# Patient Record
Sex: FEMALE | Race: WHITE | NOT HISPANIC OR LATINO | Employment: OTHER | URBAN - METROPOLITAN AREA
[De-identification: names, ages, dates, MRNs, and addresses within clinical notes are randomized per-mention and may not be internally consistent; named-entity substitution may affect disease eponyms.]

---

## 2017-02-13 ENCOUNTER — ALLSCRIPTS OFFICE VISIT (OUTPATIENT)
Dept: OTHER | Facility: OTHER | Age: 67
End: 2017-02-13

## 2017-02-21 RX ORDER — MULTIVITAMIN WITH IRON
TABLET ORAL DAILY
COMMUNITY

## 2017-02-23 ENCOUNTER — HOSPITAL ENCOUNTER (OUTPATIENT)
Dept: RADIOLOGY | Facility: CLINIC | Age: 67
Discharge: HOME | End: 2017-02-23
Payer: MEDICARE

## 2017-02-23 DIAGNOSIS — M25.552 PAIN IN LEFT HIP: ICD-10-CM

## 2017-02-23 DIAGNOSIS — M25.551 PAIN IN RIGHT HIP: ICD-10-CM

## 2017-02-23 DIAGNOSIS — M25.562 PAIN IN LEFT KNEE: ICD-10-CM

## 2017-02-23 DIAGNOSIS — M25.561 PAIN IN RIGHT KNEE: ICD-10-CM

## 2017-02-23 PROCEDURE — 73562 X-RAY EXAM OF KNEE 3: CPT

## 2017-02-23 PROCEDURE — 73521 X-RAY EXAM HIPS BI 2 VIEWS: CPT

## 2017-02-27 ENCOUNTER — GENERIC CONVERSION - ENCOUNTER (OUTPATIENT)
Dept: OTHER | Facility: OTHER | Age: 67
End: 2017-02-27

## 2017-03-01 ENCOUNTER — LAB CONVERSION - ENCOUNTER (OUTPATIENT)
Dept: OTHER | Facility: OTHER | Age: 67
End: 2017-03-01

## 2017-03-01 LAB
A/G RATIO (HISTORICAL): 1.5 (CALC) (ref 1–2.5)
ALBUMIN SERPL BCP-MCNC: 4.4 G/DL (ref 3.6–5.1)
ALP SERPL-CCNC: 89 U/L (ref 33–130)
ALT SERPL W P-5'-P-CCNC: 31 U/L (ref 6–29)
AST SERPL W P-5'-P-CCNC: 24 U/L (ref 10–35)
BILIRUB SERPL-MCNC: 0.6 MG/DL (ref 0.2–1.2)
BUN SERPL-MCNC: 34 MG/DL (ref 7–25)
BUN/CREA RATIO (HISTORICAL): 23 (CALC) (ref 6–22)
CALCIUM SERPL-MCNC: 10.2 MG/DL (ref 8.6–10.4)
CHLORIDE SERPL-SCNC: 99 MMOL/L (ref 98–110)
CHOLEST SERPL-MCNC: 185 MG/DL (ref 125–200)
CHOLEST/HDLC SERPL: 3.1 (CALC)
CO2 SERPL-SCNC: 29 MMOL/L (ref 20–31)
CREAT SERPL-MCNC: 1.47 MG/DL (ref 0.5–0.99)
CREATININE, RANDOM URINE (HISTORICAL): 97 MG/DL (ref 20–320)
EGFR AFRICAN AMERICAN (HISTORICAL): 42 ML/MIN/1.73M2
EGFR-AMERICAN CALC (HISTORICAL): 36 ML/MIN/1.73M2
GAMMA GLOBULIN (HISTORICAL): 3 G/DL (CALC) (ref 1.9–3.7)
GLUCOSE (HISTORICAL): 120 MG/DL (ref 65–99)
HBA1C MFR BLD HPLC: 6.5 % OF TOTAL HGB
HDLC SERPL-MCNC: 59 MG/DL
LDL CHOLESTEROL (HISTORICAL): 110 MG/DL (CALC)
MAGNESIUM, UR (HISTORICAL): 0.9 MG/DL
MICROALBUMIN/CREATININE RATIO (HISTORICAL): 9 MCG/MG CREAT
NON-HDL-CHOL (CHOL-HDL) (HISTORICAL): 126 MG/DL (CALC)
POTASSIUM SERPL-SCNC: 4.3 MMOL/L (ref 3.5–5.3)
SODIUM SERPL-SCNC: 139 MMOL/L (ref 135–146)
TOTAL PROTEIN (HISTORICAL): 7.4 G/DL (ref 6.1–8.1)
TRIGL SERPL-MCNC: 82 MG/DL
TSH SERPL DL<=0.05 MIU/L-ACNC: 2.77 MIU/L (ref 0.4–4.5)

## 2017-03-02 ENCOUNTER — HOSPITAL ENCOUNTER (OUTPATIENT)
Facility: HOSPITAL | Age: 67
Setting detail: OUTPATIENT SURGERY
Discharge: HOME | End: 2017-03-02
Attending: ORTHOPAEDIC SURGERY | Admitting: ORTHOPAEDIC SURGERY
Payer: MEDICARE

## 2017-03-02 VITALS
HEART RATE: 58 BPM | DIASTOLIC BLOOD PRESSURE: 70 MMHG | HEIGHT: 60 IN | SYSTOLIC BLOOD PRESSURE: 146 MMHG | WEIGHT: 182 LBS | OXYGEN SATURATION: 100 % | RESPIRATION RATE: 16 BRPM | BODY MASS INDEX: 35.73 KG/M2 | TEMPERATURE: 99.5 F

## 2017-03-02 PROBLEM — M65.352 TRIGGER LITTLE FINGER OF LEFT HAND: Status: ACTIVE | Noted: 2017-03-02

## 2017-03-02 RX ORDER — HYDROCODONE BITARTRATE AND ACETAMINOPHEN 5; 325 MG/1; MG/1
1 TABLET ORAL EVERY 6 HOURS PRN
Qty: 20 TABLET | Refills: 0 | Status: SHIPPED | OUTPATIENT
Start: 2017-03-02 | End: 2018-10-10

## 2017-03-02 RX ORDER — MAGNESIUM HYDROXIDE 1200 MG/15ML
LIQUID ORAL AS NEEDED
Status: DISCONTINUED | OUTPATIENT
Start: 2017-03-02 | End: 2017-03-02 | Stop reason: HOSPADM

## 2017-03-02 RX ADMIN — LIDOCAINE HYDROCHLORIDE,EPINEPHRINE BITARTRATE: 10; .01 INJECTION, SOLUTION INFILTRATION; PERINEURAL at 11:53

## 2017-03-06 ENCOUNTER — ALLSCRIPTS OFFICE VISIT (OUTPATIENT)
Dept: OTHER | Facility: OTHER | Age: 67
End: 2017-03-06

## 2017-03-17 ENCOUNTER — HOSPITAL ENCOUNTER (OUTPATIENT)
Dept: ULTRASOUND IMAGING | Facility: CLINIC | Age: 67
Discharge: HOME | End: 2017-03-17
Payer: MEDICARE

## 2017-03-17 DIAGNOSIS — D24.2 BENIGN NEOPLASM OF LEFT BREAST: ICD-10-CM

## 2017-03-17 PROCEDURE — 76642 ULTRASOUND BREAST LIMITED: CPT

## 2017-03-22 ENCOUNTER — ALLSCRIPTS OFFICE VISIT (OUTPATIENT)
Dept: OTHER | Facility: OTHER | Age: 67
End: 2017-03-22

## 2017-03-22 DIAGNOSIS — M54.50 LOW BACK PAIN: ICD-10-CM

## 2017-03-25 ENCOUNTER — ALLSCRIPTS OFFICE VISIT (OUTPATIENT)
Dept: OTHER | Facility: OTHER | Age: 67
End: 2017-03-25

## 2017-03-27 ENCOUNTER — GENERIC CONVERSION - ENCOUNTER (OUTPATIENT)
Dept: OTHER | Facility: OTHER | Age: 67
End: 2017-03-27

## 2017-03-28 ENCOUNTER — LAB CONVERSION - ENCOUNTER (OUTPATIENT)
Dept: OTHER | Facility: OTHER | Age: 67
End: 2017-03-28

## 2017-03-28 LAB
BUN SERPL-MCNC: 37 MG/DL (ref 7–25)
BUN/CREA RATIO (HISTORICAL): 26 (CALC) (ref 6–22)
CALCIUM SERPL-MCNC: 10 MG/DL (ref 8.6–10.4)
CHLORIDE SERPL-SCNC: 99 MMOL/L (ref 98–110)
CO2 SERPL-SCNC: 32 MMOL/L (ref 20–31)
CREAT SERPL-MCNC: 1.44 MG/DL (ref 0.5–0.99)
EGFR AFRICAN AMERICAN (HISTORICAL): 43 ML/MIN/1.73M2
EGFR-AMERICAN CALC (HISTORICAL): 37 ML/MIN/1.73M2
GLUCOSE (HISTORICAL): 92 MG/DL (ref 65–99)
POTASSIUM SERPL-SCNC: 4.3 MMOL/L (ref 3.5–5.3)
SODIUM SERPL-SCNC: 139 MMOL/L (ref 135–146)

## 2017-04-06 ENCOUNTER — ALLSCRIPTS OFFICE VISIT (OUTPATIENT)
Dept: OTHER | Facility: OTHER | Age: 67
End: 2017-04-06

## 2017-04-06 ENCOUNTER — TRANSCRIBE ORDERS (OUTPATIENT)
Dept: ADMINISTRATIVE | Facility: HOSPITAL | Age: 67
End: 2017-04-06

## 2017-04-06 DIAGNOSIS — R92.8 ABNORMAL MAMMOGRAM, UNSPECIFIED: Primary | ICD-10-CM

## 2017-04-13 ENCOUNTER — ALLSCRIPTS OFFICE VISIT (OUTPATIENT)
Dept: OTHER | Facility: OTHER | Age: 67
End: 2017-04-13

## 2017-04-17 ENCOUNTER — ALLSCRIPTS OFFICE VISIT (OUTPATIENT)
Dept: OTHER | Facility: OTHER | Age: 67
End: 2017-04-17

## 2017-04-26 ENCOUNTER — ALLSCRIPTS OFFICE VISIT (OUTPATIENT)
Dept: OTHER | Facility: OTHER | Age: 67
End: 2017-04-26

## 2017-05-18 ENCOUNTER — HOSPITAL ENCOUNTER (OUTPATIENT)
Dept: RADIOLOGY | Facility: CLINIC | Age: 67
Discharge: HOME | End: 2017-05-18
Payer: MEDICARE

## 2017-05-18 ENCOUNTER — TRANSCRIBE ORDERS (OUTPATIENT)
Dept: ADMINISTRATIVE | Facility: HOSPITAL | Age: 67
End: 2017-05-18

## 2017-05-18 DIAGNOSIS — M79.671 RIGHT FOOT PAIN: Primary | ICD-10-CM

## 2017-05-18 DIAGNOSIS — M79.671 RIGHT FOOT PAIN: ICD-10-CM

## 2017-05-18 PROCEDURE — 73630 X-RAY EXAM OF FOOT: CPT

## 2017-05-23 ENCOUNTER — ANESTHESIA EVENT (OUTPATIENT)
Dept: SURGERY | Facility: HOSPITAL | Age: 67
End: 2017-05-23
Payer: MEDICARE

## 2017-05-23 ENCOUNTER — ANESTHESIA (OUTPATIENT)
Dept: SURGERY | Facility: HOSPITAL | Age: 67
End: 2017-05-23
Payer: MEDICARE

## 2017-05-23 ENCOUNTER — HOSPITAL ENCOUNTER (OUTPATIENT)
Dept: NON INVASIVE DIAGNOSTICS | Facility: HOSPITAL | Age: 67
Discharge: HOME | End: 2017-05-23
Attending: INTERNAL MEDICINE | Admitting: INTERNAL MEDICINE
Payer: MEDICARE

## 2017-05-23 VITALS
RESPIRATION RATE: 18 BRPM | BODY MASS INDEX: 35.14 KG/M2 | TEMPERATURE: 98.6 F | HEART RATE: 52 BPM | SYSTOLIC BLOOD PRESSURE: 166 MMHG | HEIGHT: 60 IN | OXYGEN SATURATION: 96 % | DIASTOLIC BLOOD PRESSURE: 77 MMHG | WEIGHT: 179 LBS

## 2017-05-23 DIAGNOSIS — I50.32 CHRONIC DIASTOLIC HEART FAILURE (HCC): ICD-10-CM

## 2017-05-23 LAB
ANION GAP SERPL CALCULATED.3IONS-SCNC: 5 MMOL/L (ref 4–13)
BASOPHILS # BLD AUTO: 0.03 THOUSANDS/ΜL (ref 0–0.1)
BASOPHILS NFR BLD AUTO: 0 % (ref 0–1)
BUN SERPL-MCNC: 18 MG/DL (ref 5–25)
CALCIUM SERPL-MCNC: 9.8 MG/DL (ref 8.3–10.1)
CHLORIDE SERPL-SCNC: 101 MMOL/L (ref 100–108)
CO2 SERPL-SCNC: 33 MMOL/L (ref 21–32)
CREAT SERPL-MCNC: 1.1 MG/DL (ref 0.6–1.3)
EOSINOPHIL # BLD AUTO: 0.22 THOUSAND/ΜL (ref 0–0.61)
EOSINOPHIL NFR BLD AUTO: 2 % (ref 0–6)
ERYTHROCYTE [DISTWIDTH] IN BLOOD BY AUTOMATED COUNT: 14.2 % (ref 11.6–15.1)
GFR SERPL CREATININE-BSD FRML MDRD: 49.2 ML/MIN/1.73SQ M
GLUCOSE P FAST SERPL-MCNC: 135 MG/DL (ref 65–99)
GLUCOSE SERPL-MCNC: 135 MG/DL (ref 65–140)
HCT VFR BLD AUTO: 43.3 % (ref 34.8–46.1)
HGB BLD-MCNC: 14.3 G/DL (ref 11.5–15.4)
INR PPP: 0.93 (ref 0.86–1.16)
LYMPHOCYTES # BLD AUTO: 1.48 THOUSANDS/ΜL (ref 0.6–4.47)
LYMPHOCYTES NFR BLD AUTO: 16 % (ref 14–44)
MAGNESIUM SERPL-MCNC: 2.3 MG/DL (ref 1.6–2.6)
MCH RBC QN AUTO: 29.2 PG (ref 26.8–34.3)
MCHC RBC AUTO-ENTMCNC: 33 G/DL (ref 31.4–37.4)
MCV RBC AUTO: 89 FL (ref 82–98)
MONOCYTES # BLD AUTO: 0.83 THOUSAND/ΜL (ref 0.17–1.22)
MONOCYTES NFR BLD AUTO: 9 % (ref 4–12)
NEUTROPHILS # BLD AUTO: 6.54 THOUSANDS/ΜL (ref 1.85–7.62)
NEUTS SEG NFR BLD AUTO: 73 % (ref 43–75)
NRBC BLD AUTO-RTO: 0 /100 WBCS
PLATELET # BLD AUTO: 185 THOUSANDS/UL (ref 149–390)
PMV BLD AUTO: 10.5 FL (ref 8.9–12.7)
POTASSIUM SERPL-SCNC: 3.9 MMOL/L (ref 3.5–5.3)
PROTHROMBIN TIME: 12.5 SECONDS (ref 12.1–14.4)
RBC # BLD AUTO: 4.89 MILLION/UL (ref 3.81–5.12)
SODIUM SERPL-SCNC: 139 MMOL/L (ref 136–145)
WBC # BLD AUTO: 9.13 THOUSAND/UL (ref 4.31–10.16)

## 2017-05-23 PROCEDURE — 93005 ELECTROCARDIOGRAM TRACING: CPT | Performed by: PHYSICIAN ASSISTANT

## 2017-05-23 PROCEDURE — 83735 ASSAY OF MAGNESIUM: CPT | Performed by: PHYSICIAN ASSISTANT

## 2017-05-23 PROCEDURE — C1882 AICD, OTHER THAN SING/DUAL: HCPCS

## 2017-05-23 PROCEDURE — 85025 COMPLETE CBC W/AUTO DIFF WBC: CPT | Performed by: PHYSICIAN ASSISTANT

## 2017-05-23 PROCEDURE — 80048 BASIC METABOLIC PNL TOTAL CA: CPT | Performed by: PHYSICIAN ASSISTANT

## 2017-05-23 PROCEDURE — 85610 PROTHROMBIN TIME: CPT | Performed by: PHYSICIAN ASSISTANT

## 2017-05-23 PROCEDURE — 33264 RMVL & RPLCMT DFB GEN MLT LD: CPT | Performed by: INTERNAL MEDICINE

## 2017-05-23 RX ORDER — PROPOFOL 10 MG/ML
INJECTION, EMULSION INTRAVENOUS AS NEEDED
Status: DISCONTINUED | OUTPATIENT
Start: 2017-05-23 | End: 2017-05-23 | Stop reason: SURG

## 2017-05-23 RX ORDER — EPHEDRINE SULFATE 50 MG/ML
INJECTION, SOLUTION INTRAVENOUS AS NEEDED
Status: DISCONTINUED | OUTPATIENT
Start: 2017-05-23 | End: 2017-05-23 | Stop reason: SURG

## 2017-05-23 RX ORDER — SODIUM CHLORIDE 9 MG/ML
INJECTION, SOLUTION INTRAVENOUS CONTINUOUS PRN
Status: DISCONTINUED | OUTPATIENT
Start: 2017-05-23 | End: 2017-05-23 | Stop reason: SURG

## 2017-05-23 RX ORDER — LIDOCAINE HYDROCHLORIDE 10 MG/ML
INJECTION, SOLUTION INFILTRATION; PERINEURAL AS NEEDED
Status: DISCONTINUED | OUTPATIENT
Start: 2017-05-23 | End: 2017-05-23 | Stop reason: SURG

## 2017-05-23 RX ORDER — LIDOCAINE HYDROCHLORIDE 10 MG/ML
INJECTION, SOLUTION INFILTRATION; PERINEURAL CODE/TRAUMA/SEDATION MEDICATION
Status: COMPLETED | OUTPATIENT
Start: 2017-05-23 | End: 2017-05-23

## 2017-05-23 RX ORDER — PROPOFOL 10 MG/ML
INJECTION, EMULSION INTRAVENOUS CONTINUOUS PRN
Status: DISCONTINUED | OUTPATIENT
Start: 2017-05-23 | End: 2017-05-23 | Stop reason: SURG

## 2017-05-23 RX ORDER — SODIUM CHLORIDE 9 MG/ML
75 INJECTION, SOLUTION INTRAVENOUS CONTINUOUS
Status: DISCONTINUED | OUTPATIENT
Start: 2017-05-23 | End: 2017-05-23 | Stop reason: HOSPADM

## 2017-05-23 RX ORDER — ACETAMINOPHEN 325 MG/1
650 TABLET ORAL EVERY 6 HOURS PRN
Status: DISCONTINUED | OUTPATIENT
Start: 2017-05-23 | End: 2017-05-23 | Stop reason: HOSPADM

## 2017-05-23 RX ORDER — GENTAMICIN SULFATE 40 MG/ML
INJECTION, SOLUTION INTRAMUSCULAR; INTRAVENOUS CODE/TRAUMA/SEDATION MEDICATION
Status: COMPLETED | OUTPATIENT
Start: 2017-05-23 | End: 2017-05-23

## 2017-05-23 RX ADMIN — PROPOFOL 120 MCG/KG/MIN: 10 INJECTION, EMULSION INTRAVENOUS at 11:54

## 2017-05-23 RX ADMIN — LIDOCAINE HYDROCHLORIDE 50 MG: 10 INJECTION, SOLUTION INFILTRATION; PERINEURAL at 11:54

## 2017-05-23 RX ADMIN — PROPOFOL 50 MG: 10 INJECTION, EMULSION INTRAVENOUS at 12:08

## 2017-05-23 RX ADMIN — PROPOFOL 80 MG: 10 INJECTION, EMULSION INTRAVENOUS at 11:54

## 2017-05-23 RX ADMIN — GENTAMICIN SULFATE 80 MG: 40 INJECTION, SOLUTION INTRAMUSCULAR; INTRAVENOUS at 12:26

## 2017-05-23 RX ADMIN — ACETAMINOPHEN 650 MG: 325 TABLET, FILM COATED ORAL at 14:36

## 2017-05-23 RX ADMIN — SODIUM CHLORIDE: 0.9 INJECTION, SOLUTION INTRAVENOUS at 11:35

## 2017-05-23 RX ADMIN — EPHEDRINE SULFATE 5 MG: 50 INJECTION, SOLUTION INTRAMUSCULAR; INTRAVENOUS; SUBCUTANEOUS at 12:37

## 2017-05-23 RX ADMIN — LIDOCAINE HYDROCHLORIDE 18 ML: 10 INJECTION, SOLUTION INFILTRATION; PERINEURAL at 12:11

## 2017-05-23 RX ADMIN — CEFAZOLIN SODIUM 2000 MG: 2 SOLUTION INTRAVENOUS at 11:59

## 2017-05-24 ENCOUNTER — HOSPITAL ENCOUNTER (EMERGENCY)
Facility: HOSPITAL | Age: 67
Discharge: HOME | End: 2017-05-24
Attending: EMERGENCY MEDICINE
Payer: MEDICARE

## 2017-05-24 ENCOUNTER — TRANSCRIBE ORDERS (OUTPATIENT)
Dept: ADMINISTRATIVE | Facility: HOSPITAL | Age: 67
End: 2017-05-24

## 2017-05-24 ENCOUNTER — APPOINTMENT (EMERGENCY)
Dept: RADIOLOGY | Facility: HOSPITAL | Age: 67
End: 2017-05-24
Attending: EMERGENCY MEDICINE
Payer: MEDICARE

## 2017-05-24 VITALS
TEMPERATURE: 97 F | OXYGEN SATURATION: 99 % | DIASTOLIC BLOOD PRESSURE: 74 MMHG | SYSTOLIC BLOOD PRESSURE: 132 MMHG | BODY MASS INDEX: 34.18 KG/M2 | WEIGHT: 175 LBS | HEART RATE: 72 BPM | RESPIRATION RATE: 20 BRPM

## 2017-05-24 DIAGNOSIS — M10.9 GOUT OF RIGHT FOOT: Primary | ICD-10-CM

## 2017-05-24 DIAGNOSIS — M84.376A: Primary | ICD-10-CM

## 2017-05-24 LAB
ANION GAP SERPL CALCULATED.3IONS-SCNC: 7 MMOL/L (ref 4–13)
ATRIAL RATE: 50 BPM
BASOPHILS # BLD AUTO: 0.03 THOUSANDS/ΜL (ref 0–0.1)
BASOPHILS NFR BLD AUTO: 0 % (ref 0–1)
BUN SERPL-MCNC: 20 MG/DL (ref 5–25)
CALCIUM SERPL-MCNC: 9.6 MG/DL (ref 8.3–10.1)
CHLORIDE SERPL-SCNC: 103 MMOL/L (ref 100–108)
CO2 SERPL-SCNC: 30 MMOL/L (ref 21–32)
CREAT SERPL-MCNC: 1.02 MG/DL (ref 0.6–1.3)
CRP SERPL QL: 30.3 MG/L
EOSINOPHIL # BLD AUTO: 0.21 THOUSAND/ΜL (ref 0–0.61)
EOSINOPHIL NFR BLD AUTO: 2 % (ref 0–6)
ERYTHROCYTE [DISTWIDTH] IN BLOOD BY AUTOMATED COUNT: 14.6 % (ref 11.6–15.1)
ERYTHROCYTE [SEDIMENTATION RATE] IN BLOOD: 18 MM/HOUR (ref 0–15)
GFR SERPL CREATININE-BSD FRML MDRD: 53.7 ML/MIN/1.73SQ M
GLUCOSE SERPL-MCNC: 125 MG/DL (ref 65–140)
HCT VFR BLD AUTO: 44.6 % (ref 34.8–46.1)
HGB BLD-MCNC: 14.8 G/DL (ref 11.5–15.4)
LYMPHOCYTES # BLD AUTO: 1.41 THOUSANDS/ΜL (ref 0.6–4.47)
LYMPHOCYTES NFR BLD AUTO: 15 % (ref 14–44)
MCH RBC QN AUTO: 28.8 PG (ref 26.8–34.3)
MCHC RBC AUTO-ENTMCNC: 33.2 G/DL (ref 31.4–37.4)
MCV RBC AUTO: 87 FL (ref 82–98)
MONOCYTES # BLD AUTO: 1.1 THOUSAND/ΜL (ref 0.17–1.22)
MONOCYTES NFR BLD AUTO: 11 % (ref 4–12)
NEUTROPHILS # BLD AUTO: 6.97 THOUSANDS/ΜL (ref 1.85–7.62)
NEUTS SEG NFR BLD AUTO: 72 % (ref 43–75)
P AXIS: 43 DEGREES
PLATELET # BLD AUTO: 196 THOUSANDS/UL (ref 149–390)
PMV BLD AUTO: 10.4 FL (ref 8.9–12.7)
POTASSIUM SERPL-SCNC: 4.4 MMOL/L (ref 3.5–5.3)
PR INTERVAL: 80 MS
QRS AXIS: -46 DEGREES
QRSD INTERVAL: 182 MS
QT INTERVAL: 508 MS
QTC INTERVAL: 463 MS
RBC # BLD AUTO: 5.14 MILLION/UL (ref 3.81–5.12)
SODIUM SERPL-SCNC: 140 MMOL/L (ref 136–145)
T WAVE AXIS: 8 DEGREES
URATE SERPL-MCNC: 7.2 MG/DL (ref 2–6.8)
VENTRICULAR RATE: 50 BPM
WBC # BLD AUTO: 9.72 THOUSAND/UL (ref 4.31–10.16)

## 2017-05-24 PROCEDURE — 99284 EMERGENCY DEPT VISIT MOD MDM: CPT

## 2017-05-24 PROCEDURE — 85652 RBC SED RATE AUTOMATED: CPT | Performed by: EMERGENCY MEDICINE

## 2017-05-24 PROCEDURE — 85025 COMPLETE CBC W/AUTO DIFF WBC: CPT | Performed by: EMERGENCY MEDICINE

## 2017-05-24 PROCEDURE — 96360 HYDRATION IV INFUSION INIT: CPT

## 2017-05-24 PROCEDURE — 73630 X-RAY EXAM OF FOOT: CPT

## 2017-05-24 PROCEDURE — 86140 C-REACTIVE PROTEIN: CPT | Performed by: EMERGENCY MEDICINE

## 2017-05-24 PROCEDURE — 80048 BASIC METABOLIC PNL TOTAL CA: CPT | Performed by: EMERGENCY MEDICINE

## 2017-05-24 PROCEDURE — 84550 ASSAY OF BLOOD/URIC ACID: CPT | Performed by: EMERGENCY MEDICINE

## 2017-05-24 PROCEDURE — 96361 HYDRATE IV INFUSION ADD-ON: CPT

## 2017-05-24 PROCEDURE — 36415 COLL VENOUS BLD VENIPUNCTURE: CPT | Performed by: EMERGENCY MEDICINE

## 2017-05-24 RX ORDER — COLCHICINE 0.6 MG/1
1.2 TABLET ORAL ONCE
Status: DISCONTINUED | OUTPATIENT
Start: 2017-05-24 | End: 2017-05-24 | Stop reason: HOSPADM

## 2017-05-24 RX ORDER — INDOMETHACIN 50 MG/1
50 CAPSULE ORAL 2 TIMES DAILY WITH MEALS
Qty: 20 CAPSULE | Refills: 0 | Status: SHIPPED | OUTPATIENT
Start: 2017-05-24 | End: 2019-10-01

## 2017-05-24 RX ORDER — COLCHICINE 0.6 MG/1
0.6 TABLET ORAL DAILY
Qty: 1 TABLET | Refills: 0 | Status: SHIPPED | OUTPATIENT
Start: 2017-05-24 | End: 2019-10-01

## 2017-05-24 RX ADMIN — SODIUM CHLORIDE 1000 ML: 0.9 INJECTION, SOLUTION INTRAVENOUS at 12:01

## 2017-05-26 ENCOUNTER — HOSPITAL ENCOUNTER (OUTPATIENT)
Dept: CT IMAGING | Facility: HOSPITAL | Age: 67
Discharge: HOME | End: 2017-05-26
Payer: MEDICARE

## 2017-05-26 DIAGNOSIS — M84.376A: ICD-10-CM

## 2017-05-26 PROCEDURE — 73700 CT LOWER EXTREMITY W/O DYE: CPT

## 2017-06-09 ENCOUNTER — ALLSCRIPTS OFFICE VISIT (OUTPATIENT)
Dept: OTHER | Facility: OTHER | Age: 67
End: 2017-06-09

## 2017-06-30 ENCOUNTER — ALLSCRIPTS OFFICE VISIT (OUTPATIENT)
Dept: OTHER | Facility: OTHER | Age: 67
End: 2017-06-30

## 2017-06-30 DIAGNOSIS — M10.9 GOUT: ICD-10-CM

## 2017-06-30 DIAGNOSIS — E78.5 HYPERLIPIDEMIA: ICD-10-CM

## 2017-06-30 DIAGNOSIS — E11.22 TYPE 2 DIABETES MELLITUS WITH DIABETIC CHRONIC KIDNEY DISEASE (HCC): ICD-10-CM

## 2017-06-30 DIAGNOSIS — E03.9 HYPOTHYROIDISM: ICD-10-CM

## 2017-07-06 ENCOUNTER — LAB CONVERSION - ENCOUNTER (OUTPATIENT)
Dept: OTHER | Facility: OTHER | Age: 67
End: 2017-07-06

## 2017-07-06 LAB
A/G RATIO (HISTORICAL): 1.7 (CALC) (ref 1–2.5)
ALBUMIN SERPL BCP-MCNC: 3.8 G/DL (ref 3.6–5.1)
ALP SERPL-CCNC: 66 U/L (ref 33–130)
ALT SERPL W P-5'-P-CCNC: 26 U/L (ref 6–29)
AST SERPL W P-5'-P-CCNC: 18 U/L (ref 10–35)
BASOPHILS # BLD AUTO: 0.7 %
BASOPHILS # BLD AUTO: 55 CELLS/UL (ref 0–200)
BILIRUB SERPL-MCNC: 0.6 MG/DL (ref 0.2–1.2)
BUN SERPL-MCNC: 21 MG/DL (ref 7–25)
BUN/CREA RATIO (HISTORICAL): 16 (CALC) (ref 6–22)
CALCIUM SERPL-MCNC: 9.8 MG/DL (ref 8.6–10.4)
CHLORIDE SERPL-SCNC: 102 MMOL/L (ref 98–110)
CHOLEST SERPL-MCNC: 170 MG/DL (ref 125–200)
CHOLEST/HDLC SERPL: 2.3 (CALC)
CO2 SERPL-SCNC: 32 MMOL/L (ref 20–31)
CREAT SERPL-MCNC: 1.3 MG/DL (ref 0.5–0.99)
DEPRECATED RDW RBC AUTO: 15.2 % (ref 11–15)
EGFR AFRICAN AMERICAN (HISTORICAL): 48 ML/MIN/1.73M2
EGFR-AMERICAN CALC (HISTORICAL): 42 ML/MIN/1.73M2
EOSINOPHIL # BLD AUTO: 2.8 %
EOSINOPHIL # BLD AUTO: 218 CELLS/UL (ref 15–500)
GAMMA GLOBULIN (HISTORICAL): 2.3 G/DL (CALC) (ref 1.9–3.7)
GLUCOSE (HISTORICAL): 138 MG/DL (ref 65–99)
HBA1C MFR BLD HPLC: 7 % OF TOTAL HGB
HCT VFR BLD AUTO: 43.9 % (ref 35–45)
HDLC SERPL-MCNC: 73 MG/DL
HGB BLD-MCNC: 14.3 G/DL (ref 11.7–15.5)
LDL CHOLESTEROL (HISTORICAL): 85 MG/DL (CALC)
LYMPHOCYTES # BLD AUTO: 1669 CELLS/UL (ref 850–3900)
LYMPHOCYTES # BLD AUTO: 21.4 %
MCH RBC QN AUTO: 29.1 PG (ref 27–33)
MCHC RBC AUTO-ENTMCNC: 32.6 G/DL (ref 32–36)
MCV RBC AUTO: 89.3 FL (ref 80–100)
MONOCYTES # BLD AUTO: 780 CELLS/UL (ref 200–950)
MONOCYTES (HISTORICAL): 10 %
NEUTROPHILS # BLD AUTO: 5078 CELLS/UL (ref 1500–7800)
NEUTROPHILS # BLD AUTO: 65.1 %
NON-HDL-CHOL (CHOL-HDL) (HISTORICAL): 97 MG/DL (CALC)
PLATELET # BLD AUTO: 200 THOUSAND/UL (ref 140–400)
PMV BLD AUTO: 9 FL (ref 7.5–12.5)
POTASSIUM SERPL-SCNC: 4.6 MMOL/L (ref 3.5–5.3)
RBC # BLD AUTO: 4.92 MILLION/UL (ref 3.8–5.1)
SODIUM SERPL-SCNC: 143 MMOL/L (ref 135–146)
TOTAL PROTEIN (HISTORICAL): 6.1 G/DL (ref 6.1–8.1)
TRIGL SERPL-MCNC: 59 MG/DL
TSH SERPL DL<=0.05 MIU/L-ACNC: 1.73 MIU/L (ref 0.4–4.5)
URIC ACID (HISTORICAL): 9.2 MG/DL (ref 2.5–7)
WBC # BLD AUTO: 7.8 THOUSAND/UL (ref 3.8–10.8)

## 2017-07-10 ENCOUNTER — ALLSCRIPTS OFFICE VISIT (OUTPATIENT)
Dept: OTHER | Facility: OTHER | Age: 67
End: 2017-07-10

## 2017-07-21 ENCOUNTER — GENERIC CONVERSION - ENCOUNTER (OUTPATIENT)
Dept: OTHER | Facility: OTHER | Age: 67
End: 2017-07-21

## 2017-07-24 ENCOUNTER — GENERIC CONVERSION - ENCOUNTER (OUTPATIENT)
Dept: OTHER | Facility: OTHER | Age: 67
End: 2017-07-24

## 2017-07-28 ENCOUNTER — GENERIC CONVERSION - ENCOUNTER (OUTPATIENT)
Dept: OTHER | Facility: OTHER | Age: 67
End: 2017-07-28

## 2017-08-29 ENCOUNTER — ALLSCRIPTS OFFICE VISIT (OUTPATIENT)
Dept: OTHER | Facility: OTHER | Age: 67
End: 2017-08-29

## 2017-08-31 ENCOUNTER — GENERIC CONVERSION - ENCOUNTER (OUTPATIENT)
Dept: OTHER | Facility: OTHER | Age: 67
End: 2017-08-31

## 2017-08-31 ENCOUNTER — LAB CONVERSION - ENCOUNTER (OUTPATIENT)
Dept: OTHER | Facility: OTHER | Age: 67
End: 2017-08-31

## 2017-08-31 LAB
BACTERIA UR QL AUTO: ABNORMAL /HPF
BILIRUB UR QL STRIP: NEGATIVE
COLOR UR: YELLOW
COMMENT (HISTORICAL): CLEAR
CULTURE RESULT (HISTORICAL): ABNORMAL
CULTURE RESULT (HISTORICAL): NORMAL
FECAL OCCULT BLOOD DIAGNOSTIC (HISTORICAL): NEGATIVE
GLUCOSE (HISTORICAL): NEGATIVE
HYALINE CASTS #/AREA URNS LPF: ABNORMAL /LPF
KETONES UR STRIP-MCNC: NEGATIVE MG/DL
LEUKOCYTE ESTERASE UR QL STRIP: ABNORMAL
NITRITE UR QL STRIP: NEGATIVE
PH UR STRIP.AUTO: 6 [PH] (ref 5–8)
PROT UR STRIP-MCNC: NEGATIVE MG/DL
RBC (HISTORICAL): ABNORMAL /HPF
SP GR UR STRIP.AUTO: 1.01 (ref 1–1.03)
SQUAMOUS EPITHELIAL CELLS (HISTORICAL): ABNORMAL /HPF
WBC # BLD AUTO: ABNORMAL /HPF

## 2017-09-06 ENCOUNTER — GENERIC CONVERSION - ENCOUNTER (OUTPATIENT)
Dept: OTHER | Facility: OTHER | Age: 67
End: 2017-09-06

## 2017-09-08 ENCOUNTER — GENERIC CONVERSION - ENCOUNTER (OUTPATIENT)
Dept: OTHER | Facility: OTHER | Age: 67
End: 2017-09-08

## 2017-09-11 ENCOUNTER — ALLSCRIPTS OFFICE VISIT (OUTPATIENT)
Dept: OTHER | Facility: OTHER | Age: 67
End: 2017-09-11

## 2017-09-12 ENCOUNTER — GENERIC CONVERSION - ENCOUNTER (OUTPATIENT)
Dept: OTHER | Facility: OTHER | Age: 67
End: 2017-09-12

## 2017-09-13 ENCOUNTER — GENERIC CONVERSION - ENCOUNTER (OUTPATIENT)
Dept: OTHER | Facility: OTHER | Age: 67
End: 2017-09-13

## 2017-09-14 ENCOUNTER — GENERIC CONVERSION - ENCOUNTER (OUTPATIENT)
Dept: OTHER | Facility: OTHER | Age: 67
End: 2017-09-14

## 2017-09-26 ENCOUNTER — GENERIC CONVERSION - ENCOUNTER (OUTPATIENT)
Dept: OTHER | Facility: OTHER | Age: 67
End: 2017-09-26

## 2017-10-09 ENCOUNTER — GENERIC CONVERSION - ENCOUNTER (OUTPATIENT)
Dept: OTHER | Facility: OTHER | Age: 67
End: 2017-10-09

## 2017-10-13 ENCOUNTER — ALLSCRIPTS OFFICE VISIT (OUTPATIENT)
Dept: OTHER | Facility: OTHER | Age: 67
End: 2017-10-13
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
## 2017-10-30 ENCOUNTER — HOSPITAL ENCOUNTER (OUTPATIENT)
Dept: MAMMOGRAPHY | Facility: CLINIC | Age: 67
Discharge: HOME | End: 2017-10-30
Payer: MEDICARE

## 2017-10-30 ENCOUNTER — HOSPITAL ENCOUNTER (OUTPATIENT)
Dept: ULTRASOUND IMAGING | Facility: CLINIC | Age: 67
Discharge: HOME | End: 2017-10-30
Payer: MEDICARE

## 2017-10-30 DIAGNOSIS — R92.8 ABNORMAL MAMMOGRAM: ICD-10-CM

## 2017-10-30 PROCEDURE — G0202 SCR MAMMO BI INCL CAD: HCPCS

## 2017-10-30 PROCEDURE — 77063 BREAST TOMOSYNTHESIS BI: CPT

## 2017-10-30 PROCEDURE — 76642 ULTRASOUND BREAST LIMITED: CPT

## 2017-11-03 ENCOUNTER — APPOINTMENT (OUTPATIENT)
Dept: LAB | Facility: CLINIC | Age: 67
End: 2017-11-03
Payer: MEDICARE

## 2017-11-03 ENCOUNTER — TRANSCRIBE ORDERS (OUTPATIENT)
Dept: ADMINISTRATIVE | Facility: HOSPITAL | Age: 67
End: 2017-11-03

## 2017-11-03 DIAGNOSIS — E78.5 HYPERLIPIDEMIA: ICD-10-CM

## 2017-11-03 DIAGNOSIS — M10.9 GOUT: ICD-10-CM

## 2017-11-03 DIAGNOSIS — E03.9 HYPOTHYROIDISM: ICD-10-CM

## 2017-11-03 DIAGNOSIS — E11.22 TYPE 2 DIABETES MELLITUS WITH DIABETIC CHRONIC KIDNEY DISEASE (HCC): ICD-10-CM

## 2017-11-03 LAB
ALBUMIN SERPL BCP-MCNC: 3.7 G/DL (ref 3.5–5)
ALP SERPL-CCNC: 65 U/L (ref 46–116)
ALT SERPL W P-5'-P-CCNC: 29 U/L (ref 12–78)
ANION GAP SERPL CALCULATED.3IONS-SCNC: 4 MMOL/L (ref 4–13)
AST SERPL W P-5'-P-CCNC: 10 U/L (ref 5–45)
BILIRUB SERPL-MCNC: 0.45 MG/DL (ref 0.2–1)
BUN SERPL-MCNC: 31 MG/DL (ref 5–25)
CALCIUM SERPL-MCNC: 10 MG/DL (ref 8.3–10.1)
CHLORIDE SERPL-SCNC: 101 MMOL/L (ref 100–108)
CHOLEST SERPL-MCNC: 190 MG/DL (ref 50–200)
CO2 SERPL-SCNC: 34 MMOL/L (ref 21–32)
CREAT SERPL-MCNC: 1.25 MG/DL (ref 0.6–1.3)
EST. AVERAGE GLUCOSE BLD GHB EST-MCNC: 146 MG/DL
GFR SERPL CREATININE-BSD FRML MDRD: 44 ML/MIN/1.73SQ M
GLUCOSE P FAST SERPL-MCNC: 100 MG/DL (ref 65–99)
HBA1C MFR BLD: 6.7 % (ref 4.2–6.3)
HDLC SERPL-MCNC: 79 MG/DL (ref 40–60)
LDLC SERPL CALC-MCNC: 98 MG/DL (ref 0–100)
POTASSIUM SERPL-SCNC: 4.2 MMOL/L (ref 3.5–5.3)
PROT SERPL-MCNC: 7 G/DL (ref 6.4–8.2)
SODIUM SERPL-SCNC: 139 MMOL/L (ref 136–145)
TRIGL SERPL-MCNC: 65 MG/DL
TSH SERPL DL<=0.05 MIU/L-ACNC: 1.24 UIU/ML (ref 0.36–3.74)
URATE SERPL-MCNC: 9.5 MG/DL (ref 2–6.8)

## 2017-11-03 PROCEDURE — 83036 HEMOGLOBIN GLYCOSYLATED A1C: CPT

## 2017-11-03 PROCEDURE — 80061 LIPID PANEL: CPT

## 2017-11-03 PROCEDURE — 36415 COLL VENOUS BLD VENIPUNCTURE: CPT

## 2017-11-03 PROCEDURE — 80053 COMPREHEN METABOLIC PANEL: CPT

## 2017-11-03 PROCEDURE — 84443 ASSAY THYROID STIM HORMONE: CPT

## 2017-11-03 PROCEDURE — 84550 ASSAY OF BLOOD/URIC ACID: CPT

## 2017-11-13 ENCOUNTER — ALLSCRIPTS OFFICE VISIT (OUTPATIENT)
Dept: OTHER | Facility: OTHER | Age: 67
End: 2017-11-13

## 2017-11-30 ENCOUNTER — ALLSCRIPTS OFFICE VISIT (OUTPATIENT)
Dept: OTHER | Facility: OTHER | Age: 67
End: 2017-11-30

## 2017-12-05 NOTE — PROGRESS NOTES
Assessment    1  History of Abnormal finding on breast imaging (793 89) (R92 8)   2  Encounter for screening mammogram for malignant neoplasm of breast (V76 12)   (Z12 31)   3  Bilateral fibrocystic breast changes (610 1) (N60 11,N60 12)    Plan  Encounter for screening mammogram for malignant neoplasm of breast    · * MAMMO SCREENING BILATERAL W CAD; Status:Hold For - Scheduling; Requested  for:30Oct2018; Perform:[facility] Radiology; ICP:86WCP0016;QLVPAHF;  [place] for screening mammogram for malignant neoplasm of breast; Ordered By:Chela Segura;  PMH: Abnormal finding on breast imaging    · Follow-up visit in 1 year Evaluation and Treatment  Follow-up  Status: Hold For -  Scheduling  Requested for: 55CEV9625   Ordered; For: PMH: Abnormal finding on breast imaging; Ordered By: Lindsey Millard Performed:  Due: 53TZW5901    Discussion/Summary  68 yo female with fibrocystic changes and family history  She is s/p benign biopsy of the left breast  Her exam and imaging are stable  I will therefore see her again in one year or sooner should the need arise  Chief Complaint  Chief Complaint Free Text Note Form: Pt is here for her 6 month breast follow-up   no new complaints at this time  breast imaging:10/30/2017 [facility] 3d scrn mammo/left us (B2)  provider:Dr Jt Floyd  History of Present Illness  Diagnosis and Staging: left breast benign biopsy, fibrocystic changes, abnormal imaging      Review of Systems  Complete Female ROS SurgOnc:   Constitutional: The patient denies new or recent history of general fatigue, no recent weight loss, no change in appetite  Eyes: No complaints of visual problems, no scleral icterus  ENT: no complaints of ear pain, no hoarseness, no difficulty swallowing, no tinnitus and no new masses in head, oral cavity, or neck  Cardiovascular: No complaints of chest pain, no palpitations, no ankle edema  Respiratory: No complaints of shortness of breath, no cough  Gastrointestinal: No complaints of jaundice, no bloody stools, no pale stools  Genitourinary: No complaints of dysuria, no hematuria, no nocturia, no frequent urination, no urethral discharge  Musculoskeletal: left shoulder pain  Integumentary: No complaints of rash, no new lesions  Neurological: No complaints of convulsions, no seizures, no dizziness  Hematologic/Lymphatic: No complaints of easy bruising  Active Problems    1  Allergic rhinitis (477 9) (J30 9)   2  Arthralgia (719 40) (M25 50)   3  Arthralgia of knee, left (719 46) (M25 562)   4  Arthralgia of knee, right (719 46) (M25 561)   5  Atherosclerotic heart disease of native coronary artery without angina pectoris (414 01)   (I25 10)   6  Benign positional vertigo, left (386 11) (H81 12)   7  Bilateral sacroiliitis (720 2) (M46 1)   8  Cardiac defibrillator in situ (V45 02) (Z95 810)   9  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   10  Chronic diastolic congestive heart failure (428 32,428 0) (I50 32)   11  Chronic kidney disease, stage 3 (585 3) (N18 3)   12  Chronic systolic congestive heart failure (428 22,428 0) (I50 22)   13  Coronary artery disease (414 00) (I25 10)   14  Cough (786 2) (R05)   15  Degeneration of cervical intervertebral disc (722 4) (M50 90)   16  Depression (311) (F32 9)   17  Detrusor instability (596 59) (N32 81)   18  Dilated cardiomyopathy (425 4) (I42 0)   19  Encounter for screening mammogram for malignant neoplasm of breast (V76 12)    (Z12 31)   20  Esophageal reflux (530 81) (K21 9)   21  Gout (274 9) (M10 9)   22  Hyperlipidemia (272 4) (E78 5)   23  Hypertension (401 9) (I10)   24  Hypokalemia (276 8) (E87 6)   25  Hypothyroidism (244 9) (E03 9)   26  Ingrown nail of second toe of right foot (703 0) (L60 0)   27  Left bundle-branch block (426 3) (I44 7)   28  Left low back pain (724 2) (M54 5)   29  History of Need For Prophylactic Measure (V07 9)   30   Need for vaccination with 13-polyvalent pneumococcal conjugate vaccine (V03 82) (Z23)   31  Needs flu shot (V04 81) (Z23)   32  Nipple discharge (611 79) (N64 52)   33  Obesity (278 00) (E66 9)   34  Obstructive sleep apnea (327 23) (G47 33)   35  Osteoarthritis (715 90) (M19 90)   36  Overactive bladder (596 51) (N32 81)   37  Pain of both hip joints (719 45) (M25 551,M25 552)   38  Papilloma of left breast (217) (D24 2)   39  Psoriasis (696 1) (L40 9)   40  Psoriatic arthropathy (696 0) (L40 50)   41  Restless legs syndrome (333 94) (G25 81)   42  Sciatica (724 3) (M54 30)   43  Screening for genitourinary condition (V81 6) (Z13 89)   44  Screening for osteoporosis (V82 81) (Z13 820)   45  Shortness of breath (786 05) (R06 02)   46  Subungual infection of toe of left foot (681 11) (L03 032)   47  Tendinitis (726 90) (M77 9)   48  Trigger little finger of left hand (727 03) (M65 352)   49  Trigger ring finger of right hand (727 03) (M65 341)   50  Trigger thumb, left   51  Type 2 diabetes mellitus (250 00) (E11 9)   52  Type 2 diabetes mellitus with stage 3 chronic kidney disease (250 40,585 3)    (E11 22,N18 3)   53  UTI (urinary tract infection), bacterial (599 0,041 9) (N39 0,A49 9)   54  Vitamin D deficiency (268 9) (E55 9)    Past Medical History    1  History of Abnormal finding on breast imaging (793 89) (R92 8)   2  History of Acute bacterial bronchitis (466 0,041 9) (J20 8,B96 89)   3  Denied: History of Alcohol abuse   4  History of Arthralgia of toe, unspecified laterality (719 47) (M25 579)   5  History of Back pain, unspecified back pain laterality, unspecified chronicity, unspecified   location   6  History of Benign paroxysmal vertigo, unspecified laterality (386 11) (H81 10)   7  History of Birth control (V25 9) (Z30 9)   8  History of Carpal tunnel syndrome, unspecified laterality (354 0) (G56 00)   9  History of Difficulty walking up stairs (719 7) (R26 2)   10   History of blood clots (V12 51) (Z86 718) 11  History of hemorrhoids (V13 89) (Z87 19)   12  History of left bundle branch block (V12 59) (Z86 79)   13  History of pregnancy (V13 29)   14  History of rickettsial disease (V12 09) (Z86 19)   15  Denied: History of substance abuse   16  History of urinary frequency (V13 09) (Z87 898)   17  History of Hormone replacement therapy (V07 4) (Z79 890)   18  History of Indigestion (536 8) (K30)   19  History of Menarche (V21 8)   20  History of Sleep apnea, unspecified type (780 57) (G47 30)   21  History of Urinary Tract Infection (V13 02)    Surgical History    1  History of Biopsy Breast Percutaneous Needle Core   · 05/23/16 left   2  History of Bladder Surgery   · lift and repair 1999   3  History of Cardiac Cath Procedure Outcome: Successful   4  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   5  History of Cath Stent Placement   6  History of Cystoscopy With Ureteroscopy   · 2009   7  History of Eye Surgery   · left eye 1999   8  History of Hand Surgery   · 1997 marcus carpal tunnel hands, DeQuervains and trigger finger release left hand 2011  Trigger finger release right hand 2011  Trigger finger release left hand 2012  Trigger      finger release right hand 2015  Trigger finger release left thumb 2015    9  History of Implantable Cardioverter-Defibrillator   10  History of Knee Arthroscopy (Therapeutic)   11  History of Knee Replacement   12  History of Need For Prophylactic Measure (V07 9)   13  History of Neuroplasty Decompression Median Nerve At Carpal Tunnel   14  History of Rectal Surgery   · repair of rectal ulcer 2006   15  History of Shoulder Surgery   · replacement 2007 left  left arthroscopy 2006   16  History of Total Abdominal Hysterectomy  Surgical History Reviewed: The surgical history was reviewed and updated today  Family History  Father    1  Family history of malignant neoplasm of prostate (V16 42) (Z80 42)   2   Family history of malignant neoplasm of urinary bladder (V16 52) (Z80 52)  Brother    3  Family history of malignant neoplasm (V16 9) (Z80 9)  Paternal Grandmother    4  Family history of Malignant neoplasm of female breast, unspecified laterality, unspecified   site of breast  Paternal Aunt    11  Family history of malignant neoplasm of stomach (V16 0) (Z80 0)  Family History    6  Denied: Family history of Alcohol abuse   7  Denied: Family history of substance abuse   8  Denied: Family history of Mental health problem  Family History Reviewed: The family history was reviewed and updated today  Social History    · Denied: History of Alcohol use   · Always uses seat belt   · Former smoker (V15 82) (H99 287)   · Marital History - Currently   Social History Reviewed: The social history was reviewed and updated today  The social history was reviewed and is unchanged  Current Meds   1  Allopurinol 100 MG Oral Tablet; TAKE 1 TABLET DAILY; Therapy: 39UPC0547 to ([place] Reason)  Requested for: 90INA2392; Last   Rx:13Nov2017 Ordered   2  Aspirin 81 MG TABS; Take 1 tablet daily; Therapy: (Recorded:57Kls2469) to Recorded   3  Candesartan Cilexetil 32 MG Oral Tablet; Take 1 tablet daily; Therapy: 15JAS4473 to (Lexii Jones)  Requested for: 11AYT1627; Last   Rx:90Chm3254 Ordered   4  Claritin 10 MG Oral Tablet; Therapy: (Recorded:42Lqq4274) to Recorded   5  [facility] 2 % SOLN Recorded   6  Daily Multiple Vitamins Oral Tablet; Take 1 tablet daily; Therapy: (Recorded:08Wiq1834) to Recorded   7  FLUoxetine HCl - 20 MG Oral Capsule; take 1 capsule daily; Therapy: 93RXX1204 to (Robert Block)  Requested for: 49Dec5323; Last   Rx:60Nre4463 Ordered   8  Januvia 100 MG Oral Tablet; Take 1 tablet daily; Therapy: 79DAW4364 to ([place] Reason)  Requested for: 72CMW7678; Last   Rx:13Nov2017 Ordered   9  Klor-Con M20 20 MEQ Oral Tablet Extended Release; Take 1 tablet twice daily;    Therapy: 02PPE1824 to (Evaluate:30Oct2018)  Requested for: 04MDC6537; Last   Rx:04Nov2017 Ordered   10  Levothyroxine Sodium 25 MCG Oral Tablet; TAKE 1 TABLET EVERY MORNING; Therapy: 17EEI4283 to [phone])  Requested for: 61UMU8747; Last    Rx:32Dmv7838 Ordered   11  Magnesium 250 MG Oral Tablet; Therapy: (Recorded:16Hib1174) to Recorded   12  Meloxicam 15 MG Oral Tablet; take 1 tablet every other day; Therapy: (Recorded:30Jun2017) to Recorded   13  Metoprolol Succinate  MG Oral Tablet Extended Release 24 Hour; Take 1 tablet    daily; Therapy: 74XWZ1955 to (Evaluate:27Apr2018)  Requested for: 21DZL4518; Last    Rx:55Hvw4365 Ordered   14  Montelukast Sodium 10 MG Oral Tablet; Take 1 tablet daily  Requested for: 09CGG2342;    Last Rx:40Ude4206 Ordered   15  Omeprazole 40 MG Oral Capsule Delayed Release; TAKE ONE CAPSULE BY MOUTH    ONCE DAILY; Therapy: 91QKL1345 to (Evaluate:10Aug2018)  Requested for: 31IAA5421; Last    Rx:13Nov2017 Ordered   16  Simvastatin 40 MG Oral Tablet; take 1 tablet by mouth daily; Therapy: 74Ovx3465 to [phone])  Requested for: 86HKJ1980; Last    Rx:44Zmn5734 Ordered   17  Torsemide 20 MG Oral Tablet; TAKE 2 OR 3 TABLETS DAILY; Therapy: 11Riv5462 to (Evaluate:98Sja5298)  Requested for: 80ZSK1801; Last    Rx:01Nov2017 Ordered   18  TraMADol HCl - 50 MG Oral Tablet; TAKE 1 TABLET BY MOUTH TWICE A DAY AS    NEEDED; Therapy: 36ONH3166 to (Evaluate:28Jan2018); Last Rx:30Oct2017 Ordered   19  Vitamin B6 TABS; TAKE 1 TABLET DAILY AS DIRECTED; Therapy: (Recorded:70Ajr8994) to Recorded   20  Vitamin D 1000 UNIT CAPS; Take as directed; Therapy: (Recorded:87Axu9481) to Recorded  Medication List Reviewed: The medication list was reviewed and updated today  Allergies    1   No Known Drug Allergies    Vitals  Vital Signs    Recorded: 98ACJ7548 01:55PM   Temperature 99 6 F   Heart Rate 78   Respiration 15   Systolic 148   Diastolic 74   Height 5 ft    Weight 176 lb BMI Calculated 34 37   BSA Calculated 1 77   O2 Saturation 98     Physical Exam    Constitutional: General appearance: The Patient is well-developed, well-nourished female who appears her stated age in no acute distress  She is pleasant and talkative  Neuro: Grossly nonfocal   Orientation to person, place and time: Normal   Mood and affect: Normal     Lymphatic: no evidence of cervical adenopathy bilaterally  no evidence of axillary adenopathy bilaterally  Skin: Examination of Breast: Abnormal   Breast: Examination of both breasts revealed fibrocystic changes  Examination of the right breast revealed no erythema, no swelling and no tenderness  Examination of the left breast revealed no erythema, no swelling and no tenderness  Nipples appeared normal No discharge was noted from the nipples  No breast masses were palpable  Axillary nodes: no enlarged nodes  Results/Data  Diagnostic Studies Reviewed Surg Onc:   X-ray Review 10/30/17 bilateral 3d screening mammogram and left breast ultrasound benign/stable B2  Health Management  History of Encounter for screening colonoscopy   COLONOSCOPY; every 10 years; Last 48SBK9414; Next Due: 48HAZ9221; Active    Future Appointments    Date/Time Provider Specialty Site   12/11/2017 10:00 AM Cardiology, Device Remote  [address]   03/13/2018 01:40 PM Van Martinez MD Family Medicine Raimundo Flores MD     End of Encounter Meds    1  Claritin 10 MG Oral Tablet; Therapy: (Recorded:58Uoz5654) to Recorded   2  Montelukast Sodium 10 MG Oral Tablet (Singulair); Take 1 tablet daily  Requested for:   12EBL0957; Last Rx:36Mxj5058 Ordered    3  Torsemide 20 MG Oral Tablet; TAKE 2 OR 3 TABLETS DAILY; Therapy: 66Uii1716 to (Evaluate:93Tiw2124)  Requested for: 51OHP5114; Last   Rx:66Vpq2059 Ordered    4  Aspirin 81 MG TABS; Take 1 tablet daily; Therapy: (Recorded:42Wdg2007) to Recorded    5   FLUoxetine HCl - 20 MG Oral Capsule; take 1 capsule daily; Therapy: 94ZEK5434 to (Vijaya Jamar)  Requested for: 79Fre7104; Last   Rx:2017 Ordered    6  Omeprazole 40 MG Oral Capsule Delayed Release; TAKE ONE CAPSULE BY MOUTH   ONCE DAILY; Therapy: 73TRJ4802 to (Evaluate:2018)  Requested for: 79GLQ5035; Last   Rx:2017 Ordered    7  Allopurinol 100 MG Oral Tablet; TAKE 1 TABLET DAILY; Therapy: 59QDN4132 to (Marina Apgar)  Requested for: 29PYS4850; Last   Rx:2017 Ordered    8  Daily Multiple Vitamins Oral Tablet; Take 1 tablet daily; Therapy: (Recorded:67Hzv9349) to Recorded   9  Magnesium 250 MG Oral Tablet; Therapy: (Recorded:55Ild3496) to Recorded   10  Vitamin B6 TABS; TAKE 1 TABLET DAILY AS DIRECTED; Therapy: (Recorded:99Vrw4280) to Recorded    11  Simvastatin 40 MG Oral Tablet (Zocor); take 1 tablet by mouth daily; Therapy: 22Bfy0446 to (Camilo De Guzman)  Requested for: 30IXD1587; Last    Rx:96Fhy6180 Ordered    12  Candesartan Cilexetil 32 MG Oral Tablet; Take 1 tablet daily; Therapy: 75RNF4007 to (Yuri Espinal)  Requested for: 74NGE8896; Last    Rx:2017 Ordered   13  Metoprolol Succinate  MG Oral Tablet Extended Release 24 Hour (Toprol XL); Take 1 tablet daily; Therapy: 20ARX2926 to (Evaluate:2018)  Requested for: 44EEN3813; Last    Rx:2017 Ordered    14  Klor-Con M20 20 MEQ Oral Tablet Extended Release; Take 1 tablet twice daily; Therapy: 84JLY3814 to (Evaluate:2018)  Requested for: 54SSF2314; Last    Rx:2017 Ordered    15  Levothyroxine Sodium 25 MCG Oral Tablet; TAKE 1 TABLET EVERY MORNING; Therapy: 03YIR2149 to (Camilo De Guzman)  Requested for: 03JBA8023; Last    Rx:90Vtz9369 Ordered    16  TraMADol HCl - 50 MG Oral Tablet; TAKE 1 TABLET BY MOUTH TWICE A DAY AS    NEEDED; Therapy: 48VZN3629 to (Evaluate:2018); Last Rx:2017 Ordered    17  Januvia 100 MG Oral Tablet; Take 1 tablet daily;     Therapy: 30NUN5531 to (Marina Apgar)  Requested for: 67RRH1364; Last    Rx:13Nov2017 Ordered    18  Vitamin D 1000 UNIT CAPS; Take as directed; Therapy: (Recorded:27Pbg5553) to Recorded    19  [facility] 2 % SOLN Recorded   20  Meloxicam 15 MG Oral Tablet; take 1 tablet every other day;     Therapy: (Imani Freeze) to Recorded    Signatures   Electronically signed by : CAMPOS Manriquez ; Nov 30 2017  2:18PM EST                       (Author)

## 2017-12-11 ENCOUNTER — ALLSCRIPTS OFFICE VISIT (OUTPATIENT)
Dept: OTHER | Facility: OTHER | Age: 67
End: 2017-12-11

## 2017-12-11 ENCOUNTER — GENERIC CONVERSION - ENCOUNTER (OUTPATIENT)
Dept: OTHER | Facility: OTHER | Age: 67
End: 2017-12-11

## 2017-12-11 NOTE — PROGRESS NOTES
Assessment    1  Hypertension (401 9) (I10)   2  Hyperlipidemia (272 4) (E78 5)   3  Hypothyroidism (244 9) (E03 9)   4  Chronic kidney disease, stage 3 (585 3) (N18 3)   5  Gout (274 9) (M10 9)   6  Type 2 diabetes mellitus with stage 3 chronic kidney disease (250 40,585 3) (E11 22,N18 3)    Plan  Esophageal reflux    · Omeprazole 40 MG Oral Capsule Delayed Release; TAKE ONE CAPSULE BYMOUTH ONCE DAILY  Gout    · Allopurinol 100 MG Oral Tablet; TAKE 1 TABLET DAILY   · (1) CBC/ PLT (NO DIFF); Status:Hold For - Exact Date; Requested for:Approx 02PRB7296;    · (1) COMPREHENSIVE METABOLIC PANEL; Status:Hold For - Exact Date; Requestedfor:Approx 06GKZ2592;    · (1) URIC ACID; Status:Hold For - Exact Date; Requested for:Approx 47NQI5758;   Hyperlipidemia    · (1) LIPID PANEL, FASTING; Status:Hold For - Exact Date; Requested for:Dygzhe23Iap5115;   Type 2 diabetes mellitus    · Januvia 100 MG Oral Tablet; Take 1 tablet daily  Type 2 diabetes mellitus with stage 3 chronic kidney disease    · (1) HEMOGLOBIN A1C; Status:Hold For - Exact Date; Requested for:Approx [id number]; Discussion/Summary  Possible side effects of new medications were reviewed with the patient/guardian today  The treatment plan was reviewed with the patient/guardian  The patient/guardian understands and agrees with the treatment plan     Self Referrals: No      Chief Complaint  Patient is here today for follow up of chronic conditions described in HPI  History of Present Illness  4 month followup  by Dr Sindi Marte and noted to have an improved EF up to 60%  Had R TKR with Dr Paco Bauman in September and is doing PT now  Active Problems    1  Allergic rhinitis (477 9) (J30 9)   2  Arthralgia (719 40) (M25 50)   3  Arthralgia of knee, left (719 46) (M25 562)   4  Arthralgia of knee, right (719 46) (M25 561)   5  Atherosclerotic heart disease of native coronary artery without angina pectoris (414 01) (I25 10)   6   Benign positional vertigo, left (386 11) (H81 12)   7  Bilateral sacroiliitis (720 2) (M46 1)   8  Cardiac defibrillator in situ (V45 02) (Z95 810)   9  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   10  Chronic diastolic congestive heart failure (428 32,428 0) (I50 32)   11  Chronic kidney disease, stage 3 (585 3) (N18 3)   12  Chronic systolic congestive heart failure (428 22,428 0) (I50 22)   13  Coronary artery disease (414 00) (I25 10)   14  Cough (786 2) (R05)   15  Degeneration of cervical intervertebral disc (722 4) (M50 90)   16  Depression (311) (F32 9)   17  Detrusor instability (596 59) (N32 81)   18  Dilated cardiomyopathy (425 4) (I42 0)   19  Esophageal reflux (530 81) (K21 9)   20  Gout (274 9) (M10 9)   21  Hyperlipidemia (272 4) (E78 5)   22  Hypertension (401 9) (I10)   23  Hypokalemia (276 8) (E87 6)   24  Hypothyroidism (244 9) (E03 9)   25  Ingrown nail of second toe of right foot (703 0) (L60 0)   26  Left bundle-branch block (426 3) (I44 7)   27  Left low back pain (724 2) (M54 5)   28  History of Need For Prophylactic Measure (V07 9)   29  Need for vaccination with 13-polyvalent pneumococcal conjugate vaccine (V03 82) (Z23)   30  Needs flu shot (V04 81) (Z23)   31  Nipple discharge (611 79) (N64 52)   32  Obesity (278 00) (E66 9)   33  Obstructive sleep apnea (327 23) (G47 33)   34  Osteoarthritis (715 90) (M19 90)   35  Overactive bladder (596 51) (N32 81)   36  Pain of both hip joints (719 45) (M25 551,M25 552)   37  Papilloma of left breast (217) (D24 2)   38  Psoriasis (696 1) (L40 9)   39  Psoriatic arthropathy (696 0) (L40 50)   40  Restless legs syndrome (333 94) (G25 81)   41  Sciatica (724 3) (M54 30)   42  Screening for genitourinary condition (V81 6) (Z13 89)   43  Screening for osteoporosis (V82 81) (Z13 820)   44  Shortness of breath (786 05) (R06 02)   45  Subungual infection of toe of left foot (681 11) (L03 032)   46  Tendinitis (726 90) (M77 9)   47   Trigger little finger of left hand (727 03) (M65 352)   48  Trigger ring finger of right hand (727 03) (M65 341)   49  Trigger thumb, left   50  Type 2 diabetes mellitus (250 00) (E11 9)   51  Type 2 diabetes mellitus with stage 3 chronic kidney disease (250 40,585 3)  (E11 22,N18 3)   52  UTI (urinary tract infection), bacterial (599 0,041 9) (N39 0,A49 9)   53  Vitamin D deficiency (268 9) (E55 9)    Past Medical History    1  History of Acute bacterial bronchitis (466 0,041 9) (J20 8,B96 89)   2  Denied: History of Alcohol abuse   3  History of Arthralgia of toe, unspecified laterality (719 47) (M25 579)   4  History of Back pain, unspecified back pain laterality, unspecified chronicity, unspecified location   5  History of Benign paroxysmal vertigo, unspecified laterality (386 11) (H81 10)   6  History of Birth control (V25 9) (Z30 9)   7  History of Carpal tunnel syndrome, unspecified laterality (354 0) (G56 00)   8  History of Difficulty walking up stairs (719 7) (R26 2)   9  History of blood clots (V12 51) (Z86 718)   10  History of hemorrhoids (V13 89) (Z87 19)   11  History of left bundle branch block (V12 59) (Z86 79)   12  History of pregnancy (V13 29)   13  History of rickettsial disease (V12 09) (Z86 19)   14  Denied: History of substance abuse   15  History of urinary frequency (V13 09) (Z87 898)   16  History of Hormone replacement therapy (V07 4) (Z79 890)   17  History of Indigestion (536 8) (K30)   18  History of Menarche (V21 8)   19  History of Sleep apnea, unspecified type (780 57) (G47 30)   20  History of Urinary Tract Infection (V13 02)    The active problems and past medical history were reviewed and updated today  Surgical History    1  History of Biopsy Breast Percutaneous Needle Core   2  History of Bladder Surgery   3  History of Cardiac Cath Procedure Outcome: Successful   4  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   5  History of Cath Stent Placement   6   History of Cystoscopy With Ureteroscopy   7  History of Eye Surgery   8  History of Hand Surgery   9  History of Implantable Cardioverter-Defibrillator   10  History of Knee Arthroscopy (Therapeutic)   11  History of Knee Replacement   12  History of Need For Prophylactic Measure (V07 9)   13  History of Neuroplasty Decompression Median Nerve At Carpal Tunnel   14  History of Rectal Surgery   15  History of Shoulder Surgery   16  History of Total Abdominal Hysterectomy    The surgical history was reviewed and updated today  Family History  Father    1  Family history of malignant neoplasm of prostate (V16 42) (Z80 42)   2  Family history of malignant neoplasm of urinary bladder (V16 52) (Z80 52)  Brother    3  Family history of malignant neoplasm (V16 9) (Z80 9)  Paternal Grandmother    4  Family history of Malignant neoplasm of female breast, unspecified laterality, unspecified site of breast  Paternal Aunt    11  Family history of malignant neoplasm of stomach (V16 0) (Z80 0)  Family History    6  Denied: Family history of Alcohol abuse   7  Denied: Family history of substance abuse   8  Denied: Family history of Mental health problem    The family history was reviewed and updated today  Social History     · Denied: History of Alcohol use   · Always uses seat belt   · Former smoker (V15 82) (F26 335)   · Marital History - Currently   The social history was reviewed and updated today  The social history was reviewed and is unchanged  Current Meds   1  Aspirin 81 MG TABS; Take 1 tablet daily; Therapy: (Recorded:14Mar1701) to Recorded   2  Candesartan Cilexetil 32 MG Oral Tablet; Take 1 tablet daily; Therapy: 31JBR6333 to (Alex Macdonald)  Requested for: 15YFU5483; Last Rx:61Ash8961 Ordered   3  Claritin 10 MG Oral Tablet; Therapy: (Recorded:47Kld7790) to Recorded   4  [employer] Hair Regrowth Womens 2 % SOLN Recorded   5  Daily Multiple Vitamins Oral Tablet; Take 1 tablet daily;  Therapy: (Recorded:68Ggd9556) to Recorded   6  FLUoxetine HCl - 20 MG Oral Capsule; take 1 capsule daily; Therapy: 23GIG4892 to ([phone])  Requested for: 83Xzl8542; Last Rx:05Irk7960 Ordered   7  Januvia 100 MG Oral Tablet; Take 1 tablet daily; Therapy: 39JIA5894 to (Last Rx:55Tnp3177)  Requested for: 09Vpv0693 Ordered   8  Klor-Con M20 20 MEQ Oral Tablet Extended Release; Take 1 tablet twice daily; Therapy: 83YZL8716 to (Evaluate:30Oct2018)  Requested for: 58DFD1865; Last Rx:04Nov2017 Ordered   9  Levothyroxine Sodium 25 MCG Oral Tablet; TAKE 1 TABLET EVERY MORNING; Therapy: 76XVX2914 to [phone])  Requested for: 31KCH1537; Last Rx:09Myf6639 Ordered   10  Magnesium 250 MG Oral Tablet; Therapy: (Recorded:06Pam2031) to Recorded   11  Meclizine HCl - 25 MG Oral Tablet; Take 1/2 or 1 tablet every 4 hours if needed for  dizziness; Therapy: 92HZB0988 to (Last Rx:30Jun2017)  Requested for: 30Jun2017 Ordered   12  Meloxicam 15 MG Oral Tablet; take 1 tablet every other day; Therapy: (Recorded:30Jun2017) to Recorded   13  Metoprolol Succinate  MG Oral Tablet Extended Release 24 Hour; Take 1 tablet  daily; Therapy: 59CNJ3334 to (Evaluate:27Apr2018)  Requested for: 83RHW2267; Last  Rx:95Mho7645 Ordered   14  Montelukast Sodium 10 MG Oral Tablet; Take 1 tablet daily  Requested for: 11HWE0617;  Last Rx:22Qab1174 Ordered   15  Omeprazole 40 MG Oral Capsule Delayed Release; TAKE ONE CAPSULE BY MOUTH  ONCE DAILY; Therapy: 97TUO9970 to (Monty Nix)  Requested for: 09Kyv5651; Last  Rx:30Ptd7557 Ordered   16  Simvastatin 40 MG Oral Tablet; take 1 tablet by mouth daily; Therapy: 89Wwr9834 to [phone])  Requested for: 94TCB0256; Last  Rx:48Vie0217 Ordered   17  Torsemide 20 MG Oral Tablet; TAKE 2 OR 3 TABLETS DAILY; Therapy: 25Ssf5341 to (Evaluate:48Mot7263)  Requested for: 85EOE4212; Last  Rx:01Nov2017 Ordered   18   TraMADol HCl - 50 MG Oral Tablet; TAKE 1 TABLET BY MOUTH TWICE A DAY AS NEEDED; Therapy: 04JTR5681 to (Evaluate:28Jan2018); Last Rx:30Oct2017 Ordered   19  Vitamin B6 TABS; TAKE 1 TABLET DAILY AS DIRECTED; Therapy: (Recorded:15Oup8893) to Recorded   20  Vitamin D 1000 UNIT CAPS; Take as directed; Therapy: (Recorded:98Ihf6057) to Recorded    The medication list was reviewed and updated today  Allergies  1  No Known Drug Allergies    Vitals  Vital Signs    Recorded: 24AZU5486 01:23PM   Temperature 98 3 F   Heart Rate 68   Systolic 046   Diastolic 78   Height 5 ft    Weight 176 lb    BMI Calculated 34 37   BSA Calculated 1 77     Results/Data  PHQ-9 Adult Depression Screening 78LSB5224 01:25PM User, s     Test Name Result Flag Reference   PHQ-9 Adult Depression Score 0       Over the last two weeks, how often have you been bothered by any of the following problems? Little interest or pleasure in doing things: Not at all - 0 Feeling down, depressed, or hopeless: Not at all - 0 Trouble falling or staying asleep, or sleeping too much: Not at all - 0 Feeling tired or having little energy: Not at all - 0 Poor appetite or over eating: Not at all - 0 Feeling bad about yourself - or that you are a failure or have let yourself or your family down: Not at all - 0 Trouble concentrating on things, such as reading the newspaper or watching television: Not at all - 0 Moving or speaking so slowly that other people could have noticed  Or the opposite -  being so fidgety or restless that you have been moving around a lot more than usual: Not at all - 0 Thoughts that you would be better off dead, or of hurting yourself in some way: Not at all - 0   PHQ-9 Adult Depression Screening Negative     PHQ-9 Difficulty Level Not difficult at all     PHQ-9 Severity No Depression         Health Management  History of Encounter for screening colonoscopy   COLONOSCOPY; every 10 years; Last 14RDH8304; Next Due: 72PNU4895;  Active    Future Appointments    Date/Time Provider Specialty Site   12/11/2017 10:00 AM Cardiology, Device Remote  [facility] [address]   11/29/2018 02:15 PM CAMPOS Read   Surgical Oncology [facility]   03/13/2018 01:40 PM Orlin Varela MD [facility] MD       Signatures   Electronically signed by : Derik Yu MD; Dec 10 2017 12:36PM EST                       (Author)

## 2017-12-13 ENCOUNTER — GENERIC CONVERSION - ENCOUNTER (OUTPATIENT)
Dept: OTHER | Facility: OTHER | Age: 67
End: 2017-12-13

## 2018-01-10 NOTE — PROGRESS NOTES
Assessment    1  Chronic diastolic congestive heart failure (428 32,428 0) (I50 32)   2  Hypertension (401 9) (I10)   3  Atherosclerotic heart disease of native coronary artery without angina pectoris (414 01)   (I25 10)   4  Chronic kidney disease, stage 3 (585 3) (N18 3)   5  Type 2 diabetes mellitus with stage 3 chronic kidney disease (250 40,585 3)   (E11 22,N18 3)   6  Bilateral sacroiliitis (720 2) (M46 1)    Plan  Bilateral sacroiliitis    · 1 - Anmol Lozano DO  (Pain Management) Physician Referral  Consult Only: the expectation  is that the referring provider will communicate back to the patient on treatment options  Evaluation and Treatment: the expectation is that the referred to provider will  communicate back to the patient on treatment options  Status: Active  Requested for:  14BGO1206  Care Summary provided  : Yes  Bilateral sacroiliitis, Chronic kidney disease, stage 3    · (1) BASIC METABOLIC PROFILE; Status:Hold For - Exact Date; Requested for:Approx  [id number];     Discussion/Summary    BP excellent labs reviewed in detail  Excellent DM control  Excellent lipid profile  Chief Complaint  Patient is here today for follow up of chronic conditions described in HPI  History of Present Illness  4 month followup  Recent release of L finger  Pain in low back when getting up from chair  Glucose readings all low or mid 100's      Active Problems    1  Acute bacterial bronchitis (466 0,041 9) (J20 8,B96 89)   2  Allergic rhinitis (477 9) (J30 9)   3  Arthralgia (719 40) (M25 50)   4  Arthralgia of knee, left (719 46) (M25 562)   5  Arthralgia of knee, right (719 46) (M25 561)   6  Atherosclerotic heart disease of native coronary artery without angina pectoris (414 01)   (I25 10)   7  Benign paroxysmal vertigo, unspecified laterality (386 11) (H81 10)   8  Cardiac defibrillator in situ (V45 02) (Z95 810)   9  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   10  Carpal tunnel syndrome, unspecified laterality (354 0) (G56 00)   11  Chronic diastolic congestive heart failure (428 32,428 0) (I50 32)   12  Chronic kidney disease, stage 3 (585 3) (N18 3)   13  Chronic systolic congestive heart failure (428 22,428 0) (I50 22)   14  Coronary artery disease (414 00) (I25 10)   15  Cough (786 2) (R05)   16  Degeneration of cervical intervertebral disc (722 4) (M50 90)   17  Depression (311) (F32 9)   18  Detrusor instability (596 59) (N32 81)   19  Dilated cardiomyopathy (425 4) (I42 0)   20  Encounter for screening colonoscopy (V76 51) (Z12 11)   21  Encounter for screening mammogram for malignant neoplasm of breast (V76 12)    (Z12 31)   22  Esophageal reflux (530 81) (K21 9)   23  Flu vaccine need (V04 81) (Z23)   24  Hyperlipidemia (272 4) (E78 5)   25  Hypertension (401 9) (I10)   26  Hypokalemia (276 8) (E87 6)   27  Hypothyroidism (244 9) (E03 9)   28  Ingrown nail of second toe of right foot (703 0) (L60 0)   29  Left bundle-branch block (426 3) (I44 7)   30  History of Need For Prophylactic Measure (V07 9)   31  Need for vaccination with 13-polyvalent pneumococcal conjugate vaccine (V03 82) (Z23)   32  Nipple discharge (611 79) (N64 52)   33  Obesity (278 00) (E66 9)   34  Obstructive sleep apnea (327 23) (G47 33)   35  Osteoarthritis (715 90) (M19 90)   36  Overactive bladder (596 51) (N32 81)   37  Pain of both hip joints (719 45) (M25 551,M25 552)   38  Papilloma of left breast (217) (D24 2)   39  Psoriasis (696 1) (L40 9)   40  Psoriatic arthropathy (696 0) (L40 50)   41  Restless legs syndrome (333 94) (G25 81)   42  Sciatica (724 3) (M54 30)   43  Screening for genitourinary condition (V81 6) (Z13 89)   44  Screening for osteoporosis (V82 81) (Z13 820)   45  Shortness of breath (786 05) (R06 02)   46  Tendinitis (726 90) (M77 9)   47  Trigger little finger of left hand (727 03) (M65 352)   48  Trigger ring finger of right hand (727 03) (M65 341)   49   Trigger thumb, left   50  Type 2 diabetes mellitus (250 00) (E11 9)   51  Type 2 diabetes mellitus with stage 3 chronic kidney disease (250 40,585 3)    (E11 22,N18 3)   52  Vitamin D deficiency (268 9) (E55 9)    Past Medical History    1  History of Arthralgia of toe, unspecified laterality (719 47) (M25 579)   2  History of Back pain, unspecified back pain laterality, unspecified chronicity, unspecified   location   3  History of Birth control (V25 9) (Z30 9)   4  History of Carpal tunnel syndrome, unspecified laterality (354 0) (G56 00)   5  History of Difficulty walking up stairs (719 7) (R26 2)   6  History of blood clots (V12 51) (Z86 718)   7  History of hemorrhoids (V13 89) (Z87 19)   8  History of left bundle branch block (V12 59) (Z86 79)   9  History of pregnancy (V13 29)   10  History of rickettsial disease (V12 09) (Z86 19)   11  History of urinary frequency (V13 09) (Z87 898)   12  History of Hormone replacement therapy (V07 4) (Z79 890)   13  History of Indigestion (536 8) (K30)   14  History of Menarche (V21 8)   15  History of Sleep apnea, unspecified type (780 57) (G47 30)   16  History of Urinary Tract Infection (V13 02)    The active problems and past medical history were reviewed and updated today  Surgical History    1  History of Biopsy Breast Percutaneous Needle Core   2  History of Bladder Surgery   3  History of Cardiac Cath Procedure Outcome: Successful   4  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   5  History of Cath Stent Placement   6  History of Cystoscopy With Ureteroscopy   7  History of Eye Surgery   8  History of Hand Surgery   9  History of Implantable Cardioverter-Defibrillator   10  History of Knee Arthroscopy (Therapeutic)   11  History of Knee Replacement   12  History of Need For Prophylactic Measure (V07 9)   13  History of Neuroplasty Decompression Median Nerve At Carpal Tunnel   14  History of Rectal Surgery   15   History of Shoulder Surgery 16  History of Total Abdominal Hysterectomy    The surgical history was reviewed and updated today  Family History  Father    1  Family history of malignant neoplasm of prostate (V16 42) (Z80 42)   2  Family history of malignant neoplasm of urinary bladder (V16 52) (Z80 52)  Brother    3  Family history of malignant neoplasm (V16 9) (Z80 9)  Paternal Grandmother    4  Family history of Malignant neoplasm of female breast, unspecified laterality, unspecified   site of breast  Paternal Aunt    11  Family history of malignant neoplasm of stomach (V16 0) (Z80 0)    The family history was reviewed and updated today  Social History    · Denied: History of Alcohol use   · Former smoker (V15 82) (P23 089)   · Marital History - Currently   The social history was reviewed and updated today  The social history was reviewed and is unchanged  Current Meds   1  Aspirin 81 MG TABS; Take 1 tablet daily; Therapy: (Recorded:33Rot5845) to Recorded   2  Candesartan Cilexetil 32 MG Oral Tablet; Take 1 tablet daily; Therapy: 75ODX7299 to (Evaluate:22Ebu2467)  Requested for: 08Gbg6045; Last   Rx:45Vem7257 Ordered   3  Claritin 10 MG Oral Tablet; Therapy: (Recorded:36Toe9302) to Recorded   4  [employer] Hair Regrowth Womens 2 % External Solution Recorded   5  Daily Multiple Vitamins Oral Tablet; Take 1 tablet daily; Therapy: (Recorded:40Gxq6775) to Recorded   6  FLUoxetine HCl - 20 MG Oral Capsule; take 1 capsule daily; Therapy: 38AHP3337 to (893 4912)  Requested for: 05Apr2016; Last   Rx:05Apr2016 Ordered   7  Januvia 100 MG Oral Tablet; Take 1 tablet daily; Therapy: 18WMR2904 to (Last Rx:28Nov2016)  Requested for: 28Nov2016 Ordered   8  Klor-Con M20 20 MEQ Oral Tablet Extended Release; Take 1 tablet twice daily; Therapy: 11DBX8657 to (HGJDRVLL:16NIF9372)  Requested for: 89RZO6169; Last   Rx:65Plc7578 Ordered   9  Levothyroxine Sodium 25 MCG Oral Tablet; TAKE 1 TABLET EVERY MORNING;    Therapy: 46WVX5381 to (Evaluate:26Ulk4002)  Requested for: 60AVD0129; Last   Rx:70Ebv2986 Ordered   10  Magnesium 250 MG Oral Tablet; Therapy: (Recorded:88Lvs1500) to Recorded   11  Meloxicam 15 MG Oral Tablet; Take 1 tablet daily  Requested for: 47WHS8767; Last    Rx:08Ptr0725 Ordered   12  Metoprolol Succinate  MG Oral Tablet Extended Release 24 Hour; Take 1 tablet    daily; Therapy: 38VGV7127 to (Evaluate:27Apr2017)  Requested for: 72JSO4246; Last    Rx:59Djq9465 Ordered   13  Montelukast Sodium 10 MG Oral Tablet; Take 1 tablet daily  Requested for: 02HHX9411;    Last Rx:19Umk4800 Ordered   14  Omeprazole 40 MG Oral Capsule Delayed Release; TAKE ONE CAPSULE BY MOUTH    ONCE DAILY; Therapy: 34IUN9158 to (Evaluate:21Nov2017)  Requested for: 26GXG0575; Last    Rx:05Kiu5030 Ordered   15  Torsemide 20 MG Oral Tablet; TAKE 2 OR 3 TABLETS DAILY; Therapy: 91Uil1394 to ([phone])  Requested for: 05XGA3673; Last    Rx:06Jan2017 Ordered   16  TraMADol HCl - 50 MG Oral Tablet; TAKE 1 TABLET BY MOUTH TWICE A DAY AS    NEEDED; Therapy: 40PTD1579 to (Evaluate:53Ksu3513); Last Rx:18Jan2017 Ordered   17  Vitamin B6 TABS; TAKE 1 TABLET DAILY AS DIRECTED; Therapy: (Recorded:18Jdf5786) to Recorded   18  Vitamin D 1000 UNIT CAPS; Take as directed; Therapy: (Recorded:89Uqm4070) to Recorded   19  Zocor 40 MG Oral Tablet; Take 1 tablet daily; Therapy: 26EUG0197 to (Evaluate:79Rxp1916)  Requested for: 26GQT0365; Last    Rx:05Zmw3083 Ordered    The medication list was reviewed and updated today  Allergies    1   No Known Drug Allergies    Vitals  Vital Signs    Recorded: 19VZM7546 01:41PM   Temperature 99 5 F, Tympanic   Heart Rate 76, L Radial   Pulse Quality Regular   Systolic 353, LUE, Sitting   Diastolic 70, LUE, Sitting   Height 5 ft    Weight 179 lb    BMI Calculated 34 96   BSA Calculated 1 77     Physical Exam    Constitutional   General appearance: No acute distress, well appearing and well nourished  Pulmonary   Respiratory effort: No increased work of breathing or signs of respiratory distress  Auscultation of lungs: Clear to auscultation  Cardiovascular   Auscultation of heart: Normal rate and rhythm, normal S1 and S2, without murmurs  Examination of extremities for edema and/or varicosities: Normal     Carotid pulses: Normal     Musculoskeletal   Gait and station: Normal     Inspection/palpation of joints, bones, and muscles: Abnormal   very tender R sacroiliac joint  Health Management  Encounter for screening colonoscopy   COLONOSCOPY; every 10 years; Last 23RDI5873; Next Due: 74GOV5224; Active    Future Appointments    Date/Time Provider Specialty Site   03/22/2017 02:00 PM Cardiology, [address] [place]   03/14/2017 10:00 AM CAMP[facility] Dillon  Orthopedic Surgery [facility] [address] [place] [address]   04/17/2017 01:40 PM CAMPOS Olea  Cardiology [facility]IOLOG[facility] [place]   04/06/2017 01:30 PM CAMPOS Godinez   Surgical Oncology [facility]   03/22/2017 10:15 AM Elizabeth Pineda DO Pain Management [facility]   07/10/2017 02:40 PM Alistair Peña MD Family Medicine Deneen Leyden MD     Signatures   Electronically signed by : Mirlande Porter MD; Mar 12 2017 10:45AM EST                       (Author)

## 2018-01-10 NOTE — PROGRESS NOTES
Assessment    1  Encounter for preventive health examination (V70 0) (Z00 00)   2  Coronary artery disease (414 00) (I25 10)   3  Hypothyroidism (244 9) (E03 9)   4  Hypertension (401 9) (I10)   5  Type 2 diabetes mellitus with stage 3 chronic kidney disease (250 40,585 3)   (E11 22,N18 3)   6  Hyperlipidemia (272 4) (E78 5)   7  Gout (274 9) (M10 9)    Plan  Allergic rhinitis    · Montelukast Sodium 10 MG Oral Tablet (Singulair); Take 1 tablet daily  Gout    · (1) URIC ACID; Status:Hold For - Exact Date; Requested for:Approx [id number]; Health Maintenance    · *VB - Fall Risk Assessment  (Dx Z13 89 Screen for Neurologic Disorder);  Status:Complete;   Done: 54ORL5494 12:35PM   · *VB - Urinary Incontinence Screen (Dx Z13 89 Screen for UI); Status:Complete;   Done:  77DCY7862 12:36PM   · Begin a bladder training program to help you control your urgency  Start by urinating  every 2 hours ; Status:Complete;   Done: 45LGH4786   · Eat a low fat and low cholesterol diet ; Status:Complete;   Done: 66ZRS1796   · Stretch and warm up your muscles during the first 10 minutes , then cool down your  muscles for the last 10 minutes of exercise ; Status:Complete;   Done: 02FKR6582   · The plan of care for falls is detailed in the plan and/or discussion section of today's note ;  Status:Complete;   Done: 59DKY7015   · The plan of care for urinary incontinence is detailed in the plan and/or discussion section  of today's note ; Status:Complete;   Done: 07RXC4010   · We recommend that you bring your body mass index down to 26 ; Status:Complete;    Done: 93HZR8870   · We recommend that you create an advance directive ; Status:Complete;   Done:  44LXY2256   · Obesity Follow Up in 6 Months Evaluation and Treatment  Follow-up  Status: Complete   Done: 41SAP1479  Hyperlipidemia    · Simvastatin 40 MG Oral Tablet (Zocor); take 1 tablet by mouth daily   · (1) LIPID PANEL, FASTING; Status:Hold For - Exact Date;  Requested for:Approx  53OFO1397;   Hypothyroidism    · Levothyroxine Sodium 25 MCG Oral Tablet; TAKE 1 TABLET EVERY MORNING   · (1) TSH WITH FT4 REFLEX; Status:Hold For - Exact Date; Requested for:Approx  74NWN7563;   Type 2 diabetes mellitus with stage 3 chronic kidney disease    · (1) COMPREHENSIVE METABOLIC PANEL; Status:Hold For - Exact Date; Requested  for:Approx 17KHL4602;    · (1) HEMOGLOBIN A1C; Status:Hold For - Exact Date; Requested for:Approx 75RJS6305;    · *VB - Foot Exam; Status:Complete;   Done: 44RHB7731 03:14PM    Discussion/Summary    Labs reviewed in detail- excellent lipids   A1c fairly stable at 7 0  CKD creatinine slightly improved from earlier this year  Impression: Subsequent Annual Wellness Visit, with preventive exam as well as age and risk appropriate counseling completed  Cardiovascular screening and counseling: screening is current  Diabetes screening and counseling: screening is current  Colorectal cancer screening and counseling: screening is current and done 1/19/12  Breast cancer screening and counseling: screening is current  Cervical cancer screening and counseling: screening not indicated  Osteoporosis screening and counseling: screening is current  Abdominal aortic aneurysm screening and counseling: screening not indicated  Glaucoma screening and counseling: screening is current  HIV screening and counseling: screening not indicated  Immunizations: influenza vaccine is up to date this year, pneumococcal vaccine due today, hepatitis B vaccination series is not indicated at this time due to the patient's low risk of da the disease, Zostavax vaccination up to date and the patient declines the Td vaccine  Advance Directive Planning: complete and up to date  Advice and education were given regarding fall risk reduction  She was referred to none  Medical Equipment/Suppliers: none   Patient Discussion: plan discussed with the patient, follow-up visit needed in 6 months  Possible side effects of new medications were reviewed with the patient/guardian today  The treatment plan was reviewed with the patient/guardian  The patient/guardian understands and agrees with the treatment plan         Self Referrals: No      Chief Complaint  HM      Advance Directives  Advance Directive [place]:   YES - Patient has an advance health care directive  The patient has a living will located  in patient's home  Capacity/Competence: This patient has full decision making capacity for discussion of advance care planning and This patient has full decision making competency for discussion of advance care planning  History of Present Illness  HPI: Alternating Prednisone and Meloxicam through Dr Alesia Baez to [facility] and Wellness Visits: The patient is being seen for the subsequent annual wellness visit  Medicare Screening and Risk Factors   Hospitalizations: she has been previously hospitalizied and she has been hospitalized 3 times  Once per lifetime medicare screening tests: ECG ~U() and AAA screening US has not yet been done  Medicare Screening Tests Risk Questions   Abdominal aortic aneurysm risk assessment: none indicated  Osteoporosis risk assessment: , female gender and over 48years of age  HIV risk assessment: none indicated  Drug and Alcohol Use: The patient has never smoked cigarettes  The patient reports never drinking alcohol  She has never used illicit drugs  Diet and Physical Activity: Current diet includes well balanced meals  She exercises daily  Exercise: walking  Mood Disorder and Cognitive Impairment Screening: PHQ-9 Depression Scale   Over the past 2 weeks, how often have you been bothered by the following problems? 1 ) Little interest or pleasure in doing things? Several days  2 ) Feeling down, depressed or hopeless? Several days  3 ) Trouble falling asleep or sleeping too much? Several days     4 ) Feeling tired or having little energy? Several days  5 ) Poor appetite or overeating? Half the days or more  6 ) Feeling bad about yourself, or that you are a failure, or have let yourself or your family down? Several days  7 ) Trouble concentrating on things, such as reading a newspaper or watching television? Not at all    8 ) Moving or speaking so slowly that other people could have noticed, or the opposite, moving or speaking faster than usual? Not at all  TOTAL SCORE: 8, severity of depression is mild  How difficult have these problems made it for you to do your work, take care of things at home, or get along with people? Somewhat difficult  Depression screening  no significant symptoms  She denies feeling down, depressed, or hopeless over the past two weeks  She denies feeling little interest or pleasure in doing things over the past two weeks  Cognitive impairment screening: denies difficulty learning/retaining new information, denies difficulty handling complex tasks, denies difficulty with reasoning, denies difficulty with spatial ability and orientation, denies difficulty with language and denies difficulty with behavior  Functional Ability/Level of Safety: Hearing is normal bilaterally and a hearing aid is not used  The patient is currently able to do activities of daily living without limitations, able to do instrumental activities of daily living without limitations, able to participate in social activities without limitations and able to drive without limitations  Activities of daily living details: does not need help using the phone, no transportation help needed, does not need help shopping, no meal preparation help needed, does not need help doing housework, does not need help doing laundry, does not need help managing medications and does not need help managing money   Fall risk factors:  antihypertensive use and optimize BP control, but no polypharmacy, no alcohol use, no mobility impairment, no antidepressant use, no deconditioning, no postural hypotension, no sedative use, no visual impairment, no urinary incontinence, no cognitive impairment, up and go test was normal and no previous fall  Home safety risk factors:  no unfamiliar surroundings, no loose rugs, no poor household lighting, no uneven floors, no household clutter, grab bars in the bathroom and handrails on the stairs  Advance Directives: Advance directives: living will, durable power of  for health care directives and advance directives  end of life decisions were reviewed with the patient and I agree with the patient's decisions  Co-Managers and Medical Equipment/Suppliers: See Patient Care Team       Reviewed Updated Michael Parker:   Last Medicare Wellness Visit Information was reviewed, patient interviewed and updates made to the record today  Preventive Quality Program 65 and Older: Falls Risk: The patient fell 0 times in the past 12 months  The patient is currently asymptomatic Symptoms Include:  Associated symptoms:  No associated symptoms are reported  The patient currently has no urinary incontinence symptoms  Patient Care Team    Care Team Member Role Specialty Office Number   Li Haney MD  Family Medicine [phone]   [facility]  Cardiology [phone]   Derrell CASTELLON  Orthopedic Surgery [phone]   Daphne CASTELLON  Specialist Surgical Oncology [phone]   [facility] DO  Pain Management [phone], [address] [place] [address] [phone]     Review of Systems    Constitutional: no malaise  ENT: no earache, no hearing loss and no nasal congestion  Cardiovascular: no chest pain and no palpitations  Respiratory: no shortness of breath and no cough  Gastrointestinal: no abdominal pain  Genitourinary: no dysuria  Musculoskeletal: diffuse joint pain and generalized muscle aches  Integumentary and Breasts: no rashes  Neurological: difficulty walking  Psychiatric: no anxiety and no depression  Endocrine: no generalized weakness  Hematologic and Lymphatic: no tendency for easy bleeding and no tendency for easy bruising  Active Problems    1  Abnormal finding on breast imaging (793 89) (R92 8)   2  Allergic rhinitis (477 9) (J30 9)   3  Arthralgia (719 40) (M25 50)   4  Arthralgia of knee, left (719 46) (M25 562)   5  Arthralgia of knee, right (719 46) (M25 561)   6  Atherosclerotic heart disease of native coronary artery without angina pectoris (414 01)   (I25 10)   7  Benign paroxysmal vertigo, unspecified laterality (386 11) (H81 10)   8  Benign positional vertigo, left (386 11) (H81 12)   9  Bilateral sacroiliitis (720 2) (M46 1)   10  Cardiac defibrillator in situ (V45 02) (Z95 810)   11  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   12  Carpal tunnel syndrome, unspecified laterality (354 0) (G56 00)   13  Chronic diastolic congestive heart failure (428 32,428 0) (I50 32)   14  Chronic kidney disease, stage 3 (585 3) (N18 3)   15  Chronic systolic congestive heart failure (428 22,428 0) (I50 22)   16  Coronary artery disease (414 00) (I25 10)   17  Cough (786 2) (R05)   18  Degeneration of cervical intervertebral disc (722 4) (M50 90)   19  Depression (311) (F32 9)   20  Detrusor instability (596 59) (N32 81)   21  Dilated cardiomyopathy (425 4) (I42 0)   22  Encounter for screening colonoscopy (V76 51) (Z12 11)   23  Encounter for screening mammogram for malignant neoplasm of breast (V76 12)    (Z12 31)   24  Esophageal reflux (530 81) (K21 9)   25  Flu vaccine need (V04 81) (Z23)   26  Gout (274 9) (M10 9)   27  Hyperlipidemia (272 4) (E78 5)   28  Hypertension (401 9) (I10)   29  Hypokalemia (276 8) (E87 6)   30  Hypothyroidism (244 9) (E03 9)   31  Ingrown nail of second toe of right foot (703 0) (L60 0)   32  Left bundle-branch block (426 3) (I44 7)   33  Left low back pain (724 2) (M54 5)   34   History of Need For Prophylactic Measure (V07 9)   35  Need for vaccination with 13-polyvalent pneumococcal conjugate vaccine (V03 82) (Z23)   36  Nipple discharge (611 79) (N64 52)   37  Obesity (278 00) (E66 9)   38  Obstructive sleep apnea (327 23) (G47 33)   39  Osteoarthritis (715 90) (M19 90)   40  Overactive bladder (596 51) (N32 81)   41  Pain of both hip joints (719 45) (M25 551,M25 552)   42  Papilloma of left breast (217) (D24 2)   43  Psoriasis (696 1) (L40 9)   44  Psoriatic arthropathy (696 0) (L40 50)   45  Restless legs syndrome (333 94) (G25 81)   46  Sciatica (724 3) (M54 30)   47  Screening for genitourinary condition (V81 6) (Z13 89)   48  Screening for osteoporosis (V82 81) (Z13 820)   49  Shortness of breath (786 05) (R06 02)   50  Subungual infection of toe of left foot (681 11) (L03 032)   51  Tendinitis (726 90) (M77 9)   52  Trigger little finger of left hand (727 03) (M65 352)   53  Trigger ring finger of right hand (727 03) (M65 341)   54  Trigger thumb, left   55  Type 2 diabetes mellitus (250 00) (E11 9)   56  Type 2 diabetes mellitus with stage 3 chronic kidney disease (250 40,585 3)    (E11 22,N18 3)   57   Vitamin D deficiency (268 9) (E55 9)      Cath Stent Placement       Breast nodule (793 89) (Z06)          Past Medical History    · History of Acute bacterial bronchitis (466 0,041 9) (J20 8,B96 89)   · Denied: History of Alcohol abuse   · History of Arthralgia of toe, unspecified laterality (719 47) (M25 579)   · History of Back pain, unspecified back pain laterality, unspecified chronicity, unspecified  location   · History of Birth control (V25 9) (Z30 9)   · History of Carpal tunnel syndrome, unspecified laterality (354 0) (G56 00)   · History of Difficulty walking up stairs (719 7) (R26 2)   · History of blood clots (V12 51) (Z86 718)   · History of hemorrhoids (V13 89) (Z87 19)   · History of left bundle branch block (V12 59) (Z86 79)   · History of pregnancy (V13 29)   · History of rickettsial disease (V12 09) (Z86 19)   · History of urinary frequency (V13 09) (O24 858)   · History of Hormone replacement therapy (V07 4) (Z79 890)   · History of Indigestion (536 8) (K30)   · History of Menarche (V21 8)   · History of Sleep apnea, unspecified type (780 57) (G47 30)   · History of Urinary Tract Infection (V13 02)    The active problems and past medical history were reviewed and updated today  Surgical History    · History of Biopsy Breast Percutaneous Needle Core   · History of Bladder Surgery   · History of Cardiac Cath Procedure Outcome: Successful   · 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   · History of Cath Stent Placement   · History of Cystoscopy With Ureteroscopy   · History of Eye Surgery   · History of Hand Surgery   · History of Implantable Cardioverter-Defibrillator   · History of Knee Arthroscopy (Therapeutic)   · History of Knee Replacement   · History of Need For Prophylactic Measure (V07 9)   · History of Neuroplasty Decompression Median Nerve At Carpal Tunnel   · History of Rectal Surgery   · History of Shoulder Surgery   · History of Total Abdominal Hysterectomy    The surgical history was reviewed and updated today  Cath Stent Placement             Family History  Father    · Family history of malignant neoplasm of prostate (V16 42) (Z80 42)   · Family history of malignant neoplasm of urinary bladder (V16 52) (Z80 52)  Brother    · Family history of malignant neoplasm (V16 9) (Z80 9)  Paternal Grandmother    · Family history of Malignant neoplasm of female breast, unspecified laterality, unspecified  site of breast  Paternal Aunt    · Family history of malignant neoplasm of stomach (V16 0) (Z80 0)    The family history was reviewed and updated today        Family history of malignant neoplasm (V16 9) (Z80 9)     - pt shared some type of blood cancer       Family history of Malignant neoplasm of female breast, unspecified laterality, unspecified site of breast       Family history of malignant neoplasm of stomach (V16 0) (Z80 0)     - age 72             Social History    · Denied: History of Alcohol use   · Former smoker (V15 82) (X50 509)   · Marital History - Currently   The social history was reviewed and updated today  The social history was reviewed and is unchanged  Non-smoker (V49 89) (Z78 9)             Current Meds   1  Aspirin 81 MG TABS; Take 1 tablet daily; Therapy: (Recorded:92Lqm7058) to Recorded   2  Candesartan Cilexetil 32 MG Oral Tablet; Take 1 tablet daily; Therapy: 36ZTA6375 to (Maryam Henao)  Requested for: 88DKZ5882; Last   Rx:50Mtr5933 Ordered   3  Claritin 10 MG Oral Tablet; Therapy: (Recorded:39Qva9003) to Recorded   4  [employer] Hair Regrowth Womens 2 % SOLN Recorded   5  Daily Multiple Vitamins Oral Tablet; Take 1 tablet daily; Therapy: (Recorded:39Bjr7299) to Recorded   6  FLUoxetine HCl - 20 MG Oral Capsule; take 1 capsule daily; Therapy: 37LHS7249 to (Que Lopes)  Requested for: 81Jdx8517; Last   Rx:12Pvu2469 Ordered   7  Januvia 100 MG Oral Tablet; Take 1 tablet daily; Therapy: 24KLC6092 to (Last Rx:28Nov2016)  Requested for: 28Nov2016 Ordered   8  Klor-Con M20 20 MEQ Oral Tablet Extended Release; Take 1 tablet twice daily; Therapy: 91HWO9742 to (BOPSYEGJ:11NLT4172)  Requested for: 78UAR4356; Last   Rx:04Cbc2731 Ordered   9  Levothyroxine Sodium 25 MCG Oral Tablet; TAKE 1 TABLET EVERY MORNING; Therapy: 19AZE0596 to (Espiridion Enrique)  Requested for: 68VUA1507; Last   Rx:72Ven5941 Ordered   10  Magnesium 250 MG Oral Tablet; Therapy: (Recorded:74Baj6523) to Recorded   11  Meclizine HCl - 25 MG Oral Tablet; Take 1/2 or 1 tablet every 4 hours if needed for    dizziness; Therapy: 38RKR1622 to (Last Rx:30Jun2017)  Requested for: 30Jun2017 Ordered   12  Meloxicam 15 MG Oral Tablet; take 1 tablet every other day; Therapy: (Recorded:30Jun2017) to Recorded   13  Metoprolol Succinate  MG Oral Tablet Extended Release 24 Hour; Take 1 tablet    daily; Therapy: 09JCB7457 to (Evaluate:27Apr2018)  Requested for: 53FVO2930; Last    Rx:96Zsa0606 Ordered   14  Montelukast Sodium 10 MG Oral Tablet; Take 1 tablet daily  Requested for: 07NSP5178;    Last Rx:62Mxe0622 Ordered   15  Omeprazole 40 MG Oral Capsule Delayed Release; TAKE ONE CAPSULE BY MOUTH    ONCE DAILY; Therapy: 42ARJ1016 to (Evaluate:21Nov2017)  Requested for: 79HND7876; Last    Rx:51Dex8164 Ordered   16  Simvastatin 40 MG Oral Tablet; take 1 tablet by mouth daily; Therapy: 72Kgr0594 to (Evaluate:06Apr2018)  Requested for: 85Xvs2285; Last    Rx:91Ssv4470 Ordered   17  Torsemide 20 MG Oral Tablet; TAKE 2 OR 3 TABLETS DAILY; Therapy: 72Rpy1578 to ([phone])  Requested for: 96DYH8170; Last    Rx:25Dcv2698 Ordered   18  TraMADol HCl - 50 MG Oral Tablet; TAKE 1 TABLET BY MOUTH TWICE A DAY AS    NEEDED; Therapy: 89JGQ5484 to (Evaluate:17Oct2017); Last Rx:52Ayy1422 Ordered   19  Vitamin B6 TABS; TAKE 1 TABLET DAILY AS DIRECTED; Therapy: (Recorded:23Bve2957) to Recorded   20  Vitamin D 1000 UNIT CAPS; Take as directed; Therapy: (Recorded:47Pqm3145) to Recorded    The medication list was reviewed and updated today  Allergies    1  No Known Drug Allergies    Immunizations   ** Printed in Appendix #1 below  Vitals  Signs    Temperature: 99 2 F, Tympanic  Heart Rate: 80, L Radial  Pulse Quality: Regular, L Radial  Systolic: 009, LUE  Diastolic: 74, LUE  BP Cuff Size: Large  Height: 5 ft   Weight: 183 lb 9 6 oz  BMI Calculated: 35 86  BSA Calculated: 1 8    Physical Exam    Constitutional   General appearance: No acute distress, well appearing and well nourished  Ears, Nose, Mouth, and Throat   External inspection of ears and nose: Normal     Otoscopic examination: Tympanic membranes translucent with normal light reflex  Canals patent without erythema      Neck   Neck: Supple, symmetric, trachea midline, no masses  Thyroid: Normal, no thyromegaly  Pulmonary   Respiratory effort: No increased work of breathing or signs of respiratory distress  Auscultation of lungs: Clear to auscultation  Cardiovascular   Auscultation of heart: Normal rate and rhythm, normal S1 and S2, no murmurs  Carotid pulses: 2+ bilaterally  Lymphatic   Palpation of lymph nodes in neck: No lymphadenopathy  Musculoskeletal   Gait and station: Normal     Psychiatric   Judgment and insight: Normal     Orientation to person, place, and time: Normal     Recent and remote memory: Intact  Mood and affect: Normal     Socks and shoes removed, Right Foot Findings: normal foot  The toes were normal  The sensory exam showed normal vibratory sensation at the level of the toes and normal position sense at the level of the toes  Socks and Shoes removed, Left Foot Findings: normal foot  The toes were normal  The sensory exam showed normal vibratory sensation at the level of the toes and normal position sense at the level of the toes  Monofilament Testing: normal tactile sensation with monofilament testing throughout both feet  Vascular: Capillary refills findings on the right were normal in the toes  Pulses: 1+ in the posterior tibialis and 1+ in the dorsalis pedis  Capillary refills findings on the left were normal in the toes  Pulses: 1+ in the posterior tibialis and 1+ in the dorsalis pedis  Assign Risk Category: 0: No loss of protective sensation, no deformity  No present risk  Results/Data  PHQ-9 Adult Depression Screening 13HEQ5161 02:41PM User, s     Test Name Result Flag Reference   PHQ-9 Adult Depression Score 8     Over the last two weeks, how often have you been bothered by any of the following problems?   Little interest or pleasure in doing things: Several days - 1  Feeling down, depressed, or hopeless: Several days - 1  Trouble falling or staying asleep, or sleeping too much: Several days - 1  Feeling tired or having little energy: Several days - 1  Poor appetite or over eating: More than half the days - 2  Feeling bad about yourself - or that you are a failure or have let yourself or your family down: Several days - 1  Trouble concentrating on things, such as reading the newspaper or watching television: Not at all - 0  Moving or speaking so slowly that other people could have noticed  Or the opposite -  being so fidgety or restless that you have been moving around a lot more than usual: Not at all - 0  Thoughts that you would be better off dead, or of hurting yourself in some way: Several days - 1   PHQ-9 Adult Depression Screening Negative     PHQ-9 Difficulty Level Somewhat difficult     PHQ-9 Severity Mild Depression       *VB - Urinary Incontinence Screen (Dx Z13 89 Screen for UI) 87JKG5379 12:36PM RUSBASE     Test Name Result Flag Reference   Urinary Incontinence Assessment 13IOH5258       *VB - Fall Risk Assessment  (Dx Z13 89 Screen for Neurologic Disorder) 87DCK7143 12:35PM RUSBASE     Test Name Result Flag Reference   Falls Risk      No falls in the past year       Procedure    Procedure: Visual Acuity Test    Indication: routine screening  Inforrmation supplied by a Snellen chart  Results: 20/40 in the right eye with corrective device, 20/40 in the left eye with corrective device      Health Management  Encounter for screening colonoscopy   COLONOSCOPY; every 10 years; Last 93MWB3064; Next Due: 88PAW8843; Active    Future Appointments    Date/Time Provider Specialty Site   09/11/2017 02:00 PM Cardiology, Device Remote   [address]   12/11/2017 10:00 AM Cardiology, Device Remote   [address]   10/05/2017 02:00 PM CAMPOS Ontiveros   Surgical Oncology [facility]     Signatures   Electronically signed by : Owen Tran MD; Jul 10 2017  3:21PM EST                       (Author)    Appendix #1     Patient: CAIT MOJICA ; : 1950; MRN: [id number]      1 2 3 4 5    Influenza  07-Sep-2012 24-Sep-2013 16-Oct-2014 [id number]-[id number] 05-Oct-2016    PCV  2015        PPSV  Oct 2000 Oct 2005

## 2018-01-11 NOTE — RESULT NOTES
Verified Results  * XR HIPS BILATERAL 2 VW W PELVIS IF PERFORMED 23Feb2017 01:29PM [facility] Order Number: [id number]     Test Name Result Flag Reference   XR HIPS BILATERAL WITH AP PELVIS (Report)     BILATERAL HIPS AND PELVIS     INDICATION: Right hip pain  Occasional left hip pain  COMPARISON: None     VIEWS: AP pelvis and coned down views of each hip     IMAGES: 5      FINDINGS:   Moderate to severe lower lumbar and bilateral sacroiliac degenerative changes are noted  No acute pelvic fracture or pathologic bone lesions  Visualized bony pelvis appears intact  LEFT HIP:   No significant degenerative changes  Bony alignment is maintained  Soft tissues are unremarkable  RIGHT HIP:   No significant degenerative changes  Bony alignment is maintained  Soft tissues are unremarkable  IMPRESSION:     No acute osseous abnormality  No significant hip degenerative changes  Moderate to severe lower lumbar bilateral sacroiliac degenerative changes, incompletely imaged  Workstation performed: [id number]     Signed by:   Braeden Wooten MD   2/24/17     * XR KNEE 3 VW RIGHT NON INJURY 23Feb2017 01:29PM [facility] Order Number: [id number]     Test Name Result Flag Reference   XR KNEE 3 VW RIGHT (Report)     RIGHT KNEE     INDICATION: Right knee pain  COMPARISON: None     IMAGES: 3     FINDINGS:     There is no acute fracture or dislocation  There is no joint effusion  Moderate right knee osteoarthritis is noted most notable in medial and patellofemoral compartments with joint space narrowing and subchondral sclerosis is noted  Small osteophytes are present  No lytic or blastic lesions are seen  Soft tissues are unremarkable  IMPRESSION:     No acute osseous abnormality  Moderate right knee osteoarthritis is noted most notable in medial and patellofemoral compartments with joint space narrowing and subchondral sclerosis is noted   Small osteophytes are present  Workstation performed: [id number]     Signed by:   Eloisa Tavarez MD   2/24/17     * XR KNEE 3 VW LEFT NON INJURY [id number] 01:29PM [facility] Order Number: [id number]     Test Name Result Flag Reference   XR KNEE 3 VW LEFT (Report)     LEFT KNEE     INDICATION: Left knee arthroplasty  Left knee pain  COMPARISON: None     IMAGES: 3     FINDINGS:     Intact left knee arthroplasty is noted  No evidence of hardware complication  There is no acute fracture or dislocation  There is no joint effusion  No degenerative changes  No lytic or blastic lesions are seen  Soft tissues are unremarkable  IMPRESSION:     Intact left knee arthroplasty  No evidence of hardware complication  Workstation performed: [id number]     Signed by:   Eloisa Tavarez MD   2/24/17       Discussion/Summary   Moderate to severe amount of arthritis in all joints evaluated     I can review further with her at appointment next week        Patient aware

## 2018-01-12 VITALS
SYSTOLIC BLOOD PRESSURE: 122 MMHG | HEART RATE: 76 BPM | WEIGHT: 184.2 LBS | TEMPERATURE: 98.8 F | DIASTOLIC BLOOD PRESSURE: 74 MMHG | BODY MASS INDEX: 36.16 KG/M2 | HEIGHT: 60 IN

## 2018-01-12 NOTE — RESULT NOTES
Discussion/Summary   Urine is normal with no infection       Verified Results  (1) URINALYSIS w URINE C/S REFLEX (will reflex a microscopy if leukocytes, occult blood, or nitrites are not within normal limits) [id number] 12:00AM [facility]     Test Name Result Flag Reference   SPECIFIC GRAVITY 1 010  1 001-1 035   BILIRUBIN NEGATIVE  NEGATIVE   GLUCOSE NEGATIVE  NEGATIVE   COLOR YELLOW  YELLOW   APPEARANCE CLEAR  CLEAR   PH 6 0  5 0-8 0   KETONES NEGATIVE  NEGATIVE   OCCULT BLOOD NEGATIVE  NEGATIVE   PROTEIN NEGATIVE  NEGATIVE   SQUAMOUS EPITHELIAL CELLS NONE SEEN /HPF  < OR = 5   HYALINE CAST NONE SEEN /LPF  NONE SEEN   REFLEXIVE URINE CULTURE      CULTURE INDICATED - RESULTS TO FOLLOW   RBC NONE SEEN /HPF  < OR = 2   NITRITE NEGATIVE  NEGATIVE   LEUKOCYTE ESTERASE TRACE A NEGATIVE   WBC 0-5 /HPF  < OR = 5   BACTERIA NONE SEEN /HPF  NONE SEEN     REFLEXIVE URINE CULTURE [id number] 12:00AM [facility]     Test Name Result Flag Reference   CULTURE, URINE, ROUTINE      CULTURE, URINE, ROUTINE         MICRO NUMBER:      [id number]    TEST STATUS:       FINAL    SPECIMEN SOURCE:   URINE    SPECIMEN QUALITY:  ADEQUATE    RESULT:            No Growth        Pt aware

## 2018-01-13 VITALS
RESPIRATION RATE: 16 BRPM | DIASTOLIC BLOOD PRESSURE: 62 MMHG | SYSTOLIC BLOOD PRESSURE: 112 MMHG | HEIGHT: 60 IN | BODY MASS INDEX: 35.61 KG/M2 | HEART RATE: 76 BPM | WEIGHT: 181.38 LBS | SYSTOLIC BLOOD PRESSURE: 132 MMHG | HEIGHT: 60 IN | DIASTOLIC BLOOD PRESSURE: 74 MMHG | TEMPERATURE: 99.2 F | WEIGHT: 183.6 LBS | HEART RATE: 80 BPM | BODY MASS INDEX: 36.05 KG/M2

## 2018-01-13 VITALS
DIASTOLIC BLOOD PRESSURE: 70 MMHG | BODY MASS INDEX: 35.14 KG/M2 | HEIGHT: 60 IN | WEIGHT: 179 LBS | SYSTOLIC BLOOD PRESSURE: 130 MMHG | HEART RATE: 76 BPM | TEMPERATURE: 99.5 F

## 2018-01-13 VITALS
DIASTOLIC BLOOD PRESSURE: 72 MMHG | HEIGHT: 60 IN | SYSTOLIC BLOOD PRESSURE: 118 MMHG | BODY MASS INDEX: 34.04 KG/M2 | HEART RATE: 76 BPM | WEIGHT: 173.4 LBS

## 2018-01-13 VITALS
WEIGHT: 179.5 LBS | HEIGHT: 60 IN | BODY MASS INDEX: 35.24 KG/M2 | SYSTOLIC BLOOD PRESSURE: 132 MMHG | HEART RATE: 76 BPM | DIASTOLIC BLOOD PRESSURE: 76 MMHG

## 2018-01-13 VITALS
WEIGHT: 182.13 LBS | HEIGHT: 60 IN | SYSTOLIC BLOOD PRESSURE: 130 MMHG | DIASTOLIC BLOOD PRESSURE: 82 MMHG | BODY MASS INDEX: 35.76 KG/M2

## 2018-01-13 VITALS
RESPIRATION RATE: 14 BRPM | BODY MASS INDEX: 35.93 KG/M2 | WEIGHT: 183 LBS | HEART RATE: 76 BPM | DIASTOLIC BLOOD PRESSURE: 80 MMHG | SYSTOLIC BLOOD PRESSURE: 128 MMHG | HEIGHT: 60 IN

## 2018-01-13 VITALS
HEIGHT: 60 IN | TEMPERATURE: 98.2 F | DIASTOLIC BLOOD PRESSURE: 72 MMHG | BODY MASS INDEX: 35.57 KG/M2 | HEART RATE: 72 BPM | SYSTOLIC BLOOD PRESSURE: 116 MMHG | WEIGHT: 181.2 LBS

## 2018-01-14 VITALS
HEART RATE: 84 BPM | HEIGHT: 60 IN | BODY MASS INDEX: 35.34 KG/M2 | WEIGHT: 180 LBS | SYSTOLIC BLOOD PRESSURE: 131 MMHG | TEMPERATURE: 97.5 F | DIASTOLIC BLOOD PRESSURE: 84 MMHG

## 2018-01-14 VITALS
OXYGEN SATURATION: 96 % | DIASTOLIC BLOOD PRESSURE: 82 MMHG | RESPIRATION RATE: 15 BRPM | TEMPERATURE: 99 F | HEART RATE: 86 BPM | BODY MASS INDEX: 35.53 KG/M2 | SYSTOLIC BLOOD PRESSURE: 120 MMHG | WEIGHT: 181 LBS | HEIGHT: 60 IN

## 2018-01-14 VITALS
HEIGHT: 60 IN | SYSTOLIC BLOOD PRESSURE: 124 MMHG | BODY MASS INDEX: 34.55 KG/M2 | HEART RATE: 78 BPM | RESPIRATION RATE: 15 BRPM | DIASTOLIC BLOOD PRESSURE: 74 MMHG | WEIGHT: 176 LBS | TEMPERATURE: 99.6 F | OXYGEN SATURATION: 98 %

## 2018-01-14 VITALS
WEIGHT: 176 LBS | DIASTOLIC BLOOD PRESSURE: 78 MMHG | HEIGHT: 60 IN | BODY MASS INDEX: 34.55 KG/M2 | HEART RATE: 68 BPM | SYSTOLIC BLOOD PRESSURE: 128 MMHG | TEMPERATURE: 98.3 F

## 2018-01-15 NOTE — RESULT NOTES
Message   Recorded as Task   Date: 03/27/2017 10:13 AM, Created By: Pj Olivarez   Task Name: Miscellaneous   Assigned To: Deborah Sanchez   Regarding Patient: Meghan Roque, Status: Active   Comment:    Deborah Sanchez - 27 Mar 2017 10:13 AM     TASK CREATED  pt cancelling her MBB #1 for 3/30/2017 as she "just can't seem to bring on the pain" she states if it flares up again she will call back     Anmol Lozano - 27 Mar 2017 10:16 AM     TASK REPLIED TO: Previously Assigned To Anmol Lozano            aware

## 2018-01-15 NOTE — RESULT NOTES
Verified Results  (1) LIPID PANEL, FASTING [id number] 08:16AM [employer]     Test Name Result Flag Reference   CHOLESTEROL, TOTAL 180 mg/dL  125-200   HDL CHOLESTEROL 61 mg/dL  > OR = 46   TRIGLICERIDES 86 mg/dL  <703   LDL-CHOLESTEROL 102 mg/dL (calc)  <130   Desirable range <100 mg/dL for patients with CHD or  diabetes and <70 mg/dL for diabetic patients with  known heart disease  CHOL/HDLC RATIO 3 0 (calc)  < OR = 5 0   NON HDL CHOLESTEROL 119 mg/dL (calc)     Target for non-HDL cholesterol is 30 mg/dL higher than   LDL cholesterol target  (1) COMPREHENSIVE METABOLIC PANEL [id number] 04:77TT [employer]     Test Name Result Flag Reference   GLUCOSE 113 mg/dL H 65-99   Fasting reference interval   UREA NITROGEN (BUN) 38 mg/dL H 7-25   CREATININE 1 17 mg/dL H 0 50-0 99   For patients >52years of age, the reference limit  for Creatinine is approximately 13% higher for people  identified as -American  eGFR NON-AFR   AMERICAN 48 mL/min/1 73m2 L > OR = 60   eGFR AFRICAN AMERICAN 55 mL/min/1 73m2 L > OR = 60   BUN/CREATININE RATIO 32 (calc) H 6-22   SODIUM 138 mmol/L  135-146   POTASSIUM 4 2 mmol/L  3 5-5 3   CHLORIDE 100 mmol/L     CARBON DIOXIDE 27 mmol/L  19-30   CALCIUM 10 0 mg/dL  8 6-10 4   PROTEIN, TOTAL 7 1 g/dL  6 1-8 1   ALBUMIN 4 2 g/dL  3 6-5 1   GLOBULIN 2 9 g/dL (calc)  1 9-3 7   ALBUMIN/GLOBULIN RATIO 1 4 (calc)  1 0-2 5   BILIRUBIN, TOTAL 0 6 mg/dL  0 2-1 2   ALKALINE PHOSPHATASE 72 U/L     AST 19 U/L  10-35   ALT 23 U/L  6-29     (1) CBC/PLT/DIFF [id number] 08:16AM [employer]     Test Name Result Flag Reference   WHITE BLOOD CELL COUNT 8 3 Thousand/uL  3 8-10 8   RED BLOOD CELL COUNT 4 94 Million/uL  3 80-5 10   HEMOGLOBIN 13 9 g/dL  11 7-15 5   HEMATOCRIT 42 9 %  35 0-45 0   MCV 86 8 fL  80 0-100 0   MCH 28 0 pg  27 0-33 0   MCHC 32 3 g/dL  32 0-36 0   RDW 14 5 %  11 0-15 0   PLATELET COUNT 315 Thousand/uL  140-400   MPV 8 9 fL  7 5-11 5   ABSOLUTE NEUTROPHILS 5453 cells/uL  3993-0039   ABSOLUTE LYMPHOCYTES 1751 cells/uL  850-3900   ABSOLUTE MONOCYTES 772 cells/uL  200-950   ABSOLUTE EOSINOPHILS 291 cells/uL     ABSOLUTE BASOPHILS 33 cells/uL  0-200   NEUTROPHILS 65 7 %     LYMPHOCYTES 21 1 %     MONOCYTES 9 3 %     EOSINOPHILS 3 5 %     BASOPHILS 0 4 %       (Q) TSH, 3RD GENERATION W/REFLEX TO FT4 [id number] 08:16AM [facility]     Test Name Result Flag Reference   TSH W/REFLEX TO FT4 2 72 mIU/L  0 40-4 50     (Q) HEMOGLOBIN A1c [id number] 08:16AM [facility]   REPORT COMMENT:  FASTING:YES     Test Name Result Flag Reference   HEMOGLOBIN A1c 6 2 % of total Hgb H <5 7   According to ADA guidelines, hemoglobin A1c <7 0%  represents optimal control in non-pregnant diabetic  patients  Different metrics may apply to specific  patient populations  Standards of Medical Care in    Diabetes Care  2013;36:s11-s66     For the purpose of screening for the presence of  diabetes  <5 7%       Consistent with the absence of diabetes  5 7-6 4%    Consistent with increased risk for diabetes              (prediabetes)  >or=6 5%    Consistent with diabetes     This assay result is consistent with a higher risk  of diabetes  Currently, no consensus exists for use of hemoglobin  A1c for diagnosis of diabetes for children  Pt aware1      1 Amended By: Sandra Myles; Jul 14 2016 3:43 PM EST    Discussion/Summary   Excellent sugar, cholesterol, thyroid   Keep same meds and doses

## 2018-01-16 ENCOUNTER — ALLSCRIPTS OFFICE VISIT (OUTPATIENT)
Dept: OTHER | Facility: OTHER | Age: 68
End: 2018-01-16

## 2018-01-16 ENCOUNTER — GENERIC CONVERSION - ENCOUNTER (OUTPATIENT)
Dept: OTHER | Facility: OTHER | Age: 68
End: 2018-01-16

## 2018-01-16 NOTE — RESULT NOTES
Verified Results  (1) TISSUE EXAM [id number] 02:41PM [facility]     Test Name Result Flag Reference   LAB AP CASE REPORT (Report)     Surgical Pathology Report             Case: [id number]                   Authorizing Provider: Lucrecia Flores MD    Collected:      05/23/2016 1441        Ordering Location:   [address] [place] [address] Breast Received:      05/24/2016 [address]                                     Pathologist:      Sridhar Morton MD                                Specimen:  Breast, Left, lt breast, 3 passes, 6 o'clock retroareolar, 14 g bard   LAB AP FINAL DIAGNOSIS (Report)     A  Breast, Left, left breast, 3 passes, 6 o'clock retroareolar, 14 g bard:  - Small fragments of benign epithelial-lined cyst wall with focal usual   ductal hyperplasia showing    micropapillary and possible papillary patterns; no atypia  See Note  - Negative for malignancy, in-situ carcinoma and atypical hyperplasia  -- Confirmed by positive P63 and calponin-B myoepithelial immunostains   and      CK 5/6 epithelial and myoepithelial immunopositivity  Interpretation performed at [facility], [address] [place] [address]  LAB AP NOTE (Report)     The histology correlates with the radiologic findings  However, the biopsy   fragments are small and while not diagnostic of an intraductal papilloma,   sampling of the edge of an intraductal papillary lesion cannot be   completely excluded  No malignancy, in-situ carcinoma or atypical   hyperplasia are identified in this biopsy and confirmed by positive   myoepithelial immunostains and CK 5/6 positivity of both epithelial and   myoepithelial cells  Clinicoradiologic follow-up is recommended  Immunostains are performed with appropriate controls  LAB AP SURGICAL ADDITIONAL INFORMATION (Report)     These tests were developed and their performance characteristics   determined by [facility] or [facility]  They may not be cleared or approved by the [place]  Food and   Drug Administration  The FDA has determined that such clearance or   approval is not necessary  These tests are used for clinical purposes  They should not be regarded as investigational or for research  This   laboratory has been approved by [facility], designated as a high-complexity   laboratory and is qualified to perform these tests  LAB AP GROSS DESCRIPTION (Report)     A  The specimen is received in formalin, labeled with the patient's name   and hospital number, and is designated left breast, 3 passes, 6 o'clock   retroareolar  The specimen consists of 4 white-tan yellow fibrofatty   tissue cores ranging in diameter from 0 17 cm to 0 2 cm, and having   lengths of 0 2 cm, 0 2 cm, 0 4 cm, and 0 5 cm  No additional tissue is   identified  Entirely submitted  Two cassettes, with 2 cores in each   cassette  Note: The estimated total formalin fixation time based upon information   provided by the submitting clinician and the standard processing schedule   is 19 75 hours  MAS   LAB AP CLINICAL INFORMATION (Report)     Fixed in formalin  At the 6 o'clock position, in a retroareolar location, there is a hypoechoic 0 7 x 0 7 x 0 4 cm nodule with internal vascularity, possibly located within a duct  With the history of nipple discharge, papilloma cannot be excluded and ultrasound-guided core biopsy is recommended  Located inferior to this nodular hypoechoic area, a dilated duct filled with hypoechoic material is identified  As a filling defect within this duct could not be excluded  No cystic nodules are identified  Fixed in formalin       Plan  Nipple discharge, Papilloma of left breast    · 1 - James Roy MD, Maicol Castillo  (Surgical Oncology) Physician Referral  Consult  Status: Active   Requested for: [id number]  () Care Summary provided  : Yes    Discussion/Summary    Breast biopsy was benign   I would advise visit with Dr Keith Pretty just to see if she needs further followup     Referral is on printer  Patient aware1       1 Amended By: Shin Link; May 27 2016 7:36 AM EST

## 2018-01-16 NOTE — PROGRESS NOTES
Assessment    1  Encounter for preventive health examination (V70 0) (Z00 00)   2  Type 2 diabetes mellitus with stage 3 chronic kidney disease (250 40,585 3)   (E11 22,N18 3)   3  Hyperlipidemia (272 4) (E78 5)   4  Hypokalemia (276 8) (E87 6)   5  Hypothyroidism (244 9) (E03 9)   6  Obesity (278 00) (E66 9)    Plan  Health Maintenance    · *VB - Fall Risk Assessment  (Dx V80 09 Screen for Neurologic Disorder);  Status:Complete;   Done: 86KZY3704   · Eat a low fat and low cholesterol diet ; Status:Complete;   Done: 07UJP6567   · Keep a diary of when and what you eat ; Status:Complete;   Done: 26PSY5076   · Some eating tips that can help you lose weight ; Status:Complete;   Done: 62DGJ9126   · These are things you can do to prevent falls in and around the home ; Status:Complete;    Done: 06Tjb2991   · We recommend that you bring your body mass index down to 26 ; Status:Complete;    Done: 67NIN5038  Hyperlipidemia    · (1) LIPID PANEL, FASTING; Status:Active; Requested for:85Pnc6490;   Hypothyroidism    · Levothyroxine Sodium 25 MCG Oral Tablet; TAKE 1 TABLET EVERY MORNING   · (1) TSH WITH FT4 REFLEX; Status:Active; Requested for:50Qtv3525;   Type 2 diabetes mellitus with stage 3 chronic kidney disease    · (1) CBC/PLT/DIFF; Status:Active; Requested for:30Ihk2660;    · (1) COMPREHENSIVE METABOLIC PANEL; Status:Active; Requested for:49Adp9746;    · (1) HEMOGLOBIN A1C; Status:Active; Requested for:72Dpv6697;     Discussion/Summary    Labs reviewed in detail- excellent lipids and A1C   BP excellent control  CPAP continues 12-16 cmH2O  Impression: Subsequent Annual Wellness Visit, with preventive exam as well as age and risk appropriate counseling completed  Cardiovascular screening and counseling: screening is current  Diabetes screening and counseling: screening is current  Colorectal cancer screening and counseling: screening is current and done 1/19/12     Breast cancer screening and counseling: screening is current  Cervical cancer screening and counseling: screening not indicated  Osteoporosis screening and counseling: screening is current  Abdominal aortic aneurysm screening and counseling: screening not indicated  Glaucoma screening and counseling: screening is current  HIV screening and counseling: screening not indicated  Immunizations: influenza vaccine is up to date this year, pneumococcal vaccine due today, hepatitis B vaccination series is not indicated at this time due to the patient's low risk of da the disease, Zostavax vaccination up to date and the patient declines the Td vaccine  Advance Directive Planning: complete and up to date  Advice and education were given regarding fall risk reduction  She was referred to none  Medical Equipment/Suppliers: none  Patient Discussion: plan discussed with the patient, follow-up visit needed in 6 months  Chief Complaint  HM      Advance Directives  Advance Directive [facility]:   YES - Patient has an advance health care directive  The patient has a living will located  in patient's home  Capacity/Competence: This patient has full decision making capacity for discussion of advance care planning and This patient has full decision making competency for discussion of advance care planning  History of Present Illness  HPI: Lump on L earlobe  Had R ring finger trigger release  Welcome to [employer] and Wellness Visits: The patient is being seen for the subsequent annual wellness visit  Medicare Screening and Risk Factors   Hospitalizations: she has been previously hospitalizied and she has been hospitalized 3 times  Once per lifetime medicare screening tests: ECG ~U() and AAA screening US has not yet been done  Medicare Screening Tests Risk Questions   Abdominal aortic aneurysm risk assessment: none indicated  Osteoporosis risk assessment: , female gender and over 48years of age     HIV risk assessment: none indicated  Drug and Alcohol Use: The patient has never smoked cigarettes  The patient reports never drinking alcohol  She has never used illicit drugs  Diet and Physical Activity: Current diet includes well balanced meals  She exercises daily  Exercise: walking  Mood Disorder and Cognitive Impairment Screening:   Depression screening was done using 15 point [facility] depression screen and score was 4     no significant symptoms  She denies feeling down, depressed, or hopeless over the past two weeks  She denies feeling little interest or pleasure in doing things over the past two weeks  Cognitive impairment screening: denies difficulty learning/retaining new information, denies difficulty handling complex tasks, denies difficulty with reasoning, denies difficulty with spatial ability and orientation, denies difficulty with language and denies difficulty with behavior  Functional Ability/Level of Safety: Hearing is normal bilaterally and a hearing aid is not used  The patient is currently able to do activities of daily living without limitations, able to do instrumental activities of daily living without limitations, able to participate in social activities without limitations and able to drive without limitations  Activities of daily living details: does not need help using the phone, no transportation help needed, does not need help shopping, no meal preparation help needed, does not need help doing housework, does not need help doing laundry, does not need help managing medications and does not need help managing money  Fall risk factors:  antihypertensive use and optimize BP control, but no polypharmacy, no alcohol use, no mobility impairment, no antidepressant use, no deconditioning, no postural hypotension, no sedative use, no visual impairment, no urinary incontinence, no cognitive impairment, up and go test was normal and no previous fall   Home safety risk factors:  no unfamiliar surroundings, no loose rugs, no poor household lighting, no uneven floors, no household clutter, grab bars in the bathroom and handrails on the stairs  Advance Directives: Advance directives: living will, durable power of  for health care directives and advance directives  end of life decisions were reviewed with the patient and I agree with the patient's decisions  Co-Managers and Medical Equipment/Suppliers: See Patient Care Team   Reviewed Updated [facility]:   Last Medicare Wellness Visit Information was reviewed, patient interviewed and updates made to the record today  Preventive Quality Program 65 and Older: Falls Risk: The patient fell 0 times in the past 12 months  The patient is currently asymptomatic Symptoms Include:  Associated symptoms:  No associated symptoms are reported  Patient Care Team    Care Team Member Role Specialty Office Number   Karla Landon MD  Family Medicine [phone]   [facility]  Cardiology [phone]   Eder Lovett MD  Orthopedic Surgery [phone]   Trish CASTELLON  Orthopedic Surgery [phone]     Review of Systems    Constitutional: no malaise  ENT: no earache, no hearing loss and no nasal congestion  Cardiovascular: no chest pain and no palpitations  Respiratory: no shortness of breath and no cough  Gastrointestinal: no abdominal pain  Genitourinary: no dysuria  Musculoskeletal: diffuse joint pain and generalized muscle aches  Integumentary and Breasts: no rashes  Neurological: difficulty walking  Psychiatric: no anxiety and no depression  Endocrine: no generalized weakness  Hematologic and Lymphatic: no tendency for easy bleeding and no tendency for easy bruising  Active Problems     1  Acute bacterial bronchitis (466 0,041 9) (J20 8,B96 89)   2  Aftercare following surgery of the musculoskeletal system (V58 78) (Z47 89)   3  Allergic rhinitis (477 9) (J30 9)   4  Arthralgia (719 40) (M25 50)   5   Atherosclerotic heart disease of native coronary artery without angina pectoris (414 01)   (I25 10)   6  Benign paroxysmal vertigo, unspecified laterality (386 11) (H81 10)   7  Cardiac defibrillator in situ (V45 02) (Z95 810)   8  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   9  Carpal tunnel syndrome, unspecified laterality (354 0) (G56 00)   10  Chronic diastolic congestive heart failure (428 32,428 0) (I50 32)   11  Chronic kidney disease, stage 3 (585 3) (N18 3)   12  Chronic systolic congestive heart failure (428 22,428 0) (I50 22)   13  Coronary artery disease (414 00) (I25 10)   14  Cough (786 2) (R05)   15  Degeneration of cervical intervertebral disc (722 4) (M50 90)   16  Depression (311) (F32 9)   17  Detrusor instability (596 59) (N32 81)   18  Dilated cardiomyopathy (425 4) (I42 0)   19  Encounter for screening colonoscopy (V76 51) (Z12 11)   20  Encounter for screening mammogram for malignant neoplasm of breast (V76 12)    (Z12 31)   21  Esophageal reflux (530 81) (K21 9)   22  Flu vaccine need (V04 81) (Z23)   23  Hyperlipidemia (272 4) (E78 5)   24  Hypertension (401 9) (I10)   25  Hypokalemia (276 8) (E87 6)   26  Hypothyroidism (244 9) (E03 9)   27  Ingrown nail of second toe of right foot (703 0) (L60 0)   28  Left bundle-branch block (426 3) (I44 7)   29  History of Need For Prophylactic Measure (V07 9)   30  Need for vaccination with 13-polyvalent pneumococcal conjugate vaccine (V03 82) (Z23)   31  Nipple discharge (611 79) (N64 52)   32  Obstructive sleep apnea (327 23) (G47 33)   33  Osteoarthritis (715 90) (M19 90)   34  Overactive bladder (596 51) (N32 81)   35  Papilloma of left breast (217) (D24 2)   36  Psoriasis (696 1) (L40 9)   37  Psoriatic arthropathy (696 0) (L40 50)   38  Restless legs syndrome (333 94) (G25 81)   39  Sciatica (724 3) (M54 30)   40  Screening for osteoporosis (V82 81) (Z13 820)   41  Shortness of breath (786 05) (R06 02)   42   Tendinitis (131 90) (M77 9)   43  Trigger ring finger of right hand (727 03) (M65 341)   44  Trigger thumb, left   45  Type 2 diabetes mellitus (250 00) (E11 9)   46  Type 2 diabetes mellitus with stage 3 chronic kidney disease (250 40,585 3)    (E11 22,N18 3)   47  Vitamin D deficiency (268 9) (E55 9)    Cath Stent Placement       Breast nodule (793 89) (C54)          Past Medical History    · History of Arthralgia of toe, unspecified laterality (719 47) (M25 579)   · History of Back pain, unspecified back pain laterality, unspecified chronicity, unspecified  location   · History of Birth control (V25 9) (Z30 9)   · History of Carpal tunnel syndrome, unspecified laterality (354 0) (G56 00)   · History of Difficulty walking up stairs (719 7) (R26 2)   · History of blood clots (V12 51) (Z86 718)   · History of hemorrhoids (V13 89) (Z87 19)   · History of left bundle branch block (V12 59) (Z86 79)   · History of pregnancy (V13 29)   · History of rickettsial disease (V12 09) (Z86 19)   · History of urinary frequency (V13 09) (O50 736)   · History of Hormone replacement therapy (V07 4) (Z79 890)   · History of Indigestion (536 8) (K30)   · History of Menarche (V21 8)   · History of Sleep apnea, unspecified type (780 57) (G47 30)   · History of Urinary Tract Infection (V13 02)    The active problems and past medical history were reviewed and updated today        Surgical History     · History of Biopsy Breast Percutaneous Needle Core   · History of Bladder Surgery   · History of Cardiac Cath Procedure Outcome: Successful   · 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   · History of Cath Stent Placement   · History of Cystoscopy With Ureteroscopy   · History of Eye Surgery   · History of Hand Surgery   · History of Implantable Cardioverter-Defibrillator   · History of Knee Arthroscopy   · History of Knee Replacement   · History of Need For Prophylactic Measure (V07 9)   · History of Neuroplasty Decompression Median Nerve At Carpal Tunnel   · History of Rectal Surgery   · History of Shoulder Surgery   · History of Total Abdominal Hysterectomy    The surgical history was reviewed and updated today  Cath Stent Placement             Family History   Father    · Family history of malignant neoplasm of prostate (V16 42) (Z80 42)   · Family history of malignant neoplasm of urinary bladder (P81 81) (Z80 52)    The family history was reviewed and updated today  Family history of malignant neoplasm (V16 9) (Z80 9)     - pt shared some type of blood cancer       Family history of Malignant neoplasm of female breast, unspecified laterality, unspecified site of breast       Family history of malignant neoplasm of stomach (V16 0) (Z80 0)     - age 72             Social History     · Denied: History of Alcohol use   · Former smoker (V15 82) (J10 470)   · Marital History - Currently   The social history was reviewed and updated today  The social history was reviewed and is unchanged  Non-smoker (V49 89) (Z78 9)             Current Meds   1  Aspirin 81 MG TABS; Take 1 tablet daily; Therapy: (Recorded:95Rfr2664) to Recorded   2  Atacand 32 MG Oral Tablet; Therapy: (Recorded:29Akz5540) to Recorded   3  Candesartan Cilexetil 32 MG Oral Tablet; Take 1 tablet daily; Therapy: 40LQV2372 to (Trina Espinosa)  Requested for: 52Rts2929; Last   Rx:20Rli6913 Ordered   4  Claritin 10 MG Oral Tablet; Therapy: (Recorded:91Nvu3571) to Recorded   5  [employer] Hair Regrowth Womens 2 % External Solution Recorded   6  Daily Multiple Vitamins Oral Tablet; Take 1 tablet daily; Therapy: (Recorded:49Xbf1457) to Recorded   7  Esomeprazole Magnesium 40 MG Oral Capsule Delayed Release; take 1 capsule daily; Therapy: 01KMV8326 to (Evaluate:65Rga1628)  Requested for: 39LUR9351; Last   Rx:96Muo1255 Ordered   8  FLUoxetine HCl - 20 MG Oral Capsule; take 1 capsule daily;    Therapy: 86MJJ2910 to 96 439235)  Requested for: 30Ajo9738; Last   Rx:05Apr2016 Ordered   9  Januvia 100 MG Oral Tablet; Take 1 tablet daily; Therapy: 52QER9076 to (Last Rx:27Nov2015)  Requested for: 27Nov2015 Ordered   10  Klor-Con M20 20 MEQ Oral Tablet Extended Release; Take 1 tablet twice daily; Therapy: 20LPY7926 to (Thu Yan)  Requested for: 29DBT5636; Last    Rx:05Pvb7648 Ordered   11  Levothyroxine Sodium 25 MCG Oral Tablet; TAKE 1 TABLET EVERY MORNING; Therapy: 71DER9396 to (Pj Rebekah)  Requested for: 10UEN0279; Last    Rx:99Ssw6861 Ordered   12  Magnesium 250 MG Oral Tablet; Therapy: (Recorded:64Bbt6898) to Recorded   13  Meloxicam 15 MG Oral Tablet; Take 1 tablet daily  Requested for: 75LRY9008; Last    Rx:06Jan2016 Ordered   14  Metoprolol Succinate  MG Oral Tablet Extended Release 24 Hour; Take 1 tablet    daily; Therapy: 04VXB6798 to (Evaluate:27Apr2017)  Requested for: 22PNJ8090; Last    Rx:91Zbo7439 Ordered   15  Montelukast Sodium 10 MG Oral Tablet; Take 1 tablet daily  Requested for: 16XCP1942; Last Rx:19Vhh1392 Ordered   16  NexIUM 40 MG Oral Capsule Delayed Release; TAKE 1 CAPSULE DAILY; Therapy: (Recorded:81Lkn8341) to Recorded   17  Singulair 10 MG Oral Tablet; dialy as needed; Therapy: (Recorded:64Ayj5760) to Recorded   18  Torsemide 20 MG Oral Tablet; TAKE 2 OR 3 TABLETS DAILY; Therapy: 37Bvb1785 to (Evaluate:34Sfz6256)  Requested for: 32DYV7994; Last    Rx:15Mar2016 Ordered   19  TraMADol HCl - 50 MG Oral Tablet; TAKE 1 TABLET BY MOUTH TWICE A DAY AS    NEEDED; Therapy: 20ANH9183 to (Evaluate:29Baz6048); Last Rx:22Jan2016 Ordered   20  Vitamin B6 TABS; TAKE 1 TABLET DAILY AS DIRECTED; Therapy: (Recorded:43Aax2554) to Recorded   21  Vitamin D 1000 UNIT CAPS; Take as directed; Therapy: (Recorded:48Sfg7098) to Recorded   22  Zocor 40 MG Oral Tablet; Take 1 tablet daily; Therapy: 66SCT5545 to (Evaluate:18Jun2016)  Requested for: 94AES7794;  Last    ZH:02GTC0773 Ordered    The medication list was reviewed and updated today  Allergies    1  No Known Drug Allergies    Immunizations   1 2 3    Fluzone High-Dose 0 5 ML Intramuscular Suspension Prefilled Syringe  03-Nov-2015      Influenza  07-Sep-2012 24-Sep-2013 16-Oct-2014    PCV  24-Jun-2015      PPSV  Oct 2000 Oct 2005      Vitals  Signs    Systolic: 620, LUE, Sitting  Diastolic: 70, LUE, Sitting  Heart Rate: 64, L Radial  Pulse Quality: Regular  Temperature: 98 1 F, Tympanic  Height: 5 ft   Weight: 177 lb 12 8 oz  BMI Calculated: 34 72  BSA Calculated: 1 77    Physical Exam    Constitutional   General appearance: No acute distress, well appearing and well nourished  Ears, Nose, Mouth, and Throat   External inspection of ears and nose: Normal     Otoscopic examination: Tympanic membranes translucent with normal light reflex  Canals patent without erythema  Neck   Neck: Supple, symmetric, trachea midline, no masses  Thyroid: Normal, no thyromegaly  Pulmonary   Respiratory effort: No increased work of breathing or signs of respiratory distress  Auscultation of lungs: Clear to auscultation  Cardiovascular   Auscultation of heart: Normal rate and rhythm, normal S1 and S2, no murmurs  Carotid pulses: 2+ bilaterally  Lymphatic   Palpation of lymph nodes in neck: No lymphadenopathy  Musculoskeletal   Gait and station: Normal     Psychiatric   Judgment and insight: Normal     Orientation to person, place, and time: Normal     Recent and remote memory: Intact  Mood and affect: Normal        Procedure    Procedure: Visual Acuity Test    Indication: routine screening  Inforrmation supplied by a Snellen chart  Results: 20/25 in the right eye with corrective device, 20/30 in the left eye with corrective device      Health Management  Encounter for screening colonoscopy   COLONOSCOPY; every 10 years; Last 93NKA5640; Next Due: 86BIU8701;  Active    Future Appointments    Date/Time Provider Specialty Site   10/06/2016 01:30 PM CAMPOS Patino   Surgical Oncology [facility]   11/08/2016 02:00 PM Adelfo Chandler MD [address] [place] [address] MD     Signatures   Electronically signed by : Nicolle Khan MD; Aug  7 2016  8:35PM EST                       (Author)

## 2018-01-23 NOTE — RESULT NOTES
Message  I received a message from the device clinic that Dejah's opti vol crossed the threshold  I called Ned Perkins today and she was out at a [facility]  I left a message to call our office if she was experiencing, weight gain > 3 pounds, SOB or LE edema        Signatures   Electronically signed by : Andrea Escobar, [address] [facility]; Dec 13 2017  1:21PM EST                       (Author)

## 2018-01-29 DIAGNOSIS — M12.9 ARTHROPATHY: Primary | ICD-10-CM

## 2018-01-29 RX ORDER — TRAMADOL HYDROCHLORIDE 50 MG/1
50 TABLET ORAL 2 TIMES DAILY
Qty: 60 TABLET | Refills: 0 | Status: SHIPPED | OUTPATIENT
Start: 2018-01-29 | End: 2018-02-27 | Stop reason: SDUPTHER

## 2018-02-05 DIAGNOSIS — E87.6 HYPOKALEMIA: Primary | ICD-10-CM

## 2018-02-05 RX ORDER — POTASSIUM CHLORIDE 1.5 G/1.77G
20 POWDER, FOR SOLUTION ORAL 2 TIMES DAILY
Qty: 90 TABLET | Refills: 3 | Status: SHIPPED | OUTPATIENT
Start: 2018-02-05 | End: 2018-02-27 | Stop reason: SDUPTHER

## 2018-02-27 DIAGNOSIS — E87.6 HYPOKALEMIA: ICD-10-CM

## 2018-02-27 DIAGNOSIS — M12.9 ARTHROPATHY: ICD-10-CM

## 2018-02-27 RX ORDER — POTASSIUM CHLORIDE 1.5 G/1.77G
20 POWDER, FOR SOLUTION ORAL 2 TIMES DAILY
Qty: 180 TABLET | Refills: 3 | Status: SHIPPED | OUTPATIENT
Start: 2018-02-27 | End: 2019-03-24 | Stop reason: SDUPTHER

## 2018-02-27 RX ORDER — TRAMADOL HYDROCHLORIDE 50 MG/1
50 TABLET ORAL 2 TIMES DAILY
Qty: 180 TABLET | Refills: 3 | Status: SHIPPED | OUTPATIENT
Start: 2018-02-27 | End: 2018-09-29 | Stop reason: SDUPTHER

## 2018-03-07 NOTE — PROGRESS NOTES
"  Discussion/Summary  Normal device function      Results/Data  Results   Cardiac Device Remote [id number] 07:26AM Bayron Smaller     Test Name Result Flag Reference   MISCELLANEOUS COMMENT      CARELINK TRANSMISSION - OPTI-VOL ONLY: OPTI-VOL WITHIN NORMAL LIMITS  BVP-100%  BATTERY VOLTAGE ADEQUATE (2 93V-RRT=2 63V)  NO SIGNIFICANT HIGH RATE EPISODES  ALL AVAILABLE LEAD PARAMETERS WITHIN NORMAL LIMITS  NORMAL DEVICE FUNCTION  GV   Cardiac Electrophysiology Report      [id number]}  pdf   DEVICE TYPE CRT-D       Cardiac Electrophysiology Report [id number] 07:26AM Bayron Smaller     Test Name Result Flag Reference   Cardiac Electrophysiology Report      [id number]  pdf     Signatures   Electronically signed by : Natasha Mabry RN; Jan 18 2016 12:26PM EST                       (Author)    Electronically signed by : CAMPOS Masterson ; Jan 18 2016  7:30PM EST                       (Author)    "

## 2018-03-07 NOTE — PROGRESS NOTES
"  Discussion/Summary  Normal device function      Results/Data  Cardiac Device Remote [id number] 06:23AM Rina Stout     Test Name Result Flag Reference   MISCELLANEOUS COMMENT (Report)     CARELINK TRANSMISSION: BATTERY VOLTAGE ADEQUATE (5 1 YRS)  AP-56 3%, BVP-98 5% [ - 98 4% + VSRP - 0 1%]  ALL AVAILABLE LEAD PARAMETERS APPEAR WITHIN NORMAL LIMITS & STABLE  NO SIGNIFICANT HIGH RATE EPISODES  3 VENT SENSING EPISODE (CHANNEL MARKERS) ALL ON SAME DAY WITH TOTAL DURATION @ ~ 5 MINS  /AVG V RATES  BPM  OPTI-VOL FLUID THRESHOLD CROSSED & ONGOING  PT TAKES TORSEMIDE - HX: CKD STAGE III  TASK TO NP  WILL JULIANNE IN 31 DAYS  NORMAL DEVICE FUNCTION  eb   Cardiac Electrophysiology Report      [id number]  pdf   DEVICE TYPE CRT-D       Cardiac Electrophysiology Report [id number] 06:23AM Rina Stout     Test Name Result Flag Reference   Cardiac Electrophysiology Report      [id number]  pdf     Signatures   Electronically signed by : Judith Ríos, ; Dec 12 2017  9:01AM EST                       (Author)    Electronically signed by : CAMPOS Wall ; Dec 12 2017 11:40AM EST                       (Author)    "

## 2018-03-07 NOTE — PROGRESS NOTES
"  Discussion/Summary  Normal device function      Results/Data  Results   Cardiac Device Remote [id number] 01:54PM Simeon Car     Test Name Result Flag Reference   MISCELLANEOUS COMMENT      CARELINK TRANSMISSION - OPTI-VOL ONLY: OPTI-VOL FLUID THRESHOLD CROSSED  NP NOTIFIED  NORMAL DEVICE FUNCTION  X0A0A* BATTERY STATUS "OK"  % AP 87 9% ALL AVAILABLE LEAD PARAMETERS WITHIN NORMAL LIMITS  NO SIGNIFICANT HIGH RATE EPISODES  *   Cardiac Electrophysiology Report      [id number]}  pdf   DEVICE TYPE CRT-D       Cardiac Electrophysiology Report [id number] 01:54PM Simeon Car     Test Name Result Flag Reference   Cardiac Electrophysiology Report      [id number]  pdf     Signatures   Electronically signed by : Joyce Harmon, ; Feb 24 2016  3:03PM EST                       (Author)    Electronically signed by : Chelsey Matta DO; Feb 24 2016  3:33PM EST                       (Author)    "

## 2018-03-07 NOTE — PROGRESS NOTES
"  Discussion/Summary  Normal device function      Results/Data  Results   Cardiac Device Remote [id number] 05:24AM Scaleform [place]     Test Name Result Flag Reference   MISCELLANEOUS COMMENT (Report)     CARELINK TRANSMISSION: BATTERY VOLTAGE NEARING THIERNO  WILL SCHEDULE MONTHLY BATTERY CHECKS VIA CARELINK  AP 53 7% BVP 99 9% ( 99 9 VSR PACE 0)  NO SIGNIFICANT HIGH RATE EPISODES  ALL AVAILABLE LEAD PARAMETERS WITHIN NORMAL LIMITS  OPTI-VOL WITHIN NORMAL LIMITS  NORMAL DEVICE FUNCTION  NC   Cardiac Electrophysiology Report      [id number]}  pdf   DEVICE TYPE CRT-D       Cardiac Electrophysiology Report [id number] 05:24AM [facility] [place]     Test Name Result Flag Reference   Cardiac Electrophysiology Report      [id number]  pdf     Signatures   Electronically signed by : Leandro Selby, ; Jul 6 2016  8:33AM EST                       (Author)    Electronically signed by : CAMPOS Abad ; Jul 6 2016  9:08AM EST                       (Author)    "

## 2018-03-07 NOTE — PROGRESS NOTES
"  Discussion/Summary  Normal device function      Results/Data  Results   Cardiac Device Remote [id number] 12:28PM Oralee Lipoma     Test Name Result Flag Reference   MISCELLANEOUS COMMENT      CARELINK TRANSMISSION - OPTI-VOL ONLY: OPTI-VOL WITHIN NORMAL LIMITS  BATTERY VOLTAGE ADEQUATE (2 86V-RRT=2 63V)  AP-50%, BVP>99%  NO SIGNIFICANT HIGH RATE EPISODES  ALL AVAILABLE LEAD PARAMETERS WITHIN NORMAL LIMITS  NORMAL DEVICE FUNCTION  GV   Cardiac Electrophysiology Report      [id number]}  pdf   DEVICE TYPE CRT-D       Cardiac Electrophysiology Report [id number] 12:28PM Oralee Lipoma     Test Name Result Flag Reference   Cardiac Electrophysiology Report      [id number]  pdf     Signatures   Electronically signed by : Laurie Bishop RN; May  2 2016  3:30PM EST                       (Author)    Electronically signed by : Pennelope Kayser, M D ; May  2 2016  3:49PM EST                       (Author)    "

## 2018-03-07 NOTE — PROGRESS NOTES
"  Discussion/Summary  Normal device function      Results/Data  Cardiac Device Remote [id number] 08:42AM Sabina Garnica     Test Name Result Flag Reference   MISCELLANEOUS COMMENT (Report)     CARELINK TRANSMISSION: BATTERY VOLTAGE ADEQUATE (5 1 YRS)  AP-61%, BVP-98%  ALL AVAILABLE LEAD PARAMETERS WITHIN NORMAL LIMITS  NO SIGNIFICANT HIGH RATE EPISODES  1 VENT SENSING EPISODE- PAT ON CHANNEL MARKERS  OPTI-VOL WITHIN NORMAL LIMITS  NORMAL DEVICE FUNCTION  GV   Cardiac Electrophysiology Report      [id number]  pdf   DEVICE TYPE CRT-D       Cardiac Electrophysiology Report [id number] 08:42AM Sabina Garnica     Test Name Result Flag Reference   Cardiac Electrophysiology Report      [id number]  pdf     Signatures   Electronically signed by : Erin Garza RN; Sep 13 2017  3:51PM EST                       (Author)    Electronically signed by : CAMPOS Johnson ; Sep 14 2017  2:03PM EST                       (Author)    "

## 2018-03-07 NOTE — PROGRESS NOTES
"  Discussion/Summary  Normal device function      Results/Data  Cardiac Device In Clinic [id number] 03:07PM Rupa Blackmon     Test Name Result Flag Reference   MISCELLANEOUS COMMENT (Report)     DEVICE INTERROGATED IN THE [facility] OFFICE  BATTERY VOLTAGE ADEQUATE (7 2 YRS)  AP-57%, BVP-97%  ALL LEAD PARAMETERS WITHIN NORMAL LIMITS  ALL OTHER TESTING WITHIN NORMAL LIMITS  NO SIGNIFICANT HIGH RATE EPISODES  1 BENIGN VENT SENSING EPISODE  OPTIVOL INITIALIZING  FARFIELD OVERSENSING NOTED ON PRESENTING EGM  DECREASED RA SENSITIVITY FROM 0 3MV TO 0 45MV  NORMAL DEVICE FUNCTION  WOUND CHECK: INCISION CLEAN AND DRY WITH EDGES APPROXIMATED; WOUND CARE AND RESTRICTIONS REVIEWED WITH PATIENT  GV   Cardiac Electrophysiology Report      [id number] Dejah_[id number]_Session [id number]  pdf   DEVICE TYPE CRT-D       Cardiac Electrophysiology Report [id number] 03:07PM Rupa Blackmon     Test Name Result Flag Reference   Cardiac Electrophysiology Report      [id number]  pdf     Signatures   Electronically signed by : Indio Kendall RN; Jun 9 2017 11:48AM EST                       (Author)    Electronically signed by : Jazz Oneill DO; Jun 9 2017 11:52AM EST                       (Author)    "

## 2018-03-07 NOTE — PROGRESS NOTES
"  Discussion/Summary  Normal device function     Approaching THIERNO  Results/Data  Cardiac Device In Clinic [id number] 05:17PM Molinda Pit My Pet     Test Name Result Flag Reference   MISCELLANEOUS COMMENT (Report)     DEVICE INTERROGATED IN THE [place] OFFICE: BATTERY VOLTAGE NEARING THIERNO (2 62V - THIERNO=2 63V BUT DID NOT TRIP YET)  WILL SCHEDULE MONTHLY BATTERY CHECKS  CHARGE TIME 11 8 SEC  AP 57 7% BVP 99 2% + VSRP 0 1% = TOTAL BVP 99 3%  ALL LEAD PARAMETERS WITHIN NORMAL LIMITS  NO SIGNIFICANT HIGH RATE EPISODES  OPTI-VOL WITHIN NORMAL LIMITS  NO PROGRAMMING CHANGES MADE TO DEVICE PARAMETERS  APPROPRIATELY FUNCTIONING BIV ICD NEARING THIERNO  CP    Cardiac Electrophysiology Report      [id number] Dejah_[id number]_Session [id number]  pdf   DEVICE TYPE CRT-D       Cardiac Electrophysiology Report [id number] 05:17PM Rockbotsoya Pit My [facility]     Test Name Result Flag Reference   Cardiac Electrophysiology Report      [id number]  pdf     Signatures   Electronically signed by : Reinhold Hatchet, ; Mar 22 2017  3:46PM EST                       (Author)    Electronically signed by : CAMPOS Castellon ; Mar 23 2017  9:25AM EST                       (Author)    "

## 2018-03-07 NOTE — PROGRESS NOTES
"  Discussion/Summary  Normal device function      Results/Data  Cardiac Device Remote [id number] 09:37AM Shauna Blackwell     Test Name Result Flag Reference   MISCELLANEOUS COMMENT (Report)     CARELINK TRANSMISSION - OPTI-VOL ONLY: OPTI-VOL WITHIN NORMAL LIMITS  X0A0A***PRESENTING RHYTHM AP/BVP @ 50 PPM  BATTERY VOLTAGE ADEQUATE 6 3 YEARS  AP=63 8% BVP=96 8% VSR PACING=0 2%X0A[TOTAL BVP=97%]  ALL AVAILABLE LEAD PARAMETERS WITHIN NORMAL LIMITS  NO SIGNIFICANT HIGH RATE EPISODES  NORMAL DEVICE FUNCTION  DL   Cardiac Electrophysiology Report      [id number]  pdf   DEVICE TYPE CRT-D       Cardiac Electrophysiology Report [id number] 09:37AM Shauna Blackwell     Test Name Result Flag Reference   Cardiac Electrophysiology Report      [id number]     Signatures   Electronically signed by : Sydni Vasquez, ; Jan 17 2018  1:09PM EST                       (Author)    Electronically signed by : Tanmay Zhu DO; Jan 21 2018  2:33PM EST                       (Author)    "

## 2018-03-07 NOTE — PROGRESS NOTES
"  Discussion/Summary  Abnormal Device Function     Device at [facility]  Results/Data  Cardiac Device In Clinic [id number] 05:38PM Donnia Osgood     Test Name Result Flag Reference   MISCELLANEOUS COMMENT      **NONBILLABLE** DEVICE INTERROGATED IN THE [place] OFFICE TO TURN OFF THIERNO ALERT TONE ONLY (NO TEST)  PT SCHEDULED FOR CARDIAC F/U ON 4/17/17 WITH DR OWUSU ; GEN REVISION H&P SCHED  ON 4/26/17 WITH DR Alfornia Fleischer eb   Cardiac Electrophysiology Report      [id number] [id number]_Session [id number]  pdf   DEVICE TYPE CRT-D       Cardiac Electrophysiology Report [id number] 05:38PM Donnia Osgood     Test Name Result Flag Reference   Cardiac Electrophysiology Report      [id number]  pdf     Signatures   Electronically signed by : Fred Marie, ; Apr 13 2017  1:43PM EST                       (Author)    Electronically signed by : CAMPOS Ballesteros ; Apr 20 2017 10:30PM EST                       (Author)    "

## 2018-03-07 NOTE — PROGRESS NOTES
"  Discussion/Summary  Normal device function      Results/Data  Results   Cardiac Device In Clinic [id number] 02:29PM Darrol Nat     Test Name Result Flag Reference   MISCELLANEOUS COMMENT (Report)     DEVICE INTERROGATED IN THE [facility] OFFICE: BATTERY VOLTAGE ADEQUATE (2 88V/ RRT = 2 63V); AP = 60%, BVP = 99 9%; NO SIGNIFICANT HIGH RATE EPISODES; (1) VENTRICULAR SENSING EPISODE NOTED SINCE LAST REMOTE WITH DURATION >10 BEATS (END OF EPISODE NOT DOCUMENTED) /AVG  MS - MARKERS ONLY - SLOW VT; EF = 60% (4/22/15); PT TAKES METORPOLOL; ALL LEAD PARAMETERS TEST WITHIN NORMAL LIMITS/STABLE; OPTI-VOL FLUID THRESHOLD CROSSED 2/22/16-3/24/16 BUT NOW WNL; WILL RE-CHECK IN 31 DAYS; NO PROGRAMMING CHANGES MADE TO DEVICE PARAMETERS; NORMAL DEVICE FUNCTION  eb   Cardiac Electrophysiology Report      [id number] Marghaily_[id number]_Session [id number]_[id number]  pdf   DEVICE TYPE CRT-D       Cardiac Electrophysiology Report [id number] 02:29PM Darrol Nat     Test Name Result Flag Reference   Cardiac Electrophysiology Report      [id number]  pdf     Signatures   Electronically signed by : Anjel Sewell, ; Mar 30 2016 12:02PM EST                       (Author)    Electronically signed by : CAMPOS Piedra ; Mar 30 2016  1:19PM EST                       (Author)    "

## 2018-03-12 PROBLEM — N60.11 BILATERAL FIBROCYSTIC BREAST CHANGES: Status: ACTIVE | Noted: 2017-11-30

## 2018-03-12 PROBLEM — M46.1 BILATERAL SACROILIITIS (HCC): Status: ACTIVE | Noted: 2017-03-06

## 2018-03-12 PROBLEM — H81.12 BENIGN POSITIONAL VERTIGO, LEFT: Status: ACTIVE | Noted: 2017-06-30

## 2018-03-12 PROBLEM — M10.9 GOUT: Status: ACTIVE | Noted: 2017-06-30

## 2018-03-12 PROBLEM — N60.12 BILATERAL FIBROCYSTIC BREAST CHANGES: Status: ACTIVE | Noted: 2017-11-30

## 2018-03-13 ENCOUNTER — OFFICE VISIT (OUTPATIENT)
Dept: FAMILY MEDICINE CLINIC | Facility: HOSPITAL | Age: 68
End: 2018-03-13
Payer: MEDICARE

## 2018-03-13 VITALS
SYSTOLIC BLOOD PRESSURE: 128 MMHG | HEART RATE: 72 BPM | HEIGHT: 60 IN | BODY MASS INDEX: 35.97 KG/M2 | WEIGHT: 183.2 LBS | DIASTOLIC BLOOD PRESSURE: 74 MMHG | TEMPERATURE: 99.2 F

## 2018-03-13 DIAGNOSIS — I25.10 CORONARY ARTERY DISEASE DUE TO CALCIFIED CORONARY LESION: ICD-10-CM

## 2018-03-13 DIAGNOSIS — I10 ESSENTIAL HYPERTENSION: ICD-10-CM

## 2018-03-13 DIAGNOSIS — I25.84 CORONARY ARTERY DISEASE DUE TO CALCIFIED CORONARY LESION: ICD-10-CM

## 2018-03-13 DIAGNOSIS — E11.22 TYPE 2 DIABETES MELLITUS WITH STAGE 3 CHRONIC KIDNEY DISEASE, WITHOUT LONG-TERM CURRENT USE OF INSULIN (HCC): ICD-10-CM

## 2018-03-13 DIAGNOSIS — N18.30 TYPE 2 DIABETES MELLITUS WITH STAGE 3 CHRONIC KIDNEY DISEASE, WITHOUT LONG-TERM CURRENT USE OF INSULIN (HCC): ICD-10-CM

## 2018-03-13 DIAGNOSIS — E78.2 MIXED HYPERLIPIDEMIA: ICD-10-CM

## 2018-03-13 DIAGNOSIS — M1A.9XX0 CHRONIC GOUT WITHOUT TOPHUS, UNSPECIFIED CAUSE, UNSPECIFIED SITE: ICD-10-CM

## 2018-03-13 DIAGNOSIS — N18.30 CHRONIC KIDNEY DISEASE, STAGE 3 (HCC): Primary | ICD-10-CM

## 2018-03-13 PROCEDURE — 99214 OFFICE O/P EST MOD 30 MIN: CPT | Performed by: FAMILY MEDICINE

## 2018-03-13 RX ORDER — OMEPRAZOLE 40 MG/1
40 CAPSULE, DELAYED RELEASE ORAL DAILY
Refills: 2 | COMMUNITY
Start: 2018-02-14 | End: 2018-07-19 | Stop reason: SDUPTHER

## 2018-03-13 RX ORDER — MULTIVITAMIN WITH IRON
TABLET ORAL
COMMUNITY
End: 2018-07-23 | Stop reason: SDUPTHER

## 2018-03-13 RX ORDER — POTASSIUM CHLORIDE 20 MEQ/1
1 TABLET, EXTENDED RELEASE ORAL 2 TIMES DAILY
COMMUNITY
Start: 2013-11-09 | End: 2018-07-23 | Stop reason: SDUPTHER

## 2018-03-13 NOTE — PROGRESS NOTES
Assessment/Plan:         Diagnoses and all orders for this visit:    Chronic kidney disease, stage 3    Essential hypertension  -     CBC and differential; Future  -     Comprehensive metabolic panel; Future  -     TSH, 3rd generation; Future    Coronary artery disease due to calcified coronary lesion    Type 2 diabetes mellitus with stage 3 chronic kidney disease, without long-term current use of insulin (HCC)  -     HEMOGLOBIN A1C W/ EAG ESTIMATION; Future    Mixed hyperlipidemia  -     Lipid Panel with Direct LDL reflex; Future    Chronic gout without tophus, unspecified cause, unspecified site  -     Uric acid; Future    Other orders  -     omeprazole (PriLOSEC) 40 MG capsule; Take 40 mg by mouth daily  -     potassium chloride (KLOR-CON M20) 20 mEq tablet; Take 1 tablet by mouth 2 (two) times a day  -     Magnesium 250 MG TABS; Take by mouth  -     allopurinol (ZYLOPRIM) 100 mg tablet; Take 1 tablet by mouth daily  -     aspirin 81 MG tablet; Take 1 tablet by mouth daily  -     DAILY MULTIPLE VITAMINS PO; Take 1 tablet by mouth daily  -     FLUoxetine (PROzac) 20 mg capsule; Take 1 capsule by mouth daily  -     methylprednisolone (MEDROL) 4 mg tablet; Take 12 mg by mouth 2 (two) times a day  -     pyridoxine (VITAMIN B6) 100 mg tablet; Take 1 tablet by mouth daily  -     Cholecalciferol (VITAMIN D3) 1000 units CAPS; Take by mouth  -     candesartan (ATACAND) 32 MG tablet; Take 1 tablet by mouth daily  -     loratadine (CLARITIN) 10 mg tablet; Take by mouth  -     sitaGLIPtin (JANUVIA) 100 mg tablet; Take 1 tablet by mouth daily  -     levothyroxine 25 mcg tablet; Take 1 tablet by mouth  -     metoprolol succinate (TOPROL-XL) 100 mg 24 hr tablet; Take 1 tablet by mouth daily  -     montelukast (SINGULAIR) 10 mg tablet; Take 1 tablet by mouth daily  -     simvastatin (ZOCOR) 40 mg tablet; Take 1 tablet by mouth daily  -     torsemide (DEMADEX) 20 mg tablet;  Take by mouth  -     traMADol (ULTRAM) 50 mg tablet; Take 1 tablet by mouth 2 (two) times a day as needed          Subjective:      Patient ID: Elicia Haney is a 69 y o  female  Recent death of  suddenly, cardiac and IPF  Started on Methylprednisolone through Dr Hannah Stanton for foot problems  Alternating with Celebrex every other day  Has three children, all are doing ok and are very supportive  May downsize  In the future  Several days of heartburn and is now back to [place] again  The following portions of the patient's history were reviewed and updated as appropriate: allergies, current medications, past family history, past medical history, past social history, past surgical history and problem list     Review of Systems   HENT: Negative for congestion and sinus pressure  Cardiovascular: Negative for chest pain, palpitations and leg swelling  Gastrointestinal: Negative for abdominal distention, abdominal pain, constipation and diarrhea  Musculoskeletal: Positive for arthralgias, joint swelling and myalgias  Neurological: Negative for tremors, weakness, light-headedness and headaches  Hematological: Negative for adenopathy  Does not bruise/bleed easily  Psychiatric/Behavioral: Negative for sleep disturbance  The patient is not nervous/anxious  Objective:      /74 (BP Location: Left arm, Patient Position: Sitting, Cuff Size: Large)   Pulse 72   Temp 99 2 °F (37 3 °C) (Tympanic)   Ht 5' (1 524 m)   Wt 83 1 kg (183 lb 3 2 oz)   BMI 35 78 kg/m²          Physical Exam   Constitutional: She is oriented to person, place, and time  She appears well-developed and well-nourished  Eyes: Conjunctivae are normal    Neck: Neck supple  Cardiovascular: Normal rate, regular rhythm, normal heart sounds and intact distal pulses  Pulmonary/Chest: Effort normal and breath sounds normal    Musculoskeletal: She exhibits no edema  Neurological: She is alert and oriented to person, place, and time  Skin: No rash noted  Psychiatric: She has a normal mood and affect   Her behavior is normal  Judgment and thought content normal

## 2018-03-15 RX ORDER — TORSEMIDE 20 MG/1
TABLET ORAL
COMMUNITY
Start: 2012-02-21 | End: 2018-07-23 | Stop reason: SDUPTHER

## 2018-03-15 RX ORDER — ALLOPURINOL 100 MG/1
1 TABLET ORAL DAILY
COMMUNITY
Start: 2017-11-13 | End: 2018-11-09 | Stop reason: SDUPTHER

## 2018-03-15 RX ORDER — MULTIVITAMIN WITH IRON
1 TABLET ORAL DAILY
COMMUNITY
End: 2018-07-23 | Stop reason: SDUPTHER

## 2018-03-15 RX ORDER — BIOTIN 1 MG
TABLET ORAL
COMMUNITY

## 2018-03-15 RX ORDER — FLUOXETINE HYDROCHLORIDE 20 MG/1
1 CAPSULE ORAL DAILY
COMMUNITY
Start: 2011-07-01 | End: 2018-04-03 | Stop reason: SDUPTHER

## 2018-03-15 RX ORDER — SIMVASTATIN 40 MG
1 TABLET ORAL DAILY
COMMUNITY
Start: 2012-04-11 | End: 2018-07-19 | Stop reason: SDUPTHER

## 2018-03-15 RX ORDER — TRAMADOL HYDROCHLORIDE 50 MG/1
1 TABLET ORAL 2 TIMES DAILY PRN
COMMUNITY
Start: 2015-02-24 | End: 2018-07-23 | Stop reason: SDUPTHER

## 2018-03-15 RX ORDER — LORATADINE 10 MG/1
TABLET ORAL
COMMUNITY
End: 2018-07-23 | Stop reason: SDUPTHER

## 2018-03-15 RX ORDER — METHYLPREDNISOLONE 4 MG/1
12 TABLET ORAL 2 TIMES DAILY
Refills: 1 | COMMUNITY
Start: 2018-02-16 | End: 2019-10-01

## 2018-03-15 RX ORDER — CANDESARTAN 32 MG/1
1 TABLET ORAL DAILY
COMMUNITY
Start: 2011-07-01 | End: 2018-06-22 | Stop reason: SDUPTHER

## 2018-03-15 RX ORDER — METOPROLOL SUCCINATE 100 MG/1
1 TABLET, EXTENDED RELEASE ORAL DAILY
COMMUNITY
Start: 2012-03-15 | End: 2018-07-23 | Stop reason: SDUPTHER

## 2018-03-15 RX ORDER — LEVOTHYROXINE SODIUM 0.03 MG/1
1 TABLET ORAL
COMMUNITY
Start: 2011-10-01 | End: 2018-07-23 | Stop reason: SDUPTHER

## 2018-03-15 RX ORDER — MONTELUKAST SODIUM 10 MG/1
1 TABLET ORAL DAILY
COMMUNITY
End: 2018-10-10

## 2018-03-16 NOTE — PATIENT INSTRUCTIONS
Grief and Loss   WHAT YOU NEED TO KNOW:   What is grief? Grief involves feelings of sadness and suffering after the loss of a loved one  It is a normal and healthy emotional response to a loss  What are the stages of grief? · Shock, numbness, and denial:  Even if the death of a loved one was expected, it may still come as a shock  Shock may leave you feeling numb emotionally, which may last for hours to days  You may also find it hard to accept that someone close to you has   · Yearning and searching:  During this time, you may get angry easily and feel anxious  You may try to hold onto the memories of the person who   You may feel guilty because of unfinished business at the time of his death  You may not have said all the things you want to say to your loved one  You may feel guilty for being the one who is still alive  · Disorganized and despair:  You may feel confused, lonely, and depressed  You may feel as though the pain and despair will not go away  There may be times that you separate yourself from your family or friends  · Reorganization:  As time passes, you may learn to accept the changes in your life  You may finally be able to say goodbye to the person  What are the signs and symptoms of grief? The loss and death of a person may cause shock and confusion at first  You may need time to go over and over the events around the death  You may think that mistakes were made, and feel guilty or angry  You may experience any of the following after the death of a loved one:  · Feeling worthless, hopeless, or helpless     · Constant tiredness, frequent crying, and difficulty enjoying things or having fun    · The need to hold onto the person's memories, such as his clothing or other belongings    · Difficulty thinking, concentrating, or making decisions    · Problems eating, such as poor appetite or overeating    · Sleeping too much or too little  What can I do to cope with the loss?   The pain of the grief process can be difficult  You may feel angry, sad, or confused  Anything that might remind you of the loss can trigger these feelings  Events, anniversaries of special times, birthdays, and holidays may also bring these emotions  The following may help you cope with the death of a loved one:  · Give yourself plenty of time and rest:  Allow yourself time to heal  Grief is not something you can rush  It may take years to heal from your loss  Ask your family, friends, and healthcare providers for help  · Share your thoughts and feelings:  Try saying what you really feel or share stories of the one who just passed away  Often just talking to someone you trust, or crying when you need to can be a big help  · Seek hospice services:  Hospice services work with the person during his remaining days, and also help the person's loved ones  Hospice care prepares you for your loss and offers continued help through grief programs after the person's death  Healthcare providers provide support for survivors, and check if grief counseling or psychiatric help are needed  These services give support through sad times after the death  How will I know if I am unable to cope with the loss? You may be having a difficult time with the loss of your loved one if:  · Your sadness and grief continue for a long time  · Your grief is delayed or very strong  · You hide your true feelings or pretend that everything is okay  · You begin to fail in relationships, job, or school  · You start behaving recklessly, such as abusing drugs or drinking alcohol heavily  Where can I find support and more information? · [address] [place] [address], [address] [place] [address]  Phone: [phone]  Web Address: http://[employer]/  [employer].de  When should I contact my healthcare provider? · You cannot eat, drink, or take your medicines      · You feel depressed or sad most of the time, or these feelings do not go away  · You need to talk about your problems and feelings  · You have questions or concerns about your condition or care  When should I seek immediate care? · You feel like hurting yourself or someone else  · You are anxious or restless even after you take your medicines  · You feel that you cannot cope with your condition  · You have problems sleeping  · You have trouble breathing, chest pain, or a fast heartbeat  CARE AGREEMENT:   You have the right to help plan your care  Learn about your health condition and how it may be treated  Discuss treatment options with your caregivers to decide what care you want to receive  You always have the right to refuse treatment  The above information is an  only  It is not intended as medical advice for individual conditions or treatments  Talk to your doctor, nurse or pharmacist before following any medical regimen to see if it is safe and effective for you  © 2017 [employer] Information is for End User's use only and may not be sold, redistributed or otherwise used for commercial purposes  All illustrations and images included in CareNotes® are the copyrighted property of A D A M [employer] Inc  or Cosme Mercer

## 2018-03-21 ENCOUNTER — CLINICAL SUPPORT (OUTPATIENT)
Dept: CARDIOLOGY CLINIC | Facility: CLINIC | Age: 68
End: 2018-03-21
Payer: MEDICARE

## 2018-03-21 DIAGNOSIS — Z95.810 PRESENCE OF IMPLANTABLE CARDIOVERTER-DEFIBRILLATOR (ICD): ICD-10-CM

## 2018-03-21 DIAGNOSIS — I50.32 CHRONIC DIASTOLIC CONGESTIVE HEART FAILURE (HCC): Primary | ICD-10-CM

## 2018-03-21 PROCEDURE — 93296 REM INTERROG EVL PM/IDS: CPT | Performed by: INTERNAL MEDICINE

## 2018-03-21 PROCEDURE — 93295 DEV INTERROG REMOTE 1/2/MLT: CPT | Performed by: INTERNAL MEDICINE

## 2018-03-21 NOTE — PROGRESS NOTES
CARELINK CRT-D TRANSMISSION:  BATTERY VOLTAGE ADEQUATE (4 9 YR)    AP 54 6% BP 99 5% (VSRP <0 1%)   ALL AVAILABLE LEAD PARAMETERS WITHIN NORMAL LIMITS   NO SIGNIFICANT HIGH RATE EPISODES   OPTI-VOL WITHIN NORMAL LIMITS   NORMAL DEVICE FUNCTION   RG

## 2018-04-03 DIAGNOSIS — F32.A DEPRESSION, UNSPECIFIED DEPRESSION TYPE: Primary | ICD-10-CM

## 2018-04-03 RX ORDER — FLUOXETINE HYDROCHLORIDE 20 MG/1
20 CAPSULE ORAL DAILY
Qty: 30 CAPSULE | Refills: 5 | Status: SHIPPED | OUTPATIENT
Start: 2018-04-03 | End: 2018-10-16 | Stop reason: SDUPTHER

## 2018-04-03 RX ORDER — FLUOXETINE HYDROCHLORIDE 20 MG/1
20 CAPSULE ORAL DAILY
Qty: 30 CAPSULE | Refills: 5 | Status: SHIPPED | OUTPATIENT
Start: 2018-04-03 | End: 2018-07-23 | Stop reason: SDUPTHER

## 2018-04-11 ENCOUNTER — LAB (OUTPATIENT)
Dept: LAB | Facility: CLINIC | Age: 68
End: 2018-04-11
Payer: MEDICARE

## 2018-04-11 DIAGNOSIS — N18.30 TYPE 2 DIABETES MELLITUS WITH STAGE 3 CHRONIC KIDNEY DISEASE, WITHOUT LONG-TERM CURRENT USE OF INSULIN (HCC): ICD-10-CM

## 2018-04-11 DIAGNOSIS — E11.22 TYPE 2 DIABETES MELLITUS WITH DIABETIC CHRONIC KIDNEY DISEASE (HCC): ICD-10-CM

## 2018-04-11 DIAGNOSIS — E78.2 MIXED HYPERLIPIDEMIA: ICD-10-CM

## 2018-04-11 DIAGNOSIS — I10 ESSENTIAL HYPERTENSION: ICD-10-CM

## 2018-04-11 DIAGNOSIS — E11.22 TYPE 2 DIABETES MELLITUS WITH STAGE 3 CHRONIC KIDNEY DISEASE, WITHOUT LONG-TERM CURRENT USE OF INSULIN (HCC): ICD-10-CM

## 2018-04-11 DIAGNOSIS — M1A.9XX0 CHRONIC GOUT WITHOUT TOPHUS, UNSPECIFIED CAUSE, UNSPECIFIED SITE: ICD-10-CM

## 2018-04-11 LAB
ALBUMIN SERPL BCP-MCNC: 3.4 G/DL (ref 3.5–5)
ALP SERPL-CCNC: 68 U/L (ref 46–116)
ALT SERPL W P-5'-P-CCNC: 30 U/L (ref 12–78)
ANION GAP SERPL CALCULATED.3IONS-SCNC: 5 MMOL/L (ref 4–13)
AST SERPL W P-5'-P-CCNC: 13 U/L (ref 5–45)
BASOPHILS # BLD AUTO: 0.04 THOUSANDS/ΜL (ref 0–0.1)
BASOPHILS NFR BLD AUTO: 0 % (ref 0–1)
BILIRUB SERPL-MCNC: 0.49 MG/DL (ref 0.2–1)
BUN SERPL-MCNC: 25 MG/DL (ref 5–25)
CALCIUM SERPL-MCNC: 9.8 MG/DL
CHLORIDE SERPL-SCNC: 106 MMOL/L (ref 100–108)
CHOLEST SERPL-MCNC: 167 MG/DL (ref 50–200)
CO2 SERPL-SCNC: 31 MMOL/L (ref 21–32)
CREAT SERPL-MCNC: 1.04 MG/DL (ref 0.6–1.3)
EOSINOPHIL # BLD AUTO: 0.22 THOUSAND/ΜL (ref 0–0.61)
EOSINOPHIL NFR BLD AUTO: 2 % (ref 0–6)
ERYTHROCYTE [DISTWIDTH] IN BLOOD BY AUTOMATED COUNT: 14.5 % (ref 11.6–15.1)
EST. AVERAGE GLUCOSE BLD GHB EST-MCNC: 177 MG/DL
GFR SERPL CREATININE-BSD FRML MDRD: 55 ML/MIN/1.73SQ M
GLUCOSE P FAST SERPL-MCNC: 100 MG/DL (ref 65–99)
HBA1C MFR BLD: 7.8 % (ref 4.2–6.3)
HCT VFR BLD AUTO: 44.9 % (ref 34.8–46.1)
HDLC SERPL-MCNC: 76 MG/DL (ref 40–60)
HGB BLD-MCNC: 14.6 G/DL (ref 11.5–15.4)
LDLC SERPL CALC-MCNC: 79 MG/DL (ref 0–100)
LYMPHOCYTES # BLD AUTO: 2.53 THOUSANDS/ΜL (ref 0.6–4.47)
LYMPHOCYTES NFR BLD AUTO: 21 % (ref 14–44)
MCH RBC QN AUTO: 29.1 PG (ref 26.8–34.3)
MCHC RBC AUTO-ENTMCNC: 32.5 G/DL (ref 31.4–37.4)
MCV RBC AUTO: 89 FL (ref 82–98)
MONOCYTES # BLD AUTO: 1.35 THOUSAND/ΜL (ref 0.17–1.22)
MONOCYTES NFR BLD AUTO: 11 % (ref 4–12)
NEUTROPHILS # BLD AUTO: 8.05 THOUSANDS/ΜL (ref 1.85–7.62)
NEUTS SEG NFR BLD AUTO: 66 % (ref 43–75)
NRBC BLD AUTO-RTO: 0 /100 WBCS
PLATELET # BLD AUTO: 220 THOUSANDS/UL (ref 149–390)
PMV BLD AUTO: 10.5 FL (ref 8.9–12.7)
POTASSIUM SERPL-SCNC: 4.5 MMOL/L (ref 3.5–5.3)
PROT SERPL-MCNC: 6.9 G/DL (ref 6.4–8.2)
RBC # BLD AUTO: 5.02 MILLION/UL (ref 3.81–5.12)
SODIUM SERPL-SCNC: 142 MMOL/L (ref 136–145)
TRIGL SERPL-MCNC: 59 MG/DL
TSH SERPL DL<=0.05 MIU/L-ACNC: 1.03 UIU/ML (ref 0.36–3.74)
URATE SERPL-MCNC: 5.7 MG/DL (ref 2–6.8)
WBC # BLD AUTO: 12.31 THOUSAND/UL (ref 4.31–10.16)

## 2018-04-11 PROCEDURE — 85025 COMPLETE CBC W/AUTO DIFF WBC: CPT

## 2018-04-11 PROCEDURE — 80053 COMPREHEN METABOLIC PANEL: CPT

## 2018-04-11 PROCEDURE — 36415 COLL VENOUS BLD VENIPUNCTURE: CPT

## 2018-04-11 PROCEDURE — 83036 HEMOGLOBIN GLYCOSYLATED A1C: CPT

## 2018-04-11 PROCEDURE — 80061 LIPID PANEL: CPT

## 2018-04-11 PROCEDURE — 84443 ASSAY THYROID STIM HORMONE: CPT

## 2018-04-11 PROCEDURE — 84550 ASSAY OF BLOOD/URIC ACID: CPT

## 2018-04-23 ENCOUNTER — IN-CLINIC DEVICE VISIT (OUTPATIENT)
Dept: CARDIOLOGY CLINIC | Facility: CLINIC | Age: 68
End: 2018-04-23
Payer: MEDICARE

## 2018-04-23 DIAGNOSIS — Z95.810 AICD (AUTOMATIC CARDIOVERTER/DEFIBRILLATOR) PRESENT: ICD-10-CM

## 2018-04-23 DIAGNOSIS — I50.32 CHRONIC DIASTOLIC CONGESTIVE HEART FAILURE (HCC): Primary | ICD-10-CM

## 2018-04-23 PROCEDURE — 93299 PR REM INTERROG ICPMS/SCRMS <30 D TECH REVIEW: CPT | Performed by: INTERNAL MEDICINE

## 2018-04-23 PROCEDURE — 93297 REM INTERROG DEV EVAL ICPMS: CPT | Performed by: INTERNAL MEDICINE

## 2018-04-23 NOTE — PROGRESS NOTES
Results for orders placed or performed in visit on 04/23/18   Cardiac EP device report    Narrative    MDT/BIV-ICD  CARELINK TRANSMISSION - OPTI-VOL ONLY: BATTERY STATUS "OK"  54%  99%  VSRP <0 1%  ALL AVAILABLE LEAD PARAMETERS WITHIN NORMAL LIMITS  NO SIGNIFICANT HIGH RATE EPISODES  5 VENT SENSING NOTED, MARKERS ONLY  OPTI-VOL WITHIN NORMAL LIMITS  NORMAL DEVICE FUNCTION   NC

## 2018-05-16 DIAGNOSIS — I10 ESSENTIAL HYPERTENSION: Primary | ICD-10-CM

## 2018-05-16 RX ORDER — METOPROLOL SUCCINATE 100 MG/1
100 TABLET, EXTENDED RELEASE ORAL DAILY
Qty: 90 TABLET | Refills: 3 | Status: SHIPPED | OUTPATIENT
Start: 2018-05-16 | End: 2019-03-31 | Stop reason: SDUPTHER

## 2018-05-29 ENCOUNTER — REMOTE DEVICE CLINIC VISIT (OUTPATIENT)
Dept: CARDIOLOGY CLINIC | Facility: CLINIC | Age: 68
End: 2018-05-29
Payer: MEDICARE

## 2018-05-29 DIAGNOSIS — Z95.810 BIVENTRICULAR IMPLANTABLE CARDIOVERTER-DEFIBRILLATOR IN SITU: ICD-10-CM

## 2018-05-29 DIAGNOSIS — I42.0 DILATED CARDIOMYOPATHY (HCC): ICD-10-CM

## 2018-05-29 DIAGNOSIS — I47.2 NONSUSTAINED VENTRICULAR TACHYCARDIA (HCC): ICD-10-CM

## 2018-05-29 DIAGNOSIS — I50.42 CHRONIC COMBINED SYSTOLIC AND DIASTOLIC CONGESTIVE HEART FAILURE (HCC): Primary | ICD-10-CM

## 2018-05-29 PROCEDURE — 93299 PR REM INTERROG ICPMS/SCRMS <30 D TECH REVIEW: CPT | Performed by: INTERNAL MEDICINE

## 2018-05-29 PROCEDURE — 93297 REM INTERROG DEV EVAL ICPMS: CPT | Performed by: INTERNAL MEDICINE

## 2018-05-29 NOTE — PROGRESS NOTES
Results for orders placed or performed in visit on 05/29/18   Cardiac EP device report    Narrative    MDT/BIV-ICD  CARELINK TRANSMISSION - OPTI-VOL ONLY: OPTI-VOL FLUID THRESHOLD RISING BUT NOT YET CROSSED  WILL JULIANNE IN 31 DAYS  BATTERY VOLTAGE ADEQUATE (4 8 YRS)  AP - 50% BVP - 99 4% ( 99 3% + VSRP - 0 1%)  ALL AVAILABLE LEAD PARAMETERS WITHIN NORMAL LIMITS  NO SIGNIFICANT HIGH RATE EPISODES  9 V SENSING EPISODES (MARKERS ONLY) - LONGEST EPISODES 4:44 MINS WITH AVG RATE 97 - 115 BPM  NORMAL DEVICE FUNCTION    eb

## 2018-06-04 ENCOUNTER — TELEPHONE (OUTPATIENT)
Dept: FAMILY MEDICINE CLINIC | Facility: HOSPITAL | Age: 68
End: 2018-06-04

## 2018-06-04 NOTE — TELEPHONE ENCOUNTER
PATIENT HAS SLEEP APNEA - SHE CAN FALL ASLEEP VERY QUICKLY - SHE IS ASKING FOR A SCRIPT FOR PROVIGIL SO SHE CAN STAY AWAKE ENOUGH TO DRIVE TO VISIT HER CHILDREN THAT LIVE 2+ HOURS AWAY - PLEASE ADVISE

## 2018-06-05 DIAGNOSIS — G47.421 NARCOLEPSY DUE TO UNDERLYING CONDITION WITH CATAPLEXY: Primary | ICD-10-CM

## 2018-06-05 RX ORDER — MODAFINIL 100 MG/1
100 TABLET ORAL DAILY
Qty: 30 TABLET | Refills: 0 | Status: SHIPPED | OUTPATIENT
Start: 2018-06-05 | End: 2018-10-10

## 2018-06-22 DIAGNOSIS — E03.9 ACQUIRED HYPOTHYROIDISM: Primary | ICD-10-CM

## 2018-06-22 DIAGNOSIS — I10 ESSENTIAL HYPERTENSION: ICD-10-CM

## 2018-06-22 RX ORDER — LEVOTHYROXINE SODIUM 0.03 MG/1
25 TABLET ORAL DAILY
Qty: 90 TABLET | Refills: 3 | Status: SHIPPED | OUTPATIENT
Start: 2018-06-22 | End: 2019-05-26 | Stop reason: SDUPTHER

## 2018-06-22 RX ORDER — CANDESARTAN 32 MG/1
32 TABLET ORAL DAILY
Qty: 90 TABLET | Refills: 3 | Status: SHIPPED | OUTPATIENT
Start: 2018-06-22 | End: 2019-03-25 | Stop reason: SDUPTHER

## 2018-06-22 NOTE — TELEPHONE ENCOUNTER
Candesartan 32mg levothyroxine 25mcg professional pharm [facility]-only has enough pills for the weekend

## 2018-07-03 ENCOUNTER — REMOTE DEVICE CLINIC VISIT (OUTPATIENT)
Dept: CARDIOLOGY CLINIC | Facility: CLINIC | Age: 68
End: 2018-07-03
Payer: MEDICARE

## 2018-07-03 DIAGNOSIS — Z95.810 PRESENCE OF CARDIOVERTER DEFIBRILLATOR: ICD-10-CM

## 2018-07-03 DIAGNOSIS — I47.2 VT (VENTRICULAR TACHYCARDIA) (HCC): ICD-10-CM

## 2018-07-03 DIAGNOSIS — Z95.810 PRESENCE OF IMPLANTABLE CARDIOVERTER-DEFIBRILLATOR (ICD): ICD-10-CM

## 2018-07-03 DIAGNOSIS — I42.0 DILATED CARDIOMYOPATHY (HCC): Primary | ICD-10-CM

## 2018-07-03 PROCEDURE — 93296 REM INTERROG EVL PM/IDS: CPT | Performed by: INTERNAL MEDICINE

## 2018-07-03 PROCEDURE — 93295 DEV INTERROG REMOTE 1/2/MLT: CPT | Performed by: INTERNAL MEDICINE

## 2018-07-03 NOTE — PROGRESS NOTES
MDT/BIV-ICD  CARELINK TRANSMISSION:  BATTERY VOLTAGE ADEQUATE (4 5 YR)     AP 47 3% BP 98 9% (VSRP <0 1%)   ALL LEAD PARAMETERS WITHIN NORMAL LIMITS   NO SIGNIFICANT HIGH RATE EPISODES    16 V SENSE EPISODES ON 6/24/18 AND 6/25/18 WITH MARKERS ONLY SHOWING AR/VS W PVCs IN BIGEMINY VS NSVT ~ 100 -115 BPM   OPTI-VOL WITHIN NORMAL LIMITS   NORMAL DEVICE FUNCTION   RG

## 2018-07-19 DIAGNOSIS — E78.5 HYPERLIPIDEMIA, UNSPECIFIED HYPERLIPIDEMIA TYPE: ICD-10-CM

## 2018-07-19 DIAGNOSIS — K21.9 GASTROESOPHAGEAL REFLUX DISEASE WITHOUT ESOPHAGITIS: Primary | ICD-10-CM

## 2018-07-19 RX ORDER — OMEPRAZOLE 40 MG/1
40 CAPSULE, DELAYED RELEASE ORAL DAILY
Qty: 30 CAPSULE | Refills: 0 | Status: SHIPPED | OUTPATIENT
Start: 2018-07-19 | End: 2018-09-24 | Stop reason: SDUPTHER

## 2018-07-19 RX ORDER — SIMVASTATIN 40 MG
40 TABLET ORAL DAILY
Qty: 30 TABLET | Refills: 0 | Status: SHIPPED | OUTPATIENT
Start: 2018-07-19 | End: 2018-08-06 | Stop reason: SDUPTHER

## 2018-07-22 RX ORDER — AMOXICILLIN 500 MG/1
CAPSULE ORAL
Refills: 0 | COMMUNITY
Start: 2018-06-14 | End: 2018-10-04

## 2018-07-22 RX ORDER — CELECOXIB 200 MG/1
200 CAPSULE ORAL 2 TIMES DAILY
Refills: 3 | COMMUNITY
Start: 2018-04-19 | End: 2019-09-11 | Stop reason: SDUPTHER

## 2018-07-23 ENCOUNTER — OFFICE VISIT (OUTPATIENT)
Dept: FAMILY MEDICINE CLINIC | Facility: HOSPITAL | Age: 68
End: 2018-07-23
Payer: MEDICARE

## 2018-07-23 VITALS
HEIGHT: 60 IN | WEIGHT: 177.2 LBS | TEMPERATURE: 98.1 F | HEART RATE: 64 BPM | DIASTOLIC BLOOD PRESSURE: 62 MMHG | BODY MASS INDEX: 34.79 KG/M2 | SYSTOLIC BLOOD PRESSURE: 120 MMHG

## 2018-07-23 DIAGNOSIS — E03.9 ACQUIRED HYPOTHYROIDISM: ICD-10-CM

## 2018-07-23 DIAGNOSIS — Z13.820 ENCOUNTER FOR SCREENING FOR OSTEOPOROSIS: ICD-10-CM

## 2018-07-23 DIAGNOSIS — N18.30 TYPE 2 DIABETES MELLITUS WITH STAGE 3 CHRONIC KIDNEY DISEASE, WITHOUT LONG-TERM CURRENT USE OF INSULIN (HCC): ICD-10-CM

## 2018-07-23 DIAGNOSIS — Z00.00 MEDICARE ANNUAL WELLNESS VISIT, SUBSEQUENT: Primary | ICD-10-CM

## 2018-07-23 DIAGNOSIS — E78.2 MIXED HYPERLIPIDEMIA: ICD-10-CM

## 2018-07-23 DIAGNOSIS — E11.22 TYPE 2 DIABETES MELLITUS WITH STAGE 3 CHRONIC KIDNEY DISEASE, WITHOUT LONG-TERM CURRENT USE OF INSULIN (HCC): ICD-10-CM

## 2018-07-23 PROCEDURE — G0439 PPPS, SUBSEQ VISIT: HCPCS | Performed by: FAMILY MEDICINE

## 2018-07-23 NOTE — PROGRESS NOTES
Assessment/Plan:         Diagnoses and all orders for this visit:    Medicare annual wellness visit, subsequent    Encounter for screening for osteoporosis  -     DXA bone density spine hip and pelvis; Future          Subjective:      Patient ID: Trinity Hill is a 77 y o  female  No current illness or injury    No checking of glucometers  Cutting down on sweets    No hypoglycemia episodes  Had one episode of chest/neck tightness for a few minutes  The following portions of the patient's history were reviewed and updated as appropriate: allergies, current medications, past family history, past medical history, past social history, past surgical history and problem list     Review of Systems   Cardiovascular: Positive for chest pain  All other systems reviewed and are negative        AWV Clinical     ISAR:   Previous hospitalizations?:  Yes   How many hospitalizations have you had in the last year?:  1-2   Additional Comments:  Knee replacement       Once in a Lifetime Medicare Screening:   AAA screening performed? (if performed, please add date to Health Maintenance):  No       Medicare Screening Tests and Risk Assessment:   AAA Risk Assessment     Tobacco use (males only):  No   Age over 72 (males only):  No Family history of AAA:  No   Osteoporosis Risk Assessment     Female:  Yes   :  Yes :  No   Age over 48:  Yes Low body weight (<127lbs):  No   Tobacco use:  No Alcohol use:  No   Low calcium diet:  No PMHX of fractures:  No   FHX of fractures:  No    HIV Risk Assessment        Drug and Alcohol Use:   Tobacco use    Cigarettes:  never smoker    Tobacco use duration    Tobacco Cessation Readiness    Alcohol use    Alcohol use:  never    Alcohol Treatment Readiness   Illicit Drug Use    Drug use:  never        Diet & Exercise:   Diet   What is your diet?:  Regular   How many servings a day of the following:   Fruits and Vegetables:  3-4 Meat:  1-2   Whole Grains:  1, 2    Dairy: 1 Soda:  1   Coffee:  0 Tea:  0   Exercise    Do you currently exercise?:  currently not exercising       Cognitive Impairment Screening:   Depression screening preformed:  Yes Depression screen score:  0   Cognitive Impairment Screening    Do you have difficulty learning or retaining new information?:  No Do you have difficulty handling new tasks?:  No   Do you have difficulty with reasoning?:  No Do you have difficulty with spatial ability and orientation?:  No   Do you have difficulty with language?:  No Do you have difficulty with behavior?:  No       Functional Ability/Level of Safety:   Hearing    Hearing difficulties:  Yes Bilateral:  normal   Hearing Impairment Assessment    Hearing status:  No impairment   Current Activities    Status:  unlimited ADL's   Help needed with the folllowing:    ADL    Fall Risk   Have you fallen in the last 12 months?:  Yes    How many times?:  2    Injury History       Home Safety:   Are there hazards in your environment?:  No   If you fell, would you need help to get back up from the ground?:  Yes Do you have problems or concerns getting in/out of a bed, chair, tub, or toilet?:  Yes   Do you feel unsteady when walking?:  No Is your activity limited by pain?:  Yes   Do you have handrails and grab-bars in the home?:  Yes Are emergency numbers kept by the phone and regularly updated?:  No   Are you and/or family members aware of the dangers of smoking in bed?:  Yes    Do you have working smoke alarms and fire extinguisher?:  Yes Do all household members know how to use them?:  Yes   Have you left the stove on unsupervised?:  No    Home Safety Risk Factors   Unfamilar with surroundings:  Yes Uneven floors:  No   Stairs or handrail saftey risk:  No Loose rugs:  No   Household clutter:  No Poor household lighting:  No   No grab bars in bathroom:  Yes        Advanced Directives:   Advanced Directives    Living Will:  Yes Durable POA for healthcare:  No   Advanced directive:  No Patient's End of Life Decisions        Urinary Incontinence:       Glaucoma:           Provider Screening     Preventative Screening/Counseling:   Cardiovascular Screening/Counseling:   (Labs Q5 years, EKG optional one-time)   General:  Screening Current           Diabetes Screening/Counseling:   (2 tests/year if Pre-Diabetes or 1 test/year if no Diabetes)   General:  Screening Current           Colorectal Cancer Screening/Counseling:   (FOBT Q1 yr; Flex Sig Q4 yrs or Q10 yrs after Screening Colonoscopy; Screening Colonoscpy Q2 yrs High Risk or Q10 yrs Low Risk; Barium Enema Q2 yrs High Risk or Q4 yrs Low Risk)   General:  Screening Current           Prostate Cancer Screening/Counseling:   (Annual)          Breast Cancer Screening/Counseling:   (Baseline Age 28 - 43; Annual Age 36+)   General:  Screening Current          Cervical Cancer Screening/Counseling:   (Annual for High Risk or Childbearing Age with Abnormal Pap in Last 3 yrs; Every 2 all others)   General:  Screening Not Indicated           Osteoporosis Screening/Counseling:   (Every 2 Yrs if at risk or more if medically necessary)   General:  Screening Current           AAA Screening/Counseling:   (Once per Lifetime with risk factors)    Family History of AAA:  No Age over 72 (males only):  No Tobacco use (males only):  No   General:  Screening Not Indicated           Glaucoma Screening/Counseling:   (Annual)   General:  Screening Current          HIV Screening/Counseling:   (Voluntary; Once annually for high risk OR 3 times for Pregnancy at diagnosis of IUP; 3rd trimester; and at Labor   General:  Screening Not Indicated           Hepatitis C Screening:             Immunizations:   Influenza (annual):   Influenza UTD This Year   Pneumococcal (Once in a Lifetime):  Lifetime Vaccine Completed   Hepatitis B Series (low risk patients):  Series Not Indicated   Zostavax (Medicare D Coverage, Pt >70 yo):  Patient Declines   TD (Non-Medicare Wellness  Visit required):  Patient Declines   Tdap (Non-Medicare Wellness Visit required):  Patient Declines       Other Preventative Couseling (Non-Medicare Wellness Visit Required):   fall prevention education provided       Referrals (Non-Medicare Wellness Visit Required):       Medical Equipment/Suppliers:   none             Objective:      /62   Pulse 64   Temp 98 1 °F (36 7 °C)   Ht 5' (1 524 m)   Wt 80 4 kg (177 lb 3 2 oz)   BMI 34 61 kg/m²          Physical Exam   Constitutional: She appears well-developed and well-nourished  Cardiovascular: Normal rate, regular rhythm, normal heart sounds and intact distal pulses  Pulses are no weak pulses  Pulses:       Dorsalis pedis pulses are 1+ on the right side, and 1+ on the left side  Posterior tibial pulses are 1+ on the right side, and 1+ on the left side  Pulmonary/Chest: Effort normal and breath sounds normal    Musculoskeletal: She exhibits no edema  Feet:   Right Foot:   Skin Integrity: Negative for ulcer, skin breakdown, erythema, warmth, callus or dry skin  Left Foot:   Skin Integrity: Negative for ulcer, skin breakdown, erythema, warmth, callus or dry skin  Psychiatric: She has a normal mood and affect  Her behavior is normal  Judgment and thought content normal    Nursing note and vitals reviewed  Patient's shoes and socks removed  Right Foot/Ankle   Right Foot Inspection  Skin Exam: skin normal and skin intact no dry skin, no warmth, no callus, no erythema, no maceration, no abnormal color, no pre-ulcer, no ulcer and no callus                          Toe Exam: ROM and strength within normal limits  Sensory   Vibration: intact  Proprioception: intact   Monofilament testing: intact  Vascular  Capillary refills: < 3 seconds  The right DP pulse is 1+  The right PT pulse is 1+       Left Foot/Ankle  Left Foot Inspection  Skin Exam: skin normal and skin intactno dry skin, no warmth, no erythema, no maceration, normal color, no pre-ulcer, no ulcer and no callus                         Toe Exam: ROM and strength within normal limits                   Sensory   Vibration: intact  Proprioception: intact  Monofilament: intact  Vascular  Capillary refills: < 3 seconds  The left DP pulse is 1+  The left PT pulse is 1+  Assign Risk Category:  No deformity present; No loss of protective sensation;  No weak pulses       Risk: 0

## 2018-07-23 NOTE — PATIENT INSTRUCTIONS

## 2018-07-24 ENCOUNTER — HOSPITAL ENCOUNTER (OUTPATIENT)
Dept: MAMMOGRAPHY | Facility: CLINIC | Age: 68
Discharge: HOME | End: 2018-07-24
Payer: MEDICARE

## 2018-07-24 DIAGNOSIS — Z13.820 ENCOUNTER FOR SCREENING FOR OSTEOPOROSIS: ICD-10-CM

## 2018-07-24 PROCEDURE — 77080 DXA BONE DENSITY AXIAL: CPT

## 2018-08-02 ENCOUNTER — REMOTE DEVICE CLINIC VISIT (OUTPATIENT)
Dept: CARDIOLOGY CLINIC | Facility: CLINIC | Age: 68
End: 2018-08-02
Payer: MEDICARE

## 2018-08-02 DIAGNOSIS — I47.2 VT (VENTRICULAR TACHYCARDIA) (HCC): ICD-10-CM

## 2018-08-02 DIAGNOSIS — Z95.810 PRESENCE OF IMPLANTABLE CARDIOVERTER-DEFIBRILLATOR (ICD): ICD-10-CM

## 2018-08-02 DIAGNOSIS — I42.0 DILATED CARDIOMYOPATHY (HCC): Primary | ICD-10-CM

## 2018-08-02 DIAGNOSIS — I50.32 CHRONIC DIASTOLIC CONGESTIVE HEART FAILURE (HCC): ICD-10-CM

## 2018-08-02 PROCEDURE — 93297 REM INTERROG DEV EVAL ICPMS: CPT | Performed by: INTERNAL MEDICINE

## 2018-08-02 PROCEDURE — 93299 PR REM INTERROG ICPMS/SCRMS <30 D TECH REVIEW: CPT | Performed by: INTERNAL MEDICINE

## 2018-08-02 NOTE — PROGRESS NOTES
MDT/BIV-ICD  CARELINK TRANSMISSION - OPTI-VOL ONLY:  OPTI-VOL WITHIN NORMAL LIMITS   BATTERY VOLTAGE ADEQUATE (4 6 YR)    AP 55 3% BP 98 2% (VSRP <0 1%)   ALL LEAD PARAMETERS WITHIN NORMAL LIMITS   NO SIGNIFICANT HIGH RATE EPISODES   28 V SENSE EPISODES (24 SEC/D) WITH MARKERS SHOWING PVCs-AR/VS   NORMAL DEVICE FUNCTION   RG

## 2018-08-06 DIAGNOSIS — E78.5 HYPERLIPIDEMIA, UNSPECIFIED HYPERLIPIDEMIA TYPE: ICD-10-CM

## 2018-08-06 RX ORDER — SIMVASTATIN 40 MG
40 TABLET ORAL DAILY
Qty: 30 TABLET | Refills: 5 | Status: SHIPPED | OUTPATIENT
Start: 2018-08-06 | End: 2019-02-04 | Stop reason: SDUPTHER

## 2018-09-05 ENCOUNTER — REMOTE DEVICE CLINIC VISIT (OUTPATIENT)
Dept: CARDIOLOGY CLINIC | Facility: CLINIC | Age: 68
End: 2018-09-05
Payer: MEDICARE

## 2018-09-05 DIAGNOSIS — Z95.810 AICD (AUTOMATIC CARDIOVERTER/DEFIBRILLATOR) PRESENT: ICD-10-CM

## 2018-09-05 DIAGNOSIS — I47.2 VT (VENTRICULAR TACHYCARDIA) (HCC): Primary | ICD-10-CM

## 2018-09-05 LAB
LEFT EYE DIABETIC RETINOPATHY: NORMAL
RIGHT EYE DIABETIC RETINOPATHY: NORMAL

## 2018-09-05 PROCEDURE — 93299 PR REM INTERROG ICPMS/SCRMS <30 D TECH REVIEW: CPT | Performed by: INTERNAL MEDICINE

## 2018-09-05 PROCEDURE — 93297 REM INTERROG DEV EVAL ICPMS: CPT | Performed by: INTERNAL MEDICINE

## 2018-09-05 NOTE — PROGRESS NOTES
Results for orders placed or performed in visit on 09/05/18   Cardiac EP device report    Narrative    MDT/BIV-ICD  CARELINK TRANSMISSION - OPTI-VOL ONLY: BATTERY STATUS "OK"  AP 63%  98% VSRP 0%  ALL AVAILABLE LEAD PARAMETERS WITHIN NORMAL LIMITS  NO SIGNIFICANT HIGH RATE EPISODES  25 VENT SENSING NOTED, MARKERS ONLY  OPTI-VOL WITHIN NORMAL LIMITS  NORMAL DEVICE FUNCTION   NC

## 2018-09-19 ENCOUNTER — IN-CLINIC DEVICE VISIT (OUTPATIENT)
Dept: CARDIOLOGY CLINIC | Facility: CLINIC | Age: 68
End: 2018-09-19
Payer: MEDICARE

## 2018-09-19 DIAGNOSIS — I47.2 VENTRICULAR TACHYCARDIA (HCC): ICD-10-CM

## 2018-09-19 DIAGNOSIS — I50.42 CHRONIC COMBINED SYSTOLIC AND DIASTOLIC CONGESTIVE HEART FAILURE (HCC): Primary | ICD-10-CM

## 2018-09-19 DIAGNOSIS — Z95.810 BIVENTRICULAR ICD (IMPLANTABLE CARDIOVERTER-DEFIBRILLATOR) IN PLACE: ICD-10-CM

## 2018-09-19 PROCEDURE — 93284 PRGRMG EVAL IMPLANTABLE DFB: CPT | Performed by: INTERNAL MEDICINE

## 2018-09-19 NOTE — PROGRESS NOTES
Results for orders placed or performed in visit on 09/19/18   Cardiac EP device report    Narrative    MDT/BIV-ICD  DEVICE INTERROGATED IN THE [facility] OFFICE: BATTERY VOLTAGE ADEQUATE (5 6 YRS)  AP -57% BVP - 98 5% ( -98 45 + VSR PACE- <0 1%)  ALL LEAD PARAMETERS WITHIN NORMAL LIMITS  ALL OTHER TESTING WITHIN NORMAL LIMITS  NO SIGNIFICANT HIGH RATE EPISODES  (7) VENTRICULAR SENSING EPISODES (MARKERS) WITH (5) EPISODES SAME DAY/TIME  TOTAL DURATION @ ~ 30 MINS WITH MARKER SHOWING AR/VS W PVCs IN BIGEMINY VS NSVT ~ 111- 113 BPM  HX OF SAME  EF -60%(2015 ECHO)  PT ON ASA 81, METOPROLOL SUCC  OPTI-VOL FLUID THRESHOLD CROSSED FROM 8/14/18 - 9/2/18 AND NOW WNL  LRL INCREASED FROM 50 to 60 BPM PER DR RITCHIE (OPTIMA NOTE)  NORMAL DEVICE FUNCTION    eb

## 2018-09-20 ENCOUNTER — CLINICAL SUPPORT (OUTPATIENT)
Dept: FAMILY MEDICINE CLINIC | Facility: HOSPITAL | Age: 68
End: 2018-09-20
Payer: MEDICARE

## 2018-09-20 DIAGNOSIS — Z23 FLU VACCINE NEED: Primary | ICD-10-CM

## 2018-09-20 PROCEDURE — G0008 ADMIN INFLUENZA VIRUS VAC: HCPCS

## 2018-09-20 PROCEDURE — 90662 IIV NO PRSV INCREASED AG IM: CPT

## 2018-09-21 DIAGNOSIS — R60.9 EDEMA, UNSPECIFIED TYPE: Primary | ICD-10-CM

## 2018-09-21 RX ORDER — TORSEMIDE 20 MG/1
20 TABLET ORAL 2 TIMES DAILY
Qty: 180 TABLET | Refills: 3 | Status: SHIPPED | OUTPATIENT
Start: 2018-09-21 | End: 2018-10-10

## 2018-09-24 DIAGNOSIS — K21.9 GASTROESOPHAGEAL REFLUX DISEASE WITHOUT ESOPHAGITIS: ICD-10-CM

## 2018-09-24 RX ORDER — OMEPRAZOLE 40 MG/1
40 CAPSULE, DELAYED RELEASE ORAL DAILY
Qty: 90 CAPSULE | Refills: 2 | Status: SHIPPED | OUTPATIENT
Start: 2018-09-24 | End: 2018-10-10

## 2018-09-28 ENCOUNTER — TELEPHONE (OUTPATIENT)
Dept: FAMILY MEDICINE CLINIC | Facility: HOSPITAL | Age: 68
End: 2018-09-28

## 2018-09-28 NOTE — TELEPHONE ENCOUNTER
Pt aware, states she was not taking calcium  Just vit d and a multi vit  Told her to buy calcium and take 2  Whatever dose she can find at the pharm

## 2018-09-28 NOTE — TELEPHONE ENCOUNTER
Although there was some decrease in density compared to prior DEXA, still OK to continue with Calcium and vit D supplement   (2 tablets daily)

## 2018-09-29 DIAGNOSIS — M12.9 ARTHROPATHY: ICD-10-CM

## 2018-09-29 RX ORDER — TRAMADOL HYDROCHLORIDE 50 MG/1
50 TABLET ORAL 2 TIMES DAILY
Qty: 180 TABLET | Refills: 0 | Status: SHIPPED | OUTPATIENT
Start: 2018-09-29 | End: 2018-09-29 | Stop reason: SDUPTHER

## 2018-09-29 RX ORDER — TRAMADOL HYDROCHLORIDE 50 MG/1
50 TABLET ORAL 2 TIMES DAILY
Qty: 180 TABLET | Refills: 0 | Status: SHIPPED | OUTPATIENT
Start: 2018-09-29 | End: 2018-10-10

## 2018-10-03 ENCOUNTER — HOSPITAL ENCOUNTER (EMERGENCY)
Facility: HOSPITAL | Age: 68
Discharge: HOME | End: 2018-10-04
Attending: EMERGENCY MEDICINE | Admitting: EMERGENCY MEDICINE
Payer: MEDICARE

## 2018-10-03 DIAGNOSIS — N39.0 UTI (URINARY TRACT INFECTION): Primary | ICD-10-CM

## 2018-10-03 DIAGNOSIS — R10.9 ABDOMINAL PAIN: ICD-10-CM

## 2018-10-03 PROCEDURE — 99284 EMERGENCY DEPT VISIT MOD MDM: CPT

## 2018-10-04 ENCOUNTER — APPOINTMENT (EMERGENCY)
Dept: CT IMAGING | Facility: HOSPITAL | Age: 68
End: 2018-10-04
Payer: MEDICARE

## 2018-10-04 VITALS
RESPIRATION RATE: 18 BRPM | BODY MASS INDEX: 33.47 KG/M2 | HEART RATE: 67 BPM | DIASTOLIC BLOOD PRESSURE: 73 MMHG | HEIGHT: 61 IN | TEMPERATURE: 98.3 F | WEIGHT: 177.25 LBS | SYSTOLIC BLOOD PRESSURE: 144 MMHG | OXYGEN SATURATION: 93 %

## 2018-10-04 LAB
ALBUMIN SERPL BCP-MCNC: 3.6 G/DL (ref 3.5–5)
ALP SERPL-CCNC: 86 U/L (ref 46–116)
ALT SERPL W P-5'-P-CCNC: 34 U/L (ref 12–78)
ANION GAP SERPL CALCULATED.3IONS-SCNC: 12 MMOL/L (ref 4–13)
AST SERPL W P-5'-P-CCNC: 16 U/L (ref 5–45)
ATRIAL RATE: 77 BPM
BACTERIA UR QL AUTO: ABNORMAL /HPF
BASOPHILS # BLD AUTO: 0.07 THOUSANDS/ΜL (ref 0–0.1)
BASOPHILS NFR BLD AUTO: 1 % (ref 0–1)
BILIRUB SERPL-MCNC: 0.5 MG/DL (ref 0.2–1)
BILIRUB UR QL STRIP: NEGATIVE
BUN SERPL-MCNC: 35 MG/DL (ref 5–25)
CALCIUM SERPL-MCNC: 9.9 MG/DL (ref 8.3–10.1)
CHLORIDE SERPL-SCNC: 103 MMOL/L (ref 100–108)
CLARITY UR: ABNORMAL
CLARITY, POC: NORMAL
CO2 SERPL-SCNC: 27 MMOL/L (ref 21–32)
COLOR UR: YELLOW
COLOR, POC: YELLOW
CREAT SERPL-MCNC: 1.5 MG/DL (ref 0.6–1.3)
EOSINOPHIL # BLD AUTO: 0.2 THOUSAND/ΜL (ref 0–0.61)
EOSINOPHIL NFR BLD AUTO: 2 % (ref 0–6)
ERYTHROCYTE [DISTWIDTH] IN BLOOD BY AUTOMATED COUNT: 14.1 % (ref 11.6–15.1)
EXT BILIRUBIN, UA: NORMAL
EXT BLOOD URINE: NORMAL
EXT GLUCOSE, UA: NORMAL
EXT KETONES: NORMAL
EXT NITRITE, UA: NORMAL
EXT PH, UA: 6.5
EXT PROTEIN, UA: NORMAL
EXT SPECIFIC GRAVITY, UA: 1.01
EXT UROBILINOGEN: NORMAL
GFR SERPL CREATININE-BSD FRML MDRD: 35 ML/MIN/1.73SQ M
GLUCOSE SERPL-MCNC: 137 MG/DL (ref 65–140)
GLUCOSE UR STRIP-MCNC: NEGATIVE MG/DL
HCT VFR BLD AUTO: 48.3 % (ref 34.8–46.1)
HGB BLD-MCNC: 15.7 G/DL (ref 11.5–15.4)
HGB UR QL STRIP.AUTO: ABNORMAL
IMM GRANULOCYTES # BLD AUTO: 0.17 THOUSAND/UL (ref 0–0.2)
IMM GRANULOCYTES NFR BLD AUTO: 1 % (ref 0–2)
KETONES UR STRIP-MCNC: NEGATIVE MG/DL
LEUKOCYTE ESTERASE UR QL STRIP: ABNORMAL
LIPASE SERPL-CCNC: 148 U/L (ref 73–393)
LYMPHOCYTES # BLD AUTO: 2.12 THOUSANDS/ΜL (ref 0.6–4.47)
LYMPHOCYTES NFR BLD AUTO: 16 % (ref 14–44)
MCH RBC QN AUTO: 28.5 PG (ref 26.8–34.3)
MCHC RBC AUTO-ENTMCNC: 32.5 G/DL (ref 31.4–37.4)
MCV RBC AUTO: 88 FL (ref 82–98)
MONOCYTES # BLD AUTO: 1.4 THOUSAND/ΜL (ref 0.17–1.22)
MONOCYTES NFR BLD AUTO: 10 % (ref 4–12)
NEUTROPHILS # BLD AUTO: 9.6 THOUSANDS/ΜL (ref 1.85–7.62)
NEUTS SEG NFR BLD AUTO: 70 % (ref 43–75)
NITRITE UR QL STRIP: NEGATIVE
NON-SQ EPI CELLS URNS QL MICRO: ABNORMAL /HPF
NRBC BLD AUTO-RTO: 0 /100 WBCS
P AXIS: 48 DEGREES
PH UR STRIP.AUTO: 6.5 [PH] (ref 4.5–8)
PLATELET # BLD AUTO: 258 THOUSANDS/UL (ref 149–390)
PMV BLD AUTO: 11 FL (ref 8.9–12.7)
POTASSIUM SERPL-SCNC: 3.5 MMOL/L (ref 3.5–5.3)
PR INTERVAL: 148 MS
PROT SERPL-MCNC: 7 G/DL (ref 6.4–8.2)
PROT UR STRIP-MCNC: NEGATIVE MG/DL
QRS AXIS: 245 DEGREES
QRSD INTERVAL: 162 MS
QT INTERVAL: 438 MS
QTC INTERVAL: 495 MS
RBC # BLD AUTO: 5.5 MILLION/UL (ref 3.81–5.12)
RBC #/AREA URNS AUTO: ABNORMAL /HPF
SODIUM SERPL-SCNC: 142 MMOL/L (ref 136–145)
SP GR UR STRIP.AUTO: 1.01 (ref 1–1.03)
T WAVE AXIS: 50 DEGREES
TROPONIN I SERPL-MCNC: <0.02 NG/ML
UROBILINOGEN UR QL STRIP.AUTO: 0.2 E.U./DL
VENTRICULAR RATE: 77 BPM
WBC # BLD AUTO: 13.56 THOUSAND/UL (ref 4.31–10.16)
WBC # BLD EST: NORMAL 10*3/UL
WBC #/AREA URNS AUTO: ABNORMAL /HPF

## 2018-10-04 PROCEDURE — 85025 COMPLETE CBC W/AUTO DIFF WBC: CPT | Performed by: EMERGENCY MEDICINE

## 2018-10-04 PROCEDURE — 36415 COLL VENOUS BLD VENIPUNCTURE: CPT | Performed by: EMERGENCY MEDICINE

## 2018-10-04 PROCEDURE — 83690 ASSAY OF LIPASE: CPT | Performed by: EMERGENCY MEDICINE

## 2018-10-04 PROCEDURE — 81001 URINALYSIS AUTO W/SCOPE: CPT | Performed by: EMERGENCY MEDICINE

## 2018-10-04 PROCEDURE — 74176 CT ABD & PELVIS W/O CONTRAST: CPT

## 2018-10-04 PROCEDURE — 84484 ASSAY OF TROPONIN QUANT: CPT | Performed by: EMERGENCY MEDICINE

## 2018-10-04 PROCEDURE — 80053 COMPREHEN METABOLIC PANEL: CPT | Performed by: EMERGENCY MEDICINE

## 2018-10-04 PROCEDURE — 96361 HYDRATE IV INFUSION ADD-ON: CPT

## 2018-10-04 PROCEDURE — 87086 URINE CULTURE/COLONY COUNT: CPT | Performed by: EMERGENCY MEDICINE

## 2018-10-04 PROCEDURE — 96375 TX/PRO/DX INJ NEW DRUG ADDON: CPT

## 2018-10-04 PROCEDURE — 93005 ELECTROCARDIOGRAM TRACING: CPT

## 2018-10-04 PROCEDURE — C9113 INJ PANTOPRAZOLE SODIUM, VIA: HCPCS | Performed by: EMERGENCY MEDICINE

## 2018-10-04 PROCEDURE — 96374 THER/PROPH/DIAG INJ IV PUSH: CPT

## 2018-10-04 PROCEDURE — 93010 ELECTROCARDIOGRAM REPORT: CPT | Performed by: INTERNAL MEDICINE

## 2018-10-04 RX ORDER — CEPHALEXIN 250 MG/1
500 CAPSULE ORAL ONCE
Status: COMPLETED | OUTPATIENT
Start: 2018-10-04 | End: 2018-10-04

## 2018-10-04 RX ORDER — PANTOPRAZOLE SODIUM 40 MG/1
40 INJECTION, POWDER, FOR SOLUTION INTRAVENOUS ONCE
Status: COMPLETED | OUTPATIENT
Start: 2018-10-04 | End: 2018-10-04

## 2018-10-04 RX ORDER — ONDANSETRON 4 MG/1
4 TABLET, ORALLY DISINTEGRATING ORAL ONCE
Qty: 20 TABLET | Refills: 0 | Status: SHIPPED | OUTPATIENT
Start: 2018-10-04 | End: 2019-11-12

## 2018-10-04 RX ORDER — TROSPIUM CHLORIDE 20 MG/1
60 TABLET, FILM COATED ORAL
COMMUNITY
End: 2019-10-01

## 2018-10-04 RX ORDER — METHOCARBAMOL 500 MG/1
500 TABLET, FILM COATED ORAL ONCE
Status: DISCONTINUED | OUTPATIENT
Start: 2018-10-04 | End: 2018-10-04

## 2018-10-04 RX ORDER — ACETAMINOPHEN 325 MG/1
975 TABLET ORAL ONCE
Status: COMPLETED | OUTPATIENT
Start: 2018-10-04 | End: 2018-10-04

## 2018-10-04 RX ORDER — ONDANSETRON 4 MG/1
4 TABLET, ORALLY DISINTEGRATING ORAL ONCE
Status: COMPLETED | OUTPATIENT
Start: 2018-10-04 | End: 2018-10-04

## 2018-10-04 RX ORDER — ONDANSETRON 2 MG/ML
4 INJECTION INTRAMUSCULAR; INTRAVENOUS ONCE
Status: COMPLETED | OUTPATIENT
Start: 2018-10-04 | End: 2018-10-04

## 2018-10-04 RX ORDER — CEPHALEXIN 500 MG/1
500 CAPSULE ORAL EVERY 12 HOURS SCHEDULED
Qty: 14 CAPSULE | Refills: 0 | Status: SHIPPED | OUTPATIENT
Start: 2018-10-04 | End: 2018-10-11

## 2018-10-04 RX ADMIN — ACETAMINOPHEN 975 MG: 325 TABLET, FILM COATED ORAL at 03:38

## 2018-10-04 RX ADMIN — ONDANSETRON 4 MG: 2 INJECTION, SOLUTION INTRAMUSCULAR; INTRAVENOUS at 00:11

## 2018-10-04 RX ADMIN — CEPHALEXIN 500 MG: 250 CAPSULE ORAL at 03:39

## 2018-10-04 RX ADMIN — PANTOPRAZOLE SODIUM 40 MG: 40 INJECTION, POWDER, FOR SOLUTION INTRAVENOUS at 00:11

## 2018-10-04 RX ADMIN — ONDANSETRON 4 MG: 4 TABLET, ORALLY DISINTEGRATING ORAL at 00:09

## 2018-10-04 RX ADMIN — SODIUM CHLORIDE 500 ML: 0.9 INJECTION, SOLUTION INTRAVENOUS at 00:33

## 2018-10-04 NOTE — ED NOTES
Ct called to let us [place] could not read due to tech difficulty manuel is now going to read it       Fallon Conley RN  10/04/18 [id number]

## 2018-10-04 NOTE — DISCHARGE INSTRUCTIONS
Abdominal Pain   AMBULATORY CARE:   Abdominal pain  can be dull, achy, or sharp  You may have pain in one area of your abdomen, or in your entire abdomen  Your pain may be caused by a condition such as constipation, food sensitivity or poisoning, infection, or a blockage  Abdominal pain can also be from a hernia, appendicitis, or an ulcer  Liver, gallbladder, or kidney conditions can also cause abdominal pain  The cause of your abdominal pain may be unknown  Seek care immediately if:   · You have new chest pain or shortness of breath  · You have pulsing pain in your upper abdomen or lower back that suddenly becomes constant  · Your pain is in the right lower abdominal area and worsens with movement  · You have a fever over 100 4°F (38°C) or shaking chills  · You are vomiting and cannot keep food or liquids down  · Your pain does not improve or gets worse over the next 8 to 12 hours  · You see blood in your vomit or bowel movements, or they look black and tarry  · Your skin or the whites of your eyes turn yellow  · You are a woman and have a large amount of vaginal bleeding that is not your monthly period  Contact your healthcare provider if:   · You have pain in your lower back  · You are a man and have pain in your testicles  · You have pain when you urinate  · You have questions or concerns about your condition or care  Treatment for abdominal pain  may include medicine to calm your stomach, prevent vomiting, or decrease pain  Follow up with your healthcare provider as directed:  Write down your questions so you remember to ask them during your visits  © 2017 [employer]  Information is for End User's use only and may not be sold, redistributed or otherwise used for commercial purposes  All illustrations and images included in CareNotes® are the copyrighted property of [employer]  or [employer]    The above information is an  only  It is not intended as medical advice for individual conditions or treatments  Talk to your doctor, nurse or pharmacist before following any medical regimen to see if it is safe and effective for you  Urinary Traction Infection in Older Adults   WHAT YOU NEED TO KNOW:   A urinary tract infection (UTI) is caused by bacteria that get inside your urinary tract  Your urinary tract includes your kidneys, ureters, bladder, and urethra  Urine is made in your kidneys, and it flows from the ureters to the bladder  Urine leaves the bladder through the urethra  A UTI is more common in your lower urinary tract, which includes your bladder and urethra  DISCHARGE INSTRUCTIONS:   Seek care immediately if:   · You are urinating very little or not at all  · You are vomiting  · You have a high fever with shaking chills  · You have side or back pain that gets worse  Contact your healthcare provider if:   · You have a fever  · You are a woman and you have increased white or yellow discharge from your vagina  · You do not feel better after 2 days of taking antibiotics  · You have questions or concerns about your condition or care  Medicines:   · Medicines  help treat the bacterial infection or decrease pain and burning when you urinate  You may also need medicines to decrease the urge to urinate often  Your healthcare provider may recommend cranberry juice or cranberry supplements to help decrease your symptoms  · Take your medicine as directed  Contact your healthcare provider if you think your medicine is not helping or if you have side effects  Tell him or her if you are allergic to any medicine  Keep a list of the medicines, vitamins, and herbs you take  Include the amounts, and when and why you take them  Bring the list or the pill bottles to follow-up visits  Carry your medicine list with you in case of an emergency  Self-care:   · Urinate when you feel the urge    Do not hold your urine because bacteria can grow in the bladder if urine stays in the bladder too long  It may be helpful to urinate at least every 3 to 4 hours  · Drink liquids as directed  Liquids can help flush bacteria from your urinary tract  Ask how much liquid to drink each day and which liquids are best for you  You may need to drink more liquids than usual to help flush out the bacteria  Do not drink alcohol, caffeine, and citrus juices  These can irritate your bladder and increase your symptoms  · Apply heat  on your abdomen for 20 to 30 minutes every 2 hours for as many days as directed  Heat helps decrease discomfort and pressure in your bladder  Prevent a UTI:   · Women should wipe front to back  after urinating or having a bowel movement  This may prevent germs from getting into the urinary tract  · Urinate after you have sex  to flush away bacteria that can enter your urinary tract during sex  · Wear cotton underwear and clothes that fit loose  Tight pants and nylon underwear can trap moisture and cause bacteria to grow  Follow up with your healthcare provider as directed:  Write down your questions so you remember to ask them during your visits  © 2017 [facility] Information is for End User's use only and may not be sold, redistributed or otherwise used for commercial purposes  All illustrations and images included in CareNotes® are the copyrighted property of [employer]  or [employer]  The above information is an  only  It is not intended as medical advice for individual conditions or treatments  Talk to your doctor, nurse or pharmacist before following any medical regimen to see if it is safe and effective for you

## 2018-10-04 NOTE — ED PROVIDER NOTES
History  Chief Complaint   Patient presents with    Abdominal Pain     Pt presents to ER stating having abdominal pain that started around 930pm and frequent belching for the past few hours, states pain rises up with the burp  pt states last BM was around 11pm and it was normal       Patient complains of generalized crampy abd pain gradual worsening since 930 pm about 3 hours ago  She has associated nausea and then large amount of burping  She did not eat anything abnl  Last nl bm was at 11 pm tonight  Did not take anything at home for this  Prior to Admission Medications   Prescriptions Last Dose Informant Patient Reported? Taking? Cholecalciferol (VITAMIN D3) 1000 units CAPS   Yes No   Sig: Take by mouth   DAILY MULTIPLE VITAMINS PO   Yes No   Sig: Take 1 tablet by mouth daily   FLUoxetine (PROzac) 20 mg capsule   No No   Sig: Take 1 capsule (20 mg total) by mouth daily   HYDROcodone-acetaminophen (NORCO) 5-325 mg per tablet   No No   Sig: Take 1 tablet by mouth every 6 (six) hours as needed for pain for up to 20 doses Max Daily Amount: 4 tablets   Magnesium 250 MG TABS   Yes No   Sig: Take by mouth daily   Minoxidil (ROGAINE WOMENS EX)   Yes No   Sig: Apply 1 mL topically  Multiple Vitamin (MULTIVITAMIN) tablet   Yes No   Sig: Take 1 tablet by mouth daily  Specialty Vitamins Products (MAGNESIUM, AMINO ACID CHELATE,) 133 MG tablet   Yes No   Sig: Take 1 tablet by mouth 2 (two) times a day  Vitamin D, Cholecalciferol, 1000 UNITS CAPS   Yes No   Sig: Take by mouth  allopurinol (ZYLOPRIM) 100 mg tablet   Yes No   Sig: Take 1 tablet by mouth daily   aspirin 81 MG tablet   Yes No   Sig: Take 81 mg by mouth daily     candesartan (ATACAND) 32 MG tablet   No No   Sig: Take 1 tablet (32 mg total) by mouth daily   celecoxib (CeleBREX) 200 mg capsule   Yes No   Sig: Take 200 mg by mouth 2 (two) times a day   colchicine (COLCRYS) 0 6 mg tablet   No No   Sig: Take 1 tablet by mouth daily indomethacin (INDOCIN) 50 mg capsule   No No   Sig: Take 1 capsule by mouth 2 (two) times a day with meals   levothyroxine 25 mcg tablet   No No   Sig: Take 1 tablet (25 mcg total) by mouth daily   loratadine (CLARITIN) 10 mg tablet   Yes No   Sig: Take 10 mg by mouth as needed for allergies  meloxicam (MOBIC) 15 mg tablet   Yes No   Sig: Take 15 mg by mouth daily  methylprednisolone (MEDROL) 4 mg tablet   Yes No   Sig: Take 12 mg by mouth 2 (two) times a day   metoprolol succinate (TOPROL-XL) 100 mg 24 hr tablet   No No   Sig: Take 1 tablet (100 mg total) by mouth daily   modafinil (PROVIGIL) 100 mg tablet   No No   Sig: Take 1 tablet (100 mg total) by mouth daily   montelukast (SINGULAIR) 10 mg tablet   Yes No   Sig: Take 10 mg by mouth daily at bedtime  montelukast (SINGULAIR) 10 mg tablet   Yes No   Sig: Take 1 tablet by mouth daily   omeprazole (PriLOSEC) 40 MG capsule   No No   Sig: Take 1 capsule (40 mg total) by mouth daily   potassium chloride (KLOR-CON) 20 mEq packet   No No   Sig: Take 20 mEq by mouth 2 (two) times a day   pyridoxine (VITAMIN B6) 100 mg tablet   Yes No   Sig: Take 100 mg by mouth daily  simvastatin (ZOCOR) 40 mg tablet   No No   Sig: Take 1 tablet (40 mg total) by mouth daily   sitaGLIPtin (JANUVIA) 100 mg tablet   Yes No   Sig: Take 100 mg by mouth daily     torsemide (DEMADEX) 20 mg tablet   No No   Sig: Take 1 tablet (20 mg total) by mouth 2 (two) times a day   traMADol (ULTRAM) 50 mg tablet   No No   Sig: Take 1 tablet (50 mg total) by mouth 2 (two) times a day   trospium chloride (SANCTURA) 20 mg tablet   Yes Yes   Sig: Take 60 mg by mouth daily in the early morning Instructed by Dr Carter Jernigan to stop taking until symptoms resolve      Facility-Administered Medications: None       Past Medical History:   Diagnosis Date    Abnormal finding on breast imaging     last assessed 11/30/17    Arthritis     BBB (bundle branch block)     left,last assesed 6/4/12    Benign paroxysmal vertigo     last assessed 9/7/12    Cardiac defibrillator in situ     Carpal tunnel syndrome, unspecified upper limb     CHF (congestive heart failure) (HCC)     chronic systolic/diastolic    Chronic kidney disease     stage 3    Coronary artery disease     Depression     Diabetes mellitus (HCC)     Difficulty walking up stairs     Dilated cardiomyopathy ([address] )     Disease of thyroid gland     GERD (gastroesophageal reflux disease)     Hemorrhoids     History of blood clots     Hyperlipidemia     Hypertension     Hypokalemia     OAB (overactive bladder)     detrusor instability    Psoriasis     psoriatic arthropathy    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    RLS (restless legs syndrome)     Sciatica     Shortness of breath     Sleep apnea     unspecified type    Vitamin D deficiency        Past Surgical History:   Procedure Laterality Date    BLADDER SURGERY  1999    lift and repair    BREAST BIOPSY Left 05/23/2016    CARDIAC CATHETERIZATION      outcome:  successful    CARDIAC DEFIBRILLATOR PLACEMENT      CARPAL TUNNEL RELEASE Bilateral 1997    COLONOSCOPY      CORONARY ANGIOPLASTY WITH STENT PLACEMENT      CYSTOSCOPY W/ URETEROSCOPY  2009    DE Malina Limb RELEASE Left 2011    and trigger finger release    EYE SURGERY Left 1999    INSERT / REPLACE / [address] Right 09/2017    Knee    KNEE ARTHROSCOPY      therapeutic    NEUROPLASTY / TRANSPOSITION MEDIAN NERVE AT CARPAL TUNNEL      RI INCISE FINGER TENDON SHEATH Left 3/2/2017    Procedure: SMALL TRIGGER FINGER RELEASE;  Surgeon: Diaz Hart MD;  Location: [facility];  Service: Orthopedics    RECTAL SURGERY  2006    repair of rectal ulcer    SHOULDER ARTHROSCOPY Left 2006    TOTAL ABDOMINAL HYSTERECTOMY      TOTAL KNEE ARTHROPLASTY Left     TOTAL SHOULDER REPLACEMENT Left 2007    TRIGGER FINGER RELEASE Bilateral 2011    right [address],2015,left hand 2012,2015       Family History   Problem Relation Age of Onset    Hypertension Mother     Alzheimer's disease Mother     Cancer Father 70        bladder    Prostate cancer Father 70    Cancer Brother         pt shared some type of blood cancer    Breast cancer Paternal Grandmother     Stomach cancer Paternal Aunt 72     I have reviewed and agree with the history as documented  Social History   Substance Use Topics    Smoking status: Former Smoker     Packs/day: 1 00     Years: 15 00     Quit date: 1977    Smokeless tobacco: Never Used    Alcohol use No        Review of Systems   All other systems reviewed and are negative  Physical Exam  Physical Exam   Constitutional: She is oriented to person, place, and time  She appears well-developed and well-nourished  HENT:   Mouth/Throat: Oropharynx is clear and moist    Eyes: Pupils are equal, round, and reactive to light  Conjunctivae and EOM are normal    Neck: Normal range of motion  Neck supple  No spinous process tenderness present  Cardiovascular: Normal rate, regular rhythm, normal heart sounds and intact distal pulses  Pulmonary/Chest: Effort normal and breath sounds normal  No respiratory distress  She has no wheezes  Abdominal: Soft  Bowel sounds are normal  She exhibits no distension  There is generalized tenderness  There is no rebound and no guarding  Musculoskeletal: Normal range of motion  Neurological: She is alert and oriented to person, place, and time  She has normal strength  No sensory deficit  GCS eye subscore is 4  GCS verbal subscore is 5  GCS motor subscore is 6  Skin: Skin is warm and dry  No rash noted  Psychiatric: She has a normal mood and affect  Nursing note and vitals reviewed        Vital Signs  ED Triage Vitals   Temperature Pulse Respirations Blood Pressure SpO2   10/03/18 2357 10/03/18 2357 10/03/18 2357 10/03/18 2359 10/03/18 2357   98 3 °F (36 8 °C) 87 18 158/89 97 %      Temp Source Heart Rate Source Patient Position - Orthostatic VS BP Location FiO2 (%)   10/03/18 2357 10/03/18 2357 -- -- --   Temporal Monitor         Pain Score       10/03/18 2357       5           Vitals:    10/03/18 2359 10/04/18 0130 10/04/18 0215 10/04/18 0245   BP: 158/89 133/67 144/73    Pulse:  69 71 67       Visual Acuity      ED Medications  Medications   ondansetron (ZOFRAN) injection 4 mg (4 mg Intravenous Given 10/4/18 0011)   pantoprazole (PROTONIX) injection 40 mg (40 mg Intravenous Given 10/4/18 0011)   ondansetron (ZOFRAN-ODT) dispersible tablet 4 mg (4 mg Oral Given 10/4/18 0009)   sodium chloride 0 9 % bolus 500 mL (0 mL Intravenous Stopped 10/4/18 0255)   acetaminophen (TYLENOL) tablet 975 mg (975 mg Oral Given 10/4/18 0338)   cephalexin (KEFLEX) capsule 500 mg (500 mg Oral Given 10/4/18 0339)       Diagnostic Studies  Results Reviewed     Procedure Component Value Units Date/Time    Urine Microscopic [[id number]]  (Abnormal) Collected:  10/04/18 0319    Lab Status:  Final result Specimen:  Urine from Urine, Clean Catch Updated:  10/04/18 0352     RBC, UA 1-2 (A) /hpf      WBC, UA 20-30 (A) /hpf      Epithelial Cells Occasional /hpf      Bacteria, UA Moderate (A) /hpf     Urine culture [[id number]] Collected:  10/04/18 0319    Lab Status:   In process Specimen:  Urine from Urine, Clean Catch Updated:  10/04/18 0352    POCT urinalysis dipstick [[id number]]  (Normal) Resulted:  10/04/18 0335    Lab Status:  Final result Updated:  10/04/18 0335     Color, UA yellow     Clarity, UA cloudy     EXT Glucose, UA (Ref: Negative) neg     EXT Bilirubin, UA (Ref: Negative) neg     Ketones, UA (Ref: Negative) neg     EXT Spec Grav, UA (Ref:1 003-1 030) 1 010     Blood, UA (Ref: Negative) neg     EXT pH, UA (Ref: 4 5-8 0) 6 5     EXT Protein, UA (Ref: Negative) neg     Urobilinogen, UA (Ref: 0 2- 1 0) neg      Leukocytes, UA (Ref: Negative) pos     Nitrite, UA (Ref: Negative) neg    UA w Reflex to Microscopic w Reflex to Culture [[id number]]  (Abnormal) Collected:  10/04/18 3567 Lab Status:  Final result Specimen:  Urine from Urine, Clean Catch Updated:  10/04/18 0333     Color, UA Yellow     Clarity, UA Cloudy     Specific [place], UA 1 010     pH, UA 6 5     Leukocytes, UA Large (A)     Nitrite, UA Negative     Protein, UA Negative mg/dl      Glucose, UA Negative mg/dl      Ketones, UA Negative mg/dl      Urobilinogen, UA 0 2 E U /dl      Bilirubin, UA Negative     Blood, UA Trace-lysed (A)    Troponin I [[id number]]  (Normal) Collected:  10/04/18 0030    Lab Status:  Final result Specimen:  Blood from Arm, Right Updated:  10/04/18 0058     Troponin I <0 02 ng/mL     CMP [[id number]]  (Abnormal) Collected:  10/04/18 0030    Lab Status:  Final result Specimen:  Blood from Arm, Left Updated:  10/04/18 0056     Sodium 142 mmol/L      Potassium 3 5 mmol/L      Chloride 103 mmol/L      CO2 27 mmol/L      ANION GAP 12 mmol/L      BUN 35 (H) mg/dL      Creatinine 1 50 (H) mg/dL      Glucose 137 mg/dL      Calcium 9 9 mg/dL      AST 16 U/L      ALT 34 U/L      Alkaline Phosphatase 86 U/L      Total Protein 7 0 g/dL      Albumin 3 6 g/dL      Total Bilirubin 0 50 mg/dL      eGFR 35 ml/min/1 73sq m     Narrative:         National Kidney Disease Education Program recommendations are as follows:  GFR calculation is accurate only with a steady state creatinine  Chronic Kidney disease less than 60 ml/min/1 73 sq  meters  Kidney failure less than 15 ml/min/1 73 sq  meters      Lipase [[id number]]  (Normal) Collected:  10/04/18 0030    Lab Status:  Final result Specimen:  Blood from Arm, Left Updated:  10/04/18 0056     Lipase 148 u/L     CBC and differential [[id number]]  (Abnormal) Collected:  10/04/18 0012    Lab Status:  Final result Specimen:  Blood from Arm, Left Updated:  10/04/18 0020     WBC 13 56 (H) Thousand/uL      RBC 5 50 (H) Million/uL      Hemoglobin 15 7 (H) g/dL      Hematocrit 48 3 (H) %      MCV 88 fL      MCH 28 5 pg      MCHC 32 5 g/dL      RDW 14 1 %      MPV 11 0 fL      Platelets 313 Thousands/uL      nRBC 0 /100 WBCs      Neutrophils Relative 70 %      Immat GRANS % 1 %      Lymphocytes Relative 16 %      Monocytes Relative 10 %      Eosinophils Relative 2 %      Basophils Relative 1 %      Neutrophils Absolute 9 60 (H) Thousands/µL      Immature Grans Absolute 0 17 Thousand/uL      Lymphocytes Absolute 2 12 Thousands/µL      Monocytes Absolute 1 40 (H) Thousand/µL      Eosinophils Absolute 0 20 Thousand/µL      Basophils Absolute 0 07 Thousands/µL                  CT abdomen pelvis wo contrast   ED Interpretation by Milagro Yanes DO (10/04 0246)   No acute abnl read by virtual radiology      Final Result by Chris Talavera MD (10/04 0622)         1  Fatty change in the liver  2   The stomach is distended with food and fluid  Correlate for gastric outlet obstruction  Workstation performed: [id number]                    Procedures  ECG 12 Lead Documentation  Date/Time: 10/4/2018 12:32 AM  Performed by: Lyndon Pedersen by: Shawn Toth     Indications / Diagnosis:  Epigastric pain  ECG reviewed by me, the ED Provider: yes    Patient location:  ED  Previous ECG:     Previous ECG:  Compared to current    Comparison ECG info:  5-17    Similarity:  No change  Interpretation:     Interpretation: normal    Rate:     ECG rate:  77    ECG rate assessment: normal    Rhythm:     Rhythm: paced    Pacing:     Capture:  Complete  Ectopy:     Ectopy: PVCs      PVCs:  Infrequent  QRS:     QRS axis:  Normal    QRS intervals:  Normal  Conduction:     Conduction: normal    ST segments:     ST segments:  Normal  T waves:     T waves: normal               Phone Contacts  ED Phone Contact    ED Course  ED Course as of Oct 04 2013   Thu Oct 04, 2018   0033 Several dry heaves and small amount of bilious vomiting here in department      2679 Waiting form GFR to determine kind of CT scan    0223 Patient notified of delay with CT scan - it was not able to be sent to local radiologist so it is getting transmitted to virtual radiology    8702 Patient feels better, trying some ice chips  0247 Abdominal pain returned after ice chips  Finally able to urinate  Patient remembers which new medicine she is on for the last week - Trospium for overactive bladder  I reviewed side effects - can be abdominal cramping  Recommended she stop this medication until symptoms yuliet  MDM  Number of Diagnoses or Management Options  Abdominal pain: new and requires workup  UTI (urinary tract infection): new and requires workup     Amount and/or Complexity of Data Reviewed  Clinical lab tests: reviewed and ordered  Tests in the radiology section of CPT®: ordered and reviewed  Discuss the patient with other providers: yes    Patient Progress  Patient progress: improved    CritCare Time    Disposition  Final diagnoses:   UTI (urinary tract infection) - acute   Abdominal pain     Time reflects when diagnosis was documented in both MDM as applicable and the Disposition within this note     Time User Action Codes Description Comment    10/4/2018  3:23 AM Catrina Dikes Add [N39 0] UTI (urinary tract infection)     10/4/2018  3:23 AM Catrina Dikes Modify [N39 0] UTI (urinary tract infection) acute    10/4/2018  3:23 AM Catrina Dikes Add [R10 9] Abdominal pain       ED Disposition     ED Disposition Condition Comment    Discharge  [facility] discharge to home/self care      Condition at discharge: Good        Follow-up Information     Follow up With Specialties Details Why Contact Info    Annella Dancer, MD Family Medicine In 5 days  [place]  [address] [place] [id number] [phone]            Discharge Medication List as of 10/4/2018  3:25 AM      START taking these medications    Details   cephalexin (KEFLEX) 500 mg capsule Take 1 capsule (500 mg total) by mouth every 12 (twelve) hours for 7 days, Starting Thu 10/4/2018, Until Thu 10/11/2018, Normal      ondansetron (ZOFRAN-ODT) 4 mg disintegrating tablet Take 1 tablet (4 mg total) by mouth once for 1 dose, Starting Thu 10/4/2018, Normal         CONTINUE these medications which have NOT CHANGED    Details   allopurinol (ZYLOPRIM) 100 mg tablet Take 1 tablet by mouth daily, Starting Mon 11/13/2017, Historical Med      aspirin 81 MG tablet Take 81 mg by mouth daily  , Until Discontinued, Historical Med      candesartan (ATACAND) 32 MG tablet Take 1 tablet (32 mg total) by mouth daily, Starting Fri 6/22/2018, Normal      celecoxib (CeleBREX) 200 mg capsule Take 200 mg by mouth 2 (two) times a day, Starting Thu 4/19/2018, Historical Med      !! Cholecalciferol (VITAMIN D3) 1000 units CAPS Take by mouth, Historical Med      colchicine (COLCRYS) 0 6 mg tablet Take 1 tablet by mouth daily, Starting 5/24/2017, Until Discontinued, Print      !! DAILY MULTIPLE VITAMINS PO Take 1 tablet by mouth daily, Historical Med      FLUoxetine (PROzac) 20 mg capsule Take 1 capsule (20 mg total) by mouth daily, Starting Tue 4/3/2018, Normal      HYDROcodone-acetaminophen (NORCO) 5-325 mg per tablet Take 1 tablet by mouth every 6 (six) hours as needed for pain for up to 20 doses Max Daily Amount: 4 tablets, Starting 3/2/2017, Until Discontinued, Print      indomethacin (INDOCIN) 50 mg capsule Take 1 capsule by mouth 2 (two) times a day with meals, Starting 5/24/2017, Until Discontinued, Print      levothyroxine 25 mcg tablet Take 1 tablet (25 mcg total) by mouth daily, Starting Fri 6/22/2018, Normal      loratadine (CLARITIN) 10 mg tablet Take 10 mg by mouth as needed for allergies  , Until Discontinued, Historical Med      Magnesium 250 MG TABS Take by mouth daily, Until Discontinued, Historical Med      meloxicam (MOBIC) 15 mg tablet Take 15 mg by mouth daily  , Until Discontinued, Historical Med      methylprednisolone (MEDROL) 4 mg tablet Take 12 mg by mouth 2 (two) times a day, Starting Fri 2/16/2018, Historical Med      metoprolol succinate (TOPROL-XL) 100 mg 24 hr tablet Take 1 tablet (100 mg total) by mouth daily, Starting Wed 5/16/2018, Normal      Minoxidil (ROGAINE WOMENS EX) Apply 1 mL topically  , Until Discontinued, Historical Med      modafinil (PROVIGIL) 100 mg tablet Take 1 tablet (100 mg total) by mouth daily, Starting Tue 6/5/2018, Normal      !! montelukast (SINGULAIR) 10 mg tablet Take 10 mg by mouth daily at bedtime  , Until Discontinued, Historical Med      !! montelukast (SINGULAIR) 10 mg tablet Take 1 tablet by mouth daily, Historical Med      !! Multiple Vitamin (MULTIVITAMIN) tablet Take 1 tablet by mouth daily  , Until Discontinued, Historical Med      omeprazole (PriLOSEC) 40 MG capsule Take 1 capsule (40 mg total) by mouth daily, Starting Mon 9/24/2018, Normal      potassium chloride (KLOR-CON) 20 mEq packet Take 20 mEq by mouth 2 (two) times a day, Starting Tue 2/27/2018, Normal      pyridoxine (VITAMIN B6) 100 mg tablet Take 100 mg by mouth daily  , Until Discontinued, Historical Med      simvastatin (ZOCOR) 40 mg tablet Take 1 tablet (40 mg total) by mouth daily, Starting Mon 8/6/2018, Normal      sitaGLIPtin (JANUVIA) 100 mg tablet Take 100 mg by mouth daily  , Until Discontinued, Historical Med      Specialty Vitamins Products (MAGNESIUM, AMINO ACID CHELATE,) 133 MG tablet Take 1 tablet by mouth 2 (two) times a day , Until Discontinued, Historical Med      torsemide (DEMADEX) 20 mg tablet Take 1 tablet (20 mg total) by mouth 2 (two) times a day, Starting Fri 9/21/2018, Normal      traMADol (ULTRAM) 50 mg tablet Take 1 tablet (50 mg total) by mouth 2 (two) times a day, Starting Sat 9/29/2018, Normal      !! Vitamin D, Cholecalciferol, 1000 UNITS CAPS Take by mouth , Until Discontinued, Historical Med       !! - Potential duplicate medications found  Please discuss with provider  No discharge procedures on file      ED Provider  Electronically Signed by           Bonita Andrews DO  10/04/18 2013

## 2018-10-05 ENCOUNTER — VBI (OUTPATIENT)
Dept: ADMINISTRATIVE | Facility: OTHER | Age: 68
End: 2018-10-05

## 2018-10-05 LAB — BACTERIA UR CULT: NORMAL

## 2018-10-05 NOTE — PROGRESS NOTES
Called patient and informed her of CT result and advised f/u with GI doctor  Patient given number for [facility] and advised to call tomorrow

## 2018-10-10 ENCOUNTER — TRANSCRIBE ORDERS (OUTPATIENT)
Dept: ADMINISTRATIVE | Facility: HOSPITAL | Age: 68
End: 2018-10-10

## 2018-10-10 ENCOUNTER — APPOINTMENT (OUTPATIENT)
Dept: LAB | Facility: HOSPITAL | Age: 68
End: 2018-10-10
Attending: INTERNAL MEDICINE
Payer: MEDICARE

## 2018-10-10 ENCOUNTER — OFFICE VISIT (OUTPATIENT)
Dept: PAIN MEDICINE | Facility: CLINIC | Age: 68
End: 2018-10-10
Payer: MEDICARE

## 2018-10-10 VITALS
HEART RATE: 66 BPM | WEIGHT: 174 LBS | BODY MASS INDEX: 32.85 KG/M2 | HEIGHT: 61 IN | DIASTOLIC BLOOD PRESSURE: 78 MMHG | SYSTOLIC BLOOD PRESSURE: 122 MMHG

## 2018-10-10 DIAGNOSIS — R11.2 NAUSEA AND VOMITING, INTRACTABILITY OF VOMITING NOT SPECIFIED, UNSPECIFIED VOMITING TYPE: Primary | ICD-10-CM

## 2018-10-10 DIAGNOSIS — R11.2 NAUSEA AND VOMITING, INTRACTABILITY OF VOMITING NOT SPECIFIED, UNSPECIFIED VOMITING TYPE: ICD-10-CM

## 2018-10-10 DIAGNOSIS — G89.29 CHRONIC LEFT-SIDED LOW BACK PAIN WITHOUT SCIATICA: Primary | ICD-10-CM

## 2018-10-10 DIAGNOSIS — M54.50 CHRONIC LEFT-SIDED LOW BACK PAIN WITHOUT SCIATICA: Primary | ICD-10-CM

## 2018-10-10 DIAGNOSIS — M47.816 LUMBAR SPONDYLOSIS: ICD-10-CM

## 2018-10-10 LAB
ALBUMIN SERPL BCP-MCNC: 3.5 G/DL (ref 3.5–5)
ALP SERPL-CCNC: 83 U/L (ref 46–116)
ALT SERPL W P-5'-P-CCNC: 35 U/L (ref 12–78)
ANION GAP SERPL CALCULATED.3IONS-SCNC: 8 MMOL/L (ref 4–13)
AST SERPL W P-5'-P-CCNC: 17 U/L (ref 5–45)
BASOPHILS # BLD MANUAL: 0 THOUSAND/UL (ref 0–0.1)
BASOPHILS NFR MAR MANUAL: 0 % (ref 0–1)
BILIRUB SERPL-MCNC: 0.4 MG/DL (ref 0.2–1)
BUN SERPL-MCNC: 28 MG/DL (ref 5–25)
CALCIUM SERPL-MCNC: 9.9 MG/DL (ref 8.3–10.1)
CHLORIDE SERPL-SCNC: 104 MMOL/L (ref 100–108)
CO2 SERPL-SCNC: 30 MMOL/L (ref 21–32)
CREAT SERPL-MCNC: 1.35 MG/DL (ref 0.6–1.3)
EOSINOPHIL # BLD MANUAL: 0.13 THOUSAND/UL (ref 0–0.4)
EOSINOPHIL NFR BLD MANUAL: 1 % (ref 0–6)
ERYTHROCYTE [DISTWIDTH] IN BLOOD BY AUTOMATED COUNT: 14.4 % (ref 11.6–15.1)
GFR SERPL CREATININE-BSD FRML MDRD: 40 ML/MIN/1.73SQ M
GLUCOSE SERPL-MCNC: 131 MG/DL (ref 65–140)
HCT VFR BLD AUTO: 45.7 % (ref 34.8–46.1)
HGB BLD-MCNC: 14.6 G/DL (ref 11.5–15.4)
LYMPHOCYTES # BLD AUTO: 0.39 THOUSAND/UL (ref 0.6–4.47)
LYMPHOCYTES # BLD AUTO: 3 % (ref 14–44)
MCH RBC QN AUTO: 28.1 PG (ref 26.8–34.3)
MCHC RBC AUTO-ENTMCNC: 31.9 G/DL (ref 31.4–37.4)
MCV RBC AUTO: 88 FL (ref 82–98)
MONOCYTES # BLD AUTO: 0.52 THOUSAND/UL (ref 0–1.22)
MONOCYTES NFR BLD: 4 % (ref 4–12)
NEUTROPHILS # BLD MANUAL: 11.91 THOUSAND/UL (ref 1.85–7.62)
NEUTS BAND NFR BLD MANUAL: 2 % (ref 0–8)
NEUTS SEG NFR BLD AUTO: 90 % (ref 43–75)
NRBC BLD AUTO-RTO: 0 /100 WBCS
PLATELET # BLD AUTO: 254 THOUSANDS/UL (ref 149–390)
PLATELET BLD QL SMEAR: ADEQUATE
PMV BLD AUTO: 10.3 FL (ref 8.9–12.7)
POLYCHROMASIA BLD QL SMEAR: PRESENT
POTASSIUM SERPL-SCNC: 4.2 MMOL/L (ref 3.5–5.3)
PROT SERPL-MCNC: 6.9 G/DL (ref 6.4–8.2)
RBC # BLD AUTO: 5.19 MILLION/UL (ref 3.81–5.12)
RBC MORPH BLD: PRESENT
SODIUM SERPL-SCNC: 142 MMOL/L (ref 136–145)
TOTAL CELLS COUNTED SPEC: 100
TOXIC GRANULES BLD QL SMEAR: PRESENT
TSH SERPL DL<=0.05 MIU/L-ACNC: 0.79 UIU/ML (ref 0.36–3.74)
WBC # BLD AUTO: 12.95 THOUSAND/UL (ref 4.31–10.16)

## 2018-10-10 PROCEDURE — 83516 IMMUNOASSAY NONANTIBODY: CPT

## 2018-10-10 PROCEDURE — 85007 BL SMEAR W/DIFF WBC COUNT: CPT

## 2018-10-10 PROCEDURE — 85027 COMPLETE CBC AUTOMATED: CPT

## 2018-10-10 PROCEDURE — 86255 FLUORESCENT ANTIBODY SCREEN: CPT

## 2018-10-10 PROCEDURE — 36415 COLL VENOUS BLD VENIPUNCTURE: CPT

## 2018-10-10 PROCEDURE — 99213 OFFICE O/P EST LOW 20 MIN: CPT | Performed by: ANESTHESIOLOGY

## 2018-10-10 PROCEDURE — 80053 COMPREHEN METABOLIC PANEL: CPT

## 2018-10-10 PROCEDURE — 82784 ASSAY IGA/IGD/IGG/IGM EACH: CPT

## 2018-10-10 PROCEDURE — 84443 ASSAY THYROID STIM HORMONE: CPT

## 2018-10-10 NOTE — PROGRESS NOTES
Assessment:  1  Chronic left-sided low back pain without sciatica    2  Lumbar spondylosis        Plan:  Patient was seen and evaluated in March of 2017 at time time she was going to be scheduled for diagnostic medial branch block but her pain never got to a whole level so we cancel the procedure  She states for the past 2 months her pain has been significant and severe on the left side of her low back without any radiation  At this point time I do believe would be appropriate to schedule left L3 through S1 diagnostic medial branch block  If the patient obtain significant relief with bupivacaine 0 25% , then I would confirm with bupivacaine 0 75%, if she received significant relief of appropriate duration she would be a candidate for radiofrequency neurotomy  In the office today, we reviewed the nature of the patient's pathology in depth using  diagrams and models  We discussed the approach we would use for the medial branch block and provided literature for home review  The patient understands the risks associated with the procedure including bleeding, infection, tissue injury, allergic reaction and paralysis and provided written and verbal consent  in the office today  My impressions and treatment recommendations were discussed in detail with the patient who verbalized understanding and had no further questions  Discharge instructions were provided  I personally saw and examined the patient and I agree with the above discussed plan of care  Orders Placed This Encounter   Procedures    FL spine and pain procedure     Standing Status:   Future     Standing Expiration Date:   10/10/2022     Order Specific Question:   Reason for Exam:     Answer:   Left L3, L4, L5, and S1 MBB 1  Order Specific Question:   Anticoagulant hold needed? Answer:   No         History of Present Illness:    Dayanara Jarvis is a 77 y o  female was seen approximately 1/2 years ago    She was here for left-sided low back pain without radiation and we were going to schedule diagnostic medial branch block per pain never reached a significant level in the procedure was canceled  Patient states for the past 2 months her pain has been significant is significant interfering with her daily living activities describes worse in the morning and evening intermittent described sharp pressure-like that should the left side of her low back  I have personally reviewed and/or updated the patient's past medical history, past surgical history, family history, social history, current medications, allergies, and vital signs today  Review of Systems:    Review of Systems   Respiratory: Positive for shortness of breath  Cardiovascular: Negative for chest pain  Gastrointestinal: Negative for constipation, diarrhea, nausea and vomiting  Musculoskeletal: Positive for gait problem (difficulty walking, Decreased ROM) and joint swelling (joint stiffness )  Negative for arthralgias and myalgias  Skin: Negative for rash  Neurological: Negative for dizziness, seizures and weakness  All other systems reviewed and are negative        Patient Active Problem List   Diagnosis    Trigger little finger of left hand    Allergic rhinitis    Arthralgia of knee, left    Arthralgia of knee, right    Coronary artery disease    Benign positional vertigo, left    Bilateral fibrocystic breast changes    Bilateral sacroiliitis (HCC)    Chronic systolic congestive heart failure (HCC)    Chronic kidney disease, stage 3 (HCC)    Chronic diastolic congestive heart failure (HCC)    Presence of cardioverter defibrillator    DDD (degenerative disc disease), cervical    Depression    Esophageal reflux    Gout    Hyperlipidemia    Hypertension    Hypokalemia    Hypothyroidism    Obesity    Obstructive sleep apnea    Osteoarthritis    Overactive bladder    Papilloma of left breast    Psoriasis    Restless legs syndrome    [facility]    Type 2 diabetes mellitus with stage 3 chronic kidney disease ([facility]    Vitamin D deficiency    Medicare annual wellness visit, subsequent    Lumbar spondylosis    Chronic left-sided low back pain without sciatica       Past Medical History:   Diagnosis Date    Abnormal finding on breast imaging     last assessed 11/30/17    Arthritis     BBB (bundle branch block)     left,last assesed 6/4/12    Benign paroxysmal vertigo     last assessed 9/7/12    Cardiac defibrillator in situ     Carpal tunnel syndrome, unspecified upper limb     CH[facility]congestive heart failure) ([facility])     chronic systolic/diastolic    Chronic kidney disease     stage 3    Coronary artery disease     Depression     Diabetes mellitus ([facility])     Difficulty walking up stairs     Dilated cardiomyopathy [facility]     Disease of thyroid gland     GERD (gastroesophageal reflux disease)     Hemorrhoids     History of blood clots     Hyperlipidemia     Hypertension     Hypokalemia     OAB (overactive bladder)     detrusor instability    Psoriasis     psoriatic arthropathy    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    RLS (restless legs syndrome)     Sciatica     Shortness of breath     Sleep apnea     unspecified type    Vitamin D deficiency        Past Surgical History:   Procedure Laterality Date    BLADDER SURGERY  1999    lift and repair    BREAST BIOPSY Left 05/23/2016    CARDIAC CATHETERIZATION      outcome:  successful    CARDIAC DEFIBRILLATOR PLACEMENT      CARPAL TUNNEL RELEASE Bilateral 1997    COLONOSCOPY      CORONARY ANGIOPLASTY WITH STENT PLACEMENT      CYSTOSCOPY W/ URETEROSCOPY  2009    DE QUERVAIN'S RELEASE Left 2011    and trigger finger release    EYE SURGERY Left 1999    INSERT / REPLACE / REMOVE PACEMAKER      JOINT REPLACEMENT Right 09/2017    Knee    KNEE ARTHROSCOPY      therapeutic    NEUROPLASTY / TRANSPOSITION MEDIAN NERVE AT CARPAL TUNNEL      MO INCISE FINGER TENDON SHEATH Left 3/2/2017    Procedure: SMALL TRIGGER FINGER RELEASE;  Surgeon: Alonso Allen MD;  Location: [facility];  Service: Orthopedics    RECTAL SURGERY  2006    repair of rectal ulcer    SHOULDER ARTHROSCOPY Left 2006    TOTAL ABDOMINAL HYSTERECTOMY      TOTAL KNEE ARTHROPLASTY Left     TOTAL SHOULDER REPLACEMENT Left 2007    TRIGGER FINGER RELEASE Bilateral 2011    right haand 201,2015,left hand 2012,2015       Family History   Problem Relation Age of Onset    Hypertension Mother     Alzheimer's disease Mother     Cancer Father 70        bladder    Prostate cancer Father 70    Cancer Brother         pt shared some type of blood cancer    Breast cancer Paternal Grandmother     Stomach cancer Paternal Aunt 72       Social History     Occupational History    Not on file  Social History Main Topics    Smoking status: Former Smoker     Packs/day: 1 00     Years: 15 00     Quit date: 1977    Smokeless tobacco: Never Used    Alcohol use No    Drug use: No    Sexual activity: Not on file      Comment: birth control       Current Outpatient Prescriptions on File Prior to Visit   Medication Sig    allopurinol (ZYLOPRIM) 100 mg tablet Take 1 tablet by mouth daily    aspirin 81 MG tablet Take 81 mg by mouth daily      candesartan (ATACAND) 32 MG tablet Take 1 tablet (32 mg total) by mouth daily    celecoxib (CeleBREX) 200 mg capsule Take 200 mg by mouth 2 (two) times a day    cephalexin (KEFLEX) 500 mg capsule Take 1 capsule (500 mg total) by mouth every 12 (twelve) hours for 7 days    Cholecalciferol (VITAMIN D3) 1000 units CAPS Take by mouth    colchicine (COLCRYS) 0 6 mg tablet Take 1 tablet by mouth daily    DAILY MULTIPLE VITAMINS PO Take 1 tablet by mouth daily    FLUoxetine (PROzac) 20 mg capsule Take 1 capsule (20 mg total) by mouth daily    indomethacin (INDOCIN) 50 mg capsule Take 1 capsule by mouth 2 (two) times a day with meals    levothyroxine 25 mcg tablet Take 1 tablet (25 mcg total) by mouth daily    loratadine (CLARITIN) 10 mg tablet Take 10 mg by mouth as needed for allergies   Magnesium 250 MG TABS Take by mouth daily    methylprednisolone (MEDROL) 4 mg tablet Take 12 mg by mouth 2 (two) times a day    metoprolol succinate (TOPROL-XL) 100 mg 24 hr tablet Take 1 tablet (100 mg total) by mouth daily    Minoxidil (ROGAINE WOMENS EX) Apply 1 mL topically   potassium chloride (KLOR-CON) 20 mEq packet Take 20 mEq by mouth 2 (two) times a day    pyridoxine (VITAMIN B6) 100 mg tablet Take 100 mg by mouth daily   simvastatin (ZOCOR) 40 mg tablet Take 1 tablet (40 mg total) by mouth daily    sitaGLIPtin (JANUVIA) 100 mg tablet Take 100 mg by mouth daily   Specialty Vitamins Products (MAGNESIUM, AMINO ACID CHELATE,) 133 MG tablet Take 1 tablet by mouth 2 (two) times a day   trospium chloride (SANCTURA) 20 mg tablet Take 60 mg by mouth daily in the early morning Instructed by Dr Christi Anderson to stop taking until symptoms resolve    ondansetron (ZOFRAN-ODT) 4 mg disintegrating tablet Take 1 tablet (4 mg total) by mouth once for 1 dose    [DISCONTINUED] HYDROcodone-acetaminophen (NORCO) 5-325 mg per tablet Take 1 tablet by mouth every 6 (six) hours as needed for pain for up to 20 doses Max Daily Amount: 4 tablets (Patient not taking: Reported on 10/10/2018 )    [DISCONTINUED] meloxicam (MOBIC) 15 mg tablet Take 15 mg by mouth daily   [DISCONTINUED] modafinil (PROVIGIL) 100 mg tablet Take 1 tablet (100 mg total) by mouth daily (Patient not taking: Reported on 10/10/2018 )    [DISCONTINUED] montelukast (SINGULAIR) 10 mg tablet Take 10 mg by mouth daily at bedtime   [DISCONTINUED] montelukast (SINGULAIR) 10 mg tablet Take 1 tablet by mouth daily    [DISCONTINUED] Multiple Vitamin (MULTIVITAMIN) tablet Take 1 tablet by mouth daily      [DISCONTINUED] omeprazole (PriLOSEC) 40 MG capsule Take 1 capsule (40 mg total) by mouth daily (Patient not taking: Reported on 10/10/2018 )    [DISCONTINUED] torsemide (DEMADEX) 20 mg tablet Take 1 tablet (20 mg total) by mouth 2 (two) times a day (Patient not taking: Reported on 10/10/2018 )    [DISCONTINUED] traMADol (ULTRAM) 50 mg tablet Take 1 tablet (50 mg total) by mouth 2 (two) times a day (Patient not taking: Reported on 10/10/2018 )    [DISCONTINUED] Vitamin D, Cholecalciferol, 1000 UNITS CAPS Take by mouth  No current facility-administered medications on file prior to visit  No Known Allergies    Physical Exam:    /78   Pulse 66   Ht 5' 1" (1 549 m)   Wt 78 9 kg (174 lb)   BMI 32 88 kg/m²     Constitutional: normal, well developed, well nourished, alert, in no distress and non-toxic and no overt pain behavior  and obese  Eyes: anicteric  HEENT: grossly intact  Neck: supple, symmetric, trachea midline and no masses   Pulmonary:even and unlabored  Cardiovascular:No edema or pitting edema present  Skin:Normal without rashes or lesions and well hydrated  Psychiatric:Mood and affect appropriate  Neurologic:Cranial Nerves II-XII grossly intact  Musculoskeletal:normal     Inspection:  Normal station and gait  Normal lumbar lordotic curve with no significant scoliosis or spinal step-off  Skin intact without erythema  No gross mass or muscle atrophy  Palpation:  There is no tenderness to palpation overlying the sacroiliac joint as well as the ischial bursa bilateral   There is tenderness along the left lumbar paravertebral is, facet joints  No significant tenderness over the greater trochanteric bursa bilaterally  Motor/Strength:  5/5 strength in the bilateral lower limbs  The patient is able to heel and/toe walk  Tandem gait is intact  Reflexes: Deep tendon reflex are 2+ and symmetrical bilateral patella and Achilles  Sensation:   Sensation intact to light touch and pinprick in the bilateral lower limbs  Proprioception is intact at bilateral hallux  Maneuvers: Negative bilateral straight leg raising    Negative Des's maneuver  Positive pain with lumbar extension and lumbar facet loading  Imaging    XRAY LUMBAR 01/28/14 @ [facility]    Progressive, moderate degenerative changes of the lumbar spine   Given the presence of a pacing device, MRI is contraindicated

## 2018-10-11 ENCOUNTER — TELEPHONE (OUTPATIENT)
Dept: PAIN MEDICINE | Facility: MEDICAL CENTER | Age: 68
End: 2018-10-11

## 2018-10-11 LAB
ENDOMYSIUM IGA SER QL: NEGATIVE
GLIADIN PEPTIDE IGA SER-ACNC: 3 UNITS (ref 0–19)
GLIADIN PEPTIDE IGG SER-ACNC: 2 UNITS (ref 0–19)
IGA SERPL-MCNC: 252 MG/DL (ref 87–352)
TTG IGA SER-ACNC: <2 U/ML (ref 0–3)
TTG IGG SER-ACNC: <2 U/ML (ref 0–5)

## 2018-10-11 NOTE — TELEPHONE ENCOUNTER
Pt left a voicemail yesterday @ 5:02p requesting to r/s her 10/29 procedure appt due to conflicts in her schedule   Pt can be reached at [phone]

## 2018-10-12 NOTE — TELEPHONE ENCOUNTER
MARISELA for [employer]- Pt returned call to [facility]- rescheduled MBB#1 from 10/29 to Thurs 10/25, 2:45pm arrival  Pt says that is late enough in the day for her pain to be at its worst  She is concerned that she will not have pain at all, advised pt to do things that might bring pain on, but if unable to increase pain to call our office before coming to appt

## 2018-10-14 NOTE — TELEPHONE ENCOUNTER
Adelita Shelton    ED Visit Information     Ed visit date: 10/03/2018  Diagnosis Description: UTI (urinary tract infection) [acute] ; Abdominal pain  In Network? Yes Kira Eng  Discharge status: Home  Discharged with meds ? Yes (cephalexin)  Number of ED visits to date: 1  ED Severity:3     Outreach Information    Outreach successful: Yes 1  Date letter mailed:N/a  Date Finalized:10/05/2018    Care Coordination    Follow up appointment with pcp: no Declined  Transportation issues ? No    [employer] Outreach    Emergent necessity warranted by diagnosis:  No  [facility] PCP:  Yes  Transportation:  Self Transport  Called PCP first?:  No  Feels able to call PCP for urgent problems ?:  Yes  Understands what emergencies can be handled by PCP ?:  Yes  Ever any problems getting appointment with PCP for minor emergency/urgency problems?:  No  Practice Contacted Patient ?:  No  Pt had ED follow up with pcp/staff ?:  No    Seen for follow-up out of network ?:  No  Reason Patient went to ED instead of Urgent Care or PCP?:  Proximity  Urgent care Education?:  No  10/05/2018 03:00 PM Phone (Keren Rodriguez) Harmon Lombard (Self) [phone] (H)   Left Message - Requesting a call back    10/05/2018 03:18 PM Phone (Incoming) Harmon Lombard (Self) [phone] (H)     Personal communication with patient in regard to her recent ED visit on 10/03 for UTI (urinary tract infection) [acute] ; Abdominal pain  Patient was given medication (cephalexin and ondansetron) and was advised to f/u with her pcp within 5 days of visit  Patient declined to schedule a f/u appt with her pcp at this time  Patient does not meet [facility] crietria, denies any transportation issues and is aware of her nearest [place] [id number] urgent care facility, education not given

## 2018-10-15 NOTE — TELEPHONE ENCOUNTER
Called pt to readjust her time of arrive, pt to arrive at 3:30 per provider request  Pt ok with time change

## 2018-10-16 DIAGNOSIS — F32.A DEPRESSION, UNSPECIFIED DEPRESSION TYPE: ICD-10-CM

## 2018-10-16 RX ORDER — FLUOXETINE HYDROCHLORIDE 20 MG/1
20 CAPSULE ORAL DAILY
Qty: 90 CAPSULE | Refills: 1 | Status: SHIPPED | OUTPATIENT
Start: 2018-10-16 | End: 2019-02-18 | Stop reason: SDUPTHER

## 2018-10-22 ENCOUNTER — REMOTE DEVICE CLINIC VISIT (OUTPATIENT)
Dept: CARDIOLOGY CLINIC | Facility: CLINIC | Age: 68
End: 2018-10-22
Payer: MEDICARE

## 2018-10-22 DIAGNOSIS — I25.84 CORONARY ARTERY DISEASE DUE TO CALCIFIED CORONARY LESION: ICD-10-CM

## 2018-10-22 DIAGNOSIS — I50.32 CHRONIC DIASTOLIC CONGESTIVE HEART FAILURE (HCC): ICD-10-CM

## 2018-10-22 DIAGNOSIS — I47.2 VENTRICULAR TACHYARRHYTHMIA (HCC): ICD-10-CM

## 2018-10-22 DIAGNOSIS — Z95.810 AICD (AUTOMATIC CARDIOVERTER/DEFIBRILLATOR) PRESENT: ICD-10-CM

## 2018-10-22 DIAGNOSIS — I44.7 LEFT BUNDLE BRANCH BLOCK: ICD-10-CM

## 2018-10-22 DIAGNOSIS — I42.0 DILATED CARDIOMYOPATHY (HCC): Primary | ICD-10-CM

## 2018-10-22 DIAGNOSIS — I25.10 CORONARY ARTERY DISEASE DUE TO CALCIFIED CORONARY LESION: ICD-10-CM

## 2018-10-22 PROCEDURE — 93297 REM INTERROG DEV EVAL ICPMS: CPT | Performed by: INTERNAL MEDICINE

## 2018-10-22 PROCEDURE — 93299 PR REM INTERROG ICPMS/SCRMS <30 D TECH REVIEW: CPT | Performed by: INTERNAL MEDICINE

## 2018-10-22 NOTE — PROGRESS NOTES
Results for orders placed or performed in visit on 10/22/18   Cardiac EP device report    Narrative    MDT/BIV-ICD  CARELINK TRANSMISSION - OPTI-VOL ONLY: OPTI-VOL WITHIN NORMAL LIMITS  BATTERY VOLTAGE ADEQUATE (4 YRS)  AP-87%, BVP-95% (TOTAL -95 4%+VSR PACE-0 1%)  ALL AVAILABLE LEAD PARAMETERS WITHIN NORMAL LIMITS  NO SIGNIFICANT HIGH RATE EPISODES  [address] PT ON ASA 81MG & METOPROLOL  NORMAL DEVICE FUNCTION   GV

## 2018-10-25 ENCOUNTER — TELEPHONE (OUTPATIENT)
Dept: RADIOLOGY | Facility: CLINIC | Age: 68
End: 2018-10-25

## 2018-10-29 ENCOUNTER — TRANSCRIBE ORDERS (OUTPATIENT)
Dept: ADMINISTRATIVE | Facility: HOSPITAL | Age: 68
End: 2018-10-29

## 2018-10-29 DIAGNOSIS — Z12.31 VISIT FOR SCREENING MAMMOGRAM: Primary | ICD-10-CM

## 2018-10-30 DIAGNOSIS — Z12.31 ENCOUNTER FOR SCREENING MAMMOGRAM FOR MALIGNANT NEOPLASM OF BREAST: ICD-10-CM

## 2018-10-31 DIAGNOSIS — K21.00 GASTROESOPHAGEAL REFLUX DISEASE WITH ESOPHAGITIS: Primary | ICD-10-CM

## 2018-10-31 RX ORDER — OMEPRAZOLE 40 MG/1
40 CAPSULE, DELAYED RELEASE ORAL DAILY
Qty: 90 CAPSULE | Refills: 3 | Status: SHIPPED | OUTPATIENT
Start: 2018-10-31 | End: 2019-09-11 | Stop reason: SDUPTHER

## 2018-11-09 DIAGNOSIS — M10.00 IDIOPATHIC GOUT, UNSPECIFIED CHRONICITY, UNSPECIFIED SITE: Primary | ICD-10-CM

## 2018-11-09 RX ORDER — ALLOPURINOL 100 MG/1
100 TABLET ORAL DAILY
Qty: 90 TABLET | Refills: 3 | Status: SHIPPED | OUTPATIENT
Start: 2018-11-09 | End: 2019-09-20 | Stop reason: SDUPTHER

## 2018-11-15 ENCOUNTER — TRANSCRIBE ORDERS (OUTPATIENT)
Dept: ADMINISTRATIVE | Facility: HOSPITAL | Age: 68
End: 2018-11-15

## 2018-11-15 ENCOUNTER — APPOINTMENT (OUTPATIENT)
Dept: LAB | Facility: CLINIC | Age: 68
End: 2018-11-15
Payer: MEDICARE

## 2018-11-15 DIAGNOSIS — E78.2 MIXED HYPERLIPIDEMIA: ICD-10-CM

## 2018-11-15 DIAGNOSIS — E11.22 TYPE 2 DIABETES MELLITUS WITH STAGE 3 CHRONIC KIDNEY DISEASE, WITHOUT LONG-TERM CURRENT USE OF INSULIN (HCC): ICD-10-CM

## 2018-11-15 DIAGNOSIS — E03.9 ACQUIRED HYPOTHYROIDISM: ICD-10-CM

## 2018-11-15 DIAGNOSIS — N18.30 TYPE 2 DIABETES MELLITUS WITH STAGE 3 CHRONIC KIDNEY DISEASE, WITHOUT LONG-TERM CURRENT USE OF INSULIN (HCC): ICD-10-CM

## 2018-11-15 LAB
BASOPHILS # BLD AUTO: 0.06 THOUSANDS/ΜL (ref 0–0.1)
BASOPHILS NFR BLD AUTO: 1 % (ref 0–1)
EOSINOPHIL # BLD AUTO: 0.18 THOUSAND/ΜL (ref 0–0.61)
EOSINOPHIL NFR BLD AUTO: 1 % (ref 0–6)
ERYTHROCYTE [DISTWIDTH] IN BLOOD BY AUTOMATED COUNT: 14.5 % (ref 11.6–15.1)
HCT VFR BLD AUTO: 48.4 % (ref 34.8–46.1)
HGB BLD-MCNC: 14.7 G/DL (ref 11.5–15.4)
IMM GRANULOCYTES # BLD AUTO: 0.09 THOUSAND/UL (ref 0–0.2)
IMM GRANULOCYTES NFR BLD AUTO: 1 % (ref 0–2)
LYMPHOCYTES # BLD AUTO: 2.65 THOUSANDS/ΜL (ref 0.6–4.47)
LYMPHOCYTES NFR BLD AUTO: 21 % (ref 14–44)
MCH RBC QN AUTO: 27.9 PG (ref 26.8–34.3)
MCHC RBC AUTO-ENTMCNC: 30.4 G/DL (ref 31.4–37.4)
MCV RBC AUTO: 92 FL (ref 82–98)
MONOCYTES # BLD AUTO: 1.21 THOUSAND/ΜL (ref 0.17–1.22)
MONOCYTES NFR BLD AUTO: 10 % (ref 4–12)
NEUTROPHILS # BLD AUTO: 8.3 THOUSANDS/ΜL (ref 1.85–7.62)
NEUTS SEG NFR BLD AUTO: 66 % (ref 43–75)
NRBC BLD AUTO-RTO: 0 /100 WBCS
PLATELET # BLD AUTO: 275 THOUSANDS/UL (ref 149–390)
PMV BLD AUTO: 10.7 FL (ref 8.9–12.7)
RBC # BLD AUTO: 5.26 MILLION/UL (ref 3.81–5.12)
WBC # BLD AUTO: 12.49 THOUSAND/UL (ref 4.31–10.16)

## 2018-11-15 PROCEDURE — 80061 LIPID PANEL: CPT

## 2018-11-15 PROCEDURE — 36415 COLL VENOUS BLD VENIPUNCTURE: CPT

## 2018-11-15 PROCEDURE — 83036 HEMOGLOBIN GLYCOSYLATED A1C: CPT

## 2018-11-15 PROCEDURE — 85025 COMPLETE CBC W/AUTO DIFF WBC: CPT

## 2018-11-15 PROCEDURE — 80053 COMPREHEN METABOLIC PANEL: CPT

## 2018-11-15 PROCEDURE — 84443 ASSAY THYROID STIM HORMONE: CPT

## 2018-11-16 LAB
ALBUMIN SERPL BCP-MCNC: 3.9 G/DL (ref 3.5–5)
ALP SERPL-CCNC: 77 U/L (ref 46–116)
ALT SERPL W P-5'-P-CCNC: 32 U/L (ref 12–78)
ANION GAP SERPL CALCULATED.3IONS-SCNC: 4 MMOL/L (ref 4–13)
AST SERPL W P-5'-P-CCNC: 16 U/L (ref 5–45)
BILIRUB SERPL-MCNC: 0.68 MG/DL (ref 0.2–1)
BUN SERPL-MCNC: 25 MG/DL (ref 5–25)
CALCIUM ALBUM COR SERPL-MCNC: 10.3 MG/DL (ref 8.3–10.1)
CALCIUM SERPL-MCNC: 10.2 MG/DL (ref 8.3–10.1)
CHLORIDE SERPL-SCNC: 101 MMOL/L (ref 100–108)
CHOLEST SERPL-MCNC: 191 MG/DL (ref 50–200)
CO2 SERPL-SCNC: 34 MMOL/L (ref 21–32)
CREAT SERPL-MCNC: 1.34 MG/DL (ref 0.6–1.3)
EST. AVERAGE GLUCOSE BLD GHB EST-MCNC: 157 MG/DL
GFR SERPL CREATININE-BSD FRML MDRD: 40 ML/MIN/1.73SQ M
GLUCOSE P FAST SERPL-MCNC: 103 MG/DL (ref 65–99)
HBA1C MFR BLD: 7.1 % (ref 4.2–6.3)
HDLC SERPL-MCNC: 74 MG/DL (ref 40–60)
LDLC SERPL CALC-MCNC: 105 MG/DL (ref 0–100)
POTASSIUM SERPL-SCNC: 4.7 MMOL/L (ref 3.5–5.3)
PROT SERPL-MCNC: 7.2 G/DL (ref 6.4–8.2)
SODIUM SERPL-SCNC: 139 MMOL/L (ref 136–145)
TRIGL SERPL-MCNC: 59 MG/DL
TSH SERPL DL<=0.05 MIU/L-ACNC: 1.1 UIU/ML (ref 0.36–3.74)

## 2018-11-21 ENCOUNTER — HOSPITAL ENCOUNTER (OUTPATIENT)
Dept: MAMMOGRAPHY | Facility: CLINIC | Age: 68
Discharge: HOME | End: 2018-11-21
Payer: MEDICARE

## 2018-11-21 VITALS — BODY MASS INDEX: 33.77 KG/M2 | WEIGHT: 172 LBS | HEIGHT: 60 IN

## 2018-11-21 DIAGNOSIS — Z12.31 VISIT FOR SCREENING MAMMOGRAM: ICD-10-CM

## 2018-11-21 PROCEDURE — 77067 SCR MAMMO BI INCL CAD: CPT

## 2018-11-21 PROCEDURE — 77063 BREAST TOMOSYNTHESIS BI: CPT

## 2018-11-23 ENCOUNTER — REMOTE DEVICE CLINIC VISIT (OUTPATIENT)
Dept: CARDIOLOGY CLINIC | Facility: CLINIC | Age: 68
End: 2018-11-23
Payer: MEDICARE

## 2018-11-23 DIAGNOSIS — I47.2 VENTRICULAR TACHYCARDIA (HCC): ICD-10-CM

## 2018-11-23 DIAGNOSIS — Z95.810 BIVENTRICULAR IMPLANTABLE CARDIOVERTER-DEFIBRILLATOR IN SITU: ICD-10-CM

## 2018-11-23 DIAGNOSIS — I42.0 DILATED CARDIOMYOPATHY (HCC): ICD-10-CM

## 2018-11-23 DIAGNOSIS — I50.42 CHRONIC COMBINED SYSTOLIC AND DIASTOLIC CONGESTIVE HEART FAILURE (HCC): Primary | ICD-10-CM

## 2018-11-23 PROCEDURE — 93297 REM INTERROG DEV EVAL ICPMS: CPT | Performed by: INTERNAL MEDICINE

## 2018-11-23 PROCEDURE — 93299 PR REM INTERROG ICPMS/SCRMS <30 D TECH REVIEW: CPT | Performed by: INTERNAL MEDICINE

## 2018-11-23 RX ORDER — NITROFURANTOIN MACROCRYSTALS 100 MG/1
CAPSULE ORAL
Refills: 0 | COMMUNITY
Start: 2018-08-29 | End: 2019-10-01

## 2018-11-23 NOTE — PROGRESS NOTES
Results for orders placed or performed in visit on 11/23/18   Cardiac EP device report    Narrative    MDT/BIV-ICD  CARELINK TRANSMISSION - OPTI-VOL ONLY: OPTI-VOL WITHIN NORMAL LIMITS  BATTERY VOLTAGE ADEQUATE (4 YRS)  AP-93 3%, BVP-98% (-97% + VSR PACE - 0 1%)  ALL AVAILABLE LEAD PARAMETERS WITHIN NORMAL LIMITS  NO SIGNIFICANT HIGH RATE EPISODES  1 VENTRICULAR SENSE EPISODE (MARKERS ONLY) FOR PVC'S, VENT BIGEMINY  PT ON ASA 81MG, METOPROLOL SUCC  NORMAL DEVICE FUNCTION    eb

## 2018-11-26 ENCOUNTER — OFFICE VISIT (OUTPATIENT)
Dept: FAMILY MEDICINE CLINIC | Facility: HOSPITAL | Age: 68
End: 2018-11-26
Payer: MEDICARE

## 2018-11-26 VITALS
BODY MASS INDEX: 34.32 KG/M2 | TEMPERATURE: 98.7 F | HEIGHT: 60 IN | HEART RATE: 83 BPM | WEIGHT: 174.8 LBS | SYSTOLIC BLOOD PRESSURE: 120 MMHG | DIASTOLIC BLOOD PRESSURE: 70 MMHG

## 2018-11-26 DIAGNOSIS — D72.829 LEUKOCYTOSIS, UNSPECIFIED TYPE: ICD-10-CM

## 2018-11-26 DIAGNOSIS — E83.52 HYPERCALCEMIA: ICD-10-CM

## 2018-11-26 DIAGNOSIS — I10 ESSENTIAL HYPERTENSION: Primary | ICD-10-CM

## 2018-11-26 DIAGNOSIS — G25.81 RESTLESS LEGS SYNDROME: ICD-10-CM

## 2018-11-26 DIAGNOSIS — N18.30 TYPE 2 DIABETES MELLITUS WITH STAGE 3 CHRONIC KIDNEY DISEASE, WITHOUT LONG-TERM CURRENT USE OF INSULIN (HCC): ICD-10-CM

## 2018-11-26 DIAGNOSIS — E03.9 ACQUIRED HYPOTHYROIDISM: ICD-10-CM

## 2018-11-26 DIAGNOSIS — E66.1 CLASS 1 DRUG-INDUCED OBESITY WITH BODY MASS INDEX (BMI) OF 34.0 TO 34.9 IN ADULT, UNSPECIFIED WHETHER SERIOUS COMORBIDITY PRESENT: ICD-10-CM

## 2018-11-26 DIAGNOSIS — N18.30 CHRONIC KIDNEY DISEASE, STAGE 3 (HCC): ICD-10-CM

## 2018-11-26 DIAGNOSIS — E11.22 TYPE 2 DIABETES MELLITUS WITH STAGE 3 CHRONIC KIDNEY DISEASE, WITHOUT LONG-TERM CURRENT USE OF INSULIN (HCC): ICD-10-CM

## 2018-11-26 DIAGNOSIS — Z23 NEED FOR PNEUMOCOCCAL VACCINATION: ICD-10-CM

## 2018-11-26 PROCEDURE — 90732 PPSV23 VACC 2 YRS+ SUBQ/IM: CPT | Performed by: FAMILY MEDICINE

## 2018-11-26 PROCEDURE — G0009 ADMIN PNEUMOCOCCAL VACCINE: HCPCS | Performed by: FAMILY MEDICINE

## 2018-11-26 PROCEDURE — 99215 OFFICE O/P EST HI 40 MIN: CPT | Performed by: FAMILY MEDICINE

## 2018-11-26 NOTE — PROGRESS NOTES
Assessment/Plan:         Diagnoses and all orders for this visit:    Essential hypertension  Comments:  Excellent BP control  Orders:  -     Comprehensive metabolic panel; Future    Type 2 diabetes mellitus with stage 3 chronic kidney disease, without long-term current use of insulin (HCC)  -     HEMOGLOBIN A1C W/ EAG ESTIMATION; Future    Restless legs syndrome    Acquired hypothyroidism  Comments:  Clinically euthyroid    Chronic kidney disease, stage 3 (HCC)  Comments:  Stable creatinine    Hypercalcemia  -     PTH, intact; Future    Leukocytosis, unspecified type  -     CBC and differential; Future    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, unspecified whether serious comorbidity present    Need for pneumococcal vaccination  -     PNEUMOCOCCAL POLYSACCHARIDE VACCINE 23-VALENT =>3YO SQ IM          Subjective:      Patient ID: Juan Carlos Oquendo is a 77 y o  female  4 month followup  No recent illness or injury  Good Thanksgiving with all her family  Not testing glucometers  Had ER visit for abdominal pain, treated for UTI and seems to have resolution of what she presented with  Seeing Dr Fei Stubbs but not able to coordinate with her own pain issues  Frequent flares of pain in neck, elbows, knees        The following portions of the patient's history were reviewed and updated as appropriate: allergies, current medications, past family history, past medical history, past social history, past surgical history and problem list     Review of Systems   Constitutional: Positive for fatigue  Negative for unexpected weight change  HENT: Negative  Eyes: Negative for visual disturbance  Respiratory: Negative  Cardiovascular: Negative  Gastrointestinal: Negative  Genitourinary: Negative  Musculoskeletal: Positive for arthralgias, back pain, gait problem and myalgias  Hematological: Negative  Psychiatric/Behavioral: The patient is nervous/anxious  All other systems reviewed and are negative  Objective:      /70   Pulse 83   Temp 98 7 °F (37 1 °C)   Ht 5' (1 524 m)   Wt 79 3 kg (174 lb 12 8 oz)   BMI 34 14 kg/m²          Physical Exam   Constitutional: She is oriented to person, place, and time  She appears well-developed and well-nourished  Neck: No thyromegaly present  Cardiovascular: Normal rate, regular rhythm and normal heart sounds  Pulmonary/Chest: Effort normal and breath sounds normal    Musculoskeletal: She exhibits no edema  Neurological: She is oriented to person, place, and time  Psychiatric: She has a normal mood and affect  Her behavior is normal  Judgment and thought content normal    Nursing note and vitals reviewed  BMI Counseling: Body mass index is 34 14 kg/m²  Discussed with patient's BMI with her  The BMI is above average  BMI counseling and education was provided to the patient  Nutrition recommendations include reducing portion sizes and decreasing overall calorie intake  Exercise recommendations include exercising 3-5 times per week

## 2018-11-29 ENCOUNTER — OFFICE VISIT (OUTPATIENT)
Dept: SURGICAL ONCOLOGY | Facility: HOSPITAL | Age: 68
End: 2018-11-29
Payer: MEDICARE

## 2018-11-29 VITALS
DIASTOLIC BLOOD PRESSURE: 70 MMHG | HEIGHT: 60 IN | SYSTOLIC BLOOD PRESSURE: 110 MMHG | TEMPERATURE: 98 F | BODY MASS INDEX: 34.95 KG/M2 | HEART RATE: 45 BPM | WEIGHT: 178 LBS | RESPIRATION RATE: 14 BRPM

## 2018-11-29 DIAGNOSIS — N60.11 BILATERAL FIBROCYSTIC BREAST CHANGES: ICD-10-CM

## 2018-11-29 DIAGNOSIS — R92.2 DENSE BREAST TISSUE: ICD-10-CM

## 2018-11-29 DIAGNOSIS — N60.12 BILATERAL FIBROCYSTIC BREAST CHANGES: ICD-10-CM

## 2018-11-29 DIAGNOSIS — Z80.3 FAMILY HISTORY OF BREAST CANCER: ICD-10-CM

## 2018-11-29 DIAGNOSIS — Z12.31 SCREENING MAMMOGRAM, ENCOUNTER FOR: Primary | ICD-10-CM

## 2018-11-29 PROBLEM — R92.30 DENSE BREAST TISSUE: Status: ACTIVE | Noted: 2018-11-29

## 2018-11-29 PROCEDURE — 99213 OFFICE O/P EST LOW 20 MIN: CPT | Performed by: SURGERY

## 2018-11-29 NOTE — PROGRESS NOTES
Surgical Oncology Follow Up       [facility] ONCOLOGY [facility] [address]  1950  [id number]  [address]  [facility] [address] [facility]  [address] [address] [address]    Chief Complaint   Patient presents with    Follow-up     1 year       Assessment/Plan   Diagnoses and all orders for this visit:    Screening mammogram, encounter for  -     Mammo screening bilateral w 3d & cad; Future    Bilateral fibrocystic breast changes    Dense breast tissue    Family history of breast cancer        Advance Care Planning/Advance Directives:  Did not discuss  with the patient  Oncology History:     No history exists  History of Present Illness: follow up   -Interval History: none    Review of Systems:  Review of Systems   Constitutional: Negative  Negative for appetite change and fever  Eyes: Negative  Respiratory: Negative for shortness of breath  Cardiovascular: Negative  Gastrointestinal: Negative  Endocrine: Negative  Genitourinary: Negative  Musculoskeletal: Negative  Negative for arthralgias and myalgias  Skin: Negative  Allergic/Immunologic: Negative  Neurological: Negative  Hematological: Negative  Negative for adenopathy  Does not bruise/bleed easily  Psychiatric/Behavioral: Negative          Patient Active Problem List   Diagnosis    Trigger little finger of left hand    Allergic rhinitis    Arthralgia of knee, left    Arthralgia of knee, right    Coronary artery disease    Benign positional vertigo, left    Bilateral fibrocystic breast changes    Bilateral sacroiliitis (HCC)    Chronic systolic congestive heart failure (HCC)    Chronic kidney disease, stage 3 (HCC)    Chronic diastolic congestive heart failure (HCC)    Presence of cardioverter defibrillator    DDD (degenerative disc disease), cervical    Depression    Esophageal reflux    Gout    Hyperlipidemia    Hypertension    Hypokalemia    Hypothyroidism    Obesity    Obstructive sleep apnea    Osteoarthritis    Overactive bladder    Psoriasis    Restless legs syndrome    Type 2 diabetes mellitus (HCC)    Type 2 diabetes mellitus with stage 3 chronic kidney disease ([facility] [address] )    Vitamin D deficiency    Medicare annual wellness visit, subsequent    Lumbar spondylosis    Chronic left-sided low back pain without sciatica    Hypercalcemia    Leukocytosis    Screening mammogram, encounter for    Dense breast tissue    Family history of breast cancer     Past Medical History:   Diagnosis Date    Abnormal finding on breast imaging     last assessed 11/30/17    Acute bacterial bronchitis     Allergic rhinitis     Arthralgia     Arthritis     Atherosclerotic heart disease of native coronary artery without angina pectoris     BBB (bundle branch block)     left,last assesed 6/4/12    BBB (bundle branch block)     left    Benign paroxysmal vertigo     last assessed 9/7/12    Benign paroxysmal vertigo of left ear     Bilateral fibrocystic breast changes     Bilateral sacroiliitis (HCC)     Cardiac defibrillator in situ     Carpal tunnel syndrome, unspecified upper limb     CHF (congestive heart failure) (HCC)     chronic systolic/diastolic    Chronic diastolic congestive heart failure (HCC)     Chronic kidney disease     stage 3    Chronic systolic congestive heart failure (HCC)     Coronary artery disease     Cough     Depression     Detrusor instability     Diabetes mellitus (HCC)     Difficulty walking up stairs     Dilated cardiomyopathy (HCC)     Dilated cardiomyopathy (HCC)     Disease of thyroid gland     Disorder of intervertebral disc of cervical spine     Esophageal reflux     GERD (gastroesophageal reflux disease)     Gout     Hemorrhoids     History of blood clots     Hormone replacement therapy     Hx of blood clots     Hyperlipidemia     Hypertension     Hypokalemia     Hypothyroidism     Indigestion     Inflamed toenail, left     Ingrown nail     Low back pain     left    OAB (overactive bladder)     detrusor instability    Obesity     AZ (obstructive sleep apnea)     Osteoarthritis     Papilloma of left breast     Psoriasis     psoriatic arthropathy    Psoriatic arthropathy (HCC)     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    RLS (restless legs syndrome)     Sciatica     Shortness of breath     Sleep apnea     unspecified type    Tendinitis     Trigger thumb, left thumb     Urinary frequency     UTI (urinary tract infection)     Vitamin D deficiency      Past Surgical History:   Procedure Laterality Date    BLADDER SURGERY  1999    lift and repair    BREAST BIOPSY Left 05/23/2016    CARDIAC CATHETERIZATION      outcome:  successful    CARDIAC DEFIBRILLATOR PLACEMENT      CARPAL TUNNEL RELEASE Bilateral 1997    COLONOSCOPY      CORONARY ANGIOPLASTY WITH STENT PLACEMENT      CYSTOSCOPY W/ URETEROSCOPY  2009    DE QUERVAIN'S RELEASE Left 2011    and trigger finger release    EYE SURGERY Left 1999    INSERT / REPLACE / REMOVE PACEMAKER      JOINT REPLACEMENT Right 09/2017    Knee    KNEE ARTHROSCOPY      therapeutic    NEUROPLASTY / TRANSPOSITION MEDIAN NERVE AT CARPAL TUNNEL      OOPHORECTOMY      PA INCISE FINGER TENDON SHEATH Left 3/2/2017    Procedure: SMALL TRIGGER FINGER RELEASE;  Surgeon: Clau Cm MD;  Location: [facility];  Service: Orthopedics    RECTAL SURGERY  2006    repair of rectal ulcer    SHOULDER ARTHROSCOPY Left 2006    TOTAL ABDOMINAL HYSTERECTOMY      TOTAL KNEE ARTHROPLASTY Left     TOTAL SHOULDER REPLACEMENT Left 2007    TRIGGER FINGER RELEASE Bilateral 2011    right haand 201,2015,left hand 2012,2015    US GUIDED BREAST BIOPSY LEFT COMPLETE Left 5/23/2016     Family History   Problem Relation Age of Onset    Hypertension Mother     Alzheimer's disease Mother     Cancer Father 70        bladder    Prostate cancer Father 70    Cancer Brother         pt shared some type of blood cancer    Breast cancer Paternal Grandmother         age dx unk    Stomach cancer Paternal Aunt 72     Social History     Social History    Marital status:      Spouse name: N/A    Number of children: N/A    Years of education: N/A     Occupational History    Not on file  Social History Main Topics    Smoking status: Former Smoker     Packs/day: 1 00     Years: 15 00     Quit date: 1977    Smokeless tobacco: Never Used    Alcohol use No    Drug use: No    Sexual activity: Not on file      Comment: birth control     Other Topics Concern    Not on file     Social History Narrative    Always uses seat belt       Current Outpatient Prescriptions:     allopurinol (ZYLOPRIM) 100 mg tablet, Take 1 tablet (100 mg total) by mouth daily, Disp: 90 tablet, Rfl: 3    aspirin 81 MG tablet, Take 81 mg by mouth daily  , Disp: , Rfl:     candesartan (ATACAND) 32 MG tablet, Take 1 tablet (32 mg total) by mouth daily, Disp: 90 tablet, Rfl: 3    celecoxib (CeleBREX) 200 mg capsule, Take 200 mg by mouth 2 (two) times a day, Disp: , Rfl: 3    Cholecalciferol (VITAMIN D3) 1000 units CAPS, Take by mouth, Disp: , Rfl:     colchicine (COLCRYS) 0 6 mg tablet, Take 1 tablet by mouth daily, Disp: 1 tablet, Rfl: 0    DAILY MULTIPLE VITAMINS PO, Take 1 tablet by mouth daily, Disp: , Rfl:     FLUoxetine (PROzac) 20 mg capsule, Take 1 capsule (20 mg total) by mouth daily, Disp: 90 capsule, Rfl: 1    levothyroxine 25 mcg tablet, Take 1 tablet (25 mcg total) by mouth daily, Disp: 90 tablet, Rfl: 3    Magnesium 250 MG TABS, Take by mouth daily, Disp: , Rfl:     methylprednisolone (MEDROL) 4 mg tablet, Take 12 mg by mouth 2 (two) times a day, Disp: , Rfl: 1    metoprolol succinate (TOPROL-XL) 100 mg 24 hr tablet, Take 1 tablet (100 mg total) by mouth daily, Disp: 90 tablet, Rfl: 3    Minoxidil (ROGAINE WOMENS EX), Apply 1 mL topically  , Disp: , Rfl:     omeprazole (PriLOSEC) 40 MG capsule, Take 1 capsule (40 mg total) by mouth daily, Disp: 90 capsule, Rfl: 3    potassium chloride (KLOR-CON) 20 mEq packet, Take 20 mEq by mouth 2 (two) times a day, Disp: 180 tablet, Rfl: 3    pyridoxine (VITAMIN B6) 100 mg tablet, Take 100 mg by mouth daily  , Disp: , Rfl:     simvastatin (ZOCOR) 40 mg tablet, Take 1 tablet (40 mg total) by mouth daily, Disp: 30 tablet, Rfl: 5    sitaGLIPtin (JANUVIA) 100 mg tablet, Take 100 mg by mouth daily  , Disp: , Rfl:     indomethacin (INDOCIN) 50 mg capsule, Take 1 capsule by mouth 2 (two) times a day with meals (Patient not taking: Reported on 11/26/2018 ), Disp: 20 capsule, Rfl: 0    loratadine (CLARITIN) 10 mg tablet, Take 10 mg by mouth as needed for allergies  , Disp: , Rfl:     nitrofurantoin (MACRODANTIN) 100 mg capsule, TAKE 1 CAPSULE BY MOUTH EVERY DAY STARTING ON 09/16/18 PRIOR TO TEST, Disp: , Rfl: 0    ondansetron (ZOFRAN-ODT) 4 mg disintegrating tablet, Take 1 tablet (4 mg total) by mouth once for 1 dose (Patient not taking: Reported on 11/29/2018 ), Disp: 20 tablet, Rfl: 0    trospium chloride (SANCTURA) 20 mg tablet, Take 60 mg by mouth daily in the early morning Instructed by Dr Jase Dave to stop taking until symptoms resolve, Disp: , Rfl:   No Known Allergies    The following portions of the patient's history were reviewed and updated as appropriate: allergies, current medications, past family history, past medical history, past social history, past surgical history and problem list         Vitals:    11/29/18 1355   BP: 110/70   Pulse: (!) 45   Resp: 14   Temp: 98 °F (36 7 °C)       Physical Exam   Constitutional: She is oriented to person, place, and time  She appears well-developed and well-nourished  HENT:   Head: Normocephalic and atraumatic     Pulmonary/Chest: Right breast exhibits no inverted nipple, no mass, no nipple discharge, no skin change and no tenderness  Left breast exhibits no inverted nipple, no mass, no nipple discharge, no skin change and no tenderness  Lymphadenopathy:        Right axillary: No pectoral and no lateral adenopathy present  Left axillary: No pectoral and no lateral adenopathy present  Right: No supraclavicular adenopathy present  Left: No supraclavicular adenopathy present  Neurological: She is alert and oriented to person, place, and time  Psychiatric: She has a normal mood and affect  Results:      Imaging  11/21/2018 bilateral 3D screening mammogram is benign BI-RADS two with a density of three    I reviewed the above imaging data  Discussion/Summary:  77-year-old female with dense breast tissue, fibrocystic changes and family history of breast cancer  She had a prior benign biopsy of the left breast   There are no concerns on examination today  Her recent mammogram was benign  I will therefore see her again in one year or sooner should the need arise

## 2018-11-30 PROBLEM — Z23 NEED FOR PNEUMOCOCCAL VACCINATION: Status: ACTIVE | Noted: 2018-11-30

## 2018-12-15 DIAGNOSIS — E11.22 TYPE 2 DIABETES MELLITUS WITH STAGE 3 CHRONIC KIDNEY DISEASE, WITHOUT LONG-TERM CURRENT USE OF INSULIN (HCC): Primary | ICD-10-CM

## 2018-12-15 DIAGNOSIS — N18.30 TYPE 2 DIABETES MELLITUS WITH STAGE 3 CHRONIC KIDNEY DISEASE, WITHOUT LONG-TERM CURRENT USE OF INSULIN (HCC): Primary | ICD-10-CM

## 2018-12-22 ENCOUNTER — APPOINTMENT (OUTPATIENT)
Dept: LAB | Facility: CLINIC | Age: 68
End: 2018-12-22
Payer: MEDICARE

## 2018-12-22 ENCOUNTER — TRANSCRIBE ORDERS (OUTPATIENT)
Dept: ADMINISTRATIVE | Facility: HOSPITAL | Age: 68
End: 2018-12-22

## 2018-12-22 DIAGNOSIS — D72.829 LEUKOCYTOSIS, UNSPECIFIED TYPE: ICD-10-CM

## 2018-12-22 DIAGNOSIS — D72.829 LEUKOCYTOSIS, UNSPECIFIED TYPE: Primary | ICD-10-CM

## 2018-12-22 LAB
BASOPHILS # BLD AUTO: 0.12 THOUSANDS/ΜL (ref 0–0.1)
BASOPHILS NFR BLD AUTO: 1 % (ref 0–1)
EOSINOPHIL # BLD AUTO: 0.29 THOUSAND/ΜL (ref 0–0.61)
EOSINOPHIL NFR BLD AUTO: 3 % (ref 0–6)
ERYTHROCYTE [DISTWIDTH] IN BLOOD BY AUTOMATED COUNT: 13.8 % (ref 11.6–15.1)
HCT VFR BLD AUTO: 50.4 % (ref 34.8–46.1)
HGB BLD-MCNC: 15.5 G/DL (ref 11.5–15.4)
IMM GRANULOCYTES # BLD AUTO: 0.09 THOUSAND/UL (ref 0–0.2)
IMM GRANULOCYTES NFR BLD AUTO: 1 % (ref 0–2)
LYMPHOCYTES # BLD AUTO: 2.04 THOUSANDS/ΜL (ref 0.6–4.47)
LYMPHOCYTES NFR BLD AUTO: 20 % (ref 14–44)
MCH RBC QN AUTO: 27.9 PG (ref 26.8–34.3)
MCHC RBC AUTO-ENTMCNC: 30.8 G/DL (ref 31.4–37.4)
MCV RBC AUTO: 91 FL (ref 82–98)
MONOCYTES # BLD AUTO: 1.2 THOUSAND/ΜL (ref 0.17–1.22)
MONOCYTES NFR BLD AUTO: 12 % (ref 4–12)
NEUTROPHILS # BLD AUTO: 6.41 THOUSANDS/ΜL (ref 1.85–7.62)
NEUTS SEG NFR BLD AUTO: 63 % (ref 43–75)
NRBC BLD AUTO-RTO: 0 /100 WBCS
PLATELET # BLD AUTO: 274 THOUSANDS/UL (ref 149–390)
PMV BLD AUTO: 10.4 FL (ref 8.9–12.7)
RBC # BLD AUTO: 5.56 MILLION/UL (ref 3.81–5.12)
WBC # BLD AUTO: 10.15 THOUSAND/UL (ref 4.31–10.16)

## 2018-12-22 PROCEDURE — 36415 COLL VENOUS BLD VENIPUNCTURE: CPT

## 2018-12-22 PROCEDURE — 85025 COMPLETE CBC W/AUTO DIFF WBC: CPT

## 2018-12-26 DIAGNOSIS — M25.50 ARTHRALGIA, UNSPECIFIED JOINT: Primary | ICD-10-CM

## 2018-12-26 RX ORDER — TRAMADOL HYDROCHLORIDE 50 MG/1
50 TABLET ORAL EVERY 6 HOURS PRN
Qty: 90 TABLET | Refills: 0 | Status: SHIPPED | OUTPATIENT
Start: 2018-12-26 | End: 2019-02-07 | Stop reason: SDUPTHER

## 2018-12-27 ENCOUNTER — REMOTE DEVICE CLINIC VISIT (OUTPATIENT)
Dept: CARDIOLOGY CLINIC | Facility: CLINIC | Age: 68
End: 2018-12-27
Payer: MEDICARE

## 2018-12-27 DIAGNOSIS — I42.0 DILATED CARDIOMYOPATHY (HCC): ICD-10-CM

## 2018-12-27 DIAGNOSIS — I47.2 VENTRICULAR TACHYCARDIA (HCC): ICD-10-CM

## 2018-12-27 DIAGNOSIS — I50.42 CHRONIC COMBINED SYSTOLIC AND DIASTOLIC CONGESTIVE HEART FAILURE (HCC): Primary | ICD-10-CM

## 2018-12-27 DIAGNOSIS — Z95.810 BIVENTRICULAR ICD (IMPLANTABLE CARDIOVERTER-DEFIBRILLATOR) IN PLACE: ICD-10-CM

## 2018-12-27 PROCEDURE — 93296 REM INTERROG EVL PM/IDS: CPT | Performed by: INTERNAL MEDICINE

## 2018-12-27 PROCEDURE — 93295 DEV INTERROG REMOTE 1/2/MLT: CPT | Performed by: INTERNAL MEDICINE

## 2018-12-27 NOTE — PROGRESS NOTES
Results for orders placed or performed in visit on 12/27/18   Cardiac EP device report    Narrative    MDT/BIV-ICD  CARELINK TRANSMISSION: BATTERY VOLTAGE ADEQUATE (3 6 YRS)  AP - 94 4% BVP - 97 7% ( - 97 6% + VSR PACE - 0 1%)  ALL AVAILABLE LEAD PARAMETERS WITHIN NORMAL LIMITS  NO SIGNIFICANT HIGH RATE EPISODES  31 VENT  SENSE EPISODES (1 5 MIN/DAY) WITH AVAIL MARKERS SHOWING PVC'S, BIGEMINY  HX OF SAME  OPTI-VOL WITHIN NORMAL LIMITS  NORMAL DEVICE FUNCTION     EB

## 2019-01-05 ENCOUNTER — OFFICE VISIT (OUTPATIENT)
Dept: URGENT CARE | Facility: CLINIC | Age: 69
End: 2019-01-05
Payer: MEDICARE

## 2019-01-05 VITALS
OXYGEN SATURATION: 97 % | RESPIRATION RATE: 16 BRPM | HEART RATE: 78 BPM | WEIGHT: 176 LBS | HEIGHT: 60 IN | TEMPERATURE: 100.8 F | BODY MASS INDEX: 34.55 KG/M2 | DIASTOLIC BLOOD PRESSURE: 80 MMHG | SYSTOLIC BLOOD PRESSURE: 142 MMHG

## 2019-01-05 DIAGNOSIS — J45.20 MILD INTERMITTENT ASTHMATIC BRONCHITIS WITHOUT COMPLICATION: Primary | ICD-10-CM

## 2019-01-05 PROCEDURE — G0463 HOSPITAL OUTPT CLINIC VISIT: HCPCS | Performed by: EMERGENCY MEDICINE

## 2019-01-05 PROCEDURE — 99213 OFFICE O/P EST LOW 20 MIN: CPT | Performed by: EMERGENCY MEDICINE

## 2019-01-05 RX ORDER — PREDNISONE 20 MG/1
40 TABLET ORAL DAILY
Qty: 8 TABLET | Refills: 0 | Status: SHIPPED | OUTPATIENT
Start: 2019-01-05 | End: 2019-01-09

## 2019-01-05 RX ORDER — BENZONATATE 100 MG/1
100 CAPSULE ORAL 3 TIMES DAILY PRN
Qty: 20 CAPSULE | Refills: 0 | Status: SHIPPED | OUTPATIENT
Start: 2019-01-05 | End: 2019-10-01

## 2019-01-05 RX ORDER — AZITHROMYCIN 250 MG/1
TABLET, FILM COATED ORAL
Qty: 6 TABLET | Refills: 0 | Status: SHIPPED | OUTPATIENT
Start: 2019-01-05 | End: 2019-01-09

## 2019-01-05 NOTE — PATIENT INSTRUCTIONS
Sleep with head elevated, Tessalon for cough, Prednisone, Zithromax once a day, Tylenol or ibuprofen for fever, recheck next week if not improving

## 2019-01-05 NOTE — PROGRESS NOTES
Assessment/Plan:    No problem-specific Assessment & Plan notes found for this encounter  Diagnoses and all orders for this visit:    Mild intermittent asthmatic bronchitis without complication  -     azithromycin (ZITHROMAX) 250 mg tablet; Take 2 tablets today then 1 tablet daily x 4 days  -     benzonatate (TESSALON PERLES) 100 mg capsule; Take 1 capsule (100 mg total) by mouth 3 (three) times a day as needed for cough  -     predniSONE 20 mg tablet; Take 2 tablets (40 mg total) by mouth daily for 4 days          Subjective:      Patient ID: Cr Aquino is a 77 y o  female  Cough with slight fever past 2-3 days  No history of asthma      Cough   This is a new problem  The current episode started in the past 7 days  The problem has been gradually worsening  The problem occurs every few minutes  The cough is productive of sputum (minimal)  Associated symptoms include headaches, shortness of breath and wheezing  The symptoms are aggravated by exercise  She has tried nothing for the symptoms  The treatment provided no relief  The following portions of the patient's history were reviewed and updated as appropriate: current medications, past family history, past medical history, past social history, past surgical history and problem list     Review of Systems   Respiratory: Positive for cough, shortness of breath and wheezing  Neurological: Positive for headaches  All other systems reviewed and are negative  Objective:      /80 (BP Location: Left arm, Patient Position: Sitting, Cuff Size: Adult)   Pulse 78   Temp (!) 100 8 °F (38 2 °C)   Resp 16   Ht 5' (1 524 m)   Wt 79 8 kg (176 lb)   SpO2 97%   BMI 34 37 kg/m²          Physical Exam   Constitutional: She is oriented to person, place, and time  She appears well-developed and well-nourished  HENT:   Nose: Nose normal    Canals occluded by cerumen   Eyes: Pupils are equal, round, and reactive to light     Neck: Normal range of motion  Cardiovascular: Normal rate  Pulmonary/Chest: Effort normal  She has wheezes (scattered upper lobes)  Abdominal: Soft  Neurological: She is alert and oriented to person, place, and time  Skin: Skin is warm and dry  Psychiatric: She has a normal mood and affect  Her behavior is normal  Judgment and thought content normal    Nursing note and vitals reviewed

## 2019-01-14 DIAGNOSIS — N18.30 TYPE 2 DIABETES MELLITUS WITH STAGE 3 CHRONIC KIDNEY DISEASE, WITHOUT LONG-TERM CURRENT USE OF INSULIN (HCC): ICD-10-CM

## 2019-01-14 DIAGNOSIS — E11.22 TYPE 2 DIABETES MELLITUS WITH STAGE 3 CHRONIC KIDNEY DISEASE, WITHOUT LONG-TERM CURRENT USE OF INSULIN (HCC): ICD-10-CM

## 2019-01-14 RX ORDER — SITAGLIPTIN 100 MG/1
TABLET, FILM COATED ORAL
Qty: 30 TABLET | OUTPATIENT
Start: 2019-01-14

## 2019-01-30 ENCOUNTER — REMOTE DEVICE CLINIC VISIT (OUTPATIENT)
Dept: CARDIOLOGY CLINIC | Facility: CLINIC | Age: 69
End: 2019-01-30
Payer: MEDICARE

## 2019-01-30 DIAGNOSIS — Z95.810 BIVENTRICULAR ICD (IMPLANTABLE CARDIOVERTER-DEFIBRILLATOR) IN PLACE: ICD-10-CM

## 2019-01-30 DIAGNOSIS — I50.42 CHRONIC COMBINED SYSTOLIC AND DIASTOLIC CONGESTIVE HEART FAILURE (HCC): Primary | ICD-10-CM

## 2019-01-30 DIAGNOSIS — I47.2 VENTRICULAR TACHYCARDIA (HCC): ICD-10-CM

## 2019-01-30 DIAGNOSIS — I42.0 DILATED CARDIOMYOPATHY (HCC): ICD-10-CM

## 2019-01-30 PROCEDURE — 93297 REM INTERROG DEV EVAL ICPMS: CPT | Performed by: INTERNAL MEDICINE

## 2019-01-30 PROCEDURE — 93299 PR REM INTERROG ICPMS/SCRMS <30 D TECH REVIEW: CPT | Performed by: INTERNAL MEDICINE

## 2019-01-30 NOTE — PROGRESS NOTES
Results for orders placed or performed in visit on 01/30/19   Cardiac EP device report    Narrative    MDT/BIV-ICD  CARELINK TRANSMISSION - OPTI-VOL ONLY: OPTI-VOL WITHIN NORMAL LIMITS  BATTERY VOLTAGE ADEQUATE (3 7 YRS)  AP - 83% BVP - 96 4% ( - 96 3% + VSR PACE - 0 1%)  ALL AVAILABLE LEAD PARAMETERS WITHIN NORMAL LIMITS  NO SIGNIFICANT HIGH RATE EPISODES  21 VENT  SENSE EPISODES (1 8 MIN/DAY) WITH AVAIL MARKERS SHOWING PVC'S, BIGEMINY  HX OF SAME  NORMAL DEVICE FUNCTION     EB

## 2019-02-04 DIAGNOSIS — E78.5 HYPERLIPIDEMIA, UNSPECIFIED HYPERLIPIDEMIA TYPE: ICD-10-CM

## 2019-02-04 RX ORDER — SIMVASTATIN 40 MG
TABLET ORAL
Qty: 30 TABLET | Refills: 5 | Status: SHIPPED | OUTPATIENT
Start: 2019-02-04 | End: 2019-07-25 | Stop reason: SDUPTHER

## 2019-02-07 DIAGNOSIS — M25.50 ARTHRALGIA, UNSPECIFIED JOINT: ICD-10-CM

## 2019-02-07 RX ORDER — TRAMADOL HYDROCHLORIDE 50 MG/1
50 TABLET ORAL EVERY 6 HOURS PRN
Qty: 90 TABLET | Refills: 2 | Status: SHIPPED | OUTPATIENT
Start: 2019-02-07 | End: 2019-05-25 | Stop reason: SDUPTHER

## 2019-02-18 DIAGNOSIS — F32.A DEPRESSION, UNSPECIFIED DEPRESSION TYPE: ICD-10-CM

## 2019-02-18 RX ORDER — FLUOXETINE HYDROCHLORIDE 20 MG/1
20 CAPSULE ORAL DAILY
Qty: 90 CAPSULE | Refills: 1 | Status: SHIPPED | OUTPATIENT
Start: 2019-02-18 | End: 2019-07-25 | Stop reason: SDUPTHER

## 2019-03-04 ENCOUNTER — REMOTE DEVICE CLINIC VISIT (OUTPATIENT)
Dept: CARDIOLOGY CLINIC | Facility: CLINIC | Age: 69
End: 2019-03-04
Payer: MEDICARE

## 2019-03-04 DIAGNOSIS — I25.84 CORONARY ARTERY DISEASE DUE TO CALCIFIED CORONARY LESION: ICD-10-CM

## 2019-03-04 DIAGNOSIS — Z95.810 AICD (AUTOMATIC CARDIOVERTER/DEFIBRILLATOR) PRESENT: ICD-10-CM

## 2019-03-04 DIAGNOSIS — I47.2 VENTRICULAR TACHYARRHYTHMIA (HCC): ICD-10-CM

## 2019-03-04 DIAGNOSIS — I25.10 CORONARY ARTERY DISEASE DUE TO CALCIFIED CORONARY LESION: ICD-10-CM

## 2019-03-04 DIAGNOSIS — I44.7 LEFT BUNDLE BRANCH BLOCK: ICD-10-CM

## 2019-03-04 DIAGNOSIS — I50.32 CHRONIC DIASTOLIC CONGESTIVE HEART FAILURE (HCC): ICD-10-CM

## 2019-03-04 DIAGNOSIS — I42.0 DILATED CARDIOMYOPATHY (HCC): Primary | ICD-10-CM

## 2019-03-04 PROCEDURE — 93297 REM INTERROG DEV EVAL ICPMS: CPT | Performed by: INTERNAL MEDICINE

## 2019-03-04 PROCEDURE — 93299 PR REM INTERROG ICPMS/SCRMS <30 D TECH REVIEW: CPT | Performed by: INTERNAL MEDICINE

## 2019-03-19 ENCOUNTER — APPOINTMENT (OUTPATIENT)
Dept: LAB | Facility: CLINIC | Age: 69
End: 2019-03-19
Payer: MEDICARE

## 2019-03-19 DIAGNOSIS — N18.30 TYPE 2 DIABETES MELLITUS WITH STAGE 3 CHRONIC KIDNEY DISEASE, WITHOUT LONG-TERM CURRENT USE OF INSULIN (HCC): ICD-10-CM

## 2019-03-19 DIAGNOSIS — E83.52 HYPERCALCEMIA: ICD-10-CM

## 2019-03-19 DIAGNOSIS — I10 ESSENTIAL HYPERTENSION: ICD-10-CM

## 2019-03-19 DIAGNOSIS — E11.22 TYPE 2 DIABETES MELLITUS WITH STAGE 3 CHRONIC KIDNEY DISEASE, WITHOUT LONG-TERM CURRENT USE OF INSULIN (HCC): ICD-10-CM

## 2019-03-19 DIAGNOSIS — D72.829 LEUKOCYTOSIS, UNSPECIFIED TYPE: ICD-10-CM

## 2019-03-19 LAB
ALBUMIN SERPL BCP-MCNC: 3.6 G/DL (ref 3.5–5)
ALP SERPL-CCNC: 69 U/L (ref 46–116)
ALT SERPL W P-5'-P-CCNC: 35 U/L (ref 12–78)
ANION GAP SERPL CALCULATED.3IONS-SCNC: 3 MMOL/L (ref 4–13)
AST SERPL W P-5'-P-CCNC: 13 U/L (ref 5–45)
BASOPHILS # BLD AUTO: 0.09 THOUSANDS/ΜL (ref 0–0.1)
BASOPHILS NFR BLD AUTO: 1 % (ref 0–1)
BILIRUB SERPL-MCNC: 0.49 MG/DL (ref 0.2–1)
BUN SERPL-MCNC: 26 MG/DL (ref 5–25)
CALCIUM ALBUM COR SERPL-MCNC: 10.5 MG/DL (ref 8.3–10.1)
CALCIUM SERPL-MCNC: 10.2 MG/DL (ref 8.3–10.1)
CHLORIDE SERPL-SCNC: 102 MMOL/L (ref 100–108)
CO2 SERPL-SCNC: 34 MMOL/L (ref 21–32)
CREAT SERPL-MCNC: 1.12 MG/DL (ref 0.6–1.3)
EOSINOPHIL # BLD AUTO: 0.15 THOUSAND/ΜL (ref 0–0.61)
EOSINOPHIL NFR BLD AUTO: 1 % (ref 0–6)
ERYTHROCYTE [DISTWIDTH] IN BLOOD BY AUTOMATED COUNT: 15.1 % (ref 11.6–15.1)
EST. AVERAGE GLUCOSE BLD GHB EST-MCNC: 186 MG/DL
GFR SERPL CREATININE-BSD FRML MDRD: 50 ML/MIN/1.73SQ M
GLUCOSE P FAST SERPL-MCNC: 127 MG/DL (ref 65–99)
HBA1C MFR BLD: 8.1 % (ref 4.2–6.3)
HCT VFR BLD AUTO: 47.4 % (ref 34.8–46.1)
HGB BLD-MCNC: 14.9 G/DL (ref 11.5–15.4)
IMM GRANULOCYTES # BLD AUTO: 0.2 THOUSAND/UL (ref 0–0.2)
IMM GRANULOCYTES NFR BLD AUTO: 2 % (ref 0–2)
LYMPHOCYTES # BLD AUTO: 2.93 THOUSANDS/ΜL (ref 0.6–4.47)
LYMPHOCYTES NFR BLD AUTO: 22 % (ref 14–44)
MCH RBC QN AUTO: 28.5 PG (ref 26.8–34.3)
MCHC RBC AUTO-ENTMCNC: 31.4 G/DL (ref 31.4–37.4)
MCV RBC AUTO: 91 FL (ref 82–98)
MONOCYTES # BLD AUTO: 1.32 THOUSAND/ΜL (ref 0.17–1.22)
MONOCYTES NFR BLD AUTO: 10 % (ref 4–12)
NEUTROPHILS # BLD AUTO: 8.7 THOUSANDS/ΜL (ref 1.85–7.62)
NEUTS SEG NFR BLD AUTO: 64 % (ref 43–75)
NRBC BLD AUTO-RTO: 0 /100 WBCS
PLATELET # BLD AUTO: 250 THOUSANDS/UL (ref 149–390)
PMV BLD AUTO: 10.8 FL (ref 8.9–12.7)
POTASSIUM SERPL-SCNC: 4.2 MMOL/L (ref 3.5–5.3)
PROT SERPL-MCNC: 7 G/DL (ref 6.4–8.2)
PTH-INTACT SERPL-MCNC: 100.5 PG/ML (ref 18.4–80.1)
RBC # BLD AUTO: 5.23 MILLION/UL (ref 3.81–5.12)
SODIUM SERPL-SCNC: 139 MMOL/L (ref 136–145)
WBC # BLD AUTO: 13.39 THOUSAND/UL (ref 4.31–10.16)

## 2019-03-19 PROCEDURE — 83970 ASSAY OF PARATHORMONE: CPT

## 2019-03-19 PROCEDURE — 36415 COLL VENOUS BLD VENIPUNCTURE: CPT

## 2019-03-19 PROCEDURE — 85025 COMPLETE CBC W/AUTO DIFF WBC: CPT

## 2019-03-19 PROCEDURE — 80053 COMPREHEN METABOLIC PANEL: CPT

## 2019-03-19 PROCEDURE — 83036 HEMOGLOBIN GLYCOSYLATED A1C: CPT

## 2019-03-24 DIAGNOSIS — E87.6 HYPOKALEMIA: ICD-10-CM

## 2019-03-24 RX ORDER — POTASSIUM CHLORIDE 20 MEQ/1
TABLET, EXTENDED RELEASE ORAL
Qty: 180 TABLET | Refills: 3 | Status: SHIPPED | OUTPATIENT
Start: 2019-03-24 | End: 2020-03-29

## 2019-03-25 DIAGNOSIS — I10 ESSENTIAL HYPERTENSION: ICD-10-CM

## 2019-03-25 RX ORDER — CANDESARTAN 32 MG/1
32 TABLET ORAL DAILY
Qty: 90 TABLET | Refills: 2 | Status: SHIPPED | OUTPATIENT
Start: 2019-03-25 | End: 2019-12-22 | Stop reason: SDUPTHER

## 2019-03-27 ENCOUNTER — OFFICE VISIT (OUTPATIENT)
Dept: FAMILY MEDICINE CLINIC | Facility: HOSPITAL | Age: 69
End: 2019-03-27
Payer: MEDICARE

## 2019-03-27 VITALS
WEIGHT: 175.6 LBS | DIASTOLIC BLOOD PRESSURE: 84 MMHG | HEIGHT: 60 IN | BODY MASS INDEX: 34.47 KG/M2 | TEMPERATURE: 97.8 F | SYSTOLIC BLOOD PRESSURE: 130 MMHG | HEART RATE: 70 BPM

## 2019-03-27 DIAGNOSIS — E21.3 HYPERPARATHYROIDISM (HCC): ICD-10-CM

## 2019-03-27 DIAGNOSIS — E11.22 TYPE 2 DIABETES MELLITUS WITH STAGE 3 CHRONIC KIDNEY DISEASE, WITHOUT LONG-TERM CURRENT USE OF INSULIN (HCC): Primary | ICD-10-CM

## 2019-03-27 DIAGNOSIS — D72.829 LEUKOCYTOSIS, UNSPECIFIED TYPE: ICD-10-CM

## 2019-03-27 DIAGNOSIS — N18.30 TYPE 2 DIABETES MELLITUS WITH STAGE 3 CHRONIC KIDNEY DISEASE, WITHOUT LONG-TERM CURRENT USE OF INSULIN (HCC): Primary | ICD-10-CM

## 2019-03-27 DIAGNOSIS — E83.52 HYPERCALCEMIA: ICD-10-CM

## 2019-03-27 DIAGNOSIS — E78.2 MIXED HYPERLIPIDEMIA: ICD-10-CM

## 2019-03-27 DIAGNOSIS — N18.30 CKD (CHRONIC KIDNEY DISEASE) STAGE 3, GFR 30-59 ML/MIN (HCC): ICD-10-CM

## 2019-03-27 DIAGNOSIS — E66.09 CLASS 1 OBESITY DUE TO EXCESS CALORIES WITH SERIOUS COMORBIDITY AND BODY MASS INDEX (BMI) OF 34.0 TO 34.9 IN ADULT: ICD-10-CM

## 2019-03-27 PROBLEM — K21.00 GASTROESOPHAGEAL REFLUX DISEASE WITH ESOPHAGITIS: Status: ACTIVE | Noted: 2019-03-27

## 2019-03-27 PROBLEM — Z12.11 SCREENING FOR COLON CANCER: Status: ACTIVE | Noted: 2018-11-29

## 2019-03-27 PROBLEM — R11.2 NAUSEA AND VOMITING: Status: ACTIVE | Noted: 2019-03-27

## 2019-03-27 PROBLEM — R93.5 ABNORMAL COMPUTERIZED AXIAL TOMOGRAPHY OF ABDOMEN: Status: ACTIVE | Noted: 2019-03-27

## 2019-03-27 PROCEDURE — 99215 OFFICE O/P EST HI 40 MIN: CPT | Performed by: FAMILY MEDICINE

## 2019-03-27 RX ORDER — AMOXICILLIN 500 MG/1
CAPSULE ORAL
Refills: 1 | COMMUNITY
Start: 2019-01-22 | End: 2019-10-01

## 2019-03-27 RX ORDER — CEPHALEXIN 500 MG/1
500 CAPSULE ORAL 2 TIMES DAILY
Refills: 0 | COMMUNITY
Start: 2019-01-22 | End: 2019-10-01

## 2019-03-31 DIAGNOSIS — I10 ESSENTIAL HYPERTENSION: ICD-10-CM

## 2019-03-31 RX ORDER — METOPROLOL SUCCINATE 100 MG/1
TABLET, EXTENDED RELEASE ORAL
Qty: 90 TABLET | Refills: 3 | Status: SHIPPED | OUTPATIENT
Start: 2019-03-31 | End: 2019-08-26 | Stop reason: SDUPTHER

## 2019-04-02 ENCOUNTER — HOSPITAL ENCOUNTER (OUTPATIENT)
Dept: NUCLEAR MEDICINE | Facility: HOSPITAL | Age: 69
Discharge: HOME | End: 2019-04-02
Payer: MEDICARE

## 2019-04-02 DIAGNOSIS — E21.3 HYPERPARATHYROIDISM (HCC): ICD-10-CM

## 2019-04-02 PROCEDURE — A9500 TC99M SESTAMIBI: HCPCS

## 2019-04-02 PROCEDURE — 78071 PARATHYRD PLANAR W/WO SUBTRJ: CPT

## 2019-04-04 ENCOUNTER — REMOTE DEVICE CLINIC VISIT (OUTPATIENT)
Dept: CARDIOLOGY CLINIC | Facility: CLINIC | Age: 69
End: 2019-04-04
Payer: MEDICARE

## 2019-04-04 DIAGNOSIS — I47.2 VENTRICULAR TACHYCARDIA (HCC): ICD-10-CM

## 2019-04-04 DIAGNOSIS — I50.32 CHRONIC DIASTOLIC CONGESTIVE HEART FAILURE (HCC): ICD-10-CM

## 2019-04-04 DIAGNOSIS — Z95.810 PRESENCE OF AUTOMATIC CARDIOVERTER/DEFIBRILLATOR (AICD): ICD-10-CM

## 2019-04-04 DIAGNOSIS — I42.0 DILATED CARDIOMYOPATHY (HCC): Primary | ICD-10-CM

## 2019-04-04 PROCEDURE — 93295 DEV INTERROG REMOTE 1/2/MLT: CPT | Performed by: INTERNAL MEDICINE

## 2019-04-04 PROCEDURE — 93296 REM INTERROG EVL PM/IDS: CPT | Performed by: INTERNAL MEDICINE

## 2019-04-08 ENCOUNTER — OFFICE VISIT (OUTPATIENT)
Dept: URGENT CARE | Facility: CLINIC | Age: 69
End: 2019-04-08
Payer: MEDICARE

## 2019-04-08 VITALS
BODY MASS INDEX: 35.28 KG/M2 | RESPIRATION RATE: 18 BRPM | HEART RATE: 80 BPM | DIASTOLIC BLOOD PRESSURE: 72 MMHG | HEIGHT: 59 IN | SYSTOLIC BLOOD PRESSURE: 138 MMHG | TEMPERATURE: 97.6 F | WEIGHT: 175 LBS | OXYGEN SATURATION: 96 %

## 2019-04-08 DIAGNOSIS — S80.861A TICK BITE OF RIGHT LOWER LEG, INITIAL ENCOUNTER: Primary | ICD-10-CM
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
## 2019-04-08 PROCEDURE — G0463 HOSPITAL OUTPT CLINIC VISIT: HCPCS | Performed by: PREVENTIVE MEDICINE

## 2019-04-08 PROCEDURE — 99213 OFFICE O/P EST LOW 20 MIN: CPT | Performed by: PREVENTIVE MEDICINE

## 2019-04-08 RX ORDER — DOXYCYCLINE 100 MG/1
TABLET ORAL
Qty: 2 TABLET | Refills: 0 | Status: SHIPPED | OUTPATIENT
Start: 2019-04-08 | End: 2019-04-08

## 2019-04-08 RX ORDER — CETIRIZINE HYDROCHLORIDE 10 MG/1
10 TABLET ORAL DAILY
COMMUNITY

## 2019-05-25 DIAGNOSIS — M25.50 ARTHRALGIA, UNSPECIFIED JOINT: ICD-10-CM

## 2019-05-25 RX ORDER — TRAMADOL HYDROCHLORIDE 50 MG/1
TABLET ORAL
Qty: 90 TABLET | Refills: 1 | Status: SHIPPED | OUTPATIENT
Start: 2019-05-25 | End: 2019-07-17 | Stop reason: SDUPTHER

## 2019-05-26 DIAGNOSIS — E03.9 ACQUIRED HYPOTHYROIDISM: ICD-10-CM

## 2019-05-26 RX ORDER — LEVOTHYROXINE SODIUM 0.03 MG/1
TABLET ORAL
Qty: 90 TABLET | Refills: 3 | Status: SHIPPED | OUTPATIENT
Start: 2019-05-26 | End: 2020-05-18

## 2019-06-24 LAB
LEFT EYE DIABETIC RETINOPATHY: NORMAL
RIGHT EYE DIABETIC RETINOPATHY: NORMAL

## 2019-07-05 ENCOUNTER — REMOTE DEVICE CLINIC VISIT (OUTPATIENT)
Dept: CARDIOLOGY CLINIC | Facility: CLINIC | Age: 69
End: 2019-07-05
Payer: MEDICARE

## 2019-07-05 DIAGNOSIS — Z95.810 AICD (AUTOMATIC CARDIOVERTER/DEFIBRILLATOR) PRESENT: Primary | ICD-10-CM

## 2019-07-05 DIAGNOSIS — K21.9 GASTROESOPHAGEAL REFLUX DISEASE WITHOUT ESOPHAGITIS: ICD-10-CM

## 2019-07-05 DIAGNOSIS — R60.9 EDEMA, UNSPECIFIED TYPE: ICD-10-CM

## 2019-07-05 DIAGNOSIS — E11.22 TYPE 2 DIABETES MELLITUS WITH STAGE 3 CHRONIC KIDNEY DISEASE, WITHOUT LONG-TERM CURRENT USE OF INSULIN (HCC): ICD-10-CM

## 2019-07-05 DIAGNOSIS — N18.30 TYPE 2 DIABETES MELLITUS WITH STAGE 3 CHRONIC KIDNEY DISEASE, WITHOUT LONG-TERM CURRENT USE OF INSULIN (HCC): ICD-10-CM

## 2019-07-05 PROCEDURE — 93295 DEV INTERROG REMOTE 1/2/MLT: CPT | Performed by: INTERNAL MEDICINE

## 2019-07-05 PROCEDURE — 93296 REM INTERROG EVL PM/IDS: CPT | Performed by: INTERNAL MEDICINE

## 2019-07-05 RX ORDER — TORSEMIDE 20 MG/1
TABLET ORAL
Qty: 180 TABLET | Refills: 3 | Status: SHIPPED | OUTPATIENT
Start: 2019-07-05 | End: 2019-08-26 | Stop reason: SDUPTHER

## 2019-07-05 RX ORDER — OMEPRAZOLE 40 MG/1
CAPSULE, DELAYED RELEASE ORAL
Qty: 90 CAPSULE | Refills: 2 | Status: SHIPPED | OUTPATIENT
Start: 2019-07-05 | End: 2019-10-01

## 2019-07-05 RX ORDER — SITAGLIPTIN 100 MG/1
TABLET, FILM COATED ORAL
Qty: 30 TABLET | Refills: 5 | Status: SHIPPED | OUTPATIENT
Start: 2019-07-05 | End: 2019-12-30 | Stop reason: SDUPTHER

## 2019-07-05 NOTE — PROGRESS NOTES
Results for orders placed or performed in visit on 07/05/19   Cardiac EP device report    Narrative    MDT/BIV-ICD  CARELINK TRANSMISSION: BATTERY VOLTAGE ADEQUATE (2 7 YRS)  AP-90%, BVP-95% (TOTAL -95 3%+VSR PACE-0 1%)  ALL AVAILABLE LEAD PARAMETERS WITHIN NORMAL LIMITS  NO SIGNIFICANT HIGH RATE EPISODES  [address] OPTI-VOL WITHIN NORMAL LIMITS  NORMAL DEVICE FUNCTION   GV

## 2019-07-10 ENCOUNTER — TELEPHONE (OUTPATIENT)
Dept: FAMILY MEDICINE CLINIC | Facility: HOSPITAL | Age: 69
End: 2019-07-10

## 2019-07-10 NOTE — TELEPHONE ENCOUNTER
Pt has an up coming appt,in 7/29/2019 with Dr Pacheco Due  Does she need a re-check for blood work?   PCB

## 2019-07-16 DIAGNOSIS — E03.9 ACQUIRED HYPOTHYROIDISM: ICD-10-CM

## 2019-07-16 DIAGNOSIS — E83.52 HYPERCALCEMIA: ICD-10-CM

## 2019-07-16 DIAGNOSIS — E11.22 TYPE 2 DIABETES MELLITUS WITH STAGE 3 CHRONIC KIDNEY DISEASE, WITHOUT LONG-TERM CURRENT USE OF INSULIN (HCC): ICD-10-CM

## 2019-07-16 DIAGNOSIS — N18.30 TYPE 2 DIABETES MELLITUS WITH STAGE 3 CHRONIC KIDNEY DISEASE, WITHOUT LONG-TERM CURRENT USE OF INSULIN (HCC): ICD-10-CM

## 2019-07-16 DIAGNOSIS — I10 ESSENTIAL HYPERTENSION: ICD-10-CM

## 2019-07-16 DIAGNOSIS — E78.2 MIXED HYPERLIPIDEMIA: ICD-10-CM

## 2019-07-16 DIAGNOSIS — M10.00 IDIOPATHIC GOUT, UNSPECIFIED CHRONICITY, UNSPECIFIED SITE: Primary | ICD-10-CM

## 2019-07-17 DIAGNOSIS — M25.50 ARTHRALGIA, UNSPECIFIED JOINT: ICD-10-CM

## 2019-07-17 RX ORDER — TRAMADOL HYDROCHLORIDE 50 MG/1
TABLET ORAL
Qty: 90 TABLET | Refills: 1 | Status: SHIPPED | OUTPATIENT
Start: 2019-07-17 | End: 2019-09-27 | Stop reason: SDUPTHER

## 2019-07-22 ENCOUNTER — APPOINTMENT (OUTPATIENT)
Dept: LAB | Facility: CLINIC | Age: 69
End: 2019-07-22
Payer: MEDICARE

## 2019-07-22 DIAGNOSIS — E83.52 HYPERCALCEMIA: ICD-10-CM

## 2019-07-22 DIAGNOSIS — N18.30 TYPE 2 DIABETES MELLITUS WITH STAGE 3 CHRONIC KIDNEY DISEASE, WITHOUT LONG-TERM CURRENT USE OF INSULIN (HCC): ICD-10-CM

## 2019-07-22 DIAGNOSIS — E11.22 TYPE 2 DIABETES MELLITUS WITH STAGE 3 CHRONIC KIDNEY DISEASE, WITHOUT LONG-TERM CURRENT USE OF INSULIN (HCC): ICD-10-CM

## 2019-07-22 DIAGNOSIS — E03.9 ACQUIRED HYPOTHYROIDISM: ICD-10-CM

## 2019-07-22 DIAGNOSIS — E78.2 MIXED HYPERLIPIDEMIA: ICD-10-CM

## 2019-07-22 DIAGNOSIS — M10.00 IDIOPATHIC GOUT, UNSPECIFIED CHRONICITY, UNSPECIFIED SITE: ICD-10-CM

## 2019-07-22 DIAGNOSIS — I10 ESSENTIAL HYPERTENSION: ICD-10-CM

## 2019-07-22 LAB
ALBUMIN SERPL BCP-MCNC: 3.5 G/DL (ref 3.5–5)
ALP SERPL-CCNC: 70 U/L (ref 46–116)
ALT SERPL W P-5'-P-CCNC: 33 U/L (ref 12–78)
ANION GAP SERPL CALCULATED.3IONS-SCNC: 5 MMOL/L (ref 4–13)
AST SERPL W P-5'-P-CCNC: 16 U/L (ref 5–45)
BASOPHILS # BLD AUTO: 0.08 THOUSANDS/ΜL (ref 0–0.1)
BASOPHILS NFR BLD AUTO: 1 % (ref 0–1)
BILIRUB SERPL-MCNC: 0.62 MG/DL (ref 0.2–1)
BUN SERPL-MCNC: 26 MG/DL (ref 5–25)
CALCIUM SERPL-MCNC: 9.5 MG/DL (ref 8.3–10.1)
CHLORIDE SERPL-SCNC: 102 MMOL/L (ref 100–108)
CHOLEST SERPL-MCNC: 173 MG/DL (ref 50–200)
CO2 SERPL-SCNC: 32 MMOL/L (ref 21–32)
CREAT SERPL-MCNC: 1.18 MG/DL (ref 0.6–1.3)
CREAT UR-MCNC: 40.8 MG/DL
EOSINOPHIL # BLD AUTO: 0.23 THOUSAND/ΜL (ref 0–0.61)
EOSINOPHIL NFR BLD AUTO: 2 % (ref 0–6)
ERYTHROCYTE [DISTWIDTH] IN BLOOD BY AUTOMATED COUNT: 14.1 % (ref 11.6–15.1)
EST. AVERAGE GLUCOSE BLD GHB EST-MCNC: 157 MG/DL
GFR SERPL CREATININE-BSD FRML MDRD: 47 ML/MIN/1.73SQ M
GLUCOSE P FAST SERPL-MCNC: 99 MG/DL (ref 65–99)
HBA1C MFR BLD: 7.1 % (ref 4.2–6.3)
HCT VFR BLD AUTO: 48.7 % (ref 34.8–46.1)
HDLC SERPL-MCNC: 71 MG/DL (ref 40–60)
HGB BLD-MCNC: 15.3 G/DL (ref 11.5–15.4)
IMM GRANULOCYTES # BLD AUTO: 0.12 THOUSAND/UL (ref 0–0.2)
IMM GRANULOCYTES NFR BLD AUTO: 1 % (ref 0–2)
LDLC SERPL CALC-MCNC: 86 MG/DL (ref 0–100)
LYMPHOCYTES # BLD AUTO: 2.34 THOUSANDS/ΜL (ref 0.6–4.47)
LYMPHOCYTES NFR BLD AUTO: 25 % (ref 14–44)
MCH RBC QN AUTO: 28.2 PG (ref 26.8–34.3)
MCHC RBC AUTO-ENTMCNC: 31.4 G/DL (ref 31.4–37.4)
MCV RBC AUTO: 90 FL (ref 82–98)
MICROALBUMIN UR-MCNC: 24.7 MG/L (ref 0–20)
MICROALBUMIN/CREAT 24H UR: 61 MG/G CREATININE (ref 0–30)
MONOCYTES # BLD AUTO: 1 THOUSAND/ΜL (ref 0.17–1.22)
MONOCYTES NFR BLD AUTO: 11 % (ref 4–12)
NEUTROPHILS # BLD AUTO: 5.76 THOUSANDS/ΜL (ref 1.85–7.62)
NEUTS SEG NFR BLD AUTO: 60 % (ref 43–75)
NRBC BLD AUTO-RTO: 0 /100 WBCS
PLATELET # BLD AUTO: 236 THOUSANDS/UL (ref 149–390)
PMV BLD AUTO: 10.8 FL (ref 8.9–12.7)
POTASSIUM SERPL-SCNC: 4.1 MMOL/L (ref 3.5–5.3)
PROT SERPL-MCNC: 6.9 G/DL (ref 6.4–8.2)
PTH-INTACT SERPL-MCNC: 98.9 PG/ML (ref 18.4–80.1)
RBC # BLD AUTO: 5.42 MILLION/UL (ref 3.81–5.12)
SODIUM SERPL-SCNC: 139 MMOL/L (ref 136–145)
TRIGL SERPL-MCNC: 80 MG/DL
TSH SERPL DL<=0.05 MIU/L-ACNC: 2.44 UIU/ML (ref 0.36–3.74)
URATE SERPL-MCNC: 5.1 MG/DL (ref 2–6.8)
WBC # BLD AUTO: 9.53 THOUSAND/UL (ref 4.31–10.16)

## 2019-07-22 PROCEDURE — 84550 ASSAY OF BLOOD/URIC ACID: CPT

## 2019-07-22 PROCEDURE — 36415 COLL VENOUS BLD VENIPUNCTURE: CPT

## 2019-07-22 PROCEDURE — 80061 LIPID PANEL: CPT

## 2019-07-22 PROCEDURE — 80053 COMPREHEN METABOLIC PANEL: CPT

## 2019-07-22 PROCEDURE — 83036 HEMOGLOBIN GLYCOSYLATED A1C: CPT

## 2019-07-22 PROCEDURE — 84443 ASSAY THYROID STIM HORMONE: CPT

## 2019-07-22 PROCEDURE — 85025 COMPLETE CBC W/AUTO DIFF WBC: CPT

## 2019-07-22 PROCEDURE — 82043 UR ALBUMIN QUANTITATIVE: CPT | Performed by: FAMILY MEDICINE

## 2019-07-22 PROCEDURE — 82570 ASSAY OF URINE CREATININE: CPT | Performed by: FAMILY MEDICINE

## 2019-07-22 PROCEDURE — 83970 ASSAY OF PARATHORMONE: CPT

## 2019-07-25 DIAGNOSIS — E78.5 HYPERLIPIDEMIA, UNSPECIFIED HYPERLIPIDEMIA TYPE: ICD-10-CM

## 2019-07-25 DIAGNOSIS — F32.A DEPRESSION, UNSPECIFIED DEPRESSION TYPE: ICD-10-CM

## 2019-07-25 RX ORDER — FLUOXETINE HYDROCHLORIDE 20 MG/1
CAPSULE ORAL
Qty: 90 CAPSULE | Refills: 1 | Status: SHIPPED | OUTPATIENT
Start: 2019-07-25 | End: 2019-09-11 | Stop reason: SDUPTHER

## 2019-07-25 RX ORDER — SIMVASTATIN 40 MG
TABLET ORAL
Qty: 30 TABLET | Refills: 5 | Status: SHIPPED | OUTPATIENT
Start: 2019-07-25 | End: 2019-09-11 | Stop reason: SDUPTHER

## 2019-07-29 ENCOUNTER — OFFICE VISIT (OUTPATIENT)
Dept: FAMILY MEDICINE CLINIC | Facility: HOSPITAL | Age: 69
End: 2019-07-29
Payer: MEDICARE

## 2019-07-29 VITALS
HEIGHT: 59 IN | WEIGHT: 177 LBS | TEMPERATURE: 98 F | HEART RATE: 67 BPM | SYSTOLIC BLOOD PRESSURE: 126 MMHG | BODY MASS INDEX: 35.68 KG/M2 | DIASTOLIC BLOOD PRESSURE: 70 MMHG

## 2019-07-29 DIAGNOSIS — Z95.810 PRESENCE OF CARDIOVERTER DEFIBRILLATOR: ICD-10-CM

## 2019-07-29 DIAGNOSIS — E03.9 ACQUIRED HYPOTHYROIDISM: ICD-10-CM

## 2019-07-29 DIAGNOSIS — N18.30 CKD (CHRONIC KIDNEY DISEASE) STAGE 3, GFR 30-59 ML/MIN (HCC): ICD-10-CM

## 2019-07-29 DIAGNOSIS — M10.00 IDIOPATHIC GOUT, UNSPECIFIED CHRONICITY, UNSPECIFIED SITE: ICD-10-CM

## 2019-07-29 DIAGNOSIS — E11.22 TYPE 2 DIABETES MELLITUS WITH STAGE 3 CHRONIC KIDNEY DISEASE, WITHOUT LONG-TERM CURRENT USE OF INSULIN (HCC): Primary | ICD-10-CM

## 2019-07-29 DIAGNOSIS — N18.30 TYPE 2 DIABETES MELLITUS WITH STAGE 3 CHRONIC KIDNEY DISEASE, WITHOUT LONG-TERM CURRENT USE OF INSULIN (HCC): Primary | ICD-10-CM

## 2019-07-29 DIAGNOSIS — E78.2 MIXED HYPERLIPIDEMIA: ICD-10-CM

## 2019-07-29 PROCEDURE — 99214 OFFICE O/P EST MOD 30 MIN: CPT | Performed by: FAMILY MEDICINE

## 2019-07-29 NOTE — PROGRESS NOTES
Assessment/Plan:         Diagnoses and all orders for this visit:    Type 2 diabetes mellitus with stage 3 chronic kidney disease, without long-term current use of insulin ([facility])  Comments:  Good consistency of A1c at 7 1    Acquired hypothyroidism  Comments:  Clinically euthyroid    CKD (chronic kidney disease) stage 3, GFR 30-59 ml/min ([facility])  Comments:  Good improvement in creatinine    Presence of cardioverter defibrillator    Mixed hyperlipidemia  Comments:  Excellent lipid profile    Idiopathic gout, unspecified chronicity, unspecified site  Comments:  Stable uric acid, no gouty flares          Subjective:      Patient ID: Marcela Luo is a 68 y o  female  4 month follow up  Not testing own glucometers  No gout flares  Mainly gets stiffness and achiness of random joints  No red swelling    Labs reviewed in detail and copy given to patient  Medications reviewed and are all well tolerated  The following portions of the patient's history were reviewed and updated as appropriate: allergies, current medications, past family history, past medical history, past social history, past surgical history and problem list     Review of Systems   Constitutional: Negative for unexpected weight change  HENT: Negative  Eyes: Negative for visual disturbance  Respiratory: Negative  Cardiovascular: Negative  Gastrointestinal: Negative  Genitourinary: Negative  Musculoskeletal: Positive for arthralgias, back pain, gait problem, joint swelling and myalgias  Hematological: Negative  Psychiatric/Behavioral: Negative  All other systems reviewed and are negative  Objective:      /70   Pulse 67   Temp 98 °F (36 7 °C)   Ht 4' 11" (1 499 m)   Wt 80 3 kg (177 lb)   BMI 35 75 kg/m²          Physical Exam   Constitutional: She is oriented to person, place, and time  She appears well-developed and well-nourished  Neck: No thyromegaly present     Cardiovascular: Normal rate, regular rhythm, normal heart sounds and intact distal pulses  Pulmonary/Chest: Effort normal and breath sounds normal    Musculoskeletal: She exhibits no edema  Neurological: She is alert and oriented to person, place, and time  Psychiatric: She has a normal mood and affect  Her behavior is normal    Nursing note and vitals reviewed

## 2019-08-26 DIAGNOSIS — R60.9 EDEMA, UNSPECIFIED TYPE: ICD-10-CM

## 2019-08-26 DIAGNOSIS — I10 ESSENTIAL HYPERTENSION: ICD-10-CM

## 2019-08-26 RX ORDER — METOPROLOL SUCCINATE 100 MG/1
100 TABLET, EXTENDED RELEASE ORAL DAILY
Qty: 10 TABLET | Refills: 0 | Status: SHIPPED | OUTPATIENT
Start: 2019-08-26 | End: 2019-09-11 | Stop reason: SDUPTHER

## 2019-08-26 RX ORDER — TORSEMIDE 20 MG/1
TABLET ORAL
Qty: 10 TABLET | Refills: 0 | Status: SHIPPED | OUTPATIENT
Start: 2019-08-26 | End: 2020-06-19 | Stop reason: SDUPTHER

## 2019-08-26 NOTE — TELEPHONE ENCOUNTER
Patient on vk in [place] - totally forgot her toprol and hasn't taken it for 10 days      Realized she will run out of torsemide    Can you please send short supply to the local pharm there - pharm is changed in chart already    pls call when sent to pharm

## 2019-09-11 DIAGNOSIS — E78.5 HYPERLIPIDEMIA, UNSPECIFIED HYPERLIPIDEMIA TYPE: ICD-10-CM

## 2019-09-11 DIAGNOSIS — M47.816 LUMBAR SPONDYLOSIS: ICD-10-CM

## 2019-09-11 DIAGNOSIS — I10 ESSENTIAL HYPERTENSION: ICD-10-CM

## 2019-09-11 DIAGNOSIS — K21.00 GASTROESOPHAGEAL REFLUX DISEASE WITH ESOPHAGITIS: ICD-10-CM

## 2019-09-11 DIAGNOSIS — F32.A DEPRESSION, UNSPECIFIED DEPRESSION TYPE: ICD-10-CM

## 2019-09-11 DIAGNOSIS — M47.816 LUMBAR SPONDYLOSIS: Primary | ICD-10-CM

## 2019-09-11 RX ORDER — SIMVASTATIN 40 MG
40 TABLET ORAL DAILY
Qty: 60 TABLET | Refills: 3 | Status: SHIPPED | OUTPATIENT
Start: 2019-09-11 | End: 2019-09-11 | Stop reason: SDUPTHER

## 2019-09-11 RX ORDER — OMEPRAZOLE 40 MG/1
40 CAPSULE, DELAYED RELEASE ORAL DAILY
Qty: 60 CAPSULE | Refills: 3 | Status: SHIPPED | OUTPATIENT
Start: 2019-09-11 | End: 2019-12-21 | Stop reason: SDUPTHER

## 2019-09-11 RX ORDER — METOPROLOL SUCCINATE 100 MG/1
100 TABLET, EXTENDED RELEASE ORAL DAILY
Qty: 60 TABLET | Refills: 3 | Status: SHIPPED | OUTPATIENT
Start: 2019-09-11 | End: 2019-09-11 | Stop reason: SDUPTHER

## 2019-09-11 RX ORDER — METOPROLOL SUCCINATE 100 MG/1
100 TABLET, EXTENDED RELEASE ORAL DAILY
Qty: 60 TABLET | Refills: 3 | Status: SHIPPED | OUTPATIENT
Start: 2019-09-11 | End: 2020-05-18

## 2019-09-11 RX ORDER — FLUOXETINE HYDROCHLORIDE 20 MG/1
20 CAPSULE ORAL DAILY
Qty: 60 CAPSULE | Refills: 3 | Status: SHIPPED | OUTPATIENT
Start: 2019-09-11 | End: 2020-05-18

## 2019-09-11 RX ORDER — FLUOXETINE HYDROCHLORIDE 20 MG/1
20 CAPSULE ORAL DAILY
Qty: 60 CAPSULE | Refills: 3 | Status: SHIPPED | OUTPATIENT
Start: 2019-09-11 | End: 2019-09-11 | Stop reason: SDUPTHER

## 2019-09-11 RX ORDER — SIMVASTATIN 40 MG
40 TABLET ORAL DAILY
Qty: 60 TABLET | Refills: 3 | Status: SHIPPED | OUTPATIENT
Start: 2019-09-11 | End: 2020-05-18

## 2019-09-11 RX ORDER — CELECOXIB 200 MG/1
200 CAPSULE ORAL 2 TIMES DAILY
Qty: 180 CAPSULE | Refills: 3 | Status: SHIPPED | OUTPATIENT
Start: 2019-09-11

## 2019-09-11 NOTE — TELEPHONE ENCOUNTER
Patient needs new scripts to go to [facility]  They are having a special promotion for 60 day supply of her meds for a cheaper price  Can you send these to her pharmacy for her?

## 2019-09-18 ENCOUNTER — IN-CLINIC DEVICE VISIT (OUTPATIENT)
Dept: CARDIOLOGY CLINIC | Facility: CLINIC | Age: 69
End: 2019-09-18
Payer: MEDICARE

## 2019-09-18 DIAGNOSIS — I44.7 LEFT BUNDLE BRANCH BLOCK: ICD-10-CM

## 2019-09-18 DIAGNOSIS — I50.42 CHRONIC COMBINED SYSTOLIC AND DIASTOLIC CONGESTIVE HEART FAILURE (HCC): ICD-10-CM

## 2019-09-18 DIAGNOSIS — I42.0 DILATED CARDIOMYOPATHY (HCC): Primary | ICD-10-CM

## 2019-09-18 DIAGNOSIS — I47.2 VENTRICULAR TACHYCARDIA (HCC): ICD-10-CM

## 2019-09-18 DIAGNOSIS — Z95.810 BIVENTRICULAR IMPLANTABLE CARDIOVERTER-DEFIBRILLATOR IN SITU: ICD-10-CM

## 2019-09-18 PROCEDURE — 93284 PRGRMG EVAL IMPLANTABLE DFB: CPT | Performed by: INTERNAL MEDICINE

## 2019-09-18 NOTE — PROGRESS NOTES
Results for orders placed or performed in visit on 09/18/19   Cardiac EP device report    Narrative    MDT/BIV-ICD  DEVICE INTERROGATED IN THE [facility] OFFICE: BATTERY VOLTAGE ADEQUATE (2 4 YRS)  AP 88 1% BVP 96 2% ( 96 1% + VSRP 0 1%)  ALL LEAD PARAMETERS WITHIN NORMAL LIMITS  ALL OTHER TESTING WITHIN NORMAL LIMITS  NO SIGNIFICANT HIGH RATE EPISODES  1,125 V SENSE EPISODES (X1 YR) W/AVAIL  MARKERS SHOWING PVC, BIGEMINEY  HX OF SAME  EF - 60% (2015 ECHO)  PT ON ASA 81, METOPROLOL SUCC  OPTI-VOL FLUID THRESHOLD CROSSED 8/21/19 - 8/23/19 BUT NOW WITHIN NORMAL RANGE  NO PROGRAMMING CHANGES MADE TO DEVICE PARAMETERS  NORMAL DEVICE FUNCTION     EB

## 2019-09-20 DIAGNOSIS — M10.00 IDIOPATHIC GOUT, UNSPECIFIED CHRONICITY, UNSPECIFIED SITE: ICD-10-CM

## 2019-09-20 RX ORDER — ALLOPURINOL 100 MG/1
TABLET ORAL
Qty: 90 TABLET | Refills: 3 | Status: SHIPPED | OUTPATIENT
Start: 2019-09-20 | End: 2020-09-15

## 2019-09-26 NOTE — PROGRESS NOTES
Please call us and give us your son's phone number. Please give us a copy of your Living Will and Power of  so that we may scan it on your chart. Personalized Preventive Plan for Claudeen Charity - 9/26/2019  Medicare offers a range of preventive health benefits. Some of the tests and screenings are paid in full while other may be subject to a deductible, co-insurance, and/or copay. Some of these benefits include a comprehensive review of your medical history including lifestyle, illnesses that may run in your family, and various assessments and screenings as appropriate. After reviewing your medical record and screening and assessments performed today your provider may have ordered immunizations, labs, imaging, and/or referrals for you. A list of these orders (if applicable) as well as your Preventive Care list are included within your After Visit Summary for your review. Other Preventive Recommendations:    · A preventive eye exam performed by an eye specialist is recommended every 1-2 years to screen for glaucoma; cataracts, macular degeneration, and other eye disorders. · A preventive dental visit is recommended every 6 months. · Try to get at least 150 minutes of exercise per week or 10,000 steps per day on a pedometer . · Order or download the FREE \"Exercise & Physical Activity: Your Everyday Guide\" from [employer]. Call 0-423.692.8129 or search [employer] online. · You need 7267-2192 mg of calcium and 8160-2687 IU of vitamin D per day. It is possible to meet your calcium requirement with diet alone, but a vitamin D supplement is usually necessary to meet this goal.  · When exposed to the sun, use a sunscreen that protects against both UVA and UVB radiation with an SPF of 30 or greater. Reapply every 2 to 3 hours or after sweating, drying off with a towel, or swimming. · Always wear a seat belt when traveling in a car.  Always wear a helmet Results for orders placed or performed in visit on 03/04/19   Cardiac EP device report    Narrative    MDT/BIV-ICD  CARELINK TRANSMISSION - OPTI-VOL ONLY: OPTI-VOL WITHIN NORMAL LIMITS  BATTERY VOLTAGE ADEQUATE (3 3 YRS)  AP-90%, BVP-96% (TOTAL -96 2%+VSR PACE-0 2%)  ALL AVAILABLE LEAD PARAMETERS WITHIN NORMAL LIMITS  NO SIGNIFICANT HIGH RATE EPISODES  [address] NORMAL DEVICE FUNCTION   GV when riding a bicycle or motorcycle.

## 2019-09-27 DIAGNOSIS — M25.50 ARTHRALGIA, UNSPECIFIED JOINT: ICD-10-CM

## 2019-09-27 RX ORDER — TRAMADOL HYDROCHLORIDE 50 MG/1
50 TABLET ORAL EVERY 6 HOURS PRN
Qty: 90 TABLET | Refills: 1 | Status: SHIPPED | OUTPATIENT
Start: 2019-09-27 | End: 2019-10-14 | Stop reason: SDUPTHER

## 2019-10-01 ENCOUNTER — OFFICE VISIT (OUTPATIENT)
Dept: FAMILY MEDICINE CLINIC | Facility: HOSPITAL | Age: 69
End: 2019-10-01
Payer: MEDICARE

## 2019-10-01 VITALS
TEMPERATURE: 97.8 F | SYSTOLIC BLOOD PRESSURE: 130 MMHG | HEART RATE: 77 BPM | HEIGHT: 61 IN | DIASTOLIC BLOOD PRESSURE: 86 MMHG | OXYGEN SATURATION: 96 % | BODY MASS INDEX: 32.7 KG/M2 | WEIGHT: 173.2 LBS

## 2019-10-01 DIAGNOSIS — Z01.818 PREOP EXAMINATION: Primary | ICD-10-CM

## 2019-10-01 DIAGNOSIS — Z23 ENCOUNTER FOR IMMUNIZATION: ICD-10-CM

## 2019-10-01 DIAGNOSIS — E11.22 TYPE 2 DIABETES MELLITUS WITH STAGE 3 CHRONIC KIDNEY DISEASE, WITHOUT LONG-TERM CURRENT USE OF INSULIN (HCC): ICD-10-CM

## 2019-10-01 DIAGNOSIS — N18.30 TYPE 2 DIABETES MELLITUS WITH STAGE 3 CHRONIC KIDNEY DISEASE, WITHOUT LONG-TERM CURRENT USE OF INSULIN (HCC): ICD-10-CM

## 2019-10-01 DIAGNOSIS — I25.10 CORONARY ARTERY DISEASE DUE TO CALCIFIED CORONARY LESION: ICD-10-CM

## 2019-10-01 DIAGNOSIS — I25.84 CORONARY ARTERY DISEASE DUE TO CALCIFIED CORONARY LESION: ICD-10-CM

## 2019-10-01 DIAGNOSIS — I10 ESSENTIAL HYPERTENSION: ICD-10-CM

## 2019-10-01 PROCEDURE — G0008 ADMIN INFLUENZA VIRUS VAC: HCPCS | Performed by: NURSE PRACTITIONER

## 2019-10-01 PROCEDURE — 90662 IIV NO PRSV INCREASED AG IM: CPT | Performed by: NURSE PRACTITIONER

## 2019-10-01 PROCEDURE — 99215 OFFICE O/P EST HI 40 MIN: CPT | Performed by: NURSE PRACTITIONER

## 2019-10-01 RX ORDER — PHENOL 1.4 %
1200 AEROSOL, SPRAY (ML) MUCOUS MEMBRANE DAILY
COMMUNITY

## 2019-10-01 NOTE — ASSESSMENT & PLAN NOTE
Lab Results   Component Value Date    HGBA1C 7 1 (H) 07/22/2019     Pt to be NPO morning of surgery so advise she hold [facility]

## 2019-10-01 NOTE — PROGRESS NOTES
Jennie Sanford MD    NAME: Beto Velazquez  AGE: 68 y o  SEX: female  : 1950     DATE: 10/1/2019    Family Practice Pre-Operative Evaluation      Chief Complaint: Pre-operative Evaluation     Surgery: cataract surgery of R eye  Anticipated Date of Surgery: 10/9/19  Referring Provider: Eddie Hugo     History of Present Illness:     Beto Velazquez is a 68 y o  female who presents to the office today for a preoperative consultation at the request of surgeon, Dr Amy Diaz, who plans on performing cataract surgery on 10/9/19  Planned anesthesia is unknown  Patient has a bleeding risk of: no recent abnormal bleeding and no remote history of abnormal bleeding  Patient does not have objections to receiving blood products if needed  Current anti-platelet/anti-coagulation medications that the patient is prescribed includes: Aspirin        Assessment of Chronic Conditions:   - Diabetes Mellitus: insulin dependent, 2019 A1C 7 1%   - Coronary Artery Disease: ICD/pacemaker per cardiology management  - Hypertension: adequate control on daily metoprolol     Assessment of Cardiac Risk:  · Denies unstable or severe angina or MI in the last 6 weeks or history of stent placement in the last year   · Denies decompensated heart failure (e g  New onset heart failure, NYHA functional class IV heart failure, or worsening existing heart failure)  · Denies significant arrhythmias such as high grade AV block, symptomatic ventricular arrhythmia, newly recognized ventricular tachycardia, supraventricular tachycardia with resting heart rate >100, or symptomatic bradycardia (has a pacemaker)  · Denies severe heart valve disease including aortic stenosis or symptomatic mitral stenosis     Exercise Capacity:  · Able to walk 4 blocks without symptoms?: No  · Able to walk 2 flights without symptoms?: Yes    Prior Anesthesia Reactions: None Personal history of venous thromboembolic disease? Yes, had a blood clot after knee replacement, 2010  History of steroid use for >2 weeks within last year? Yes takes prednisone currently, 8mg every other day  Review of Systems:     Review of Systems   Constitutional: Negative for activity change, appetite change, chills, fatigue and fever  HENT: Negative for congestion, postnasal drip, rhinorrhea and trouble swallowing  Eyes: Positive for photophobia and visual disturbance  Negative for pain, discharge, redness and itching  Respiratory: Positive for apnea (wears BiPAP at night)  Negative for cough, chest tightness, shortness of breath, wheezing and stridor  Cardiovascular: Positive for palpitations (occasional and doesn't last very long)  Negative for chest pain and leg swelling  Gastrointestinal: Negative for abdominal distention, abdominal pain, constipation, diarrhea, nausea and vomiting  Endocrine: Negative for cold intolerance, heat intolerance, polydipsia, polyphagia and polyuria  Genitourinary: Negative for difficulty urinating, dysuria and hematuria  Musculoskeletal: Positive for back pain  Negative for arthralgias, gait problem, joint swelling, myalgias, neck pain and neck stiffness  Skin: Negative for color change, pallor, rash and wound  Allergic/Immunologic: Negative for environmental allergies, food allergies and immunocompromised state  Neurological: Negative for dizziness, tremors, weakness, light-headedness and headaches  Psychiatric/Behavioral: Negative for sleep disturbance  The patient is not nervous/anxious          Current Problem List:     Patient Active Problem List   Diagnosis    Trigger little finger of left hand    Allergic rhinitis    Arthralgia of knee, left    Arthralgia of knee, right    Coronary artery disease    Benign positional vertigo, left    Bilateral fibrocystic breast changes    Bilateral sacroiliitis (HCC)    Chronic systolic congestive heart failure (HCC)    CKD (chronic kidney disease) stage 3, GFR 30-59 ml/min (HCC)    Chronic diastolic congestive heart failure (HCC)    Presence of cardioverter defibrillator    DDD (degenerative disc disease), cervical    Depression    Esophageal reflux    Gout    Mixed hyperlipidemia    Hypertension    Hypokalemia    Acquired hypothyroidism    Obesity due to excess calories with serious comorbidity    Obstructive sleep apnea    Osteoarthritis    Overactive bladder    Psoriasis    Restless legs syndrome    Type 2 diabetes mellitus (HCC)    Type 2 diabetes mellitus with stage 3 chronic kidney disease ([facility] [address] )    Vitamin D deficiency    Medicare annual wellness visit, subsequent    Lumbar spondylosis    Chronic left-sided low back pain without sciatica    Hypercalcemia    Leukocytosis    Screening for colon cancer    Dense breast tissue    Family history of breast cancer    Need for pneumococcal vaccination    Nausea and vomiting    Gastroesophageal reflux disease with esophagitis    Abnormal computerized axial tomography of abdomen    Hyperparathyroidism ([facility])       Allergies:     No Known Allergies    Current Medications:       Current Outpatient Medications:     allopurinol (ZYLOPRIM) 100 mg tablet, TAKE 1 TABLET BY MOUTH DAILY, Disp: 90 tablet, Rfl: 3    aspirin 81 MG tablet, Take 81 mg by mouth daily  , Disp: , Rfl:     calcium carbonate (OS-MANINDER) 600 MG tablet, Take 1,200 mg by mouth daily, Disp: , Rfl:     candesartan (ATACAND) 32 MG tablet, Take 1 tablet (32 mg total) by mouth daily, Disp: 90 tablet, Rfl: 2    celecoxib (CeleBREX) 200 mg capsule, Take 1 capsule (200 mg total) by mouth 2 (two) times a day, Disp: 180 capsule, Rfl: 3    cetirizine (ZyrTEC) 10 mg tablet, Take 10 mg by mouth daily, Disp: , Rfl:     Cholecalciferol (VITAMIN D3) 1000 units CAPS, Take by mouth, Disp: , Rfl:     DAILY MULTIPLE VITAMINS PO, Take 1 tablet by mouth daily, Disp: , Rfl:    FLUoxetine (PROzac) 20 mg capsule, Take 1 capsule (20 mg total) by mouth daily, Disp: 60 capsule, Rfl: 3    JANUVIA 100 MG tablet, TAKE 1 TABLET BY MOUTH DAILY, Disp: 30 tablet, Rfl: 5    levothyroxine 25 mcg tablet, TAKE 1 TABLET BY MOUTH DAILY, Disp: 90 tablet, Rfl: 3    Magnesium 250 MG TABS, Take by mouth daily, Disp: , Rfl:     metoprolol succinate (TOPROL-XL) 100 mg 24 hr tablet, Take 1 tablet (100 mg total) by mouth daily, Disp: 60 tablet, Rfl: 3    Minoxidil (ROGAINE WOMENS EX), Apply 1 mL topically  , Disp: , Rfl:     omeprazole (PriLOSEC) 40 MG capsule, Take 1 capsule (40 mg total) by mouth daily, Disp: 60 capsule, Rfl: 3    potassium chloride (K-DUR,KLOR-CON) 20 mEq tablet, TAKE 1 TABLET BY MOUTH TWICE DAILY, Disp: 180 tablet, Rfl: 3    PREDNISONE PO, Take 4 mg by mouth Every other day, Disp: , Rfl:     pyridoxine (VITAMIN B6) 100 mg tablet, Take 100 mg by mouth daily  , Disp: , Rfl:     simvastatin (ZOCOR) 40 mg tablet, Take 1 tablet (40 mg total) by mouth daily, Disp: 60 tablet, Rfl: 3    torsemide (DEMADEX) 20 mg tablet, Take 1 tablet by mouth twice daily, Disp: 10 tablet, Rfl: 0    traMADol (ULTRAM) 50 mg tablet, Take 1 tablet (50 mg total) by mouth every 6 (six) hours as needed for moderate pain, Disp: 90 tablet, Rfl: 1    ondansetron (ZOFRAN-ODT) 4 mg disintegrating tablet, Take 1 tablet (4 mg total) by mouth once for 1 dose (Patient not taking: Reported on 11/29/2018 ), Disp: 20 tablet, Rfl: 0    Past Medical History:       Past Medical History:   Diagnosis Date    Abnormal finding on breast imaging     last assessed 11/30/17    Acute bacterial bronchitis     Allergic rhinitis     Arthralgia     Arthritis     Atherosclerotic heart disease of native coronary artery without angina pectoris     BBB (bundle branch block)     left,last assesed 6/4/12    BBB (bundle branch block)     left    Benign paroxysmal vertigo     last assessed 9/7/12    Benign paroxysmal vertigo of left ear  Bilateral fibrocystic breast changes     Bilateral sacroiliitis (HCC)     Cardiac defibrillator in situ     Carpal tunnel syndrome, unspecified upper limb     CHF (congestive heart failure) (HCC)     chronic systolic/diastolic    Chronic diastolic congestive heart failure (HCC)     Chronic kidney disease     stage 3    Chronic systolic congestive heart failure (HCC)     Coronary artery disease     Cough     Depression     Detrusor instability     Diabetes mellitus (HCC)     Difficulty walking up stairs     Dilated cardiomyopathy (HCC)     Dilated cardiomyopathy (HCC)     Disease of thyroid gland     Disorder of intervertebral disc of cervical spine     Esophageal reflux     GERD (gastroesophageal reflux disease)     Gout     Hemorrhoids     History of blood clots     Hormone replacement therapy     Hx of blood clots     Hyperlipidemia     Hypertension     Hypokalemia     Hypothyroidism     Indigestion     Inflamed toenail, left     Ingrown nail     Low back pain     left    OAB (overactive bladder)     detrusor instability    Obesity     [place] (obstructive sleep apnea)     Osteoarthritis     Papilloma of left breast     Psoriasis     psoriatic arthropathy    Psoriatic arthropathy (HCC)     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    RLS (restless legs syndrome)     Sciatica     Shortness of breath     Sleep apnea     unspecified type    Tendinitis     Trigger thumb, left thumb     Urinary frequency     UTI (urinary tract infection)     Vitamin D deficiency         Past Surgical History:   Procedure Laterality Date    BLADDER SURGERY  1999    lift and repair    BREAST BIOPSY Left 05/23/2016    CARDIAC CATHETERIZATION      outcome:  successful    CARDIAC DEFIBRILLATOR PLACEMENT      CARPAL TUNNEL RELEASE Bilateral 1997    COLONOSCOPY      CORONARY ANGIOPLASTY WITH STENT PLACEMENT      CYSTOSCOPY W/ URETEROSCOPY  2009    DE QUERVAIN'S RELEASE Left 2011    and trigger finger release    EYE SURGERY Left     INSERT / REPLACE / REMOVE PACEMAKER      JOINT REPLACEMENT Right 2017    Knee    KNEE ARTHROSCOPY      therapeutic    NEUROPLASTY / TRANSPOSITION MEDIAN NERVE AT CARPAL TUNNEL      OOPHORECTOMY      GA INCISE FINGER TENDON SHEATH Left 3/2/2017    Procedure: SMALL TRIGGER FINGER RELEASE;  Surgeon: Alma Guzman MD;  Location: [facility];  Service: Orthopedics    RECTAL SURGERY  2006    repair of rectal ulcer    SHOULDER ARTHROSCOPY Left 2006    TOTAL ABDOMINAL HYSTERECTOMY      TOTAL KNEE ARTHROPLASTY Left     TOTAL SHOULDER REPLACEMENT Left 2007    TRIGGER FINGER RELEASE Bilateral 2011    right haand ,left hand     US GUIDED BREAST BIOPSY LEFT COMPLETE Left 2016        Family History   Problem Relation Age of Onset    Hypertension Mother     Alzheimer's disease Mother     Cancer Father 70        bladder    Prostate cancer Father 70    Cancer Brother         pt shared some type of blood cancer    Breast cancer Paternal Grandmother         age dx unk    Stomach cancer Paternal Aunt 72        Social History     Socioeconomic History    Marital status:       Spouse name: Not on file    Number of children: Not on file    Years of education: Not on file    Highest education level: Not on file   Occupational History    Not on file   Social Needs    Financial resource strain: Not on file    Food insecurity:     Worry: Not on file     Inability: Not on file    Transportation needs:     Medical: Not on file     Non-medical: Not on file   Tobacco Use    Smoking status: Former Smoker     Packs/day: 1 00     Years: 15 00     Pack years: 15 00     Last attempt to quit: 1977     Years since quittin 7    Smokeless tobacco: Never Used   Substance and Sexual Activity    Alcohol use: No    Drug use: No    Sexual activity: Not on file     Comment: birth control   Lifestyle    Physical activity:     Days per week: Not on file     Minutes per session: Not on file    Stress: Not on file   Relationships    Social connections:     Talks on phone: Not on file     Gets together: Not on file     Attends Yazidism service: Not on file     Active member of club or organization: Not on file     Attends meetings of clubs or organizations: Not on file     Relationship status: Not on file    Intimate partner violence:     Fear of current or ex partner: Not on file     Emotionally abused: Not on file     Physically abused: Not on file     Forced sexual activity: Not on file   Other Topics Concern    Not on file   Social History Narrative    Always uses seat belt        Physical Exam:     /86 (Patient Position: Sitting, Cuff Size: Standard)   Pulse 77   Temp 97 8 °F (36 6 °C) (Tympanic)   Ht 5' 0 5" (1 537 m)   Wt 78 6 kg (173 lb 3 2 oz)   SpO2 96%   BMI 33 27 kg/m²     Physical Exam   Constitutional: She is oriented to person, place, and time  She appears well-developed and well-nourished  No distress  HENT:   Head: Normocephalic and atraumatic  Eyes: Pupils are equal, round, and reactive to light  Conjunctivae and EOM are normal  Right eye exhibits no discharge  Left eye exhibits no discharge  Neck: Normal range of motion  No JVD present  No tracheal deviation present  No thyromegaly present  Musculoskeletal: Normal range of motion  She exhibits no edema or deformity  Lymphadenopathy:     She has no cervical adenopathy  Neurological: She is alert and oriented to person, place, and time  She displays normal reflexes  No cranial nerve deficit or sensory deficit  She exhibits normal muscle tone  Coordination normal    Skin: Skin is warm and dry  She is not diaphoretic  Psychiatric: She has a normal mood and affect  Her behavior is normal  Judgment and thought content normal    Vitals reviewed         Data:     Pre-operative work-up    Laboratory Results: I have personally reviewed the pertinent laboratory results/reports      EKG: I have personally reviewed pertinent reports  Chest x-ray: I have personally reviewed pertinent reports  Previous cardiopulmonary studies within the past year:  · Echocardiogram: None  · Cardiac Catheterization: None  · Stress Test: None  · Pulmonary Function Testing: None      Assessment & Recommendations:     1  Preop examination      H&P completed w/stability in all co-morbidities, clearance issued   2  Encounter for immunization  influenza vaccine, [id number], high-dose, PF 0 5 mL (FLUZONE HIGH-DOSE)   3  Essential hypertension      Stable BP control on daily metoprolol  Continue same dose and f/u with Dr Froy Christy as scheduled in December 4  Type 2 diabetes mellitus with stage 3 chronic kidney disease, without long-term current use of insulin (HCC)      Pt to be NPO morning of surgery so advise she hold [place] and [employer] Personal Care Device  5  Coronary artery disease due to calcified coronary lesion      stable/asymptomatic, management per cardiology       Pre-Op Evaluation Assessment  68 y o  female with planned surgery: cataract extraction/IOL OD  Known risk factors for perioperative complications: Coronary disease  Diabetes mellitus  Current medications which may produce withdrawal symptoms if withheld perioperatively: none  Pre-Op Evaluation Plan  1  Further preoperative workup as follows:   - None; no further preoperative work-up is required    2  Medication Management/Recommendations:   - None, continue medication regimen including morning of surgery, with sip of water    3  Prophylaxis for cardiac events with perioperative beta-blockers: not indicated  4  Patient requires further consultation with: None    Clearance  Patient is CLEARED for surgery without any additional cardiac testing       Oralia Abbasi MD  [address]  [address] [facility] [address]  Phone#  [phone]  Fax#  [phone]

## 2019-10-01 NOTE — ASSESSMENT & PLAN NOTE
Stable BP control on daily metoprolol  Continue same dose and f/u with Dr Flaco Wolf as scheduled in December

## 2019-10-07 ENCOUNTER — TELEPHONE (OUTPATIENT)
Dept: CARDIOLOGY CLINIC | Facility: CLINIC | Age: 69
End: 2019-10-07

## 2019-10-14 DIAGNOSIS — M25.50 ARTHRALGIA, UNSPECIFIED JOINT: ICD-10-CM

## 2019-10-14 RX ORDER — TRAMADOL HYDROCHLORIDE 50 MG/1
TABLET ORAL
Qty: 90 TABLET | Refills: 3 | Status: SHIPPED | OUTPATIENT
Start: 2019-10-14 | End: 2020-05-11

## 2019-11-12 ENCOUNTER — OFFICE VISIT (OUTPATIENT)
Dept: FAMILY MEDICINE CLINIC | Facility: HOSPITAL | Age: 69
End: 2019-11-12
Payer: MEDICARE

## 2019-11-12 VITALS
OXYGEN SATURATION: 99 % | WEIGHT: 172.2 LBS | BODY MASS INDEX: 33.81 KG/M2 | SYSTOLIC BLOOD PRESSURE: 124 MMHG | DIASTOLIC BLOOD PRESSURE: 84 MMHG | TEMPERATURE: 97.8 F | HEART RATE: 77 BPM | HEIGHT: 60 IN

## 2019-11-12 DIAGNOSIS — I10 ESSENTIAL HYPERTENSION: ICD-10-CM

## 2019-11-12 DIAGNOSIS — E11.22 TYPE 2 DIABETES MELLITUS WITH STAGE 3 CHRONIC KIDNEY DISEASE, WITHOUT LONG-TERM CURRENT USE OF INSULIN (HCC): ICD-10-CM

## 2019-11-12 DIAGNOSIS — N18.30 TYPE 2 DIABETES MELLITUS WITH STAGE 3 CHRONIC KIDNEY DISEASE, WITHOUT LONG-TERM CURRENT USE OF INSULIN (HCC): ICD-10-CM

## 2019-11-12 DIAGNOSIS — H26.9 CATARACT OF LEFT EYE, UNSPECIFIED CATARACT TYPE: ICD-10-CM

## 2019-11-12 DIAGNOSIS — Z01.818 PREOP EXAMINATION: Primary | ICD-10-CM

## 2019-11-12 PROCEDURE — 99214 OFFICE O/P EST MOD 30 MIN: CPT | Performed by: NURSE PRACTITIONER

## 2019-11-12 RX ORDER — KETOROLAC TROMETHAMINE 4 MG/ML
SOLUTION/ DROPS OPHTHALMIC
Refills: 1 | COMMUNITY
Start: 2019-10-01 | End: 2020-06-10 | Stop reason: ALTCHOICE

## 2019-11-12 RX ORDER — OFLOXACIN 3 MG/ML
SOLUTION/ DROPS OPHTHALMIC
Refills: 0 | COMMUNITY
Start: 2019-10-01 | End: 2020-06-10 | Stop reason: ALTCHOICE

## 2019-11-12 RX ORDER — CYCLOPENTOLATE HYDROCHLORIDE 10 MG/ML
SOLUTION/ DROPS OPHTHALMIC
Refills: 0 | COMMUNITY
Start: 2019-10-01 | End: 2020-06-10 | Stop reason: ALTCHOICE

## 2019-11-12 NOTE — PROGRESS NOTES
Reina Blanc MD    NAME: John Montgomery  AGE: 68 y o  SEX: female  : 1950     DATE: 2019    [facility] Pre-Operative Evaluation      Chief Complaint: Pre-operative Evaluation     Surgery: L eye cataract removal  Anticipated Date of Surgery: 19  Referring Provider: Eddie Hugo     History of Present Illness:     John Montgomery is a 68 y o  female who presents to the office today for a preoperative consultation at the request of surgeon, Dr Sulma Calderón, who plans on performing cataract surgery of left eye on 19  Planned anesthesia is IV sedation  Patient has a bleeding risk of: no recent abnormal bleeding and no remote history of abnormal bleeding  Patient does not have objections to receiving blood products if needed  Current anti-platelet/anti-coagulation medications that the patient is prescribed includes: Aspirin        Assessment of Chronic Conditions:   - Diabetes Mellitus: insulin dependent, 2019 A1C 7 1%   - Coronary Artery Disease: ICD/Pacemaker per cardiology management  - Hypertension: Adequate control on daily metoprolol     Assessment of Cardiac Risk:  · Denies unstable or severe angina or MI in the last 6 weeks or history of stent placement in the last year   · Denies decompensated heart failure (e g  New onset heart failure, NYHA functional class IV heart failure, or worsening existing heart failure)  · Denies significant arrhythmias such as high grade AV block, symptomatic ventricular arrhythmia, newly recognized ventricular tachycardia, supraventricular tachycardia with resting heart rate >100, or symptomatic bradycardia  · Denies severe heart valve disease including aortic stenosis or symptomatic mitral stenosis     Exercise Capacity:  · Able to walk 4 blocks without symptoms?: No  · Able to walk 2 flights without symptoms?: No    Prior Anesthesia Reactions: No     Personal history of venous thromboembolic disease? Yes, had a blood clot after knee replacement, 2010  History of steroid use for >2 weeks within last year? Yes, takes prednisone currently for arthritis, 8mg every other day  Review of Systems:     Review of Systems   Constitutional: Negative for activity change, appetite change, chills, fatigue and fever  HENT: Negative for congestion, postnasal drip, rhinorrhea, sinus pressure, sinus pain and trouble swallowing  Respiratory: Negative for cough, chest tightness, shortness of breath and wheezing  Cardiovascular: Positive for palpitations (occurs occasionally and resolves on it's own   has no other symptoms when this happens  )  Negative for chest pain and leg swelling  Gastrointestinal: Negative for abdominal distention, abdominal pain, constipation, diarrhea, nausea and vomiting  Genitourinary: Negative for difficulty urinating  Musculoskeletal: Positive for arthralgias (has arthritis in both ankles)  Negative for back pain, gait problem, joint swelling and myalgias  Neurological: Negative for dizziness, weakness, light-headedness, numbness and headaches         Current Problem List:     Patient Active Problem List   Diagnosis    Trigger little finger of left hand    Allergic rhinitis    Arthralgia of knee, left    Arthralgia of knee, right    Coronary artery disease    Benign positional vertigo, left    Bilateral fibrocystic breast changes    Bilateral sacroiliitis (HCC)    Chronic systolic congestive heart failure (HCC)    CKD (chronic kidney disease) stage 3, GFR 30-59 ml/min (HCC)    Chronic diastolic congestive heart failure (HCC)    Presence of cardioverter defibrillator    DDD (degenerative disc disease), cervical    Depression    Esophageal reflux    Gout    Mixed hyperlipidemia    Hypertension    Hypokalemia    Acquired hypothyroidism    Obesity due to excess calories with serious comorbidity    Obstructive sleep apnea  Osteoarthritis    Overactive bladder    Psoriasis    Restless legs syndrome    Type 2 diabetes mellitus (HCC)    Type 2 diabetes mellitus with stage 3 chronic kidney disease (HCC)    Vitamin D deficiency    Medicare annual wellness visit, subsequent    Lumbar spondylosis    Chronic left-sided low back pain without sciatica    Hypercalcemia    Leukocytosis    Screening for colon cancer    Dense breast tissue    Family history of breast cancer    Need for pneumococcal vaccination    Nausea and vomiting    Gastroesophageal reflux disease with esophagitis    Abnormal computerized axial tomography of abdomen    Hyperparathyroidism (HCC)       Allergies:     No Known Allergies    Current Medications:       Current Outpatient Medications:     allopurinol (ZYLOPRIM) 100 mg tablet, TAKE 1 TABLET BY MOUTH DAILY, Disp: 90 tablet, Rfl: 3    aspirin 81 MG tablet, Take 81 mg by mouth daily  , Disp: , Rfl:     calcium carbonate (OS-MANINDER) 600 MG tablet, Take 1,200 mg by mouth daily, Disp: , Rfl:     candesartan (ATACAND) 32 MG tablet, Take 1 tablet (32 mg total) by mouth daily, Disp: 90 tablet, Rfl: 2    celecoxib (CeleBREX) 200 mg capsule, Take 1 capsule (200 mg total) by mouth 2 (two) times a day, Disp: 180 capsule, Rfl: 3    cetirizine (ZyrTEC) 10 mg tablet, Take 10 mg by mouth daily, Disp: , Rfl:     Cholecalciferol (VITAMIN D3) 1000 units CAPS, Take by mouth, Disp: , Rfl:     cyclopentolate (CYCLOGYL) 1 % ophthalmic solution, Instill 1 drop to operative eye day of surgery as directed , Disp: , Rfl: 0    DAILY MULTIPLE VITAMINS PO, Take 1 tablet by mouth daily, Disp: , Rfl:     FLUoxetine (PROzac) 20 mg capsule, Take 1 capsule (20 mg total) by mouth daily, Disp: 60 capsule, Rfl: 3    JANUVIA 100 MG tablet, TAKE 1 TABLET BY MOUTH DAILY, Disp: 30 tablet, Rfl: 5    ketorolac (ACULAR) 0 4 % SOLN, INSTILL 1 DROP IN OPERATIVE EYE 4 TIMES DAILY AS DIRECTED, Disp: , Rfl: 1    levothyroxine 25 mcg tablet, TAKE 1 TABLET BY MOUTH DAILY, Disp: 90 tablet, Rfl: 3    Magnesium 250 MG TABS, Take by mouth daily, Disp: , Rfl:     metoprolol succinate (TOPROL-XL) 100 mg 24 hr tablet, Take 1 tablet (100 mg total) by mouth daily, Disp: 60 tablet, Rfl: 3    Minoxidil (ROGAINE WOMENS EX), Apply 1 mL topically  , Disp: , Rfl:     ofloxacin (OCUFLOX) 0 3 % ophthalmic solution, INSTILL 1 DROP TO OPERATIVE EYE 4 TIMES DAILY AS DIRECTED, Disp: , Rfl: 0    omeprazole (PriLOSEC) 40 MG capsule, Take 1 capsule (40 mg total) by mouth daily, Disp: 60 capsule, Rfl: 3    potassium chloride (K-DUR,KLOR-CON) 20 mEq tablet, TAKE 1 TABLET BY MOUTH TWICE DAILY, Disp: 180 tablet, Rfl: 3    PREDNISONE PO, Take 4 mg by mouth Every other day, Disp: , Rfl:     pyridoxine (VITAMIN B6) 100 mg tablet, Take 100 mg by mouth daily  , Disp: , Rfl:     simvastatin (ZOCOR) 40 mg tablet, Take 1 tablet (40 mg total) by mouth daily, Disp: 60 tablet, Rfl: 3    torsemide (DEMADEX) 20 mg tablet, Take 1 tablet by mouth twice daily, Disp: 10 tablet, Rfl: 0    traMADol (ULTRAM) 50 mg tablet, TAKE 1 TABLET BY MOUTH EVERY 6 HOURS AS NEEDED FOR MODERATE PAIN, Disp: 90 tablet, Rfl: 3    Past Medical History:       Past Medical History:   Diagnosis Date    Abnormal finding on breast imaging     last assessed 11/30/17    Acute bacterial bronchitis     Allergic rhinitis     Arthralgia     Arthritis     Atherosclerotic heart disease of native coronary artery without angina pectoris     BBB (bundle branch block)     left,last assesed 6/4/12    BBB (bundle branch block)     left    Benign paroxysmal vertigo     last assessed 9/7/12    Benign paroxysmal vertigo of left ear     Bilateral fibrocystic breast changes     Bilateral sacroiliitis (HCC)     Cardiac defibrillator in situ     Carpal tunnel syndrome, unspecified upper limb     CHF (congestive heart failure) (HCC)     chronic systolic/diastolic    Chronic diastolic congestive heart failure ([facility] [address]     Chronic kidney disease     stage 3    Chronic systolic congestive heart failure (HCC)     Coronary artery disease     Cough     Depression     Detrusor instability     Diabetes mellitus (HCC)     Difficulty walking up stairs     Dilated cardiomyopathy ([facility] [address] [facility]     Dilated cardiomyopathy (HCC)     Disease of thyroid gland     Disorder of intervertebral disc of cervical spine     Esophageal reflux     GERD (gastroesophageal reflux disease)     Gout     Hemorrhoids     History of blood clots     Hormone replacement therapy     Hx of blood clots     Hyperlipidemia     Hypertension     Hypokalemia     Hypothyroidism     Indigestion     Inflamed toenail, left     Ingrown nail     Low back pain     left    OAB (overactive bladder)     detrusor instability    Obesity     AZ (obstructive sleep apnea)     Osteoarthritis     Papilloma of left breast     Psoriasis     psoriatic arthropathy    Psoriatic arthropathy (HCC)     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    RLS (restless legs syndrome)     Sciatica     Shortness of breath     Sleep apnea     unspecified type    Tendinitis     Trigger thumb, left thumb     Urinary frequency     UTI (urinary tract infection)     Vitamin D deficiency         Past Surgical History:   Procedure Laterality Date    BLADDER SURGERY  1999    lift and repair    BREAST BIOPSY Left 05/23/2016    CARDIAC CATHETERIZATION      outcome:  successful    CARDIAC DEFIBRILLATOR PLACEMENT      CARPAL TUNNEL RELEASE Bilateral 1997    CATARACT EXTRACTION      COLONOSCOPY      CORONARY ANGIOPLASTY WITH STENT PLACEMENT      CYSTOSCOPY W/ URETEROSCOPY  2009    DE QUERVAIN'S RELEASE Left 2011    and trigger finger release    EYE SURGERY Left 1999    INSERT / REPLACE / REMOVE PACEMAKER      JOINT REPLACEMENT Right 09/2017    Knee    KNEE ARTHROSCOPY      therapeutic    NEUROPLASTY / TRANSPOSITION MEDIAN NERVE AT CARPAL TUNNEL      OOPHORECTOMY      MS INCISE FINGER TENDON SHEATH Left 3/2/2017    Procedure: SMALL TRIGGER FINGER RELEASE;  Surgeon: Wayne Donato MD;  Location: [facility];  Service: Orthopedics    RECTAL SURGERY  2006    repair of rectal ulcer    SHOULDER ARTHROSCOPY Left 2006    TOTAL ABDOMINAL HYSTERECTOMY      TOTAL KNEE ARTHROPLASTY Left     TOTAL SHOULDER REPLACEMENT Left 2007    TRIGGER FINGER RELEASE Bilateral 2011    right haand ,left hand     US GUIDED BREAST BIOPSY LEFT COMPLETE Left 2016        Family History   Problem Relation Age of Onset    Hypertension Mother     Alzheimer's disease Mother     Cancer Father 70        bladder    Prostate cancer Father 70    Cancer Brother         pt shared some type of blood cancer    Breast cancer Paternal Grandmother         age dx unk    Stomach cancer Paternal Aunt 72        Social History     Socioeconomic History    Marital status:       Spouse name: Not on file    Number of children: Not on file    Years of education: Not on file    Highest education level: Not on file   Occupational History    Not on file   Social Needs    Financial resource strain: Not on file    Food insecurity:     Worry: Not on file     Inability: Not on file    Transportation needs:     Medical: Not on file     Non-medical: Not on file   Tobacco Use    Smoking status: Former Smoker     Packs/day: 1 00     Years: 15 00     Pack years: 15 00     Last attempt to quit: 1977     Years since quittin 8    Smokeless tobacco: Never Used   Substance and Sexual Activity    Alcohol use: No    Drug use: No    Sexual activity: Not on file     Comment: birth control   Lifestyle    Physical activity:     Days per week: Not on file     Minutes per session: Not on file    Stress: Not on file   Relationships    Social connections:     Talks on phone: Not on file     Gets together: Not on file     Attends Alevism service: Not on file     Active member of club or organization: Not on file     Attends meetings of clubs or organizations: Not on file     Relationship status: Not on file    Intimate partner violence:     Fear of current or ex partner: Not on file     Emotionally abused: Not on file     Physically abused: Not on file     Forced sexual activity: Not on file   Other Topics Concern    Not on file   Social History Narrative    Always uses seat belt        Physical Exam:     /84 (Patient Position: Sitting, Cuff Size: Standard)   Pulse 77   Temp 97 8 °F (36 6 °C) (Tympanic)   Ht 5' (1 524 m)   Wt 78 1 kg (172 lb 3 2 oz)   SpO2 99%   BMI 33 63 kg/m²     Physical Exam   Constitutional: She is oriented to person, place, and time  She appears well-developed and well-nourished  HENT:   Head: Normocephalic and atraumatic  Right Ear: Hearing normal    Left Ear: Hearing normal    Nose: Nose normal    Mouth/Throat: Oropharynx is clear and moist  No oropharyngeal exudate  Unable to visualize TM, both ear impacted with cerumen  Eyes: Pupils are equal, round, and reactive to light  Conjunctivae and EOM are normal  Right eye exhibits no discharge  Left eye exhibits no discharge  No scleral icterus  Neck: Normal range of motion  Neck supple  Normal carotid pulses and no JVD present  Carotid bruit is not present  No tracheal deviation present  No thyromegaly present  Cardiovascular: Normal rate, regular rhythm and normal heart sounds  Exam reveals no gallop and no friction rub  No murmur heard  Pulmonary/Chest: Effort normal and breath sounds normal  No stridor  No respiratory distress  She has no wheezes  She has no rales  Abdominal: Soft  Bowel sounds are normal  She exhibits no distension and no mass  There is no tenderness  There is no guarding  Musculoskeletal: Normal range of motion  She exhibits no edema, tenderness or deformity  Lymphadenopathy:     She has no cervical adenopathy  Neurological: She is alert and oriented to person, place, and time   She displays normal reflexes  No cranial nerve deficit or sensory deficit  She exhibits normal muscle tone  Coordination normal    Skin: Skin is warm and dry  Psychiatric: She has a normal mood and affect  Her behavior is normal  Judgment and thought content normal    Vitals reviewed  Data:     Pre-operative work-up    Laboratory Results: I have personally reviewed the pertinent laboratory results/reports      EKG: I have personally reviewed pertinent reports  Chest x-ray: No order for CXR  Previous cardiopulmonary studies within the past year:  · Echocardiogram: None  · Cardiac Catheterization: None  · Stress Test: None  · Pulmonary Function Testing: None      Assessment & Recommendations:     1  Preop examination      pre-op H&P completed and clearance issued (stable co-morbidities)   2  Cataract of left eye, unspecified cataract type     3  Type 2 diabetes mellitus with stage 3 chronic kidney disease, without long-term current use of insulin ([facility] [address] )     4  Essential hypertension         Pre-Op Evaluation Assessment  68 y o  female with planned surgery: left cataract extraction  Known risk factors for perioperative complications: Coronary disease  Diabetes mellitus  Current medications which may produce withdrawal symptoms if withheld perioperatively: None  Pre-Op Evaluation Plan  1  Further preoperative workup as follows:   - None; no further preoperative work-up is required    2  Medication Management/Recommendations:   - None, continue medication regimen including morning of surgery, with sip of water    3  Prophylaxis for cardiac events with perioperative beta-blockers: not indicated  4  Patient requires further consultation with: None    Clearance  Patient is CLEARED for surgery without any additional cardiac testing       Walter Porras MD  [address] [facility] [address]  Phone#  [phone]  Fax#  [phone]

## 2019-12-02 ENCOUNTER — HOSPITAL ENCOUNTER (OUTPATIENT)
Dept: MAMMOGRAPHY | Facility: CLINIC | Age: 69
Discharge: HOME | End: 2019-12-02
Payer: MEDICARE

## 2019-12-02 VITALS — HEIGHT: 60 IN | BODY MASS INDEX: 33.77 KG/M2 | WEIGHT: 172 LBS

## 2019-12-02 DIAGNOSIS — Z12.31 SCREENING MAMMOGRAM, ENCOUNTER FOR: ICD-10-CM

## 2019-12-02 PROCEDURE — 77063 BREAST TOMOSYNTHESIS BI: CPT

## 2019-12-02 PROCEDURE — 77067 SCR MAMMO BI INCL CAD: CPT

## 2019-12-03 ENCOUNTER — OFFICE VISIT (OUTPATIENT)
Dept: FAMILY MEDICINE CLINIC | Facility: HOSPITAL | Age: 69
End: 2019-12-03
Payer: MEDICARE

## 2019-12-03 VITALS
HEIGHT: 60 IN | WEIGHT: 174 LBS | SYSTOLIC BLOOD PRESSURE: 142 MMHG | BODY MASS INDEX: 34.16 KG/M2 | TEMPERATURE: 98.4 F | DIASTOLIC BLOOD PRESSURE: 84 MMHG | HEART RATE: 75 BPM

## 2019-12-03 DIAGNOSIS — N18.30 CKD (CHRONIC KIDNEY DISEASE) STAGE 3, GFR 30-59 ML/MIN (HCC): ICD-10-CM

## 2019-12-03 DIAGNOSIS — E03.9 ACQUIRED HYPOTHYROIDISM: ICD-10-CM

## 2019-12-03 DIAGNOSIS — Z11.59 ENCOUNTER FOR HEPATITIS C SCREENING TEST FOR LOW RISK PATIENT: ICD-10-CM

## 2019-12-03 DIAGNOSIS — N18.30 TYPE 2 DIABETES MELLITUS WITH STAGE 3 CHRONIC KIDNEY DISEASE, WITHOUT LONG-TERM CURRENT USE OF INSULIN (HCC): ICD-10-CM

## 2019-12-03 DIAGNOSIS — I50.32 CHRONIC DIASTOLIC CONGESTIVE HEART FAILURE (HCC): ICD-10-CM

## 2019-12-03 DIAGNOSIS — E78.2 MIXED HYPERLIPIDEMIA: ICD-10-CM

## 2019-12-03 DIAGNOSIS — E11.22 TYPE 2 DIABETES MELLITUS WITH STAGE 3 CHRONIC KIDNEY DISEASE, WITHOUT LONG-TERM CURRENT USE OF INSULIN (HCC): ICD-10-CM

## 2019-12-03 DIAGNOSIS — E66.09 CLASS 1 OBESITY DUE TO EXCESS CALORIES WITH SERIOUS COMORBIDITY AND BODY MASS INDEX (BMI) OF 33.0 TO 33.9 IN ADULT: ICD-10-CM

## 2019-12-03 DIAGNOSIS — Z00.00 MEDICARE ANNUAL WELLNESS VISIT, SUBSEQUENT: Primary | ICD-10-CM

## 2019-12-03 DIAGNOSIS — I10 ESSENTIAL HYPERTENSION: ICD-10-CM

## 2019-12-03 PROCEDURE — 99214 OFFICE O/P EST MOD 30 MIN: CPT | Performed by: FAMILY MEDICINE

## 2019-12-03 PROCEDURE — G0439 PPPS, SUBSEQ VISIT: HCPCS | Performed by: FAMILY MEDICINE

## 2019-12-03 NOTE — PATIENT INSTRUCTIONS
Medicare Preventive Visit Patient Instructions  Thank you for completing your Welcome to Medicare Visit or Medicare Annual Wellness Visit today  Your next wellness visit will be due in one year (12/3/2020)  The screening/preventive services that you may require over the next 5-10 years are detailed below  Some tests may not apply to you based off risk factors and/or age  Screening tests ordered at today's visit but not completed yet may show as past due  Also, please note that scanned in results may not display below  Preventive Screenings:  Service Recommendations Previous Testing/Comments   Colorectal Cancer Screening  * Colonoscopy    * Fecal Occult Blood Test (FOBT)/Fecal Immunochemical Test (FIT)  * Fecal DNA/Cologuard Test  * Flexible Sigmoidoscopy Age: 54-65 years old   Colonoscopy: every 10 years (may be performed more frequently if at higher risk)  OR  FOBT/FIT: every 1 year  OR  Cologuard: every 3 years  OR  Sigmoidoscopy: every 5 years  Screening may be recommended earlier than age 48 if at higher risk for colorectal cancer  Also, an individualized decision between you and your healthcare provider will decide whether screening between the ages of 74-80 would be appropriate  Colonoscopy: 01/19/2012  FOBT/FIT: Not on file  Cologuard: Not on file  Sigmoidoscopy: Not on file    Screening Current     Breast Cancer Screening Age: 36 years old  Frequency: every 1-2 years  Not required if history of left and right mastectomy Mammogram: 12/02/2019    Screening Current   Cervical Cancer Screening Between the ages of 21-29, pap smear recommended once every 3 years  Between the ages of 33-67, can perform pap smear with HPV co-testing every 5 years     Recommendations may differ for women with a history of total hysterectomy, cervical cancer, or abnormal pap smears in past  Pap Smear: Not on file    Screening Not Indicated   Hepatitis C Screening Once for adults born between 1945 and 1965  More frequently in patients at high risk for Hepatitis C Hep C Antibody: Not on file       Diabetes Screening 1-2 times per year if you're at risk for diabetes or have pre-diabetes Fasting glucose: 99 mg/dL   A1C: 7 1 %    Screening Not Indicated  History Diabetes   Cholesterol Screening Once every 5 years if you don't have a lipid disorder  May order more often based on risk factors  Lipid panel: 07/22/2019    Screening Not Indicated  History Lipid Disorder     Other Preventive Screenings Covered by Medicare:  1  Abdominal Aortic Aneurysm (AAA) Screening: covered once if your at risk  You're considered to be at risk if you have a family history of AAA  2  Lung Cancer Screening: covers low dose CT scan once per year if you meet all of the following conditions: (1) Age 50-69; (2) No signs or symptoms of lung cancer; (3) Current smoker or have quit smoking within the last 15 years; (4) You have a tobacco smoking history of at least 30 pack years (packs per day multiplied by number of years you smoked); (5) You get a written order from a healthcare provider  3  Glaucoma Screening: covered annually if you're considered high risk: (1) You have diabetes OR (2) Family history of glaucoma OR (3)  aged 48 and older OR (3)  American aged 72 and older  3  Osteoporosis Screening: covered every 2 years if you meet one of the following conditions: (1) You're estrogen deficient and at risk for osteoporosis based off medical history and other findings; (2) Have a vertebral abnormality; (3) On glucocorticoid therapy for more than 3 months; (4) Have primary hyperparathyroidism; (5) On osteoporosis medications and need to assess response to drug therapy  · Last bone density test (DXA Scan): 07/24/2018  5  HIV Screening: covered annually if you're between the age of 12-76  Also covered annually if you are younger than 13 and older than 72 with risk factors for HIV infection   For pregnant patients, it is covered up to 3 times per pregnancy  Immunizations:  Immunization Recommendations   Influenza Vaccine Annual influenza vaccination during flu season is recommended for all persons aged >= 6 months who do not have contraindications   Pneumococcal Vaccine (Prevnar and Pneumovax)  * Prevnar = PCV13  * Pneumovax = PPSV23   Adults 25-60 years old: 1-3 doses may be recommended based on certain risk factors  Adults 72 years old: Prevnar (PCV13) vaccine recommended followed by Pneumovax (PPSV23) vaccine  If already received PPSV23 since turning 65, then PCV13 recommended at least one year after PPSV23 dose  Hepatitis B Vaccine 3 dose series if at intermediate or high risk (ex: diabetes, end stage renal disease, liver disease)   Tetanus (Td) Vaccine - COST NOT COVERED BY MEDICARE PART B Following completion of primary series, a booster dose should be given every 10 years to maintain immunity against tetanus  Td may also be given as tetanus wound prophylaxis  Tdap Vaccine - COST NOT COVERED BY MEDICARE PART B Recommended at least once for all adults  For pregnant patients, recommended with each pregnancy  Shingles Vaccine (Shingrix) - COST NOT COVERED BY MEDICARE PART B  2 shot series recommended in those aged 48 and above     Health Maintenance Due:      Topic Date Due    Hepatitis C Screening  1950    DXA SCAN  07/24/2020    MAMMOGRAM  12/02/2020    CRC Screening: Colonoscopy  01/19/2022     Immunizations Due:      Topic Date Due    Hepatitis B Vaccine (1 of 3 - Risk 3-dose series) 03/18/1967     Advance Directives   What are advance directives? Advance directives are legal documents that state your wishes and plans for medical care  These plans are made ahead of time in case you lose your ability to make decisions for yourself  Advance directives can apply to any medical decision, such as the treatments you want, and if you want to donate organs  What are the types of advance directives?   There are many types of advance directives, and each state has rules about how to use them  You may choose a combination of any of the following:  · Living will: This is a written record of the treatment you want  You can also choose which treatments you do not want, which to limit, and which to stop at a certain time  This includes surgery, medicine, IV fluid, and tube feedings  · Durable power of  for healthcare [facility]): This is a written record that states who you want to make healthcare choices for you when you are unable to make them for yourself  This person, called a proxy, is usually a family member or a friend  You may choose more than 1 proxy  · Do not resuscitate (DNR) order:  A DNR order is used in case your heart stops beating or you stop breathing  It is a request not to have certain forms of treatment, such as CPR  A DNR order may be included in other types of advance directives  · Medical directive: This covers the care that you want if you are in a coma, near death, or unable to make decisions for yourself  You can list the treatments you want for each condition  Treatment may include pain medicine, surgery, blood transfusions, dialysis, IV or tube feedings, and a ventilator (breathing machine)  · Values history: This document has questions about your views, beliefs, and how you feel and think about life  This information can help others choose the care that you would choose  Why are advance directives important? An advance directive helps you control your care  Although spoken wishes may be used, it is better to have your wishes written down  Spoken wishes can be misunderstood, or not followed  Treatments may be given even if you do not want them  An advance directive may make it easier for your family to make difficult choices about your care     Weight Management   Why it is important to manage your weight:  Being overweight increases your risk of health conditions such as heart disease, high blood pressure, type 2 diabetes, and certain types of cancer  It can also increase your risk for osteoarthritis, sleep apnea, and other respiratory problems  Aim for a slow, steady weight loss  Even a small amount of weight loss can lower your risk of health problems  How to lose weight safely:  A safe and healthy way to lose weight is to eat fewer calories and get regular exercise  You can lose up about 1 pound a week by decreasing the number of calories you eat by 500 calories each day  Healthy meal plan for weight management:  A healthy meal plan includes a variety of foods, contains fewer calories, and helps you stay healthy  A healthy meal plan includes the following:  · Eat whole-grain foods more often  A healthy meal plan should contain fiber  Fiber is the part of grains, fruits, and vegetables that is not broken down by your body  Whole-grain foods are healthy and provide extra fiber in your diet  Some examples of whole-grain foods are whole-wheat breads and pastas, oatmeal, brown rice, and bulgur  · Eat a variety of vegetables every day  Include dark, leafy greens such as spinach, kale, taj greens, and mustard greens  Eat yellow and orange vegetables such as carrots, sweet potatoes, and winter squash  · Eat a variety of fruits every day  Choose fresh or canned fruit (canned in its own juice or light syrup) instead of juice  Fruit juice has very little or no fiber  · Eat low-fat dairy foods  Drink fat-free (skim) milk or 1% milk  Eat fat-free yogurt and low-fat cottage cheese  Try low-fat cheeses such as mozzarella and other reduced-fat cheeses  · Choose meat and other protein foods that are low in fat  Choose beans or other legumes such as split peas or lentils  Choose fish, skinless poultry (chicken or turkey), or lean cuts of red meat (beef or pork)  Before you cook meat or poultry, cut off any visible fat  · Use less fat and oil  Try baking foods instead of frying them   Add less fat, such as margarine, sour cream, regular salad dressing and mayonnaise to foods  Eat fewer high-fat foods  Some examples of high-fat foods include french fries, doughnuts, ice cream, and cakes  · Eat fewer sweets  Limit foods and drinks that are high in sugar  This includes candy, cookies, regular soda, and sweetened drinks  Exercise:  Exercise at least 30 minutes per day on most days of the week  Some examples of exercise include walking, biking, dancing, and swimming  You can also fit in more physical activity by taking the stairs instead of the elevator or parking farther away from stores  Ask your healthcare provider about the best exercise plan for you  © Copyright [employer] 2018 Information is for End User's use only and may not be sold, redistributed or otherwise used for commercial purposes  All illustrations and images included in CareNotes® are the copyrighted property of A D A M , Inc  or [employer] Visit for Adults   AMBULATORY CARE:   A wellness visit  is when you see your healthcare provider to get screened for health problems  You can also get advice on how to stay healthy  Write down your questions so you remember to ask them  Ask your healthcare provider how often you should have a wellness visit  What happens at a wellness visit:  Your healthcare provider will ask about your health, and your family history of health problems  This includes high blood pressure, heart disease, and cancer  He or she will ask if you have symptoms that concern you, if you smoke, and about your mood  You may also be asked about your intake of medicines, supplements, food, and alcohol  Any of the following may be done:  · Your weight  will be checked  Your height may also be checked so your body mass index (BMI) can be calculated  Your BMI shows if you are at a healthy weight  · Your blood pressure  and heart rate will be checked  Your temperature may also be checked       · Blood and urine tests may be done  Blood tests may be done to check your cholesterol levels  Abnormal cholesterol levels increase your risk for heart disease and stroke  You may also need a blood or urine test to check for diabetes if you are at increased risk  Urine tests may be done to look for signs of an infection or kidney disease  · A physical exam  includes checking your heartbeat and lungs with a stethoscope  Your healthcare provider may also check your skin to look for sun damage  · Screening tests  may be recommended  A screening test is done to check for diseases that may not cause symptoms  The screening tests you may need depend on your age, gender, family history, and lifestyle habits  For example, colorectal screening may be recommended if you are 48years old or older  Screening tests you need if you are a woman:   · A Pap smear  is used to screen for cervical cancer  Pap smears are usually done every 3 to 5 years depending on your age  You may need them more often if you have had abnormal Pap smear test results in the past  Ask your healthcare provider how often you should have a Pap smear  · A mammogram  is an x-ray of your breasts to screen for breast cancer  Experts recommend mammograms every 2 years starting at age 48 years  You may need a mammogram at age 52 years or younger if you have an increased risk for breast cancer  Talk to your healthcare provider about when you should start having mammograms and how often you need them  Vaccines you may need:   · Get an influenza vaccine  every year  The influenza vaccine protects you from the flu  Several types of viruses cause the flu  The viruses change over time, so new vaccines are made each year  · Get a tetanus-diphtheria (Td) booster vaccine  every 10 years  This vaccine protects you against tetanus and diphtheria  Tetanus is a severe infection that may cause painful muscle spasms and lockjaw   Diphtheria is a severe bacterial infection that causes a thick covering in the back of your mouth and throat  · Get a human papillomavirus (HPV) vaccine  if you are female and aged 23 to 32 or male 23 to 24 and never received it  This vaccine protects you from HPV infection  HPV is the most common infection spread by sexual contact  HPV may also cause vaginal, penile, and anal cancers  · Get a pneumococcal vaccine  if you are aged 72 years or older  The pneumococcal vaccine is an injection given to protect you from pneumococcal disease  Pneumococcal disease is an infection caused by pneumococcal bacteria  The infection may cause pneumonia, meningitis, or an ear infection  · Get a shingles vaccine  if you are aged 61 or older, even if you have had shingles before  The shingles vaccine is an injection to protect you from the varicella-zoster virus  This is the same virus that causes chickenpox  Shingles is a painful rash that develops in people who had chickenpox or have been exposed to the virus  How to eat healthy:  My Plate is a model for planning healthy meals  It shows the types and amounts of foods that should go on your plate  Fruits and vegetables make up about half of your plate, and grains and protein make up the other half  A serving of dairy is included on the side of your plate  The amount of calories and serving sizes you need depends on your age, gender, weight, and height  Examples of healthy foods are listed below:  · Eat a variety of vegetables  such as dark green, red, and orange vegetables  You can also include canned vegetables low in sodium (salt) and frozen vegetables without added butter or sauces  · Eat a variety of fresh fruits , canned fruit in 100% juice, frozen fruit, and dried fruit  · Include whole grains  At least half of the grains you eat should be whole grains   Examples include whole-wheat bread, wheat pasta, brown rice, and whole-grain cereals such as oatmeal     · Eat a variety of protein foods such as seafood (fish and shellfish), lean meat, and poultry without skin (turkey and chicken)  Examples of lean meats include pork leg, shoulder, or tenderloin, and beef round, sirloin, tenderloin, and extra lean ground beef  Other protein foods include eggs and egg substitutes, beans, peas, soy products, nuts, and seeds  · Choose low-fat dairy products such as skim or 1% milk or low-fat yogurt, cheese, and cottage cheese  · Limit unhealthy fats  such as butter, hard margarine, and shortening  Exercise:  Exercise at least 30 minutes per day on most days of the week  Some examples of exercise include walking, biking, dancing, and swimming  You can also fit in more physical activity by taking the stairs instead of the elevator or parking farther away from stores  Include muscle strengthening activities 2 days each week  Regular exercise provides many health benefits  It helps you manage your weight, and decreases your risk for type 2 diabetes, heart disease, stroke, and high blood pressure  Exercise can also help improve your mood  Ask your healthcare provider about the best exercise plan for you  General health and safety guidelines:   · Do not smoke  Nicotine and other chemicals in cigarettes and cigars can cause lung damage  Ask your healthcare provider for information if you currently smoke and need help to quit  E-cigarettes or smokeless tobacco still contain nicotine  Talk to your healthcare provider before you use these products  · Limit alcohol  A drink of alcohol is 12 ounces of beer, 5 ounces of wine, or 1½ ounces of liquor  · Lose weight, if needed  Being overweight increases your risk of certain health conditions  These include heart disease, high blood pressure, type 2 diabetes, and certain types of cancer  · Protect your skin  Do not sunbathe or use tanning beds  Use sunscreen with a SPF 15 or higher  Apply sunscreen at least 15 minutes before you go outside  Reapply sunscreen every 2 hours  Wear protective clothing, hats, and sunglasses when you are outside  · Drive safely  Always wear your seatbelt  Make sure everyone in your car wears a seatbelt  A seatbelt can save your life if you are in an accident  Do not use your cell phone when you are driving  This could distract you and cause an accident  Pull over if you need to make a call or send a text message  · Practice safe sex  Use latex condoms if are sexually active and have more than one partner  Your healthcare provider may recommend screening tests for sexually transmitted infections (STIs)  · Wear helmets, lifejackets, and protective gear  Always wear a helmet when you ride a bike or motorcycle, go skiing, or play sports that could cause a head injury  Wear protective equipment when you play sports  Wear a lifejacket when you are on a boat or doing water sports  © 2017 [employer]  Information is for End User's use only and may not be sold, redistributed or otherwise used for commercial purposes  All illustrations and images included in CareNotes® are the copyrighted property of A [employer] A M , Inc  or [employer]  The above information is an  only  It is not intended as medical advice for individual conditions or treatments  Talk to your doctor, nurse or pharmacist before following any medical regimen to see if it is safe and effective for you

## 2019-12-03 NOTE — PROGRESS NOTES
Assessment/Plan:         Diagnoses and all orders for this visit:    Medicare annual wellness visit, subsequent    Type 2 diabetes mellitus with stage 3 chronic kidney disease, without long-term current use of insulin ([facility] )  Comments:  Stable DM control    Acquired hypothyroidism  Comments:  Clinically euthyroid  Orders:  -     TSH, 3rd generation with Free T4 reflex; Future    Class 1 obesity due to excess calories with serious comorbidity and body mass index (BMI) of 33 0 to 33 9 in adult    Mixed hyperlipidemia  -     Lipid Panel with Direct LDL reflex; Future    CKD (chronic kidney disease) stage 3, GFR 30-59 ml/min ([facility])  -     Comprehensive metabolic panel; Future    Chronic diastolic congestive heart failure ([facility] )  Comments:  Return to Dr Ada Mccarthy  Orders:  -     HEMOGLOBIN A1C W/ EAG ESTIMATION; Future  -     Ambulatory referral to Cardiology; Future    Essential hypertension  Comments:  Excellent BP control  Orders:  -     CBC and differential; Future    Encounter for hepatitis C screening test for low risk patient  -     Hepatitis C antibody; Future          Subjective:      Patient ID: Al Guevara is a 68 y o  female  4 month follow up    Had extreme family stress with son who had spinal cord hematoma and is paralyzed from it  No recent illness on her own part  Not testing her own glucoses  The following portions of the patient's history were reviewed and updated as appropriate: allergies, current medications, past family history, past medical history, past social history, past surgical history and problem list     Review of Systems   Constitutional: Negative for unexpected weight change  All other systems reviewed and are negative          Objective:      /84   Pulse 75   Temp 98 4 °F (36 9 °C)   Ht 5' 0 25" (1 53 m)   Wt 78 9 kg (174 lb)   BMI 33 70 kg/m²          Physical Exam

## 2019-12-03 NOTE — PROGRESS NOTES
Assessment and Plan:     Problem List Items Addressed This Visit        Endocrine    Acquired hypothyroidism    Relevant Orders    TSH, 3rd generation with Free T4 reflex    Type 2 diabetes mellitus with stage 3 chronic kidney disease, without long-term current use of insulin (HCC)       Cardiovascular and Mediastinum    Chronic diastolic congestive heart failure ([address] )    Relevant Orders    HEMOGLOBIN A1C W/ EAG ESTIMATION    Ambulatory referral to Cardiology    Essential hypertension    Relevant Orders    CBC and differential       Genitourinary    CKD (chronic kidney disease) stage 3, GFR 30-59 ml/min (HCC)    Relevant Orders    Comprehensive metabolic panel       Other    Mixed hyperlipidemia    Relevant Orders    Lipid Panel with Direct LDL reflex    Class 1 obesity due to excess calories with serious comorbidity and body mass index (BMI) of 33 0 to 33 9 in adult    Medicare annual wellness visit, subsequent - Primary      Other Visit Diagnoses     Encounter for hepatitis C screening test for low risk patient        Relevant Orders    Hepatitis C antibody           Preventive health issues were discussed with patient, and age appropriate screening tests were ordered as noted in patient's After Visit Summary  Personalized health advice and appropriate referrals for health education or preventive services given if needed, as noted in patient's After Visit Summary       History of Present Illness:     Patient presents for Medicare Annual Wellness visit    Patient Care Team:  Thom Gardner MD as PCP - General  MD Amy Sheehan MD Vannie Media, MD Cloria Morris, MD     Problem List:     Patient Active Problem List   Diagnosis    Trigger little finger of left hand    Allergic rhinitis    Arthralgia of knee, left    Arthralgia of knee, right    Coronary artery disease    Benign positional vertigo, left    Bilateral fibrocystic breast changes    Bilateral sacroiliitis (HCC)    Chronic systolic congestive heart failure (HCC)    CKD (chronic kidney disease) stage 3, GFR 30-59 ml/min (HCC)    Chronic diastolic congestive heart failure (HCC)    Presence of cardioverter defibrillator    DDD (degenerative disc disease), cervical    Depression    Esophageal reflux    Gout    Mixed hyperlipidemia    Essential hypertension    Hypokalemia    Acquired hypothyroidism    Class 1 obesity due to excess calories with serious comorbidity and body mass index (BMI) of 33 0 to 33 9 in adult    Obstructive sleep apnea    Osteoarthritis    Overactive bladder    Psoriasis    Restless legs syndrome    Type 2 diabetes mellitus (HCC)    Type 2 diabetes mellitus with stage 3 chronic kidney disease, without long-term current use of insulin ([facility] )    Vitamin D deficiency    Medicare annual wellness visit, subsequent    Lumbar spondylosis    Chronic left-sided low back pain without sciatica    Hypercalcemia    Leukocytosis    Screening for colon cancer    Dense breast tissue    Family history of breast cancer    Need for pneumococcal vaccination    Nausea and vomiting    Gastroesophageal reflux disease with esophagitis    Abnormal computerized axial tomography of abdomen    [facility]athyroidism ([facility] )      Past Medical and Surgical History:     Past Medical History:   Diagnosis Date    Abnormal finding on breast imaging     last assessed 11/30/17    Acute bacterial bronchitis     Allergic rhinitis     Arthralgia     Arthritis     Atherosclerotic heart disease of native coronary artery without angina pectoris     BBB (bundle branch block)     left,last assesed 6/4/12    BBB (bundle branch block)     left    Benign paroxysmal vertigo     last assessed 9/7/12    Benign paroxysmal vertigo of left ear     Bilateral fibrocystic breast changes     Bilateral sacroiliitis (HCC)     Cardiac defibrillator in situ     Carpal tunnel syndrome, unspecified upper limb  CHF (congestive heart failure) (HCC)     chronic systolic/diastolic    Chronic diastolic congestive heart failure (HCC)     Chronic kidney disease     stage 3    Chronic systolic congestive heart failure (HCC)     Coronary artery disease     Cough     Depression     Detrusor instability     Diabetes mellitus (HCC)     Difficulty walking up stairs     Dilated cardiomyopathy ([address] )     Dilated cardiomyopathy (HCC)     Disease of thyroid gland     Disorder of intervertebral disc of cervical spine     Esophageal reflux     GERD (gastroesophageal reflux disease)     Gout     Hemorrhoids     History of blood clots     Hormone replacement therapy     Hx of blood clots     Hyperlipidemia     Hypertension     Hypokalemia     Hypothyroidism     Indigestion     Inflamed toenail, left     Ingrown nail     Low back pain     left    OAB (overactive bladder)     detrusor instability    Obesity     AZ (obstructive sleep apnea)     Osteoarthritis     Papilloma of left breast     Psoriasis     psoriatic arthropathy    Psoriatic arthropathy (HCC)     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    RLS (restless legs syndrome)     Sciatica     Shortness of breath     Sleep apnea     unspecified type    Tendinitis     Trigger thumb, left thumb     Urinary frequency     UTI (urinary tract infection)     Vitamin D deficiency      Past Surgical History:   Procedure Laterality Date    BLADDER SURGERY  1999    lift and repair    BREAST BIOPSY Left 05/23/2016    US CORE BIOPSY-BENIGN    CARDIAC CATHETERIZATION      outcome:  successful    CARDIAC DEFIBRILLATOR PLACEMENT      CARPAL TUNNEL RELEASE Bilateral 1997    CATARACT EXTRACTION      COLONOSCOPY      CORONARY ANGIOPLASTY WITH STENT PLACEMENT      CYSTOSCOPY W/ URETEROSCOPY  2009    DE QUERVAIN'S RELEASE Left 2011    and trigger finger release    EYE SURGERY Left 1999    EYE SURGERY Left 11/13/2019    Cataract    EYE SURGERY Right 11/09/2019 Cataract    INSERT / REPLACE / REMOVE PACEMAKER      JOINT REPLACEMENT Right 2017    Knee    KNEE ARTHROSCOPY      therapeutic    NEUROPLASTY / TRANSPOSITION MEDIAN NERVE AT CARPAL TUNNEL      OOPHORECTOMY Bilateral     AGE 44    SD INCISE FINGER TENDON SHEATH Left 3/2/2017    Procedure: SMALL TRIGGER FINGER RELEASE;  Surgeon: Robbin Ayala MD;  Location: [facility];  Service: Orthopedics    RECTAL SURGERY  2006    repair of rectal ulcer    SHOULDER ARTHROSCOPY Left 2006    TOTAL ABDOMINAL HYSTERECTOMY      AGE 53    TOTAL KNEE ARTHROPLASTY Left     TOTAL SHOULDER REPLACEMENT Left 2007    TRIGGER FINGER RELEASE Bilateral     right haand ,left hand ,    US GUIDED BREAST BIOPSY LEFT COMPLETE Left 2016      Family History:     Family History   Problem Relation Age of Onset    Hypertension Mother     Alzheimer's disease Mother     Cancer Father 70        bladder    Prostate cancer Father 70    Cancer Brother         pt shared some type of blood cancer    Breast cancer Paternal Grandmother         age dx unk    Stomach cancer Paternal Aunt 72      Social History:     Social History     Socioeconomic History    Marital status:       Spouse name: None    Number of children: None    Years of education: None    Highest education level: None   Occupational History    None   Social Needs    Financial resource strain: None    Food insecurity:     Worry: None     Inability: None    Transportation needs:     Medical: None     Non-medical: None   Tobacco Use    Smoking status: Former Smoker     Packs/day: 1 00     Years: 15 00     Pack years: 15 00     Last attempt to quit:      Years since quittin 9    Smokeless tobacco: Never Used   Substance and Sexual Activity    Alcohol use: No    Drug use: No    Sexual activity: None     Comment: birth control   Lifestyle    Physical activity:     Days per week: None     Minutes per session: None    Stress: None   Relationships    Social connections:     Talks on phone: None     Gets together: None     Attends [employer] service: None     Active member of club or organization: None     Attends meetings of clubs or organizations: None     Relationship status: None    Intimate partner violence:     Fear of current or ex partner: None     Emotionally abused: None     Physically abused: None     Forced sexual activity: None   Other Topics Concern    None   Social History Narrative    Always uses seat belt       Medications and Allergies:     Current Outpatient Medications   Medication Sig Dispense Refill    allopurinol (ZYLOPRIM) 100 mg tablet TAKE 1 TABLET BY MOUTH DAILY 90 tablet 3    aspirin 81 MG tablet Take 81 mg by mouth daily   calcium carbonate (OS-MANINDER) 600 MG tablet Take 1,200 mg by mouth daily      candesartan (ATACAND) 32 MG tablet Take 1 tablet (32 mg total) by mouth daily 90 tablet 2    celecoxib (CeleBREX) 200 mg capsule Take 1 capsule (200 mg total) by mouth 2 (two) times a day 180 capsule 3    cetirizine (ZyrTEC) 10 mg tablet Take 10 mg by mouth daily      Cholecalciferol (VITAMIN D3) 1000 units CAPS Take by mouth      cyclopentolate (CYCLOGYL) 1 % ophthalmic solution Instill 1 drop to operative eye day of surgery as directed  0    DAILY MULTIPLE VITAMINS PO Take 1 tablet by mouth daily      FLUoxetine (PROzac) 20 mg capsule Take 1 capsule (20 mg total) by mouth daily 60 capsule 3    JANUVIA 100 MG tablet TAKE 1 TABLET BY MOUTH DAILY 30 tablet 5    ketorolac (ACULAR) 0 4 % SOLN INSTILL 1 DROP IN OPERATIVE EYE 4 TIMES DAILY AS DIRECTED  1    levothyroxine 25 mcg tablet TAKE 1 TABLET BY MOUTH DAILY 90 tablet 3    Magnesium 250 MG TABS Take by mouth daily      metoprolol succinate (TOPROL-XL) 100 mg 24 hr tablet Take 1 tablet (100 mg total) by mouth daily 60 tablet 3    Minoxidil (ROGAINE WOMENS EX) Apply 1 mL topically        ofloxacin (OCUFLOX) 0 3 % ophthalmic solution INSTILL 1 DROP TO OPERATIVE EYE 4 TIMES DAILY AS DIRECTED  0    omeprazole (PriLOSEC) 40 MG capsule Take 1 capsule (40 mg total) by mouth daily 60 capsule 3    potassium chloride (K-DUR,KLOR-CON) 20 mEq tablet TAKE 1 TABLET BY MOUTH TWICE DAILY 180 tablet 3    PREDNISONE PO Take 4 mg by mouth Every other day      pyridoxine (VITAMIN B6) 100 mg tablet Take 100 mg by mouth daily   simvastatin (ZOCOR) 40 mg tablet Take 1 tablet (40 mg total) by mouth daily 60 tablet 3    torsemide (DEMADEX) 20 mg tablet Take 1 tablet by mouth twice daily 10 tablet 0    traMADol (ULTRAM) 50 mg tablet TAKE 1 TABLET BY MOUTH EVERY 6 HOURS AS NEEDED FOR MODERATE PAIN 90 tablet 3     No current facility-administered medications for this visit  No Known Allergies   Immunizations:     Immunization History   Administered Date(s) Administered    Influenza Split High Dose Preservative Free IM 09/24/2013, 10/16/2014, 11/03/2015, 10/05/2016, 10/13/2017    Influenza TIV (IM) 09/07/2012    Influenza, high dose seasonal 0 5 mL 09/20/2018, 10/01/2019    Pneumococcal Conjugate 13-Valent 06/24/2015    Pneumococcal Polysaccharide PPV23 10/01/2000, 10/01/2005, 11/26/2018    Zoster Vaccine Recombinant 09/19/2019, 11/20/2019      Health Maintenance:         Topic Date Due    Hepatitis C Screening  1950    DXA SCAN  07/24/2020    MAMMOGRAM  12/02/2020    CRC Screening: Colonoscopy  01/19/2022         Topic Date Due    Hepatitis B Vaccine (1 of 3 - Risk 3-dose series) 03/18/1967      Medicare Health Risk Assessment:     /84   Pulse 75   Temp 98 4 °F (36 9 °C)   Ht 5' 0 25" (1 53 m)   Wt 78 9 kg (174 lb)   BMI 33 70 kg/m²      Janel Rodriguez is here for her Subsequent Wellness visit  Last Medicare Wellness visit information reviewed, patient interviewed and updates made to the record today  Health Risk Assessment:   Patient rates overall health as good  Patient feels that their physical health rating is same   Eyesight was rated as same  Hearing was rated as same  Patient feels that their emotional and mental health rating is same  Pain experienced in the last 7 days has been some  Patient's pain rating has been 5/10  Patient states that she has experienced no weight loss or gain in last 6 months  Left shoulder pain while moving arm    Depression Screening:   PHQ-2 Score: 1  PHQ-9 Score: 2      Fall Risk Screening: In the past year, patient has experienced: no history of falling in past year      Urinary Incontinence Screening:   Patient has not leaked urine accidently in the last six months  Home Safety:  Patient has trouble with stairs inside or outside of their home  Patient has working smoke alarms and has working carbon monoxide detector  Home safety hazards include: none  Nutrition:   Current diet is Regular  Medications:   Patient is currently taking over-the-counter supplements  OTC medications include: see medication list  Patient is able to manage medications  Activities of Daily Living (ADLs)/Instrumental Activities of Daily Living (IADLs):   Walk and transfer into and out of bed and chair?: Yes  Dress and groom yourself?: Yes    Bathe or shower yourself?: Yes    Feed yourself? Yes  Do your laundry/housekeeping?: Yes  Manage your money, pay your bills and track your expenses?: Yes  Make your own meals?: Yes    Do your own shopping?: Yes    Previous Hospitalizations:   Any hospitalizations or ED visits within the last 12 months?: No      Advance Care Planning:   Living will: Yes    Durable POA for healthcare:  Yes    Advanced directive: Yes    Advanced directive counseling given: No    Five wishes given: No    Patient declined ACP directive: No    End of Life Decisions reviewed with patient: Yes    Provider agrees with end of life decisions: Yes      Cognitive Screening:   Provider or family/friend/caregiver concerned regarding cognition?: No    PREVENTIVE SCREENINGS      Cardiovascular Screening:    General: History Lipid Disorder and Screening Current      Diabetes Screening:     General: History Diabetes and Screening Current      Colorectal Cancer Screening:     General: Screening Current      Breast Cancer Screening:     General: Screening Current      Cervical Cancer Screening:    General: Screening Not Indicated      Osteoporosis Screening:    General: Screening Current      Abdominal Aortic Aneurysm (AAA) Screening:        General: Screening Not Indicated      Lung Cancer Screening:     General: Screening Not Indicated      Hepatitis C Screening:    General: Screening Current      Trang May MD

## 2019-12-05 ENCOUNTER — OFFICE VISIT (OUTPATIENT)
Dept: SURGICAL ONCOLOGY | Facility: HOSPITAL | Age: 69
End: 2019-12-05
Payer: MEDICARE

## 2019-12-05 VITALS
HEART RATE: 105 BPM | TEMPERATURE: 98.2 F | SYSTOLIC BLOOD PRESSURE: 110 MMHG | HEIGHT: 60 IN | WEIGHT: 173.8 LBS | RESPIRATION RATE: 18 BRPM | BODY MASS INDEX: 34.12 KG/M2 | DIASTOLIC BLOOD PRESSURE: 70 MMHG

## 2019-12-05 DIAGNOSIS — Z12.31 SCREENING MAMMOGRAM, ENCOUNTER FOR: ICD-10-CM

## 2019-12-05 DIAGNOSIS — R92.2 DENSE BREAST TISSUE: Primary | ICD-10-CM

## 2019-12-05 DIAGNOSIS — Z80.3 FAMILY HISTORY OF BREAST CANCER: ICD-10-CM

## 2019-12-05 PROCEDURE — 99213 OFFICE O/P EST LOW 20 MIN: CPT | Performed by: SURGERY

## 2019-12-05 NOTE — PROGRESS NOTES
Surgical Oncology Follow Up       [facility]  [facility]  Linus  [address] [place] N[facility] [address] [facility]  1950  [id number]  [facility]  [facility] [address]  [address] [facility] [id number]    Chief Complaint   Patient presents with    Follow-up       Assessment/Plan   Diagnoses and all orders for this visit:    Dense breast tissue    Screening mammogram, encounter for  -     Mammo screening bilateral w 3d & cad; Future    Family history of breast cancer        Advance Care Planning/Advance Directives:  Did not discuss  with the patient  Oncology History:     No history exists  History of Present Illness: Follow-up visit secondary to dense breast tissue, family history and a prior benign biopsy of the left breast  -Interval History:  Recent mammogram    Review of Systems:  Review of Systems   Constitutional: Negative  Negative for appetite change and fever  Eyes: Negative  Respiratory: Negative for shortness of breath  Cardiovascular: Negative  Gastrointestinal: Negative  Endocrine: Negative  Genitourinary: Negative  Musculoskeletal: Negative  Negative for arthralgias and myalgias  Skin: Negative  Allergic/Immunologic: Negative  Neurological: Negative  Hematological: Negative  Negative for adenopathy  Does not bruise/bleed easily  Psychiatric/Behavioral: Negative          Patient Active Problem List   Diagnosis    Trigger little finger of left hand    Allergic rhinitis    Arthralgia of knee, left    Arthralgia of knee, right    Coronary artery disease    Benign positional vertigo, left    Bilateral fibrocystic breast changes    Bilateral sacroiliitis (HCC)    Chronic systolic congestive heart failure (HCC)    CKD (chronic kidney disease) stage 3, GFR 30-59 ml/min (HCC)    Chronic diastolic congestive heart failure (HCC)    Presence of cardioverter defibrillator    DDD (degenerative disc disease), cervical    Depression    Esophageal reflux    Gout    Mixed hyperlipidemia    Essential hypertension    Hypokalemia    Acquired hypothyroidism    Class 1 obesity due to excess calories with serious comorbidity and body mass index (BMI) of 33 0 to 33 9 in adult    Obstructive sleep apnea    Osteoarthritis    Overactive bladder    Psoriasis    Restless legs syndrome    Type 2 diabetes mellitus (HCC)    Type 2 diabetes mellitus with stage 3 chronic kidney disease, without long-term current use of insulin ([facility] )    Vitamin D deficiency    Medicare annual wellness visit, subsequent    Lumbar spondylosis    Chronic left-sided low back pain without sciatica    Hypercalcemia    Leukocytosis    Screening for colon cancer    Dense breast tissue    Family history of breast cancer    Need for pneumococcal vaccination    Nausea and vomiting    Gastroesophageal reflux disease with esophagitis    Abnormal computerized axial tomography of abdomen    Hyperparathyroidism ([facility] )    Screening mammogram, encounter for     Past Medical History:   Diagnosis Date    Abnormal finding on breast imaging     last assessed 11/30/17    Acute bacterial bronchitis     Allergic rhinitis     Arthralgia     Arthritis     Atherosclerotic heart disease of native coronary artery without angina pectoris     BBB (bundle branch block)     left,last assesed 6/4/12    BBB (bundle branch block)     left    Benign paroxysmal vertigo     last assessed 9/7/12    Benign paroxysmal vertigo of left ear     Bilateral fibrocystic breast changes     Bilateral sacroiliitis (HCC)     Cardiac defibrillator in situ     Carpal tunnel syndrome, unspecified upper limb     CHF (congestive heart failure) (HCC)     chronic systolic/diastolic    Chronic diastolic congestive heart failure (HCC)     Chronic kidney disease stage 3    Chronic systolic congestive heart failure (HCC)     Coronary artery disease     Cough     Depression     Detrusor instability     Diabetes mellitus ([address] )     Difficulty walking up stairs     Dilated cardiomyopathy ([address] )     Dilated cardiomyopathy (HCC)     Disease of thyroid gland     Disorder of intervertebral disc of cervical spine     Esophageal reflux     GERD (gastroesophageal reflux disease)     Gout     Hemorrhoids     History of blood clots     Hormone replacement therapy     Hx of blood clots     Hyperlipidemia     Hypertension     Hypokalemia     Hypothyroidism     Indigestion     Inflamed toenail, left     Ingrown nail     Low back pain     left    OAB (overactive bladder)     detrusor instability    Obesity     AZ (obstructive sleep apnea)     Osteoarthritis     Papilloma of left breast     Psoriasis     psoriatic arthropathy    Psoriatic arthropathy (HCC)     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    RLS (restless legs syndrome)     Sciatica     Shortness of breath     Sleep apnea     unspecified type    Tendinitis     Trigger thumb, left thumb     Urinary frequency     UTI (urinary tract infection)     Vitamin D deficiency      Past Surgical History:   Procedure Laterality Date    BLADDER SURGERY  1999    lift and repair    BREAST BIOPSY Left 05/23/2016    US CORE BIOPSY-BENIGN    CARDIAC CATHETERIZATION      outcome:  successful    CARDIAC DEFIBRILLATOR PLACEMENT      CARPAL TUNNEL RELEASE Bilateral 1997    CATARACT EXTRACTION      COLONOSCOPY      CORONARY ANGIOPLASTY WITH STENT PLACEMENT      CYSTOSCOPY W/ URETEROSCOPY  2009    DE QUERVAIN'S RELEASE Left 2011    and trigger finger release    EYE SURGERY Left 1999    EYE SURGERY Left 11/13/2019    Cataract    EYE SURGERY Right 11/09/2019    Cataract    INSERT / REPLACE / REMOVE PACEMAKER      JOINT REPLACEMENT Right 09/2017    Knee    KNEE ARTHROSCOPY      therapeutic    NEUROPLASTY / TRANSPOSITION MEDIAN NERVE AT CARPAL TUNNEL      OOPHORECTOMY Bilateral     AGE 53    [place] INCISE FINGER TENDON SHEATH Left 3/2/2017    Procedure: SMALL TRIGGER FINGER RELEASE;  Surgeon: Lucille Giron MD;  Location: [facility];  Service: Orthopedics    RECTAL SURGERY  2006    repair of rectal ulcer    SHOULDER ARTHROSCOPY Left 2006    TOTAL ABDOMINAL HYSTERECTOMY      AGE 53    TOTAL KNEE ARTHROPLASTY Left     TOTAL SHOULDER REPLACEMENT Left 2007    TRIGGER FINGER RELEASE Bilateral 2011    right haand ,,left hand     US GUIDED BREAST BIOPSY LEFT COMPLETE Left 2016     Family History   Problem Relation Age of Onset    Hypertension Mother     Alzheimer's disease Mother     Cancer Father 70        bladder    Prostate cancer Father 70    Cancer Brother         pt shared some type of blood cancer    Breast cancer Paternal Grandmother         age dx unk    Stomach cancer Paternal Aunt 72     Social History     Socioeconomic History    Marital status:       Spouse name: Not on file    Number of children: Not on file    Years of education: Not on file    Highest education level: Not on file   Occupational History    Not on file   Social Needs    Financial resource strain: Not on file    Food insecurity:     Worry: Not on file     Inability: Not on file    Transportation needs:     Medical: Not on file     Non-medical: Not on file   Tobacco Use    Smoking status: Former Smoker     Packs/day: 1 00     Years: 15 00     Pack years: 15 00     Last attempt to quit:      Years since quittin 9    Smokeless tobacco: Never Used   Substance and Sexual Activity    Alcohol use: No    Drug use: No    Sexual activity: Not on file     Comment: birth control   Lifestyle    Physical activity:     Days per week: Not on file     Minutes per session: Not on file    Stress: Not on file   Relationships    Social connections:     Talks on phone: Not on file     Gets together: Not on file     Attends Gnosticist service: Not on file     Active member of club or organization: Not on file     Attends meetings of clubs or organizations: Not on file     Relationship status: Not on file    Intimate partner violence:     Fear of current or ex partner: Not on file     Emotionally abused: Not on file     Physically abused: Not on file     Forced sexual activity: Not on file   Other Topics Concern    Not on file   Social History Narrative    Always uses seat belt       Current Outpatient Medications:     allopurinol (ZYLOPRIM) 100 mg tablet, TAKE 1 TABLET BY MOUTH DAILY, Disp: 90 tablet, Rfl: 3    aspirin 81 MG tablet, Take 81 mg by mouth daily  , Disp: , Rfl:     calcium carbonate (OS-MANINDER) 600 MG tablet, Take 1,200 mg by mouth daily, Disp: , Rfl:     candesartan (ATACAND) 32 MG tablet, Take 1 tablet (32 mg total) by mouth daily, Disp: 90 tablet, Rfl: 2    celecoxib (CeleBREX) 200 mg capsule, Take 1 capsule (200 mg total) by mouth 2 (two) times a day, Disp: 180 capsule, Rfl: 3    cetirizine (ZyrTEC) 10 mg tablet, Take 10 mg by mouth daily, Disp: , Rfl:     Cholecalciferol (VITAMIN D3) 1000 units CAPS, Take by mouth, Disp: , Rfl:     cyclopentolate (CYCLOGYL) 1 % ophthalmic solution, Instill 1 drop to operative eye day of surgery as directed , Disp: , Rfl: 0    DAILY MULTIPLE VITAMINS PO, Take 1 tablet by mouth daily, Disp: , Rfl:     FLUoxetine (PROzac) 20 mg capsule, Take 1 capsule (20 mg total) by mouth daily, Disp: 60 capsule, Rfl: 3    JANUVIA 100 MG tablet, TAKE 1 TABLET BY MOUTH DAILY, Disp: 30 tablet, Rfl: 5    ketorolac (ACULAR) 0 4 % SOLN, INSTILL 1 DROP IN OPERATIVE EYE 4 TIMES DAILY AS DIRECTED, Disp: , Rfl: 1    levothyroxine 25 mcg tablet, TAKE 1 TABLET BY MOUTH DAILY, Disp: 90 tablet, Rfl: 3    Magnesium 250 MG TABS, Take by mouth daily, Disp: , Rfl:     metoprolol succinate (TOPROL-XL) 100 mg 24 hr tablet, Take 1 tablet (100 mg total) by mouth daily, Disp: 60 tablet, Rfl: 3    Minoxidil (ROGAINE WOMENS EX), Apply 1 mL topically  , Disp: , Rfl:     ofloxacin (OCUFLOX) 0 3 % ophthalmic solution, INSTILL 1 DROP TO OPERATIVE EYE 4 TIMES DAILY AS DIRECTED, Disp: , Rfl: 0    omeprazole (PriLOSEC) 40 MG capsule, Take 1 capsule (40 mg total) by mouth daily, Disp: 60 capsule, Rfl: 3    potassium chloride (K-DUR,KLOR-CON) 20 mEq tablet, TAKE 1 TABLET BY MOUTH TWICE DAILY, Disp: 180 tablet, Rfl: 3    PREDNISONE PO, Take 4 mg by mouth Every other day, Disp: , Rfl:     pyridoxine (VITAMIN B6) 100 mg tablet, Take 100 mg by mouth daily  , Disp: , Rfl:     simvastatin (ZOCOR) 40 mg tablet, Take 1 tablet (40 mg total) by mouth daily, Disp: 60 tablet, Rfl: 3    torsemide (DEMADEX) 20 mg tablet, Take 1 tablet by mouth twice daily, Disp: 10 tablet, Rfl: 0    traMADol (ULTRAM) 50 mg tablet, TAKE 1 TABLET BY MOUTH EVERY 6 HOURS AS NEEDED FOR MODERATE PAIN, Disp: 90 tablet, Rfl: 3  No Known Allergies    The following portions of the patient's history were reviewed and updated as appropriate: allergies, current medications, past family history, past medical history, past social history, past surgical history and problem list         Vitals:    12/05/19 1410   BP: 110/70   Pulse: 105   Resp: 18   Temp: 98 2 °F (36 8 °C)       Physical Exam   Constitutional: She is oriented to person, place, and time  She appears well-developed and well-nourished  HENT:   Head: Normocephalic and atraumatic  Pulmonary/Chest: Right breast exhibits no inverted nipple, no mass, no nipple discharge, no skin change and no tenderness  Left breast exhibits no inverted nipple, no mass, no nipple discharge, no skin change and no tenderness  Lymphadenopathy:        Right axillary: No pectoral and no lateral adenopathy present  Left axillary: No pectoral and no lateral adenopathy present  Right: No supraclavicular adenopathy present  Left: No supraclavicular adenopathy present     Neurological: She is alert and oriented to person, place, and time  Psychiatric: She has a normal mood and affect  Results:  Labs:      Imaging  12/02/2019 bilateral 3D screening mammogram is benign BI-RADS two with a density of three    I reviewed the above imaging data  Discussion/Summary:  38-year-old female with dense breast tissue and family history of breast cancer  She had a prior benign biopsy of the left breast   There are no concerns on examination today  Her recent mammogram is benign as well  I will therefore see her again in one year or sooner should the need arise

## 2019-12-21 DIAGNOSIS — K21.00 GASTROESOPHAGEAL REFLUX DISEASE WITH ESOPHAGITIS: ICD-10-CM

## 2019-12-21 RX ORDER — OMEPRAZOLE 40 MG/1
40 CAPSULE, DELAYED RELEASE ORAL DAILY
Qty: 60 CAPSULE | Refills: 0 | Status: SHIPPED | OUTPATIENT
Start: 2019-12-21 | End: 2020-03-29

## 2019-12-22 DIAGNOSIS — I10 ESSENTIAL HYPERTENSION: ICD-10-CM

## 2019-12-22 RX ORDER — CANDESARTAN 32 MG/1
TABLET ORAL
Qty: 30 TABLET | Refills: 5 | Status: SHIPPED | OUTPATIENT
Start: 2019-12-22 | End: 2020-06-17

## 2019-12-30 ENCOUNTER — REMOTE DEVICE CLINIC VISIT (OUTPATIENT)
Dept: CARDIOLOGY CLINIC | Facility: CLINIC | Age: 69
End: 2019-12-30
Payer: MEDICARE

## 2019-12-30 DIAGNOSIS — Z95.810 AICD (AUTOMATIC CARDIOVERTER/DEFIBRILLATOR) PRESENT: Primary | ICD-10-CM

## 2019-12-30 DIAGNOSIS — E11.22 TYPE 2 DIABETES MELLITUS WITH STAGE 3 CHRONIC KIDNEY DISEASE, WITHOUT LONG-TERM CURRENT USE OF INSULIN (HCC): ICD-10-CM

## 2019-12-30 DIAGNOSIS — N18.30 TYPE 2 DIABETES MELLITUS WITH STAGE 3 CHRONIC KIDNEY DISEASE, WITHOUT LONG-TERM CURRENT USE OF INSULIN (HCC): ICD-10-CM

## 2019-12-30 PROCEDURE — 93297 REM INTERROG DEV EVAL ICPMS: CPT | Performed by: INTERNAL MEDICINE

## 2019-12-30 PROCEDURE — 93295 DEV INTERROG REMOTE 1/2/MLT: CPT | Performed by: INTERNAL MEDICINE

## 2019-12-30 PROCEDURE — 93296 REM INTERROG EVL PM/IDS: CPT | Performed by: INTERNAL MEDICINE

## 2019-12-30 RX ORDER — SITAGLIPTIN 100 MG/1
TABLET, FILM COATED ORAL
Qty: 30 TABLET | Refills: 5 | Status: SHIPPED | OUTPATIENT
Start: 2019-12-30 | End: 2020-06-17

## 2019-12-30 NOTE — PROGRESS NOTES
Results for orders placed or performed in visit on 12/30/19   Cardiac EP device report    Narrative    MDT/BIV-ICD  CARELINK TRANSMISSION: BATTERY STATUS "OK"  AP 95%  96% VSRP 0%  ALL AVAILABLE LEAD PARAMETERS WITHIN NORMAL LIMITS  NO SIGNIFICANT HIGH RATE EPISODES  [address] OPTI-VOL WITHIN NORMAL LIMITS  NORMAL DEVICE FUNCTION   NC

## 2020-03-29 DIAGNOSIS — E87.6 HYPOKALEMIA: ICD-10-CM

## 2020-03-29 DIAGNOSIS — K21.00 GASTROESOPHAGEAL REFLUX DISEASE WITH ESOPHAGITIS: ICD-10-CM

## 2020-03-29 RX ORDER — OMEPRAZOLE 40 MG/1
CAPSULE, DELAYED RELEASE ORAL
Qty: 60 CAPSULE | Refills: 3 | Status: SHIPPED | OUTPATIENT
Start: 2020-03-29 | End: 2020-11-13

## 2020-03-29 RX ORDER — POTASSIUM CHLORIDE 20 MEQ/1
TABLET, EXTENDED RELEASE ORAL
Qty: 60 TABLET | Refills: 3 | Status: SHIPPED | OUTPATIENT
Start: 2020-03-29 | End: 2020-07-28

## 2020-03-30 ENCOUNTER — REMOTE DEVICE CLINIC VISIT (OUTPATIENT)
Dept: CARDIOLOGY CLINIC | Facility: CLINIC | Age: 70
End: 2020-03-30
Payer: MEDICARE

## 2020-03-30 DIAGNOSIS — Z95.810 AICD (AUTOMATIC CARDIOVERTER/DEFIBRILLATOR) PRESENT: Primary | ICD-10-CM

## 2020-03-30 PROCEDURE — 93295 DEV INTERROG REMOTE 1/2/MLT: CPT | Performed by: INTERNAL MEDICINE

## 2020-03-30 PROCEDURE — 93296 REM INTERROG EVL PM/IDS: CPT | Performed by: INTERNAL MEDICINE

## 2020-03-30 NOTE — PROGRESS NOTES
Results for orders placed or performed in visit on 03/30/20   Cardiac EP device report    Narrative    MDT/BIV-ICD  CARELINK TRANSMISSION: AP BVP @ 60 BPMBATTERY STATUS "OK"  AP 95%  97% VSRP 0 1%  ALL AVAILABLE LEAD PARAMETERS WITHIN NORMAL LIMITS  NO SIGNIFICANT HIGH RATE EPISODES  [facility] WITHIN [facility]ORMAL [facility]  NORMAL DEVICE FUNCTION  NC       Current Outpatient Medications:     allopurinol (ZYLOPRIM) 100 mg tablet, TAKE 1 TABLET BY MOUTH DAILY, Disp: 90 tablet, Rfl: 3    aspirin 81 MG tablet, Take 81 mg by mouth daily  , Disp: , Rfl:     calcium carbonate (OS-MANINDER) 600 MG tablet, Take 1,200 mg by mouth daily, Disp: , Rfl:     candesartan (ATACAND) 32 MG tablet, TAKE 1 TABLET BY MOUTH DAILY, Disp: 30 tablet, Rfl: 5    celecoxib (CeleBREX) 200 mg capsule, Take 1 capsule (200 mg total) by mouth 2 (two) times a day, Disp: 180 capsule, Rfl: 3    cetirizine (ZyrTEC) 10 mg tablet, Take 10 mg by mouth daily, Disp: , Rfl:     Cholecalciferol (VITAMIN D3) 1000 units CAPS, Take by mouth, Disp: , Rfl:     cyclopentolate (CYCLOGYL) 1 % ophthalmic solution, Instill 1 drop to operative eye day of surgery as directed , Disp: , Rfl: 0    DAILY MULTIPLE VITAMINS PO, Take 1 tablet by mouth daily, Disp: , Rfl:     FLUoxetine (PROzac) 20 mg capsule, Take 1 capsule (20 mg total) by mouth daily, Disp: 60 capsule, Rfl: 3    JANUVIA 100 MG tablet, TAKE 1 TABLET BY MOUTH DAILY, Disp: 30 tablet, Rfl: 5    ketorolac (ACULAR) 0 4 % SOLN, INSTILL 1 DROP IN OPERATIVE EYE 4 TIMES DAILY AS DIRECTED, Disp: , Rfl: 1    levothyroxine 25 mcg tablet, TAKE 1 TABLET BY MOUTH DAILY, Disp: 90 tablet, Rfl: 3    Magnesium 250 MG TABS, Take by mouth daily, Disp: , Rfl:     metoprolol succinate (TOPROL-XL) 100 mg 24 hr tablet, Take 1 tablet (100 mg total) by mouth daily, Disp: 60 tablet, Rfl: 3    Minoxidil (ROGAINE WOMENS EX), Apply 1 mL topically  , Disp: , Rfl:     ofloxacin (OCUFLOX) 0 3 % ophthalmic solution, INSTILL 1 DROP TO OPERATIVE EYE 4 TIMES DAILY AS DIRECTED, Disp: , Rfl: 0    omeprazole (PriLOSEC) 40 MG capsule, TAKE 1 CAPSULE BY MOUTH DAILY, Disp: 60 capsule, Rfl: 3    potassium chloride (K-DUR,KLOR-CON) 20 mEq tablet, TAKE 1 TABLET BY MOUTH TWICE DAILY, Disp: 60 tablet, Rfl: 3    PREDNISONE PO, Take 4 mg by mouth Every other day, Disp: , Rfl:     pyridoxine (VITAMIN B6) 100 mg tablet, Take 100 mg by mouth daily  , Disp: , Rfl:     simvastatin (ZOCOR) 40 mg tablet, Take 1 tablet (40 mg total) by mouth daily, Disp: 60 tablet, Rfl: 3    torsemide (DEMADEX) 20 mg tablet, Take 1 tablet by mouth twice daily, Disp: 10 tablet, Rfl: 0    traMADol (ULTRAM) 50 mg tablet, TAKE 1 TABLET BY MOUTH EVERY 6 HOURS AS NEEDED FOR MODERATE PAIN, Disp: 90 tablet, Rfl: 3

## 2020-05-09 DIAGNOSIS — M25.50 ARTHRALGIA, UNSPECIFIED JOINT: ICD-10-CM

## 2020-05-11 RX ORDER — TRAMADOL HYDROCHLORIDE 50 MG/1
TABLET ORAL
Qty: 90 TABLET | Refills: 3 | Status: SHIPPED | OUTPATIENT
Start: 2020-05-11 | End: 2020-11-09 | Stop reason: SDUPTHER

## 2020-05-18 DIAGNOSIS — E78.5 HYPERLIPIDEMIA, UNSPECIFIED HYPERLIPIDEMIA TYPE: ICD-10-CM

## 2020-05-18 DIAGNOSIS — E03.9 ACQUIRED HYPOTHYROIDISM: ICD-10-CM

## 2020-05-18 DIAGNOSIS — F32.A DEPRESSION, UNSPECIFIED DEPRESSION TYPE: ICD-10-CM

## 2020-05-18 DIAGNOSIS — I10 ESSENTIAL HYPERTENSION: ICD-10-CM

## 2020-05-18 RX ORDER — SIMVASTATIN 40 MG
40 TABLET ORAL DAILY
Qty: 60 TABLET | Refills: 3 | Status: SHIPPED | OUTPATIENT
Start: 2020-05-18 | End: 2021-01-15

## 2020-05-18 RX ORDER — METOPROLOL SUCCINATE 100 MG/1
100 TABLET, EXTENDED RELEASE ORAL DAILY
Qty: 60 TABLET | Refills: 3 | Status: SHIPPED | OUTPATIENT
Start: 2020-05-18 | End: 2021-01-15

## 2020-05-18 RX ORDER — LEVOTHYROXINE SODIUM 0.03 MG/1
TABLET ORAL
Qty: 90 TABLET | Refills: 3 | Status: SHIPPED | OUTPATIENT
Start: 2020-05-18 | End: 2021-04-26 | Stop reason: SDUPTHER

## 2020-05-18 RX ORDER — FLUOXETINE HYDROCHLORIDE 20 MG/1
20 CAPSULE ORAL DAILY
Qty: 60 CAPSULE | Refills: 3 | Status: SHIPPED | OUTPATIENT
Start: 2020-05-18 | End: 2021-01-15

## 2020-06-05 ENCOUNTER — APPOINTMENT (OUTPATIENT)
Dept: LAB | Facility: HOSPITAL | Age: 70
End: 2020-06-05
Payer: MEDICARE

## 2020-06-05 DIAGNOSIS — E78.2 MIXED HYPERLIPIDEMIA: ICD-10-CM

## 2020-06-05 DIAGNOSIS — Z11.59 ENCOUNTER FOR HEPATITIS C SCREENING TEST FOR LOW RISK PATIENT: ICD-10-CM

## 2020-06-05 DIAGNOSIS — I10 ESSENTIAL HYPERTENSION: ICD-10-CM

## 2020-06-05 DIAGNOSIS — E03.9 ACQUIRED HYPOTHYROIDISM: ICD-10-CM

## 2020-06-05 DIAGNOSIS — N18.30 CKD (CHRONIC KIDNEY DISEASE) STAGE 3, GFR 30-59 ML/MIN (HCC): ICD-10-CM

## 2020-06-05 DIAGNOSIS — I50.32 CHRONIC DIASTOLIC CONGESTIVE HEART FAILURE (HCC): ICD-10-CM

## 2020-06-05 LAB
ALBUMIN SERPL BCP-MCNC: 3.5 G/DL (ref 3.5–5)
ALP SERPL-CCNC: 66 U/L (ref 46–116)
ALT SERPL W P-5'-P-CCNC: 32 U/L (ref 12–78)
ANION GAP SERPL CALCULATED.3IONS-SCNC: 6 MMOL/L (ref 4–13)
AST SERPL W P-5'-P-CCNC: 21 U/L (ref 5–45)
BASOPHILS # BLD AUTO: 0.07 THOUSANDS/ΜL (ref 0–0.1)
BASOPHILS NFR BLD AUTO: 1 % (ref 0–1)
BILIRUB SERPL-MCNC: 0.7 MG/DL (ref 0.2–1)
BUN SERPL-MCNC: 28 MG/DL (ref 5–25)
CALCIUM SERPL-MCNC: 9.7 MG/DL (ref 8.3–10.1)
CHLORIDE SERPL-SCNC: 104 MMOL/L (ref 100–108)
CHOLEST SERPL-MCNC: 179 MG/DL (ref 50–200)
CO2 SERPL-SCNC: 30 MMOL/L (ref 21–32)
CREAT SERPL-MCNC: 1.17 MG/DL (ref 0.6–1.3)
EOSINOPHIL # BLD AUTO: 0.24 THOUSAND/ΜL (ref 0–0.61)
EOSINOPHIL NFR BLD AUTO: 3 % (ref 0–6)
ERYTHROCYTE [DISTWIDTH] IN BLOOD BY AUTOMATED COUNT: 14.6 % (ref 11.6–15.1)
EST. AVERAGE GLUCOSE BLD GHB EST-MCNC: 154 MG/DL
GFR SERPL CREATININE-BSD FRML MDRD: 47 ML/MIN/1.73SQ M
GLUCOSE SERPL-MCNC: 98 MG/DL (ref 65–140)
HBA1C MFR BLD: 7 %
HCT VFR BLD AUTO: 44.8 % (ref 34.8–46.1)
HCV AB SER QL: NORMAL
HDLC SERPL-MCNC: 71 MG/DL
HGB BLD-MCNC: 14.4 G/DL (ref 11.5–15.4)
IMM GRANULOCYTES # BLD AUTO: 0.08 THOUSAND/UL (ref 0–0.2)
IMM GRANULOCYTES NFR BLD AUTO: 1 % (ref 0–2)
LDLC SERPL CALC-MCNC: 91 MG/DL (ref 0–100)
LYMPHOCYTES # BLD AUTO: 1.85 THOUSANDS/ΜL (ref 0.6–4.47)
LYMPHOCYTES NFR BLD AUTO: 24 % (ref 14–44)
MCH RBC QN AUTO: 28.7 PG (ref 26.8–34.3)
MCHC RBC AUTO-ENTMCNC: 32.1 G/DL (ref 31.4–37.4)
MCV RBC AUTO: 89 FL (ref 82–98)
MONOCYTES # BLD AUTO: 1.09 THOUSAND/ΜL (ref 0.17–1.22)
MONOCYTES NFR BLD AUTO: 14 % (ref 4–12)
NEUTROPHILS # BLD AUTO: 4.5 THOUSANDS/ΜL (ref 1.85–7.62)
NEUTS SEG NFR BLD AUTO: 57 % (ref 43–75)
NRBC BLD AUTO-RTO: 0 /100 WBCS
PLATELET # BLD AUTO: 219 THOUSANDS/UL (ref 149–390)
PMV BLD AUTO: 10.8 FL (ref 8.9–12.7)
POTASSIUM SERPL-SCNC: 3.9 MMOL/L (ref 3.5–5.3)
PROT SERPL-MCNC: 6.5 G/DL (ref 6.4–8.2)
RBC # BLD AUTO: 5.02 MILLION/UL (ref 3.81–5.12)
SODIUM SERPL-SCNC: 140 MMOL/L (ref 136–145)
TRIGL SERPL-MCNC: 85 MG/DL
TSH SERPL DL<=0.05 MIU/L-ACNC: 2.02 UIU/ML (ref 0.36–3.74)
WBC # BLD AUTO: 7.83 THOUSAND/UL (ref 4.31–10.16)

## 2020-06-05 PROCEDURE — 85025 COMPLETE CBC W/AUTO DIFF WBC: CPT

## 2020-06-05 PROCEDURE — 80061 LIPID PANEL: CPT

## 2020-06-05 PROCEDURE — 86803 HEPATITIS C AB TEST: CPT

## 2020-06-05 PROCEDURE — 36415 COLL VENOUS BLD VENIPUNCTURE: CPT

## 2020-06-05 PROCEDURE — 80053 COMPREHEN METABOLIC PANEL: CPT

## 2020-06-05 PROCEDURE — 83036 HEMOGLOBIN GLYCOSYLATED A1C: CPT

## 2020-06-05 PROCEDURE — 84443 ASSAY THYROID STIM HORMONE: CPT

## 2020-06-09 ENCOUNTER — TELEPHONE (OUTPATIENT)
Dept: CARDIOLOGY CLINIC | Facility: CLINIC | Age: 70
End: 2020-06-09

## 2020-06-10 ENCOUNTER — OFFICE VISIT (OUTPATIENT)
Dept: FAMILY MEDICINE CLINIC | Facility: HOSPITAL | Age: 70
End: 2020-06-10
Payer: MEDICARE

## 2020-06-10 ENCOUNTER — OFFICE VISIT (OUTPATIENT)
Dept: CARDIOLOGY CLINIC | Facility: CLINIC | Age: 70
End: 2020-06-10
Payer: MEDICARE

## 2020-06-10 VITALS
TEMPERATURE: 97.3 F | HEIGHT: 60 IN | WEIGHT: 170.3 LBS | DIASTOLIC BLOOD PRESSURE: 80 MMHG | BODY MASS INDEX: 33.44 KG/M2 | SYSTOLIC BLOOD PRESSURE: 138 MMHG

## 2020-06-10 VITALS
BODY MASS INDEX: 33.18 KG/M2 | SYSTOLIC BLOOD PRESSURE: 146 MMHG | WEIGHT: 169 LBS | HEART RATE: 66 BPM | DIASTOLIC BLOOD PRESSURE: 80 MMHG | HEIGHT: 60 IN | TEMPERATURE: 97.8 F

## 2020-06-10 DIAGNOSIS — E03.9 ACQUIRED HYPOTHYROIDISM: ICD-10-CM

## 2020-06-10 DIAGNOSIS — E78.2 MIXED HYPERLIPIDEMIA: ICD-10-CM

## 2020-06-10 DIAGNOSIS — M25.512 CHRONIC LEFT SHOULDER PAIN: ICD-10-CM

## 2020-06-10 DIAGNOSIS — I10 ESSENTIAL HYPERTENSION: ICD-10-CM

## 2020-06-10 DIAGNOSIS — I25.10 CORONARY ARTERY DISEASE DUE TO CALCIFIED CORONARY LESION: Primary | ICD-10-CM

## 2020-06-10 DIAGNOSIS — G47.33 OBSTRUCTIVE SLEEP APNEA: ICD-10-CM

## 2020-06-10 DIAGNOSIS — M10.00 IDIOPATHIC GOUT, UNSPECIFIED CHRONICITY, UNSPECIFIED SITE: ICD-10-CM

## 2020-06-10 DIAGNOSIS — N18.30 TYPE 2 DIABETES MELLITUS WITH STAGE 3 CHRONIC KIDNEY DISEASE, WITHOUT LONG-TERM CURRENT USE OF INSULIN (HCC): Primary | ICD-10-CM

## 2020-06-10 DIAGNOSIS — E11.22 TYPE 2 DIABETES MELLITUS WITH STAGE 3 CHRONIC KIDNEY DISEASE, WITHOUT LONG-TERM CURRENT USE OF INSULIN (HCC): Primary | ICD-10-CM

## 2020-06-10 DIAGNOSIS — I50.32 CHRONIC DIASTOLIC CONGESTIVE HEART FAILURE (HCC): ICD-10-CM

## 2020-06-10 DIAGNOSIS — I25.84 CORONARY ARTERY DISEASE DUE TO CALCIFIED CORONARY LESION: Primary | ICD-10-CM

## 2020-06-10 DIAGNOSIS — I25.10 CORONARY ARTERY DISEASE INVOLVING NATIVE CORONARY ARTERY OF NATIVE HEART WITHOUT ANGINA PECTORIS: ICD-10-CM

## 2020-06-10 DIAGNOSIS — E11.22 TYPE 2 DIABETES MELLITUS WITH STAGE 3 CHRONIC KIDNEY DISEASE, WITHOUT LONG-TERM CURRENT USE OF INSULIN (HCC): ICD-10-CM

## 2020-06-10 DIAGNOSIS — G89.29 CHRONIC LEFT SHOULDER PAIN: ICD-10-CM

## 2020-06-10 DIAGNOSIS — Z95.810 BIVENTRICULAR ICD (IMPLANTABLE CARDIOVERTER-DEFIBRILLATOR) IN PLACE: ICD-10-CM

## 2020-06-10 DIAGNOSIS — I49.3 PVC'S (PREMATURE VENTRICULAR CONTRACTIONS): ICD-10-CM

## 2020-06-10 DIAGNOSIS — E66.09 CLASS 1 OBESITY DUE TO EXCESS CALORIES WITH SERIOUS COMORBIDITY AND BODY MASS INDEX (BMI) OF 32.0 TO 32.9 IN ADULT: ICD-10-CM

## 2020-06-10 DIAGNOSIS — N18.30 TYPE 2 DIABETES MELLITUS WITH STAGE 3 CHRONIC KIDNEY DISEASE, WITHOUT LONG-TERM CURRENT USE OF INSULIN (HCC): ICD-10-CM

## 2020-06-10 PROCEDURE — 3051F HG A1C>EQUAL 7.0%<8.0%: CPT | Performed by: FAMILY MEDICINE

## 2020-06-10 PROCEDURE — 3066F NEPHROPATHY DOC TX: CPT | Performed by: INTERNAL MEDICINE

## 2020-06-10 PROCEDURE — 1036F TOBACCO NON-USER: CPT | Performed by: INTERNAL MEDICINE

## 2020-06-10 PROCEDURE — 3066F NEPHROPATHY DOC TX: CPT | Performed by: FAMILY MEDICINE

## 2020-06-10 PROCEDURE — 2022F DILAT RTA XM EVC RTNOPTHY: CPT | Performed by: FAMILY MEDICINE

## 2020-06-10 PROCEDURE — 93000 ELECTROCARDIOGRAM COMPLETE: CPT | Performed by: INTERNAL MEDICINE

## 2020-06-10 PROCEDURE — 3075F SYST BP GE 130 - 139MM HG: CPT | Performed by: INTERNAL MEDICINE

## 2020-06-10 PROCEDURE — 3079F DIAST BP 80-89 MM HG: CPT | Performed by: INTERNAL MEDICINE

## 2020-06-10 PROCEDURE — 3051F HG A1C>EQUAL 7.0%<8.0%: CPT | Performed by: INTERNAL MEDICINE

## 2020-06-10 PROCEDURE — 99214 OFFICE O/P EST MOD 30 MIN: CPT | Performed by: INTERNAL MEDICINE

## 2020-06-10 PROCEDURE — 3077F SYST BP >= 140 MM HG: CPT | Performed by: FAMILY MEDICINE

## 2020-06-10 PROCEDURE — 99215 OFFICE O/P EST HI 40 MIN: CPT | Performed by: FAMILY MEDICINE

## 2020-06-10 PROCEDURE — 3079F DIAST BP 80-89 MM HG: CPT | Performed by: FAMILY MEDICINE

## 2020-06-10 PROCEDURE — 2022F DILAT RTA XM EVC RTNOPTHY: CPT | Performed by: INTERNAL MEDICINE

## 2020-06-10 PROCEDURE — 1036F TOBACCO NON-USER: CPT | Performed by: FAMILY MEDICINE

## 2020-06-10 PROCEDURE — 3008F BODY MASS INDEX DOCD: CPT | Performed by: INTERNAL MEDICINE

## 2020-06-10 PROCEDURE — 1160F RVW MEDS BY RX/DR IN RCRD: CPT | Performed by: FAMILY MEDICINE

## 2020-06-10 PROCEDURE — 4040F PNEUMOC VAC/ADMIN/RCVD: CPT | Performed by: FAMILY MEDICINE

## 2020-06-10 PROCEDURE — 1160F RVW MEDS BY RX/DR IN RCRD: CPT | Performed by: INTERNAL MEDICINE

## 2020-06-10 PROCEDURE — 4040F PNEUMOC VAC/ADMIN/RCVD: CPT | Performed by: INTERNAL MEDICINE

## 2020-06-17 DIAGNOSIS — E11.22 TYPE 2 DIABETES MELLITUS WITH STAGE 3 CHRONIC KIDNEY DISEASE, WITHOUT LONG-TERM CURRENT USE OF INSULIN (HCC): ICD-10-CM

## 2020-06-17 DIAGNOSIS — N18.30 TYPE 2 DIABETES MELLITUS WITH STAGE 3 CHRONIC KIDNEY DISEASE, WITHOUT LONG-TERM CURRENT USE OF INSULIN (HCC): ICD-10-CM

## 2020-06-17 DIAGNOSIS — I10 ESSENTIAL HYPERTENSION: ICD-10-CM

## 2020-06-17 RX ORDER — CANDESARTAN 32 MG/1
TABLET ORAL
Qty: 30 TABLET | Refills: 5 | Status: SHIPPED | OUTPATIENT
Start: 2020-06-17 | End: 2020-12-15

## 2020-06-17 RX ORDER — SITAGLIPTIN 100 MG/1
TABLET, FILM COATED ORAL
Qty: 30 TABLET | Refills: 5 | Status: SHIPPED | OUTPATIENT
Start: 2020-06-17 | End: 2020-12-15

## 2020-06-19 DIAGNOSIS — R60.9 EDEMA, UNSPECIFIED TYPE: ICD-10-CM

## 2020-06-19 RX ORDER — TORSEMIDE 20 MG/1
TABLET ORAL
Qty: 60 TABLET | Refills: 5 | Status: SHIPPED | OUTPATIENT
Start: 2020-06-19 | End: 2020-12-15

## 2020-06-29 ENCOUNTER — REMOTE DEVICE CLINIC VISIT (OUTPATIENT)
Dept: CARDIOLOGY CLINIC | Facility: CLINIC | Age: 70
End: 2020-06-29
Payer: MEDICARE

## 2020-06-29 ENCOUNTER — TELEPHONE (OUTPATIENT)
Dept: CARDIOLOGY CLINIC | Facility: CLINIC | Age: 70
End: 2020-06-29

## 2020-06-29 DIAGNOSIS — Z95.810 BIVENTRICULAR ICD (IMPLANTABLE CARDIOVERTER-DEFIBRILLATOR) IN PLACE: Primary | ICD-10-CM

## 2020-06-29 PROCEDURE — 93296 REM INTERROG EVL PM/IDS: CPT | Performed by: INTERNAL MEDICINE

## 2020-06-29 PROCEDURE — 93295 DEV INTERROG REMOTE 1/2/MLT: CPT | Performed by: INTERNAL MEDICINE

## 2020-06-30 ENCOUNTER — HOSPITAL ENCOUNTER (OUTPATIENT)
Dept: NON INVASIVE DIAGNOSTICS | Facility: HOSPITAL | Age: 70
Discharge: HOME | End: 2020-06-30
Attending: INTERNAL MEDICINE
Payer: MEDICARE

## 2020-06-30 DIAGNOSIS — I25.10 CORONARY ARTERY DISEASE DUE TO CALCIFIED CORONARY LESION: ICD-10-CM

## 2020-06-30 DIAGNOSIS — I50.32 CHRONIC DIASTOLIC CONGESTIVE HEART FAILURE (HCC): ICD-10-CM

## 2020-06-30 DIAGNOSIS — I25.84 CORONARY ARTERY DISEASE DUE TO CALCIFIED CORONARY LESION: ICD-10-CM

## 2020-06-30 PROCEDURE — 93306 TTE W/DOPPLER COMPLETE: CPT

## 2020-06-30 PROCEDURE — 93306 TTE W/DOPPLER COMPLETE: CPT | Performed by: INTERNAL MEDICINE

## 2020-07-28 DIAGNOSIS — E87.6 HYPOKALEMIA: ICD-10-CM

## 2020-07-28 RX ORDER — POTASSIUM CHLORIDE 20 MEQ/1
TABLET, EXTENDED RELEASE ORAL
Qty: 60 TABLET | Refills: 3 | Status: SHIPPED | OUTPATIENT
Start: 2020-07-28 | End: 2020-11-13

## 2020-07-29 ENCOUNTER — REMOTE DEVICE CLINIC VISIT (OUTPATIENT)
Dept: CARDIOLOGY CLINIC | Facility: CLINIC | Age: 70
End: 2020-07-29
Payer: MEDICARE

## 2020-07-29 DIAGNOSIS — Z95.810 AICD (AUTOMATIC CARDIOVERTER/DEFIBRILLATOR) PRESENT: Primary | ICD-10-CM

## 2020-07-29 PROCEDURE — G2066 INTER DEVC REMOTE 30D: HCPCS | Performed by: INTERNAL MEDICINE

## 2020-07-29 PROCEDURE — 93297 REM INTERROG DEV EVAL ICPMS: CPT | Performed by: INTERNAL MEDICINE

## 2020-07-29 NOTE — PROGRESS NOTES
Results for orders placed or performed in visit on 07/29/20   Cardiac EP device report    Narrative    MDT/BIV-ICD/ NOT MRI CONDITIONAL  CARELINK TRANSMISSION - OPTI-VOL ONLY: OPTI-VOL WITHIN NORMAL LIMITS  BATTERY VOLTAGE ADEQUATE (18 MOS)  AP-90%, BVP-92% (VSR PACE<0 1%)  ALL AVAILABLE LEAD PARAMETERS WITHIN NORMAL LIMITS  NO SIGNIFICANT HIGH RATE EPISODES  [address] [address] [place] [facility] ON ASA 81MG & METOPROLOL  NORMAL DEVICE FUNCTION   GV

## 2020-08-04 ENCOUNTER — HOSPITAL ENCOUNTER (EMERGENCY)
Facility: HOSPITAL | Age: 70
Discharge: HOME | End: 2020-08-04
Attending: EMERGENCY MEDICINE | Admitting: EMERGENCY MEDICINE
Payer: MEDICARE

## 2020-08-04 ENCOUNTER — APPOINTMENT (EMERGENCY)
Dept: RADIOLOGY | Facility: HOSPITAL | Age: 70
End: 2020-08-04
Payer: MEDICARE

## 2020-08-04 VITALS
DIASTOLIC BLOOD PRESSURE: 85 MMHG | RESPIRATION RATE: 20 BRPM | SYSTOLIC BLOOD PRESSURE: 159 MMHG | HEART RATE: 62 BPM | TEMPERATURE: 97.9 F | OXYGEN SATURATION: 97 %

## 2020-08-04 DIAGNOSIS — M72.2 PLANTAR FASCIITIS: Primary | ICD-10-CM

## 2020-08-04 PROCEDURE — 99283 EMERGENCY DEPT VISIT LOW MDM: CPT | Performed by: EMERGENCY MEDICINE

## 2020-08-04 PROCEDURE — 99283 EMERGENCY DEPT VISIT LOW MDM: CPT

## 2020-08-04 PROCEDURE — 73630 X-RAY EXAM OF FOOT: CPT

## 2020-08-04 RX ORDER — ACETAMINOPHEN 325 MG/1
650 TABLET ORAL ONCE
Status: COMPLETED | OUTPATIENT
Start: 2020-08-04 | End: 2020-08-04

## 2020-08-04 RX ADMIN — ACETAMINOPHEN 650 MG: 325 TABLET, FILM COATED ORAL at 05:52

## 2020-08-04 NOTE — ED PROVIDER NOTES
History  Chief Complaint   Patient presents with    Foot Pain     Patient presents to ED after c/o left foot pain similar to gout pain she has had in the past     64 yo F with PMH of CHF, CAD, DM2 presents to ED with foot pain  Started tonight, she was watching TV for awhile, then got up to go to bed  Walked up stairs without an issue, but noticed some pain in bottom of left foot  No injury/trauma  Pain wouldn't go away, and got gradually worse, so she came in for eval  adamently denies trauma  Does have h/o gout (but states it was in both feet, diagnosed in ER, with no arthrocentesis - is on chronic allopurinol), and plantar fasciitis, with orthotics  When asked if she has plantar fasciitis, she said "oh yea, this maybe does feel like that " no numbness/tingling  No pain elsewhere  No CP/SOB  Able to bear weight with cane, but it is painful  No fevers/chills  She didn't take anything for pain at home  History provided by:  Patient and medical records   used: No    Foot Injury - Major   Location:  Foot  Foot location:  Sole of L foot  Pain details:     Quality:  Aching    Radiates to:  Does not radiate    Severity:  Moderate    Onset quality:  Sudden    Timing:  Constant    Progression:  Unchanged  Chronicity:  Recurrent  Foreign body present:  No foreign bodies  Prior injury to area:  No  Relieved by:  None tried  Worsened by:  Bearing weight  Ineffective treatments:  None tried  Associated symptoms: no back pain, no decreased ROM, no fatigue, no fever, no itching, no muscle weakness, no neck pain, no numbness, no stiffness, no swelling and no tingling    Risk factors: obesity    Risk factors: no concern for non-accidental trauma        Prior to Admission Medications   Prescriptions Last Dose Informant Patient Reported? Taking?    Cholecalciferol (VITAMIN D3) 1000 units CAPS  Self Yes Yes   Sig: Take by mouth   DAILY MULTIPLE VITAMINS PO  Self Yes Yes   Sig: Take 1 tablet by mouth daily FLUoxetine (PROzac) 20 mg capsule  Self No Yes   Sig: Take 1 capsule (20 mg total) by mouth daily   JANUVIA 100 MG tablet   No Yes   Sig: TAKE 1 TABLET BY MOUTH DAILY   Magnesium 250 MG TABS  Self Yes Yes   Sig: Take by mouth daily   Minoxidil (ROGAINE WOMENS EX)  Self Yes Yes   Sig: Apply 1 mL topically  PREDNISONE PO  Self Yes Yes   Sig: Take 4 mg by mouth Every other day   allopurinol (ZYLOPRIM) 100 mg tablet  Self No Yes   Sig: TAKE 1 TABLET BY MOUTH DAILY   aspirin 81 MG tablet  Self Yes Yes   Sig: Take 81 mg by mouth daily  calcium carbonate (OS-MANINDER) 600 MG tablet  Self Yes Yes   Sig: Take 1,200 mg by mouth daily   candesartan (ATACAND) 32 MG tablet   No Yes   Sig: TAKE 1 TABLET BY MOUTH DAILY   celecoxib (CeleBREX) 200 mg capsule  Self No Yes   Sig: Take 1 capsule (200 mg total) by mouth 2 (two) times a day   Patient taking differently: Take 200 mg by mouth every other day    cetirizine (ZyrTEC) 10 mg tablet  Self Yes Yes   Sig: Take 10 mg by mouth daily   levothyroxine 25 mcg tablet  Self No Yes   Sig: TAKE 1 TABLET BY MOUTH DAILY   metoprolol succinate (TOPROL-XL) 100 mg 24 hr tablet  Self No Yes   Sig: Take 1 tablet (100 mg total) by mouth daily   omeprazole (PriLOSEC) 40 MG capsule  Self No Yes   Sig: TAKE 1 CAPSULE BY MOUTH DAILY   potassium chloride (K-DUR,KLOR-CON) 20 mEq tablet   No Yes   Sig: TAKE 1 TABLET BY MOUTH TWICE DAILY   pyridoxine (VITAMIN B6) 100 mg tablet  Self Yes Yes   Sig: Take 100 mg by mouth daily     simvastatin (ZOCOR) 40 mg tablet  Self No Yes   Sig: Take 1 tablet (40 mg total) by mouth daily   torsemide (DEMADEX) 20 mg tablet   No Yes   Sig: Take 1 tablet by mouth twice daily   traMADol (ULTRAM) 50 mg tablet  Self No Yes   Sig: TAKE 1 TABLET BY MOUTH EVERY 6 HOURS AS NEEDED FOR MODERATE PAIN   Patient taking differently: Take 50 mg by mouth 2 (two) times a day       Facility-Administered Medications: None       Past Medical History:   Diagnosis Date    Abnormal finding on breast imaging     last assessed 11/30/17    Acute bacterial bronchitis     Allergic rhinitis     Arthralgia     Arthritis     Atherosclerotic heart disease of native coronary artery without angina pectoris     BBB (bundle branch block)     left,last assesed 6/4/12    BBB (bundle branch block)     left    Benign paroxysmal vertigo     last assessed 9/7/12    Benign paroxysmal vertigo of left ear     Bilateral fibrocystic breast changes     [facility]roiliitis ([facility])     Cardiac defibrillator in situ     Carpal tunnel syndrome, unspecified upper limb     CHF (congestive heart failure) ([facility])     chronic systolic/diastolic    Chronic diastolic congestive heart failure ([facility])     Chronic kidney disease     stage 3    Chronic systolic congestive heart failure ([facility])     Coronary artery disease     Cough     Depression     Detrusor instability     Diabetes mellitus ([facility])     Difficulty walking up stairs     Dilated cardiomyopathy ([address]     Dilated cardiomyopathy ([address]     Disease of thyroid gland     Disorder of intervertebral disc of cervical spine     Esophageal reflux     GERD (gastroesophageal reflux disease)     Gout     Hemorrhoids     History of blood clots     Hormone replacement therapy     Hx of blood clots     Hyperlipidemia     Hypertension     Hypokalemia     Hypothyroidism     Indigestion     Inflamed toenail, left     Ingrown nail     Low back pain     left    OAB (overactive bladder)     detrusor instability    Obesity     AZ (obstructive sleep apnea)     Osteoarthritis     Papilloma of left breast     Psoriasis     psoriatic arthropathy    Psoriatic arthropathy ([facility])     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    RLS (restless legs syndrome)     Sciatica     Shortness of breath     Sleep apnea     unspecified type    Tendinitis     Trigger thumb, left thumb     Urinary frequency     UTI (urinary tract infection)     Vitamin D deficiency        Past Surgical History:   Procedure Laterality Date    BLADDER SURGERY  1999    lift and repair    BREAST BIOPSY Left 05/23/2016    US CORE BIOPSY-BENIGN    CARDIAC CATHETERIZATION      outcome:  successful    CARDIAC DEFIBRILLATOR PLACEMENT      CARPAL TUNNEL RELEASE Bilateral 1997    CATARACT EXTRACTION      COLONOSCOPY      CORONARY ANGIOPLASTY WITH STENT PLACEMENT      CYSTOSCOPY W/ URETEROSCOPY  2009    DE QUERVAIN'S RELEASE Left 2011    and trigger finger release    EYE SURGERY Left 1999    EYE SURGERY Left 11/13/2019    Cataract    EYE SURGERY Right 11/09/2019    Cataract    INSERT / REPLACE / REMOVE PACEMAKER      JOINT REPLACEMENT Right 09/2017    Knee    KNEE ARTHROSCOPY      therapeutic    NEUROPLASTY / TRANSPOSITION MEDIAN NERVE AT CARPAL TUNNEL      OOPHORECTOMY Bilateral     AGE 44    GA INCISE FINGER TENDON SHEATH Left 3/2/2017    Procedure: SMALL TRIGGER FINGER RELEASE;  Surgeon: Edie Nicolas MD;  Location: [facility];  Service: Orthopedics    RECTAL SURGERY  2006    repair of rectal ulcer    SHOULDER ARTHROSCOPY Left 2006    TOTAL ABDOMINAL HYSTERECTOMY      AGE 53    TOTAL KNEE ARTHROPLASTY Left     TOTAL SHOULDER REPLACEMENT Left 2007    TRIGGER FINGER RELEASE Bilateral 2011    right haand 201,2015,left hand 2012,2015    US GUIDED BREAST BIOPSY LEFT COMPLETE Left 5/23/2016       Family History   Problem Relation Age of Onset    Hypertension Mother     Alzheimer's disease Mother     Cancer Father 70        bladder    Prostate cancer Father 70    Cancer Brother         pt shared some type of blood cancer    Breast cancer Paternal Grandmother         age dx unk    Stomach cancer Paternal Aunt 72     I have reviewed and agree with the history as documented      E-Cigarette/Vaping    E-Cigarette Use Never User      E-Cigarette/Vaping Substances     Social History     Tobacco Use    Smoking status: Former Smoker     Packs/day: 1 00     Years: 15 00     Pack years: 15 00     Last attempt to quit: 1977     Years since quittin 6    Smokeless tobacco: Never Used   Substance Use Topics    Alcohol use: No    Drug use: No       Review of Systems   Constitutional: Negative for appetite change, chills, fatigue and fever  HENT: Negative for congestion, ear pain, rhinorrhea, sore throat, trouble swallowing and voice change  Eyes: Negative for pain and visual disturbance  Respiratory: Negative for cough, chest tightness and shortness of breath  Cardiovascular: Negative for chest pain, palpitations and leg swelling  Gastrointestinal: Negative for abdominal pain, blood in stool, constipation, diarrhea, nausea and vomiting  Genitourinary: Negative for difficulty urinating, dysuria, flank pain and hematuria  Musculoskeletal: Positive for gait problem and myalgias  Negative for back pain, neck pain, neck stiffness and stiffness  Skin: Negative for itching and rash  Neurological: Negative for dizziness, syncope, facial asymmetry, speech difficulty, weakness, light-headedness, numbness and headaches  Psychiatric/Behavioral: Negative for confusion and suicidal ideas  Physical Exam  Physical Exam  Vitals signs and nursing note reviewed  Constitutional:       General: She is not in acute distress  Appearance: She is well-developed  She is not diaphoretic  HENT:      Head: Normocephalic and atraumatic  Right Ear: External ear normal       Left Ear: External ear normal       Nose: Nose normal    Eyes:      General: No scleral icterus  Right eye: No discharge  Left eye: No discharge  Conjunctiva/sclera: Conjunctivae normal       Pupils: Pupils are equal, round, and reactive to light  Neck:      Musculoskeletal: Normal range of motion and neck supple  Trachea: No tracheal deviation  Cardiovascular:      Rate and Rhythm: Normal rate and regular rhythm        Pulses:           Dorsalis pedis pulses are 2+ on the right side and 2+ on the left side         Posterior tibial pulses are 2+ on the right side and 2+ on the left side  Heart sounds: Normal heart sounds  No murmur  No friction rub  No gallop  Pulmonary:      Effort: Pulmonary effort is normal  No respiratory distress  Breath sounds: Normal breath sounds  No stridor  Chest:      Chest wall: No tenderness  Abdominal:      General: Bowel sounds are normal       Palpations: Abdomen is soft  Tenderness: There is no abdominal tenderness  There is no guarding or rebound  Musculoskeletal: Normal range of motion  General: No deformity  Left foot: Normal range of motion  No deformity, bunion, Charcot foot or foot drop  Feet:      Right foot:      Skin integrity: Skin integrity normal       Left foot:      Skin integrity: Skin integrity normal  No ulcer, blister, skin breakdown, erythema, warmth, callus or dry skin  Toenail Condition: Left toenails are normal       Comments: No apparent deformity or erythema  No skin breakdown  TTP on sole of foot around heel, and mid foot  NV intact  No indication of gout  No other joint pain or swelling or tenderness to palpation  Lymphadenopathy:      Cervical: No cervical adenopathy  Skin:     General: Skin is warm and dry  Findings: No rash  Neurological:      General: No focal deficit present  Mental Status: She is alert and oriented to person, place, and time  Cranial Nerves: No cranial nerve deficit  Sensory: No sensory deficit  Coordination: Coordination normal       Gait: Gait abnormal (atalgic gait)     Psychiatric:         Behavior: Behavior normal          Vital Signs  ED Triage Vitals [08/04/20 0439]   Temperature Pulse Respirations Blood Pressure SpO2   97 9 °F (36 6 °C) 84 20 (!) 184/91 98 %      Temp Source Heart Rate Source Patient Position - Orthostatic VS BP Location FiO2 (%)   Temporal Monitor Sitting Left arm --      Pain Score       6           Vitals:    08/04/20 0439 08/04/20 0445 08/04/20 0500 08/04/20 0530   BP: (!) 184/91 (!) 180/91 (!) 171/76 159/85   Pulse: 84 62 57 62   Patient Position - Orthostatic VS: Sitting            Visual Acuity      ED Medications  Medications - No data to display    Diagnostic Studies  Results Reviewed     None                 XR foot 3+ views LEFT   Final Result by Kayley Bejarano MD (08/04 2259)      No acute osseous abnormality  Workstation performed: [id number]                    Procedures  Procedures         ED Course  ED Course as of Aug 04 0544   Tue Aug 04, 2020   5676 I do not suspect gout  There is no visual abnormality  NV intact  Compartments soft  I suspect plantar fasciitis  Will d/c home, recommend f/u with PCP and podiatry  I offered pt pain medications, she declined  Ambulated with cane  US AUDIT      Most Recent Value   Initial Alcohol Screen: US AUDIT-C    1  How often do you have a drink containing alcohol? 1 Filed at: 08/04/2020 0440   2  How many drinks containing alcohol do you have on a typical day you are drinking? 1 Filed at: 08/04/2020 0440   3a  Male UNDER 65: How often do you have five or more drinks on one occasion? 0 Filed at: 08/04/2020 0440   3b  FEMALE Any Age, or MALE 65+: How often do you have 4 or more drinks on one occassion? 1 Filed at: 08/04/2020 0440   Audit-C Score  3 Filed at: 08/04/2020 0440                  COLE/DAST-10      Most Recent Value   How many times in the past year have you    Used an illegal drug or used a prescription medication for non-medical reasons?   Never Filed at: 08/04/2020 0440                                MDM  Number of Diagnoses or Management Options  Plantar fasciitis: new and requires workup     Amount and/or Complexity of Data Reviewed  Tests in the radiology section of CPT®: reviewed and ordered  Review and summarize past medical records: yes  Independent visualization of images, tracings, or specimens: yes    Risk of Complications, Morbidity, and/or Mortality  Presenting problems: low  Diagnostic procedures: low  Management options: minimal    Patient Progress  Patient progress: improved        Disposition  Final diagnoses:   Plantar fasciitis     Time reflects when diagnosis was documented in both MDM as applicable and the Disposition within this note     Time User Action Codes Description Comment    8/4/2020  5:42 AM Karitony Hill Add [M72 2] Plantar fasciitis       ED Disposition     ED Disposition Condition Date/Time Comment    Discharge Stable Tue Aug 4, 2020  5:42 AM Buzz Motts discharge to home/self care  Follow-up Information     Follow up With Specialties Details Why Contact Info Additional Information    Fredo Martins MD Family Medicine Schedule an appointment as soon as possible for a visit   [facility]  [address] [facility]  [address] [phone]       [address] [facility] an appointment as soon as possible for a visit   [address] [facility]  [id number]  [address] at [facility], [place] [address], [place], [place],  Ana Maria Cespedes [address] [facility] Emergency Medicine  If symptoms worsen [address] [id number] [phone]  ED, [address] [place] [address], [facility] [address]          Patient's Medications   Discharge Prescriptions    No medications on file     No discharge procedures on file      PDMP Review       Value Time User    PDMP Reviewed  Yes 5/11/2020  6:37 AM Fredo Martins MD          ED Provider  Electronically Signed by           Lake Rodarte MD  08/04/20 0287

## 2020-08-06 ENCOUNTER — VBI (OUTPATIENT)
Dept: FAMILY MEDICINE CLINIC | Facility: HOSPITAL | Age: 70
End: 2020-08-06

## 2020-08-07 NOTE — TELEPHONE ENCOUNTER
[facility]    ED Visit Information     Ed visit date: 8/4/2020  Diagnosis Description:   Plantar fasciitis     In Network? Yes [facility]  Discharge status: Home  Discharged with meds ? No  Number of ED visits to date: 1  ED Severity:N/a     Outreach Information    Outreach successful: Yes 2  Date letter mailed:N/a  Date Finalized:8/7/2020    Care Coordination    Follow up appointment with pcp: no No ED f/u appt scheduled  Transportation issues ? NA    Value [employer] type:  7 Day Outreach  Emergent necessity warranted by diagnosis:  No  [facility] PCP:  Yes  Transportation:  Self Transport  Called PCP first?:  No  Feels able to call PCP for urgent problems ?:  Yes  Understands what emergencies can be handled by PCP ?:  Yes  Ever any problems getting appointment with PCP for minor emergency/urgency problems?:  No  Practice Contacted Patient ?:  No  Pt had ED follow up with pcp/staff ?:  No    Seen for follow-up out of network ?:  No  Reason Patient went to ED instead of Urgent Care or PCP?:  Perceived Severity of Illness  08/10/2020 09:34 AM Phone (Wilma Reyes) [address] [facility], Serena Cramer (Self) [phone] (M)   Call Complete  Personal communication with patient regarding her recent ED visit on 8/4/2020 for Plantar fasciitis  Patient was discharged without medication and advised to follow up with PCP  Patient stated that her foot is feeling much better and does not feel the need to follow up with PCP at this time  Patient does not meet OPCM criteria  Patient is aware of her nearest [facility] facility  Education given on after on-call service

## 2020-08-31 ENCOUNTER — REMOTE DEVICE CLINIC VISIT (OUTPATIENT)
Dept: CARDIOLOGY CLINIC | Facility: CLINIC | Age: 70
End: 2020-08-31
Payer: MEDICARE

## 2020-08-31 DIAGNOSIS — Z95.810 AICD (AUTOMATIC CARDIOVERTER/DEFIBRILLATOR) PRESENT: Primary | ICD-10-CM

## 2020-08-31 PROCEDURE — 93296 REM INTERROG EVL PM/IDS: CPT | Performed by: INTERNAL MEDICINE

## 2020-08-31 PROCEDURE — 93295 DEV INTERROG REMOTE 1/2/MLT: CPT | Performed by: INTERNAL MEDICINE

## 2020-08-31 NOTE — PROGRESS NOTES
Results for orders placed or performed in visit on 08/31/20   Cardiac EP device report    Narrative    MDT/BIV-ICD/ NOT MRI CONDITIONAL  CARELINK TRANSMISSION: BATTERY STATUS "OK"  AP 95%  97% VSRP 0%  ALL AVAILABLE LEAD PARAMETERS WITHIN NORMAL LIMITS  NO SIGNIFICANT HIGH RATE EPISODES  8 VENT SENSING NOTED, MARKERS ONLY  OPTI-VOL WITHIN NORMAL LIMITS  NORMAL DEVICE FUNCTION  NC       Current Outpatient Medications:     allopurinol (ZYLOPRIM) 100 mg tablet, TAKE 1 TABLET BY MOUTH DAILY, Disp: 90 tablet, Rfl: 3    aspirin 81 MG tablet, Take 81 mg by mouth daily  , Disp: , Rfl:     calcium carbonate (OS-MANINDER) 600 MG tablet, Take 1,200 mg by mouth daily, Disp: , Rfl:     candesartan (ATACAND) 32 MG tablet, TAKE 1 TABLET BY MOUTH DAILY, Disp: 30 tablet, Rfl: 5    celecoxib (CeleBREX) 200 mg capsule, Take 1 capsule (200 mg total) by mouth 2 (two) times a day (Patient taking differently: Take 200 mg by mouth every other day ), Disp: 180 capsule, Rfl: 3    cetirizine (ZyrTEC) 10 mg tablet, Take 10 mg by mouth daily, Disp: , Rfl:     Cholecalciferol (VITAMIN D3) 1000 units CAPS, Take by mouth, Disp: , Rfl:     DAILY MULTIPLE VITAMINS PO, Take 1 tablet by mouth daily, Disp: , Rfl:     FLUoxetine (PROzac) 20 mg capsule, Take 1 capsule (20 mg total) by mouth daily, Disp: 60 capsule, Rfl: 3    JANUVIA 100 MG tablet, TAKE 1 TABLET BY MOUTH DAILY, Disp: 30 tablet, Rfl: 5    levothyroxine 25 mcg tablet, TAKE 1 TABLET BY MOUTH DAILY, Disp: 90 tablet, Rfl: 3    Magnesium 250 MG TABS, Take by mouth daily, Disp: , Rfl:     metoprolol succinate (TOPROL-XL) 100 mg 24 hr tablet, Take 1 tablet (100 mg total) by mouth daily, Disp: 60 tablet, Rfl: 3    Minoxidil (ROGAINE WOMENS EX), Apply 1 mL topically  , Disp: , Rfl:     omeprazole (PriLOSEC) 40 MG capsule, TAKE 1 CAPSULE BY MOUTH DAILY, Disp: 60 capsule, Rfl: 3    potassium chloride (K-DUR,KLOR-CON) 20 mEq tablet, TAKE 1 TABLET BY MOUTH TWICE DAILY, Disp: 60 tablet, Rfl: 3    PREDNISONE PO, Take 4 mg by mouth Every other day, Disp: , Rfl:     pyridoxine (VITAMIN B6) 100 mg tablet, Take 100 mg by mouth daily  , Disp: , Rfl:     simvastatin (ZOCOR) 40 mg tablet, Take 1 tablet (40 mg total) by mouth daily, Disp: 60 tablet, Rfl: 3    torsemide (DEMADEX) 20 mg tablet, Take 1 tablet by mouth twice daily, Disp: 60 tablet, Rfl: 5    traMADol (ULTRAM) 50 mg tablet, TAKE 1 TABLET BY MOUTH EVERY 6 HOURS AS NEEDED FOR MODERATE PAIN (Patient taking differently: Take 50 mg by mouth 2 (two) times a day ), Disp: 90 tablet, Rfl: 3

## 2020-09-15 DIAGNOSIS — M10.00 IDIOPATHIC GOUT, UNSPECIFIED CHRONICITY, UNSPECIFIED SITE: ICD-10-CM

## 2020-09-15 RX ORDER — ALLOPURINOL 100 MG/1
TABLET ORAL
Qty: 90 TABLET | Refills: 3 | Status: SHIPPED | OUTPATIENT
Start: 2020-09-15 | End: 2021-06-09 | Stop reason: SDUPTHER

## 2020-10-05 DIAGNOSIS — M25.50 ARTHRALGIA, UNSPECIFIED JOINT: ICD-10-CM

## 2020-10-06 RX ORDER — TRAMADOL HYDROCHLORIDE 50 MG/1
TABLET ORAL
Qty: 90 TABLET | Refills: 3 | OUTPATIENT
Start: 2020-10-06

## 2020-10-07 ENCOUNTER — IMMUNIZATIONS (OUTPATIENT)
Dept: FAMILY MEDICINE CLINIC | Facility: HOSPITAL | Age: 70
End: 2020-10-07
Payer: MEDICARE

## 2020-10-07 DIAGNOSIS — Z23 ENCOUNTER FOR IMMUNIZATION: ICD-10-CM

## 2020-10-07 PROCEDURE — 90662 IIV NO PRSV INCREASED AG IM: CPT | Performed by: FAMILY MEDICINE

## 2020-10-07 PROCEDURE — G0008 ADMIN INFLUENZA VIRUS VAC: HCPCS | Performed by: FAMILY MEDICINE

## 2020-11-09 DIAGNOSIS — M25.50 ARTHRALGIA, UNSPECIFIED JOINT: ICD-10-CM

## 2020-11-09 RX ORDER — TRAMADOL HYDROCHLORIDE 50 MG/1
50 TABLET ORAL EVERY 8 HOURS PRN
Qty: 90 TABLET | Refills: 1 | Status: SHIPPED | OUTPATIENT
Start: 2020-11-09 | End: 2020-12-15

## 2020-11-13 DIAGNOSIS — E87.6 HYPOKALEMIA: ICD-10-CM

## 2020-11-13 DIAGNOSIS — K21.00 GASTROESOPHAGEAL REFLUX DISEASE WITH ESOPHAGITIS: ICD-10-CM

## 2020-11-13 RX ORDER — POTASSIUM CHLORIDE 20 MEQ/1
TABLET, EXTENDED RELEASE ORAL
Qty: 60 TABLET | Refills: 3 | Status: SHIPPED | OUTPATIENT
Start: 2020-11-13 | End: 2021-03-14

## 2020-11-13 RX ORDER — OMEPRAZOLE 40 MG/1
CAPSULE, DELAYED RELEASE ORAL
Qty: 60 CAPSULE | Refills: 3 | Status: SHIPPED | OUTPATIENT
Start: 2020-11-13 | End: 2021-06-09 | Stop reason: SDUPTHER

## 2020-12-03 ENCOUNTER — HOSPITAL ENCOUNTER (OUTPATIENT)
Dept: BONE DENSITY | Facility: CLINIC | Age: 70
Discharge: HOME | End: 2020-12-03
Payer: MEDICARE

## 2020-12-03 VITALS — WEIGHT: 169 LBS | BODY MASS INDEX: 33.18 KG/M2 | HEIGHT: 60 IN

## 2020-12-03 DIAGNOSIS — Z12.31 SCREENING MAMMOGRAM, ENCOUNTER FOR: ICD-10-CM

## 2020-12-03 PROCEDURE — 77063 BREAST TOMOSYNTHESIS BI: CPT

## 2020-12-03 PROCEDURE — 77067 SCR MAMMO BI INCL CAD: CPT

## 2020-12-09 ENCOUNTER — APPOINTMENT (OUTPATIENT)
Dept: LAB | Facility: CLINIC | Age: 70
End: 2020-12-09
Payer: MEDICARE

## 2020-12-09 ENCOUNTER — IN-CLINIC DEVICE VISIT (OUTPATIENT)
Dept: CARDIOLOGY CLINIC | Facility: CLINIC | Age: 70
End: 2020-12-09
Payer: MEDICARE

## 2020-12-09 DIAGNOSIS — Z95.810 PRESENCE OF AUTOMATIC CARDIOVERTER/DEFIBRILLATOR (AICD): Primary | ICD-10-CM

## 2020-12-09 DIAGNOSIS — N18.30 TYPE 2 DIABETES MELLITUS WITH STAGE 3 CHRONIC KIDNEY DISEASE, WITHOUT LONG-TERM CURRENT USE OF INSULIN (HCC): ICD-10-CM

## 2020-12-09 DIAGNOSIS — E11.22 TYPE 2 DIABETES MELLITUS WITH STAGE 3 CHRONIC KIDNEY DISEASE, WITHOUT LONG-TERM CURRENT USE OF INSULIN (HCC): ICD-10-CM

## 2020-12-09 DIAGNOSIS — I25.10 CORONARY ARTERY DISEASE INVOLVING NATIVE CORONARY ARTERY OF NATIVE HEART WITHOUT ANGINA PECTORIS: ICD-10-CM

## 2020-12-09 LAB
ALBUMIN SERPL BCP-MCNC: 3.6 G/DL (ref 3.5–5)
ALP SERPL-CCNC: 74 U/L (ref 46–116)
ALT SERPL W P-5'-P-CCNC: 27 U/L (ref 12–78)
ANION GAP SERPL CALCULATED.3IONS-SCNC: 2 MMOL/L (ref 4–13)
AST SERPL W P-5'-P-CCNC: 11 U/L (ref 5–45)
BILIRUB SERPL-MCNC: 0.52 MG/DL (ref 0.2–1)
BUN SERPL-MCNC: 29 MG/DL (ref 5–25)
CALCIUM SERPL-MCNC: 10.5 MG/DL (ref 8.3–10.1)
CHLORIDE SERPL-SCNC: 106 MMOL/L (ref 100–108)
CHOLEST SERPL-MCNC: 183 MG/DL (ref 50–200)
CO2 SERPL-SCNC: 35 MMOL/L (ref 21–32)
CREAT SERPL-MCNC: 1.16 MG/DL (ref 0.6–1.3)
EST. AVERAGE GLUCOSE BLD GHB EST-MCNC: 148 MG/DL
GFR SERPL CREATININE-BSD FRML MDRD: 47 ML/MIN/1.73SQ M
GLUCOSE P FAST SERPL-MCNC: 76 MG/DL (ref 65–99)
HBA1C MFR BLD: 6.8 %
HDLC SERPL-MCNC: 76 MG/DL
LDLC SERPL CALC-MCNC: 96 MG/DL (ref 0–100)
POTASSIUM SERPL-SCNC: 3.8 MMOL/L (ref 3.5–5.3)
PROT SERPL-MCNC: 6.9 G/DL (ref 6.4–8.2)
SODIUM SERPL-SCNC: 143 MMOL/L (ref 136–145)
TRIGL SERPL-MCNC: 54 MG/DL

## 2020-12-09 PROCEDURE — 80053 COMPREHEN METABOLIC PANEL: CPT

## 2020-12-09 PROCEDURE — 83036 HEMOGLOBIN GLYCOSYLATED A1C: CPT

## 2020-12-09 PROCEDURE — 36415 COLL VENOUS BLD VENIPUNCTURE: CPT

## 2020-12-09 PROCEDURE — 80061 LIPID PANEL: CPT

## 2020-12-09 PROCEDURE — 93284 PRGRMG EVAL IMPLANTABLE DFB: CPT | Performed by: INTERNAL MEDICINE

## 2020-12-15 DIAGNOSIS — E11.22 TYPE 2 DIABETES MELLITUS WITH STAGE 3 CHRONIC KIDNEY DISEASE, WITHOUT LONG-TERM CURRENT USE OF INSULIN (HCC): ICD-10-CM

## 2020-12-15 DIAGNOSIS — M25.50 ARTHRALGIA, UNSPECIFIED JOINT: ICD-10-CM

## 2020-12-15 DIAGNOSIS — R60.9 EDEMA, UNSPECIFIED TYPE: ICD-10-CM

## 2020-12-15 DIAGNOSIS — N18.30 TYPE 2 DIABETES MELLITUS WITH STAGE 3 CHRONIC KIDNEY DISEASE, WITHOUT LONG-TERM CURRENT USE OF INSULIN (HCC): ICD-10-CM

## 2020-12-15 DIAGNOSIS — I10 ESSENTIAL HYPERTENSION: ICD-10-CM

## 2020-12-15 RX ORDER — CANDESARTAN 32 MG/1
TABLET ORAL
Qty: 30 TABLET | Refills: 5 | Status: SHIPPED | OUTPATIENT
Start: 2020-12-15 | End: 2021-06-07

## 2020-12-15 RX ORDER — SITAGLIPTIN 100 MG/1
TABLET, FILM COATED ORAL
Qty: 30 TABLET | Refills: 5 | Status: SHIPPED | OUTPATIENT
Start: 2020-12-15 | End: 2021-06-07

## 2020-12-15 RX ORDER — TORSEMIDE 20 MG/1
TABLET ORAL
Qty: 60 TABLET | Refills: 5 | Status: SHIPPED | OUTPATIENT
Start: 2020-12-15 | End: 2021-06-07

## 2020-12-15 RX ORDER — TRAMADOL HYDROCHLORIDE 50 MG/1
TABLET ORAL
Qty: 90 TABLET | Refills: 1 | Status: SHIPPED | OUTPATIENT
Start: 2020-12-15 | End: 2021-02-18

## 2020-12-24 LAB
LEFT EYE DIABETIC RETINOPATHY: NORMAL
RIGHT EYE DIABETIC RETINOPATHY: NORMAL

## 2021-01-15 DIAGNOSIS — F32.A DEPRESSION, UNSPECIFIED DEPRESSION TYPE: ICD-10-CM

## 2021-01-15 DIAGNOSIS — I10 ESSENTIAL HYPERTENSION: ICD-10-CM

## 2021-01-15 DIAGNOSIS — E78.5 HYPERLIPIDEMIA, UNSPECIFIED HYPERLIPIDEMIA TYPE: ICD-10-CM

## 2021-01-15 RX ORDER — FLUOXETINE HYDROCHLORIDE 20 MG/1
20 CAPSULE ORAL DAILY
Qty: 60 CAPSULE | Refills: 0 | Status: SHIPPED | OUTPATIENT
Start: 2021-01-15 | End: 2021-03-21

## 2021-01-15 RX ORDER — METOPROLOL SUCCINATE 100 MG/1
TABLET, EXTENDED RELEASE ORAL
Qty: 60 TABLET | Refills: 0 | Status: SHIPPED | OUTPATIENT
Start: 2021-01-15 | End: 2021-03-21

## 2021-01-15 RX ORDER — SIMVASTATIN 40 MG
40 TABLET ORAL DAILY
Qty: 60 TABLET | Refills: 0 | Status: SHIPPED | OUTPATIENT
Start: 2021-01-15 | End: 2021-03-21

## 2021-01-25 DIAGNOSIS — E78.5 HYPERLIPIDEMIA, UNSPECIFIED HYPERLIPIDEMIA TYPE: ICD-10-CM

## 2021-01-25 DIAGNOSIS — F32.A DEPRESSION, UNSPECIFIED DEPRESSION TYPE: ICD-10-CM

## 2021-01-25 DIAGNOSIS — I10 ESSENTIAL HYPERTENSION: ICD-10-CM

## 2021-01-25 RX ORDER — SIMVASTATIN 40 MG
40 TABLET ORAL DAILY
Qty: 60 TABLET | Refills: 0 | OUTPATIENT
Start: 2021-01-25

## 2021-01-25 RX ORDER — FLUOXETINE HYDROCHLORIDE 20 MG/1
20 CAPSULE ORAL DAILY
Qty: 60 CAPSULE | Refills: 0 | OUTPATIENT
Start: 2021-01-25

## 2021-01-25 RX ORDER — METOPROLOL SUCCINATE 100 MG/1
100 TABLET, EXTENDED RELEASE ORAL DAILY
Qty: 60 TABLET | Refills: 0 | OUTPATIENT
Start: 2021-01-25

## 2021-02-12 ENCOUNTER — OFFICE VISIT (OUTPATIENT)
Dept: FAMILY MEDICINE CLINIC | Facility: HOSPITAL | Age: 71
End: 2021-02-12
Payer: MEDICARE

## 2021-02-12 VITALS
HEIGHT: 61 IN | SYSTOLIC BLOOD PRESSURE: 122 MMHG | BODY MASS INDEX: 31.26 KG/M2 | TEMPERATURE: 96.5 F | DIASTOLIC BLOOD PRESSURE: 80 MMHG | HEART RATE: 84 BPM | WEIGHT: 165.6 LBS

## 2021-02-12 DIAGNOSIS — M77.42 METATARSALGIA OF BOTH FEET: ICD-10-CM

## 2021-02-12 DIAGNOSIS — E11.22 TYPE 2 DIABETES MELLITUS WITH STAGE 3A CHRONIC KIDNEY DISEASE, WITHOUT LONG-TERM CURRENT USE OF INSULIN (HCC): ICD-10-CM

## 2021-02-12 DIAGNOSIS — N18.31 TYPE 2 DIABETES MELLITUS WITH STAGE 3A CHRONIC KIDNEY DISEASE, WITHOUT LONG-TERM CURRENT USE OF INSULIN (HCC): ICD-10-CM

## 2021-02-12 DIAGNOSIS — H61.23 IMPACTED CERUMEN, BILATERAL: ICD-10-CM

## 2021-02-12 DIAGNOSIS — E28.39 MENOPAUSE OVARIAN FAILURE: ICD-10-CM

## 2021-02-12 DIAGNOSIS — Z00.00 MEDICARE ANNUAL WELLNESS VISIT, SUBSEQUENT: Primary | ICD-10-CM

## 2021-02-12 DIAGNOSIS — M47.816 LUMBAR SPONDYLOSIS: ICD-10-CM

## 2021-02-12 DIAGNOSIS — R41.3 MEMORY DIFFICULTIES: ICD-10-CM

## 2021-02-12 DIAGNOSIS — I10 ESSENTIAL HYPERTENSION: ICD-10-CM

## 2021-02-12 DIAGNOSIS — M77.41 METATARSALGIA OF BOTH FEET: ICD-10-CM

## 2021-02-12 PROCEDURE — 1123F ACP DISCUSS/DSCN MKR DOCD: CPT | Performed by: FAMILY MEDICINE

## 2021-02-12 PROCEDURE — 99214 OFFICE O/P EST MOD 30 MIN: CPT | Performed by: FAMILY MEDICINE

## 2021-02-12 PROCEDURE — G0439 PPPS, SUBSEQ VISIT: HCPCS | Performed by: FAMILY MEDICINE

## 2021-02-12 PROCEDURE — 69210 REMOVE IMPACTED EAR WAX UNI: CPT | Performed by: FAMILY MEDICINE

## 2021-02-12 NOTE — PATIENT INSTRUCTIONS
Medicare Preventive Visit Patient Instructions  Thank you for completing your Welcome to Medicare Visit or Medicare Annual Wellness Visit today  Your next wellness visit will be due in one year (2/12/2022)  The screening/preventive services that you may require over the next 5-10 years are detailed below  Some tests may not apply to you based off risk factors and/or age  Screening tests ordered at today's visit but not completed yet may show as past due  Also, please note that scanned in results may not display below  Preventive Screenings:  Service Recommendations Previous Testing/Comments   Colorectal Cancer Screening  * Colonoscopy    * Fecal Occult Blood Test (FOBT)/Fecal Immunochemical Test (FIT)  * Fecal DNA/Cologuard Test  * Flexible Sigmoidoscopy Age: 54-65 years old   Colonoscopy: every 10 years (may be performed more frequently if at higher risk)  OR  FOBT/FIT: every 1 year  OR  Cologuard: every 3 years  OR  Sigmoidoscopy: every 5 years  Screening may be recommended earlier than age 48 if at higher risk for colorectal cancer  Also, an individualized decision between you and your healthcare provider will decide whether screening between the ages of 74-80 would be appropriate  Colonoscopy: 01/19/2012  FOBT/FIT: Not on file  Cologuard: Not on file  Sigmoidoscopy: Not on file    Screening Current     Breast Cancer Screening Age: 36 years old  Frequency: every 1-2 years  Not required if history of left and right mastectomy Mammogram: 12/03/2020    Screening Current   Cervical Cancer Screening Between the ages of 21-29, pap smear recommended once every 3 years  Between the ages of 33-67, can perform pap smear with HPV co-testing every 5 years     Recommendations may differ for women with a history of total hysterectomy, cervical cancer, or abnormal pap smears in past  Pap Smear: Not on file    Screening Not Indicated   Hepatitis C Screening Once for adults born between 1945 and 1965  More frequently in patients at high risk for Hepatitis C Hep C Antibody: 06/05/2020    Screening Current   Diabetes Screening 1-2 times per year if you're at risk for diabetes or have pre-diabetes Fasting glucose: 76 mg/dL   A1C: 6 8 %    Screening Not Indicated  History Diabetes   Cholesterol Screening Once every 5 years if you don't have a lipid disorder  May order more often based on risk factors  Lipid panel: 12/09/2020    Screening Not Indicated  History Lipid Disorder     Other Preventive Screenings Covered by Medicare:  1  Abdominal Aortic Aneurysm (AAA) Screening: covered once if your at risk  You're considered to be at risk if you have a family history of AAA  2  Lung Cancer Screening: covers low dose CT scan once per year if you meet all of the following conditions: (1) Age 50-69; (2) No signs or symptoms of lung cancer; (3) Current smoker or have quit smoking within the last 15 years; (4) You have a tobacco smoking history of at least 30 pack years (packs per day multiplied by number of years you smoked); (5) You get a written order from a healthcare provider  3  Glaucoma Screening: covered annually if you're considered high risk: (1) You have diabetes OR (2) Family history of glaucoma OR (3)  aged 48 and older OR (3)  American aged 72 and older  3  Osteoporosis Screening: covered every 2 years if you meet one of the following conditions: (1) You're estrogen deficient and at risk for osteoporosis based off medical history and other findings; (2) Have a vertebral abnormality; (3) On glucocorticoid therapy for more than 3 months; (4) Have primary hyperparathyroidism; (5) On osteoporosis medications and need to assess response to drug therapy  · Last bone density test (DXA Scan): 07/24/2018  5  HIV Screening: covered annually if you're between the age of 12-76  Also covered annually if you are younger than 13 and older than 72 with risk factors for HIV infection   For pregnant patients, it is covered up to 3 times per pregnancy  Immunizations:  Immunization Recommendations   Influenza Vaccine Annual influenza vaccination during flu season is recommended for all persons aged >= 6 months who do not have contraindications   Pneumococcal Vaccine (Prevnar and Pneumovax)  * Prevnar = PCV13  * Pneumovax = PPSV23   Adults 25-60 years old: 1-3 doses may be recommended based on certain risk factors  Adults 72 years old: Prevnar (PCV13) vaccine recommended followed by Pneumovax (PPSV23) vaccine  If already received PPSV23 since turning 65, then PCV13 recommended at least one year after PPSV23 dose  Hepatitis B Vaccine 3 dose series if at intermediate or high risk (ex: diabetes, end stage renal disease, liver disease)   Tetanus (Td) Vaccine - COST NOT COVERED BY MEDICARE PART B Following completion of primary series, a booster dose should be given every 10 years to maintain immunity against tetanus  Td may also be given as tetanus wound prophylaxis  Tdap Vaccine - COST NOT COVERED BY MEDICARE PART B Recommended at least once for all adults  For pregnant patients, recommended with each pregnancy  Shingles Vaccine (Shingrix) - COST NOT COVERED BY MEDICARE PART B  2 shot series recommended in those aged 48 and above     Health Maintenance Due:      Topic Date Due    DXA SCAN  07/24/2020    MAMMOGRAM  12/03/2021    Colorectal Cancer Screening  01/19/2022    Hepatitis C Screening  Completed     Immunizations Due:  There are no preventive care reminders to display for this patient  Advance Directives   What are advance directives? Advance directives are legal documents that state your wishes and plans for medical care  These plans are made ahead of time in case you lose your ability to make decisions for yourself  Advance directives can apply to any medical decision, such as the treatments you want, and if you want to donate organs  What are the types of advance directives?   There are many types of advance directives, and each state has rules about how to use them  You may choose a combination of any of the following:  · Living will: This is a written record of the treatment you want  You can also choose which treatments you do not want, which to limit, and which to stop at a certain time  This includes surgery, medicine, IV fluid, and tube feedings  · Durable power of  for healthcare [facility]): This is a written record that states who you want to make healthcare choices for you when you are unable to make them for yourself  This person, called a proxy, is usually a family member or a friend  You may choose more than 1 proxy  · Do not resuscitate (DNR) order:  A DNR order is used in case your heart stops beating or you stop breathing  It is a request not to have certain forms of treatment, such as CPR  A DNR order may be included in other types of advance directives  · Medical directive: This covers the care that you want if you are in a coma, near death, or unable to make decisions for yourself  You can list the treatments you want for each condition  Treatment may include pain medicine, surgery, blood transfusions, dialysis, IV or tube feedings, and a ventilator (breathing machine)  · Values history: This document has questions about your views, beliefs, and how you feel and think about life  This information can help others choose the care that you would choose  Why are advance directives important? An advance directive helps you control your care  Although spoken wishes may be used, it is better to have your wishes written down  Spoken wishes can be misunderstood, or not followed  Treatments may be given even if you do not want them  An advance directive may make it easier for your family to make difficult choices about your care  Fall Prevention    Fall prevention  includes ways to make your home and other areas safer  It also includes ways you can move more carefully to prevent a fall  Health conditions that cause changes in your blood pressure, vision, or muscle strength and coordination may increase your risk for falls  Medicines may also increase your risk for falls if they make you dizzy, weak, or sleepy  Fall prevention tips:   · Stand or sit up slowly  · Use assistive devices as directed  · Wear shoes that fit well and have soles that   · Wear a personal alarm  · Stay active  · Manage your medical conditions  Home Safety Tips:  · Add items to prevent falls in the bathroom  · Keep paths clear  · Install bright lights in your home  · Keep items you use often on shelves within reach  · Paint or place reflective tape on the edges of your stairs  Urinary Incontinence   Urinary incontinence (UI)  is when you lose control of your bladder  UI develops because your bladder cannot store or empty urine properly  The 3 most common types of UI are stress incontinence, urge incontinence, or both  Medicines:   · May be given to help strengthen your bladder control  Report any side effects of medication to your healthcare provider  Do pelvic muscle exercises often:  Your pelvic muscles help you stop urinating  Squeeze these muscles tight for 5 seconds, then relax for 5 seconds  Gradually work up to squeezing for 10 seconds  Do 3 sets of 15 repetitions a day, or as directed  This will help strengthen your pelvic muscles and improve bladder control  Train your bladder:  Go to the bathroom at set times, such as every 2 hours, even if you do not feel the urge to go  You can also try to hold your urine when you feel the urge to go  For example, hold your urine for 5 minutes when you feel the urge to go  As that becomes easier, hold your urine for 10 minutes  Self-care:   · Keep a UI record  Write down how often you leak urine and how much you leak  Make a note of what you were doing when you leaked urine  · Drink liquids as directed   You may need to limit the amount of liquid you drink to help control your urine leakage  Do not drink any liquid right before you go to bed  Limit or do not have drinks that contain caffeine or alcohol  · Prevent constipation  Eat a variety of high-fiber foods  Good examples are high-fiber cereals, beans, vegetables, and whole-grain breads  Walking is the best way to trigger your intestines to have a bowel movement  · Exercise regularly and maintain a healthy weight  Weight loss and exercise will decrease pressure on your bladder and help you control your leakage  · Use a catheter as directed  to help empty your bladder  A catheter is a tiny, plastic tube that is put into your bladder to drain your urine  · Go to behavior therapy as directed  Behavior therapy may be used to help you learn to control your urge to urinate  Weight Management   Why it is important to manage your weight:  Being overweight increases your risk of health conditions such as heart disease, high blood pressure, type 2 diabetes, and certain types of cancer  It can also increase your risk for osteoarthritis, sleep apnea, and other respiratory problems  Aim for a slow, steady weight loss  Even a small amount of weight loss can lower your risk of health problems  How to lose weight safely:  A safe and healthy way to lose weight is to eat fewer calories and get regular exercise  You can lose up about 1 pound a week by decreasing the number of calories you eat by 500 calories each day  Healthy meal plan for weight management:  A healthy meal plan includes a variety of foods, contains fewer calories, and helps you stay healthy  A healthy meal plan includes the following:  · Eat whole-grain foods more often  A healthy meal plan should contain fiber  Fiber is the part of grains, fruits, and vegetables that is not broken down by your body  Whole-grain foods are healthy and provide extra fiber in your diet   Some examples of whole-grain foods are whole-wheat breads and pastas, oatmeal, brown rice, and bulgur  · Eat a variety of vegetables every day  Include dark, leafy greens such as spinach, kale, taj greens, and mustard greens  Eat yellow and orange vegetables such as carrots, sweet potatoes, and winter squash  · Eat a variety of fruits every day  Choose fresh or canned fruit (canned in its own juice or light syrup) instead of juice  Fruit juice has very little or no fiber  · Eat low-fat dairy foods  Drink fat-free (skim) milk or 1% milk  Eat fat-free yogurt and low-fat cottage cheese  Try low-fat cheeses such as mozzarella and other reduced-fat cheeses  · Choose meat and other protein foods that are low in fat  Choose beans or other legumes such as split peas or lentils  Choose fish, skinless poultry (chicken or turkey), or lean cuts of red meat (beef or pork)  Before you cook meat or poultry, cut off any visible fat  · Use less fat and oil  Try baking foods instead of frying them  Add less fat, such as margarine, sour cream, regular salad dressing and mayonnaise to foods  Eat fewer high-fat foods  Some examples of high-fat foods include french fries, doughnuts, ice cream, and cakes  · Eat fewer sweets  Limit foods and drinks that are high in sugar  This includes candy, cookies, regular soda, and sweetened drinks  Exercise:  Exercise at least 30 minutes per day on most days of the week  Some examples of exercise include walking, biking, dancing, and swimming  You can also fit in more physical activity by taking the stairs instead of the elevator or parking farther away from stores  Ask your healthcare provider about the best exercise plan for you  © Copyright [employer] 2018 Information is for End User's use only and may not be sold, redistributed or otherwise used for commercial purposes   All illustrations and images included in CareNotes® are the copyrighted property of A D [employer] M [employer] Inc  or [employer] Visit for Adults   AMBULATORY CARE:   A wellness visit  is when you see your healthcare provider to get screened for health problems  Your healthcare provider will also give you advice on how to stay healthy  Write down your questions so you remember to ask them  Ask your healthcare provider how often you should have a wellness visit  What happens at a wellness visit:  Your healthcare provider will ask about your health, and your family history of health problems  This includes high blood pressure, heart disease, and cancer  He or she will ask if you have symptoms that concern you, if you smoke, and about your mood  You may also be asked about your intake of medicines, supplements, food, and alcohol  Any of the following may be done:  · Your weight  will be checked  Your height may also be checked so your body mass index (BMI) can be calculated  Your BMI shows if you are at a healthy weight  · Your blood pressure  and heart rate will be checked  Your temperature may also be checked  · Blood and urine tests  may be done  Blood tests may be done to check your cholesterol levels  Abnormal cholesterol levels increase your risk for heart disease and stroke  You may also need a blood or urine test to check for diabetes if you are at increased risk  Urine tests may be done to look for signs of an infection or kidney disease  · A physical exam  includes checking your heartbeat and lungs with a stethoscope  Your healthcare provider may also check your skin to look for sun damage  · Screening tests  may be recommended  A screening test is done to check for diseases that may not cause symptoms  The screening tests you may need depend on your age, gender, family history, and lifestyle habits  For example, colorectal screening may be recommended if you are 48years old or older  Screening tests you need if you are a woman:   · A Pap smear  is used to screen for cervical cancer   Pap smears are usually done every 3 to 5 years depending on your age  [address] may need them more often if you have had abnormal Pap smear test results in the past  Ask your healthcare provider how often you should have a Pap smear  · A mammogram  is an x-ray of your breasts to screen for breast cancer  Experts recommend mammograms every 2 years starting at age 48 years  You may need a mammogram at age 52 years or younger if you have an increased risk for breast cancer  Talk to your healthcare provider about when you should start having mammograms and how often you need them  Vaccines you may need:   · Get an influenza vaccine  every year  The influenza vaccine protects you from the flu  Several types of viruses cause the flu  The viruses change over time, so new vaccines are made each year  · Get a tetanus-diphtheria (Td) booster vaccine  every 10 years  This vaccine protects you against tetanus and diphtheria  Tetanus is a severe infection that may cause painful muscle spasms and lockjaw  Diphtheria is a severe bacterial infection that causes a thick covering in the back of your mouth and throat  · Get a human papillomavirus (HPV) vaccine  if you are female and aged 23 to 32 or male 23 to 24 and never received it  This vaccine protects you from HPV infection  HPV is the most common infection spread by sexual contact  HPV may also cause vaginal, penile, and anal cancers  · Get a pneumococcal vaccine  if you are aged 72 years or older  The pneumococcal vaccine is an injection given to protect you from pneumococcal disease  Pneumococcal disease is an infection caused by pneumococcal bacteria  The infection may cause pneumonia, meningitis, or an ear infection  · Get a shingles vaccine  if you are 60 or older, even if you have had shingles before  The shingles vaccine is an injection to protect you from the varicella-zoster virus  This is the same virus that causes chickenpox   Shingles is a painful rash that develops in people who had chickenpox or have been exposed to the virus  How to eat healthy:  My Plate is a model for planning healthy meals  It shows the types and amounts of foods that should go on your plate  Fruits and vegetables make up about half of your plate, and grains and protein make up the other half  A serving of dairy is included on the side of your plate  The amount of calories and serving sizes you need depends on your age, gender, weight, and height  Examples of healthy foods are listed below:  · Eat a variety of vegetables  such as dark green, red, and orange vegetables  You can also include canned vegetables low in sodium (salt) and frozen vegetables without added butter or sauces  · Eat a variety of fresh fruits , canned fruit in 100% juice, frozen fruit, and dried fruit  · Include whole grains  At least half of the grains you eat should be whole grains  Examples include whole-wheat bread, wheat pasta, brown rice, and whole-grain cereals such as oatmeal     · Eat a variety of protein foods such as seafood (fish and shellfish), lean meat, and poultry without skin (turkey and chicken)  Examples of lean meats include pork leg, shoulder, or tenderloin, and beef round, sirloin, tenderloin, and extra lean ground beef  Other protein foods include eggs and egg substitutes, beans, peas, soy products, nuts, and seeds  · Choose low-fat dairy products such as skim or 1% milk or low-fat yogurt, cheese, and cottage cheese  · Limit unhealthy fats  such as butter, hard margarine, and shortening  Exercise:  Exercise at least 30 minutes per day on most days of the week  Some examples of exercise include walking, biking, dancing, and swimming  You can also fit in more physical activity by taking the stairs instead of the elevator or parking farther away from stores  Include muscle strengthening activities 2 days each week  Regular exercise provides many health benefits   It helps you manage your weight, and decreases your risk for type 2 diabetes, heart disease, stroke, and high blood pressure  Exercise can also help improve your mood  Ask your healthcare provider about the best exercise plan for you  General health and safety guidelines:   · Do not smoke  Nicotine and other chemicals in cigarettes and cigars can cause lung damage  Ask your healthcare provider for information if you currently smoke and need help to quit  E-cigarettes or smokeless tobacco still contain nicotine  Talk to your healthcare provider before you use these products  · Limit alcohol  A drink of alcohol is 12 ounces of beer, 5 ounces of wine, or 1½ ounces of liquor  · Lose weight, if needed  Being overweight increases your risk of certain health conditions  These include heart disease, high blood pressure, type 2 diabetes, and certain types of cancer  · Protect your skin  Do not sunbathe or use tanning beds  Use sunscreen with a SPF 15 or higher  Apply sunscreen at least 15 minutes before you go outside  Reapply sunscreen every 2 hours  Wear protective clothing, hats, and sunglasses when you are outside  · Drive safely  Always wear your seatbelt  Make sure everyone in your car wears a seatbelt  A seatbelt can save your life if you are in an accident  Do not use your cell phone when you are driving  This could distract you and cause an accident  Pull over if you need to make a call or send a text message  · Practice safe sex  Use latex condoms if are sexually active and have more than one partner  Your healthcare provider may recommend screening tests for sexually transmitted infections (STIs)  · Wear helmets, lifejackets, and protective gear  Always wear a helmet when you ride a bike or motorcycle, go skiing, or play sports that could cause a head injury  Wear protective equipment when you play sports  Wear a lifejacket when you are on a boat or doing water sports      © Copyright [address] Information is for End User's use only and may not be sold, redistributed or otherwise used for commercial purposes  All illustrations and images included in CareNotes® are the copyrighted property of A D A M , Inc  or Sophia Hugo  The above information is an  only  It is not intended as medical advice for individual conditions or treatments  Talk to your doctor, nurse or pharmacist before following any medical regimen to see if it is safe and effective for you

## 2021-02-12 NOTE — PROGRESS NOTES
Ear cerumen removal    Date/Time: 2/12/2021 3:08 PM  Performed by: Sanjuanita Allred MD  Authorized by: Sanjuanita Allred MD   Universal Protocol:  Consent: Verbal consent obtained  Written consent not obtained  Risks and benefits: risks, benefits and alternatives were discussed  Consent given by: patient  Timeout called at: 2/12/2021 3:08 PM   Patient understanding: patient states understanding of the procedure being performed      Patient location:  Clinic  Procedure details:     Location:  L ear and R ear    Procedure type: irrigation with instrumentation      Instrumentation: curette      Approach:  Natural orifice  Post-procedure details:     Complication:  None    Hearing quality:  Normal    Patient tolerance of procedure:   Tolerated well, no immediate complications

## 2021-02-12 NOTE — PROGRESS NOTES
Assessment and Plan:     Problem List Items Addressed This Visit        Endocrine    Type 2 diabetes mellitus with stage 3 chronic kidney disease, without long-term current use of insulin ([facility] [address] )       Cardiovascular and Mediastinum    Essential hypertension       Musculoskeletal and Integument    Lumbar spondylosis    Relevant Orders    XR spine lumbar minimum 4 views non injury       Other    Medicare annual wellness visit, subsequent      Other Visit Diagnoses     Metatarsalgia of both feet    -  Primary    Relevant Orders    Ambulatory referral to Orthopedic Surgery    Menopause ovarian failure        Relevant Orders    DXA bone density spine hip and pelvis    Memory difficulties        Impacted cerumen, bilateral        Relevant Orders    Ear cerumen removal        BMI Counseling: Body mass index is 31 81 kg/m²  The BMI is above normal  Nutrition recommendations include decreasing portion sizes, moderation in carbohydrate intake and reducing intake of cholesterol  Exercise recommendations include exercising 3-5 times per week  No pharmacotherapy was ordered  Falls Plan of Care: balance, strength, and gait training instructions were provided  Preventive health issues were discussed with patient, and age appropriate screening tests were ordered as noted in patient's After Visit Summary  Personalized health advice and appropriate referrals for health education or preventive services given if needed, as noted in patient's After Visit Summary       History of Present Illness:     Patient presents for Medicare Annual Wellness visit    Patient Care Team:  Cesar Santoro MD as PCP - MD Sabrina Vilchis MD Fletcher Comes, MD Lyman Manual, MD     Problem List:     Patient Active Problem List   Diagnosis    Trigger little finger of left hand    Allergic rhinitis    Arthralgia of knee, left    Arthralgia of knee, right    Coronary artery disease    Benign positional vertigo, left    Bilateral fibrocystic breast changes    Bilateral sacroiliitis (HCC)    Chronic systolic congestive heart failure (HCC)    CKD (chronic kidney disease) stage 3, GFR 30-59 ml/min (HCC)    Chronic diastolic congestive heart failure (HCC)    Biventricular ICD (implantable cardioverter-defibrillator) in place    DDD (degenerative disc disease), cervical    Depression    Esophageal reflux    Gout    Mixed hyperlipidemia    Essential hypertension    Hypokalemia    Acquired hypothyroidism    Class 1 obesity due to excess calories with serious comorbidity and body mass index (BMI) of 32 0 to 32 9 in adult    Obstructive sleep apnea    Osteoarthritis    Overactive bladder    Psoriasis    Restless legs syndrome    Type 2 diabetes mellitus ([facility])    Type 2 diabetes mellitus with stage 3 chronic kidney disease, without long-term current use of insulin ([place] [address] )    Vitamin D deficiency    Medicare annual wellness visit, subsequent    Lumbar spondylosis    Chronic left-sided low back pain without sciatica    Hypercalcemia    Leukocytosis    Screening for colon cancer    Dense breast tissue    Family history of breast cancer    Need for pneumococcal vaccination    Nausea and vomiting    Gastroesophageal reflux disease with esophagitis    Abnormal computerized axial tomography of abdomen    [facility]    Screening mammogram, encounter for    PVC's (premature ventricular contractions)    Chronic left shoulder pain      Past Medical and Surgical History:     Past Medical History:   Diagnosis Date    Abnormal finding on breast imaging     last assessed 11/30/17    Acute bacterial bronchitis     Allergic rhinitis     Arthralgia     Arthritis     Atherosclerotic heart disease of native coronary artery without angina pectoris     BBB (bundle branch block)     left,last assesed 6/4/12    BBB (bundle branch block)     left    Benign paroxysmal vertigo last assessed 9/7/12    Benign paroxysmal vertigo of left ear     Bilateral fibrocystic breast changes     Bilateral sacroiliitis (HCC)     Cardiac defibrillator in situ     Carpal tunnel syndrome, unspecified upper limb     CHF (congestive heart failure) (HCC)     chronic systolic/diastolic    Chronic diastolic congestive heart failure (HCC)     Chronic kidney disease     stage 3    Chronic systolic congestive heart failure (HCC)     Coronary artery disease     Cough     Depression     Detrusor instability     Diabetes mellitus (HCC)     Difficulty walking up stairs     Dilated cardiomyopathy (HCC)     Dilated cardiomyopathy (HCC)     Disease of thyroid gland     Disorder of intervertebral disc of cervical spine     Esophageal reflux     GERD (gastroesophageal reflux disease)     Gout     Hemorrhoids     History of blood clots     Hormone replacement therapy     Hx of blood clots     Hyperlipidemia     Hypertension     Hypokalemia     Hypothyroidism     Indigestion     Inflamed toenail, left     Ingrown nail     Low back pain     left    OAB (overactive bladder)     detrusor instability    Obesity     AZ (obstructive sleep apnea)     Osteoarthritis     Papilloma of left breast     Psoriasis     psoriatic arthropathy    Psoriatic arthropathy (HCC)     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    RLS (restless legs syndrome)     Sciatica     Shortness of breath     Sleep apnea     unspecified type    Tendinitis     Trigger thumb, left thumb     Urinary frequency     UTI (urinary tract infection)     Vitamin D deficiency      Past Surgical History:   Procedure Laterality Date    BLADDER SURGERY  1999    lift and repair    BREAST BIOPSY Left 05/23/2016     CORE BIOPSY-BENIGN    CARDIAC CATHETERIZATION      outcome:  successful    CARDIAC DEFIBRILLATOR PLACEMENT      CARPAL TUNNEL RELEASE Bilateral 1997    CATARACT EXTRACTION      COLONOSCOPY      CORONARY ANGIOPLASTY WITH STENT PLACEMENT      CYSTOSCOPY W/ URETEROSCOPY  2009    DE QUERVAIN'S RELEASE Left     and trigger finger release    EYE SURGERY Left     EYE SURGERY Left 2019    Cataract    EYE SURGERY Right 2019    Cataract    INSERT / REPLACE / REMOVE PACEMAKER      JOINT REPLACEMENT Right 2017    Knee    KNEE ARTHROSCOPY      therapeutic    NEUROPLASTY / TRANSPOSITION MEDIAN NERVE AT CARPAL TUNNEL      OOPHORECTOMY Bilateral     AGE 44    WA INCISE FINGER TENDON SHEATH Left 3/2/2017    Procedure: SMALL TRIGGER FINGER RELEASE;  Surgeon: Dalton Riggins MD;  Location: [facility];  Service: Orthopedics    RECTAL SURGERY  2006    repair of rectal ulcer    SHOULDER ARTHROSCOPY Left 2006    TOTAL ABDOMINAL HYSTERECTOMY      AGE 53    TOTAL KNEE ARTHROPLASTY Left     TOTAL SHOULDER REPLACEMENT Left 2007    TRIGGER FINGER RELEASE Bilateral     right haand ,,left hand ,    US GUIDED BREAST BIOPSY LEFT COMPLETE Left 2016      Family History:     Family History   Problem Relation Age of Onset    Hypertension Mother     Alzheimer's disease Mother     Cancer Father 70        bladder    Prostate cancer Father 70    Cancer Brother         pt shared some type of blood cancer    Breast cancer Paternal Grandmother         age dx unk    Stomach cancer Paternal Aunt 72      Social History:        Social History     Socioeconomic History    Marital status:       Spouse name: None    Number of children: None    Years of education: None    Highest education level: None   Occupational History    None   Social Needs    Financial resource strain: None    Food insecurity     Worry: None     Inability: None    Transportation needs     Medical: None     Non-medical: None   Tobacco Use    Smoking status: Former Smoker     Packs/day: 1 00     Years: 15 00     Pack years: 15 00     Quit date:      Years since quittin 1    Smokeless tobacco: Never Used Substance and Sexual Activity    Alcohol use: No    Drug use: No    Sexual activity: None     Comment: birth control   Lifestyle    Physical activity     Days per week: None     Minutes per session: None    Stress: None   Relationships    Social connections     Talks on phone: None     Gets together: None     Attends [employer] service: None     Active member of club or organization: None     Attends meetings of clubs or organizations: None     Relationship status: None    Intimate partner violence     Fear of current or ex partner: None     Emotionally abused: None     Physically abused: None     Forced sexual activity: None   Other Topics Concern    None   Social History Narrative    Always uses seat belt      Medications and Allergies:     Current Outpatient Medications   Medication Sig Dispense Refill    allopurinol (ZYLOPRIM) 100 mg tablet TAKE 1 TABLET BY MOUTH DAILY 90 tablet 3    aspirin 81 MG tablet Take 81 mg by mouth daily   calcium carbonate (OS-MANINDER) 600 MG tablet Take 1,200 mg by mouth daily      celecoxib (CeleBREX) 200 mg capsule Take 1 capsule (200 mg total) by mouth 2 (two) times a day (Patient taking differently: Take 200 mg by mouth every other day ) 180 capsule 3    cetirizine (ZyrTEC) 10 mg tablet Take 10 mg by mouth daily      Cholecalciferol (VITAMIN D3) 1000 units CAPS Take by mouth      DAILY MULTIPLE VITAMINS PO Take 1 tablet by mouth daily      FLUoxetine (PROzac) 20 mg capsule Take 1 capsule (20 mg total) by mouth daily 60 capsule 0    Januvia 100 MG tablet TAKE 1 TABLET BY MOUTH DAILY 30 tablet 5    levothyroxine 25 mcg tablet TAKE 1 TABLET BY MOUTH DAILY 90 tablet 3    Magnesium 250 MG TABS Take by mouth daily      Melatonin 3 MG CAPS Take 3 mg by mouth      metoprolol succinate (TOPROL-XL) 100 mg 24 hr tablet TAKE 1 TABLET BY MOUTH ONCE A DAY 60 tablet 0    Minoxidil (ROGAINE WOMENS EX) Apply 1 mL topically        omeprazole (PriLOSEC) 40 MG capsule TAKE 1 CAPSULE BY MOUTH DAILY 60 capsule 3    potassium chloride (K-DUR,KLOR-CON) 20 mEq tablet TAKE 1 TABLET BY MOUTH TWICE DAILY 60 tablet 3    PREDNISONE PO Take 4 mg by mouth Every other day      pyridoxine (VITAMIN B6) 100 mg tablet Take 100 mg by mouth daily   torsemide (DEMADEX) 20 mg tablet Take 1 tablet by mouth twice daily 60 tablet 5    traMADol (ULTRAM) 50 mg tablet TAKE 1 TABLET BY MOUTH EVERY 8 HOURS AS NEEDED FOR MODERATE PAIN 90 tablet 1    TURMERIC PO Take by mouth      candesartan (ATACAND) 32 MG tablet TAKE 1 TABLET BY MOUTH DAILY 30 tablet 5    simvastatin (ZOCOR) 40 mg tablet Take 1 tablet (40 mg total) by mouth daily 60 tablet 0     No current facility-administered medications for this visit  No Known Allergies   Immunizations:     Immunization History   Administered Date(s) Administered    Influenza Split High Dose Preservative Free IM 09/24/2013, 10/16/2014, 11/03/2015, 10/05/2016, 10/13/2017    Influenza, high dose seasonal 0 7 mL 09/20/2018, 10/01/2019, 10/07/2020    Influenza, seasonal, injectable 09/07/2012    Pneumococcal Conjugate 13-Valent 06/24/2015    Pneumococcal Polysaccharide PPV23 10/01/2000, 10/01/2005, 11/26/2018    Zoster Vaccine Recombinant 09/19/2019, 11/20/2019      Health Maintenance:         Topic Date Due    DXA SCAN  07/24/2020    MAMMOGRAM  12/03/2021    Colorectal Cancer Screening  01/19/2022    Hepatitis C Screening  Completed     There are no preventive care reminders to display for this patient  Medicare Health Risk Assessment:     /80   Pulse 84   Temp (!) 96 5 °F (35 8 °C)   Ht 5' 0 5" (1 537 m)   Wt 75 1 kg (165 lb 9 6 oz)   BMI 31 81 kg/m²      Leydi Gao is here for her Subsequent Wellness visit  Last Medicare Wellness visit information reviewed, patient interviewed and updates made to the record today  Health Risk Assessment:   Patient rates overall health as good   Patient feels that their physical health rating is slightly worse  Eyesight was rated as same  Hearing was rated as same  Patient feels that their emotional and mental health rating is same  Pain experienced in the last 7 days has been a lot  Patient's pain rating has been 7/10  Patient states that she has experienced no weight loss or gain in last 6 months  Depression Screening:   PHQ-2 Score: 2  PHQ-9 Score: 5      Fall Risk Screening: In the past year, patient has experienced: history of falling in past year    Number of falls: 2 or more  Injured during fall?: Yes    Feels unsteady when standing or walking?: No    Worried about falling?: No      Urinary Incontinence Screening:   Patient has leaked urine accidently in the last six months  Home Safety:  Patient has trouble with stairs inside or outside of their home  Patient has working smoke alarms and has working carbon monoxide detector  Home safety hazards include: none  Nutrition:   Current diet is Regular  Medications:   Patient is currently taking over-the-counter supplements  OTC medications include: see medication list  Patient is able to manage medications  Activities of Daily Living (ADLs)/Instrumental Activities of Daily Living (IADLs):   Walk and transfer into and out of bed and chair?: Yes  Dress and groom yourself?: Yes    Bathe or shower yourself?: Yes    Feed yourself? Yes  Do your laundry/housekeeping?: Yes  Manage your money, pay your bills and track your expenses?: Yes  Make your own meals?: Yes    Do your own shopping?: Yes    Previous Hospitalizations:   Any hospitalizations or ED visits within the last 12 months?: Yes    How many hospitalizations have you had in the last year?: 1-2    Advance Care Planning:   Living will: Yes    Durable POA for healthcare:  Yes    Advanced directive: Yes    Advanced directive counseling given: No    Five wishes given: No    Patient declined ACP directive: No    End of Life Decisions reviewed with patient: Yes    Provider agrees with end of life decisions: Yes      Cognitive Screening:   Provider or family/friend/caregiver concerned regarding cognition?: No    PREVENTIVE SCREENINGS      Cardiovascular Screening:    General: Screening Not Indicated and History Lipid Disorder      Diabetes Screening:     General: Screening Not Indicated and History Diabetes      Colorectal Cancer Screening:     General: Screening Current      Breast Cancer Screening:     General: Screening Current      Cervical Cancer Screening:    General: Screening Not Indicated      Osteoporosis Screening:    General: Risks and Benefits Discussed    Due for: DXA Axial      Abdominal Aortic Aneurysm (AAA) Screening:        General: Screening Not Indicated      Lung Cancer Screening:     General: Screening Not Indicated      Hepatitis C Screening:    General: Screening Current      Caity Steen MD

## 2021-02-12 NOTE — PROGRESS NOTES
Assessment/Plan:         Diagnoses and all orders for this visit:    Medicare annual wellness visit, subsequent    Menopause ovarian failure  -     DXA bone density spine hip and pelvis; Future    Type 2 diabetes mellitus with stage 3a chronic kidney disease, without long-term current use of insulin ([facility])  Comments:  Excellent A1c 6 8    Essential hypertension  Comments:  Excellent BP control    Memory difficulties  Comments:  No red flags at this time, may need Neurology evaluation    Impacted cerumen, bilateral  -     Ear cerumen removal    Lumbar spondylosis  -     XR spine lumbar minimum 4 views non injury; Future    Metatarsalgia of both feet  -     Cancel: Ambulatory referral to Orthopedic Surgery; Future  -     Ambulatory referral to Orthopedic Surgery; Future    Other orders  -     TURMERIC PO; Take by mouth  -     Melatonin 3 MG CAPS; Take 3 mg by mouth          Subjective:      Patient ID: Martin Carlisle is a 65 y o  female  6 month follow up    No recent illness or injury  No COVID concern    Ears are blocking up, hard to hear    Has increased back pain recently   Hx seeing Pain management in 2018    Memory getting to be more of a problem   Not noted by family, just by her  Labs reviewed in detail and copy given    Discussed COVID vaccine availability    Having lots of pain in metatarsal area of both feet  The following portions of the patient's history were reviewed and updated as appropriate: allergies, current medications, past family history, past medical history, past social history, past surgical history and problem list     Review of Systems   Constitutional: Negative for activity change, appetite change and unexpected weight change  Respiratory: Negative  Cardiovascular: Negative  Gastrointestinal: Negative  Genitourinary: Negative  Musculoskeletal: Positive for arthralgias, back pain, gait problem and joint swelling  Hematological: Negative  Psychiatric/Behavioral: The patient is nervous/anxious  All other systems reviewed and are negative  Objective:      /80   Pulse 84   Temp (!) 96 5 °F (35 8 °C)   Ht 5' 0 5" (1 537 m)   Wt 75 1 kg (165 lb 9 6 oz)   BMI 31 81 kg/m²          Physical Exam  Vitals signs and nursing note reviewed  Constitutional:       Appearance: Normal appearance  HENT:      Head: Normocephalic and atraumatic  Right Ear: There is impacted cerumen  Left Ear: There is impacted cerumen  Cardiovascular:      Rate and Rhythm: Normal rate and regular rhythm  Pulses: Normal pulses  Heart sounds: Normal heart sounds  Pulmonary:      Effort: Pulmonary effort is normal       Breath sounds: Normal breath sounds  Musculoskeletal:      Right lower leg: No edema  Left lower leg: No edema  Neurological:      Mental Status: She is alert  Psychiatric:         Mood and Affect: Mood normal          Behavior: Behavior normal          Thought Content:  Thought content normal          Judgment: Judgment normal

## 2021-02-17 ENCOUNTER — APPOINTMENT (OUTPATIENT)
Dept: RADIOLOGY | Facility: CLINIC | Age: 71
End: 2021-02-17
Payer: MEDICARE

## 2021-02-17 DIAGNOSIS — M47.816 LUMBAR SPONDYLOSIS: ICD-10-CM

## 2021-02-17 PROCEDURE — 72110 X-RAY EXAM L-2 SPINE 4/>VWS: CPT

## 2021-02-18 DIAGNOSIS — M25.50 ARTHRALGIA, UNSPECIFIED JOINT: ICD-10-CM

## 2021-02-18 RX ORDER — TRAMADOL HYDROCHLORIDE 50 MG/1
TABLET ORAL
Qty: 90 TABLET | Refills: 1 | Status: SHIPPED | OUTPATIENT
Start: 2021-02-18 | End: 2021-06-08

## 2021-02-24 ENCOUNTER — HOSPITAL ENCOUNTER (OUTPATIENT)
Dept: BONE DENSITY | Facility: CLINIC | Age: 71
Discharge: HOME | End: 2021-02-24
Payer: MEDICARE

## 2021-02-24 DIAGNOSIS — E28.39 MENOPAUSE OVARIAN FAILURE: ICD-10-CM

## 2021-02-24 PROCEDURE — 77080 DXA BONE DENSITY AXIAL: CPT

## 2021-03-03 DIAGNOSIS — Z23 ENCOUNTER FOR IMMUNIZATION: ICD-10-CM

## 2021-03-08 ENCOUNTER — IMMUNIZATIONS (OUTPATIENT)
Dept: FAMILY MEDICINE CLINIC | Facility: HOSPITAL | Age: 71
End: 2021-03-08

## 2021-03-08 DIAGNOSIS — Z23 ENCOUNTER FOR IMMUNIZATION: Primary | ICD-10-CM

## 2021-03-08 PROCEDURE — 0001A SARS-COV-2 / COVID-19 MRNA VACCINE (PFIZER-BIONTECH) 30 MCG: CPT

## 2021-03-08 PROCEDURE — 91300 SARS-COV-2 / COVID-19 MRNA VACCINE (PFIZER-BIONTECH) 30 MCG: CPT

## 2021-03-14 DIAGNOSIS — E87.6 HYPOKALEMIA: ICD-10-CM

## 2021-03-14 RX ORDER — POTASSIUM CHLORIDE 20 MEQ/1
TABLET, EXTENDED RELEASE ORAL
Qty: 60 TABLET | Refills: 3 | Status: SHIPPED | OUTPATIENT
Start: 2021-03-14 | End: 2021-07-07

## 2021-03-18 ENCOUNTER — REMOTE DEVICE CLINIC VISIT (OUTPATIENT)
Dept: CARDIOLOGY CLINIC | Facility: CLINIC | Age: 71
End: 2021-03-18
Payer: MEDICARE

## 2021-03-18 DIAGNOSIS — Z95.818 PRESENCE OF OTHER CARDIAC IMPLANTS AND GRAFTS: Primary | ICD-10-CM

## 2021-03-18 PROCEDURE — 93295 DEV INTERROG REMOTE 1/2/MLT: CPT | Performed by: INTERNAL MEDICINE

## 2021-03-18 PROCEDURE — 93296 REM INTERROG EVL PM/IDS: CPT | Performed by: INTERNAL MEDICINE

## 2021-03-18 NOTE — PROGRESS NOTES
Results for orders placed or performed in visit on 03/18/21   Cardiac EP device report    Narrative    MDT/BIV-ICD/ NOT MRI CONDITIONAL  CARELINK TRANSMISSION: BATTERY VOLTAGE ADEQUATE (18 MOS)  AP-88%, BVP-95% (VSR PACE-0 2%)  ALL AVAILABLE LEAD PARAMETERS WITHIN NORMAL LIMITS  NO SIGNIFICANT HIGH RATE EPISODES  [id number] OPTI-VOL WITHIN NORMAL LIMITS  NORMAL DEVICE FUNCTION   GV

## 2021-03-20 DIAGNOSIS — F32.A DEPRESSION, UNSPECIFIED DEPRESSION TYPE: ICD-10-CM

## 2021-03-20 DIAGNOSIS — E78.5 HYPERLIPIDEMIA, UNSPECIFIED HYPERLIPIDEMIA TYPE: ICD-10-CM

## 2021-03-20 DIAGNOSIS — I10 ESSENTIAL HYPERTENSION: ICD-10-CM

## 2021-03-21 RX ORDER — METOPROLOL SUCCINATE 100 MG/1
TABLET, EXTENDED RELEASE ORAL
Qty: 60 TABLET | Refills: 0 | Status: SHIPPED | OUTPATIENT
Start: 2021-03-21 | End: 2021-04-26 | Stop reason: SDUPTHER

## 2021-03-21 RX ORDER — FLUOXETINE HYDROCHLORIDE 20 MG/1
CAPSULE ORAL
Qty: 60 CAPSULE | Refills: 0 | Status: SHIPPED | OUTPATIENT
Start: 2021-03-21 | End: 2021-05-18

## 2021-03-21 RX ORDER — SIMVASTATIN 40 MG
TABLET ORAL
Qty: 60 TABLET | Refills: 0 | Status: SHIPPED | OUTPATIENT
Start: 2021-03-21 | End: 2021-04-26 | Stop reason: SDUPTHER

## 2021-03-30 ENCOUNTER — IMMUNIZATIONS (OUTPATIENT)
Dept: FAMILY MEDICINE CLINIC | Facility: HOSPITAL | Age: 71
End: 2021-03-30

## 2021-03-30 DIAGNOSIS — Z23 ENCOUNTER FOR IMMUNIZATION: Primary | ICD-10-CM

## 2021-03-30 PROCEDURE — 91300 SARS-COV-2 / COVID-19 MRNA VACCINE (PFIZER-BIONTECH) 30 MCG: CPT

## 2021-03-30 PROCEDURE — 0002A SARS-COV-2 / COVID-19 MRNA VACCINE (PFIZER-BIONTECH) 30 MCG: CPT

## 2021-04-26 DIAGNOSIS — I10 ESSENTIAL HYPERTENSION: ICD-10-CM

## 2021-04-26 DIAGNOSIS — E78.5 HYPERLIPIDEMIA, UNSPECIFIED HYPERLIPIDEMIA TYPE: ICD-10-CM

## 2021-04-26 DIAGNOSIS — E03.9 ACQUIRED HYPOTHYROIDISM: ICD-10-CM

## 2021-04-26 RX ORDER — LEVOTHYROXINE SODIUM 0.03 MG/1
25 TABLET ORAL DAILY
Qty: 90 TABLET | Refills: 1 | Status: SHIPPED | OUTPATIENT
Start: 2021-04-26 | End: 2021-05-09

## 2021-04-26 RX ORDER — METOPROLOL SUCCINATE 100 MG/1
100 TABLET, EXTENDED RELEASE ORAL DAILY
Qty: 90 TABLET | Refills: 1 | Status: SHIPPED | OUTPATIENT
Start: 2021-04-26 | End: 2021-05-18

## 2021-04-26 RX ORDER — SIMVASTATIN 40 MG
40 TABLET ORAL DAILY
Qty: 90 TABLET | Refills: 1 | Status: SHIPPED | OUTPATIENT
Start: 2021-04-26 | End: 2021-05-18

## 2021-05-08 DIAGNOSIS — E03.9 ACQUIRED HYPOTHYROIDISM: ICD-10-CM

## 2021-05-09 RX ORDER — LEVOTHYROXINE SODIUM 0.03 MG/1
TABLET ORAL
Qty: 90 TABLET | Refills: 3 | Status: SHIPPED | OUTPATIENT
Start: 2021-05-09 | End: 2021-06-09 | Stop reason: SDUPTHER

## 2021-05-18 DIAGNOSIS — F32.A DEPRESSION, UNSPECIFIED DEPRESSION TYPE: ICD-10-CM

## 2021-05-18 DIAGNOSIS — I10 ESSENTIAL HYPERTENSION: ICD-10-CM

## 2021-05-18 DIAGNOSIS — E78.5 HYPERLIPIDEMIA, UNSPECIFIED HYPERLIPIDEMIA TYPE: ICD-10-CM

## 2021-05-18 RX ORDER — SIMVASTATIN 40 MG
TABLET ORAL
Qty: 60 TABLET | Refills: 3 | Status: SHIPPED | OUTPATIENT
Start: 2021-05-18 | End: 2021-06-09 | Stop reason: SDUPTHER

## 2021-05-18 RX ORDER — FLUOXETINE HYDROCHLORIDE 20 MG/1
CAPSULE ORAL
Qty: 60 CAPSULE | Refills: 3 | Status: SHIPPED | OUTPATIENT
Start: 2021-05-18 | End: 2022-01-03

## 2021-05-18 RX ORDER — METOPROLOL SUCCINATE 100 MG/1
TABLET, EXTENDED RELEASE ORAL
Qty: 60 TABLET | Refills: 3 | Status: SHIPPED | OUTPATIENT
Start: 2021-05-18 | End: 2021-06-09 | Stop reason: SDUPTHER

## 2021-05-30 DIAGNOSIS — M25.50 ARTHRALGIA, UNSPECIFIED JOINT: ICD-10-CM

## 2021-06-07 DIAGNOSIS — R60.9 EDEMA, UNSPECIFIED TYPE: ICD-10-CM

## 2021-06-07 DIAGNOSIS — E11.22 TYPE 2 DIABETES MELLITUS WITH STAGE 3 CHRONIC KIDNEY DISEASE, WITHOUT LONG-TERM CURRENT USE OF INSULIN (HCC): ICD-10-CM

## 2021-06-07 DIAGNOSIS — N18.30 TYPE 2 DIABETES MELLITUS WITH STAGE 3 CHRONIC KIDNEY DISEASE, WITHOUT LONG-TERM CURRENT USE OF INSULIN (HCC): ICD-10-CM

## 2021-06-07 DIAGNOSIS — I10 ESSENTIAL HYPERTENSION: ICD-10-CM

## 2021-06-07 RX ORDER — CANDESARTAN 32 MG/1
TABLET ORAL
Qty: 30 TABLET | Refills: 5 | Status: SHIPPED | OUTPATIENT
Start: 2021-06-07 | End: 2021-06-09 | Stop reason: SDUPTHER

## 2021-06-07 RX ORDER — SITAGLIPTIN 100 MG/1
TABLET, FILM COATED ORAL
Qty: 30 TABLET | Refills: 5 | Status: SHIPPED | OUTPATIENT
Start: 2021-06-07 | End: 2021-06-09 | Stop reason: SDUPTHER

## 2021-06-07 RX ORDER — TORSEMIDE 20 MG/1
TABLET ORAL
Qty: 60 TABLET | Refills: 5 | Status: SHIPPED | OUTPATIENT
Start: 2021-06-07 | End: 2021-12-04

## 2021-06-08 RX ORDER — TRAMADOL HYDROCHLORIDE 50 MG/1
TABLET ORAL
Qty: 90 TABLET | Refills: 1 | Status: SHIPPED | OUTPATIENT
Start: 2021-06-08 | End: 2021-08-06

## 2021-06-09 DIAGNOSIS — M10.00 IDIOPATHIC GOUT, UNSPECIFIED CHRONICITY, UNSPECIFIED SITE: ICD-10-CM

## 2021-06-09 DIAGNOSIS — I10 ESSENTIAL HYPERTENSION: ICD-10-CM

## 2021-06-09 DIAGNOSIS — E11.22 TYPE 2 DIABETES MELLITUS WITH STAGE 3 CHRONIC KIDNEY DISEASE, WITHOUT LONG-TERM CURRENT USE OF INSULIN (HCC): ICD-10-CM

## 2021-06-09 DIAGNOSIS — K21.00 GASTROESOPHAGEAL REFLUX DISEASE WITH ESOPHAGITIS: ICD-10-CM

## 2021-06-09 DIAGNOSIS — E78.5 HYPERLIPIDEMIA, UNSPECIFIED HYPERLIPIDEMIA TYPE: ICD-10-CM

## 2021-06-09 DIAGNOSIS — E03.9 ACQUIRED HYPOTHYROIDISM: ICD-10-CM

## 2021-06-09 DIAGNOSIS — N18.30 TYPE 2 DIABETES MELLITUS WITH STAGE 3 CHRONIC KIDNEY DISEASE, WITHOUT LONG-TERM CURRENT USE OF INSULIN (HCC): ICD-10-CM

## 2021-06-09 RX ORDER — OMEPRAZOLE 40 MG/1
40 CAPSULE, DELAYED RELEASE ORAL DAILY
Qty: 90 CAPSULE | Refills: 1 | Status: SHIPPED | OUTPATIENT
Start: 2021-06-09 | End: 2021-07-07

## 2021-06-09 RX ORDER — METOPROLOL SUCCINATE 100 MG/1
100 TABLET, EXTENDED RELEASE ORAL DAILY
Qty: 90 TABLET | Refills: 1 | Status: SHIPPED | OUTPATIENT
Start: 2021-06-09 | End: 2022-02-02

## 2021-06-09 RX ORDER — CANDESARTAN 32 MG/1
32 TABLET ORAL DAILY
Qty: 90 TABLET | Refills: 1 | Status: SHIPPED | OUTPATIENT
Start: 2021-06-09 | End: 2021-12-04

## 2021-06-09 RX ORDER — LEVOTHYROXINE SODIUM 0.03 MG/1
25 TABLET ORAL DAILY
Qty: 90 TABLET | Refills: 1 | Status: SHIPPED | OUTPATIENT
Start: 2021-06-09 | End: 2022-04-06 | Stop reason: SDUPTHER

## 2021-06-09 RX ORDER — SIMVASTATIN 40 MG
40 TABLET ORAL DAILY
Qty: 90 TABLET | Refills: 1 | Status: SHIPPED | OUTPATIENT
Start: 2021-06-09 | End: 2022-02-02

## 2021-06-09 RX ORDER — ALLOPURINOL 100 MG/1
100 TABLET ORAL DAILY
Qty: 90 TABLET | Refills: 3 | Status: SHIPPED | OUTPATIENT
Start: 2021-06-09 | End: 2021-09-05

## 2021-06-17 ENCOUNTER — REMOTE DEVICE CLINIC VISIT (OUTPATIENT)
Dept: CARDIOLOGY CLINIC | Facility: CLINIC | Age: 71
End: 2021-06-17
Payer: MEDICARE

## 2021-06-17 DIAGNOSIS — Z95.810 AICD (AUTOMATIC CARDIOVERTER/DEFIBRILLATOR) PRESENT: Primary | ICD-10-CM

## 2021-06-17 PROCEDURE — 93296 REM INTERROG EVL PM/IDS: CPT | Performed by: INTERNAL MEDICINE

## 2021-06-17 PROCEDURE — 93295 DEV INTERROG REMOTE 1/2/MLT: CPT | Performed by: INTERNAL MEDICINE

## 2021-06-17 NOTE — PROGRESS NOTES
Results for orders placed or performed in visit on 06/17/21   Cardiac EP device report    Narrative    MDT/BIV-ICD/ NOT MRI CONDITIONAL  CARELINK TRANSMISSION: BATTERY VOLTAGE ADEQUATE (17 MOS)  AP-91%, BVP-96% (VSR PACE-0 1%)  ALL AVAILABLE LEAD PARAMETERS WITHIN NORMAL LIMITS  NO SIGNIFICANT HIGH RATE EPISODES  4 VENT SENSING EPISODES  OPTI-VOL WITHIN NORMAL LIMITS  NORMAL DEVICE FUNCTION   GV

## 2021-07-07 DIAGNOSIS — E87.6 HYPOKALEMIA: ICD-10-CM

## 2021-07-07 DIAGNOSIS — K21.00 GASTROESOPHAGEAL REFLUX DISEASE WITH ESOPHAGITIS: ICD-10-CM

## 2021-07-07 RX ORDER — POTASSIUM CHLORIDE 20 MEQ/1
TABLET, EXTENDED RELEASE ORAL
Qty: 60 TABLET | Refills: 3 | Status: SHIPPED | OUTPATIENT
Start: 2021-07-07 | End: 2021-11-04

## 2021-07-07 RX ORDER — OMEPRAZOLE 40 MG/1
CAPSULE, DELAYED RELEASE ORAL
Qty: 60 CAPSULE | Refills: 3 | Status: SHIPPED | OUTPATIENT
Start: 2021-07-07 | End: 2022-06-13 | Stop reason: SDUPTHER

## 2021-08-06 DIAGNOSIS — M25.50 ARTHRALGIA, UNSPECIFIED JOINT: ICD-10-CM

## 2021-08-06 RX ORDER — TRAMADOL HYDROCHLORIDE 50 MG/1
TABLET ORAL
Qty: 90 TABLET | Refills: 1 | Status: SHIPPED | OUTPATIENT
Start: 2021-08-06 | End: 2021-10-05

## 2021-08-12 ENCOUNTER — TELEPHONE (OUTPATIENT)
Dept: ADMINISTRATIVE | Facility: OTHER | Age: 71
End: 2021-08-12

## 2021-08-12 NOTE — TELEPHONE ENCOUNTER
----- Message from Kemi sent at 8/12/2021  9:11 AM EDT -----  Regarding: DM Eye Exam - [facility]  08/12/21 9:16 AM    Hello, our patient Zofia Quintero has had Diabetic Eye Exam completed/performed  Please assist in updating the patient chart by pulling the document from the Media Tab  The date of service is 12/24/20       Thank you,  Mishel Fernandez  [phone]  [phone]

## 2021-08-12 NOTE — TELEPHONE ENCOUNTER
Upon review of the In Basket request we were able to locate, review, and update the patient chart as requested for Diabetic Eye Exam     Any additional questions or concerns should be emailed to the Practice Liaisons via [email or web address] email, please do not reply via In Basket      Thank you  Brandy Yates

## 2021-08-13 ENCOUNTER — OFFICE VISIT (OUTPATIENT)
Dept: FAMILY MEDICINE CLINIC | Facility: HOSPITAL | Age: 71
End: 2021-08-13
Payer: MEDICARE

## 2021-08-13 VITALS
TEMPERATURE: 98.4 F | DIASTOLIC BLOOD PRESSURE: 80 MMHG | HEIGHT: 61 IN | SYSTOLIC BLOOD PRESSURE: 158 MMHG | WEIGHT: 161 LBS | HEART RATE: 70 BPM | BODY MASS INDEX: 30.4 KG/M2

## 2021-08-13 DIAGNOSIS — E78.2 MIXED HYPERLIPIDEMIA: ICD-10-CM

## 2021-08-13 DIAGNOSIS — E66.09 CLASS 1 OBESITY DUE TO EXCESS CALORIES WITH SERIOUS COMORBIDITY AND BODY MASS INDEX (BMI) OF 30.0 TO 30.9 IN ADULT: ICD-10-CM

## 2021-08-13 DIAGNOSIS — N18.30 TYPE 2 DIABETES MELLITUS WITH STAGE 3 CHRONIC KIDNEY DISEASE, WITHOUT LONG-TERM CURRENT USE OF INSULIN, UNSPECIFIED WHETHER STAGE 3A OR 3B CKD (HCC): ICD-10-CM

## 2021-08-13 DIAGNOSIS — E03.9 ACQUIRED HYPOTHYROIDISM: ICD-10-CM

## 2021-08-13 DIAGNOSIS — M25.50 ARTHRALGIA, UNSPECIFIED JOINT: ICD-10-CM

## 2021-08-13 DIAGNOSIS — H83.3X3 NOISE-INDUCED HEARING LOSS OF BOTH EARS: ICD-10-CM

## 2021-08-13 DIAGNOSIS — N18.30 STAGE 3 CHRONIC KIDNEY DISEASE, UNSPECIFIED WHETHER STAGE 3A OR 3B CKD (HCC): ICD-10-CM

## 2021-08-13 DIAGNOSIS — K21.00 GASTROESOPHAGEAL REFLUX DISEASE WITH ESOPHAGITIS WITHOUT HEMORRHAGE: ICD-10-CM

## 2021-08-13 DIAGNOSIS — I50.32 CHRONIC DIASTOLIC CONGESTIVE HEART FAILURE (HCC): ICD-10-CM

## 2021-08-13 DIAGNOSIS — E11.22 TYPE 2 DIABETES MELLITUS WITH STAGE 3 CHRONIC KIDNEY DISEASE, WITHOUT LONG-TERM CURRENT USE OF INSULIN, UNSPECIFIED WHETHER STAGE 3A OR 3B CKD (HCC): ICD-10-CM

## 2021-08-13 DIAGNOSIS — I10 ESSENTIAL HYPERTENSION: Primary | ICD-10-CM

## 2021-08-13 DIAGNOSIS — H61.22 IMPACTED CERUMEN OF LEFT EAR: ICD-10-CM

## 2021-08-13 PROCEDURE — 69209 REMOVE IMPACTED EAR WAX UNI: CPT | Performed by: FAMILY MEDICINE

## 2021-08-13 PROCEDURE — 99214 OFFICE O/P EST MOD 30 MIN: CPT | Performed by: FAMILY MEDICINE

## 2021-08-13 RX ORDER — METHYLPREDNISOLONE 4 MG/1
8 TABLET ORAL 2 TIMES DAILY
COMMUNITY
Start: 2021-07-17

## 2021-08-13 RX ORDER — TRAMADOL HYDROCHLORIDE 50 MG/1
TABLET ORAL
Qty: 90 TABLET | Refills: 1 | OUTPATIENT
Start: 2021-08-13

## 2021-08-13 NOTE — PROGRESS NOTES
Assessment/Plan:         Diagnoses and all orders for this visit:    Essential hypertension    Acquired hypothyroidism  -     TSH, 3rd generation; Future    Type 2 diabetes mellitus with stage 3 chronic kidney disease, without long-term current use of insulin, unspecified whether stage 3a or 3b CKD ([facility])  -     CBC and Platelet; Future  -     HEMOGLOBIN A1C W/ EAG ESTIMATION; Future    Gastroesophageal reflux disease with esophagitis without hemorrhage    Chronic diastolic congestive heart failure (HCC)    Stage 3 chronic kidney disease, unspecified whether stage 3a or 3b CKD (HCC)  -     Comprehensive metabolic panel; Future    Mixed hyperlipidemia  -     Lipid Panel with Direct LDL reflex; Future    Noise-induced hearing loss of both ears  -     Ambulatory Referral to Otolaryngology; Future    Impacted cerumen of left ear  -     Ear cerumen removal    Other orders  -     methylprednisolone (MEDROL) 4 mg tablet; Take 8 mg by mouth 2 (two) times a day (Patient not taking: Reported on 8/13/2021)          Subjective:      Patient ID: Trupti Painting is a 66 y o  female  6 month follow up    No recent illness or injury  Medication list reviewed and revised    Has decreased hearing and wonders about blockage  Willing to see ENT about this problem    Not testing glucometers  No sense of hypoglycemia    Having regular cardiac device interrogation    Medication list reviewed in detail and revised      The following portions of the patient's history were reviewed and updated as appropriate: allergies, current medications, past family history, past medical history, past social history, past surgical history and problem list     Review of Systems   Constitutional: Positive for fatigue and unexpected weight change  Respiratory: Negative  Cardiovascular: Negative  Genitourinary: Negative  Musculoskeletal: Positive for arthralgias, back pain, myalgias and neck stiffness  Neurological: Negative  Hematological: Negative  Psychiatric/Behavioral: Negative  All other systems reviewed and are negative  Objective:      /80   Pulse 70   Temp 98 4 °F (36 9 °C)   Ht 5' 0 5" (1 537 m)   Wt 73 kg (161 lb)   BMI 30 93 kg/m²          Physical Exam  Vitals and nursing note reviewed  Constitutional:       Appearance: Normal appearance  HENT:      Right Ear: There is no impacted cerumen  Left Ear: There is impacted cerumen  Cardiovascular:      Rate and Rhythm: Normal rate and regular rhythm  Pulses: Normal pulses  Heart sounds: Normal heart sounds  Pulmonary:      Effort: Pulmonary effort is normal       Breath sounds: Normal breath sounds  Musculoskeletal:      Right lower leg: No edema  Left lower leg: No edema  Neurological:      General: No focal deficit present  Mental Status: She is alert and oriented to person, place, and time  Psychiatric:         Mood and Affect: Mood normal          Behavior: Behavior normal          Thought Content: Thought content normal          Judgment: Judgment normal        BMI Counseling: Body mass index is 30 93 kg/m²  The BMI is above normal  Nutrition recommendations include reducing portion sizes, decreasing overall calorie intake and moderation in carbohydrate intake  Exercise recommendations include exercising 3-5 times per week

## 2021-08-13 NOTE — PROGRESS NOTES
Ear cerumen removal    Date/Time: 8/13/2021 3:03 PM  Performed by: Kerry Robertson MD  Authorized by: Kerry Robertson MD   Universal Protocol:  Consent: Verbal consent obtained  Written consent not obtained  Risks and benefits: risks, benefits and alternatives were discussed  Consent given by: patient  Timeout called at: 8/13/2021 3:03 PM   Patient understanding: patient states understanding of the procedure being performed  Patient identity confirmed: verbally with patient      Patient location:  Clinic  Procedure details:     Local anesthetic:  None    Location:  L ear    Procedure type: irrigation only      Approach:  Natural orifice  Post-procedure details:     Complication:  None    Hearing quality:  Improved    Patient tolerance of procedure:   Tolerated well, no immediate complications

## 2021-08-16 ENCOUNTER — APPOINTMENT (OUTPATIENT)
Dept: LAB | Facility: CLINIC | Age: 71
End: 2021-08-16
Payer: MEDICARE

## 2021-08-16 DIAGNOSIS — E78.2 MIXED HYPERLIPIDEMIA: ICD-10-CM

## 2021-08-16 DIAGNOSIS — E11.22 TYPE 2 DIABETES MELLITUS WITH STAGE 3 CHRONIC KIDNEY DISEASE, WITHOUT LONG-TERM CURRENT USE OF INSULIN, UNSPECIFIED WHETHER STAGE 3A OR 3B CKD (HCC): ICD-10-CM

## 2021-08-16 DIAGNOSIS — N18.30 TYPE 2 DIABETES MELLITUS WITH STAGE 3 CHRONIC KIDNEY DISEASE, WITHOUT LONG-TERM CURRENT USE OF INSULIN, UNSPECIFIED WHETHER STAGE 3A OR 3B CKD (HCC): ICD-10-CM

## 2021-08-16 DIAGNOSIS — N18.30 STAGE 3 CHRONIC KIDNEY DISEASE, UNSPECIFIED WHETHER STAGE 3A OR 3B CKD (HCC): ICD-10-CM

## 2021-08-16 DIAGNOSIS — E03.9 ACQUIRED HYPOTHYROIDISM: ICD-10-CM

## 2021-08-16 LAB
ALBUMIN SERPL BCP-MCNC: 3.5 G/DL (ref 3.5–5)
ALP SERPL-CCNC: 77 U/L (ref 46–116)
ALT SERPL W P-5'-P-CCNC: 31 U/L (ref 12–78)
ANION GAP SERPL CALCULATED.3IONS-SCNC: 4 MMOL/L (ref 4–13)
AST SERPL W P-5'-P-CCNC: 13 U/L (ref 5–45)
BILIRUB SERPL-MCNC: 0.62 MG/DL (ref 0.2–1)
BUN SERPL-MCNC: 31 MG/DL (ref 5–25)
CALCIUM SERPL-MCNC: 10.7 MG/DL (ref 8.3–10.1)
CHLORIDE SERPL-SCNC: 104 MMOL/L (ref 100–108)
CHOLEST SERPL-MCNC: 183 MG/DL (ref 50–200)
CO2 SERPL-SCNC: 32 MMOL/L (ref 21–32)
CREAT SERPL-MCNC: 1.07 MG/DL (ref 0.6–1.3)
ERYTHROCYTE [DISTWIDTH] IN BLOOD BY AUTOMATED COUNT: 13.8 % (ref 11.6–15.1)
EST. AVERAGE GLUCOSE BLD GHB EST-MCNC: 163 MG/DL
GFR SERPL CREATININE-BSD FRML MDRD: 52 ML/MIN/1.73SQ M
GLUCOSE P FAST SERPL-MCNC: 126 MG/DL (ref 65–99)
HBA1C MFR BLD: 7.3 %
HCT VFR BLD AUTO: 48.5 % (ref 34.8–46.1)
HDLC SERPL-MCNC: 77 MG/DL
HGB BLD-MCNC: 15.8 G/DL (ref 11.5–15.4)
LDLC SERPL CALC-MCNC: 94 MG/DL (ref 0–100)
MCH RBC QN AUTO: 30.1 PG (ref 26.8–34.3)
MCHC RBC AUTO-ENTMCNC: 32.6 G/DL (ref 31.4–37.4)
MCV RBC AUTO: 92 FL (ref 82–98)
PLATELET # BLD AUTO: 247 THOUSANDS/UL (ref 149–390)
PMV BLD AUTO: 10.7 FL (ref 8.9–12.7)
POTASSIUM SERPL-SCNC: 4.7 MMOL/L (ref 3.5–5.3)
PROT SERPL-MCNC: 6.7 G/DL (ref 6.4–8.2)
RBC # BLD AUTO: 5.25 MILLION/UL (ref 3.81–5.12)
SODIUM SERPL-SCNC: 140 MMOL/L (ref 136–145)
TRIGL SERPL-MCNC: 62 MG/DL
TSH SERPL DL<=0.05 MIU/L-ACNC: 0.93 UIU/ML (ref 0.36–3.74)
WBC # BLD AUTO: 14.46 THOUSAND/UL (ref 4.31–10.16)

## 2021-08-16 PROCEDURE — 36415 COLL VENOUS BLD VENIPUNCTURE: CPT

## 2021-08-16 PROCEDURE — 84443 ASSAY THYROID STIM HORMONE: CPT

## 2021-08-16 PROCEDURE — 85027 COMPLETE CBC AUTOMATED: CPT

## 2021-08-16 PROCEDURE — 80061 LIPID PANEL: CPT

## 2021-08-16 PROCEDURE — 83036 HEMOGLOBIN GLYCOSYLATED A1C: CPT

## 2021-08-16 PROCEDURE — 80053 COMPREHEN METABOLIC PANEL: CPT

## 2021-08-25 ENCOUNTER — TELEPHONE (OUTPATIENT)
Dept: FAMILY MEDICINE CLINIC | Facility: HOSPITAL | Age: 71
End: 2021-08-25

## 2021-08-25 NOTE — TELEPHONE ENCOUNTER
Labs good for thyroid and cholesterol- sugar went up with A1c 7 3  Keep same meds but careful watch on carbs and continue weight control

## 2021-09-05 DIAGNOSIS — M10.00 IDIOPATHIC GOUT, UNSPECIFIED CHRONICITY, UNSPECIFIED SITE: ICD-10-CM

## 2021-09-05 RX ORDER — ALLOPURINOL 100 MG/1
TABLET ORAL
Qty: 90 TABLET | Refills: 3 | Status: SHIPPED | OUTPATIENT
Start: 2021-09-05 | End: 2022-04-06 | Stop reason: SDUPTHER

## 2021-09-23 ENCOUNTER — IMMUNIZATIONS (OUTPATIENT)
Dept: FAMILY MEDICINE CLINIC | Facility: HOSPITAL | Age: 71
End: 2021-09-23
Payer: MEDICARE

## 2021-09-23 DIAGNOSIS — Z23 ENCOUNTER FOR IMMUNIZATION: Primary | ICD-10-CM

## 2021-09-23 PROCEDURE — 90662 IIV NO PRSV INCREASED AG IM: CPT

## 2021-09-23 PROCEDURE — G0008 ADMIN INFLUENZA VIRUS VAC: HCPCS

## 2021-09-26 ENCOUNTER — HOSPITAL ENCOUNTER (EMERGENCY)
Facility: HOSPITAL | Age: 71
Discharge: HOME | End: 2021-09-26
Attending: EMERGENCY MEDICINE
Payer: MEDICARE

## 2021-09-26 VITALS
OXYGEN SATURATION: 94 % | DIASTOLIC BLOOD PRESSURE: 79 MMHG | SYSTOLIC BLOOD PRESSURE: 162 MMHG | TEMPERATURE: 97.8 F | HEART RATE: 60 BPM | RESPIRATION RATE: 18 BRPM

## 2021-09-26 DIAGNOSIS — N39.0 URINARY TRACT INFECTION: Primary | ICD-10-CM

## 2021-09-26 LAB
AMORPH PHOS CRY URNS QL MICRO: ABNORMAL /HPF
ANION GAP SERPL CALCULATED.3IONS-SCNC: 7 MMOL/L (ref 4–13)
BACTERIA UR QL AUTO: ABNORMAL /HPF
BASOPHILS # BLD AUTO: 0.06 THOUSANDS/ΜL (ref 0–0.1)
BASOPHILS NFR BLD AUTO: 0 % (ref 0–1)
BILIRUB UR QL STRIP: NEGATIVE
BUN SERPL-MCNC: 21 MG/DL (ref 5–25)
CALCIUM SERPL-MCNC: 9.2 MG/DL (ref 8.3–10.1)
CHLORIDE SERPL-SCNC: 101 MMOL/L (ref 100–108)
CLARITY UR: ABNORMAL
CO2 SERPL-SCNC: 30 MMOL/L (ref 21–32)
COLOR UR: ABNORMAL
CREAT SERPL-MCNC: 1.28 MG/DL (ref 0.6–1.3)
EOSINOPHIL # BLD AUTO: 0.15 THOUSAND/ΜL (ref 0–0.61)
EOSINOPHIL NFR BLD AUTO: 1 % (ref 0–6)
ERYTHROCYTE [DISTWIDTH] IN BLOOD BY AUTOMATED COUNT: 13.5 % (ref 11.6–15.1)
GFR SERPL CREATININE-BSD FRML MDRD: 42 ML/MIN/1.73SQ M
GLUCOSE SERPL-MCNC: 167 MG/DL (ref 65–140)
GLUCOSE UR STRIP-MCNC: NEGATIVE MG/DL
HCT VFR BLD AUTO: 43.2 % (ref 34.8–46.1)
HGB BLD-MCNC: 14.3 G/DL (ref 11.5–15.4)
HGB UR QL STRIP.AUTO: ABNORMAL
IMM GRANULOCYTES # BLD AUTO: 0.12 THOUSAND/UL (ref 0–0.2)
IMM GRANULOCYTES NFR BLD AUTO: 1 % (ref 0–2)
KETONES UR STRIP-MCNC: NEGATIVE MG/DL
LEUKOCYTE ESTERASE UR QL STRIP: ABNORMAL
LYMPHOCYTES # BLD AUTO: 1.08 THOUSANDS/ΜL (ref 0.6–4.47)
LYMPHOCYTES NFR BLD AUTO: 7 % (ref 14–44)
MCH RBC QN AUTO: 30.2 PG (ref 26.8–34.3)
MCHC RBC AUTO-ENTMCNC: 33.1 G/DL (ref 31.4–37.4)
MCV RBC AUTO: 91 FL (ref 82–98)
MONOCYTES # BLD AUTO: 1.5 THOUSAND/ΜL (ref 0.17–1.22)
MONOCYTES NFR BLD AUTO: 10 % (ref 4–12)
NEUTROPHILS # BLD AUTO: 12.57 THOUSANDS/ΜL (ref 1.85–7.62)
NEUTS SEG NFR BLD AUTO: 81 % (ref 43–75)
NITRITE UR QL STRIP: POSITIVE
NON-SQ EPI CELLS URNS QL MICRO: ABNORMAL /HPF
NRBC BLD AUTO-RTO: 0 /100 WBCS
OTHER STN SPEC: ABNORMAL
PH UR STRIP.AUTO: 7 [PH]
PLATELET # BLD AUTO: 178 THOUSANDS/UL (ref 149–390)
PMV BLD AUTO: 10.2 FL (ref 8.9–12.7)
POTASSIUM SERPL-SCNC: 4 MMOL/L (ref 3.5–5.3)
PROT UR STRIP-MCNC: ABNORMAL MG/DL
RBC # BLD AUTO: 4.73 MILLION/UL (ref 3.81–5.12)
RBC #/AREA URNS AUTO: ABNORMAL /HPF
SODIUM SERPL-SCNC: 138 MMOL/L (ref 136–145)
SP GR UR STRIP.AUTO: 1.02 (ref 1–1.03)
UROBILINOGEN UR QL STRIP.AUTO: 0.2 E.U./DL
WBC # BLD AUTO: 15.48 THOUSAND/UL (ref 4.31–10.16)
WBC #/AREA URNS AUTO: ABNORMAL /HPF

## 2021-09-26 PROCEDURE — 99284 EMERGENCY DEPT VISIT MOD MDM: CPT | Performed by: PHYSICIAN ASSISTANT

## 2021-09-26 PROCEDURE — 85025 COMPLETE CBC W/AUTO DIFF WBC: CPT | Performed by: PHYSICIAN ASSISTANT

## 2021-09-26 PROCEDURE — 87077 CULTURE AEROBIC IDENTIFY: CPT | Performed by: PHYSICIAN ASSISTANT

## 2021-09-26 PROCEDURE — 99283 EMERGENCY DEPT VISIT LOW MDM: CPT

## 2021-09-26 PROCEDURE — 80048 BASIC METABOLIC PNL TOTAL CA: CPT | Performed by: PHYSICIAN ASSISTANT

## 2021-09-26 PROCEDURE — 96365 THER/PROPH/DIAG IV INF INIT: CPT

## 2021-09-26 PROCEDURE — 87086 URINE CULTURE/COLONY COUNT: CPT | Performed by: PHYSICIAN ASSISTANT

## 2021-09-26 PROCEDURE — 36415 COLL VENOUS BLD VENIPUNCTURE: CPT | Performed by: PHYSICIAN ASSISTANT

## 2021-09-26 PROCEDURE — 87186 SC STD MICRODIL/AGAR DIL: CPT | Performed by: PHYSICIAN ASSISTANT

## 2021-09-26 PROCEDURE — 96361 HYDRATE IV INFUSION ADD-ON: CPT

## 2021-09-26 PROCEDURE — 81001 URINALYSIS AUTO W/SCOPE: CPT | Performed by: PHYSICIAN ASSISTANT

## 2021-09-26 RX ORDER — CEFDINIR 300 MG/1
300 CAPSULE ORAL EVERY 12 HOURS SCHEDULED
Qty: 14 CAPSULE | Refills: 0 | Status: SHIPPED | OUTPATIENT
Start: 2021-09-26 | End: 2021-10-03

## 2021-09-26 RX ORDER — CEFTRIAXONE 2 G/50ML
2000 INJECTION, SOLUTION INTRAVENOUS ONCE
Status: COMPLETED | OUTPATIENT
Start: 2021-09-26 | End: 2021-09-26

## 2021-09-26 RX ADMIN — CEFTRIAXONE 2000 MG: 2 INJECTION, SOLUTION INTRAVENOUS at 10:20

## 2021-09-26 RX ADMIN — SODIUM CHLORIDE 1000 ML: 0.9 INJECTION, SOLUTION INTRAVENOUS at 09:31

## 2021-09-27 ENCOUNTER — VBI (OUTPATIENT)
Dept: ADMINISTRATIVE | Facility: OTHER | Age: 71
End: 2021-09-27

## 2021-09-28 LAB — BACTERIA UR CULT: ABNORMAL

## 2021-10-05 DIAGNOSIS — M25.50 ARTHRALGIA, UNSPECIFIED JOINT: ICD-10-CM

## 2021-10-05 RX ORDER — TRAMADOL HYDROCHLORIDE 50 MG/1
TABLET ORAL
Qty: 90 TABLET | Refills: 1 | Status: SHIPPED | OUTPATIENT
Start: 2021-10-05 | End: 2021-12-04

## 2021-10-12 ENCOUNTER — TELEPHONE (OUTPATIENT)
Dept: FAMILY MEDICINE CLINIC | Facility: HOSPITAL | Age: 71
End: 2021-10-12

## 2021-10-12 ENCOUNTER — IMMUNIZATIONS (OUTPATIENT)
Dept: FAMILY MEDICINE CLINIC | Facility: HOSPITAL | Age: 71
End: 2021-10-12

## 2021-10-12 DIAGNOSIS — Z23 ENCOUNTER FOR IMMUNIZATION: Primary | ICD-10-CM

## 2021-10-12 PROCEDURE — 91300 SARS-COV-2 / COVID-19 MRNA VACCINE (PFIZER-BIONTECH) 30 MCG: CPT

## 2021-10-12 PROCEDURE — 0001A SARS-COV-2 / COVID-19 MRNA VACCINE (PFIZER-BIONTECH) 30 MCG: CPT

## 2021-10-25 ENCOUNTER — OFFICE VISIT (OUTPATIENT)
Dept: UROLOGY | Facility: HOSPITAL | Age: 71
End: 2021-10-25
Payer: MEDICARE

## 2021-10-25 VITALS
BODY MASS INDEX: 26.99 KG/M2 | DIASTOLIC BLOOD PRESSURE: 72 MMHG | WEIGHT: 162 LBS | HEART RATE: 72 BPM | SYSTOLIC BLOOD PRESSURE: 122 MMHG | HEIGHT: 65 IN

## 2021-10-25 DIAGNOSIS — N39.0 URINARY TRACT INFECTION WITHOUT HEMATURIA, SITE UNSPECIFIED: Primary | ICD-10-CM

## 2021-10-25 PROCEDURE — 99213 OFFICE O/P EST LOW 20 MIN: CPT | Performed by: NURSE PRACTITIONER

## 2021-10-26 ENCOUNTER — OFFICE VISIT (OUTPATIENT)
Dept: CARDIOLOGY CLINIC | Facility: CLINIC | Age: 71
End: 2021-10-26
Payer: MEDICARE

## 2021-10-26 VITALS
BODY MASS INDEX: 26.59 KG/M2 | WEIGHT: 159.6 LBS | HEIGHT: 65 IN | DIASTOLIC BLOOD PRESSURE: 72 MMHG | HEART RATE: 78 BPM | SYSTOLIC BLOOD PRESSURE: 132 MMHG

## 2021-10-26 DIAGNOSIS — I25.10 CORONARY ARTERY DISEASE DUE TO CALCIFIED CORONARY LESION: Primary | ICD-10-CM

## 2021-10-26 DIAGNOSIS — R60.9 EDEMA, UNSPECIFIED TYPE: ICD-10-CM

## 2021-10-26 DIAGNOSIS — E78.2 MIXED HYPERLIPIDEMIA: ICD-10-CM

## 2021-10-26 DIAGNOSIS — I10 ESSENTIAL HYPERTENSION: ICD-10-CM

## 2021-10-26 DIAGNOSIS — I50.32 CHRONIC DIASTOLIC CONGESTIVE HEART FAILURE (HCC): ICD-10-CM

## 2021-10-26 DIAGNOSIS — I25.84 CORONARY ARTERY DISEASE DUE TO CALCIFIED CORONARY LESION: Primary | ICD-10-CM

## 2021-10-26 DIAGNOSIS — I49.3 PVC'S (PREMATURE VENTRICULAR CONTRACTIONS): ICD-10-CM

## 2021-10-26 DIAGNOSIS — Z95.810 BIVENTRICULAR ICD (IMPLANTABLE CARDIOVERTER-DEFIBRILLATOR) IN PLACE: ICD-10-CM

## 2021-10-26 PROCEDURE — 99214 OFFICE O/P EST MOD 30 MIN: CPT | Performed by: INTERNAL MEDICINE

## 2021-10-26 PROCEDURE — 93000 ELECTROCARDIOGRAM COMPLETE: CPT | Performed by: INTERNAL MEDICINE

## 2021-10-29 ENCOUNTER — HOSPITAL ENCOUNTER (OUTPATIENT)
Dept: ULTRASOUND IMAGING | Facility: HOSPITAL | Age: 71
Discharge: HOME | End: 2021-10-29
Payer: MEDICARE

## 2021-10-29 DIAGNOSIS — N39.0 URINARY TRACT INFECTION WITHOUT HEMATURIA, SITE UNSPECIFIED: ICD-10-CM

## 2021-10-29 PROCEDURE — 76770 US EXAM ABDO BACK WALL COMP: CPT

## 2021-11-04 DIAGNOSIS — E87.6 HYPOKALEMIA: ICD-10-CM

## 2021-11-04 RX ORDER — POTASSIUM CHLORIDE 20 MEQ/1
TABLET, EXTENDED RELEASE ORAL
Qty: 60 TABLET | Refills: 3 | Status: SHIPPED | OUTPATIENT
Start: 2021-11-04 | End: 2022-03-04

## 2021-11-08 ENCOUNTER — TELEPHONE (OUTPATIENT)
Dept: UROLOGY | Facility: AMBULATORY SURGERY CENTER | Age: 71
End: 2021-11-08

## 2021-12-04 DIAGNOSIS — N18.30 TYPE 2 DIABETES MELLITUS WITH STAGE 3 CHRONIC KIDNEY DISEASE, WITHOUT LONG-TERM CURRENT USE OF INSULIN (HCC): ICD-10-CM

## 2021-12-04 DIAGNOSIS — I10 ESSENTIAL HYPERTENSION: ICD-10-CM

## 2021-12-04 DIAGNOSIS — E11.22 TYPE 2 DIABETES MELLITUS WITH STAGE 3 CHRONIC KIDNEY DISEASE, WITHOUT LONG-TERM CURRENT USE OF INSULIN (HCC): ICD-10-CM

## 2021-12-04 DIAGNOSIS — M25.50 ARTHRALGIA, UNSPECIFIED JOINT: ICD-10-CM

## 2021-12-04 DIAGNOSIS — R60.9 EDEMA, UNSPECIFIED TYPE: ICD-10-CM

## 2021-12-04 RX ORDER — TRAMADOL HYDROCHLORIDE 50 MG/1
TABLET ORAL
Qty: 90 TABLET | Refills: 1 | Status: SHIPPED | OUTPATIENT
Start: 2021-12-04 | End: 2022-02-02

## 2021-12-04 RX ORDER — TORSEMIDE 20 MG/1
TABLET ORAL
Qty: 60 TABLET | Refills: 5 | Status: SHIPPED | OUTPATIENT
Start: 2021-12-04 | End: 2022-06-02

## 2021-12-04 RX ORDER — CANDESARTAN 32 MG/1
TABLET ORAL
Qty: 30 TABLET | Refills: 5 | Status: SHIPPED | OUTPATIENT
Start: 2021-12-04 | End: 2022-05-05 | Stop reason: SDUPTHER

## 2021-12-04 RX ORDER — SITAGLIPTIN 100 MG/1
TABLET, FILM COATED ORAL
Qty: 30 TABLET | Refills: 5 | Status: SHIPPED | OUTPATIENT
Start: 2021-12-04 | End: 2022-04-06 | Stop reason: SDUPTHER

## 2021-12-07 ENCOUNTER — HOSPITAL ENCOUNTER (OUTPATIENT)
Dept: MAMMOGRAPHY | Facility: CLINIC | Age: 71
Discharge: HOME | End: 2021-12-07
Payer: MEDICARE

## 2021-12-07 VITALS — WEIGHT: 158.73 LBS | HEIGHT: 61 IN | BODY MASS INDEX: 29.97 KG/M2

## 2021-12-07 DIAGNOSIS — Z12.31 VISIT FOR SCREENING MAMMOGRAM: ICD-10-CM

## 2021-12-07 DIAGNOSIS — Z12.31 ENCOUNTER FOR SCREENING MAMMOGRAM FOR MALIGNANT NEOPLASM OF BREAST: ICD-10-CM

## 2021-12-07 PROCEDURE — 77067 SCR MAMMO BI INCL CAD: CPT

## 2021-12-07 PROCEDURE — 77063 BREAST TOMOSYNTHESIS BI: CPT

## 2022-01-03 DIAGNOSIS — F32.A DEPRESSION, UNSPECIFIED DEPRESSION TYPE: ICD-10-CM

## 2022-01-03 RX ORDER — FLUOXETINE HYDROCHLORIDE 20 MG/1
CAPSULE ORAL
Qty: 30 CAPSULE | Refills: 3 | Status: SHIPPED | OUTPATIENT
Start: 2022-01-03 | End: 2022-05-03

## 2022-01-07 ENCOUNTER — TELEPHONE (OUTPATIENT)
Dept: FAMILY MEDICINE CLINIC | Facility: HOSPITAL | Age: 72
End: 2022-01-07

## 2022-01-07 NOTE — TELEPHONE ENCOUNTER
Spoke to pt, states that around 2:30 am she started with vomiting and explosive diarrhea  States it has slowed down a little bit now, but she hasn't eaten or drank anything  Also has not taken any of her daily medications  Pt has no other symptoms  Advised pt that she should try imodium to help and also a bland diet for the next few days  Pt complains that her bottom is extremely sore, asking what she can do about that  Pt also wanted your opinion on what she should do for this  Please advise

## 2022-01-21 ENCOUNTER — OFFICE VISIT (OUTPATIENT)
Dept: GASTROENTEROLOGY | Facility: CLINIC | Age: 72
End: 2022-01-21
Payer: MEDICARE

## 2022-01-21 VITALS
DIASTOLIC BLOOD PRESSURE: 78 MMHG | HEIGHT: 61 IN | WEIGHT: 157 LBS | BODY MASS INDEX: 29.64 KG/M2 | SYSTOLIC BLOOD PRESSURE: 142 MMHG

## 2022-01-21 DIAGNOSIS — Z95.810 BIVENTRICULAR ICD (IMPLANTABLE CARDIOVERTER-DEFIBRILLATOR) IN PLACE: ICD-10-CM

## 2022-01-21 DIAGNOSIS — Z12.11 SCREENING FOR COLON CANCER: ICD-10-CM

## 2022-01-21 DIAGNOSIS — K21.00 GASTROESOPHAGEAL REFLUX DISEASE WITH ESOPHAGITIS WITHOUT HEMORRHAGE: Primary | ICD-10-CM

## 2022-01-21 PROCEDURE — 99203 OFFICE O/P NEW LOW 30 MIN: CPT | Performed by: REGISTERED NURSE

## 2022-01-21 NOTE — PROGRESS NOTES
[address] [facility] - Outpatient Consultation  Wilder Viera 66 y o  female MRN: [id number]  Encounter: [id number]    ASSESSMENT AND PLAN:      1  Gastroesophageal reflux disease with esophagitis without hemorrhage  Well controlled on daily PPI  Discussion with patient on how to start weaning medication  Every other day for about a month  Every 3 days for about a month  She can then stop the medication  If she has any breakthrough symptoms during this time she can go back to taking it daily  Also important to continue with reflux diet and lifestyle modifications  2  Screening for colon cancer  Average risk for colon neoplasm  Last colonoscopy was in 2012 with a 10 year recall     -colonoscopy at [address] [place] [address]    -patient does have a biventricle ICD  (inplanted cardioverter-defibrillator) Will need cardiology clearance from Dr Elsie Goodell prior to proceeding  Followup Appointment: PRN  ______________________________________________________________________    Chief Complaint   Patient presents with    cardiac clearance     for colonoscopy       HPI:   Wilder Viera is a 66y o  year old female who presents for clearance for colonoscopy  Last colonoscopy in 2012 with a 10 year recall  Denies any GI symptoms at this time  Moves her bowels regularly every 1-2 days  Soft and formed without straining  Denies any change in her bowel habits  Denies any blood in her stools  Denies any abdominal pain  She does have a history of GERD for which she takes omeprazole daily  She does not have any breakthrough symptoms with the medication  Discussed with her tapering off the medication  She does have a biventricular ICD in place and will need cardiology clearance prior to colonoscopy      Historical Information   Past Medical History:   Diagnosis Date    Abnormal finding on breast imaging     last assessed 11/30/17    Acute bacterial bronchitis     Allergic rhinitis     Arthralgia     Arthritis     Atherosclerotic heart disease of native coronary artery without angina pectoris     BBB (bundle branch block)     left,last assesed 6/4/12    BBB (bundle branch block)     left    Benign paroxysmal vertigo     last assessed 9/7/12    Benign paroxysmal vertigo of left ear     Bilateral fibrocystic breast changes     Bilateral sacroiliitis (HCC)     Cardiac defibrillator in situ     Carpal tunnel syndrome, unspecified upper limb     CHF (congestive heart failure) (HCC)     chronic systolic/diastolic    Chronic diastolic congestive heart failure (HCC)     Chronic kidney disease     stage 3    Chronic systolic congestive heart failure (HCC)     Coronary artery disease     Cough     Depression     Detrusor instability     Diabetes mellitus (HCC)     Difficulty walking up stairs     Dilated cardiomyopathy ([facility] [address] )     Dilated cardiomyopathy ([facility])     Disease of thyroid gland     Disorder of intervertebral disc of cervical spine     Esophageal reflux     GERD (gastroesophageal reflux disease)     Gout     Hemorrhoids     History of blood clots     Hormone replacement therapy     Hx of blood clots     Hyperlipidemia     Hypertension     Hypokalemia     Hypothyroidism     Indigestion     Inflamed toenail, left     Ingrown nail     Low back pain     left    OAB (overactive bladder)     detrusor instability    Obesity     AZ (obstructive sleep apnea)     Osteoarthritis     Papilloma of left breast     Psoriasis     psoriatic arthropathy    Psoriatic arthropathy ([facility]     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    RLS (restless legs syndrome)     Sciatica     Shortness of breath     Sleep apnea     unspecified type    Tendinitis     Trigger thumb, left thumb     Urinary frequency     UTI (urinary tract infection)     Vitamin D deficiency      Past Surgical History:   Procedure Laterality Date    BLADDER SURGERY  1999    lift and repair    BREAST BIOPSY Left 2016    US CORE BIOPSY-BENIGN    CARDIAC CATHETERIZATION      outcome:  successful    CARDIAC DEFIBRILLATOR PLACEMENT      CARPAL TUNNEL RELEASE Bilateral     CATARACT EXTRACTION      COLONOSCOPY      CORONARY ANGIOPLASTY WITH STENT PLACEMENT      CYSTOSCOPY W/ URETEROSCOPY      DE QUERVAIN'S RELEASE Left     and trigger finger release    EYE SURGERY Left     EYE SURGERY Left 2019    Cataract    EYE SURGERY Right 2019    Cataract    INSERT / REPLACE / REMOVE PACEMAKER      JOINT REPLACEMENT Right 2017    Knee    KNEE ARTHROSCOPY      therapeutic    NEUROPLASTY / TRANSPOSITION MEDIAN NERVE AT CARPAL TUNNEL      OOPHORECTOMY Bilateral     AGE 53    DE INCISE FINGER TENDON SHEATH Left 3/2/2017    Procedure: SMALL TRIGGER FINGER RELEASE;  Surgeon: Lam Braxton MD;  Location: [facility];  Service: Orthopedics    RECTAL SURGERY  2006    repair of rectal ulcer    SHOULDER ARTHROSCOPY Left 2006    TOTAL ABDOMINAL HYSTERECTOMY      AGE 53    TOTAL KNEE ARTHROPLASTY Left     TOTAL SHOULDER REPLACEMENT Left     TRIGGER FINGER RELEASE Bilateral     right haand ,left hand     US GUIDED BREAST BIOPSY LEFT COMPLETE Left 2016     Social History     Substance and Sexual Activity   Alcohol Use No     Social History     Substance and Sexual Activity   Drug Use No     Social History     Tobacco Use   Smoking Status Former Smoker    Packs/day: 1 00    Years: 15 00    Pack years: 15 00    Quit date: 0    Years since quittin 0   Smokeless Tobacco Never Used     Family History   Problem Relation Age of Onset    Hypertension Mother     Alzheimer's disease Mother     Cancer Father 70        bladder    Prostate cancer Father 70    Cancer Brother         pt shared some type of blood cancer    Breast cancer Paternal Grandmother         age dx unk    Stomach cancer Paternal Aunt 72       Meds/Allergies     Current Outpatient Medications:     allopurinol (ZYLOPRIM) 100 mg tablet    aspirin 81 MG tablet    calcium carbonate (OS-MANINDER) 600 MG tablet    candesartan (ATACAND) 32 MG tablet    celecoxib (CeleBREX) 200 mg capsule    cetirizine (ZyrTEC) 10 mg tablet    Cholecalciferol (VITAMIN D3) 1000 units CAPS    DAILY MULTIPLE VITAMINS PO    FLUoxetine (PROzac) 20 mg capsule    Januvia 100 MG tablet    levothyroxine 25 mcg tablet    Magnesium 250 MG TABS    Melatonin 3 MG CAPS    methylprednisolone (MEDROL) 4 mg tablet    metoprolol succinate (TOPROL-XL) 100 mg 24 hr tablet    Minoxidil (ROGAINE WOMENS EX)    omeprazole (PriLOSEC) 40 MG capsule    potassium chloride (K-DUR,KLOR-CON) 20 mEq tablet    PREDNISONE PO    pyridoxine (VITAMIN B6) 100 mg tablet    simvastatin (ZOCOR) 40 mg tablet    torsemide (DEMADEX) 20 mg tablet    traMADol (ULTRAM) 50 mg tablet    TURMERIC PO    No Known Allergies    PHYSICAL EXAM:    Blood pressure 142/78, height 5' 1" (1 549 m), weight 71 2 kg (157 lb)  Body mass index is 29 66 kg/m²  General Appearance: NAD, cooperative, alert  Eyes: Anicteric, PERRLA, EOMI  ENT:  Normocephalic, atraumatic, normal mucosa  Neck:  Supple, symmetrical, trachea midline,   Resp:  Clear to auscultation bilaterally; no rales, rhonchi or wheezing; respirations unlabored   CV:  S1 S2, Regular rate and rhythm; no murmur, rub, or gallop  GI:  Soft, non-tender, non-distended; normal bowel sounds; no masses, no organomegaly   Rectal: Deferred  Musculoskeletal: No cyanosis, clubbing or edema  Normal ROM    Skin:  No jaundice, rashes, or lesions   Heme/Lymph: No palpable cervical lymphadenopathy  Psych: Normal affect, good eye contact  Neuro: No gross deficits, AAOx3    Lab Results:   Lab Results   Component Value Date    WBC 15 48 (H) 09/26/2021    HGB 14 3 09/26/2021    HCT 43 2 09/26/2021    MCV 91 09/26/2021     09/26/2021     Lab Results   Component Value Date     07/05/2017    K 4 0 09/26/2021  09/26/2021    CO2 30 09/26/2021    BUN 21 09/26/2021    CREATININE 1 28 09/26/2021    GLUF 126 (H) 08/16/2021    CALCIUM 9 2 09/26/2021    CORRECTEDCA 10 5 (H) 03/19/2019    AST 13 08/16/2021    ALT 31 08/16/2021    ALKPHOS 77 08/16/2021    PROT 6 1 07/05/2017    BILITOT 0 6 07/05/2017    EGFR 42 09/26/2021     No results found for: IRON, TIBC, FERRITIN  Lab Results   Component Value Date    LIPASE 148 10/04/2018       Radiology Results:   No results found  REVIEW OF SYSTEMS:    CONSTITUTIONAL: Denies any fever, chills, rigors, and weight loss  HEENT: No earache or tinnitus  Denies hearing loss or visual disturbances  CARDIOVASCULAR: No chest pain or palpitations  RESPIRATORY: Denies any cough, hemoptysis, shortness of breath or dyspnea on exertion  GASTROINTESTINAL: As noted in the History of Present Illness  GENITOURINARY: No problems with urination  Denies any hematuria or dysuria  NEUROLOGIC: No dizziness or vertigo, denies headaches  MUSCULOSKELETAL: Denies any muscle or joint pain  SKIN: Denies skin rashes or itching  ENDOCRINE: Denies excessive thirst  Denies intolerance to heat or cold  PSYCHOSOCIAL: Denies depression or anxiety  Denies any recent memory loss

## 2022-01-21 NOTE — PATIENT INSTRUCTIONS
Try to taper omeprazole  Start by taking every other day for a month  Then you can take every 3rd day for a month  If you do okay then you could stop the medication  If at any point you have breakthrough acid reflux symptoms you should restart the medication      Scheduled date of colonoscopy (as of today): 03/02/2022    Physician performing colonoscopy: Kelly Ron  Location of colonoscopy: [facility]  Bowel prep reviewed with patient: Miralax  Instructions reviewed with patient by: Amadeo Dowling  Clearances: Cardiac

## 2022-01-24 ENCOUNTER — TELEPHONE (OUTPATIENT)
Dept: GASTROENTEROLOGY | Facility: CLINIC | Age: 72
End: 2022-01-24

## 2022-01-24 NOTE — TELEPHONE ENCOUNTER
Our [place] pt is currently scheduled for an Endoscopic procedure on __03/2/22___________  The patient was found to have a cardiac condition that might warrant cardiac clearance  To avoid delay in the patient's care, we request a provider's approval      The patient has an AICD  Please address the following items:      Does this patient need to be seen by you for cardiac clearance? ____________      Is it safe to do the endoscopic procedure in an ambulatory setting?  (If no, it will prompt the procedure to be completed at the hospital) ______________      Reason for AICD: __________________________________      Is a magnet needed if electrocautery is used? __________________      Does the defibrillator need to be interrogated before or after the procedure? ______________________

## 2022-01-24 NOTE — TELEPHONE ENCOUNTER
Pt last saw you 10/2021  Would you recommend an office visit for cardiac clearance? Device can pull a home transmission to check on device

## 2022-01-25 ENCOUNTER — REMOTE DEVICE CLINIC VISIT (OUTPATIENT)
Dept: CARDIOLOGY CLINIC | Facility: CLINIC | Age: 72
End: 2022-01-25
Payer: MEDICARE

## 2022-01-25 DIAGNOSIS — Z95.810 AICD (AUTOMATIC CARDIOVERTER/DEFIBRILLATOR) PRESENT: Primary | ICD-10-CM

## 2022-01-25 PROCEDURE — 93295 DEV INTERROG REMOTE 1/2/MLT: CPT | Performed by: INTERNAL MEDICINE

## 2022-01-25 PROCEDURE — 93296 REM INTERROG EVL PM/IDS: CPT | Performed by: INTERNAL MEDICINE

## 2022-01-25 NOTE — PROGRESS NOTES
Results for orders placed or performed in visit on 01/25/22   Cardiac EP device report    Narrative    MDT/BIV-ICD/ NOT MRI CONDITIONAL  CARELINK TRANSMISSION: BATTERY VOLTAGE NEARING THIERNO-14 MONS  WILL SCHEDULE MONTHLY BATTERY CHECKS  AP 88%  97% VSRP 0%  ALL AVAILABLE LEAD PARAMETERS WITHIN NORMAL LIMITS  NO SIGNIFICANT HIGH RATE EPISODES  [facility]; MARKERS ONLY  OPTI-VOL WITHIN NORMAL LIMITS  NORMAL DEVICE FUNCTION  NC         Current Outpatient Medications:     allopurinol (ZYLOPRIM) 100 mg tablet, TAKE 1 TABLET BY MOUTH DAILY, Disp: 90 tablet, Rfl: 3    aspirin 81 MG tablet, Take 81 mg by mouth daily  , Disp: , Rfl:     calcium carbonate (OS-MANINDER) 600 MG tablet, Take 1,200 mg by mouth daily, Disp: , Rfl:     candesartan (ATACAND) 32 MG tablet, TAKE 1 TABLET BY MOUTH EVERY DAY, Disp: 30 tablet, Rfl: 5    celecoxib (CeleBREX) 200 mg capsule, Take 1 capsule (200 mg total) by mouth 2 (two) times a day (Patient taking differently: Take 200 mg by mouth every other day ), Disp: 180 capsule, Rfl: 3    cetirizine (ZyrTEC) 10 mg tablet, Take 10 mg by mouth daily, Disp: , Rfl:     Cholecalciferol (VITAMIN D3) 1000 units CAPS, Take by mouth, Disp: , Rfl:     DAILY MULTIPLE VITAMINS PO, Take 1 tablet by mouth daily, Disp: , Rfl:     FLUoxetine (PROzac) 20 mg capsule, TAKE 1 CAPSULE BY MOUTH EVERY DAY, Disp: 30 capsule, Rfl: 3    Januvia 100 MG tablet, TAKE 1 TABLET BY MOUTH EVERY DAY, Disp: 30 tablet, Rfl: 5    levothyroxine 25 mcg tablet, Take 1 tablet (25 mcg total) by mouth daily, Disp: 90 tablet, Rfl: 1    Magnesium 250 MG TABS, Take by mouth daily, Disp: , Rfl:     Melatonin 3 MG CAPS, Take 3 mg by mouth, Disp: , Rfl:     methylprednisolone (MEDROL) 4 mg tablet, Take 8 mg by mouth 2 (two) times a day , Disp: , Rfl:     metoprolol succinate (TOPROL-XL) 100 mg 24 hr tablet, Take 1 tablet (100 mg total) by mouth daily, Disp: 90 tablet, Rfl: 1    Minoxidil (ROGAINE WOMENS EX), Apply 1 mL topically  , Disp: , Rfl:     omeprazole (PriLOSEC) 40 MG capsule, TAKE 1 CAPSULE BY MOUTH DAILY, Disp: 60 capsule, Rfl: 3    potassium chloride (K-DUR,KLOR-CON) 20 mEq tablet, TAKE 1 TABLET BY MOUTH TWICE DAILY, Disp: 60 tablet, Rfl: 3    PREDNISONE PO, Take 4 mg by mouth Every other day, Disp: , Rfl:     pyridoxine (VITAMIN B6) 100 mg tablet, Take 100 mg by mouth daily  , Disp: , Rfl:     simvastatin (ZOCOR) 40 mg tablet, Take 1 tablet (40 mg total) by mouth daily, Disp: 90 tablet, Rfl: 1    torsemide (DEMADEX) 20 mg tablet, TAKE 1 TABLET BY MOUTH TWICE DAILY, Disp: 60 tablet, Rfl: 5    traMADol (ULTRAM) 50 mg tablet, TAKE 1 TABLET BY MOUTH EVERY 8 HOURS AS NEEDED FOR MODERATE PAIN, Disp: 90 tablet, Rfl: 1    TURMERIC PO, Take by mouth, Disp: , Rfl:

## 2022-02-01 ENCOUNTER — TELEPHONE (OUTPATIENT)
Dept: FAMILY MEDICINE CLINIC | Facility: HOSPITAL | Age: 72
End: 2022-02-01

## 2022-02-07 NOTE — TELEPHONE ENCOUNTER
Dr Zana Wick, are you OK with answering the yes/no questions on GI's form or would you like the same on the pre-op risk assessment letter?

## 2022-02-08 ENCOUNTER — APPOINTMENT (OUTPATIENT)
Dept: LAB | Facility: CLINIC | Age: 72
End: 2022-02-08
Payer: MEDICARE

## 2022-02-08 DIAGNOSIS — E03.9 ACQUIRED HYPOTHYROIDISM: ICD-10-CM

## 2022-02-08 DIAGNOSIS — I10 ESSENTIAL HYPERTENSION: ICD-10-CM

## 2022-02-08 DIAGNOSIS — M10.00 IDIOPATHIC GOUT, UNSPECIFIED CHRONICITY, UNSPECIFIED SITE: ICD-10-CM

## 2022-02-08 DIAGNOSIS — E78.2 MIXED HYPERLIPIDEMIA: ICD-10-CM

## 2022-02-08 DIAGNOSIS — N18.30 TYPE 2 DIABETES MELLITUS WITH STAGE 3 CHRONIC KIDNEY DISEASE, WITHOUT LONG-TERM CURRENT USE OF INSULIN, UNSPECIFIED WHETHER STAGE 3A OR 3B CKD (HCC): ICD-10-CM

## 2022-02-08 DIAGNOSIS — E11.22 TYPE 2 DIABETES MELLITUS WITH STAGE 3 CHRONIC KIDNEY DISEASE, WITHOUT LONG-TERM CURRENT USE OF INSULIN, UNSPECIFIED WHETHER STAGE 3A OR 3B CKD (HCC): ICD-10-CM

## 2022-02-08 LAB
ALBUMIN SERPL BCP-MCNC: 3.6 G/DL (ref 3.5–5)
ALP SERPL-CCNC: 68 U/L (ref 46–116)
ALT SERPL W P-5'-P-CCNC: 23 U/L (ref 12–78)
ANION GAP SERPL CALCULATED.3IONS-SCNC: 7 MMOL/L (ref 4–13)
AST SERPL W P-5'-P-CCNC: 13 U/L (ref 5–45)
BASOPHILS # BLD AUTO: 0.07 THOUSANDS/ΜL (ref 0–0.1)
BASOPHILS NFR BLD AUTO: 1 % (ref 0–1)
BILIRUB SERPL-MCNC: 0.67 MG/DL (ref 0.2–1)
BUN SERPL-MCNC: 26 MG/DL (ref 5–25)
CALCIUM SERPL-MCNC: 10.7 MG/DL (ref 8.3–10.1)
CHLORIDE SERPL-SCNC: 106 MMOL/L (ref 100–108)
CHOLEST SERPL-MCNC: 169 MG/DL
CO2 SERPL-SCNC: 30 MMOL/L (ref 21–32)
CREAT SERPL-MCNC: 1.17 MG/DL (ref 0.6–1.3)
EOSINOPHIL # BLD AUTO: 0.25 THOUSAND/ΜL (ref 0–0.61)
EOSINOPHIL NFR BLD AUTO: 3 % (ref 0–6)
ERYTHROCYTE [DISTWIDTH] IN BLOOD BY AUTOMATED COUNT: 14.2 % (ref 11.6–15.1)
GFR SERPL CREATININE-BSD FRML MDRD: 46 ML/MIN/1.73SQ M
GLUCOSE P FAST SERPL-MCNC: 86 MG/DL (ref 65–99)
HCT VFR BLD AUTO: 46.9 % (ref 34.8–46.1)
HDLC SERPL-MCNC: 66 MG/DL
HGB BLD-MCNC: 14.8 G/DL (ref 11.5–15.4)
IMM GRANULOCYTES # BLD AUTO: 0.12 THOUSAND/UL (ref 0–0.2)
IMM GRANULOCYTES NFR BLD AUTO: 1 % (ref 0–2)
LDLC SERPL CALC-MCNC: 86 MG/DL (ref 0–100)
LYMPHOCYTES # BLD AUTO: 2.34 THOUSANDS/ΜL (ref 0.6–4.47)
LYMPHOCYTES NFR BLD AUTO: 25 % (ref 14–44)
MCH RBC QN AUTO: 28.7 PG (ref 26.8–34.3)
MCHC RBC AUTO-ENTMCNC: 31.6 G/DL (ref 31.4–37.4)
MCV RBC AUTO: 91 FL (ref 82–98)
MONOCYTES # BLD AUTO: 1.13 THOUSAND/ΜL (ref 0.17–1.22)
MONOCYTES NFR BLD AUTO: 12 % (ref 4–12)
NEUTROPHILS # BLD AUTO: 5.58 THOUSANDS/ΜL (ref 1.85–7.62)
NEUTS SEG NFR BLD AUTO: 58 % (ref 43–75)
NRBC BLD AUTO-RTO: 0 /100 WBCS
PLATELET # BLD AUTO: 217 THOUSANDS/UL (ref 149–390)
PMV BLD AUTO: 10.8 FL (ref 8.9–12.7)
POTASSIUM SERPL-SCNC: 4.1 MMOL/L (ref 3.5–5.3)
PROT SERPL-MCNC: 6.8 G/DL (ref 6.4–8.2)
RBC # BLD AUTO: 5.15 MILLION/UL (ref 3.81–5.12)
SODIUM SERPL-SCNC: 143 MMOL/L (ref 136–145)
TRIGL SERPL-MCNC: 85 MG/DL
TSH SERPL DL<=0.05 MIU/L-ACNC: 1.78 UIU/ML (ref 0.36–3.74)
URATE SERPL-MCNC: 5 MG/DL (ref 2–6.8)
WBC # BLD AUTO: 9.49 THOUSAND/UL (ref 4.31–10.16)

## 2022-02-08 PROCEDURE — 83036 HEMOGLOBIN GLYCOSYLATED A1C: CPT

## 2022-02-08 PROCEDURE — 80053 COMPREHEN METABOLIC PANEL: CPT

## 2022-02-08 PROCEDURE — 84550 ASSAY OF BLOOD/URIC ACID: CPT

## 2022-02-08 PROCEDURE — 80061 LIPID PANEL: CPT

## 2022-02-08 PROCEDURE — 85025 COMPLETE CBC W/AUTO DIFF WBC: CPT

## 2022-02-08 PROCEDURE — 84443 ASSAY THYROID STIM HORMONE: CPT

## 2022-02-08 PROCEDURE — 36415 COLL VENOUS BLD VENIPUNCTURE: CPT

## 2022-02-09 LAB
EST. AVERAGE GLUCOSE BLD GHB EST-MCNC: 151 MG/DL
HBA1C MFR BLD: 6.9 %

## 2022-02-09 NOTE — TELEPHONE ENCOUNTER
Our [place] pt is currently scheduled for an Endoscopic procedure on __03/2/22___________  The patient was found to have a cardiac condition that might warrant cardiac clearance  To avoid delay in the patient's care, we request a provider's approval       The patient has an AICD  Please address the following items:        Does this patient need to be seen by you for cardiac clearance? No        Is it safe to do the endoscopic procedure in an ambulatory setting? (If no, it will prompt the procedure to be completed at the hospital): Yes        Reason for AICD:  Congestive heart failure        Is a magnet needed if electrocautery is used? Yes, but likely N/A given this procedure  Does the defibrillator need to be interrogated before or after the procedure?   No

## 2022-02-14 ENCOUNTER — OFFICE VISIT (OUTPATIENT)
Dept: FAMILY MEDICINE CLINIC | Facility: HOSPITAL | Age: 72
End: 2022-02-14
Payer: MEDICARE

## 2022-02-14 VITALS
TEMPERATURE: 97.7 F | WEIGHT: 156.8 LBS | OXYGEN SATURATION: 98 % | SYSTOLIC BLOOD PRESSURE: 140 MMHG | DIASTOLIC BLOOD PRESSURE: 80 MMHG | HEIGHT: 61 IN | HEART RATE: 78 BPM | BODY MASS INDEX: 29.6 KG/M2

## 2022-02-14 DIAGNOSIS — M10.00 IDIOPATHIC GOUT, UNSPECIFIED CHRONICITY, UNSPECIFIED SITE: ICD-10-CM

## 2022-02-14 DIAGNOSIS — E21.3 HYPERPARATHYROIDISM (HCC): ICD-10-CM

## 2022-02-14 DIAGNOSIS — E78.2 MIXED HYPERLIPIDEMIA: ICD-10-CM

## 2022-02-14 DIAGNOSIS — N18.30 TYPE 2 DIABETES MELLITUS WITH STAGE 3 CHRONIC KIDNEY DISEASE, WITHOUT LONG-TERM CURRENT USE OF INSULIN, UNSPECIFIED WHETHER STAGE 3A OR 3B CKD (HCC): ICD-10-CM

## 2022-02-14 DIAGNOSIS — I10 ESSENTIAL HYPERTENSION: ICD-10-CM

## 2022-02-14 DIAGNOSIS — N18.31 STAGE 3A CHRONIC KIDNEY DISEASE (HCC): ICD-10-CM

## 2022-02-14 DIAGNOSIS — I50.32 CHRONIC DIASTOLIC CONGESTIVE HEART FAILURE (HCC): ICD-10-CM

## 2022-02-14 DIAGNOSIS — E11.22 TYPE 2 DIABETES MELLITUS WITH STAGE 3 CHRONIC KIDNEY DISEASE, WITHOUT LONG-TERM CURRENT USE OF INSULIN, UNSPECIFIED WHETHER STAGE 3A OR 3B CKD (HCC): ICD-10-CM

## 2022-02-14 DIAGNOSIS — Z95.810 BIVENTRICULAR ICD (IMPLANTABLE CARDIOVERTER-DEFIBRILLATOR) IN PLACE: ICD-10-CM

## 2022-02-14 DIAGNOSIS — Z00.00 MEDICARE ANNUAL WELLNESS VISIT, SUBSEQUENT: Primary | ICD-10-CM

## 2022-02-14 PROCEDURE — 1123F ACP DISCUSS/DSCN MKR DOCD: CPT | Performed by: FAMILY MEDICINE

## 2022-02-14 PROCEDURE — G0439 PPPS, SUBSEQ VISIT: HCPCS | Performed by: FAMILY MEDICINE

## 2022-02-14 PROCEDURE — 99214 OFFICE O/P EST MOD 30 MIN: CPT | Performed by: FAMILY MEDICINE

## 2022-02-14 RX ORDER — TURMERIC 100 %
POWDER (GRAM) MISCELLANEOUS
COMMUNITY

## 2022-02-14 RX ORDER — DIPHENOXYLATE HYDROCHLORIDE AND ATROPINE SULFATE 2.5; .025 MG/1; MG/1
1 TABLET ORAL DAILY
COMMUNITY

## 2022-02-14 RX ORDER — MULTIVITAMIN/IRON/FOLIC ACID 18MG-0.4MG
TABLET ORAL
COMMUNITY

## 2022-02-14 RX ORDER — CHLORHEXIDINE GLUCONATE 0.12 MG/ML
RINSE ORAL
COMMUNITY
Start: 2022-01-20

## 2022-02-14 NOTE — PROGRESS NOTES
Assessment/Plan:         Diagnoses and all orders for this visit:    Medicare annual wellness visit, subsequent    Type 2 diabetes mellitus with stage 3 chronic kidney disease, without long-term current use of insulin, unspecified whether stage 3a or 3b CKD ([place] [address] )  -     HEMOGLOBIN A1C W/ EAG ESTIMATION; Future    Chronic diastolic congestive heart failure (HCC)    Stage 3a chronic kidney disease ([place] [address] )  -     Comprehensive metabolic panel; Future    Idiopathic gout, unspecified chronicity, unspecified site    Biventricular ICD (implantable cardioverter-defibrillator) in place    Hyperparathyroidism ([place] [address] )  -     PTH, intact; Future  -     Phosphorus; Future    Essential hypertension  -     CBC and Platelet; Future    Mixed hyperlipidemia  -     Lipid Panel with Direct LDL reflex; Future    Other orders  -     Turmeric POWD; Take by mouth (Patient not taking: Reported on 2/14/2022 )  -     Magnesium Oxide 250 MG TABS; Take by mouth  -     chlorhexidine (PERIDEX) 0 12 % solution; SWISH AND EXPECTORATE WITH 15 ML (1 capful) FOR 30 seconds IN THE MORNING AND IN THE EVENING AFTER toothbrushing   -     multivitamin (THERAGRAN) TABS; Take 1 tablet by mouth daily          Subjective:      Patient ID: Sunny Abbasi is a 66 y o  female  6 month follow up    No recent illness or injury  No COVID contact or concerns    Medication list reviewed and revised    Labs reviewed in detail and copy given  All metabolic measures are good  Calcium level again elevated 10 t  Advised to reduce cacium intake from 1200mg to 600mg  The following portions of the patient's history were reviewed and updated as appropriate: allergies, current medications, past family history, past medical history, past social history, past surgical history and problem list     Review of Systems   Constitutional: Negative for unexpected weight change  HENT: Negative  Respiratory: Negative  Cardiovascular: Negative      Gastrointestinal: Negative  Genitourinary: Negative  Musculoskeletal: Positive for arthralgias, back pain, gait problem, joint swelling and myalgias  Psychiatric/Behavioral: Negative  All other systems reviewed and are negative  Objective:      /80 (BP Location: Left arm, Patient Position: Sitting, Cuff Size: Standard)   Pulse 78   Temp 97 7 °F (36 5 °C) (Tympanic)   Ht 5' 1" (1 549 m)   Wt 71 1 kg (156 lb 12 8 oz)   SpO2 98%   BMI 29 63 kg/m²          Physical Exam  Vitals and nursing note reviewed  HENT:      Head: Normocephalic  Right Ear: Hearing normal  A middle ear effusion is present  Tympanic membrane is not retracted  Left Ear: Hearing normal   No middle ear effusion  Tympanic membrane is not retracted  Neck:      Vascular: No carotid bruit  Cardiovascular:      Rate and Rhythm: Normal rate and regular rhythm  Pulses: Normal pulses  Heart sounds: Murmur heard  Pulmonary:      Effort: Pulmonary effort is normal       Breath sounds: Normal breath sounds  Musculoskeletal:      Right lower leg: No edema  Left lower leg: No edema     Psychiatric:         Mood and Affect: Mood normal

## 2022-02-14 NOTE — PROGRESS NOTES
Assessment and Plan:     Problem List Items Addressed This Visit        Endocrine    Type 2 diabetes mellitus with stage 3 chronic kidney disease, without long-term current use of insulin ([facility] [address] )    Relevant Orders    HEMOGLOBIN A1C W/ EAG ESTIMATION    Hyperparathyroidism ([facility] [address] )    Relevant Orders    PTH, intact    Phosphorus       Cardiovascular and Mediastinum    Chronic diastolic congestive heart failure (HCC)    Essential hypertension    Relevant Orders    CBC and Platelet       Genitourinary    CKD (chronic kidney disease) stage 3, GFR 30-59 ml/min (HCC)    Relevant Orders    Comprehensive metabolic panel       Other    Biventricular ICD (implantable cardioverter-defibrillator) in place    Gout    Mixed hyperlipidemia    Relevant Orders    Lipid Panel with Direct LDL reflex    Medicare annual wellness visit, subsequent - Primary        BMI Counseling: Body mass index is 29 63 kg/m²  The BMI is above normal  Nutrition recommendations include decreasing portion sizes, moderation in carbohydrate intake and reducing intake of cholesterol  Exercise recommendations include exercising 3-5 times per week  No pharmacotherapy was ordered  Rationale for BMI follow-up plan is due to patient being overweight or obese  Preventive health issues were discussed with patient, and age appropriate screening tests were ordered as noted in patient's After Visit Summary  Personalized health advice and appropriate referrals for health education or preventive services given if needed, as noted in patient's After Visit Summary       History of Present Illness:     Patient presents for Medicare Annual Wellness visit    Patient Care Team:  Scarlett Bond MD as PCP - MD Vicky Hardy MD Randle Overcast, MD Wilhelmina Berth, MD     Problem List:     Patient Active Problem List   Diagnosis    Trigger little finger of left hand    Allergic rhinitis    Arthralgia of knee, left    Arthralgia of knee, right    Coronary artery disease    Benign positional vertigo, left    Bilateral fibrocystic breast changes    Bilateral sacroiliitis (HCC)    Chronic systolic congestive heart failure (HCC)    CKD (chronic kidney disease) stage 3, GFR 30-59 ml/min (HCC)    Chronic diastolic congestive heart failure (HCC)    Biventricular ICD (implantable cardioverter-defibrillator) in place    DDD (degenerative disc disease), cervical    Depression    Esophageal reflux    Gout    Mixed hyperlipidemia    Essential hypertension    Hypokalemia    Acquired hypothyroidism    Class 1 obesity due to excess calories with serious comorbidity and body mass index (BMI) of 30 0 to 30 9 in adult    Obstructive sleep apnea    Osteoarthritis    Overactive bladder    Psoriasis    Type 2 diabetes mellitus ([facility])    Type 2 diabetes mellitus with stage 3 chronic kidney disease, without long-term current use of insulin ([place] [address] )    Vitamin D deficiency    Medicare annual wellness visit, subsequent    Lumbar spondylosis    Chronic left-sided low back pain without sciatica    Hypercalcemia    Leukocytosis    Screening for colon cancer    Dense breast tissue    Family history of breast cancer    Need for pneumococcal vaccination    Nausea and vomiting    Gastroesophageal reflux disease with esophagitis    Abnormal computerized axial tomography of abdomen    [facility]    Screening mammogram, encounter for    PVC's (premature ventricular contractions)    Chronic left shoulder pain      Past Medical and Surgical History:     Past Medical History:   Diagnosis Date    Abnormal finding on breast imaging     last assessed 11/30/17    Acute bacterial bronchitis     Allergic rhinitis     Arthralgia     Arthritis     Atherosclerotic heart disease of native coronary artery without angina pectoris     BBB (bundle branch block)     left,last assesed 6/4/12    BBB (bundle branch block) left    Benign paroxysmal vertigo     last assessed 9/7/12    Benign paroxysmal vertigo of left ear     Bilateral fibrocystic breast changes     Bilateral sacroiliitis (HCC)     Cardiac defibrillator in situ     Carpal tunnel syndrome, unspecified upper limb     CHF (congestive heart failure) (HCC)     chronic systolic/diastolic    Chronic diastolic congestive heart failure (HCC)     Chronic kidney disease     stage 3    Chronic systolic congestive heart failure (HCC)     Coronary artery disease     Cough     Depression     Detrusor instability     Diabetes mellitus (HCC)     Difficulty walking up stairs     Dilated cardiomyopathy ([address] )     Dilated cardiomyopathy (HCC)     Disease of thyroid gland     Disorder of intervertebral disc of cervical spine     Esophageal reflux     GERD (gastroesophageal reflux disease)     Gout     Hemorrhoids     History of blood clots     Hormone replacement therapy     Hx of blood clots     Hyperlipidemia     Hypertension     Hypokalemia     Hypothyroidism     Indigestion     Inflamed toenail, left     Ingrown nail     Low back pain     left    OAB (overactive bladder)     detrusor instability    Obesity     AZ (obstructive sleep apnea)     Osteoarthritis     Papilloma of left breast     Psoriasis     psoriatic arthropathy    Psoriatic arthropathy (HCC)     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    RLS (restless legs syndrome)     Sciatica     Shortness of breath     Sleep apnea     unspecified type    Tendinitis     Trigger thumb, left thumb     Urinary frequency     UTI (urinary tract infection)     Vitamin D deficiency      Past Surgical History:   Procedure Laterality Date    BLADDER SURGERY  1999    lift and repair    BREAST BIOPSY Left 05/23/2016     CORE BIOPSY-BENIGN    CARDIAC CATHETERIZATION      outcome:  successful    CARDIAC DEFIBRILLATOR PLACEMENT      CARPAL TUNNEL RELEASE Bilateral 1997    CATARACT EXTRACTION      COLONOSCOPY      CORONARY ANGIOPLASTY WITH STENT PLACEMENT      CYSTOSCOPY W/ URETEROSCOPY  2009    DE QUERVAIN'S RELEASE Left     and trigger finger release    EYE SURGERY Left     EYE SURGERY Left 2019    Cataract    EYE SURGERY Right 2019    Cataract    INSERT / REPLACE / REMOVE PACEMAKER      JOINT REPLACEMENT Right 2017    Knee    KNEE ARTHROSCOPY      therapeutic    NEUROPLASTY / TRANSPOSITION MEDIAN NERVE AT CARPAL TUNNEL      OOPHORECTOMY Bilateral     AGE 44    ID INCISE FINGER TENDON SHEATH Left 3/2/2017    Procedure: SMALL TRIGGER FINGER RELEASE;  Surgeon: Edie Nicolas MD;  Location: [facility];  Service: Orthopedics    RECTAL SURGERY  2006    repair of rectal ulcer    SHOULDER ARTHROSCOPY Left 2006    TOTAL ABDOMINAL HYSTERECTOMY      AGE 53    TOTAL KNEE ARTHROPLASTY Left     TOTAL SHOULDER REPLACEMENT Left 2007    TRIGGER FINGER RELEASE Bilateral     right haand ,,left hand ,    US GUIDED BREAST BIOPSY LEFT COMPLETE Left 2016      Family History:     Family History   Problem Relation Age of Onset    Hypertension Mother     Alzheimer's disease Mother     Cancer Father 70        bladder    Prostate cancer Father 70    Cancer Brother         pt shared some type of blood cancer    Breast cancer Paternal Grandmother         age dx unk    Stomach cancer Paternal Aunt 72      Social History:     Social History     Socioeconomic History    Marital status:       Spouse name: None    Number of children: None    Years of education: None    Highest education level: None   Occupational History    None   Tobacco Use    Smoking status: Former Smoker     Packs/day: 1 00     Years: 15 00     Pack years: 15 00     Quit date:      Years since quittin 1    Smokeless tobacco: Never Used   Vaping Use    Vaping Use: Never used   Substance and Sexual Activity    Alcohol use: No    Drug use: No    Sexual activity: None Comment: birth control   Other Topics Concern    None   Social History Narrative    Always uses seat belt     Social Determinants of Health     Financial Resource Strain: Not on file   Food Insecurity: Not on file   Transportation Needs: Not on file   Physical Activity: Not on file   Stress: Not on file   Social Connections: Not on file   Intimate Partner Violence: Not on file   Housing Stability: Not on file      Medications and Allergies:     Current Outpatient Medications   Medication Sig Dispense Refill    allopurinol (ZYLOPRIM) 100 mg tablet TAKE 1 TABLET BY MOUTH DAILY 90 tablet 3    aspirin 81 MG tablet Take 81 mg by mouth daily   calcium carbonate (OS-MANINDER) 600 MG tablet Take 1,200 mg by mouth daily      candesartan (ATACAND) 32 MG tablet TAKE 1 TABLET BY MOUTH EVERY DAY 30 tablet 5    celecoxib (CeleBREX) 200 mg capsule Take 1 capsule (200 mg total) by mouth 2 (two) times a day (Patient taking differently: Take 200 mg by mouth every other day ) 180 capsule 3    cetirizine (ZyrTEC) 10 mg tablet Take 10 mg by mouth daily      chlorhexidine (PERIDEX) 0 12 % solution SWISH AND EXPECTORATE WITH 15 ML (1 capful) FOR 30 seconds IN THE MORNING AND IN THE EVENING AFTER toothbrushing        Cholecalciferol (VITAMIN D3) 1000 units CAPS Take by mouth      DAILY MULTIPLE VITAMINS PO Take 1 tablet by mouth daily      FLUoxetine (PROzac) 20 mg capsule TAKE 1 CAPSULE BY MOUTH EVERY DAY 30 capsule 3    Januvia 100 MG tablet TAKE 1 TABLET BY MOUTH EVERY DAY 30 tablet 5    levothyroxine 25 mcg tablet Take 1 tablet (25 mcg total) by mouth daily 90 tablet 1    Magnesium 250 MG TABS Take by mouth daily      Magnesium Oxide 250 MG TABS Take by mouth      Melatonin 3 MG CAPS Take 3 mg by mouth      methylprednisolone (MEDROL) 4 mg tablet Take 8 mg by mouth 2 (two) times a day       metoprolol succinate (TOPROL-XL) 100 mg 24 hr tablet TAKE 1 TABLET BY MOUTH EVERY DAY 30 tablet 3    Minoxidil (ROGAINE WOMENS EX) Apply 1 mL topically   multivitamin (THERAGRAN) TABS Take 1 tablet by mouth daily      omeprazole (PriLOSEC) 40 MG capsule TAKE 1 CAPSULE BY MOUTH DAILY 60 capsule 3    potassium chloride (K-DUR,KLOR-CON) 20 mEq tablet TAKE 1 TABLET BY MOUTH TWICE DAILY 60 tablet 3    PREDNISONE PO Take 4 mg by mouth Every other day      pyridoxine (VITAMIN B6) 100 mg tablet Take 100 mg by mouth daily   simvastatin (ZOCOR) 40 mg tablet TAKE 1 TABLET BY MOUTH EVERY DAY 30 tablet 3    torsemide (DEMADEX) 20 mg tablet TAKE 1 TABLET BY MOUTH TWICE DAILY 60 tablet 5    traMADol (ULTRAM) 50 mg tablet TAKE 1 TABLET BY MOUTH EVERY 8 HOURS AS NEEDED FOR moderate PAIN 90 tablet 1    TURMERIC PO Take by mouth (Patient not taking: Reported on 2/14/2022 )      Turmeric POWD Take by mouth (Patient not taking: Reported on 2/14/2022 )       No current facility-administered medications for this visit       No Known Allergies   Immunizations:     Immunization History   Administered Date(s) Administered    COVID-19 PFIZER VACCINE 0 3 ML IM 03/08/2021, 03/30/2021, 10/12/2021    Influenza Split High Dose Preservative Free IM 09/24/2013, 10/16/2014, 11/03/2015, 10/05/2016, 10/13/2017    Influenza, high dose seasonal 0 7 mL 09/20/2018, 10/01/2019, 10/07/2020, 09/23/2021    Influenza, seasonal, injectable 09/07/2012    Pneumococcal Conjugate 13-Valent 06/24/2015    Pneumococcal Polysaccharide PPV23 10/01/2000, 10/01/2005, 11/26/2018    Zoster Vaccine Recombinant 09/19/2019, 11/20/2019      Health Maintenance:         Topic Date Due    Colorectal Cancer Screening  01/19/2022    Breast Cancer Screening: Mammogram  12/07/2022    DXA SCAN  02/24/2023    Hepatitis C Screening  Completed         Topic Date Due    DTaP,Tdap,and Td Vaccines (1 - Tdap) Never done      Medicare Health Risk Assessment:     /80 (BP Location: Left arm, Patient Position: Sitting, Cuff Size: Standard)   Pulse 78   Temp 97 7 °F (36 5 °C) (Tympanic) Ht 5' 1" (1 549 m)   Wt 71 1 kg (156 lb 12 8 oz)   SpO2 98%   BMI 29 63 kg/m²      Leah Dunham is here for her Subsequent Wellness visit  Last Medicare Wellness visit information reviewed, patient interviewed and updates made to the record today  Health Risk Assessment:   Patient rates overall health as fair  Patient feels that their physical health rating is same  Patient is dissatisfied with their life  Eyesight was rated as same  Hearing was rated as same  Patient feels that their emotional and mental health rating is same  Patients states they are never, rarely angry  Patient states they are sometimes unusually tired/fatigued  Pain experienced in the last 7 days has been some  Patient's pain rating has been 2/10  Patient states that she has experienced no weight loss or gain in last 6 months  Depression Screening:   PHQ-9 Score: 5      Fall Risk Screening: In the past year, patient has experienced: history of falling in past year    Number of falls: 1  Injured during fall?: No    Feels unsteady when standing or walking?: No    Worried about falling?: No      Urinary Incontinence Screening:   Patient has not leaked urine accidently in the last six months  Home Safety:  Patient has trouble with stairs inside or outside of their home  Patient has working smoke alarms and has working carbon monoxide detector  Home safety hazards include: none  Nutrition:   Current diet is Regular  Medications:   Patient is not currently taking any over-the-counter supplements  Patient is able to manage medications  Activities of Daily Living (ADLs)/Instrumental Activities of Daily Living (IADLs):   Walk and transfer into and out of bed and chair?: Yes  Dress and groom yourself?: Yes    Bathe or shower yourself?: Yes    Feed yourself?  Yes  Do your laundry/housekeeping?: Yes  Manage your money, pay your bills and track your expenses?: Yes  Make your own meals?: Yes    Do your own shopping?: Yes    Previous Hospitalizations:   Any hospitalizations or ED visits within the last 12 months?: No      Advance Care Planning:   Living will: Yes    Durable POA for healthcare: Yes    Advanced directive: Yes    Advanced directive counseling given: Yes    Five wishes given: Yes    Patient declined ACP directive: No    End of Life Decisions reviewed with patient: Yes    Provider agrees with end of life decisions: Yes      Cognitive Screening:   Provider or family/friend/caregiver concerned regarding cognition?: No    PREVENTIVE SCREENINGS      Cardiovascular Screening:    General: Screening Not Indicated and History Lipid Disorder      Diabetes Screening:     General: Screening Not Indicated and History Diabetes      Colorectal Cancer Screening:     General: Screening Current      Breast Cancer Screening:     General: Screening Current      Cervical Cancer Screening:    General: Screening Not Indicated      Osteoporosis Screening:    General: Screening Current      Abdominal Aortic Aneurysm (AAA) Screening:        General: Screening Not Indicated      Lung Cancer Screening:     General: Screening Not Indicated      Hepatitis C Screening:    General: Screening Current    Screening, Brief Intervention, and Referral to Treatment (SBIRT)    Screening  Typical number of drinks in a day: 0  Typical number of drinks in a week: 0  Interpretation: Low risk drinking behavior      Single Item Drug Screening:  How often have you used an illegal drug (including marijuana) or a prescription medication for non-medical reasons in the past year? never    Single Item Drug Screen Score: 0  Interpretation: Negative screen for possible drug use disorder      Nicolle Khan MD

## 2022-02-14 NOTE — PATIENT INSTRUCTIONS

## 2022-02-15 ENCOUNTER — TELEPHONE (OUTPATIENT)
Dept: ADMINISTRATIVE | Facility: OTHER | Age: 72
End: 2022-02-15

## 2022-02-15 NOTE — LETTER
Diabetic Foot Exam Form    Date Requested: 02/15/22  Patient: Zane Strickland  Patient : [id number]   Referring Provider: Fredo Officer, MD    Diabetic Foot Exam Performed with shoes and socks removed        Yes         No     Date of Diabetic Foot Exam ______________________________  Risk Score ____________________________________________    Left Foot       Visual Inspection         Monofilament Testing Sensory Exam        Pedal Pulses         Additional Comments         Right Foot      Visual Inspection         Monofilament Testing Sensory Exam       Pedal Pulses         Additional Comments         Comments __________________________________________________________    Practice Providing Exam ______________________________________________    Exam Performed By (print name) _______________________________________      Provider Signature ___________________________________________________      These reports are needed for  compliance  Please fax this completed form and a copy of the Diabetic Foot Exam report to our office located at [place] [address] as soon as possible via [phone] attention Amalia: Phone [phone]    We thank you for your assistance in treating our mutual patient     (sent to [facility])

## 2022-02-15 NOTE — TELEPHONE ENCOUNTER
----- Message from Yumiko Joshi sent at 2/14/2022  2:40 PM EST -----  Regarding: DM foot; [facility]  02/14/22 2:42 PM    Hello, our patient Elicia Haney has had Diabetic Foot Exam completed/performed  Please assist in updating the patient chart by making an External outreach to Dr Rasheeda Kimball located in [address] [place] [address]  The date of service is within the year      Thank you,  Yumiko Joshi  [facility]

## 2022-02-15 NOTE — TELEPHONE ENCOUNTER
Upon review of the In Basket request and the patient's chart, initial outreach has been made via fax, please see Contacts section for details       Thank you  Fredis Alcantara MA

## 2022-02-16 NOTE — TELEPHONE ENCOUNTER
Upon review of the In Basket request we were able to locate, review, and update the patient chart as requested for Diabetic Foot Exam     Any additional questions or concerns should be emailed to the Practice Liaisons via [email or web address] email, please do not reply via In Basket      Thank you  Fredis Alcantara MA

## 2022-02-28 ENCOUNTER — REMOTE DEVICE CLINIC VISIT (OUTPATIENT)
Dept: CARDIOLOGY CLINIC | Facility: CLINIC | Age: 72
End: 2022-02-28
Payer: MEDICARE

## 2022-02-28 DIAGNOSIS — Z95.810 AICD (AUTOMATIC CARDIOVERTER/DEFIBRILLATOR) PRESENT: Primary | ICD-10-CM

## 2022-02-28 PROCEDURE — 93297 REM INTERROG DEV EVAL ICPMS: CPT | Performed by: INTERNAL MEDICINE

## 2022-02-28 PROCEDURE — G2066 INTER DEVC REMOTE 30D: HCPCS | Performed by: INTERNAL MEDICINE

## 2022-02-28 NOTE — PROGRESS NOTES
Results for orders placed or performed in visit on 02/28/22   Cardiac EP device report    Narrative    MDT/BIV-ICD/ NOT MRI CONDITIONAL  CARELINK TRANSMISSION: BATTERY STATUS "14 MONS " AP 95%  98% VSRP 0%  ALL AVAILABLE LEAD PARAMETERS WITHIN NORMAL LIMITS  NO SIGNIFICANT HIGH RATE EPISODES  OPTI-VOL FLUID THRESHOLD CROSSED  HF/RN MADE AWARE  NORMAL DEVICE FUNCTION  NC         Current Outpatient Medications:     allopurinol (ZYLOPRIM) 100 mg tablet, TAKE 1 TABLET BY MOUTH DAILY, Disp: 90 tablet, Rfl: 3    aspirin 81 MG tablet, Take 81 mg by mouth daily  , Disp: , Rfl:     calcium carbonate (OS-MANINDER) 600 MG tablet, Take 1,200 mg by mouth daily, Disp: , Rfl:     candesartan (ATACAND) 32 MG tablet, TAKE 1 TABLET BY MOUTH EVERY DAY, Disp: 30 tablet, Rfl: 5    celecoxib (CeleBREX) 200 mg capsule, Take 1 capsule (200 mg total) by mouth 2 (two) times a day (Patient taking differently: Take 200 mg by mouth every other day ), Disp: 180 capsule, Rfl: 3    cetirizine (ZyrTEC) 10 mg tablet, Take 10 mg by mouth daily, Disp: , Rfl:     chlorhexidine (PERIDEX) 0 12 % solution, SWISH AND EXPECTORATE WITH 15 ML (1 capful) FOR 30 seconds IN THE MORNING AND IN THE EVENING AFTER toothbrushing , Disp: , Rfl:     Cholecalciferol (VITAMIN D3) 1000 units CAPS, Take by mouth, Disp: , Rfl:     DAILY MULTIPLE VITAMINS PO, Take 1 tablet by mouth daily, Disp: , Rfl:     FLUoxetine (PROzac) 20 mg capsule, TAKE 1 CAPSULE BY MOUTH EVERY DAY, Disp: 30 capsule, Rfl: 3    Januvia 100 MG tablet, TAKE 1 TABLET BY MOUTH EVERY DAY, Disp: 30 tablet, Rfl: 5    levothyroxine 25 mcg tablet, Take 1 tablet (25 mcg total) by mouth daily, Disp: 90 tablet, Rfl: 1    Magnesium 250 MG TABS, Take by mouth daily, Disp: , Rfl:     Magnesium Oxide 250 MG TABS, Take by mouth, Disp: , Rfl:     Melatonin 3 MG CAPS, Take 3 mg by mouth, Disp: , Rfl:     methylprednisolone (MEDROL) 4 mg tablet, Take 8 mg by mouth 2 (two) times a day , Disp: , Rfl:    metoprolol succinate (TOPROL-XL) 100 mg 24 hr tablet, TAKE 1 TABLET BY MOUTH EVERY DAY, Disp: 30 tablet, Rfl: 3    Minoxidil (ROGAINE WOMENS EX), Apply 1 mL topically  , Disp: , Rfl:     multivitamin (THERAGRAN) TABS, Take 1 tablet by mouth daily, Disp: , Rfl:     omeprazole (PriLOSEC) 40 MG capsule, TAKE 1 CAPSULE BY MOUTH DAILY, Disp: 60 capsule, Rfl: 3    potassium chloride (K-DUR,KLOR-CON) 20 mEq tablet, TAKE 1 TABLET BY MOUTH TWICE DAILY, Disp: 60 tablet, Rfl: 3    PREDNISONE PO, Take 4 mg by mouth Every other day, Disp: , Rfl:     pyridoxine (VITAMIN B6) 100 mg tablet, Take 100 mg by mouth daily  , Disp: , Rfl:     simvastatin (ZOCOR) 40 mg tablet, TAKE 1 TABLET BY MOUTH EVERY DAY, Disp: 30 tablet, Rfl: 3    torsemide (DEMADEX) 20 mg tablet, TAKE 1 TABLET BY MOUTH TWICE DAILY, Disp: 60 tablet, Rfl: 5    traMADol (ULTRAM) 50 mg tablet, TAKE 1 TABLET BY MOUTH EVERY 8 HOURS AS NEEDED FOR moderate PAIN, Disp: 90 tablet, Rfl: 1    TURMERIC PO, Take by mouth (Patient not taking: Reported on 2/14/2022 ), Disp: , Rfl:     Turmeric POWD, Take by mouth (Patient not taking: Reported on 2/14/2022 ), Disp: , Rfl:

## 2022-03-01 ENCOUNTER — TELEPHONE (OUTPATIENT)
Dept: SURGERY | Facility: HOSPITAL | Age: 72
End: 2022-03-01

## 2022-03-02 ENCOUNTER — ANESTHESIA (OUTPATIENT)
Dept: GASTROENTEROLOGY | Facility: HOSPITAL | Age: 72
End: 2022-03-02

## 2022-03-02 ENCOUNTER — ANESTHESIA EVENT (OUTPATIENT)
Dept: GASTROENTEROLOGY | Facility: HOSPITAL | Age: 72
End: 2022-03-02

## 2022-03-02 ENCOUNTER — HOSPITAL ENCOUNTER (OUTPATIENT)
Dept: GASTROENTEROLOGY | Facility: HOSPITAL | Age: 72
Setting detail: OUTPATIENT SURGERY
Discharge: HOME | End: 2022-03-02
Attending: INTERNAL MEDICINE
Payer: MEDICARE

## 2022-03-02 VITALS
TEMPERATURE: 97.7 F | WEIGHT: 155 LBS | HEART RATE: 59 BPM | BODY MASS INDEX: 30.43 KG/M2 | SYSTOLIC BLOOD PRESSURE: 130 MMHG | HEIGHT: 60 IN | OXYGEN SATURATION: 95 % | DIASTOLIC BLOOD PRESSURE: 60 MMHG | RESPIRATION RATE: 18 BRPM

## 2022-03-02 DIAGNOSIS — Z12.11 SCREENING FOR COLON CANCER: ICD-10-CM

## 2022-03-02 PROCEDURE — 45378 DIAGNOSTIC COLONOSCOPY: CPT | Performed by: INTERNAL MEDICINE

## 2022-03-02 RX ORDER — SODIUM CHLORIDE, SODIUM LACTATE, POTASSIUM CHLORIDE, CALCIUM CHLORIDE 600; 310; 30; 20 MG/100ML; MG/100ML; MG/100ML; MG/100ML
INJECTION, SOLUTION INTRAVENOUS CONTINUOUS PRN
Status: DISCONTINUED | OUTPATIENT
Start: 2022-03-02 | End: 2022-03-02

## 2022-03-02 RX ORDER — PROPOFOL 10 MG/ML
INJECTION, EMULSION INTRAVENOUS AS NEEDED
Status: DISCONTINUED | OUTPATIENT
Start: 2022-03-02 | End: 2022-03-02

## 2022-03-02 RX ORDER — SODIUM CHLORIDE, SODIUM LACTATE, POTASSIUM CHLORIDE, CALCIUM CHLORIDE 600; 310; 30; 20 MG/100ML; MG/100ML; MG/100ML; MG/100ML
100 INJECTION, SOLUTION INTRAVENOUS CONTINUOUS
Status: DISCONTINUED | OUTPATIENT
Start: 2022-03-02 | End: 2022-03-06 | Stop reason: HOSPADM

## 2022-03-02 RX ADMIN — PROPOFOL 30 MG: 10 INJECTION, EMULSION INTRAVENOUS at 08:06

## 2022-03-02 RX ADMIN — PROPOFOL 90 MG: 10 INJECTION, EMULSION INTRAVENOUS at 08:04

## 2022-03-02 RX ADMIN — SODIUM CHLORIDE, SODIUM LACTATE, POTASSIUM CHLORIDE, AND CALCIUM CHLORIDE: .6; .31; .03; .02 INJECTION, SOLUTION INTRAVENOUS at 08:01

## 2022-03-02 RX ADMIN — PROPOFOL 30 MG: 10 INJECTION, EMULSION INTRAVENOUS at 08:09

## 2022-03-02 RX ADMIN — PROPOFOL 50 MG: 10 INJECTION, EMULSION INTRAVENOUS at 08:12

## 2022-03-02 RX ADMIN — PROPOFOL 30 MG: 10 INJECTION, EMULSION INTRAVENOUS at 08:14

## 2022-03-02 NOTE — H&P
History and Physical -  [facility]  Kailee Garrett 66 y o  female MRN: [id number]    HPI: Kailee Garrett is a 66y o  year old female who presents for average risk screening colonoscopy    REVIEW OF SYSTEMS: Per the HPI, and otherwise unremarkable      Historical Information   Past Medical History:   Diagnosis Date    Abnormal finding on breast imaging     last assessed 11/30/17    Acute bacterial bronchitis     Allergic rhinitis     Arthralgia     Arthritis     Atherosclerotic heart disease of native coronary artery without angina pectoris     BBB (bundle branch block)     left,last assesed 6/4/12    BBB (bundle branch block)     left    Benign paroxysmal vertigo     last assessed 9/7/12    Benign paroxysmal vertigo of left ear     Bilateral fibrocystic breast changes     Bilateral sacroiliitis ([facility] )     Cardiac defibrillator in situ     Carpal tunnel syndrome, unspecified upper limb     CHF (congestive heart failure) (HCC)     chronic systolic/diastolic    Chronic diastolic congestive heart failure (HCC)     Chronic kidney disease     stage 3    Chronic systolic congestive heart failure (HCC)     Coronary artery disease     Cough     Depression     Detrusor instability     Diabetes mellitus (HCC)     Difficulty walking up stairs     Dilated cardiomyopathy ([address] )     Dilated cardiomyopathy ([address]     Disease of thyroid gland     Disorder of intervertebral disc of cervical spine     Esophageal reflux     GERD (gastroesophageal reflux disease)     Gout     Hemorrhoids     History of blood clots     Hormone replacement therapy     Hx of blood clots     Hyperlipidemia     Hypertension     Hypokalemia     Hypothyroidism     Indigestion     Inflamed toenail, left     Ingrown nail     Low back pain     left    OAB (overactive bladder)     detrusor instability    Obesity     ZA (obstructive sleep apnea)     Osteoarthritis     Papilloma of left breast     Psoriasis     psoriatic arthropathy    Psoriatic arthropathy (HCC)     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    RLS (restless legs syndrome)     Sciatica     Shortness of breath     Sleep apnea     unspecified type    Tendinitis     Trigger thumb, left thumb     Urinary frequency     UTI (urinary tract infection)     Vitamin D deficiency      Past Surgical History:   Procedure Laterality Date    BLADDER SURGERY  1999    lift and repair    BREAST BIOPSY Left 05/23/2016    US CORE BIOPSY-BENIGN    CARDIAC CATHETERIZATION      outcome:  successful    CARDIAC DEFIBRILLATOR PLACEMENT      CARPAL TUNNEL RELEASE Bilateral 1997    CATARACT EXTRACTION      COLONOSCOPY      CORONARY ANGIOPLASTY WITH STENT PLACEMENT      CYSTOSCOPY W/ URETEROSCOPY  2009    DE QUERVAIN'S RELEASE Left 2011    and trigger finger release    EYE SURGERY Left 1999    EYE SURGERY Left 11/13/2019    Cataract    EYE SURGERY Right 11/09/2019    Cataract    INSERT / REPLACE / REMOVE PACEMAKER      JOINT REPLACEMENT Right 09/2017    Knee    KNEE ARTHROSCOPY      therapeutic    NEUROPLASTY / TRANSPOSITION MEDIAN NERVE AT CARPAL TUNNEL      OOPHORECTOMY Bilateral     AGE 44    OH INCISE FINGER TENDON SHEATH Left 3/2/2017    Procedure: SMALL TRIGGER FINGER RELEASE;  Surgeon: Hossein Bowman MD;  Location: [facility];  Service: Orthopedics    RECTAL SURGERY  2006    repair of rectal ulcer    SHOULDER ARTHROSCOPY Left 2006    TOTAL ABDOMINAL HYSTERECTOMY      AGE 53    TOTAL KNEE ARTHROPLASTY Left     TOTAL SHOULDER REPLACEMENT Left 2007    TRIGGER FINGER RELEASE Bilateral 2011    right haand 201,2015,left hand 2012,2015    US GUIDED BREAST BIOPSY LEFT COMPLETE Left 5/23/2016     Social History   Social History     Substance and Sexual Activity   Alcohol Use No     Social History     Substance and Sexual Activity   Drug Use No     Social History     Tobacco Use   Smoking Status Former Smoker    Packs/day: 1 00    Years: 15 00    Pack years: 15 00    Quit date: 0    Years since quittin 1   Smokeless Tobacco Never Used     Family History   Problem Relation Age of Onset    Hypertension Mother     Alzheimer's disease Mother     Cancer Father 70        bladder    Prostate cancer Father 70    Cancer Brother         pt shared some type of blood cancer    Breast cancer Paternal Grandmother         age dx unk    Stomach cancer Paternal Aunt 72       Meds/Allergies       Current Outpatient Medications:     allopurinol (ZYLOPRIM) 100 mg tablet    aspirin 81 MG tablet    calcium carbonate (OS-MANINDER) 600 MG tablet    candesartan (ATACAND) 32 MG tablet    celecoxib (CeleBREX) 200 mg capsule    cetirizine (ZyrTEC) 10 mg tablet    Cholecalciferol (VITAMIN D3) 1000 units CAPS    DAILY MULTIPLE VITAMINS PO    FLUoxetine (PROzac) 20 mg capsule    Januvia 100 MG tablet    levothyroxine 25 mcg tablet    Magnesium 250 MG TABS    methylprednisolone (MEDROL) 4 mg tablet    metoprolol succinate (TOPROL-XL) 100 mg 24 hr tablet    multivitamin (THERAGRAN) TABS    omeprazole (PriLOSEC) 40 MG capsule    potassium chloride (K-DUR,KLOR-CON) 20 mEq tablet    pyridoxine (VITAMIN B6) 100 mg tablet    simvastatin (ZOCOR) 40 mg tablet    torsemide (DEMADEX) 20 mg tablet    traMADol (ULTRAM) 50 mg tablet    chlorhexidine (PERIDEX) 0 12 % solution    Magnesium Oxide 250 MG TABS    Melatonin 3 MG CAPS    Minoxidil (ROGAINE WOMENS EX)    TURMERIC PO    Turmeric POWD    Current Facility-Administered Medications:     lactated ringers infusion, 100 mL/hr, Intravenous, Continuous    No Known Allergies    Objective     /76   Pulse 64   Temp (!) 97 1 °F (36 2 °C) (Temporal)   Resp 18   Ht 5' (1 524 m)   Wt 70 3 kg (155 lb)   SpO2 97%   BMI 30 27 kg/m²     PHYSICAL EXAM    Gen: NAD AAOx3  Head: Normocephalic, Atraumatic  CV: S1S2 RRR no m/r/g  CHEST: Clear b/l no c/r/w  ABD: soft, +BS NT/ND  EXT: no edema    ASSESSMENT/PLAN:  This is a 66y o  year old female here for average risk screening colonoscopy, and she is stable and optimized for her procedure

## 2022-03-02 NOTE — ANESTHESIA POSTPROCEDURE EVALUATION
Post-Op Assessment Note    CV Status:  Stable    Pain management: adequate     Mental Status:  Alert and awake   Hydration Status:  Euvolemic   PONV Controlled:  Controlled   Airway Patency:  Patent      Post Op Vitals Reviewed: Yes      Staff: CRNA, Anesthesiologist         No complications documented      BP      Temp     Pulse     Resp      SpO2

## 2022-03-02 NOTE — ANESTHESIA PREPROCEDURE EVALUATION
Procedure:  COLONOSCOPY    Relevant Problems   ANESTHESIA (within normal limits)      CARDIO  Pt denies any changes since last cardiology visit  No symptoms, no SOB/orthopnea  Last ICD interrogation 2 days ago noted volume up but pt denies symptoms/fluid retention, has been on her normal diuretics and now bowel prepped   (+) Chronic systolic congestive heart failure (HCC)   (+) Coronary artery disease   (+) Essential hypertension   (+) Mixed hyperlipidemia   (+) PVC's (premature ventricular contractions)      ENDO   (+) Acquired hypothyroidism   (+) Hyperparathyroidism (HCC)   (+) Type 2 diabetes mellitus with stage 3 chronic kidney disease, without long-term current use of insulin (HCC)      GI/HEPATIC   (+) Esophageal reflux   (+) Gastroesophageal reflux disease with esophagitis      MUSCULOSKELETAL   (+) DDD (degenerative disc disease), cervical   (+) Gout      NEURO/PSYCH   (+) Depression      PULMONARY   (+) Obstructive sleep apnea (using CPAP)   (-) Shortness of breath   (-) Smoking   (-) URI (upper respiratory infection)      Other   (+) Allergic rhinitis   (+) Biventricular ICD (implantable cardioverter-defibrillator) in place   (+) Dilated cardiomyopathy (HCC)   (+) RLS (restless legs syndrome)    BMI 30    Physical Exam    Airway    Mallampati score: I  TM Distance: >3 FB  Neck ROM: full     Dental   No notable dental hx     Cardiovascular      Pulmonary      Other Findings  Laying flat without SOB     Lab Results   Component Value Date    WBC 9 49 02/08/2022    HGB 14 8 02/08/2022     02/08/2022     Lab Results   Component Value Date    SODIUM 143 02/08/2022    K 4 1 02/08/2022    BUN 26 (H) 02/08/2022    CREATININE 1 17 02/08/2022    EGFR 46 02/08/2022     Lab Results   Component Value Date    HGBA1C 6 9 (H) 02/08/2022     TTE 6/2020 SUMMARY     LEFT VENTRICLE:  The ventricle was dilated  Systolic function was mildly reduced  Ejection fraction was estimated in the range of 45 % to 50 %    There was hypokinesis of the apical anterior, apical septal, apical lateral, and apical wall(s)  Doppler parameters were consistent with abnormal left ventricular relaxation (grade 1 diastolic dysfunction)      TRICUSPID VALVE:  There was mild regurgitation  Pulmonary artery systolic pressure was at the upper limits of normal       Anesthesia Plan  ASA Score- 3     Anesthesia Type- IV sedation with anesthesia with ASA Monitors  Additional Monitors:   Airway Plan:           Plan Factors-    Chart reviewed  Existing labs reviewed  Patient summary reviewed  Patient is not a current smoker  Induction- intravenous  Postoperative Plan-     Informed Consent- Anesthetic plan and risks discussed with patient  I personally reviewed this patient with the CRNA  Discussed and agreed on the Anesthesia Plan with the CRNA  Michelle Daniel

## 2022-03-04 DIAGNOSIS — E87.6 HYPOKALEMIA: ICD-10-CM

## 2022-03-04 RX ORDER — POTASSIUM CHLORIDE 20 MEQ/1
TABLET, EXTENDED RELEASE ORAL
Qty: 60 TABLET | Refills: 3 | Status: SHIPPED | OUTPATIENT
Start: 2022-03-04 | End: 2022-07-04

## 2022-04-06 DIAGNOSIS — E03.9 ACQUIRED HYPOTHYROIDISM: ICD-10-CM

## 2022-04-06 DIAGNOSIS — N18.30 TYPE 2 DIABETES MELLITUS WITH STAGE 3 CHRONIC KIDNEY DISEASE, WITHOUT LONG-TERM CURRENT USE OF INSULIN (HCC): ICD-10-CM

## 2022-04-06 DIAGNOSIS — E11.22 TYPE 2 DIABETES MELLITUS WITH STAGE 3 CHRONIC KIDNEY DISEASE, WITHOUT LONG-TERM CURRENT USE OF INSULIN (HCC): ICD-10-CM

## 2022-04-06 DIAGNOSIS — I10 ESSENTIAL HYPERTENSION: ICD-10-CM

## 2022-04-06 DIAGNOSIS — M10.00 IDIOPATHIC GOUT, UNSPECIFIED CHRONICITY, UNSPECIFIED SITE: ICD-10-CM

## 2022-04-06 RX ORDER — METOPROLOL SUCCINATE 100 MG/1
100 TABLET, EXTENDED RELEASE ORAL DAILY
Qty: 90 TABLET | Refills: 3 | Status: SHIPPED | OUTPATIENT
Start: 2022-04-06 | End: 2022-06-02

## 2022-04-06 RX ORDER — ALLOPURINOL 100 MG/1
100 TABLET ORAL DAILY
Qty: 90 TABLET | Refills: 3 | Status: SHIPPED | OUTPATIENT
Start: 2022-04-06

## 2022-04-06 RX ORDER — LEVOTHYROXINE SODIUM 0.03 MG/1
25 TABLET ORAL DAILY
Qty: 90 TABLET | Refills: 1 | Status: SHIPPED | OUTPATIENT
Start: 2022-04-06 | End: 2022-05-03

## 2022-05-03 DIAGNOSIS — M25.50 ARTHRALGIA, UNSPECIFIED JOINT: ICD-10-CM

## 2022-05-03 DIAGNOSIS — E03.9 ACQUIRED HYPOTHYROIDISM: ICD-10-CM

## 2022-05-03 DIAGNOSIS — F32.A DEPRESSION, UNSPECIFIED DEPRESSION TYPE: ICD-10-CM

## 2022-05-03 RX ORDER — LEVOTHYROXINE SODIUM 0.03 MG/1
TABLET ORAL
Qty: 90 TABLET | Refills: 3 | Status: SHIPPED | OUTPATIENT
Start: 2022-05-03

## 2022-05-03 RX ORDER — FLUOXETINE HYDROCHLORIDE 20 MG/1
CAPSULE ORAL
Qty: 30 CAPSULE | Refills: 3 | Status: SHIPPED | OUTPATIENT
Start: 2022-05-03

## 2022-05-04 RX ORDER — TRAMADOL HYDROCHLORIDE 50 MG/1
TABLET ORAL
Qty: 90 TABLET | Refills: 1 | Status: SHIPPED | OUTPATIENT
Start: 2022-05-04 | End: 2022-07-04

## 2022-05-05 DIAGNOSIS — E78.5 HYPERLIPIDEMIA, UNSPECIFIED HYPERLIPIDEMIA TYPE: ICD-10-CM

## 2022-05-05 DIAGNOSIS — I10 ESSENTIAL HYPERTENSION: ICD-10-CM

## 2022-05-05 RX ORDER — CANDESARTAN 32 MG/1
32 TABLET ORAL DAILY
Qty: 30 TABLET | Refills: 5 | Status: SHIPPED | OUTPATIENT
Start: 2022-05-05

## 2022-05-05 RX ORDER — SIMVASTATIN 40 MG
40 TABLET ORAL DAILY
Qty: 30 TABLET | Refills: 3 | Status: SHIPPED | OUTPATIENT
Start: 2022-05-05

## 2022-05-09 ENCOUNTER — TELEPHONE (OUTPATIENT)
Dept: FAMILY MEDICINE CLINIC | Facility: HOSPITAL | Age: 72
End: 2022-05-09

## 2022-05-09 PROCEDURE — U0005 INFEC AGEN DETEC AMPLI PROBE: HCPCS | Performed by: FAMILY MEDICINE

## 2022-05-09 PROCEDURE — U0003 INFECTIOUS AGENT DETECTION BY NUCLEIC ACID (DNA OR RNA); SEVERE ACUTE RESPIRATORY SYNDROME CORONAVIRUS 2 (SARS-COV-2) (CORONAVIRUS DISEASE [COVID-19]), AMPLIFIED PROBE TECHNIQUE, MAKING USE OF HIGH THROUGHPUT TECHNOLOGIES AS DESCRIBED BY CMS-2020-01-R: HCPCS | Performed by: FAMILY MEDICINE

## 2022-05-09 NOTE — TELEPHONE ENCOUNTER
Pt has been 8-9 days congested, coughing up lots of mucus  Headache, stomach ache, severe exhaustion  No fever, SOB or diarrhea  Has not taken any OTC meds to treat  Will be going to appt soon, OK to leave a detailed VM   PCB

## 2022-06-01 ENCOUNTER — IN-CLINIC DEVICE VISIT (OUTPATIENT)
Dept: CARDIOLOGY CLINIC | Facility: CLINIC | Age: 72
End: 2022-06-01
Payer: MEDICARE

## 2022-06-01 DIAGNOSIS — Z95.810 PRESENCE OF AUTOMATIC CARDIOVERTER/DEFIBRILLATOR (AICD): Primary | ICD-10-CM

## 2022-06-01 PROCEDURE — 93284 PRGRMG EVAL IMPLANTABLE DFB: CPT | Performed by: INTERNAL MEDICINE

## 2022-06-01 NOTE — PROGRESS NOTES
Results for orders placed or performed in visit on 06/01/22   Cardiac EP device report    Narrative    MDT/BIV-ICD/ NOT MRI CONDITIONAL  DEVICE INTERROGATED IN THE [facility] OFFICE: BATTERY VOLTAGE NEARING THIERNO (11 MOS) WILL SCHEDULE MONTHLY BATTERY CHECKS  AP: 89 4%  : 96 5% + VSRP: 0 2%  ALL LEAD PARAMETERS WITHIN NORMAL LIMITS  NO SIGNIFICANT HIGH RATE EPISODES  V-SENSE EPISODES PREVIOUSLY ADDRESSED  OPTI-VOL WITHIN NORMAL LIMITS  NO PROGRAMMING CHANGES MADE TO DEVICE PARAMETERS  APPROPRIATELY FUNCTIONING BI-V ICD    [address] [place] [address] Street

## 2022-06-02 DIAGNOSIS — E11.22 TYPE 2 DIABETES MELLITUS WITH STAGE 3 CHRONIC KIDNEY DISEASE, WITHOUT LONG-TERM CURRENT USE OF INSULIN (HCC): ICD-10-CM

## 2022-06-02 DIAGNOSIS — N18.30 TYPE 2 DIABETES MELLITUS WITH STAGE 3 CHRONIC KIDNEY DISEASE, WITHOUT LONG-TERM CURRENT USE OF INSULIN (HCC): ICD-10-CM

## 2022-06-02 DIAGNOSIS — R60.9 EDEMA, UNSPECIFIED TYPE: ICD-10-CM

## 2022-06-02 DIAGNOSIS — I10 ESSENTIAL HYPERTENSION: ICD-10-CM

## 2022-06-02 RX ORDER — SITAGLIPTIN 100 MG/1
TABLET, FILM COATED ORAL
Qty: 30 TABLET | Refills: 5 | Status: SHIPPED | OUTPATIENT
Start: 2022-06-02

## 2022-06-02 RX ORDER — TORSEMIDE 20 MG/1
TABLET ORAL
Qty: 60 TABLET | Refills: 5 | Status: SHIPPED | OUTPATIENT
Start: 2022-06-02

## 2022-06-02 RX ORDER — METOPROLOL SUCCINATE 100 MG/1
TABLET, EXTENDED RELEASE ORAL
Qty: 30 TABLET | Refills: 3 | Status: SHIPPED | OUTPATIENT
Start: 2022-06-02

## 2022-06-13 DIAGNOSIS — K21.00 GASTROESOPHAGEAL REFLUX DISEASE WITH ESOPHAGITIS: ICD-10-CM

## 2022-06-13 RX ORDER — OMEPRAZOLE 40 MG/1
40 CAPSULE, DELAYED RELEASE ORAL DAILY
Qty: 60 CAPSULE | Refills: 3 | Status: SHIPPED | OUTPATIENT
Start: 2022-06-13

## 2022-07-01 ENCOUNTER — REMOTE DEVICE CLINIC VISIT (OUTPATIENT)
Dept: CARDIOLOGY CLINIC | Facility: CLINIC | Age: 72
End: 2022-07-01

## 2022-07-01 DIAGNOSIS — Z95.810 BIVENTRICULAR ICD (IMPLANTABLE CARDIOVERTER-DEFIBRILLATOR) IN PLACE: Primary | ICD-10-CM

## 2022-07-01 PROCEDURE — RECHECK: Performed by: INTERNAL MEDICINE

## 2022-07-01 NOTE — PROGRESS NOTES
Results for orders placed or performed in visit on 07/01/22   Cardiac EP device report    Narrative    MDT/BIV-ICD/ NOT MRI CONDITIONAL  N/B; [address] STATUS: BATTERY VOLTAGE NEARING THIERNO (~ 10 MOS)  WILL SCHEDULE MONTHLY BATTERY CHECKS  AP 90 9% BVP 97 6% (0 1% VSRP)  ALL AVAILABLE LEAD PARAMETERS WITHIN NORMAL LIMITS  NO SIGNIFICANT HIGH RATE OR V-SENSE EPISODES  OPTI-VOL WITHIN NORMAL LIMITS  NORMAL DEVICE FUNCTION    ES

## 2022-07-13 ENCOUNTER — TELEPHONE (OUTPATIENT)
Dept: FAMILY MEDICINE CLINIC | Facility: HOSPITAL | Age: 72
End: 2022-07-13

## 2022-08-03 ENCOUNTER — REMOTE DEVICE CLINIC VISIT (OUTPATIENT)
Dept: CARDIOLOGY CLINIC | Facility: CLINIC | Age: 72
End: 2022-08-03

## 2022-08-03 DIAGNOSIS — Z95.810 PRESENCE OF IMPLANTABLE CARDIOVERTER-DEFIBRILLATOR (ICD): Primary | ICD-10-CM

## 2022-08-03 PROCEDURE — RECHECK: Performed by: INTERNAL MEDICINE

## 2022-08-03 NOTE — PROGRESS NOTES
MDT/BIV-ICD/ NOT MRI CONDITIONAL   NB CARELINK TRANSMISSION:  BATTERY VOLTAGE NEARING THIERNO (9 MONTHS)   WILL CONTINUE MONTHLY BATTERY CHECKS   AP 89 6% BP 97 1% VSRP 0 2%    ALL LEAD PARAMETERS WITHIN NORMAL LIMITS   NO SIGNIFICANT HIGH RATE OR V SENSE EPISODES   OPTI-VOL WITHIN NORMAL LIMITS   NORMAL DEVICE FUNCTION   RG

## 2022-08-09 ENCOUNTER — APPOINTMENT (OUTPATIENT)
Dept: LAB | Facility: CLINIC | Age: 72
End: 2022-08-09
Payer: MEDICARE

## 2022-08-09 DIAGNOSIS — N18.31 STAGE 3A CHRONIC KIDNEY DISEASE (HCC): ICD-10-CM

## 2022-08-09 DIAGNOSIS — N18.30 TYPE 2 DIABETES MELLITUS WITH STAGE 3 CHRONIC KIDNEY DISEASE, WITHOUT LONG-TERM CURRENT USE OF INSULIN, UNSPECIFIED WHETHER STAGE 3A OR 3B CKD (HCC): ICD-10-CM

## 2022-08-09 DIAGNOSIS — I10 ESSENTIAL HYPERTENSION: ICD-10-CM

## 2022-08-09 DIAGNOSIS — E11.22 TYPE 2 DIABETES MELLITUS WITH STAGE 3 CHRONIC KIDNEY DISEASE, WITHOUT LONG-TERM CURRENT USE OF INSULIN, UNSPECIFIED WHETHER STAGE 3A OR 3B CKD (HCC): ICD-10-CM

## 2022-08-09 DIAGNOSIS — E78.2 MIXED HYPERLIPIDEMIA: ICD-10-CM

## 2022-08-09 DIAGNOSIS — E21.3 HYPERPARATHYROIDISM (HCC): ICD-10-CM

## 2022-08-09 LAB
ALBUMIN SERPL BCP-MCNC: 3.4 G/DL (ref 3.5–5)
ALP SERPL-CCNC: 68 U/L (ref 46–116)
ALT SERPL W P-5'-P-CCNC: 24 U/L (ref 12–78)
ANION GAP SERPL CALCULATED.3IONS-SCNC: 2 MMOL/L (ref 4–13)
AST SERPL W P-5'-P-CCNC: 14 U/L (ref 5–45)
BILIRUB SERPL-MCNC: 0.58 MG/DL (ref 0.2–1)
BUN SERPL-MCNC: 25 MG/DL (ref 5–25)
CALCIUM ALBUM COR SERPL-MCNC: 10.7 MG/DL (ref 8.3–10.1)
CALCIUM SERPL-MCNC: 10.2 MG/DL (ref 8.3–10.1)
CHLORIDE SERPL-SCNC: 105 MMOL/L (ref 96–108)
CHOLEST SERPL-MCNC: 166 MG/DL
CO2 SERPL-SCNC: 33 MMOL/L (ref 21–32)
CREAT SERPL-MCNC: 1.13 MG/DL (ref 0.6–1.3)
ERYTHROCYTE [DISTWIDTH] IN BLOOD BY AUTOMATED COUNT: 13.7 % (ref 11.6–15.1)
EST. AVERAGE GLUCOSE BLD GHB EST-MCNC: 160 MG/DL
GFR SERPL CREATININE-BSD FRML MDRD: 47 ML/MIN/1.73SQ M
GLUCOSE P FAST SERPL-MCNC: 111 MG/DL (ref 65–99)
HBA1C MFR BLD: 7.2 %
HCT VFR BLD AUTO: 46.2 % (ref 34.8–46.1)
HDLC SERPL-MCNC: 70 MG/DL
HGB BLD-MCNC: 14.7 G/DL (ref 11.5–15.4)
LDLC SERPL CALC-MCNC: 82 MG/DL (ref 0–100)
MCH RBC QN AUTO: 29.3 PG (ref 26.8–34.3)
MCHC RBC AUTO-ENTMCNC: 31.8 G/DL (ref 31.4–37.4)
MCV RBC AUTO: 92 FL (ref 82–98)
PHOSPHATE SERPL-MCNC: 2.9 MG/DL (ref 2.3–4.1)
PLATELET # BLD AUTO: 202 THOUSANDS/UL (ref 149–390)
PMV BLD AUTO: 10.6 FL (ref 8.9–12.7)
POTASSIUM SERPL-SCNC: 4.2 MMOL/L (ref 3.5–5.3)
PROT SERPL-MCNC: 6.5 G/DL (ref 6.4–8.4)
PTH-INTACT SERPL-MCNC: 103 PG/ML (ref 18.4–80.1)
RBC # BLD AUTO: 5.01 MILLION/UL (ref 3.81–5.12)
SODIUM SERPL-SCNC: 140 MMOL/L (ref 135–147)
TRIGL SERPL-MCNC: 68 MG/DL
WBC # BLD AUTO: 7.94 THOUSAND/UL (ref 4.31–10.16)

## 2022-08-09 PROCEDURE — 80053 COMPREHEN METABOLIC PANEL: CPT

## 2022-08-09 PROCEDURE — 36415 COLL VENOUS BLD VENIPUNCTURE: CPT

## 2022-08-09 PROCEDURE — 83036 HEMOGLOBIN GLYCOSYLATED A1C: CPT

## 2022-08-09 PROCEDURE — 84100 ASSAY OF PHOSPHORUS: CPT

## 2022-08-09 PROCEDURE — 85027 COMPLETE CBC AUTOMATED: CPT

## 2022-08-09 PROCEDURE — 80061 LIPID PANEL: CPT

## 2022-08-09 PROCEDURE — 83970 ASSAY OF PARATHORMONE: CPT

## 2022-08-10 ENCOUNTER — RA CDI HCC (OUTPATIENT)
Dept: OTHER | Facility: HOSPITAL | Age: 72
End: 2022-08-10

## 2022-08-10 NOTE — PROGRESS NOTES
[facility]  coding opportunities     I13 [id number]     Chart Reviewed number of suggestions sent to Provider: 1     Patients Insurance     Medicare Insurance: [employer]

## 2022-08-16 ENCOUNTER — OFFICE VISIT (OUTPATIENT)
Dept: FAMILY MEDICINE CLINIC | Facility: HOSPITAL | Age: 72
End: 2022-08-16
Payer: MEDICARE

## 2022-08-16 VITALS
HEIGHT: 60 IN | HEART RATE: 77 BPM | BODY MASS INDEX: 31.57 KG/M2 | TEMPERATURE: 97.5 F | SYSTOLIC BLOOD PRESSURE: 130 MMHG | OXYGEN SATURATION: 97 % | WEIGHT: 160.8 LBS | DIASTOLIC BLOOD PRESSURE: 80 MMHG

## 2022-08-16 DIAGNOSIS — N18.30 TYPE 2 DIABETES MELLITUS WITH STAGE 3 CHRONIC KIDNEY DISEASE, WITHOUT LONG-TERM CURRENT USE OF INSULIN, UNSPECIFIED WHETHER STAGE 3A OR 3B CKD (HCC): Primary | ICD-10-CM

## 2022-08-16 DIAGNOSIS — I10 ESSENTIAL HYPERTENSION: ICD-10-CM

## 2022-08-16 DIAGNOSIS — E11.22 TYPE 2 DIABETES MELLITUS WITH STAGE 3 CHRONIC KIDNEY DISEASE, WITHOUT LONG-TERM CURRENT USE OF INSULIN, UNSPECIFIED WHETHER STAGE 3A OR 3B CKD (HCC): Primary | ICD-10-CM

## 2022-08-16 DIAGNOSIS — E21.3 HYPERPARATHYROIDISM (HCC): ICD-10-CM

## 2022-08-16 DIAGNOSIS — I25.10 CORONARY ARTERY DISEASE INVOLVING NATIVE CORONARY ARTERY OF NATIVE HEART WITHOUT ANGINA PECTORIS: ICD-10-CM

## 2022-08-16 DIAGNOSIS — N18.31 STAGE 3A CHRONIC KIDNEY DISEASE (HCC): ICD-10-CM

## 2022-08-16 DIAGNOSIS — E78.2 MIXED HYPERLIPIDEMIA: ICD-10-CM

## 2022-08-16 PROCEDURE — 99214 OFFICE O/P EST MOD 30 MIN: CPT | Performed by: FAMILY MEDICINE

## 2022-08-16 NOTE — PROGRESS NOTES
Assessment/Plan:         Diagnoses and all orders for this visit:    Type 2 diabetes mellitus with stage 3 chronic kidney disease, without long-term current use of insulin, unspecified whether stage 3a or 3b CKD ([facility] )  -     Comprehensive metabolic panel; Future  -     HEMOGLOBIN A1C W/ EAG ESTIMATION; Future    Coronary artery disease involving native coronary artery of native heart without angina pectoris    Essential hypertension  -     CBC and differential; Future  -     TSH, 3rd generation with Free T4 reflex; Future    Mixed hyperlipidemia  -     Lipid Panel with Direct LDL reflex; Future    Hyperparathyroidism ([facility] )  -     Vitamin D 25 hydroxy; Future  -     PTH, intact; Future    Stage 3a chronic kidney disease (HCC)          Subjective:      Patient ID: Deepak Thompson is a 74 y o  female  6 month follow up  No recent illness or injury  Easy bruisability with any bumps    Unsure if Tramadol is of benefit- may consider holding it off  Recent vacation with family in [place]    Had episode of unusual prolonged coughing, has been very fatigued and not back to her baseline    Labs reviewed in detail and copy given      The following portions of the patient's history were reviewed and updated as appropriate: allergies, current medications, past family history, past medical history, past social history, past surgical history and problem list     Review of Systems   Constitutional: Negative for unexpected weight change  HENT: Negative  Respiratory: Positive for cough  Negative for shortness of breath  Cardiovascular: Negative  Gastrointestinal: Negative  Genitourinary: Negative  Musculoskeletal: Positive for arthralgias, gait problem, joint swelling and myalgias  Psychiatric/Behavioral: The patient is nervous/anxious  All other systems reviewed and are negative          Objective:      /80 (BP Location: Left arm, Patient Position: Sitting, Cuff Size: Standard)   Pulse 77   Temp 97 5 °F (36 4 °C) (Tympanic)   Ht 5' (1 524 m)   Wt 72 9 kg (160 lb 12 8 oz)   SpO2 97%   BMI 31 40 kg/m²          Physical Exam

## 2022-08-16 NOTE — PATIENT INSTRUCTIONS
Basic Carbohydrate Counting   AMBULATORY CARE:   Carbohydrate counting  is a way to plan your meals by counting the amount of carbohydrate in foods  Carbohydrates are the sugars, starches, and fiber found in fruit, grains, vegetables, and milk products  Carbohydrates increase your blood sugar levels  Carbohydrate counting can help you eat the right amount of carbohydrate to keep your blood sugar levels under control  What you need to know about planning meals using carbohydrate counting:  · A dietitian or healthcare provider will help you develop a healthy meal plan that works best for you  You will be taught how much carbohydrate to eat or drink for each meal and snack  Your meal plan will be based on your age, weight, usual food intake, and physical activity level  If you have diabetes, it will also include your blood sugar levels and diabetes medicine  Once you know how much carbohydrate you should eat, you can decide what type of food you want to eat  · You will need to know what foods contain carbohydrate and how much they contain  Keep track of the amount of carbohydrate in meals and snacks in order to follow your meal plan  Do not avoid carbohydrates or skip meals  Your blood sugar may fall too low if you do not eat enough carbohydrate or you skip meals  Foods that contain carbohydrate:   · Breads:  Each serving of food listed below contains about 15 g of carbohydrate   ? 1 slice of bread (1 ounce) or 1 flour or corn tortilla (6 inch)    ? ½ of a hamburger bun or ¼ of a large bagel (about 1 ounce)    ? 1 pancake (about 4 inches across and ¼ inch thick)    · Cereals and grains:  Serving sizes of ready-to-eat cereals vary  Look at the serving size and the total carbohydrate amount listed on the food label  Each serving of food listed below contains about 15 g of carbohydrate   ? ¾ cup of dry, unsweetened, ready-to-eat cereal or ¼ cup of low-fat granola     ?  ½ cup of oatmeal or other cooked cereal     ? ? cup of cooked rice or pasta    · Starchy vegetables and beans:  Each serving of food listed below contains about 15 g of carbohydrate   ? ½ cup of corn, green peas, sweet potatoes, or mashed potatoes    ? ¼ of a large baked potato    ? ½ cup of beans, lentils, and peas (garbanzo, ohara, kidney, white, split, black-eyed)    · Crackers and snacks:  Each serving of food listed below contains about 15 g of carbohydrate   ? 3 raul cracker squares or 8 animal crackers     ? 6 saltine-type crackers    ? 3 cups of popcorn or ¾ ounce of pretzels, potato chips, or tortilla chips    · Fruit:  Each serving of food listed below contains about 15 g of carbohydrate   ? 1 small (4 ounce) piece of fresh fruit or ¾ to 1 cup of fresh fruit    ? ½ cup of canned or frozen fruit, packed in natural juice    ? ½ cup (4 ounces) of unsweetened fruit juice    ? 2 tablespoons of dried fruit    · Desserts or sugary foods:  Each serving of food listed below contains about 15 g of carbohydrate   ? 2-inch square unfrosted cake or brownie     ? 2 small cookies    ? ½ cup of ice cream, frozen yogurt, or nondairy frozen yogurt    ? ¼ cup of sherbet or sorbet    ? 1 tablespoon of regular syrup, jam, or jelly    ? 2 tablespoons of light syrup    · Milk and yogurt:  Foods from the milk group contain about 12 g of carbohydrate per serving  ? 1 cup of fat-free or low-fat milk    ? 1 cup of soy milk    ? ? cup of fat-free, yogurt sweetened with artificial sweetener    · Non-starchy vegetables:  Each serving contains about 5 g of carbohydrate   Three servings of non-starch vegetables count as 1 carbohydrate serving  ? ½ cup of cooked vegetables or 1 cup of raw vegetables  This includes beets, broccoli, cabbage, cauliflower, cucumber, mushrooms, tomatoes, and zucchini    ?  ½ cup of vegetable juice    How to use carbohydrate counting to plan meals:   · Count carbohydrate amounts using serving sizes:      ? Pasta dinner example: You plan to have pasta, tossed salad, and an 8-ounce glass of milk  Your healthcare provider tells you that you may have 4 carbohydrate servings for dinner  One carbohydrate serving of pasta is ? cup  One cup of pasta will equal 3 carbohydrate servings  An 8-ounce glass of milk will count as 1 carbohydrate serving  These amounts of food would equal 4 carbohydrate servings  One cup of tossed salad does not count toward your carbohydrate servings  · Count carbohydrate amounts using food labels:  Find the total amount of carbohydrate in a packaged food by reading the food label  Food labels tell you the serving size of the food and the total carbohydrate amount in each serving  Find the serving size on the food label and then decide how many servings you will eat  Multiply the number of servings you plan to eat by the carbohydrate amount per serving  ? Granola bar snack example: Your meal plan allows you to have 2 carbohydrate servings (30 grams) of carbohydrate for a snack  You plan to eat 1 package of granola bars, which contains 2 bars  According to the food label, the serving size of food in this package is 1 bar  Each serving (1 bar) contains 25 grams of carbohydrate  The total amount of carbohydrate in this package of granola bars would be 50 g  Based on your meal plan, you should eat only 1 bar  Follow up with your doctor as directed:  Write down your questions so you remember to ask them during your visits  © Copyright [employer] 2022 Information is for End User's use only and may not be sold, redistributed or otherwise used for commercial purposes  All illustrations and images included in CareNotes® are the copyrighted property of A D A M , Inc  or [facility] [place]   The above information is an  only  It is not intended as medical advice for individual conditions or treatments   Talk to your doctor, nurse or pharmacist before following any medical regimen to see if it is safe and effective for you  What to Do if Your Blood Sugar is Low   AMBULATORY CARE:   Low blood sugar levels  (hypoglycemia) can happen with Type 1 and Type 2 diabetes  Low levels are more likely to happen if you use insulin  Hypoglycemia can cause you to have falls, accidents, and injuries  A blood sugar level that gets too low can lead to seizures, coma, and death  Learn to recognize the symptoms early so you can get treatment quickly  When your blood sugar is low you may feel:  · Sweaty    · Nervous or shaky    · Anxious or irritable    · Confused    · A fast, pounding heartbeat    · Extremely hungry    Have someone call your local emergency number (911 in the [address]) if:   · You cannot be woken  · You have a seizure  Call your doctor if:   · You have symptoms of a low blood sugar level, such as trouble thinking, sweating, or a pounding heartbeat  · Your blood sugar level is lower than normal and it does not improve with treatment  · You often have lower blood sugar levels than your target goals  · You have trouble coping with your illness, or you feel anxious or depressed  · You have questions or concerns about your condition or care  What to do if you have symptoms of low blood sugar:   · Check your blood sugar level, if possible  Your blood sugar level is too low if it is at or below 70 mg/dL  · Eat or drink 15 grams of fast-acting carbohydrate  Fast-acting carbohydrates will raise your blood sugar level quickly  Examples of 15 grams of fast-acting carbohydrates:     ? 4 ounces (½ cup) of fruit juice     ? 4 ounces of regular soda    ? 2 tablespoons of raisins     ? 1 tube of glucose gel or 3 to 4 glucose tablets       · Check your blood sugar level 15 minutes later  If the level is still low (less than 100 mg/dL), eat another 15 grams of carbohydrate  When the level returns to 100 mg/dL, eat a snack or meal that contains carbohydrates   This will help prevent another drop in blood sugar  · Teach people close to you how to use your glucagon kit  Your blood sugar may be too low for you to be awake  People need to know when and how to use your kit  Prevent low blood sugar levels:  Prevent low blood sugar by knowing what increases your risk  Ask your healthcare provider for ways to prevent low blood sugar levels  Any of the following can increase your risk of low blood sugar:  · Fasting for tests or procedures    · During or after intense exercise    · Late or postponed meals    · Sleeping (you may need a bedtime snack)     · Drinking alcohol if you use insulin or insulin releasing pills    Follow up with your doctor as directed:  Write down your questions so you remember to ask them during your visits  © Copyright [employer] 2022 Information is for End User's use only and may not be sold, redistributed or otherwise used for commercial purposes  All illustrations and images included in CareNotes® are the copyrighted property of [employer]  or Sophia [place]   The above information is an  only  It is not intended as medical advice for individual conditions or treatments  Talk to your doctor, nurse or pharmacist before following any medical regimen to see if it is safe and effective for you

## 2022-08-29 DIAGNOSIS — M25.50 ARTHRALGIA, UNSPECIFIED JOINT: ICD-10-CM

## 2022-08-29 DIAGNOSIS — M10.00 IDIOPATHIC GOUT, UNSPECIFIED CHRONICITY, UNSPECIFIED SITE: ICD-10-CM

## 2022-08-29 DIAGNOSIS — F32.A DEPRESSION, UNSPECIFIED DEPRESSION TYPE: ICD-10-CM

## 2022-08-29 DIAGNOSIS — E78.5 HYPERLIPIDEMIA, UNSPECIFIED HYPERLIPIDEMIA TYPE: ICD-10-CM

## 2022-08-29 RX ORDER — SIMVASTATIN 40 MG
TABLET ORAL
Qty: 30 TABLET | Refills: 3 | Status: SHIPPED | OUTPATIENT
Start: 2022-08-29

## 2022-08-29 RX ORDER — FLUOXETINE HYDROCHLORIDE 20 MG/1
CAPSULE ORAL
Qty: 30 CAPSULE | Refills: 3 | Status: SHIPPED | OUTPATIENT
Start: 2022-08-29

## 2022-09-06 ENCOUNTER — REMOTE DEVICE CLINIC VISIT (OUTPATIENT)
Dept: CARDIOLOGY CLINIC | Facility: CLINIC | Age: 72
End: 2022-09-06
Payer: MEDICARE

## 2022-09-06 DIAGNOSIS — Z95.810 AICD (AUTOMATIC CARDIOVERTER/DEFIBRILLATOR) PRESENT: Primary | ICD-10-CM

## 2022-09-06 PROCEDURE — 93295 DEV INTERROG REMOTE 1/2/MLT: CPT | Performed by: INTERNAL MEDICINE

## 2022-09-06 PROCEDURE — 93296 REM INTERROG EVL PM/IDS: CPT | Performed by: INTERNAL MEDICINE

## 2022-09-06 RX ORDER — TRAMADOL HYDROCHLORIDE 50 MG/1
TABLET ORAL
Qty: 90 TABLET | Refills: 1 | Status: SHIPPED | OUTPATIENT
Start: 2022-09-06 | End: 2022-10-27

## 2022-09-06 RX ORDER — ALLOPURINOL 100 MG/1
TABLET ORAL
Qty: 90 TABLET | Refills: 3 | Status: SHIPPED | OUTPATIENT
Start: 2022-09-06

## 2022-09-06 NOTE — PROGRESS NOTES
Results for orders placed or performed in visit on 09/06/22   Cardiac EP device report    Narrative    MDT/BIV-ICD/ NOT MRI CONDITIONAL  CARELINK TRANSMISSION: BATTERY VOLTAGE NEARING THIERNO (8 MOS)  WILL SCHEDULE MONTHLY BATTERY CHECKS  AP-91%, BVP-97% (VSR PACE-0 2%)  ALL AVAILABLE LEAD PARAMETERS WITHIN NORMAL LIMITS  NO SIGNIFICANT HIGH RATE EPISODES  1 VENT SENSING EPISODE  OPTI-VOL WITHIN NORMAL LIMITS  NORMAL DEVICE FUNCTION   GV

## 2022-09-28 DIAGNOSIS — I10 ESSENTIAL HYPERTENSION: ICD-10-CM

## 2022-09-28 RX ORDER — METOPROLOL SUCCINATE 100 MG/1
TABLET, EXTENDED RELEASE ORAL
Qty: 30 TABLET | Refills: 3 | Status: SHIPPED | OUTPATIENT
Start: 2022-09-28

## 2022-09-29 ENCOUNTER — TELEPHONE (OUTPATIENT)
Dept: FAMILY MEDICINE CLINIC | Facility: HOSPITAL | Age: 72
End: 2022-09-29

## 2022-09-29 ENCOUNTER — TELEMEDICINE (OUTPATIENT)
Dept: FAMILY MEDICINE CLINIC | Facility: HOSPITAL | Age: 72
End: 2022-09-29
Payer: MEDICARE

## 2022-09-29 VITALS — BODY MASS INDEX: 31.41 KG/M2 | WEIGHT: 160 LBS | HEIGHT: 60 IN

## 2022-09-29 DIAGNOSIS — U07.1 COVID-19: Primary | ICD-10-CM

## 2022-09-29 PROCEDURE — 99214 OFFICE O/P EST MOD 30 MIN: CPT | Performed by: NURSE PRACTITIONER

## 2022-09-29 NOTE — TELEPHONE ENCOUNTER
Pt home tested covid positive this AM  C/o headache, body aches, extreme fatigue and fever of 101  Not taking OTC currently  Symptoms began last night   PCB

## 2022-09-29 NOTE — PROGRESS NOTES
COVID-19 Outpatient Progress Note    Assessment/Plan:    Problem List Items Addressed This Visit    None     Visit Diagnoses     COVID-19    -  Primary    Relevant Medications    molnupiravir 200 mg capsule         Disposition:     Patient has asymptomatic or mild COVID-19 infection  Based off CDC guidelines, they were recommended to isolate for 5 days  If they are asymptomatic or symptoms are improving with no fevers in the past 24 hours, isolation may be ended followed by 5 days of wearing a mask when around othes to minimize risk of infecting others  If still have a fever or other symptoms have not improved, continue to isolate until they improve  Regardless of when they end isolation, avoid being around people who are more likely to get very sick from COVID-19 until at least day 11  Candidate for antiviral    Unfortunately with med interactions with paxlovid so will prescribe molnupiravir  Risks/benfits/AE discussed  Call if no better or any worsening  I have spent 8 minutes directly with the patient  Greater than 50% of this time was spent in counseling/coordination of care regarding: risks and benefits of treatment options, instructions for management and impressions  Encounter provider: ALVINA Lujan     Provider located at: [facility] [phone] [facility] [id number]     Recent Visits  No visits were found meeting these conditions  Showing recent visits within past 7 days and meeting all other requirements  Today's Visits  Date Type Provider Dept   09/29/22 [address] [facility], CRNP [facility]   09/29/22 Telephone MD [facility]   Showing today's visits and meeting all other requirements  Future Appointments  No visits were found meeting these conditions    Showing future appointments within next 150 days and meeting all other requirements     This virtual check-in was done via travayl and patient was informed that this is a secure, HIPAA-compliant platform  She agrees to proceed  Patient agrees to participate in a virtual check in via telephone or video visit instead of presenting to the office to address urgent/immediate medical needs  Patient is aware this is a billable service  She acknowledged consent and understanding of privacy and security of the video platform  The patient has agreed to participate and understands they can discontinue the visit at any time  After connecting through [facility], the patient was identified by name and date of birth  Orestes Noel was informed that this was a telemedicine visit and that the exam was being conducted confidentially over secure lines  My office door was closed  No one else was in the room  Orestes Noel acknowledged consent and understanding of privacy and security of the telemedicine visit  I informed the patient that I have reviewed her record in Epic and presented the opportunity for her to ask any questions regarding the visit today  The patient agreed to participate  Verification of patient location:  Patient is located in the following state in which I hold an active license: [place]    Subjective:   Orestes Noel is a 74 y o  female who has been screened for COVID-19  Symptom change since last report: unchanged  Patient's symptoms include fever, fatigue, malaise, nasal congestion, rhinorrhea, sore throat, cough, myalgias and headache  Patient denies chills, anosmia, loss of taste, shortness of breath, chest tightness, abdominal pain, nausea, vomiting and diarrhea  - Date of symptom onset: 9/28/2022  - Date of positive COVID-19 test: 9/29/2022  Type of test: Home antigen  Patient with typical symptoms of COVID-19 and they attest that they were positive on home rapid antigen testing   Image of positive result is not able to be uploaded into their chart      COVID-19 vaccination status: Fully vaccinated with booster    Zack Saenz has been staying home and has isolated themselves in her home  She is taking care to not share personal items and is cleaning all surfaces that are touched often, like counters, tabletops, and doorknobs using household cleaning sprays or wipes  She is wearing a mask when she leaves her room  Lab Results   Component Value Date    SARSCOV2 Negative 05/09/2022       Review of Systems   Constitutional: Positive for fatigue and fever  Negative for chills  HENT: Positive for congestion, rhinorrhea and sore throat  Respiratory: Positive for cough  Negative for chest tightness and shortness of breath  Gastrointestinal: Negative for abdominal pain, diarrhea, nausea and vomiting  Musculoskeletal: Positive for myalgias  Neurological: Positive for headaches  Current Outpatient Medications on File Prior to Visit   Medication Sig    allopurinol (ZYLOPRIM) 100 mg tablet TAKE 1 TABLET BY MOUTH DAILY    aspirin 81 MG tablet Take 81 mg by mouth daily   calcium carbonate (OS-MANINDER) 600 MG tablet Take 1,200 mg by mouth daily    candesartan (ATACAND) 32 MG tablet Take 1 tablet (32 mg total) by mouth daily    cetirizine (ZyrTEC) 10 mg tablet Take 10 mg by mouth daily    Cholecalciferol (VITAMIN D3) 1000 units CAPS Take by mouth    DAILY MULTIPLE VITAMINS PO Take 1 tablet by mouth daily    FLUoxetine (PROzac) 20 mg capsule TAKE 1 CAPSULE BY MOUTH EVERY DAY    Januvia 100 MG tablet TAKE 1 TABLET BY MOUTH EVERY DAY    levothyroxine 25 mcg tablet TAKE 1 TABLET BY MOUTH DAILY    Magnesium 250 MG TABS Take by mouth daily    Magnesium Oxide 250 MG TABS Take by mouth    methylprednisolone (MEDROL) 4 mg tablet Take 8 mg by mouth 2 (two) times a day 8MG every other day on even days per pt     metoprolol succinate (TOPROL-XL) 100 mg 24 hr tablet TAKE 1 TABLET BY MOUTH EVERY DAY    Minoxidil (ROGAINE WOMENS EX) Apply 1 mL topically   multivitamin (THERAGRAN) TABS Take 1 tablet by mouth daily    omeprazole (PriLOSEC) 40 MG capsule Take 1 capsule (40 mg total) by mouth daily    potassium chloride (K-DUR,KLOR-CON) 20 mEq tablet TAKE 1 TABLET BY MOUTH TWICE DAILY    pyridoxine (VITAMIN B6) 100 mg tablet Take 100 mg by mouth daily   simvastatin (ZOCOR) 40 mg tablet TAKE 1 TABLET BY MOUTH DAILY    torsemide (DEMADEX) 20 mg tablet TAKE 1 TABLET BY MOUTH TWICE DAILY    traMADol (ULTRAM) 50 mg tablet TAKE 1 TABLET BY MOUTH EVERY 8 HOURS AS NEEDED FOR moderate PAIN    celecoxib (CeleBREX) 200 mg capsule Take 1 capsule (200 mg total) by mouth 2 (two) times a day (Patient taking differently: Take 200 mg by mouth every other day)    chlorhexidine (PERIDEX) 0 12 % solution SWISH AND EXPECTORATE WITH 15 ML (1 capful) FOR 30 seconds IN THE MORNING AND IN THE EVENING AFTER toothbrushing   [DISCONTINUED] TURMERIC PO Take by mouth (Patient not taking: No sig reported)    [DISCONTINUED] Turmeric POWD Take by mouth (Patient not taking: No sig reported)       Objective:    Ht 5' (1 524 m)   Wt 72 6 kg (160 lb)   BMI 31 25 kg/m²      Physical Exam  Vitals reviewed  Constitutional:       General: She is not in acute distress  Appearance: She is well-developed  She is not ill-appearing  Pulmonary:      Effort: Pulmonary effort is normal    Neurological:      Mental Status: She is alert and oriented to person, place, and time     Psychiatric:         Mood and Affect: Mood normal          Behavior: Behavior normal        ALVINA Mcgee

## 2022-10-05 ENCOUNTER — REMOTE DEVICE CLINIC VISIT (OUTPATIENT)
Dept: CARDIOLOGY CLINIC | Facility: CLINIC | Age: 72
End: 2022-10-05

## 2022-10-05 DIAGNOSIS — Z95.810 PRESENCE OF AUTOMATIC CARDIOVERTER/DEFIBRILLATOR (AICD): Primary | ICD-10-CM

## 2022-10-05 PROCEDURE — RECHECK: Performed by: INTERNAL MEDICINE

## 2022-10-05 NOTE — PROGRESS NOTES
Results for orders placed or performed in visit on 10/05/22   Cardiac EP device report    Narrative    MDT/BIV-ICD/ NOT MRI CONDITIONAL  NON-BILLABLE CARELINK TRANSMISSION: BATTERY STATUS: BATTERY VOLTAGE NEARING THIERNO (7 MOS)  WILL SCHEDULE MONTHLY BATTERY CHECKS  AP: 90 1%  : 98 4% + VSRP: 0 1%  ALL AVAILABLE LEAD PARAMETERS WITHIN NORMAL LIMITS  NO SIGNIFICANT HIGH RATE EPISODES  OPTI-VOL WITHIN NORMAL LIMITS  APPROPRIATELY FUNCTIONING  BI-V ICD   [address] [place] [address] Street

## 2022-10-06 ENCOUNTER — OFFICE VISIT (OUTPATIENT)
Dept: CARDIOLOGY CLINIC | Facility: CLINIC | Age: 72
End: 2022-10-06
Payer: MEDICARE

## 2022-10-06 VITALS
SYSTOLIC BLOOD PRESSURE: 142 MMHG | BODY MASS INDEX: 30.74 KG/M2 | HEART RATE: 69 BPM | DIASTOLIC BLOOD PRESSURE: 78 MMHG | WEIGHT: 157.4 LBS

## 2022-10-06 DIAGNOSIS — I10 ESSENTIAL HYPERTENSION: ICD-10-CM

## 2022-10-06 DIAGNOSIS — I25.10 CORONARY ARTERY DISEASE INVOLVING NATIVE CORONARY ARTERY OF NATIVE HEART WITHOUT ANGINA PECTORIS: Primary | ICD-10-CM

## 2022-10-06 DIAGNOSIS — Z95.810 BIVENTRICULAR ICD (IMPLANTABLE CARDIOVERTER-DEFIBRILLATOR) IN PLACE: ICD-10-CM

## 2022-10-06 DIAGNOSIS — I50.32 CHRONIC DIASTOLIC CONGESTIVE HEART FAILURE (HCC): ICD-10-CM

## 2022-10-06 DIAGNOSIS — E78.2 MIXED HYPERLIPIDEMIA: ICD-10-CM

## 2022-10-06 PROCEDURE — 93000 ELECTROCARDIOGRAM COMPLETE: CPT | Performed by: INTERNAL MEDICINE

## 2022-10-06 PROCEDURE — 99214 OFFICE O/P EST MOD 30 MIN: CPT | Performed by: INTERNAL MEDICINE

## 2022-10-06 NOTE — PATIENT INSTRUCTIONS

## 2022-10-06 NOTE — PROGRESS NOTES
Cardiology Follow Up    [address] [place] [address]  1950  [id number]  [address] [facility] [id number] [phone]    1  Coronary artery disease involving native coronary artery of native heart without angina pectoris  POCT ECG   2  Chronic diastolic congestive heart failure ([address] )     3  Essential hypertension     4  Mixed hyperlipidemia     5  Biventricular ICD (implantable cardioverter-defibrillator) in place         Discussion/Summary:    1  Chronic Diastolic CHF - Rajat Ruiz is doing about the same over the last few years  She seems to be a Stage C, NYHA class II  No changes were made  Two years ago she had an echocardiogram that showed no change  No changes were made to her medications, and she will remain on the same dose of torsemide  Her blood work has remained stable  She should follow a low-sodium diet and watch her weight  We will see her back in 1 year  2   CAD - Other than her issues with her congestive heart failure Rajat Ruiz is asymptomatic  She is not having any symptoms suggestive of angina  We will continue all medications prescribed  3   HTN - Well controlled on the current regimen  No changes were made  4   Hyperlipidemia - She is for the most part controlled on simvastatin 40 mg daily  She continues to have blood work followed closely  5   BIV-ICD - This has been functioning normally on all checks  She will continue to follow with the device clinic  Her OptiVol has been within normal limits, her battery life is approaching THIERNO  She is going to be having checks every month now  Interval History:     Rajat Ruiz is here for followup given her history of a 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  She has coronary artery disease and prior stenting to her first diagonal and right coronary artery    She has a chronic left bundle branch block and she had an ejection fraction as low as 30%  She's had a prior ICD with lead fracture resulting in multiple shocks  She also has a history of phantom shocks  She was upgraded to a biventricular ICD and had a generator change in 2012  Her ejection fraction improved to 45%  In 2017 her ICD was at [facility] and she had her generator changed without complication  Her interrogations have shown normal device function, and no high rate episodes  She does have intermittent PVCs detected  Last year we repeated an echocardiogram that showed no significant change, ejection fraction in the 45-50% range  Her ICD again as showing nearing THIERNO and therefore they are following this monthly  From a congestive heart failure standpoint Renae Car has felt about the same  She gets short of breath with upper levels of exertion  She denies any other signs of volume overload  Her ICD checks have shown her OptiVol to be within normal limits  She denies any chest pain or any other symptoms of angina  She denies any lightheadedness or dizziness  She denies any lightheadedness, dizziness or any syncope  No palpitations        Patient Active Problem List   Diagnosis    Trigger little finger of left hand    Allergic rhinitis    Arthralgia of knee, left    Arthralgia of knee, right    Coronary artery disease    Benign positional vertigo, left    Bilateral fibrocystic breast changes    Bilateral sacroiliitis (HCC)    Chronic systolic congestive heart failure (HCC)    Stage 3a chronic kidney disease (HCC)    Chronic diastolic congestive heart failure (HCC)    Biventricular ICD (implantable cardioverter-defibrillator) in place    DDD (degenerative disc disease), cervical    Depression    Esophageal reflux    Gout    Mixed hyperlipidemia    Essential hypertension    Hypokalemia    Acquired hypothyroidism    Class 1 obesity due to excess calories with serious comorbidity and body mass index (BMI) of 31 0 to 31 9 in adult    Obstructive sleep apnea    Osteoarthritis    Overactive bladder    Psoriasis    RLS (restless legs syndrome)    Type [facility]    Type 2 diabetes mellitus with stage 3 chronic kidney disease, without long-term current use of insulin ([facility])    Vitamin D deficiency    Medicare annual wellness visit, subsequent    Lumbar spondylosis    Chronic left-sided low back pain without sciatica    Hypercalcemia    Leukocytosis    Screening for colon cancer    Dense breast tissue    Family history of breast cancer    Need for pneumococcal vaccination    Nausea and vomiting    Gastroesophageal reflux disease with esophagitis    Abnormal computerized axial tomography of abdomen    [facility]athyroidism ([facility] )    Screening mammogram, encounter for    PVC's (premature ventricular contractions)    Chronic left shoulder pain    Dilated cardiomyopathy ([facility] )     Past Medical History:   Diagnosis Date    Abnormal finding on breast imaging     last assessed 11/30/17    Acute bacterial bronchitis     Allergic rhinitis     Arthralgia     Arthritis     Atherosclerotic heart disease of native coronary artery without angina pectoris     BBB (bundle branch block)     left,last assesed 6/4/12    BBB (bundle branch block)     left    Benign paroxysmal vertigo     last assessed 9/7/12    Benign paroxysmal vertigo of left ear     Bilateral fibrocystic breast changes     Bilateral [facility]roiliitis ([facility]     Cardiac defibrillator in situ     Carpal tunnel syndrome, unspecified upper limb     CHF (congestive heart failure) ([facility])     chronic systolic/diastolic    Chronic diastolic congestive heart failure (HCC)     Chronic kidney disease     stage 3    Chronic systolic congestive heart failure ([facility]     Coronary artery disease     Cough     Depression     Detrusor instability     Diabetes mellitus ([facility]     Difficulty walking up stairs     Dilated cardiomyopathy ([facility]     Dilated cardiomyopathy ([facility] )     Disease of thyroid gland     Disorder of intervertebral disc of cervical spine     Esophageal reflux     GERD (gastroesophageal reflux disease)     Gout     Hemorrhoids     History of blood clots     Hormone replacement therapy     Hx of blood clots     Hyperlipidemia     Hypertension     Hypokalemia     Hypothyroidism     Indigestion     Inflamed toenail, left     Ingrown nail     Low back pain     left    OAB (overactive bladder)     detrusor instability    Obesity     AZ (obstructive sleep apnea)     Osteoarthritis     Papilloma of left breast     Psoriasis     psoriatic arthropathy    Psoriatic arthropathy (HCC)     Rickettsial disease 1999    RLS (restless legs syndrome)     Sciatica     Shortness of breath     Sleep apnea     unspecified type    Tendinitis     Trigger thumb, left thumb     Urinary frequency     UTI (urinary tract infection)     Vitamin D deficiency      Social History     Socioeconomic History    Marital status:       Spouse name: Not on file    Number of children: Not on file    Years of education: Not on file    Highest education level: Not on file   Occupational History    Not on file   Tobacco Use    Smoking status: Former Smoker     Packs/day: 1 00     Years: 15 00     Pack years: 15 00     Quit date: 0     Years since quittin 7    Smokeless tobacco: Never Used   Vaping Use    Vaping Use: Never used   Substance and Sexual Activity    Alcohol use: No    Drug use: No    Sexual activity: Not on file     Comment: birth control   Other Topics Concern    Not on file   Social History Narrative    Always uses seat belt     Social Determinants of Health     Financial Resource Strain: Not on file   Food Insecurity: Not on file   Transportation Needs: Not on file   Physical Activity: Not on file   Stress: Not on file   Social Connections: Not on file   Intimate Partner Violence: Not on file   Housing Stability: Not on file      Family History   Problem Relation Age of Onset    Hypertension Mother     Alzheimer's disease Mother     Cancer Father 70        bladder    Prostate cancer Father 70    Cancer Brother         pt shared some type of blood cancer    Breast cancer Paternal Grandmother         age dx unk    Stomach cancer Paternal Aunt 72     Past Surgical History:   Procedure Laterality Date    BLADDER SURGERY  1999    lift and repair    BREAST BIOPSY Left 05/23/2016    US CORE BIOPSY-BENIGN    CARDIAC CATHETERIZATION      outcome:  successful    CARDIAC DEFIBRILLATOR PLACEMENT      CARPAL TUNNEL RELEASE Bilateral 1997    CATARACT EXTRACTION      COLONOSCOPY      CORONARY ANGIOPLASTY WITH STENT PLACEMENT      CYSTOSCOPY W/ URETEROSCOPY  2009    DE QUERVAIN'S RELEASE Left 2011    and trigger finger release    EYE SURGERY Left 1999    EYE SURGERY Left 11/13/2019    Cataract    EYE SURGERY Right 11/09/2019    Cataract    INSERT / REPLACE / REMOVE PACEMAKER      JOINT REPLACEMENT Right 09/2017    Knee    KNEE ARTHROSCOPY      therapeutic    NEUROPLASTY / TRANSPOSITION MEDIAN NERVE AT CARPAL TUNNEL      OOPHORECTOMY Bilateral     AGE 44    WI INCISE FINGER TENDON SHEATH Left 3/2/2017    Procedure: SMALL TRIGGER FINGER RELEASE;  Surgeon: Hossein Garrett MD;  Location: [facility];  Service: Orthopedics    RECTAL SURGERY  2006    repair of rectal ulcer    SHOULDER ARTHROSCOPY Left 2006    TOTAL ABDOMINAL HYSTERECTOMY      AGE 53    TOTAL KNEE ARTHROPLASTY Left     TOTAL SHOULDER REPLACEMENT Left 2007    TRIGGER FINGER RELEASE Bilateral 2011    right haand 201,2015,left hand 2012,2015    US GUIDED BREAST BIOPSY LEFT COMPLETE Left 5/23/2016       Current Outpatient Medications:     allopurinol (ZYLOPRIM) 100 mg tablet, TAKE 1 TABLET BY MOUTH DAILY, Disp: 90 tablet, Rfl: 3    aspirin 81 MG tablet, Take 81 mg by mouth daily  , Disp: , Rfl:     calcium carbonate (OS-MANINDER) 600 MG tablet, Take 1,200 mg by mouth daily, Disp: , Rfl:     candesartan (ATACAND) 32 MG tablet, Take 1 tablet (32 mg total) by mouth daily, Disp: 30 tablet, Rfl: 5    celecoxib (CeleBREX) 200 mg capsule, Take 1 capsule (200 mg total) by mouth 2 (two) times a day (Patient taking differently: Take 200 mg by mouth every other day), Disp: 180 capsule, Rfl: 3    cetirizine (ZyrTEC) 10 mg tablet, Take 10 mg by mouth daily, Disp: , Rfl:     chlorhexidine (PERIDEX) 0 12 % solution, SWISH AND EXPECTORATE WITH 15 ML (1 capful) FOR 30 seconds IN THE MORNING AND IN THE EVENING AFTER toothbrushing , Disp: , Rfl:     Cholecalciferol (VITAMIN D3) 1000 units CAPS, Take by mouth, Disp: , Rfl:     DAILY MULTIPLE VITAMINS PO, Take 1 tablet by mouth daily, Disp: , Rfl:     FLUoxetine (PROzac) 20 mg capsule, TAKE 1 CAPSULE BY MOUTH EVERY DAY, Disp: 30 capsule, Rfl: 3    Januvia 100 MG tablet, TAKE 1 TABLET BY MOUTH EVERY DAY, Disp: 30 tablet, Rfl: 5    levothyroxine 25 mcg tablet, TAKE 1 TABLET BY MOUTH DAILY, Disp: 90 tablet, Rfl: 3    Magnesium 250 MG TABS, Take by mouth daily, Disp: , Rfl:     Magnesium Oxide 250 MG TABS, Take by mouth, Disp: , Rfl:     methylprednisolone (MEDROL) 4 mg tablet, Take 8 mg by mouth 2 (two) times a day 8MG every other day on even days per pt , Disp: , Rfl:     metoprolol succinate (TOPROL-XL) 100 mg 24 hr tablet, TAKE 1 TABLET BY MOUTH EVERY DAY, Disp: 30 tablet, Rfl: 3    Minoxidil (ROGAINE WOMENS EX), Apply 1 mL topically  , Disp: , Rfl:     multivitamin (THERAGRAN) TABS, Take 1 tablet by mouth daily, Disp: , Rfl:     omeprazole (PriLOSEC) 40 MG capsule, Take 1 capsule (40 mg total) by mouth daily, Disp: 60 capsule, Rfl: 3    potassium chloride (K-DUR,KLOR-CON) 20 mEq tablet, TAKE 1 TABLET BY MOUTH TWICE DAILY, Disp: 60 tablet, Rfl: 3    pyridoxine (VITAMIN B6) 100 mg tablet, Take 100 mg by mouth daily  , Disp: , Rfl:     simvastatin (ZOCOR) 40 mg tablet, TAKE 1 TABLET BY MOUTH DAILY, Disp: 30 tablet, Rfl: 3    torsemide (DEMADEX) 20 mg tablet, TAKE 1 TABLET BY MOUTH TWICE DAILY, Disp: 60 tablet, Rfl: 5    traMADol (ULTRAM) 50 mg tablet, TAKE 1 TABLET BY MOUTH EVERY 8 HOURS AS NEEDED FOR moderate PAIN, Disp: 90 tablet, Rfl: 1  No Known Allergies    Labs:  Lab Results   Component Value Date     07/05/2017    K 4 2 08/09/2022    K 4 6 07/05/2017     08/09/2022     07/05/2017    CO2 33 (H) 08/09/2022    CO2 32 (H) 07/05/2017    BUN 25 08/09/2022    BUN 21 07/05/2017    CREATININE 1 13 08/09/2022    CREATININE 1 30 (H) 07/05/2017    CALCIUM 10 2 (H) 08/09/2022    CALCIUM 9 8 07/05/2017     Lab Results   Component Value Date    WBC 7 94 08/09/2022    WBC 0-5 08/29/2017    HGB 14 7 08/09/2022    HGB 14 3 07/05/2017    HCT 46 2 (H) 08/09/2022    HCT 43 9 07/05/2017    MCV 92 08/09/2022    MCV 89 3 07/05/2017     08/09/2022     07/05/2017     Lab Results   Component Value Date    CHOL 170 07/05/2017    TRIG 68 08/09/2022    TRIG 59 07/05/2017    HDL 70 08/09/2022    HDL 73 07/05/2017     Imaging:  ECG today shows AV sequential and BIV paced rhythm, with occasional PVCs  ECHO (6-2020):  LEFT VENTRICLE:  The ventricle was dilated  Systolic function was mildly reduced  Ejection fraction was estimated in the range of 45 % to 50 %  There was hypokinesis of the apical anterior, apical septal, apical lateral, and apical wall(s)  Doppler parameters were consistent with abnormal left ventricular relaxation (grade 1 diastolic dysfunction)      TRICUSPID VALVE:  There was mild regurgitation  Pulmonary artery systolic pressure was at the upper limits of normal        Review of Systems:  Review of Systems   Constitutional: Negative  HENT: Negative  Eyes: Negative  Respiratory: Positive for shortness of breath  Cardiovascular: Negative  Gastrointestinal: Negative  Musculoskeletal: Positive for arthralgias  Skin: Negative  Allergic/Immunologic: Negative  Neurological: Negative  Hematological: Negative  Psychiatric/Behavioral: Negative  All other systems reviewed and are negative  Vitals:    10/06/22 1450   BP: 142/78   Pulse: 69   Weight: 71 4 kg (157 lb 6 4 oz)     Physical Exam:  Physical Exam  Vitals and nursing note reviewed  Constitutional:       Appearance: She is well-developed  HENT:      Head: Normocephalic and atraumatic  Eyes:      General: No scleral icterus  Right eye: No discharge  Left eye: No discharge  Pupils: Pupils are equal, round, and reactive to light  Neck:      Thyroid: No thyromegaly  Vascular: No JVD  Cardiovascular:      Rate and Rhythm: Normal rate and regular rhythm  No extrasystoles are present  Pulses: Normal pulses  No decreased pulses  Heart sounds: Normal heart sounds, S1 normal and S2 normal  No murmur heard  No friction rub  No gallop  Pulmonary:      Effort: Pulmonary effort is normal  No respiratory distress  Breath sounds: Normal breath sounds  No wheezing or rales  Abdominal:      General: Bowel sounds are normal  There is no distension  Palpations: Abdomen is soft  Tenderness: There is no abdominal tenderness  Musculoskeletal:         General: No tenderness or deformity  Normal range of motion  Cervical back: Normal range of motion and neck supple  Skin:     General: Skin is warm and dry  Findings: No rash  Neurological:      Mental Status: She is alert and oriented to person, place, and time  Cranial Nerves: No cranial nerve deficit  Psychiatric:         Thought Content: Thought content normal          Judgment: Judgment normal        Counseling / Coordination of Care  Total office time spent today 25 minutes  Greater than 50% of total time was spent with the patient and / or family counseling and / or coordination of care

## 2022-10-26 ENCOUNTER — TELEPHONE (OUTPATIENT)
Dept: FAMILY MEDICINE CLINIC | Facility: HOSPITAL | Age: 72
End: 2022-10-26

## 2022-10-26 NOTE — TELEPHONE ENCOUNTER
Patient reporting she was covid+ about 1 month ago      Asking when she should get her next covid booster?    pcb

## 2022-10-27 DIAGNOSIS — I10 ESSENTIAL HYPERTENSION: ICD-10-CM

## 2022-10-27 DIAGNOSIS — M25.50 ARTHRALGIA, UNSPECIFIED JOINT: ICD-10-CM

## 2022-10-27 DIAGNOSIS — E87.6 HYPOKALEMIA: ICD-10-CM

## 2022-10-27 RX ORDER — POTASSIUM CHLORIDE 20 MEQ/1
TABLET, EXTENDED RELEASE ORAL
Qty: 60 TABLET | Refills: 3 | Status: SHIPPED | OUTPATIENT
Start: 2022-10-27

## 2022-10-27 RX ORDER — CANDESARTAN 32 MG/1
TABLET ORAL
Qty: 30 TABLET | Refills: 5 | Status: SHIPPED | OUTPATIENT
Start: 2022-10-27

## 2022-10-27 RX ORDER — TRAMADOL HYDROCHLORIDE 50 MG/1
TABLET ORAL
Qty: 90 TABLET | Refills: 1 | Status: SHIPPED | OUTPATIENT
Start: 2022-10-27

## 2022-11-04 ENCOUNTER — REMOTE DEVICE CLINIC VISIT (OUTPATIENT)
Dept: CARDIOLOGY CLINIC | Facility: CLINIC | Age: 72
End: 2022-11-04

## 2022-11-04 DIAGNOSIS — Z95.810 AICD (AUTOMATIC CARDIOVERTER/DEFIBRILLATOR) PRESENT: Primary | ICD-10-CM

## 2022-11-04 NOTE — PROGRESS NOTES
Results for orders placed or performed in visit on 11/04/22   Cardiac EP device report    Narrative    MDT/BIV-ICD/ NOT MRI CONDITIONAL  CARELINK TRANSMISSION: BATTERY VOLTAGE NEARING THIERNO-6 MIONS  WILL SCHEDULE MONTHLY BATTERY CHECKS  AP 91%  98% VSRP 0%  ALL AVAILABLE LEAD PARAMETERS WITHIN NORMAL LIMITS  NO SIGNIFICANT HIGH RATE EPISODES  OPTI-VOL WITHIN NORMAL LIMITS  NORMAL DEVICE FUNCTION   NC

## 2022-11-15 ENCOUNTER — TELEPHONE (OUTPATIENT)
Dept: FAMILY MEDICINE CLINIC | Facility: HOSPITAL | Age: 72
End: 2022-11-15

## 2022-11-15 DIAGNOSIS — Z12.31 SCREENING MAMMOGRAM FOR BREAST CANCER: Primary | ICD-10-CM

## 2022-11-27 DIAGNOSIS — E11.22 TYPE 2 DIABETES MELLITUS WITH STAGE 3 CHRONIC KIDNEY DISEASE, WITHOUT LONG-TERM CURRENT USE OF INSULIN (HCC): ICD-10-CM

## 2022-11-27 DIAGNOSIS — N18.30 TYPE 2 DIABETES MELLITUS WITH STAGE 3 CHRONIC KIDNEY DISEASE, WITHOUT LONG-TERM CURRENT USE OF INSULIN (HCC): ICD-10-CM

## 2022-11-27 RX ORDER — SITAGLIPTIN 100 MG/1
TABLET, FILM COATED ORAL
Qty: 30 TABLET | Refills: 5 | Status: SHIPPED | OUTPATIENT
Start: 2022-11-27

## 2022-12-06 ENCOUNTER — REMOTE DEVICE CLINIC VISIT (OUTPATIENT)
Dept: CARDIOLOGY CLINIC | Facility: CLINIC | Age: 72
End: 2022-12-06

## 2022-12-06 DIAGNOSIS — Z95.810 PRESENCE OF AUTOMATIC CARDIOVERTER/DEFIBRILLATOR (AICD): Primary | ICD-10-CM

## 2022-12-06 NOTE — PROGRESS NOTES
Results for orders placed or performed in visit on 12/06/22   Cardiac EP device report    Narrative    MDT/BIV-ICD/ NOT MRI CONDITIONAL  CARELINK TRANSMISSION: BATTERY VOLTAGE NEARING THIERNO (6 MOS)  WILL SCHEDULE MONTHLY BATTERY CHECKS  AP: 92 3%  : 98 9% + VSRP: 0 1%  ALL AVAILABLE LEAD PARAMETERS WITHIN NORMAL LIMITS  NO SIGNIFICANT HIGH RATE EPISODES  OPTI-VOL WITHIN NORMAL LIMITS  APPROPRIATELY FUNCTIONING  BI-V ICD    [address] [place] [address] Street

## 2022-12-27 DIAGNOSIS — F32.A DEPRESSION, UNSPECIFIED DEPRESSION TYPE: ICD-10-CM

## 2022-12-27 DIAGNOSIS — E78.5 HYPERLIPIDEMIA, UNSPECIFIED HYPERLIPIDEMIA TYPE: ICD-10-CM

## 2022-12-27 DIAGNOSIS — R60.9 EDEMA, UNSPECIFIED TYPE: ICD-10-CM

## 2022-12-27 RX ORDER — TORSEMIDE 20 MG/1
TABLET ORAL
Qty: 60 TABLET | Refills: 5 | Status: SHIPPED | OUTPATIENT
Start: 2022-12-27

## 2022-12-27 RX ORDER — SIMVASTATIN 40 MG
TABLET ORAL
Qty: 30 TABLET | Refills: 3 | Status: SHIPPED | OUTPATIENT
Start: 2022-12-27

## 2022-12-27 RX ORDER — FLUOXETINE HYDROCHLORIDE 20 MG/1
CAPSULE ORAL
Qty: 30 CAPSULE | Refills: 3 | Status: SHIPPED | OUTPATIENT
Start: 2022-12-27

## 2022-12-28 ENCOUNTER — HOSPITAL ENCOUNTER (OUTPATIENT)
Dept: MAMMOGRAPHY | Facility: CLINIC | Age: 72
Discharge: HOME | End: 2022-12-28

## 2022-12-28 VITALS — WEIGHT: 160 LBS | HEIGHT: 60 IN | BODY MASS INDEX: 31.41 KG/M2

## 2022-12-28 DIAGNOSIS — Z12.31 SCREENING MAMMOGRAM FOR BREAST CANCER: ICD-10-CM

## 2023-01-02 ENCOUNTER — TELEPHONE (OUTPATIENT)
Dept: OTHER | Facility: OTHER | Age: 73
End: 2023-01-02

## 2023-01-02 NOTE — TELEPHONE ENCOUNTER
Per patient's phone call, she would like a call back explaining the reason for her appt on 1/6/23

## 2023-01-06 ENCOUNTER — REMOTE DEVICE CLINIC VISIT (OUTPATIENT)
Dept: CARDIOLOGY CLINIC | Facility: CLINIC | Age: 73
End: 2023-01-06

## 2023-01-06 DIAGNOSIS — Z95.810 PRESENCE OF AUTOMATIC CARDIOVERTER/DEFIBRILLATOR (AICD): Primary | ICD-10-CM

## 2023-01-06 NOTE — PROGRESS NOTES
Results for orders placed or performed in visit on 01/06/23   Cardiac EP device report    Narrative    MDT/BIV-ICD/ NOT MRI CONDITIONAL  NON-BILLABLE CARELINK TRANSMISSION: BATTERY STATUS: BATTERY VOLTAGE NEARING THIERNO (4 MOS)  WILL SCHEDULE MONTHLY BATTERY CHECKS  AP: 90 2%  : 99 2% + VSRP: 0 1%  ALL AVAILABLE LEAD PARAMETERS WITHIN NORMAL LIMITS  NO SIGNIFICANT HIGH RATE EPISODES  1 V-SENSE EPISODE W/ EGM SHOWING PAT 8 BEATS @ 98 BPM  PT TAKES METOPROLOL SUCC, ASA 81MG  EF: 45-50% (ECHO 06/30/20)  OPTI-VOL WITHIN NORMAL LIMITS  APPROPRIATELY FUNCTIONING BI-V ICD    [address]

## 2023-01-26 DIAGNOSIS — K21.00 GASTROESOPHAGEAL REFLUX DISEASE WITH ESOPHAGITIS: ICD-10-CM

## 2023-01-26 DIAGNOSIS — I10 ESSENTIAL HYPERTENSION: ICD-10-CM

## 2023-01-26 RX ORDER — METOPROLOL SUCCINATE 100 MG/1
TABLET, EXTENDED RELEASE ORAL
Qty: 30 TABLET | Refills: 3 | Status: SHIPPED | OUTPATIENT
Start: 2023-01-26

## 2023-01-26 RX ORDER — OMEPRAZOLE 40 MG/1
CAPSULE, DELAYED RELEASE ORAL
Qty: 60 CAPSULE | Refills: 3 | Status: SHIPPED | OUTPATIENT
Start: 2023-01-26

## 2023-02-06 ENCOUNTER — REMOTE DEVICE CLINIC VISIT (OUTPATIENT)
Dept: CARDIOLOGY CLINIC | Facility: CLINIC | Age: 73
End: 2023-02-06

## 2023-02-06 ENCOUNTER — TELEPHONE (OUTPATIENT)
Dept: CARDIOLOGY CLINIC | Facility: CLINIC | Age: 73
End: 2023-02-06

## 2023-02-06 DIAGNOSIS — Z95.810 PRESENCE OF IMPLANTABLE CARDIOVERTER-DEFIBRILLATOR (ICD): Primary | ICD-10-CM

## 2023-02-06 NOTE — TELEPHONE ENCOUNTER
----- Message from Kareem Serna MD sent at 2/6/2023 10:53 AM EST -----  Patient's device interrogation reading shows,  device function is normal       Please call patient

## 2023-02-06 NOTE — PROGRESS NOTES
MDT/BIV-ICD/ NOT MRI CONDITIONAL   NB CARELINK TRANSMISSION:  BATTERY VOLTAGE NEARING THIERNO (4 MONTHS)  WILL SCHEDULE MONTHLY BATTERY CHECKS   AP 91 5% BP 99 4% VSRP <0 1%    ALL LEAD PARAMETERS WITHIN NORMAL LIMITS   NO SIGNIFICANT HIGH RATE OR V SENSE EPISODES   OPTI-VOL WITHIN NORMAL LIMITS   NORMAL DEVICE FUNCTION   RG

## 2023-02-21 LAB
LEFT EYE DIABETIC RETINOPATHY: NORMAL
RIGHT EYE DIABETIC RETINOPATHY: NORMAL

## 2023-02-24 DIAGNOSIS — E87.6 HYPOKALEMIA: ICD-10-CM

## 2023-02-26 RX ORDER — POTASSIUM CHLORIDE 20 MEQ/1
TABLET, EXTENDED RELEASE ORAL
Qty: 60 TABLET | Refills: 3 | Status: SHIPPED | OUTPATIENT
Start: 2023-02-26

## 2023-03-07 ENCOUNTER — REMOTE DEVICE CLINIC VISIT (OUTPATIENT)
Dept: CARDIOLOGY CLINIC | Facility: CLINIC | Age: 73
End: 2023-03-07

## 2023-03-07 ENCOUNTER — TELEPHONE (OUTPATIENT)
Dept: CARDIOLOGY CLINIC | Facility: CLINIC | Age: 73
End: 2023-03-07

## 2023-03-07 DIAGNOSIS — Z95.810 AICD (AUTOMATIC CARDIOVERTER/DEFIBRILLATOR) PRESENT: Primary | ICD-10-CM

## 2023-03-07 NOTE — TELEPHONE ENCOUNTER
Narrative     MDT/BIV-ICD/ NOT MRI CONDITIONAL  CARELINK TRANSMISSION: BATTERY VOLTAGE NEARING THIERNO-3 MIONS  WILL SCHEDULE MONTHLY BATTERY CHECKS  AP 88%  99% VSRP 0%  ALL AVAILABLE LEAD PARAMETERS WITHIN NORMAL LIMITS  NO SIGNIFICANT HIGH RATE EPISODES  OPTI-VOL FLUID THRESHOLD CROSSED  HF/RN MADE AWARE  NORMAL DEVICE FUNCTION   NC

## 2023-03-07 NOTE — PROGRESS NOTES
Results for orders placed or performed in visit on 03/07/23   Cardiac EP device report    Narrative    MDT/BIV-ICD/ NOT MRI CONDITIONAL  CARELINK TRANSMISSION: BATTERY VOLTAGE NEARING THIERNO-3 MIONS  WILL SCHEDULE MONTHLY BATTERY CHECKS  AP 88%  99% VSRP 0%  ALL AVAILABLE LEAD PARAMETERS WITHIN NORMAL LIMITS  NO SIGNIFICANT HIGH RATE EPISODES  OPTI-VOL FLUID THRESHOLD CROSSED  HF/RN MADE AWARE  NORMAL DEVICE FUNCTION   NC

## 2023-04-24 ENCOUNTER — RA CDI HCC (OUTPATIENT)
Dept: OTHER | Facility: HOSPITAL | Age: 73
End: 2023-04-24

## 2023-04-24 NOTE — PROGRESS NOTES
[facility]  coding opportunities     I13 0 and I42 0     Chart Reviewed number of suggestions sent to Provider: 2     Patients Insurance     Medicare Insurance: [employer]

## 2023-04-26 DIAGNOSIS — I10 ESSENTIAL HYPERTENSION: ICD-10-CM

## 2023-04-26 DIAGNOSIS — F32.A DEPRESSION, UNSPECIFIED DEPRESSION TYPE: ICD-10-CM

## 2023-04-26 DIAGNOSIS — E78.5 HYPERLIPIDEMIA, UNSPECIFIED HYPERLIPIDEMIA TYPE: ICD-10-CM

## 2023-04-26 RX ORDER — SIMVASTATIN 40 MG
TABLET ORAL
Qty: 30 TABLET | Refills: 3 | Status: SHIPPED | OUTPATIENT
Start: 2023-04-26

## 2023-04-27 RX ORDER — FLUOXETINE HYDROCHLORIDE 20 MG/1
CAPSULE ORAL
Qty: 30 CAPSULE | Refills: 3 | Status: SHIPPED | OUTPATIENT
Start: 2023-04-27

## 2023-04-27 RX ORDER — CANDESARTAN 32 MG/1
TABLET ORAL
Qty: 30 TABLET | Refills: 5 | Status: SHIPPED | OUTPATIENT
Start: 2023-04-27

## 2023-04-28 ENCOUNTER — OFFICE VISIT (OUTPATIENT)
Dept: FAMILY MEDICINE CLINIC | Facility: HOSPITAL | Age: 73
End: 2023-04-28

## 2023-04-28 VITALS
OXYGEN SATURATION: 99 % | DIASTOLIC BLOOD PRESSURE: 80 MMHG | TEMPERATURE: 98.4 F | BODY MASS INDEX: 31.65 KG/M2 | SYSTOLIC BLOOD PRESSURE: 144 MMHG | HEIGHT: 60 IN | WEIGHT: 161.2 LBS | HEART RATE: 67 BPM

## 2023-04-28 DIAGNOSIS — E21.3 HYPERPARATHYROIDISM (HCC): ICD-10-CM

## 2023-04-28 DIAGNOSIS — J43.9 PULMONARY EMPHYSEMA, UNSPECIFIED EMPHYSEMA TYPE (HCC): ICD-10-CM

## 2023-04-28 DIAGNOSIS — E03.9 ACQUIRED HYPOTHYROIDISM: ICD-10-CM

## 2023-04-28 DIAGNOSIS — N18.30 TYPE 2 DIABETES MELLITUS WITH STAGE 3 CHRONIC KIDNEY DISEASE, WITHOUT LONG-TERM CURRENT USE OF INSULIN, UNSPECIFIED WHETHER STAGE 3A OR 3B CKD (HCC): ICD-10-CM

## 2023-04-28 DIAGNOSIS — E78.2 MIXED HYPERLIPIDEMIA: ICD-10-CM

## 2023-04-28 DIAGNOSIS — F32.1 CURRENT MODERATE EPISODE OF MAJOR DEPRESSIVE DISORDER WITHOUT PRIOR EPISODE (HCC): Primary | ICD-10-CM

## 2023-04-28 DIAGNOSIS — E11.22 TYPE 2 DIABETES MELLITUS WITH STAGE 3 CHRONIC KIDNEY DISEASE, WITHOUT LONG-TERM CURRENT USE OF INSULIN, UNSPECIFIED WHETHER STAGE 3A OR 3B CKD (HCC): ICD-10-CM

## 2023-04-28 DIAGNOSIS — I50.32 CHRONIC DIASTOLIC CONGESTIVE HEART FAILURE (HCC): ICD-10-CM

## 2023-04-28 DIAGNOSIS — M25.50 ARTHRALGIA, UNSPECIFIED JOINT: ICD-10-CM

## 2023-04-28 DIAGNOSIS — M46.1 BILATERAL SACROILIITIS (HCC): ICD-10-CM

## 2023-04-28 RX ORDER — TRAMADOL HYDROCHLORIDE 50 MG/1
50 TABLET ORAL EVERY 8 HOURS PRN
Qty: 90 TABLET | Refills: 1 | Status: SHIPPED | OUTPATIENT
Start: 2023-04-28

## 2023-04-28 RX ORDER — BUPROPION HYDROCHLORIDE 150 MG/1
150 TABLET ORAL EVERY MORNING
Qty: 30 TABLET | Refills: 5 | Status: SHIPPED | OUTPATIENT
Start: 2023-04-28 | End: 2023-10-25

## 2023-04-28 NOTE — PROGRESS NOTES
Name: Lacy Bradshaw      : 1950      MRN: [id number]  Encounter Provider: Maggy Zaman MD  Encounter Date: 2023   Encounter department: [facility]     1  Current moderate episode of major depressive disorder without prior episode (HCC)  -     buPROPion (WELLBUTRIN XL) 150 mg 24 hr tablet; Take 1 tablet (150 mg total) by mouth every morning    2  Type 2 diabetes mellitus with stage 3 chronic kidney disease, without long-term current use of insulin, unspecified whether stage 3a or 3b CKD (HCC)  -     CBC and differential; Future  -     Comprehensive metabolic panel; Future  -     HEMOGLOBIN A1C W/ EAG ESTIMATION; Future    3  Hyperparathyroidism ([facility] )  -     PTH, intact; Future    4  Acquired hypothyroidism  -     TSH, 3rd generation with Free T4 reflex; Future    5  Mixed hyperlipidemia  -     Lipid Panel with Direct LDL reflex; Future    6  Pulmonary emphysema, unspecified emphysema type ([facility] )    7  Bilateral sacroiliitis ([facility] )    8  Chronic diastolic congestive heart failure ([facility] )    9  Arthralgia, unspecified joint  -     traMADol (ULTRAM) 50 mg tablet; Take 1 tablet (50 mg total) by mouth every 8 (eight) hours as needed for moderate pain      BMI Counseling: Body mass index is 31 48 kg/m²  The BMI is above normal  Nutrition recommendations include decreasing portion sizes, encouraging healthy choices of fruits and vegetables, limiting drinks that contain sugar and moderation in carbohydrate intake  Exercise recommendations include exercising 3-5 times per week  No pharmacotherapy was ordered  Rationale for BMI follow-up plan is due to patient being overweight or obese           Subjective     Multiple issues     Fatigued all the time  Takes naps as well as regular sleep  Feels like it has been for past 9 months  No regular exercise    Doesn't stand or walk very long    Admits she is depressed  No friends anymore  Family is out of the area  Feels lonely    Willing to take additional medication for mood along with Fluoxetine    Has GERD  More frequently  Continues on Prilosec    Controlled Substance Review    [place] PDMP or [place]  reviewed: No red flags were identified; safe to proceed with prescription         Review of Systems   Constitutional: Negative  HENT: Negative  Respiratory: Negative  Cardiovascular: Negative  Gastrointestinal: Positive for abdominal pain  Genitourinary: Negative  Musculoskeletal: Positive for arthralgias, gait problem and myalgias  Psychiatric/Behavioral: Positive for decreased concentration, dysphoric mood and sleep disturbance  All other systems reviewed and are negative        Past Medical History:   Diagnosis Date   • Abnormal finding on breast imaging     last assessed 11/30/17   • Acute bacterial bronchitis    • Allergic rhinitis    • Arthralgia    • Arthritis    • Atherosclerotic heart disease of native coronary artery without angina pectoris    • BBB (bundle branch block)     left,last assesed 6/4/12   • BBB (bundle branch block)     left   • Benign paroxysmal vertigo     last assessed 9/7/12   • Benign paroxysmal vertigo of left ear    • Bilateral fibrocystic breast changes    • Bilateral sacroiliitis (HCC)    • Cardiac defibrillator in situ    • Carpal tunnel syndrome, unspecified upper limb    • CHF (congestive heart failure) (HCC)     chronic systolic/diastolic   • Chronic diastolic congestive heart failure (HCC)    • Chronic kidney disease     stage 3   • Chronic systolic congestive heart failure (HCC)    • Coronary artery disease    • Cough    • Depression    • Detrusor instability    • Diabetes mellitus ([address] )    • Difficulty walking up stairs    • Dilated cardiomyopathy (HCC)    • Dilated cardiomyopathy (HCC)    • Disease of thyroid gland    • Disorder of intervertebral disc of cervical spine    • Esophageal reflux    • GERD (gastroesophageal reflux disease)    • Gout    • Hemorrhoids    • History of blood clots    • Hormone replacement therapy    • Hx of blood clots    • Hyperlipidemia    • Hypertension    • Hypokalemia    • Hypothyroidism    • Indigestion    • Inflamed toenail, left    • Ingrown nail    • Low back pain     left   • OAB (overactive bladder)     detrusor instability   • Obesity    • AZ (obstructive sleep apnea)    • Osteoarthritis    • Papilloma of left breast    • Psoriasis     psoriatic arthropathy   • Psoriatic arthropathy (HCC)    • 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   • RLS (restless legs syndrome)    • Sciatica    • Shortness of breath    • Sleep apnea     unspecified type   • Tendinitis    • Trigger thumb, left thumb    • Urinary frequency    • UTI (urinary tract infection)    • Vitamin D deficiency      Past Surgical History:   Procedure Laterality Date   • BLADDER SURGERY  1999    lift and repair   • BREAST BIOPSY Left 05/23/2016    US CORE BIOPSY-BENIGN   • CARDIAC CATHETERIZATION      outcome:  successful   • CARDIAC DEFIBRILLATOR PLACEMENT     • CARPAL TUNNEL RELEASE Bilateral 1997   • CATARACT EXTRACTION     • COLONOSCOPY     • CORONARY ANGIOPLASTY WITH STENT PLACEMENT     • CYSTOSCOPY W/ URETEROSCOPY  2009   • DE QUERVAIN'S RELEASE Left 2011    and trigger finger release   • EYE SURGERY Left 1999   • EYE SURGERY Left 11/13/2019    Cataract   • EYE SURGERY Right 11/09/2019    Cataract   • INSERT / REPLACE / REMOVE PACEMAKER     • JOINT REPLACEMENT Right 09/2017    Knee   • KNEE ARTHROSCOPY      therapeutic   • NEUROPLASTY / TRANSPOSITION MEDIAN NERVE AT CARPAL TUNNEL     • OOPHORECTOMY Bilateral     AGE 44   • MA TENDON SHEATH INCISION Left 3/2/2017    Procedure: SMALL TRIGGER FINGER RELEASE;  Surgeon: Da Dan MD;  Location: [facility];  Service: Orthopedics   • RECTAL SURGERY  2006    repair of rectal ulcer   • SHOULDER ARTHROSCOPY Left 2006   • TOTAL ABDOMINAL HYSTERECTOMY      AGE 44   • TOTAL KNEE ARTHROPLASTY Left    • TOTAL SHOULDER REPLACEMENT Left 2007   • TRIGGER FINGER RELEASE Bilateral 2011    right haand ,left hand    • US GUIDED BREAST BIOPSY LEFT COMPLETE Left 2016     Family History   Problem Relation Age of Onset   • Hypertension Mother    • Alzheimer's disease Mother    • Cancer Father 70        bladder   • Prostate cancer Father 70   • Cancer Brother         pt shared some type of blood cancer   • Breast cancer Paternal Grandmother         age dx unk   • Stomach cancer Paternal Aunt 72     Social History     Socioeconomic History   • Marital status:      Spouse name: None   • Number of children: None   • Years of education: None   • Highest education level: None   Occupational History   • None   Tobacco Use   • Smoking status: Former     Packs/day: 1 00     Years: 15 00     Pack years: 15 00     Types: Cigarettes     Quit date: 0     Years since quittin 3   • Smokeless tobacco: Never   Vaping Use   • Vaping Use: Never used   Substance and Sexual Activity   • Alcohol use: No   • Drug use: No   • Sexual activity: None     Comment: birth control   Other Topics Concern   • None   Social History Narrative    Always uses seat belt     Social Determinants of Health     Financial Resource Strain: Not on file   Food Insecurity: Not on file   Transportation Needs: Not on file   Physical Activity: Not on file   Stress: Not on file   Social Connections: Not on file   Intimate Partner Violence: Not on file   Housing Stability: Not on file     Current Outpatient Medications on File Prior to Visit   Medication Sig   • allopurinol (ZYLOPRIM) 100 mg tablet TAKE 1 TABLET BY MOUTH DAILY   • aspirin 81 MG tablet Take 81 mg by mouth daily     • calcium carbonate (OS-MANINDER) 600 MG tablet Take 1,200 mg by mouth daily   • candesartan (ATACAND) 32 MG tablet TAKE 1 TABLET BY MOUTH DAILY   • celecoxib (CeleBREX) 200 mg capsule Take 1 capsule (200 mg total) by mouth 2 (two) times a day (Patient taking differently: Take 200 mg by mouth every other day) • cetirizine (ZyrTEC) 10 mg tablet Take 10 mg by mouth daily   • Cholecalciferol (VITAMIN D3) 1000 units CAPS Take by mouth   • FLUoxetine (PROzac) 20 mg capsule TAKE 1 CAPSULE BY MOUTH EVERY DAY   • Januvia 100 MG tablet TAKE 1 TABLET BY MOUTH EVERY DAY   • levothyroxine 25 mcg tablet TAKE 1 TABLET BY MOUTH DAILY   • Magnesium 250 MG TABS Take by mouth daily   • Magnesium Oxide 250 MG TABS Take by mouth   • metoprolol succinate (TOPROL-XL) 100 mg 24 hr tablet TAKE 1 TABLET BY MOUTH EVERY DAY   • Minoxidil (ROGAINE WOMENS EX) Apply 1 mL topically  • omeprazole (PriLOSEC) 40 MG capsule TAKE 1 CAPSULE BY MOUTH DAILY   • potassium chloride (K-DUR,KLOR-CON) 20 mEq tablet TAKE 1 TABLET BY MOUTH TWICE DAILY   • pyridoxine (VITAMIN B6) 100 mg tablet Take 100 mg by mouth daily  • simvastatin (ZOCOR) 40 mg tablet TAKE 1 TABLET BY MOUTH DAILY   • torsemide (DEMADEX) 20 mg tablet TAKE 1 TABLET BY MOUTH TWICE DAILY   • chlorhexidine (PERIDEX) 0 12 % solution SWISH AND EXPECTORATE WITH 15 ML (1 capful) FOR 30 seconds IN THE MORNING AND IN THE EVENING AFTER toothbrushing     • DAILY MULTIPLE VITAMINS PO Take 1 tablet by mouth daily   • methylprednisolone (MEDROL) 4 mg tablet Take 8 mg by mouth 2 (two) times a day 8MG every other day on even days per pt  (Patient not taking: Reported on 4/28/2023)   • multivitamin (THERAGRAN) TABS Take 1 tablet by mouth daily     Allergies   Allergen Reactions   • Dust Mite Extract Sneezing     Immunization History   Administered Date(s) Administered   • COVID-19 PFIZER VACCINE 0 3 ML IM 03/08/2021, 03/30/2021, 10/12/2021, 04/15/2022   • Influenza Split High Dose Preservative Free IM 09/24/2013, 10/16/2014, 11/03/2015, 10/05/2016, 10/13/2017   • Influenza, high dose seasonal 0 7 mL 09/20/2018, 10/01/2019, 10/07/2020, 09/23/2021   • Influenza, seasonal, injectable 09/07/2012   • Pneumococcal Conjugate 13-Valent 06/24/2015   • Pneumococcal Polysaccharide PPV23 10/01/2000, 10/01/2005, 11/26/2018 • Zoster Vaccine Recombinant 09/19/2019, 11/20/2019       Objective     /80 (BP Location: Left arm, Patient Position: Sitting, Cuff Size: Standard)   Pulse 67   Temp 98 4 °F (36 9 °C) (Tympanic)   Ht 5' (1 524 m)   Wt 73 1 kg (161 lb 3 2 oz)   SpO2 99%   BMI 31 48 kg/m²     Physical Exam  Vitals and nursing note reviewed  Constitutional:       Appearance: She is obese  Cardiovascular:      Rate and Rhythm: Normal rate and regular rhythm  Pulses: Normal pulses  Heart sounds: Normal heart sounds  Neurological:      Mental Status: She is alert and oriented to person, place, and time  Psychiatric:         Attention and Perception: Attention normal          Mood and Affect: Mood is anxious and depressed  Speech: Speech normal          Behavior: Behavior normal          Thought Content:  Thought content normal          Judgment: Judgment normal        Palma Ennis MD

## 2023-05-01 ENCOUNTER — APPOINTMENT (OUTPATIENT)
Dept: LAB | Facility: CLINIC | Age: 73
End: 2023-05-01

## 2023-05-01 DIAGNOSIS — E78.2 MIXED HYPERLIPIDEMIA: ICD-10-CM

## 2023-05-01 DIAGNOSIS — E21.3 HYPERPARATHYROIDISM (HCC): ICD-10-CM

## 2023-05-01 DIAGNOSIS — N18.30 TYPE 2 DIABETES MELLITUS WITH STAGE 3 CHRONIC KIDNEY DISEASE, WITHOUT LONG-TERM CURRENT USE OF INSULIN, UNSPECIFIED WHETHER STAGE 3A OR 3B CKD (HCC): ICD-10-CM

## 2023-05-01 DIAGNOSIS — E11.22 TYPE 2 DIABETES MELLITUS WITH STAGE 3 CHRONIC KIDNEY DISEASE, WITHOUT LONG-TERM CURRENT USE OF INSULIN, UNSPECIFIED WHETHER STAGE 3A OR 3B CKD (HCC): ICD-10-CM

## 2023-05-01 DIAGNOSIS — I10 ESSENTIAL HYPERTENSION: ICD-10-CM

## 2023-05-01 DIAGNOSIS — E03.9 ACQUIRED HYPOTHYROIDISM: ICD-10-CM

## 2023-05-01 LAB
25(OH)D3 SERPL-MCNC: 97.1 NG/ML (ref 30–100)
ALBUMIN SERPL BCP-MCNC: 3.5 G/DL (ref 3.5–5)
ALP SERPL-CCNC: 64 U/L (ref 46–116)
ALT SERPL W P-5'-P-CCNC: 29 U/L (ref 12–78)
ANION GAP SERPL CALCULATED.3IONS-SCNC: 2 MMOL/L (ref 4–13)
AST SERPL W P-5'-P-CCNC: 16 U/L (ref 5–45)
BASOPHILS # BLD AUTO: 0.11 THOUSANDS/ΜL (ref 0–0.1)
BASOPHILS NFR BLD AUTO: 1 % (ref 0–1)
BILIRUB SERPL-MCNC: 0.55 MG/DL (ref 0.2–1)
BUN SERPL-MCNC: 24 MG/DL (ref 5–25)
CALCIUM SERPL-MCNC: 10 MG/DL (ref 8.3–10.1)
CHLORIDE SERPL-SCNC: 107 MMOL/L (ref 96–108)
CHOLEST SERPL-MCNC: 144 MG/DL
CO2 SERPL-SCNC: 29 MMOL/L (ref 21–32)
CREAT SERPL-MCNC: 1.15 MG/DL (ref 0.6–1.3)
EOSINOPHIL # BLD AUTO: 0.28 THOUSAND/ΜL (ref 0–0.61)
EOSINOPHIL NFR BLD AUTO: 3 % (ref 0–6)
ERYTHROCYTE [DISTWIDTH] IN BLOOD BY AUTOMATED COUNT: 14 % (ref 11.6–15.1)
GFR SERPL CREATININE-BSD FRML MDRD: 46 ML/MIN/1.73SQ M
GLUCOSE P FAST SERPL-MCNC: 129 MG/DL (ref 65–99)
HCT VFR BLD AUTO: 47 % (ref 34.8–46.1)
HDLC SERPL-MCNC: 73 MG/DL
HGB BLD-MCNC: 15 G/DL (ref 11.5–15.4)
IMM GRANULOCYTES # BLD AUTO: 0.18 THOUSAND/UL (ref 0–0.2)
IMM GRANULOCYTES NFR BLD AUTO: 2 % (ref 0–2)
LDLC SERPL CALC-MCNC: 56 MG/DL (ref 0–100)
LYMPHOCYTES # BLD AUTO: 2.28 THOUSANDS/ΜL (ref 0.6–4.47)
LYMPHOCYTES NFR BLD AUTO: 21 % (ref 14–44)
MCH RBC QN AUTO: 29.3 PG (ref 26.8–34.3)
MCHC RBC AUTO-ENTMCNC: 31.9 G/DL (ref 31.4–37.4)
MCV RBC AUTO: 92 FL (ref 82–98)
MONOCYTES # BLD AUTO: 1.23 THOUSAND/ΜL (ref 0.17–1.22)
MONOCYTES NFR BLD AUTO: 12 % (ref 4–12)
NEUTROPHILS # BLD AUTO: 6.57 THOUSANDS/ΜL (ref 1.85–7.62)
NEUTS SEG NFR BLD AUTO: 61 % (ref 43–75)
NRBC BLD AUTO-RTO: 0 /100 WBCS
PLATELET # BLD AUTO: 221 THOUSANDS/UL (ref 149–390)
PMV BLD AUTO: 10.7 FL (ref 8.9–12.7)
POTASSIUM SERPL-SCNC: 4.2 MMOL/L (ref 3.5–5.3)
PROT SERPL-MCNC: 6.4 G/DL (ref 6.4–8.4)
PTH-INTACT SERPL-MCNC: 81.3 PG/ML (ref 18.4–80.1)
RBC # BLD AUTO: 5.12 MILLION/UL (ref 3.81–5.12)
SODIUM SERPL-SCNC: 138 MMOL/L (ref 135–147)
TRIGL SERPL-MCNC: 74 MG/DL
TSH SERPL DL<=0.05 MIU/L-ACNC: 1.53 UIU/ML (ref 0.45–4.5)
WBC # BLD AUTO: 10.65 THOUSAND/UL (ref 4.31–10.16)

## 2023-05-03 LAB
EST. AVERAGE GLUCOSE BLD GHB EST-MCNC: 154 MG/DL
HBA1C MFR BLD: 7 %

## 2023-05-08 ENCOUNTER — TELEPHONE (OUTPATIENT)
Dept: FAMILY MEDICINE CLINIC | Facility: HOSPITAL | Age: 73
End: 2023-05-08

## 2023-05-08 DIAGNOSIS — J30.2 SEASONAL ALLERGIC RHINITIS, UNSPECIFIED TRIGGER: Primary | ICD-10-CM

## 2023-05-08 RX ORDER — MONTELUKAST SODIUM 10 MG/1
10 TABLET ORAL
Qty: 90 TABLET | Refills: 0 | Status: SHIPPED | OUTPATIENT
Start: 2023-05-08 | End: 2023-07-27 | Stop reason: SDUPTHER

## 2023-05-08 NOTE — TELEPHONE ENCOUNTER
Left message    Pt left message on the refill line stating she was previously on a prescription allergy med that seemed to help her  She was previously on Singular  Is she taking anything OTC currently for her allergies?

## 2023-05-08 NOTE — TELEPHONE ENCOUNTER
Patient currently taking Zyrtec with little relief  Was previously on Singular, would you be able to send this in again for her?  Uses [facility]

## 2023-05-11 DIAGNOSIS — E11.22 TYPE 2 DIABETES MELLITUS WITH STAGE 3 CHRONIC KIDNEY DISEASE, WITHOUT LONG-TERM CURRENT USE OF INSULIN (HCC): ICD-10-CM

## 2023-05-11 DIAGNOSIS — I10 ESSENTIAL HYPERTENSION: ICD-10-CM

## 2023-05-11 DIAGNOSIS — E03.9 ACQUIRED HYPOTHYROIDISM: ICD-10-CM

## 2023-05-11 DIAGNOSIS — N18.30 TYPE 2 DIABETES MELLITUS WITH STAGE 3 CHRONIC KIDNEY DISEASE, WITHOUT LONG-TERM CURRENT USE OF INSULIN (HCC): ICD-10-CM

## 2023-05-11 RX ORDER — METOPROLOL SUCCINATE 100 MG/1
100 TABLET, EXTENDED RELEASE ORAL DAILY
Qty: 30 TABLET | Refills: 3 | Status: SHIPPED | OUTPATIENT
Start: 2023-05-11

## 2023-05-11 RX ORDER — LEVOTHYROXINE SODIUM 0.03 MG/1
25 TABLET ORAL DAILY
Qty: 90 TABLET | Refills: 3 | Status: SHIPPED | OUTPATIENT
Start: 2023-05-11

## 2023-05-12 ENCOUNTER — REMOTE DEVICE CLINIC VISIT (OUTPATIENT)
Dept: CARDIOLOGY CLINIC | Facility: CLINIC | Age: 73
End: 2023-05-12

## 2023-05-12 DIAGNOSIS — Z95.810 PRESENCE OF IMPLANTABLE CARDIOVERTER-DEFIBRILLATOR (ICD): Primary | ICD-10-CM

## 2023-05-12 DIAGNOSIS — E11.22 TYPE 2 DIABETES MELLITUS WITH STAGE 3 CHRONIC KIDNEY DISEASE, WITHOUT LONG-TERM CURRENT USE OF INSULIN, UNSPECIFIED WHETHER STAGE 3A OR 3B CKD (HCC): Primary | ICD-10-CM

## 2023-05-12 DIAGNOSIS — N18.30 TYPE 2 DIABETES MELLITUS WITH STAGE 3 CHRONIC KIDNEY DISEASE, WITHOUT LONG-TERM CURRENT USE OF INSULIN, UNSPECIFIED WHETHER STAGE 3A OR 3B CKD (HCC): Primary | ICD-10-CM

## 2023-05-12 NOTE — PROGRESS NOTES
Results for orders placed or performed in visit on 05/12/23   Cardiac EP device report    Narrative    MDT/BIV-ICD/ NOT MRI CONDITIONAL  CARELINK TRANSMISSION: BATTERY VOLTAGE NEARING THIERNO (2 MTHS)  WILL SCHEDULE MONTHLY BATTERY CHECKS  AP 84 8% BVP 98 9% (VSRP <0 1%) ALL AVAILABLE LEAD PARAMETERS WITHIN NORMAL LIMITS  NO SIGNIFICANT HIGH RATE OR V SENSE EPISODES  OPTI-VOL WITHIN NORMAL LIMITS  NORMAL DEVICE FUNCTION   AM/RG

## 2023-06-06 ENCOUNTER — OFFICE VISIT (OUTPATIENT)
Dept: FAMILY MEDICINE CLINIC | Facility: HOSPITAL | Age: 73
End: 2023-06-06
Payer: MEDICARE

## 2023-06-06 VITALS
SYSTOLIC BLOOD PRESSURE: 128 MMHG | DIASTOLIC BLOOD PRESSURE: 83 MMHG | BODY MASS INDEX: 30.27 KG/M2 | TEMPERATURE: 98.1 F | WEIGHT: 154.2 LBS | HEART RATE: 62 BPM | HEIGHT: 60 IN | OXYGEN SATURATION: 99 %

## 2023-06-06 DIAGNOSIS — I10 ESSENTIAL HYPERTENSION: ICD-10-CM

## 2023-06-06 DIAGNOSIS — N18.30 TYPE 2 DIABETES MELLITUS WITH STAGE 3 CHRONIC KIDNEY DISEASE, WITHOUT LONG-TERM CURRENT USE OF INSULIN, UNSPECIFIED WHETHER STAGE 3A OR 3B CKD (HCC): ICD-10-CM

## 2023-06-06 DIAGNOSIS — Z95.810 BIVENTRICULAR ICD (IMPLANTABLE CARDIOVERTER-DEFIBRILLATOR) IN PLACE: ICD-10-CM

## 2023-06-06 DIAGNOSIS — E78.2 MIXED HYPERLIPIDEMIA: ICD-10-CM

## 2023-06-06 DIAGNOSIS — E28.39 MENOPAUSE OVARIAN FAILURE: ICD-10-CM

## 2023-06-06 DIAGNOSIS — F11.20 CONTINUOUS OPIOID DEPENDENCE (HCC): ICD-10-CM

## 2023-06-06 DIAGNOSIS — E66.09 CLASS 1 OBESITY DUE TO EXCESS CALORIES WITH SERIOUS COMORBIDITY AND BODY MASS INDEX (BMI) OF 31.0 TO 31.9 IN ADULT: ICD-10-CM

## 2023-06-06 DIAGNOSIS — I25.10 CORONARY ARTERY DISEASE INVOLVING NATIVE CORONARY ARTERY OF NATIVE HEART WITHOUT ANGINA PECTORIS: ICD-10-CM

## 2023-06-06 DIAGNOSIS — Z00.00 MEDICARE ANNUAL WELLNESS VISIT, SUBSEQUENT: Primary | ICD-10-CM

## 2023-06-06 DIAGNOSIS — E11.22 TYPE 2 DIABETES MELLITUS WITH STAGE 3 CHRONIC KIDNEY DISEASE, WITHOUT LONG-TERM CURRENT USE OF INSULIN, UNSPECIFIED WHETHER STAGE 3A OR 3B CKD (HCC): ICD-10-CM

## 2023-06-06 DIAGNOSIS — E03.9 ACQUIRED HYPOTHYROIDISM: ICD-10-CM

## 2023-06-06 PROBLEM — Z23 NEED FOR PNEUMOCOCCAL VACCINATION: Status: RESOLVED | Noted: 2018-11-30 | Resolved: 2023-06-06

## 2023-06-06 PROCEDURE — 99214 OFFICE O/P EST MOD 30 MIN: CPT | Performed by: FAMILY MEDICINE

## 2023-06-06 PROCEDURE — G0439 PPPS, SUBSEQ VISIT: HCPCS | Performed by: FAMILY MEDICINE

## 2023-06-06 RX ORDER — METOPROLOL SUCCINATE 100 MG/1
100 TABLET, EXTENDED RELEASE ORAL DAILY
Qty: 30 TABLET | Refills: 3 | Status: SHIPPED | OUTPATIENT
Start: 2023-06-06

## 2023-06-06 NOTE — PROGRESS NOTES
Assessment and Plan:     Problem List Items Addressed This Visit        Endocrine    Acquired hypothyroidism    Relevant Orders    TSH, 3rd generation with Free T4 reflex    Type 2 diabetes mellitus with stage 3 chronic kidney disease, without long-term current use of insulin ([facility])       Lab Results   Component Value Date    HGBA1C 7 0 (H) 05/01/2023            Relevant Orders    Comprehensive metabolic panel    HEMOGLOBIN A1C W/ EAG ESTIMATION       Cardiovascular and Mediastinum    Coronary artery disease     CAD asymptomatic, F/U with Cardiology         Essential hypertension     Excellent BP control            Other    Biventricular ICD (implantable cardioverter-defibrillator) in place    Mixed hyperlipidemia    Relevant Orders    Lipid panel    Class 1 obesity due to excess calories with serious comorbidity and body mass index (BMI) of 31 0 to 31 9 in adult    Medicare annual wellness visit, subsequent - Primary    Continuous opioid dependence ([facility] [address] )   Other Visit Diagnoses     Menopause ovarian failure        Relevant Orders    DXA bone density spine hip and pelvis           Preventive health issues were discussed with patient, and age appropriate screening tests were ordered as noted in patient's After Visit Summary  Personalized health advice and appropriate referrals for health education or preventive services given if needed, as noted in patient's After Visit Summary       History of Present Illness:     Patient presents for a Medicare Wellness Visit    AWV and multiple medical issues    No recent illness or injury    Weight loss from watching her intake and little sweets    Feels mood is better with Bupropion, wants to taper off of Prozac    Lives by herself, no contact with friends  Her son is nearest but they are not very emotionally close  Mother had Alzheimers in her 63's and she is worried about her own memory status  I did suggest volunteering or joining [address] [place] [address]     Patient Care Team:  Tariq Laboy Sandra Hayes MD as PCP - General  MD Jayden Hurley MD Clay Lady, DO Laretta Plunk, MD Edythe Binning, MD    Controlled Substance Review    [facility] [place]  reviewed: No red flags were identified; safe to proceed with prescription          Review of Systems:     Review of Systems   Constitutional: Negative for unexpected weight change  HENT: Negative  Respiratory: Negative  Cardiovascular: Negative  Gastrointestinal: Negative  Genitourinary: Positive for enuresis, frequency and urgency  Musculoskeletal: Positive for arthralgias, back pain, joint swelling and myalgias  Psychiatric/Behavioral: Positive for dysphoric mood  All other systems reviewed and are negative         Problem List:     Patient Active Problem List   Diagnosis   • Trigger little finger of left hand   • Allergic rhinitis   • Arthralgia of knee, left   • Arthralgia of knee, right   • Coronary artery disease   • Benign positional vertigo, left   • Bilateral fibrocystic breast changes   • Bilateral sacroiliitis (HCC)   • Chronic systolic congestive heart failure (HCC)   • Stage 3a chronic kidney disease (HCC)   • Chronic diastolic congestive heart failure (HCC)   • Biventricular ICD (implantable cardioverter-defibrillator) in place   • DDD (degenerative disc disease), cervical   • Depression   • Esophageal reflux   • Gout   • Mixed hyperlipidemia   • Essential hypertension   • Hypokalemia   • Acquired hypothyroidism   • Class 1 obesity due to excess calories with serious comorbidity and body mass index (BMI) of 31 0 to 31 9 in adult   • Obstructive sleep apnea   • Osteoarthritis   • Overactive bladder   • Psoriasis   • Type 2 diabetes mellitus ([facility] [address] )   • Type 2 diabetes mellitus with stage 3 chronic kidney disease, without long-term current use of insulin ([facility] [address] )   • Vitamin D deficiency   • Medicare annual wellness visit, subsequent   • Lumbar spondylosis   • Chronic left-sided low back pain without sciatica   • Hypercalcemia   • Leukocytosis   • Screening for colon cancer   • Dense breast tissue   • Family history of breast cancer   • Nausea and vomiting   • Gastroesophageal reflux disease with esophagitis   • Abnormal computerized axial tomography of abdomen   • Hyperparathyroidism ([place] [address] )   • Screening mammogram, encounter for   • PVC's (premature ventricular contractions)   • Chronic left shoulder pain   • Dilated cardiomyopathy ([place] [address] )   • Pulmonary emphysema, unspecified emphysema type ([place] [address] )   • Continuous opioid dependence ([place] [address] )      Past Medical and Surgical History:     Past Medical History:   Diagnosis Date   • Abnormal finding on breast imaging     last assessed 11/30/17   • Acute bacterial bronchitis    • Allergic rhinitis    • Arthralgia    • Arthritis    • Atherosclerotic heart disease of native coronary artery without angina pectoris    • BBB (bundle branch block)     left,last assesed 6/4/12   • BBB (bundle branch block)     left   • Benign paroxysmal vertigo     last assessed 9/7/12   • Benign paroxysmal vertigo of left ear    • Bilateral fibrocystic breast changes    • Bilateral sacroiliitis (HCC)    • Cardiac defibrillator in situ    • Carpal tunnel syndrome, unspecified upper limb    • CHF (congestive heart failure) (HCC)     chronic systolic/diastolic   • Chronic diastolic congestive heart failure (HCC)    • Chronic kidney disease     stage 3   • Chronic systolic congestive heart failure (HCC)    • Coronary artery disease    • Cough    • Depression    • Detrusor instability    • Diabetes mellitus ([place] [address] )    • Difficulty walking up stairs    • Dilated cardiomyopathy (HCC)    • Dilated cardiomyopathy (HCC)    • Disease of thyroid gland    • Disorder of intervertebral disc of cervical spine    • Esophageal reflux    • GERD (gastroesophageal reflux disease)    • Gout    • Hemorrhoids    • History of blood clots    • Hormone replacement therapy    • Hx of blood clots    • Hyperlipidemia    • Hypertension    • Hypokalemia    • Hypothyroidism    • Indigestion    • Inflamed toenail, left    • Ingrown nail    • Low back pain     left   • OAB (overactive bladder)     detrusor instability   • Obesity    • AZ (obstructive sleep apnea)    • Osteoarthritis    • Papilloma of left breast    • Psoriasis     psoriatic arthropathy   • Psoriatic arthropathy (HCC)    • 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   • RLS (restless legs syndrome)    • Sciatica    • Shortness of breath    • Sleep apnea     unspecified type   • Tendinitis    • Trigger thumb, left thumb    • Urinary frequency    • UTI (urinary tract infection)    • Vitamin D deficiency      Past Surgical History:   Procedure Laterality Date   • BLADDER SURGERY  1999    lift and repair   • BREAST BIOPSY Left 05/23/2016    US CORE BIOPSY-BENIGN   • CARDIAC CATHETERIZATION      outcome:  successful   • CARDIAC DEFIBRILLATOR PLACEMENT     • CARPAL TUNNEL RELEASE Bilateral 1997   • CATARACT EXTRACTION     • COLONOSCOPY     • CORONARY ANGIOPLASTY WITH STENT PLACEMENT     • CYSTOSCOPY W/ URETEROSCOPY  2009   • DE QUERVAIN'S RELEASE Left 2011    and trigger finger release   • EYE SURGERY Left 1999   • EYE SURGERY Left 11/13/2019    Cataract   • EYE SURGERY Right 11/09/2019    Cataract   • INSERT / REPLACE / REMOVE PACEMAKER     • JOINT REPLACEMENT Right 09/2017    Knee   • KNEE ARTHROSCOPY      therapeutic   • NEUROPLASTY / TRANSPOSITION MEDIAN NERVE AT CARPAL TUNNEL     • OOPHORECTOMY Bilateral     AGE 44   • MT TENDON SHEATH INCISION Left 3/2/2017    Procedure: SMALL TRIGGER FINGER RELEASE;  Surgeon: Amanuel James MD;  Location: [facility];  Service: Orthopedics   • RECTAL SURGERY  2006    repair of rectal ulcer   • SHOULDER ARTHROSCOPY Left 2006   • TOTAL ABDOMINAL HYSTERECTOMY      AGE 44   • TOTAL KNEE ARTHROPLASTY Left    • TOTAL SHOULDER REPLACEMENT Left 2007   • TRIGGER FINGER RELEASE Bilateral 2011    right haand 201,2015,left hand 2012,2015   • US GUIDED BREAST BIOPSY LEFT COMPLETE Left 2016      Family History:     Family History   Problem Relation Age of Onset   • Hypertension Mother    • Alzheimer's disease Mother    • Cancer Father 70        bladder   • Prostate cancer Father 70   • Cancer Brother         pt shared some type of blood cancer   • Breast cancer Paternal Grandmother         age dx unk   • Stomach cancer Paternal Aunt 72      Social History:     Social History     Socioeconomic History   • Marital status:      Spouse name: None   • Number of children: None   • Years of education: None   • Highest education level: None   Occupational History   • None   Tobacco Use   • Smoking status: Former     Packs/day: 1 00     Years: 15 00     Total pack years: 15 00     Types: Cigarettes     Quit date: 0     Years since quittin 4   • Smokeless tobacco: Never   Vaping Use   • Vaping Use: Never used   Substance and Sexual Activity   • Alcohol use: No   • Drug use: No   • Sexual activity: None     Comment: birth control   Other Topics Concern   • None   Social History Narrative    Always uses seat belt     Social Determinants of Health     Financial Resource Strain: Low Risk  (2023)    Overall Financial Resource Strain (CARDIA)    • Difficulty of Paying Living Expenses: Not very hard   Food Insecurity: Not on file   Transportation Needs: No Transportation Needs (2023)    PRAPARE - Transportation    • Lack of Transportation (Medical): No    • Lack of Transportation (Non-Medical): No   Physical Activity: Not on file   Stress: Not on file   Social Connections: Not on file   Intimate Partner Violence: Not on file   Housing Stability: Not on file      Medications and Allergies:     Current Outpatient Medications   Medication Sig Dispense Refill   • allopurinol (ZYLOPRIM) 100 mg tablet TAKE 1 TABLET BY MOUTH DAILY 90 tablet 3   • aspirin 81 MG tablet Take 81 mg by mouth daily       • buPROPion (WELLBUTRIN XL) 150 mg 24 hr tablet Take 1 tablet (150 mg total) by mouth every morning 30 tablet 5   • calcium carbonate (OS-MANINDER) 600 MG tablet Take 1,200 mg by mouth daily     • candesartan (ATACAND) 32 MG tablet TAKE 1 TABLET BY MOUTH DAILY 30 tablet 5   • celecoxib (CeleBREX) 200 mg capsule Take 1 capsule (200 mg total) by mouth 2 (two) times a day (Patient taking differently: Take 200 mg by mouth every other day) 180 capsule 3   • cetirizine (ZyrTEC) 10 mg tablet Take 10 mg by mouth daily     • Cholecalciferol (VITAMIN D3) 1000 units CAPS Take by mouth     • FLUoxetine (PROzac) 20 mg capsule TAKE 1 CAPSULE BY MOUTH EVERY DAY 30 capsule 3   • levothyroxine 25 mcg tablet Take 1 tablet (25 mcg total) by mouth daily 90 tablet 3   • Magnesium 250 MG TABS Take by mouth daily     • Minoxidil (ROGAINE WOMENS EX) Apply 1 mL topically  • montelukast (SINGULAIR) 10 mg tablet Take 1 tablet (10 mg total) by mouth daily at bedtime 90 tablet 0   • omeprazole (PriLOSEC) 40 MG capsule TAKE 1 CAPSULE BY MOUTH DAILY 60 capsule 3   • potassium chloride (K-DUR,KLOR-CON) 20 mEq tablet TAKE 1 TABLET BY MOUTH TWICE DAILY 60 tablet 3   • pyridoxine (VITAMIN B6) 100 mg tablet Take 100 mg by mouth daily  • simvastatin (ZOCOR) 40 mg tablet TAKE 1 TABLET BY MOUTH DAILY 30 tablet 3   • torsemide (DEMADEX) 20 mg tablet TAKE 1 TABLET BY MOUTH TWICE DAILY 60 tablet 5   • traMADol (ULTRAM) 50 mg tablet Take 1 tablet (50 mg total) by mouth every 8 (eight) hours as needed for moderate pain 90 tablet 1   • chlorhexidine (PERIDEX) 0 12 % solution SWISH AND EXPECTORATE WITH 15 ML (1 capful) FOR 30 seconds IN THE MORNING AND IN THE EVENING AFTER toothbrushing   (Patient not taking: Reported on 6/6/2023)     • Magnesium Oxide 250 MG TABS Take by mouth (Patient not taking: Reported on 6/6/2023)     • metoprolol succinate (TOPROL-XL) 100 mg 24 hr tablet Take 1 tablet (100 mg total) by mouth daily 30 tablet 3   • sitaGLIPtin (JANUVIA) 100 mg tablet Take 1 tablet (100 mg total) by mouth daily 90 tablet 3     No current facility-administered medications for this visit  Allergies   Allergen Reactions   • Dust Mite Extract Sneezing      Immunizations:     Immunization History   Administered Date(s) Administered   • COVID-19 PFIZER VACCINE 0 3 ML IM 03/08/2021, 03/30/2021, 10/12/2021, 04/15/2022   • COVID-19 Pfizer Vac BIVALENT [place]-sucrose 12 Yr+ IM (BOOSTER ONLY) 12/14/2022   • INFLUENZA 10/11/2022   • Influenza Split High Dose Preservative Free IM 09/24/2013, 10/16/2014, 11/03/2015, 10/05/2016, 10/13/2017   • Influenza, high dose seasonal 0 7 mL 09/20/2018, 10/01/2019, 10/07/2020, 09/23/2021   • Influenza, seasonal, injectable 09/07/2012   • Pneumococcal Conjugate 13-Valent 06/24/2015   • Pneumococcal Polysaccharide PPV23 10/01/2000, 10/01/2005, 11/26/2018   • Zoster Vaccine Recombinant 09/19/2019, 11/20/2019      Health Maintenance:         Topic Date Due   • DXA SCAN  02/24/2023   • Breast Cancer Screening: Mammogram  12/28/2023   • Hepatitis C Screening  Completed   • Colorectal Cancer Screening  Discontinued         Topic Date Due   • COVID-19 Vaccine (6 - Pfizer series) 04/14/2023      Medicare Screening Tests and Risk Assessments:     Iglesia Carrillo is here for her Subsequent Wellness visit  Last Medicare Wellness visit information reviewed, patient interviewed and updates made to the record today  Health Risk Assessment:   Patient rates overall health as fair  Patient feels that their physical health rating is slightly worse  Patient is dissatisfied with their life  Eyesight was rated as same  Hearing was rated as same  Patient feels that their emotional and mental health rating is same  Patients states they are never, rarely angry  Patient states they are sometimes unusually tired/fatigued  Pain experienced in the last 7 days has been none  Patient states that she has experienced no weight loss or gain in last 6 months  Depression Screening:   PHQ-9 Score: 3      Fall Risk Screening:    In the past year, patient has experienced: no history of falling in past year      Urinary Incontinence Screening:   Patient has not leaked urine accidently in the last six months  Home Safety:  Patient does not have trouble with stairs inside or outside of their home  Patient has working smoke alarms and has working carbon monoxide detector  Home safety hazards include: none  Nutrition:   Current diet is Regular  Medications:   Patient is not currently taking any over-the-counter supplements  Patient is able to manage medications  Activities of Daily Living (ADLs)/Instrumental Activities of Daily Living (IADLs):   Walk and transfer into and out of bed and chair?: Yes  Dress and groom yourself?: Yes    Bathe or shower yourself?: Yes    Feed yourself? Yes  Do your laundry/housekeeping?: Yes  Manage your money, pay your bills and track your expenses?: Yes  Make your own meals?: Yes    Do your own shopping?: Yes    Previous Hospitalizations:   Any hospitalizations or ED visits within the last 12 months?: No      Advance Care Planning:   Living will: Yes    Durable POA for healthcare:  Yes    Advanced directive: Yes    Advanced directive counseling given: Yes    Five wishes given: Yes    Patient declined ACP directive: No    End of Life Decisions reviewed with patient: Yes    Provider agrees with end of life decisions: Yes      Cognitive Screening:   Provider or family/friend/caregiver concerned regarding cognition?: No    PREVENTIVE SCREENINGS      Cardiovascular Screening:    General: Screening Not Indicated and History Lipid Disorder      Diabetes Screening:     General: Screening Not Indicated and History Diabetes      Colorectal Cancer Screening:     General: Screening Current      Breast Cancer Screening:     General: Screening Current      Cervical Cancer Screening:    General: Screening Not Indicated      Osteoporosis Screening:    General: Risks and Benefits Discussed    Due for: DXA Axial      Abdominal Aortic Aneurysm (AAA) Screening:        General: Screening Not Indicated      Lung Cancer Screening:     General: Screening Not Indicated      Hepatitis C Screening:    General: Screening Current    Screening, Brief Intervention, and Referral to Treatment (SBIRT)    Screening  Typical number of drinks in a day: 0  Typical number of drinks in a week: 0  Interpretation: Low risk drinking behavior  Single Item Drug Screening:  How often have you used an illegal drug (including marijuana) or a prescription medication for non-medical reasons in the past year? never    Single Item Drug Screen Score: 0  Interpretation: Negative screen for possible drug use disorder    Review of Current Opioid Use  Opioid Risk Tool (ORT) Score: 0  Opioid Risk Tool (ORT) Interpretation: Score 0-3: Low risk for opioid misuse    Vision Screening    Right eye Left eye Both eyes   Without correction 20/30 20/40 20/30   With correction           Physical Exam:     /83 (BP Location: Left arm, Patient Position: Sitting, Cuff Size: Large)   Pulse 62   Temp 98 1 °F (36 7 °C) (Tympanic)   Ht 5' (1 524 m)   Wt 69 9 kg (154 lb 3 2 oz)   SpO2 99%   BMI 30 12 kg/m²     Physical Exam  Vitals and nursing note reviewed  Constitutional:       Appearance: Normal appearance  Neck:      Vascular: No carotid bruit  Cardiovascular:      Rate and Rhythm: Regular rhythm  Bradycardia present  Heart sounds: Normal heart sounds  Pulmonary:      Effort: Pulmonary effort is normal       Breath sounds: Normal breath sounds  Musculoskeletal:      Right lower leg: No edema  Left lower leg: No edema  Neurological:      Mental Status: She is alert and oriented to person, place, and time     Psychiatric:         Mood and Affect: Mood normal           Dash Caceres MD

## 2023-06-06 NOTE — PATIENT INSTRUCTIONS
Medicare Preventive Visit Patient Instructions  Thank you for completing your Welcome to Medicare Visit or Medicare Annual Wellness Visit today  Your next wellness visit will be due in one year (6/6/2024)  The screening/preventive services that you may require over the next 5-10 years are detailed below  Some tests may not apply to you based off risk factors and/or age  Screening tests ordered at today's visit but not completed yet may show as past due  Also, please note that scanned in results may not display below  Preventive Screenings:  Service Recommendations Previous Testing/Comments   Colorectal Cancer Screening  * Colonoscopy    * Fecal Occult Blood Test (FOBT)/Fecal Immunochemical Test (FIT)  * Fecal DNA/Cologuard Test  * Flexible Sigmoidoscopy Age: 39-70 years old   Colonoscopy: every 10 years (may be performed more frequently if at higher risk)  OR  FOBT/FIT: every 1 year  OR  Cologuard: every 3 years  OR  Sigmoidoscopy: every 5 years  Screening may be recommended earlier than age 39 if at higher risk for colorectal cancer  Also, an individualized decision between you and your healthcare provider will decide whether screening between the ages of 74-80 would be appropriate  Colonoscopy: 03/02/2022  FOBT/FIT: Not on file  Cologuard: Not on file  Sigmoidoscopy: Not on file    Screening Current     Breast Cancer Screening Age: 36 years old  Frequency: every 1-2 years  Not required if history of left and right mastectomy Mammogram: 12/28/2022    Screening Current   Cervical Cancer Screening Between the ages of 21-29, pap smear recommended once every 3 years  Between the ages of 33-67, can perform pap smear with HPV co-testing every 5 years     Recommendations may differ for women with a history of total hysterectomy, cervical cancer, or abnormal pap smears in past  Pap Smear: Not on file    Screening Not Indicated   Hepatitis C Screening Once for adults born between 1945 and 1965  More frequently in patients at high risk for Hepatitis C Hep C Antibody: 06/05/2020    Screening Current   Diabetes Screening 1-2 times per year if you're at risk for diabetes or have pre-diabetes Fasting glucose: 129 mg/dL (5/1/2023)  A1C: 7 0 % (5/1/2023)  Screening Not Indicated  History Diabetes   Cholesterol Screening Once every 5 years if you don't have a lipid disorder  May order more often based on risk factors  Lipid panel: 05/01/2023    Screening Not Indicated  History Lipid Disorder     Other Preventive Screenings Covered by Medicare:  1  Abdominal Aortic Aneurysm (AAA) Screening: covered once if your at risk  You're considered to be at risk if you have a family history of AAA  2  Lung Cancer Screening: covers low dose CT scan once per year if you meet all of the following conditions: (1) Age 50-69; (2) No signs or symptoms of lung cancer; (3) Current smoker or have quit smoking within the last 15 years; (4) You have a tobacco smoking history of at least 20 pack years (packs per day multiplied by number of years you smoked); (5) You get a written order from a healthcare provider  3  Glaucoma Screening: covered annually if you're considered high risk: (1) You have diabetes OR (2) Family history of glaucoma OR (3)  aged 48 and older OR (3)  American aged 72 and older  3  Osteoporosis Screening: covered every 2 years if you meet one of the following conditions: (1) You're estrogen deficient and at risk for osteoporosis based off medical history and other findings; (2) Have a vertebral abnormality; (3) On glucocorticoid therapy for more than 3 months; (4) Have primary hyperparathyroidism; (5) On osteoporosis medications and need to assess response to drug therapy  · Last bone density test (DXA Scan): 02/24/2021   5  HIV Screening: covered annually if you're between the age of 15-65  Also covered annually if you are younger than 13 and older than 72 with risk factors for HIV infection   For pregnant patients, it is covered up to 3 times per pregnancy  Immunizations:  Immunization Recommendations   Influenza Vaccine Annual influenza vaccination during flu season is recommended for all persons aged >= 6 months who do not have contraindications   Pneumococcal Vaccine   * Pneumococcal conjugate vaccine = PCV13 (Prevnar 13), PCV15 (Vaxneuvance), PCV20 (Prevnar 20)  * Pneumococcal polysaccharide vaccine = PPSV23 (Pneumovax) Adults 25-60 years old: 1-3 doses may be recommended based on certain risk factors  Adults 72 years old: 1-2 doses may be recommended based off what pneumonia vaccine you previously received   Hepatitis B Vaccine 3 dose series if at intermediate or high risk (ex: diabetes, end stage renal disease, liver disease)   Tetanus (Td) Vaccine - COST NOT COVERED BY MEDICARE PART B Following completion of primary series, a booster dose should be given every 10 years to maintain immunity against tetanus  Td may also be given as tetanus wound prophylaxis  Tdap Vaccine - COST NOT COVERED BY MEDICARE PART B Recommended at least once for all adults  For pregnant patients, recommended with each pregnancy  Shingles Vaccine (Shingrix) - COST NOT COVERED BY MEDICARE PART B  2 shot series recommended in those aged 48 and above     Health Maintenance Due:      Topic Date Due   • DXA SCAN  02/24/2023   • Breast Cancer Screening: Mammogram  12/28/2023   • Hepatitis C Screening  Completed   • Colorectal Cancer Screening  Discontinued     Immunizations Due:      Topic Date Due   • COVID-19 Vaccine (6 - Pfizer series) 04/14/2023     Advance Directives   What are advance directives? Advance directives are legal documents that state your wishes and plans for medical care  These plans are made ahead of time in case you lose your ability to make decisions for yourself  Advance directives can apply to any medical decision, such as the treatments you want, and if you want to donate organs     What are the types of advance directives? There are many types of advance directives, and each state has rules about how to use them  You may choose a combination of any of the following:  · Living will: This is a written record of the treatment you want  You can also choose which treatments you do not want, which to limit, and which to stop at a certain time  This includes surgery, medicine, IV fluid, and tube feedings  · Durable power of  for healthcare [facility]): This is a written record that states who you want to make healthcare choices for you when you are unable to make them for yourself  This person, called a proxy, is usually a family member or a friend  You may choose more than 1 proxy  · Do not resuscitate (DNR) order:  A DNR order is used in case your heart stops beating or you stop breathing  It is a request not to have certain forms of treatment, such as CPR  A DNR order may be included in other types of advance directives  · Medical directive: This covers the care that you want if you are in a coma, near death, or unable to make decisions for yourself  You can list the treatments you want for each condition  Treatment may include pain medicine, surgery, blood transfusions, dialysis, IV or tube feedings, and a ventilator (breathing machine)  · Values history: This document has questions about your views, beliefs, and how you feel and think about life  This information can help others choose the care that you would choose  Why are advance directives important? An advance directive helps you control your care  Although spoken wishes may be used, it is better to have your wishes written down  Spoken wishes can be misunderstood, or not followed  Treatments may be given even if you do not want them  An advance directive may make it easier for your family to make difficult choices about your care     Weight Management   Why it is important to manage your weight:  Being overweight increases your risk of health conditions such as heart disease, high blood pressure, type 2 diabetes, and certain types of cancer  It can also increase your risk for osteoarthritis, sleep apnea, and other respiratory problems  Aim for a slow, steady weight loss  Even a small amount of weight loss can lower your risk of health problems  How to lose weight safely:  A safe and healthy way to lose weight is to eat fewer calories and get regular exercise  You can lose up about 1 pound a week by decreasing the number of calories you eat by 500 calories each day  Healthy meal plan for weight management:  A healthy meal plan includes a variety of foods, contains fewer calories, and helps you stay healthy  A healthy meal plan includes the following:  · Eat whole-grain foods more often  A healthy meal plan should contain fiber  Fiber is the part of grains, fruits, and vegetables that is not broken down by your body  Whole-grain foods are healthy and provide extra fiber in your diet  Some examples of whole-grain foods are whole-wheat breads and pastas, oatmeal, brown rice, and bulgur  · Eat a variety of vegetables every day  Include dark, leafy greens such as spinach, kale, taj greens, and mustard greens  Eat yellow and orange vegetables such as carrots, sweet potatoes, and winter squash  · Eat a variety of fruits every day  Choose fresh or canned fruit (canned in its own juice or light syrup) instead of juice  Fruit juice has very little or no fiber  · Eat low-fat dairy foods  Drink fat-free (skim) milk or 1% milk  Eat fat-free yogurt and low-fat cottage cheese  Try low-fat cheeses such as mozzarella and other reduced-fat cheeses  · Choose meat and other protein foods that are low in fat  Choose beans or other legumes such as split peas or lentils  Choose fish, skinless poultry (chicken or turkey), or lean cuts of red meat (beef or pork)  Before you cook meat or poultry, cut off any visible fat  · Use less fat and oil    Try baking foods instead of frying them  Add less fat, such as margarine, sour cream, regular salad dressing and mayonnaise to foods  Eat fewer high-fat foods  Some examples of high-fat foods include french fries, doughnuts, ice cream, and cakes  · Eat fewer sweets  Limit foods and drinks that are high in sugar  This includes candy, cookies, regular soda, and sweetened drinks  Exercise:  Exercise at least 30 minutes per day on most days of the week  Some examples of exercise include walking, biking, dancing, and swimming  You can also fit in more physical activity by taking the stairs instead of the elevator or parking farther away from stores  Ask your healthcare provider about the best exercise plan for you  Narcotic (Opioid) Safety    Use narcotics safely:  · Take prescribed narcotics exactly as directed  · Do not give narcotics to others or take narcotics that belong to someone else  · Do not mix narcotics without medicines or alcohol  · Do not drive or operate heavy machinery after you take the narcotic  · Monitor for side effects and notify your healthcare provider if you experienced side effects such as nausea, sleepiness, itching, or trouble thinking clearly  Manage constipation:    Constipation is the most common side effect of narcotic medicine  Constipation is when you have hard, dry bowel movements, or you go longer than usual between bowel movements  Tell your healthcare provider about all changes in your bowel movements while you are taking narcotics  He or she may recommend laxative medicine to help you have a bowel movement  He or she may also change the kind of narcotic you are taking, or change when you take it  The following are more ways you can prevent or relieve constipation:    · Drink liquids as directed  You may need to drink extra liquids to help soften and move your bowels  Ask how much liquid to drink each day and which liquids are best for you  · Eat high-fiber foods    This may help decrease constipation by adding bulk to your bowel movements  High-fiber foods include fruits, vegetables, whole-grain breads and cereals, and beans  Your healthcare provider or dietitian can help you create a high-fiber meal plan  Your provider may also recommend a fiber supplement if you cannot get enough fiber from food  · Exercise regularly  Regular physical activity can help stimulate your intestines  Walking is a good exercise to prevent or relieve constipation  Ask which exercises are best for you  · Schedule a time each day to have a bowel movement  This may help train your body to have regular bowel movements  Bend forward while you are on the toilet to help move the bowel movement out  Sit on the toilet for at least 10 minutes, even if you do not have a bowel movement  Store narcotics safely:   · Store narcotics where others cannot easily get them  Keep them in a locked cabinet or secure area  Do not  keep them in a purse or other bag you carry with you  A person may be looking for something else and find the narcotics  · Make sure narcotics are stored out of the reach of children  A child can easily overdose on narcotics  Narcotics may look like candy to a small child  The best way to dispose of narcotics: The laws vary by country and area  In the [place], the best way is to return the narcotics through a take-back program  This program is offered by the [employer]  The following are options for using the program:  · Take the narcotics to a MIGUEL collection site  The site is often a law enforcement center  Call your local law enforcement center for scheduled take-back days in your area  You will be given information on where to go if the collection site is in a different location  · Take the narcotics to an approved pharmacy or hospital   A pharmacy or hospital may be set up as a collection site   You will need to ask if it is a MIGUEL collection site if you were not directed there  A pharmacy or doctor's office may not be able to take back narcotics unless it is a MIGUEL site  · Use a mail-back system  This means you are given containers to put the narcotics into  You will then mail them in the containers  · Use a take-back drop box  This is a place to leave the narcotics at any time  People and animals will not be able to get into the box  Your local law enforcement agency can tell you where to find a drop box in your area  Other ways to manage pain:   · Ask your healthcare provider about non-narcotic medicines to control pain  Nonprescription medicines include NSAIDs (such as ibuprofen) and acetaminophen  Prescription medicines include muscle relaxers, antidepressants, and steroids  · Pain may be managed without any medicines  Some ways to relieve pain include massage, aromatherapy, or meditation  Physical or occupational therapy may also help  For more information:   · Drug Enforcement Administration  [address] [place] [address] [address]  Phone: 4- 888 - 809-0843  Web Address: [facility] gov/drug_disposal/    · [address]  and Drug Administration  [facility] , [address] [place] [address]  Phone: 8- 669 - [phone]  Web Address: http://[employer]/     © Copyright [employer] 2018 Information is for End User's use only and may not be sold, redistributed or otherwise used for commercial purposes  All illustrations and images included in CareNotes® are the copyrighted property of A D A M , Inc  or [facility] [place]     Basic Carbohydrate Counting   AMBULATORY CARE:   Carbohydrate counting  is a way to plan your meals by counting the amount of carbohydrate in foods  Carbohydrates are the sugars, starches, and fiber found in fruit, grains, vegetables, and milk products  Carbohydrates increase your blood sugar levels   Carbohydrate counting can help you eat the right amount of carbohydrate to keep your blood sugar levels under control  What you need to know about planning meals using carbohydrate counting:  • A dietitian or healthcare provider will help you develop a healthy meal plan that works best for you  You will be taught how much carbohydrate to eat or drink for each meal and snack  Your meal plan will be based on your age, weight, usual food intake, and physical activity level  If you have diabetes, it will also include your blood sugar levels and diabetes medicine  Once you know how much carbohydrate you should eat, you can decide what type of food you want to eat  • You will need to know what foods contain carbohydrate and how much they contain  Keep track of the amount of carbohydrate in meals and snacks in order to follow your meal plan  Do not avoid carbohydrates or skip meals  Your blood sugar may fall too low if you do not eat enough carbohydrate or you skip meals  Foods that contain carbohydrate:   • Breads:  Each serving of food listed below contains about 15 g of carbohydrate   ? 1 slice of bread (1 ounce) or 1 flour or corn tortilla (6 inch)    ? ½ of a hamburger bun or ¼ of a large bagel (about 1 ounce)    ? 1 pancake (about 4 inches across and ¼ inch thick)    • Cereals and grains:  Serving sizes of ready-to-eat cereals vary  Look at the serving size and the total carbohydrate amount listed on the food label  Each serving of food listed below contains about 15 g of carbohydrate   ? ¾ cup of dry, unsweetened, ready-to-eat cereal or ¼ cup of low-fat granola     ? ½ cup of oatmeal or other cooked cereal     ? ? cup of cooked rice or pasta    • Starchy vegetables and beans:  Each serving of food listed below contains about 15 g of carbohydrate   ? ½ cup of corn, green peas, sweet potatoes, or mashed potatoes    ? ¼ of a large baked potato    ?  ½ cup of beans, lentils, and peas (garbanzo, ohara, kidney, white, split, black-eyed)    • Crackers and snacks:  Each serving of food listed below contains about 15 g of carbohydrate   ? 3 raul cracker squares or 8 animal crackers     ? 6 saltine-type crackers    ? 3 cups of popcorn or ¾ ounce of pretzels, potato chips, or tortilla chips    • Fruit:  Each serving of food listed below contains about 15 g of carbohydrate   ? 1 small (4 ounce) piece of fresh fruit or ¾ to 1 cup of fresh fruit    ? ½ cup of canned or frozen fruit, packed in natural juice    ? ½ cup (4 ounces) of unsweetened fruit juice    ? 2 tablespoons of dried fruit    • Desserts or sugary foods:  Each serving of food listed below contains about 15 g of carbohydrate   ? 2-inch square unfrosted cake or brownie     ? 2 small cookies    ? ½ cup of ice cream, frozen yogurt, or nondairy frozen yogurt    ? ¼ cup of sherbet or sorbet    ? 1 tablespoon of regular syrup, jam, or jelly    ? 2 tablespoons of light syrup    • Milk and yogurt:  Foods from the milk group contain about 12 g of carbohydrate per serving  ? 1 cup of fat-free or low-fat milk    ? 1 cup of soy milk    ? ? cup of fat-free, yogurt sweetened with artificial sweetener    • Non-starchy vegetables:  Each serving contains about 5 g of carbohydrate   Three servings of non-starch vegetables count as 1 carbohydrate serving  ? ½ cup of cooked vegetables or 1 cup of raw vegetables  This includes beets, broccoli, cabbage, cauliflower, cucumber, mushrooms, tomatoes, and zucchini    ? ½ cup of vegetable juice    How to use carbohydrate counting to plan meals:   • Count carbohydrate amounts using serving sizes:      ? Pasta dinner example: You plan to have pasta, tossed salad, and an 8-ounce glass of milk  Your healthcare provider tells you that you may have 4 carbohydrate servings for dinner  One carbohydrate serving of pasta is ? cup  One cup of pasta will equal 3 carbohydrate servings  An 8-ounce glass of milk will count as 1 carbohydrate serving  These amounts of food would equal 4 carbohydrate servings   One cup of tossed salad does not count toward your carbohydrate servings  • Count carbohydrate amounts using food labels:  Find the total amount of carbohydrate in a packaged food by reading the food label  Food labels tell you the serving size of the food and the total carbohydrate amount in each serving  Find the serving size on the food label and then decide how many servings you will eat  Multiply the number of servings you plan to eat by the carbohydrate amount per serving  ? Granola bar snack example: Your meal plan allows you to have 2 carbohydrate servings (30 grams) of carbohydrate for a snack  You plan to eat 1 package of granola bars, which contains 2 bars  According to the food label, the serving size of food in this package is 1 bar  Each serving (1 bar) contains 25 grams of carbohydrate  The total amount of carbohydrate in this package of granola bars would be 50 g  Based on your meal plan, you should eat only 1 bar  Follow up with your doctor as directed:  Write down your questions so you remember to ask them during your visits  © Copyright Dar[employer] 2022 Information is for End User's use only and may not be sold, redistributed or otherwise used for commercial purposes  The above information is an  only  It is not intended as medical advice for individual conditions or treatments  Talk to your doctor, nurse or pharmacist before following any medical regimen to see if it is safe and effective for you

## 2023-06-07 ENCOUNTER — TELEPHONE (OUTPATIENT)
Dept: ADMINISTRATIVE | Facility: OTHER | Age: 73
End: 2023-06-07

## 2023-06-07 NOTE — TELEPHONE ENCOUNTER
----- Message from Sandi Monreal sent at 6/6/2023  2:27 PM EDT -----  Regarding: DM foot: [facility]  06/06/23 2:28 PM    Hello, our patient Shabana Medina has had Diabetic Foot Exam completed/performed  Please assist in updating the patient chart by making an External outreach to Dr Pooja Reyes facility located in [facility]  The date of service is past 6 weeks      Thank you,  Sandi Monreal  [facility] [address]

## 2023-06-07 NOTE — LETTER
Diabetic Foot Exam Form    Date Requested: 23  Patient: Joselin Elliott  Patient : [id number]   Referring Provider: Hi Miller MD    Diabetic Foot Exam Performed with shoes and socks removed        Yes         No     Date of Diabetic Foot Exam ______________________________  Risk Score ____________________________________________    Left Foot       Visual Inspection         Monofilament Testing Sensory Exam        Pedal Pulses         Additional Comments         Right Foot      Visual Inspection         Monofilament Testing Sensory Exam       Pedal Pulses         Additional Comments         Comments __________________________________________________________    Practice Providing Exam ______________________________________________    Exam Performed By (print name) _______________________________________      Provider Signature ___________________________________________________      These reports are needed for  compliance  Please fax this completed form and a copy of the Diabetic Foot Exam report to our office located at [place] [address] as soon as possible via Fax [phone] attention Reginia Fly: Phone [phone]    We thank you for your assistance in treating our mutual patient      2

## 2023-06-07 NOTE — TELEPHONE ENCOUNTER
Upon review of the In Basket request and the patient's chart, initial outreach has been made via fax to facility  Please see Contacts section for details       Thank you  Georgie Macias

## 2023-06-08 NOTE — TELEPHONE ENCOUNTER
Upon review of the In Basket request we were able to locate, review, and update the patient chart as requested for Diabetic Foot Exam     Any additional questions or concerns should be emailed to the Practice Liaisons via the appropriate education email address, please do not reply via In Basket      Thank you  Gary Kendrick

## 2023-06-12 ENCOUNTER — HOSPITAL ENCOUNTER (OUTPATIENT)
Dept: BONE DENSITY | Facility: CLINIC | Age: 73
Discharge: HOME | End: 2023-06-12
Payer: MEDICARE

## 2023-06-12 VITALS — BODY MASS INDEX: 32.32 KG/M2 | WEIGHT: 154 LBS | HEIGHT: 58 IN

## 2023-06-12 DIAGNOSIS — E28.39 MENOPAUSE OVARIAN FAILURE: ICD-10-CM

## 2023-06-12 PROCEDURE — 77080 DXA BONE DENSITY AXIAL: CPT

## 2023-06-21 ENCOUNTER — REMOTE DEVICE CLINIC VISIT (OUTPATIENT)
Dept: CARDIOLOGY CLINIC | Facility: CLINIC | Age: 73
End: 2023-06-21

## 2023-06-21 DIAGNOSIS — Z95.810 AICD (AUTOMATIC CARDIOVERTER/DEFIBRILLATOR) PRESENT: Primary | ICD-10-CM

## 2023-06-21 PROCEDURE — RECHECK: Performed by: INTERNAL MEDICINE

## 2023-06-21 NOTE — PROGRESS NOTES
Results for orders placed or performed in visit on 06/21/23   Cardiac EP device report    Narrative    MDT/BIV-ICD/ NOT MRI CONDITIONAL  CARELINK TRANSMISSION TO CHECK BATTERY STATUS- NB: BATTERY VOLTAGE NEARING THIERNO (1 MO)  WILL SCHEDULE MONTHLY BATTERY CHECKS  AP-88%, BVP>99% (VSR PACE<0 1%)  ALL AVAILABLE LEAD PARAMETERS WITHIN NORMAL LIMITS  NO SIGNIFICANT HIGH RATE EPISODES  OPTI-VOL WITHIN NORMAL LIMITS  NORMAL DEVICE FUNCTION   GV

## 2023-06-25 DIAGNOSIS — R60.9 EDEMA, UNSPECIFIED TYPE: ICD-10-CM

## 2023-06-25 RX ORDER — TORSEMIDE 20 MG/1
TABLET ORAL
Qty: 60 TABLET | Refills: 5 | Status: SHIPPED | OUTPATIENT
Start: 2023-06-25

## 2023-06-29 ENCOUNTER — TELEPHONE (OUTPATIENT)
Dept: CARDIOLOGY CLINIC | Facility: CLINIC | Age: 73
End: 2023-06-29

## 2023-06-29 DIAGNOSIS — I49.3 PVC'S (PREMATURE VENTRICULAR CONTRACTIONS): Primary | ICD-10-CM

## 2023-06-29 DIAGNOSIS — Z95.810 BIVENTRICULAR ICD (IMPLANTABLE CARDIOVERTER-DEFIBRILLATOR) IN PLACE: ICD-10-CM

## 2023-06-29 NOTE — TELEPHONE ENCOUNTER
Spoke to pt  And got her scheduled for her H&P on 7/19 @ 9am with Susana Zuluaga      ----- Message from Shayy Luong sent at 6/29/2023  9:40 AM EDT -----  Pt device RRT 6/29/23; not dependent; pt aware; needs H&P & interro

## 2023-06-30 ENCOUNTER — PREP FOR PROCEDURE (OUTPATIENT)
Dept: CARDIOLOGY CLINIC | Facility: CLINIC | Age: 73
End: 2023-06-30

## 2023-06-30 DIAGNOSIS — I49.3 PVC'S (PREMATURE VENTRICULAR CONTRACTIONS): Primary | ICD-10-CM

## 2023-06-30 NOTE — TELEPHONE ENCOUNTER
Patient scheduled for BIV ICD gen change at [facility] on 8/22/2023      with Dr Sourav Mcnulty  Patient aware of general instructions, mail out with labs order  Meds holds: Januvia to hold the morning of the procedure  Torsemide to hold the morning of the procedure  Patient cover under medicare

## 2023-07-11 DIAGNOSIS — E87.6 HYPOKALEMIA: ICD-10-CM

## 2023-07-11 RX ORDER — POTASSIUM CHLORIDE 20 MEQ/1
20 TABLET, EXTENDED RELEASE ORAL 2 TIMES DAILY
Qty: 60 TABLET | Refills: 3 | Status: SHIPPED | OUTPATIENT
Start: 2023-07-11

## 2023-07-16 NOTE — PROGRESS NOTES
EPS Consultation/New Patient Evaluation   Heart & Vascular [address] [facility] [place], Paresh CULLEN    Name: Bhupendra Sims  : 1950  MRN: [id number]    ASSESSMENT:  1. PVC's (premature ventricular contractions)        2. Esophageal reflux        3. Type 2 diabetes mellitus ([address])        4. Obstructive sleep apnea        5. Coronary artery disease involving native coronary artery of native heart without angina pectoris        6. Chronic systolic congestive heart failure ([address])        7. Essential hypertension        8. Dilated cardiomyopathy ([address])            PLAN:  1. Medtronic BiV ICD, reached RRT 2023  -- dual chamber device initially implanted 3/5/2005  -- LV lead added 2005  -- RV lead fracture with inappropriate ICD shocks  -- RV lead replaced  (unclear if extraction or abandoned lead)  -- s/p generator change 2017 by Dr. Praveena Gray  -- not pacemaker dependent (underlying sinus rhythm)  -- LV lead threshold chronically elevated (2.75 V @ 1.0 ms)  -- BiV paced 98.7%  -- plan for generator change to pacemaker only per patient's wishes    2. 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 HFpEF  4. LBBB  5. HTN  6. HLD  7. CAD s/p PCI to D1 and RCA    Elizabeth presents for evaluation of her ICD and upcoming generator change. Her device reached RRT on 2023. Device interrogation today reveals normal device function with stable lead parameters. Her LV threshold is chronically elevated at 2.75 V @ 1.0 ms. She is atrially paced 89.7% and V paced 98.7%. Her underlying rhythm is sinus rhythm with intact conduction. She no longer wishes to have defibrillator capabilities with her device and prefers to have a pacemaker only. Her reasoning is that she is getting older, she has more health problems, and she lives alone, and I support her in this.  She also has a history of inappropriate shocks due to RV lead fracture and does not want to experience this again. She understands that if she were to have a life-threatening arrhythmia, a pacemaker would not shock her and she would be at risk of death. She wishes to pass naturally. She had not had any recent arrhythmia episodes and no shocks since the lead fracture. Therefore, we will proceed with generator change of a pacemaker only. She has a DF 1-lead. This can be changed to a pacemaker only although she would lose MR capability. It is unclear if she has an abandoned lead in which case the device would not be MR conditional anyway. I have asked her to get an updated chest x-ray. If a defibrillator generator is placed, we can program off tachy therapies. She understands the plan and is in agreement. She will obtain an updated chest x-ray. She will present for generator change on 8/22/2023 with Dr. Lianna Unger. She will hold torsemide and Januvia the morning of the procedure. She will follow up with the device clinic 2 weeks after the procedure for wound check and device interrogation. She will call our office with any questions in the meantime. CC/HPI:   Nils Pressley is a 74year old female with a history of dilated cardiomyopathy (combined ischemic and nonischemic), EF previously 30% now 45-50%, CAD s/p PCI to D1 and RCA, LBBB, and Medtronic BiV ICD that reached RRT in June who presents for evaluation. Her device was initially implanted on 3/5/2005. LV lead was added on 9/6/2005. She has a history of RV lead fracture resulting in multiple inappropriate shocks. RV lead was replaced in 2010. She also has a history of phantom shocks. She underwent generator change on 5/23/2017 by Dr. Maryam Leung with no complications. Her EF improved from 30% to 45%. Most recent echocardiogram in 2020 documented an LVEF of 45-50%. From a heart failure standpoint, she has remained stable. She has chronic TEE. OptiVol level has remained within normal limits. She is on torsemide, Toprol XL and candesartan. Device interrogations have shown normal device function. Her device reached RRT on 6/29/2023. She is A paced 88% of the time and BiV paced 100% of the time. Her LV threshold is chronically elevated at 3.0 V @ 1.0 ms. Her underlying rhythm is sinus rhythm with intact conduction. Today, she is feeling well. She would like to discuss generator change for her device. She does not wish to move forward with a defibrillator. She would only like to have a pacemaker in place. She states that she is getting older and has more health problems and lives alone. She has been shocked inappropriately in the past and does not want this to happen again. She does not wish to be shocked for a life-threatening with rhythm and understands that this could result in death. Otherwise, she has been feeling well. She has not had any ICD shocks since the inappropriate shocks years ago. She denies chest pain, shortness of breath, lightheadedness, dizziness, syncope, orthopnea, or PND. She denies any problems at the implant site.     EKG: AV paced rhythm HR 62 bpm,  ms, QTc 489 ms    ECHO 6/30/2020: EF 45-50%    Device Interrogation today:  Medtronic BiV ICD  Implanted March 2005, LV lead 9/2005, RV lead replaced 2010 due to fracture and inappropriate shocks, gen change 5/23/2017  Presenting Rhythm: Ap-BiVp  Underlying Rhythm: sinus rhythm in 50s with intact conduction    Mode: DDDR  bpm  VF=>214-->ATP during charge, 35 J x 6  HS=831-->burst(3), 35J x 5  AP 89.7%,  98.7%  Battery: RRT 6/29/2023    Atrial Lead: P waves 2.0 mV, Imp 399 ohms, Threshold 0.5 V @ 0.4 ms  Ventricular Lead: R waves 11.5 mV, Imp 627 ohms, Threshold 0.75 V @ 0.4 ms  Left Ventricular Lead: Imp 380 ohms, Threshold 2.75 V @ 1.0 ms  Shock Impedance: 50/64 ohms  Lead parameters stable. Events: no arrhythmia episodes    No permanent changes made.       Review of Systems   Constitutional: Negative for fatigue. HENT: Negative. Eyes: Negative. Respiratory: Negative for cough, shortness of breath and wheezing. Cardiovascular: Negative for chest pain and palpitations. Gastrointestinal: Negative for abdominal pain, nausea and vomiting. Endocrine: Negative. Genitourinary: Negative. Musculoskeletal: Negative. Skin: Negative for rash. Neurological: Negative for dizziness, syncope, weakness and headaches. Objective:     Vitals: Height 4' 10" (1.473 m), weight 69.9 kg (154 lb). , Body mass index is 32.19 kg/m². ,      Physical Exam:  GEN: NAD, alert and oriented x 3, well appearing  SKIN: warm, dry without significant lesions or rashes. Left chest implant well healed, no erythema or hematoma. No signs of infection. HEENT: NCAT, PERRL, EOMs intact  NECK: supple, no JVD appreciated  CARDIOVASCULAR: RRR, normal S1, S2 without murmurs, rubs, or gallops   LUNGS: CTA bilaterally without wheezes, rhonchi, or rales  ABDOMEN: Soft, nontender, nondistended. Groins soft with no bleeding, bruits or hematoma bilaterally  EXTREMITIES/VASCULAR: perfused without clubbing, cyanosis, or LE edema b/l  PSYCH: Normal mood and affect  NEURO: CN ll-Xll grossly intact      Medications:      Current Outpatient Medications:   •  allopurinol (ZYLOPRIM) 100 mg tablet, TAKE 1 TABLET BY MOUTH DAILY, Disp: 90 tablet, Rfl: 3  •  aspirin 81 MG tablet, Take 81 mg by mouth daily. , Disp: , Rfl:   •  buPROPion (WELLBUTRIN XL) 150 mg 24 hr tablet, Take 1 tablet (150 mg total) by mouth every morning, Disp: 30 tablet, Rfl: 5  •  calcium carbonate (OS-MANINDER) 600 MG tablet, Take 1,200 mg by mouth daily, Disp: , Rfl:   •  candesartan (ATACAND) 32 MG tablet, TAKE 1 TABLET BY MOUTH DAILY, Disp: 30 tablet, Rfl: 5  •  celecoxib (CeleBREX) 200 mg capsule, Take 1 capsule (200 mg total) by mouth 2 (two) times a day (Patient taking differently: Take 200 mg by mouth every other day), Disp: 180 capsule, Rfl: 3  •  cetirizine (ZyrTEC) 10 mg tablet, Take 10 mg by mouth daily, Disp: , Rfl:   •  Cholecalciferol (VITAMIN D3) 1000 units CAPS, Take by mouth, Disp: , Rfl:   •  FLUoxetine (PROzac) 20 mg capsule, TAKE 1 CAPSULE BY MOUTH EVERY DAY, Disp: 30 capsule, Rfl: 3  •  levothyroxine 25 mcg tablet, Take 1 tablet (25 mcg total) by mouth daily, Disp: 90 tablet, Rfl: 3  •  Magnesium 250 MG TABS, Take by mouth daily, Disp: , Rfl:   •  metoprolol succinate (TOPROL-XL) 100 mg 24 hr tablet, Take 1 tablet (100 mg total) by mouth daily, Disp: 30 tablet, Rfl: 3  •  Minoxidil (ROGAINE WOMENS EX), Apply 1 mL topically. , Disp: , Rfl:   •  montelukast (SINGULAIR) 10 mg tablet, Take 1 tablet (10 mg total) by mouth daily at bedtime, Disp: 90 tablet, Rfl: 0  •  omeprazole (PriLOSEC) 40 MG capsule, TAKE 1 CAPSULE BY MOUTH DAILY, Disp: 60 capsule, Rfl: 3  •  potassium chloride (K-DUR,KLOR-CON) 20 mEq tablet, Take 1 tablet (20 mEq total) by mouth 2 (two) times a day, Disp: 60 tablet, Rfl: 3  •  pyridoxine (VITAMIN B6) 100 mg tablet, Take 100 mg by mouth daily. , Disp: , Rfl:   •  simvastatin (ZOCOR) 40 mg tablet, TAKE 1 TABLET BY MOUTH DAILY, Disp: 30 tablet, Rfl: 3  •  sitaGLIPtin (JANUVIA) 100 mg tablet, Take 1 tablet (100 mg total) by mouth daily, Disp: 90 tablet, Rfl: 3  •  torsemide (DEMADEX) 20 mg tablet, TAKE 1 TABLET BY MOUTH TWICE DAILY, Disp: 60 tablet, Rfl: 5  •  traMADol (ULTRAM) 50 mg tablet, Take 1 tablet (50 mg total) by mouth every 8 (eight) hours as needed for moderate pain, Disp: 90 tablet, Rfl: 1  •  chlorhexidine (PERIDEX) 0.12 % solution, SWISH AND EXPECTORATE WITH 15 ML (1 capful) FOR 30 seconds IN THE MORNING AND IN THE EVENING AFTER toothbrushing.  (Patient not taking: Reported on 6/6/2023), Disp: , Rfl:   •  Magnesium Oxide 250 MG TABS, Take by mouth (Patient not taking: Reported on 6/6/2023), Disp: , Rfl:        Historical Information   Past Medical History:   Diagnosis Date   • Abnormal finding on breast imaging     last assessed 11/30/17   • Acute bacterial bronchitis    • Allergic rhinitis    • Arthralgia    • Arthritis    • Atherosclerotic heart disease of native coronary artery without angina pectoris    • BBB (bundle branch block)     left,last assesed 6/4/12   • BBB (bundle branch block)     left   • Benign paroxysmal vertigo     last assessed 9/7/12   • Benign paroxysmal vertigo of left ear    • Bilateral fibrocystic breast changes    • Bilateral sacroiliitis ([facility])    • Cardiac defibrillator in situ    • Carpal tunnel syndrome, unspecified upper limb    • CHF (congestive heart failure) ([facility])     chronic systolic/diastolic   • Chronic diastolic congestive heart failure ([facility])    • Chronic kidney disease     stage 3   • Chronic systolic congestive heart failure ([facility])    • Coronary artery disease    • Cough    • Depression    • Detrusor instability    • Diabetes mellitus ([address])    • Difficulty walking up stairs    • Dilated cardiomyopathy ([address])    • Dilated cardiomyopathy ([facility])    • Disease of thyroid gland    • Disorder of intervertebral disc of cervical spine    • Esophageal reflux    • GERD (gastroesophageal reflux disease)    • Gout    • Hemorrhoids    • History of blood clots    • Hormone replacement therapy    • Hx of blood clots    • Hyperlipidemia    • Hypertension    • Hypokalemia    • Hypothyroidism    • Indigestion    • Inflamed toenail, left    • Ingrown nail    • Low back pain     left   • OAB (overactive bladder)     detrusor instability   • Obesity    • AZ (obstructive sleep apnea)    • Osteoarthritis    • Papilloma of left breast    • Psoriasis     psoriatic arthropathy   • Psoriatic arthropathy ([facility])    • 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   • RLS (restless legs syndrome)    • Sciatica    • Shortness of breath    • Sleep apnea     unspecified type   • Tendinitis    • Trigger thumb, left thumb    • Urinary frequency    • UTI (urinary tract infection)    • Vitamin D deficiency        Past Surgical History:   Procedure Laterality Date   • BLADDER SURGERY      lift and repair   • BREAST BIOPSY Left 2016    US CORE BIOPSY-BENIGN   • CARDIAC CATHETERIZATION      outcome:  successful   • CARDIAC DEFIBRILLATOR PLACEMENT     • CARPAL TUNNEL RELEASE Bilateral    • CATARACT EXTRACTION     • COLONOSCOPY     • CORONARY ANGIOPLASTY WITH STENT PLACEMENT     • CYSTOSCOPY W/ URETEROSCOPY     • [address] RELEASE Left     and trigger finger release   • EYE SURGERY Left    • EYE SURGERY Left 2019    Cataract   • EYE SURGERY Right 2019    Cataract   • INSERT / REPLACE / REMOVE PACEMAKER     • JOINT REPLACEMENT Right 2017    Knee   • KNEE ARTHROSCOPY      therapeutic   • NEUROPLASTY / TRANSPOSITION MEDIAN NERVE AT CARPAL TUNNEL     • OOPHORECTOMY Bilateral     AGE 53   • IL TENDON SHEATH INCISION Left 3/2/2017    Procedure: SMALL TRIGGER FINGER RELEASE;  Surgeon: Jonn Greer MD;  Location: [facility];  Service: Orthopedics   • RECTAL SURGERY  2006    repair of rectal ulcer   • SHOULDER ARTHROSCOPY Left    • TOTAL ABDOMINAL HYSTERECTOMY      AGE 53   • TOTAL KNEE ARTHROPLASTY Left    • TOTAL SHOULDER REPLACEMENT Left    • TRIGGER FINGER RELEASE Bilateral     right haand ,left hand    • US GUIDED BREAST BIOPSY LEFT COMPLETE Left 2016       Social History     Substance and Sexual Activity   Alcohol Use No     Social History     Substance and Sexual Activity   Drug Use No     Social History     Tobacco Use   Smoking Status Former   • Packs/day: 1.00   • Years: 15.00   • Total pack years: 15.00   • Types: Cigarettes   • Quit date:    • Years since quittin.5   Smokeless Tobacco Never       Family History   Problem Relation Age of Onset   • Hypertension Mother    • Alzheimer's disease Mother    • Cancer Father 70        bladder   • Prostate cancer Father 70   • Cancer Brother         pt shared some type of blood cancer   • Breast cancer Paternal Grandmother         age dx unk   • Stomach cancer Paternal Aunt 72       Labs & Results:  Below is the patient's most recent value for Albumin, ALT, AST, BUN, Calcium, Chloride, Cholesterol, CO2, Creatinine, GFR, Glucose, HDL, Hematocrit, Hemoglobin, Hemoglobin A1C, LDL, Magnesium, Phosphorus, Platelets, Potassium, PSA, Sodium, Triglycerides, and WBC. Lab Results   Component Value Date    ALT 29 05/01/2023    AST 16 05/01/2023    BUN 24 05/01/2023    CALCIUM 10.0 05/01/2023     05/01/2023    CHOL 170 07/05/2017    CO2 29 05/01/2023    CREATININE 1.15 05/01/2023    HDL 73 05/01/2023    HCT 47.0 (H) 05/01/2023    HGB 15.0 05/01/2023    HGBA1C 7.0 (H) 05/01/2023    MG 2.3 05/23/2017    PHOS 2.9 08/09/2022     05/01/2023    K 4.2 05/01/2023     07/05/2017    TRIG 74 05/01/2023    WBC 10.65 (H) 05/01/2023     Note: for a comprehensive list of the patient's lab results, access the Results Review activity.

## 2023-07-19 ENCOUNTER — OFFICE VISIT (OUTPATIENT)
Dept: CARDIOLOGY CLINIC | Facility: CLINIC | Age: 73
End: 2023-07-19
Payer: MEDICARE

## 2023-07-19 ENCOUNTER — TELEPHONE (OUTPATIENT)
Dept: CARDIOLOGY CLINIC | Facility: CLINIC | Age: 73
End: 2023-07-19

## 2023-07-19 VITALS
BODY MASS INDEX: 32.32 KG/M2 | HEART RATE: 62 BPM | HEIGHT: 58 IN | WEIGHT: 154 LBS | SYSTOLIC BLOOD PRESSURE: 126 MMHG | DIASTOLIC BLOOD PRESSURE: 64 MMHG

## 2023-07-19 DIAGNOSIS — I50.22 CHRONIC SYSTOLIC CONGESTIVE HEART FAILURE (HCC): ICD-10-CM

## 2023-07-19 DIAGNOSIS — I10 ESSENTIAL HYPERTENSION: ICD-10-CM

## 2023-07-19 DIAGNOSIS — I42.0 DILATED CARDIOMYOPATHY (HCC): ICD-10-CM

## 2023-07-19 DIAGNOSIS — Z45.02 ICD (IMPLANTABLE CARDIOVERTER-DEFIBRILLATOR) BATTERY DEPLETION: ICD-10-CM

## 2023-07-19 DIAGNOSIS — K21.9 ESOPHAGEAL REFLUX: ICD-10-CM

## 2023-07-19 DIAGNOSIS — G47.33 OBSTRUCTIVE SLEEP APNEA: ICD-10-CM

## 2023-07-19 DIAGNOSIS — I49.3 PVC'S (PREMATURE VENTRICULAR CONTRACTIONS): Primary | ICD-10-CM

## 2023-07-19 DIAGNOSIS — E11.9 TYPE 2 DIABETES MELLITUS (HCC): ICD-10-CM

## 2023-07-19 DIAGNOSIS — I25.10 CORONARY ARTERY DISEASE INVOLVING NATIVE CORONARY ARTERY OF NATIVE HEART WITHOUT ANGINA PECTORIS: ICD-10-CM

## 2023-07-19 PROCEDURE — 93000 ELECTROCARDIOGRAM COMPLETE: CPT | Performed by: PHYSICIAN ASSISTANT

## 2023-07-19 PROCEDURE — 99214 OFFICE O/P EST MOD 30 MIN: CPT | Performed by: PHYSICIAN ASSISTANT

## 2023-07-19 NOTE — TELEPHONE ENCOUNTER
Pt called asking for a C/B - [phone]-She would rather go to the [facility] office for her  of her bandages

## 2023-07-19 NOTE — PATIENT INSTRUCTIONS
We discussed generator change for your defibrillator today  We will plan to change to pacemaker only as you have requested  Nothing to eat or drink after midnight the night before the procedure  Hold Januvia and torsemide the morning of the procedure  Follow up 2 weeks after the procedure to check the incision

## 2023-07-20 ENCOUNTER — HOSPITAL ENCOUNTER (OUTPATIENT)
Dept: RADIOLOGY | Facility: HOSPITAL | Age: 73
End: 2023-07-20
Payer: MEDICARE

## 2023-07-20 ENCOUNTER — APPOINTMENT (OUTPATIENT)
Dept: LAB | Facility: HOSPITAL | Age: 73
End: 2023-07-20
Payer: MEDICARE

## 2023-07-20 DIAGNOSIS — I42.0 DILATED CARDIOMYOPATHY (HCC): ICD-10-CM

## 2023-07-20 DIAGNOSIS — Z45.02 ICD (IMPLANTABLE CARDIOVERTER-DEFIBRILLATOR) BATTERY DEPLETION: ICD-10-CM

## 2023-07-20 LAB
ALBUMIN SERPL BCP-MCNC: 4.4 G/DL (ref 3.5–5)
ALP SERPL-CCNC: 61 U/L (ref 34–104)
ALT SERPL W P-5'-P-CCNC: 21 U/L (ref 7–52)
ANION GAP SERPL CALCULATED.3IONS-SCNC: 7 MMOL/L
AST SERPL W P-5'-P-CCNC: 17 U/L (ref 13–39)
BASOPHILS # BLD AUTO: 0.07 THOUSANDS/ÂΜL (ref 0–0.1)
BASOPHILS NFR BLD AUTO: 1 % (ref 0–1)
BILIRUB SERPL-MCNC: 0.5 MG/DL (ref 0.2–1)
BUN SERPL-MCNC: 21 MG/DL (ref 5–25)
CALCIUM SERPL-MCNC: 10.5 MG/DL (ref 8.4–10.2)
CHLORIDE SERPL-SCNC: 101 MMOL/L (ref 96–108)
CO2 SERPL-SCNC: 34 MMOL/L (ref 21–32)
CREAT SERPL-MCNC: 1.07 MG/DL (ref 0.6–1.3)
EOSINOPHIL # BLD AUTO: 0.21 THOUSAND/ÂΜL (ref 0–0.61)
EOSINOPHIL NFR BLD AUTO: 2 % (ref 0–6)
ERYTHROCYTE [DISTWIDTH] IN BLOOD BY AUTOMATED COUNT: 13.3 % (ref 11.6–15.1)
GFR SERPL CREATININE-BSD FRML MDRD: 50 ML/MIN/1.73SQ M
GLUCOSE SERPL-MCNC: 93 MG/DL (ref 65–140)
HCT VFR BLD AUTO: 50.1 % (ref 34.8–46.1)
HGB BLD-MCNC: 16 G/DL (ref 11.5–15.4)
IMM GRANULOCYTES # BLD AUTO: 0.12 THOUSAND/UL (ref 0–0.2)
IMM GRANULOCYTES NFR BLD AUTO: 1 % (ref 0–2)
LYMPHOCYTES # BLD AUTO: 2.33 THOUSANDS/ÂΜL (ref 0.6–4.47)
LYMPHOCYTES NFR BLD AUTO: 20 % (ref 14–44)
MCH RBC QN AUTO: 29.1 PG (ref 26.8–34.3)
MCHC RBC AUTO-ENTMCNC: 31.9 G/DL (ref 31.4–37.4)
MCV RBC AUTO: 91 FL (ref 82–98)
MONOCYTES # BLD AUTO: 1.34 THOUSAND/ÂΜL (ref 0.17–1.22)
MONOCYTES NFR BLD AUTO: 11 % (ref 4–12)
NEUTROPHILS # BLD AUTO: 7.69 THOUSANDS/ÂΜL (ref 1.85–7.62)
NEUTS SEG NFR BLD AUTO: 65 % (ref 43–75)
NRBC BLD AUTO-RTO: 0 /100 WBCS
PLATELET # BLD AUTO: 228 THOUSANDS/UL (ref 149–390)
PMV BLD AUTO: 10.1 FL (ref 8.9–12.7)
POTASSIUM SERPL-SCNC: 4 MMOL/L (ref 3.5–5.3)
PROT SERPL-MCNC: 6.9 G/DL (ref 6.4–8.4)
RBC # BLD AUTO: 5.49 MILLION/UL (ref 3.81–5.12)
SODIUM SERPL-SCNC: 142 MMOL/L (ref 135–147)
WBC # BLD AUTO: 11.76 THOUSAND/UL (ref 4.31–10.16)

## 2023-07-20 PROCEDURE — 71046 X-RAY EXAM CHEST 2 VIEWS: CPT

## 2023-07-21 ENCOUNTER — TELEPHONE (OUTPATIENT)
Dept: CARDIOLOGY CLINIC | Facility: CLINIC | Age: 73
End: 2023-07-21

## 2023-07-21 NOTE — TELEPHONE ENCOUNTER
Pt called stating she has changed her mind about not wanting an icd implant. She would like to go forward with this procedure at this time.

## 2023-07-27 DIAGNOSIS — J30.2 SEASONAL ALLERGIC RHINITIS, UNSPECIFIED TRIGGER: ICD-10-CM

## 2023-07-27 RX ORDER — MONTELUKAST SODIUM 10 MG/1
10 TABLET ORAL
Qty: 90 TABLET | Refills: 0 | Status: SHIPPED | OUTPATIENT
Start: 2023-07-27

## 2023-08-01 DIAGNOSIS — M25.50 ARTHRALGIA, UNSPECIFIED JOINT: ICD-10-CM

## 2023-08-01 RX ORDER — TRAMADOL HYDROCHLORIDE 50 MG/1
50 TABLET ORAL EVERY 8 HOURS PRN
Qty: 90 TABLET | Refills: 1 | Status: SHIPPED | OUTPATIENT
Start: 2023-08-01

## 2023-08-21 ENCOUNTER — ANESTHESIA EVENT (OUTPATIENT)
Dept: NON INVASIVE DIAGNOSTICS | Facility: HOSPITAL | Age: 73
End: 2023-08-21
Payer: MEDICARE

## 2023-08-22 ENCOUNTER — HOSPITAL ENCOUNTER (OUTPATIENT)
Facility: HOSPITAL | Age: 73
Setting detail: OUTPATIENT SURGERY
Discharge: HOME | End: 2023-08-22
Attending: INTERNAL MEDICINE | Admitting: INTERNAL MEDICINE
Payer: MEDICARE

## 2023-08-22 ENCOUNTER — ANESTHESIA (OUTPATIENT)
Dept: NON INVASIVE DIAGNOSTICS | Facility: HOSPITAL | Age: 73
End: 2023-08-22
Payer: MEDICARE

## 2023-08-22 VITALS
RESPIRATION RATE: 18 BRPM | WEIGHT: 154.1 LBS | DIASTOLIC BLOOD PRESSURE: 68 MMHG | BODY MASS INDEX: 32.35 KG/M2 | HEIGHT: 58 IN | SYSTOLIC BLOOD PRESSURE: 158 MMHG | OXYGEN SATURATION: 94 % | TEMPERATURE: 97.6 F | HEART RATE: 61 BPM

## 2023-08-22 DIAGNOSIS — I49.3 PVC'S (PREMATURE VENTRICULAR CONTRACTIONS): ICD-10-CM

## 2023-08-22 DIAGNOSIS — Z45.02 BIVENTRICULAR IMPLANTABLE CARDIOVERTER-DEFIBRILLATOR (ICD) AT END OF DEVICE LIFE: Primary | ICD-10-CM

## 2023-08-22 DIAGNOSIS — M47.816 LUMBAR SPONDYLOSIS: ICD-10-CM

## 2023-08-22 LAB
ANION GAP SERPL CALCULATED.3IONS-SCNC: 4 MMOL/L
ATRIAL RATE: 62 BPM
ATRIAL RATE: 63 BPM
BUN SERPL-MCNC: 23 MG/DL (ref 5–25)
CALCIUM SERPL-MCNC: 10.4 MG/DL (ref 8.3–10.1)
CHLORIDE SERPL-SCNC: 109 MMOL/L (ref 96–108)
CO2 SERPL-SCNC: 31 MMOL/L (ref 21–32)
CREAT SERPL-MCNC: 1.19 MG/DL (ref 0.6–1.3)
ERYTHROCYTE [DISTWIDTH] IN BLOOD BY AUTOMATED COUNT: 13.5 % (ref 11.6–15.1)
GFR SERPL CREATININE-BSD FRML MDRD: 44 ML/MIN/1.73SQ M
GLUCOSE P FAST SERPL-MCNC: 66 MG/DL (ref 65–99)
GLUCOSE SERPL-MCNC: 66 MG/DL (ref 65–140)
HCT VFR BLD AUTO: 48.1 % (ref 34.8–46.1)
HGB BLD-MCNC: 15.7 G/DL (ref 11.5–15.4)
INR PPP: 0.91 (ref 0.84–1.19)
MCH RBC QN AUTO: 29.8 PG (ref 26.8–34.3)
MCHC RBC AUTO-ENTMCNC: 32.6 G/DL (ref 31.4–37.4)
MCV RBC AUTO: 91 FL (ref 82–98)
P AXIS: 98 DEGREES
PLATELET # BLD AUTO: 211 THOUSANDS/UL (ref 149–390)
PMV BLD AUTO: 10.1 FL (ref 8.9–12.7)
POTASSIUM SERPL-SCNC: 3.9 MMOL/L (ref 3.5–5.3)
PR INTERVAL: 134 MS
PR INTERVAL: 136 MS
PROTHROMBIN TIME: 12.5 SECONDS (ref 11.6–14.5)
QRS AXIS: 194 DEGREES
QRS AXIS: 205 DEGREES
QRSD INTERVAL: 180 MS
QRSD INTERVAL: 184 MS
QT INTERVAL: 484 MS
QT INTERVAL: 486 MS
QTC INTERVAL: 491 MS
QTC INTERVAL: 497 MS
RBC # BLD AUTO: 5.27 MILLION/UL (ref 3.81–5.12)
SODIUM SERPL-SCNC: 144 MMOL/L (ref 135–147)
T WAVE AXIS: 13 DEGREES
T WAVE AXIS: 23 DEGREES
VENTRICULAR RATE: 62 BPM
VENTRICULAR RATE: 63 BPM
WBC # BLD AUTO: 12.58 THOUSAND/UL (ref 4.31–10.16)

## 2023-08-22 PROCEDURE — 85027 COMPLETE CBC AUTOMATED: CPT | Performed by: PHYSICIAN ASSISTANT

## 2023-08-22 PROCEDURE — 33264 RMVL & RPLCMT DFB GEN MLT LD: CPT | Performed by: INTERNAL MEDICINE

## 2023-08-22 PROCEDURE — NC001 PR NO CHARGE: Performed by: PHYSICIAN ASSISTANT

## 2023-08-22 PROCEDURE — C1882 AICD, OTHER THAN SING/DUAL: HCPCS | Performed by: INTERNAL MEDICINE

## 2023-08-22 PROCEDURE — 93010 ELECTROCARDIOGRAM REPORT: CPT | Performed by: INTERNAL MEDICINE

## 2023-08-22 PROCEDURE — 93005 ELECTROCARDIOGRAM TRACING: CPT

## 2023-08-22 PROCEDURE — 80048 BASIC METABOLIC PNL TOTAL CA: CPT | Performed by: PHYSICIAN ASSISTANT

## 2023-08-22 PROCEDURE — 85610 PROTHROMBIN TIME: CPT | Performed by: PHYSICIAN ASSISTANT

## 2023-08-22 DEVICE — ENVELOPE CMRM6133 ABSORB LRG MR
Type: IMPLANTABLE DEVICE | Site: CHEST | Status: FUNCTIONAL
Brand: TYRX™

## 2023-08-22 DEVICE — CRTD DTMA1D1 CLARIA MRI US DF-1
Type: IMPLANTABLE DEVICE | Site: CHEST | Status: FUNCTIONAL
Brand: CLARIA MRI™ CRT-D SURESCAN™

## 2023-08-22 RX ORDER — GENTAMICIN SULFATE 40 MG/ML
INJECTION, SOLUTION INTRAMUSCULAR; INTRAVENOUS CODE/TRAUMA/SEDATION MEDICATION
Status: DISCONTINUED | OUTPATIENT
Start: 2023-08-22 | End: 2023-08-22 | Stop reason: HOSPADM

## 2023-08-22 RX ORDER — PROPOFOL 10 MG/ML
INJECTION, EMULSION INTRAVENOUS CONTINUOUS PRN
Status: DISCONTINUED | OUTPATIENT
Start: 2023-08-22 | End: 2023-08-22

## 2023-08-22 RX ORDER — LIDOCAINE HYDROCHLORIDE 10 MG/ML
INJECTION, SOLUTION EPIDURAL; INFILTRATION; INTRACAUDAL; PERINEURAL CODE/TRAUMA/SEDATION MEDICATION
Status: DISCONTINUED | OUTPATIENT
Start: 2023-08-22 | End: 2023-08-22 | Stop reason: HOSPADM

## 2023-08-22 RX ORDER — ACETAMINOPHEN 325 MG/1
650 TABLET ORAL EVERY 4 HOURS PRN
Status: DISCONTINUED | OUTPATIENT
Start: 2023-08-22 | End: 2023-08-22 | Stop reason: HOSPADM

## 2023-08-22 RX ORDER — SODIUM CHLORIDE 9 MG/ML
INJECTION, SOLUTION INTRAVENOUS CONTINUOUS PRN
Status: DISCONTINUED | OUTPATIENT
Start: 2023-08-22 | End: 2023-08-22

## 2023-08-22 RX ORDER — LIDOCAINE HYDROCHLORIDE 10 MG/ML
INJECTION, SOLUTION EPIDURAL; INFILTRATION; INTRACAUDAL; PERINEURAL AS NEEDED
Status: DISCONTINUED | OUTPATIENT
Start: 2023-08-22 | End: 2023-08-22

## 2023-08-22 RX ORDER — CEFAZOLIN SODIUM 1 G/50ML
1000 SOLUTION INTRAVENOUS ONCE
Status: COMPLETED | OUTPATIENT
Start: 2023-08-22 | End: 2023-08-22

## 2023-08-22 RX ORDER — PROPOFOL 10 MG/ML
INJECTION, EMULSION INTRAVENOUS AS NEEDED
Status: DISCONTINUED | OUTPATIENT
Start: 2023-08-22 | End: 2023-08-22

## 2023-08-22 RX ADMIN — PROPOFOL 30 MG: 10 INJECTION, EMULSION INTRAVENOUS at 08:56

## 2023-08-22 RX ADMIN — SODIUM CHLORIDE: 9 INJECTION, SOLUTION INTRAVENOUS at 08:12

## 2023-08-22 RX ADMIN — PROPOFOL 50 MCG/KG/MIN: 10 INJECTION, EMULSION INTRAVENOUS at 08:15

## 2023-08-22 RX ADMIN — LIDOCAINE HYDROCHLORIDE 50 MG: 10 INJECTION, SOLUTION EPIDURAL; INFILTRATION; INTRACAUDAL; PERINEURAL at 08:15

## 2023-08-22 RX ADMIN — PROPOFOL 30 MG: 10 INJECTION, EMULSION INTRAVENOUS at 08:15

## 2023-08-22 RX ADMIN — CEFAZOLIN SODIUM 1000 MG: 1 SOLUTION INTRAVENOUS at 08:18

## 2023-08-22 NOTE — ANESTHESIA PREPROCEDURE EVALUATION
Procedure:  Cardiac biv icd generator change ([place])    Relevant Problems   CARDIO   (+) Chronic diastolic congestive heart failure (HCC)   (+) Chronic systolic congestive heart failure (HCC)   (+) Coronary artery disease   (+) Essential hypertension   (+) Mixed hyperlipidemia   (+) PVC's (premature ventricular contractions)      ENDO   (+) Acquired hypothyroidism   (+) Hyperparathyroidism (HCC)   (+) Type 2 diabetes mellitus (HCC)   (+) Type 2 diabetes mellitus with stage 3 chronic kidney disease, without long-term current use of insulin (HCC)      GI/HEPATIC   (+) Esophageal reflux   (+) Gastroesophageal reflux disease with esophagitis      /RENAL   (+) Stage 3a chronic kidney disease (HCC)      MUSCULOSKELETAL   (+) Chronic left-sided low back pain without sciatica   (+) DDD (degenerative disc disease), cervical   (+) Gout   (+) Lumbar spondylosis   (+) Osteoarthritis      NEURO/PSYCH   (+) Chronic left shoulder pain   (+) Chronic left-sided low back pain without sciatica   (+) Continuous opioid dependence (HCC)   (+) Depression      PULMONARY   (+) Obstructive sleep apnea   (+) Pulmonary emphysema, unspecified emphysema type ([facility])        Physical Exam    Airway    Mallampati score: I  TM Distance: >3 FB  Neck ROM: full     Dental   No notable dental hx     Cardiovascular      Pulmonary      Other Findings        Anesthesia Plan  ASA Score- 3     Anesthesia Type- IV sedation with anesthesia with ASA Monitors. Additional Monitors:   Airway Plan:           Plan Factors-    Chart reviewed. Imaging results reviewed. Existing labs reviewed. Patient summary reviewed. Induction- intravenous. Postoperative Plan-     Informed Consent- Anesthetic plan and risks discussed with patient. I personally reviewed this patient with the CRNA. Discussed and agreed on the Anesthesia Plan with the CRNA. Maribell Jones

## 2023-08-22 NOTE — DISCHARGE INSTR - AVS FIRST PAGE
Please refer to post device implantation discharge instructions and restrictions below and your device booklet/temporary card. Keep incision dry for one week. Do not use lotions/powders/creams on incision. Leave outer bandage in place for 1 week - it is water proof, and as long as it is fully adhered to your skin you may shower with it. If it appears as though the bandage is coming off and/or there is any communication to the area of device incision, please then keep the whole area dry for the remaining week. After 1 week, please remove by pulling all edges away from the center of the bandage. Please call the office if you notice redness, swelling, bleeding, or drainage from incision or if you develop fevers    Cardiac Resynchronization Therapy (CRT)    If you have any questions, please call [phone] to speak with a nurse (8:30am-4pm, or [phone] after hours). For appointments, please call [phone]. WHAT YOU SHOULD KNOW:    Your device is a biventricular ICD, which delivers resynchronization therapy and is also a defibrillator (see below). Cardiac resynchronization therapy (CRT) is a procedure used to treat problems with how your heart contracts. CRT is also called biventricular pacing. Your heart has 2 upper chambers, called atria, and 2 lower chambers, called ventricles. Your heartbeat is synchronized when all areas of your heart contract together properly. When the areas of your heart do not contract as they should, your heart cannot pump enough blood and oxygen to your body. You may have trouble breathing, tire easily, and have swelling in your legs and feet. With a biventricular device, there is one wire in the right atria (in most cases), one wire in the right ventricle, and a third wire that goes through a vein and wraps around to your left ventricle. The two ventricular wires work together to help your heart contract more synchronously and efficiently.                AFTER YOU LEAVE:      Medicines:   Heart medicine may be given to help your heart beat strongly and regularly. Ask your healthcare provider for more information about heart medicines. Take your medicine as directed. Contact your healthcare provider if you think your medicine is not helping or if you have side effects. Tell him if you are allergic to any medicine. Keep a list of the medicines, vitamins, and herbs you take. Include the amounts, and when and why you take them. Bring the list or the pill bottles to follow-up visits. Carry your medicine list with you in case of an emergency. Driving: you are ok to drive 48 hours after device is implanted     Follow up with your healthcare provider as directed: You will need to return to have your pacemaker checked. You may also need an EKG, echocardiogram, or chest x-ray to check the leads in your heart, and your doctor will order these tests if necessary. Write down your questions so you remember to ask them during your visits. Device safety: Some electrical devices may interfere with how your pacemaker works. Some examples are cell phones, security systems, power cables, and electric monitors. Ask your healthcare provider how long you can be near these devices, and which devices to avoid. Tell caregivers you have a pacemaker before you have a procedure or surgery. Wound care: Care for your wound as directed. Keep incision dry for one week. Do not use lotions/powders/creams on incision. Leave outer bandage in place for 1 week - it is water proof, and as long as it is fully adhered to your skin you may shower with it. If it appears as though the bandage is coming off and/or there is any communication to the area of device incision, please then keep the whole area dry for the remaining week. After 1 week, please remove by pulling all edges away from the center of the bandage.  Please call the office if you notice redness, swelling, bleeding, or drainage from incision or if you develop fevers      Contact your healthcare provider if:   You have new or increased swelling in your legs or feet. You feel more tired than usual.   You have trouble breathing during activity. Your wound is red, warm, swollen, or draining pus. You have a fever. You have questions or concerns about your condition or care. Seek care immediately or call 911 if: You feel like your heart is fluttering or jumping. You have any of the following signs of a heart attack:    Squeezing, pressure, fullness, or pain in your chest that lasts longer than a few minutes or returns   Discomfort or pain in your back, neck, jaw, stomach, or arm   Shortness of breath or breathing problems   A sudden cold sweat, lightheadedness, dizziness, or nausea, especially with chest pain or trouble breathing      Implantable Cardioverter Defibrillator (ICD)    If you have any questions, please call [phone] to speak with a nurse (8:30am-4pm, or [phone] after hours). For appointments, please call [phone]. WHAT YOU SHOULD KNOW:    Your device is a biventricular ICD, which delivers resynchronization therapy (see above) and is also a defibrillator. An implantable cardioverter defibrillator (ICD) is a small device that monitors your heart rate and rhythm. It is commonly placed inside your upper chest region. It may be used if you have a ventricular arrhythmia, which is an irregular, dangerous rhythm from the bottom chamber of your heart. Some arrhythmias may cause your heart to suddenly stop beating. An ICD can give a shock to your heart to make it start beating again, or it can give pacing therapy (also known as pain-free therapy) to return your heart to normal rhythm. It is also a pacemaker, so it will pace your heart if needed to prevent it from beating too slowly. AFTER YOU LEAVE:      Follow up with your primary healthcare provider or cardiologist as directed:  You will need to follow-up to have your ICD checked and make sure you are not having problems. Write down your questions so you remember to ask them during your visits. Self-care:   Ask about activity: Ask if you need to avoid moving your shoulder or arm, and for how long. Ask if you should avoid lifting heavy objects. Do not play any contact sports, such as football or wrestling, until your primary healthcare provider [facility] or cardiologist tells you it is okay. You may only be able to drive for a certain amount of time per day, or during certain hours. Ask when you can return to work. Care for your skin over the ICD: see answer in instructions above     When you get a shock from your ICD: A shock may feel like someone has hit you, or you may feel a thump in the chest. If someone is touching you when you get a shock, they may feel a tingling feeling. The first time you feel a shock, it may scare you. Sit or lie down and stay calm. Ask someone to stay with you if possible. Please either call your cardiologist or report to an emergency room. Safety instructions when you have an ICD:   Carry an ID card for the ICD: This card has important information about your ICD. Stay away from magnets or machines with electric fields: This includes MRI machines. Avoid leaning into a car engine or doing welding. These things can interfere with how your ICD works. Tell airport security you have an ICD: You may need to be searched by hand when you go through a security gate. The security gate or handheld wand could harm your ICD. Keep an ICD diary: Record when you get a shock and what you were doing before you got the shock. Keep track of how you felt before and after the shock, as well as how many shocks you received. Write down the day and time of each shock. Bring the diary with you when you see your PHP or cardiologist.   Daniel Figueroa medical alert identification: Wear medical alert jewelry or carry a card that says you have an ICD.  Ask your PHP or cardiologist where to get these items. Contact your primary healthcare provider or cardiologist if:   You have a fever. You feel 1 or more shocks from your ICD and feel fine afterwards. Your feet or ankles swell. The area around your ICD is painful or tender after surgery. The skin around your stitches or staples is red, swollen, or draining pus or fluid. You have chills, a cough, and feel weak or achy. You are sad or anxious and find it hard to do your usual activities. You have questions or concerns about your condition or care. Seek care immediately or call 911 if:   Your stitches or staples come apart. Blood soaks through your bandage. You feel more than 3 shocks in a row from your ICD. You become weak, dizzy, or faint. You feel your heart skip beats or beat very fast or slow, but you do not feel a shock from your ICD. You have chest pain that does not go away with rest or medicine. © 2014 [facility] is for End User's use only and may not be sold, redistributed or otherwise used for commercial purposes. All illustrations and images included in CareNotes® are the copyrighted property of [employer] or [employer] Mer[employer]. The above information is an  only. It is not intended as medical advice for individual conditions or treatments. Talk to your doctor, nurse or pharmacist before following any medical regimen to see if it is safe and effective for you.

## 2023-08-22 NOTE — H&P
H&P Exam - Cardiology   Adwoa Underwood 74 y.o. female MRN: [id number]  Unit/Bed#: BE CATH LAB ROOM Encounter: [id number]    Assessment/Plan     Assessment:  1. Medtronic BiV ICD, reached THIERNO 6/29/2023  -- dual chamber device initially implanted 3/5/2005  -- LV lead added 9/6/2005 (2 epicardial leads placed)  -- generator change 6/2009  -- subsequent RV lead fracture with inappropriate ICD shocks, status post addition of new ICD lead 3/2010  -- fractured RV lead capped/abandoned   -- 5/2012 - generator change, high LV threshold noted so other epicardial lead was utilized   -- s/p generator change 5/23/2017 by Dr. Giovanna Mohan  -- not pacemaker dependent (underlying sinus rhythm)  -- LV lead threshold chronically elevated (2.75 V @ 1.0 ms)  -- BiV paced 98.7%     2. 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 HFpEF  4. LBBB  5. HTN  6. HLD  7. CAD s/p PCI to D1 and RCA      Plan:  BiV generator change. When asked about her wishes regarding pacemaker versus defibrillator, the patient appeared unaware of this prior conversation. Will clarify with attending prior to procedure. History of Present Illness   HPI:  Adwoa Underwood is a 74y.o. year old female with mixed ischemic and nonischemic cardiomyopathy with improved LVEF 45-50% per echo in 2020, Medtronic BiV ICD in situ, chronic HFmEF, CAD status post PCI to D1 and RCA, baseline bundle-branch block, hypertension, and hyperlipidemia. She has a long device history. In 3/2005 a BiV ICD implantation was recommended given a baseline left bundle branch block and EF of 20%. Per reports, they were unable to cannulate the CS and that she had a dual-chamber ICD placed. Several months later epicardial LV leads were added. She then underwent generator change 6/2009. Unfortunately she has subsequent RV lead fracture with inappropriate shocks, thus in 3/2010 she had a new ICD lead placed at her prior ICD lead capped. She required another generator change 5/2012, at which time it was noted that she had a high LV lead threshold. Thus, her second epicardial LV lead was utilized instead. Most recently, she underwent generator change 5/2017. Through routine device interrogation she was found to have again reached THIERNO 6/29/2023. She was seen in the office to discuss generator change, at which time there were discussions regarding whether she would want to proceed with just a pacemaker or still continue with an ICD. This was discussed at length with Dr. Duane Weir. She presents today to undergo that procedure. No significant changes over the recent past.      Review of Systems  ROS as noted above, otherwise 12 point review of systems was performed and is negative.        Historical Information   Past Medical History:   Diagnosis Date   • Abnormal finding on breast imaging     last assessed 11/30/17   • Acute bacterial bronchitis    • Allergic rhinitis    • Arthralgia    • Arthritis    • Atherosclerotic heart disease of native coronary artery without angina pectoris    • BBB (bundle branch block)     left,last assesed 6/4/12   • BBB (bundle branch block)     left   • Benign paroxysmal vertigo     last assessed 9/7/12   • Benign paroxysmal vertigo of left ear    • Bilateral fibrocystic breast changes    • Bilateral sacroiliitis (HCC)    • Cardiac defibrillator in situ    • Carpal tunnel syndrome, unspecified upper limb    • CHF (congestive heart failure) (HCC)     chronic systolic/diastolic   • Chronic diastolic congestive heart failure (HCC)    • Chronic kidney disease     stage 3   • Chronic systolic congestive heart failure (HCC)    • Coronary artery disease    • Cough    • Depression    • Detrusor instability    • Diabetes mellitus ([address])    • Difficulty walking up stairs    • Dilated cardiomyopathy ([address])    • Dilated cardiomyopathy ([address])    • Disease of thyroid gland    • Disorder of intervertebral disc of cervical spine    • Esophageal reflux    • GERD (gastroesophageal reflux disease)    • Gout    • Hemorrhoids    • History of blood clots    • Hormone replacement therapy    • Hx of blood clots    • Hyperlipidemia    • Hypertension    • Hypokalemia    • Hypothyroidism    • Indigestion    • Inflamed toenail, left    • Ingrown nail    • Low back pain     left   • OAB (overactive bladder)     detrusor instability   • Obesity    • AZ (obstructive sleep apnea)    • Osteoarthritis    • Papilloma of left breast    • Psoriasis     psoriatic arthropathy   • Psoriatic arthropathy (HCC)    • 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   • RLS (restless legs syndrome)    • Sciatica    • Shortness of breath    • Sleep apnea     unspecified type   • Tendinitis    • Trigger thumb, left thumb    • Urinary frequency    • UTI (urinary tract infection)    • Vitamin D deficiency      Past Surgical History:   Procedure Laterality Date   • BLADDER SURGERY  1999    lift and repair   • BREAST BIOPSY Left 05/23/2016    US CORE BIOPSY-BENIGN   • CARDIAC CATHETERIZATION      outcome:  successful   • CARDIAC DEFIBRILLATOR PLACEMENT     • CARPAL TUNNEL RELEASE Bilateral 1997   • CATARACT EXTRACTION     • COLONOSCOPY     • CORONARY ANGIOPLASTY WITH STENT PLACEMENT     • CYSTOSCOPY W/ URETEROSCOPY  2009   • DE QUERVAIN'S RELEASE Left 2011    and trigger finger release   • EYE SURGERY Left 1999   • EYE SURGERY Left 11/13/2019    Cataract   • EYE SURGERY Right 11/09/2019    Cataract   • INSERT / REPLACE / REMOVE PACEMAKER     • JOINT REPLACEMENT Right 09/2017    Knee   • KNEE ARTHROSCOPY      therapeutic   • NEUROPLASTY / TRANSPOSITION MEDIAN NERVE AT CARPAL TUNNEL     • OOPHORECTOMY Bilateral     AGE 44   • OK TENDON SHEATH INCISION Left 3/2/2017    Procedure: SMALL TRIGGER FINGER RELEASE;  Surgeon: Kathy Hobbs MD;  Location:  MAIN OR;  Service: Orthopedics   • RECTAL SURGERY  2006    repair of rectal ulcer   • SHOULDER ARTHROSCOPY Left 2006   • TOTAL ABDOMINAL HYSTERECTOMY AGE 53   • TOTAL KNEE ARTHROPLASTY Left    • TOTAL SHOULDER REPLACEMENT Left    • TRIGGER FINGER RELEASE Bilateral     right haand ,left hand    • US GUIDED BREAST BIOPSY LEFT COMPLETE Left 2016     Family History:   Family History   Problem Relation Age of Onset   • Hypertension Mother    • Alzheimer's disease Mother    • Cancer Father 70        bladder   • Prostate cancer Father 70   • Cancer Brother         pt shared some type of blood cancer   • Breast cancer Paternal Grandmother         age dx unk   • Stomach cancer Paternal Aunt 72       Social History   Social History     Substance and Sexual Activity   Alcohol Use No     Social History     Substance and Sexual Activity   Drug Use No     Social History     Tobacco Use   Smoking Status Former   • Packs/day: 1.00   • Years: 15.00   • Total pack years: 15.00   • Types: Cigarettes   • Quit date:    • Years since quittin.6   Smokeless Tobacco Never         Meds/Allergies   all medications and allergies reviewed  Home Medications:   Medications Prior to Admission   Medication   • allopurinol (ZYLOPRIM) 100 mg tablet   • aspirin 81 MG tablet   • buPROPion (WELLBUTRIN XL) 150 mg 24 hr tablet   • calcium carbonate (OS-MANINDER) 600 MG tablet   • candesartan (ATACAND) 32 MG tablet   • celecoxib (CeleBREX) 200 mg capsule   • cetirizine (ZyrTEC) 10 mg tablet   • chlorhexidine (PERIDEX) 0.12 % solution   • Cholecalciferol (VITAMIN D3) 1000 units CAPS   • FLUoxetine (PROzac) 20 mg capsule   • levothyroxine 25 mcg tablet   • Magnesium 250 MG TABS   • Magnesium Oxide 250 MG TABS   • metoprolol succinate (TOPROL-XL) 100 mg 24 hr tablet   • Minoxidil (ROGAINE WOMENS EX)   • montelukast (SINGULAIR) 10 mg tablet   • omeprazole (PriLOSEC) 40 MG capsule   • potassium chloride (K-DUR,KLOR-CON) 20 mEq tablet   • pyridoxine (VITAMIN B6) 100 mg tablet   • simvastatin (ZOCOR) 40 mg tablet   • sitaGLIPtin (JANUVIA) 100 mg tablet   • torsemide (DEMADEX) 20 mg tablet   • traMADol (ULTRAM) 50 mg tablet       Allergies   Allergen Reactions   • Dust Mite Extract Sneezing       Objective   Vitals: Blood pressure (!) 183/93, pulse 78, temperature (!) 97.3 °F (36.3 °C), temperature source Temporal, resp. rate 16, height 4' 10" (1.473 m), weight 69.9 kg (154 lb 1.6 oz), SpO2 98 %. Orthostatic Blood Pressures    Flowsheet Row Most Recent Value   Blood Pressure 183/93 filed at 2023 0708          No intake or output data in the 24 hours ending 23 0719    Invasive Devices     Peripheral Intravenous Line  Duration           Peripheral IV 23 Left Antecubital <1 day                Physical Exam  GEN: NAD, alert and oriented x 3, well appearing  SKIN: dry without significant lesions or rashes  HEENT: NCAT, PERRL, EOMs intact  NECK: No JVD appreciated  CARDIOVASCULAR: RRR, normal S1, S2 without murmurs, rubs, or gallops appreciated  LUNGS: Clear to auscultation bilaterally without wheezes, rhonchi, or rales  ABDOMEN: Soft, nontender, nondistended  EXTREMITIES/VASCULAR: perfused without clubbing, cyanosis, or LE edema b/l  PSYCH: Normal mood and affect  NEURO: CN ll-Xll grossly intact      Lab Results: I have personally reviewed pertinent lab results. Invalid input(s): "LABGLOM"              Imaging: I have personally reviewed pertinent reports.       ECHO: Results for orders placed during the hospital encounter of 20    Echo complete with contrast if indicated    Narrative  [address], [address] [place] [address]    Transthoracic Echocardiogram  2D, M-mode, Doppler, and Color Doppler    Study date:  2020    Patient: Harvis Primrose  MR number: [id number]  Account number: [de-identified]  : 1950  Age: 65 years  Gender: Female  Status: Outpatient  Location: [address] [place] [address]  Height: 60 in  Weight: 168.7 lb  BP: 138/ 80 mmHg    Indications: Coronary Artery Disease, Chronic Diastolic Congestive Heart Failure    Diagnoses: I25.10 - Atherosclerotic heart disease of native coronary artery without angina pectoris, I50.32 - Chronic diastolic (congestive) heart failure    Sonographer:  Myrtle Escobar RDCS  Primary Physician:  Efren Inman MD  Referring Physician:  Bonny Rajan MD  Group:  [facility]  Interpreting Physician:  Aditya Toscano MD    IMPRESSIONS:  The examination was technically difficult. SUMMARY    LEFT VENTRICLE:  The ventricle was dilated. Systolic function was mildly reduced. Ejection fraction was estimated in the range of 45 % to 50 %. There was hypokinesis of the apical anterior, apical septal, apical lateral, and apical wall(s). Doppler parameters were consistent with abnormal left ventricular relaxation (grade 1 diastolic dysfunction). TRICUSPID VALVE:  There was mild regurgitation. Pulmonary artery systolic pressure was at the upper limits of normal.    HISTORY: PRIOR HISTORY: Type 2 Diabetes, Coronary Artery Disease, Chronic Diastolic Congestive Heart Failure, Hypertension, Premature Ventricular Contractions    PROCEDURE: The study was performed in the [facility]. This was a routine study. The transthoracic approach was used. The study included complete 2D imaging, M-mode, complete spectral Doppler, and color Doppler. The heart rate was  81 bpm, at the start of the study. Images were obtained from the parasternal, apical, subcostal, and suprasternal notch acoustic windows. Echocardiographic views were limited due to lung interference. Image quality was adequate. LEFT VENTRICLE: The ventricle was dilated. Systolic function was mildly reduced. Ejection fraction was estimated in the range of 45 % to 50 %. There was hypokinesis of the apical anterior, apical septal, apical lateral, and apical wall(s).   Wall thickness was normal. DOPPLER: Doppler parameters were consistent with abnormal left ventricular relaxation (grade 1 diastolic dysfunction). RIGHT VENTRICLE: The size was normal. Systolic function was normal. Wall thickness was normal. A pacing wire was present. LEFT ATRIUM: Size was normal.    RIGHT ATRIUM: Size was normal.    MITRAL VALVE: Valve structure was normal. There was normal leaflet separation. DOPPLER: The transmitral velocity was within the normal range. There was no evidence for stenosis. There was no significant regurgitation. AORTIC VALVE: The valve was not well visualized. DOPPLER: Transaortic velocity was within the normal range. There was no evidence for stenosis. There was no significant regurgitation. TRICUSPID VALVE: The valve structure was normal. There was normal leaflet separation. DOPPLER: The transtricuspid velocity was within the normal range. There was no evidence for stenosis. There was mild regurgitation. Pulmonary artery  systolic pressure was at the upper limits of normal.    PULMONIC VALVE: Leaflets exhibited normal thickness, no calcification, and normal cuspal separation. DOPPLER: The transpulmonic velocity was within the normal range. There was no significant regurgitation. PERICARDIUM: There was no pericardial effusion. The pericardium was normal in appearance. AORTA: The root exhibited normal size. SYSTEMIC VEINS: IVC: The inferior vena cava was normal in size. SYSTEM MEASUREMENT TABLES    2D mode  AoR Diam (2D): 30 mm  AoR Diam; Mean (2D): 30 mm  Asc Aorta Diam (2D): 35 mm  Asc Aorta Diam; Mean (2D): 35 mm  Inferior Vena Cava Diameter; 2D mode;: 12.4 mm  Inferior Vena Cava Diameter; Mean; Mean value chosen; 2D mode;: 12.4 mm  LA Dimension (2D): 40 mm  LA Dimension; Mean (2D): 40 mm  LA/Ao (2D): 1.33  EDV (2D-Cubed): 504757 mm3  EF (2D-Cubed): 42.6 %  ESV (2D-Cubed): 726363 mm3  FS (2D-Cubed): 16.9 %  FS (2D-Teich): 16.9 %  IVS/LVPW (2D): 0.85  IVSd (2D): 9.19 mm  IVSd; Mean chosen (2D): 9.19 mm  LVIDd (2D): 62.3 mm  LVIDd;  Mean (2D): 62.3 mm  LVIDs (2D): 51.8 mm  LVIDs; Mean (2D): 51.8 mm  LVOT Area (2D): 314 mm2  LVPWd (2D): 10.8 mm  LVPWd; Mean (2D): 10.8 mm  Left Ventricular Ejection Fraction; Teichholz; 2D mode;: 34.7 %  Left Ventricular End Diastolic Volume; Teichholz; 2D mode;: 522283 mm3  Left Ventricular End Systolic Volume; Teichholz; 2D mode;: 085214 mm3  SI (2D-Cubed): 59.2 ml/m2  SV (2D-Cubed): 907299 mm3  Stroke Index; Teichholz; 2D mode;: 39.1 ml/m2  Stroke Volume; Teichholz; 2D mode;: 02864 mm3    Apical four chamber  EF (A4C): 45.5 %  LV MOD Diam; Recent value; End Diastole (A4C): 38.7 mm  LV MOD Diam; Recent value; End Diastole (A4C): 37.7 mm  LV MOD Diam; Recent value; End Diastole (A4C): 37.5 mm  LV MOD Diam; Recent value; End Diastole (A4C): 36.1 mm  LV MOD Diam; Recent value; End Diastole (A4C): 33.8 mm  LV MOD Diam; Recent value; End Diastole (A4C): 30.6 mm  LV MOD Diam; Recent value; End Diastole (A4C): 27.7 mm  LV MOD Diam; Recent value; End Diastole (A4C): 25 mm  LV MOD Diam; Recent value; End Diastole (A4C): 22.6 mm  LV MOD Diam; Recent value; End Diastole (A4C): 18.6 mm  LV MOD Diam; Recent value; End Diastole (A4C): 12.5 mm  LV MOD Diam; Recent value; End Diastole (A4C): 42.4 mm  LV MOD Diam; Recent value; End Diastole (A4C): 40 mm  LV MOD Diam; Recent value; End Diastole (A4C): 21.1 mm  LV MOD Diam; Recent value; End Diastole (A4C): 40.4 mm  LV MOD Diam; Recent value; End Diastole (A4C): 41.9 mm  LV MOD Diam; Recent value; End Diastole (A4C): 42.9 mm  LV MOD Diam; Recent value; End Diastole (A4C): 43.6 mm  LV MOD Diam; Recent value; End Diastole (A4C): 43.9 mm  LV MOD Diam; Recent value; End Diastole (A4C): 43.6 mm  LV MOD Diam; Recent value; End Systole (A4C): 19.1 mm  LV MOD Diam; Recent value; End Systole (A4C): 34.6 mm  LV MOD Diam; Recent value; End Systole (A4C): 35.3 mm  LV MOD Diam; Recent value; End Systole (A4C): 35.4 mm  LV MOD Diam; Recent value; End Systole (A4C): 34.8 mm  LV MOD Diam; Recent value;  End Systole (A4C): 34.3 mm  LV MOD Diam; Recent value; End Systole (A4C): 33.1 mm  LV MOD Diam; Recent value; End Systole (A4C): 31.6 mm  LV MOD Diam; Recent value; End Systole (A4C): 30.6 mm  LV MOD Diam; Recent value; End Systole (A4C): 29.9 mm  LV MOD Diam; Recent value; End Systole (A4C): 29.6 mm  LV MOD Diam; Recent value; End Systole (A4C): 29.3 mm  LV MOD Diam; Recent value; End Systole (A4C): 28.6 mm  LV MOD Diam; Recent value; End Systole (A4C): 27.6 mm  LV MOD Diam; Recent value; End Systole (A4C): 26.1 mm  LV MOD Diam; Recent value; End Systole (A4C): 23.9 mm  LV MOD Diam; Recent value; End Systole (A4C): 20.9 mm  LV MOD Diam; Recent value; End Systole (A4C): 17.7 mm  LV MOD Diam; Recent value; End Systole (A4C): 14.7 mm  LV MOD Diam; Recent value; End Systole (A4C): 9.92 mm  Left Ventricle diastolic major axis; Most recent value chosen; Method of Disks, Single Plane; 2D mode; Apical four chamber;: 78.7 mm  Left Ventricle systolic major axis; Most recent value chosen; Method of Disks, Single Plane; 2D mode; Apical four chamber;: 66.9 mm  Left Ventricular Diastolic Area; Most recent value chosen; Method of Disks, Single Plane; 2D mode; Apical four chamber;: 2690 mm2  Left Ventricular End Diastolic Volume; Most recent value chosen; Method of Disks, Single Plane; 2D mode; Apical four chamber;: 65397 mm3  Left Ventricular End Systolic Volume; Most recent value chosen; Method of Disks, Single Plane; 2D mode; Apical four chamber;: 70878 mm3  Left Ventricular Systolic Area; Most recent value chosen; Method of Disks, Single Plane; 2D mode; Apical four chamber;: 1830 mm2  SI (A4C): 20.1 ml/m2  SV (A4C): 43617 mm3    Tissue Doppler Imaging  LV Peak Early Mcdonald Tissue Stephen; Mean; Medial MA (TDI): 44.6 mm/s  LV Peak Early Mcdonald Tissue Stpehen; Medial MA (TDI): 44.6 mm/s    Unspecified Scan Mode  LVOT CV Orifice Diam; Mean: 20 mm  LVOT Diam: 20 mm  MV Peak Stephen/LV Peak Tissue Stephen E-Wave; Medial MA: 14.2  DT; Antegrade Flow: 292 ms  DT; Mean;  Antegrade Flow: 292 ms  Dec Heard; Antegrade Flow: 2170 mm/s2  Dec Heard; Mean; Antegrade Flow: 2170 mm/s2  MV E/A: 0.7  MV Peak A Stephen: 887 mm/s  MV Peak A Stephen; Mean: 887 mm/s  MV Peak E Stephen; Antegrade Flow: 634 mm/s  MV Peak E Stephen; Mean; Antegrade Flow: 634 mm/s  MVA (PHT): 256 mm2  PHT: 86 ms  PHT; Mean: 86 ms  Peak Grad; Mean; Regurgitant Flow: 27 mm[Hg]  Vmax; Mean; Regurgitant Flow: 2590 mm/s  Vmax; Regurgitant Flow: 2700 mm/s  Vmax; Regurgitant Flow: 2380 mm/s  Vmax; Regurgitant Flow: 2700 mm/s    [facility] Accredited [facility]chocardi[facility]    Prepared and electronically signed by    Sheba Rivers MD  Signed 30-Jun-2020 15:41:54      No results found for this or any previous visit.         EKG: pending        Code Status: No Order

## 2023-08-22 NOTE — Clinical Note
The ICD GENERATOR [employer]vate MedicalAN MRI QUAD CRT-D IS1/DF1 - [id number] device was inserted. The leads were placed into the connector and visually verified to be in correct position. Injury current obtained.

## 2023-08-22 NOTE — ANESTHESIA POSTPROCEDURE EVALUATION
Post-Op Assessment Note    CV Status:  Stable  Pain Score: 0    Pain management: adequate     Mental Status:  Alert and awake   Hydration Status:  Euvolemic   PONV Controlled:  Controlled   Airway Patency:  Patent   Two or more mitigation strategies used for obstructive sleep apnea   Post Op Vitals Reviewed: Yes      Staff: CRNA         No notable events documented.     BP  137/63   Temp  36.2   Pulse  80   Resp   12   SpO2   100%

## 2023-08-24 DIAGNOSIS — I10 ESSENTIAL HYPERTENSION: ICD-10-CM

## 2023-08-24 RX ORDER — METOPROLOL SUCCINATE 100 MG/1
100 TABLET, EXTENDED RELEASE ORAL DAILY
Qty: 90 TABLET | Refills: 3 | Status: SHIPPED | OUTPATIENT
Start: 2023-08-24

## 2023-09-11 DIAGNOSIS — F32.A DEPRESSION, UNSPECIFIED DEPRESSION TYPE: ICD-10-CM

## 2023-09-11 DIAGNOSIS — E87.6 HYPOKALEMIA: ICD-10-CM

## 2023-09-11 DIAGNOSIS — E03.9 ACQUIRED HYPOTHYROIDISM: ICD-10-CM

## 2023-09-11 RX ORDER — LEVOTHYROXINE SODIUM 0.03 MG/1
25 TABLET ORAL DAILY
Qty: 90 TABLET | Refills: 3 | Status: SHIPPED | OUTPATIENT
Start: 2023-09-11

## 2023-09-11 RX ORDER — FLUOXETINE HYDROCHLORIDE 20 MG/1
20 CAPSULE ORAL DAILY
Qty: 30 CAPSULE | Refills: 3 | Status: SHIPPED | OUTPATIENT
Start: 2023-09-11

## 2023-09-11 RX ORDER — POTASSIUM CHLORIDE 20 MEQ/1
20 TABLET, EXTENDED RELEASE ORAL 2 TIMES DAILY
Qty: 60 TABLET | Refills: 3 | Status: SHIPPED | OUTPATIENT
Start: 2023-09-11

## 2023-09-13 ENCOUNTER — IN-CLINIC DEVICE VISIT (OUTPATIENT)
Dept: CARDIOLOGY CLINIC | Facility: CLINIC | Age: 73
End: 2023-09-13

## 2023-09-13 DIAGNOSIS — Z95.810 BIVENTRICULAR ICD (IMPLANTABLE CARDIOVERTER-DEFIBRILLATOR) IN PLACE: Primary | ICD-10-CM

## 2023-09-13 PROCEDURE — 99024 POSTOP FOLLOW-UP VISIT: CPT | Performed by: INTERNAL MEDICINE

## 2023-09-13 NOTE — PROGRESS NOTES
Results for orders placed or performed in visit on 09/13/23   Cardiac EP device report    Narrative    MDT/BIV-ICD/ NOT MRI CONDITIONAL  DEVICE INTERROGATED IN THE [place] OFFICE: WOUND CHECK: INCISION CLEAN AND DRY WITH EDGES APPROXIMATED; WOUND CARE AND RESTRICTIONS REVIEWED WITH PATIENT. BATTERY VOLTAGE ADEQUATE (4.6 YRS). AP 91.3% BVP 99.5% (VSR PACE <0.1%); HIGH LV CAPTURE THRESHOLDS - CHRONIC/STABLE; ALL OTHER LEAD PARAMETERS WITHIN NORMAL LIMITS/STABLE; NO SIGNIFICANT HIGH RATE OR V-SENSE EPISODES. OPTI-VOL FLUID THRESHOLD OPTIMIZING; NO PROGRAMMING CHANGES MADE TO DEVICE PARAMETERS. NORMAL DEVICE FUNCTION.   ES

## 2023-10-02 ENCOUNTER — APPOINTMENT (OUTPATIENT)
Dept: LAB | Facility: CLINIC | Age: 73
End: 2023-10-02
Payer: MEDICARE

## 2023-10-02 DIAGNOSIS — E78.2 MIXED HYPERLIPIDEMIA: ICD-10-CM

## 2023-10-02 DIAGNOSIS — E11.22 TYPE 2 DIABETES MELLITUS WITH STAGE 3 CHRONIC KIDNEY DISEASE, WITHOUT LONG-TERM CURRENT USE OF INSULIN, UNSPECIFIED WHETHER STAGE 3A OR 3B CKD (HCC): ICD-10-CM

## 2023-10-02 DIAGNOSIS — N18.30 TYPE 2 DIABETES MELLITUS WITH STAGE 3 CHRONIC KIDNEY DISEASE, WITHOUT LONG-TERM CURRENT USE OF INSULIN, UNSPECIFIED WHETHER STAGE 3A OR 3B CKD (HCC): ICD-10-CM

## 2023-10-02 DIAGNOSIS — E03.9 ACQUIRED HYPOTHYROIDISM: ICD-10-CM

## 2023-10-02 LAB
ALBUMIN SERPL BCP-MCNC: 3.9 G/DL (ref 3.5–5)
ALP SERPL-CCNC: 57 U/L (ref 34–104)
ALT SERPL W P-5'-P-CCNC: 18 U/L (ref 7–52)
ANION GAP SERPL CALCULATED.3IONS-SCNC: 7 MMOL/L
AST SERPL W P-5'-P-CCNC: 14 U/L (ref 13–39)
BILIRUB SERPL-MCNC: 0.66 MG/DL (ref 0.2–1)
BUN SERPL-MCNC: 28 MG/DL (ref 5–25)
CALCIUM SERPL-MCNC: 10.4 MG/DL (ref 8.4–10.2)
CHLORIDE SERPL-SCNC: 102 MMOL/L (ref 96–108)
CHOLEST SERPL-MCNC: 170 MG/DL
CO2 SERPL-SCNC: 33 MMOL/L (ref 21–32)
CREAT SERPL-MCNC: 0.93 MG/DL (ref 0.6–1.3)
EST. AVERAGE GLUCOSE BLD GHB EST-MCNC: 154 MG/DL
GFR SERPL CREATININE-BSD FRML MDRD: 60 ML/MIN/1.73SQ M
GLUCOSE P FAST SERPL-MCNC: 88 MG/DL (ref 65–99)
HBA1C MFR BLD: 7 %
HDLC SERPL-MCNC: 68 MG/DL
LDLC SERPL CALC-MCNC: 88 MG/DL (ref 0–100)
NONHDLC SERPL-MCNC: 102 MG/DL
POTASSIUM SERPL-SCNC: 4.1 MMOL/L (ref 3.5–5.3)
PROT SERPL-MCNC: 6.1 G/DL (ref 6.4–8.4)
SODIUM SERPL-SCNC: 142 MMOL/L (ref 135–147)
TRIGL SERPL-MCNC: 68 MG/DL
TSH SERPL DL<=0.05 MIU/L-ACNC: 1.44 UIU/ML (ref 0.45–4.5)

## 2023-10-02 PROCEDURE — 36415 COLL VENOUS BLD VENIPUNCTURE: CPT

## 2023-10-02 PROCEDURE — 80053 COMPREHEN METABOLIC PANEL: CPT

## 2023-10-02 PROCEDURE — 80061 LIPID PANEL: CPT

## 2023-10-02 PROCEDURE — 84443 ASSAY THYROID STIM HORMONE: CPT

## 2023-10-02 PROCEDURE — 83036 HEMOGLOBIN GLYCOSYLATED A1C: CPT

## 2023-10-04 ENCOUNTER — RA CDI HCC (OUTPATIENT)
Dept: OTHER | Facility: HOSPITAL | Age: 73
End: 2023-10-04

## 2023-10-04 NOTE — PROGRESS NOTES
[address] [facility] coding opportunities        I13.0  Chart Reviewed number of suggestions sent to Provider: 1     Patients Insurance     Medicare Insurance: [employer]

## 2023-10-10 ENCOUNTER — OFFICE VISIT (OUTPATIENT)
Dept: FAMILY MEDICINE CLINIC | Facility: HOSPITAL | Age: 73
End: 2023-10-10
Payer: MEDICARE

## 2023-10-10 VITALS
DIASTOLIC BLOOD PRESSURE: 83 MMHG | HEIGHT: 58 IN | OXYGEN SATURATION: 98 % | TEMPERATURE: 97.3 F | BODY MASS INDEX: 31.11 KG/M2 | HEART RATE: 73 BPM | WEIGHT: 148.2 LBS | SYSTOLIC BLOOD PRESSURE: 123 MMHG

## 2023-10-10 DIAGNOSIS — M25.512 CHRONIC LEFT SHOULDER PAIN: ICD-10-CM

## 2023-10-10 DIAGNOSIS — G89.29 CHRONIC LEFT SHOULDER PAIN: ICD-10-CM

## 2023-10-10 DIAGNOSIS — N18.30 TYPE 2 DIABETES MELLITUS WITH STAGE 3 CHRONIC KIDNEY DISEASE, WITHOUT LONG-TERM CURRENT USE OF INSULIN, UNSPECIFIED WHETHER STAGE 3A OR 3B CKD (HCC): ICD-10-CM

## 2023-10-10 DIAGNOSIS — E03.9 ACQUIRED HYPOTHYROIDISM: ICD-10-CM

## 2023-10-10 DIAGNOSIS — I10 ESSENTIAL HYPERTENSION: ICD-10-CM

## 2023-10-10 DIAGNOSIS — E11.22 TYPE 2 DIABETES MELLITUS WITH STAGE 3 CHRONIC KIDNEY DISEASE, WITHOUT LONG-TERM CURRENT USE OF INSULIN, UNSPECIFIED WHETHER STAGE 3A OR 3B CKD (HCC): ICD-10-CM

## 2023-10-10 DIAGNOSIS — E78.2 MIXED HYPERLIPIDEMIA: Primary | ICD-10-CM

## 2023-10-10 LAB
CREAT UR-MCNC: 49.3 MG/DL
MICROALBUMIN UR-MCNC: <7 MG/L
MICROALBUMIN/CREAT 24H UR: <14 MG/G CREATININE (ref 0–30)

## 2023-10-10 PROCEDURE — 82570 ASSAY OF URINE CREATININE: CPT | Performed by: FAMILY MEDICINE

## 2023-10-10 PROCEDURE — 82043 UR ALBUMIN QUANTITATIVE: CPT | Performed by: FAMILY MEDICINE

## 2023-10-10 PROCEDURE — 99214 OFFICE O/P EST MOD 30 MIN: CPT | Performed by: FAMILY MEDICINE

## 2023-10-10 NOTE — PROGRESS NOTES
Name: Keven Talavera      : 1950      MRN: [id number]  Encounter Provider: Rosa Maria Bennett MD  Encounter Date: 10/10/2023   Encounter department: [address]     1. Mixed hyperlipidemia  Assessment & Plan:  Excellent BP profile    Orders:  -     Lipid Panel with Direct LDL reflex; Future    2. Type 2 diabetes mellitus with stage 3 chronic kidney disease, without long-term current use of insulin, unspecified whether stage 3a or 3b CKD (HCC)  -     Albumin / creatinine urine ratio  -     Empagliflozin (JARDIANCE) 10 MG TABS tablet; Take 1 tablet (10 mg total) by mouth daily  -     CBC and Platelet; Future  -     HEMOGLOBIN A1C W/ EAG ESTIMATION; Future    3. Chronic left shoulder pain    4. Essential hypertension  Assessment & Plan:  Excellent BP control    Orders:  -     Comprehensive metabolic panel; Future    5. Acquired hypothyroidism  -     TSH, 3rd generation with Free T4 reflex; Future    Controlled Substance Review    [place] PDMP or [place]  reviewed: No red flags were identified; safe to proceed with prescription. .         Subjective     4 month follow up multiple issues    Complains of being tired all the time and sleeping too much    Falling more often but not injuring herself    L shoulder bothering her, had past surgery  Willing to return to her Orthopedist      Review of Systems   Constitutional:  Positive for unexpected weight change. Respiratory: Negative. Cardiovascular: Negative. Gastrointestinal: Negative. Musculoskeletal:  Positive for arthralgias and joint swelling. Psychiatric/Behavioral:  Positive for decreased concentration. All other systems reviewed and are negative.       Past Medical History:   Diagnosis Date    Abnormal finding on breast imaging     last assessed 17    Acute bacterial bronchitis     Allergic rhinitis     Arthralgia     Arthritis     Atherosclerotic heart disease of native coronary artery without angina pectoris     BBB (bundle branch block)     left,last assesed 6/4/12    BBB (bundle branch block)     left    Benign paroxysmal vertigo     last assessed 9/7/12    Benign paroxysmal vertigo of left ear     Bilateral fibrocystic breast changes     Bilateral sacroiliitis ([facility])     Cardiac defibrillator in situ     Carpal tunnel syndrome, unspecified upper limb     CHF (congestive heart failure) ([facility])     chronic systolic/diastolic    Chronic diastolic congestive heart failure (HCC)     Chronic kidney disease     stage 3    Chronic systolic congestive heart failure ([facility])     Coronary artery disease     Cough     Depression     Detrusor instability     Diabetes mellitus ([address])     Difficulty walking up stairs     Dilated cardiomyopathy ([address])     Dilated cardiomyopathy ([address])     Disease of thyroid gland     Disorder of intervertebral disc of cervical spine     Esophageal reflux     GERD (gastroesophageal reflux disease)     Gout     Hemorrhoids     History of blood clots     Hormone replacement therapy     Hx of blood clots     Hyperlipidemia     Hypertension     Hypokalemia     Hypothyroidism     Indigestion     Inflamed toenail, left     Ingrown nail     Low back pain     left    OAB (overactive bladder)     detrusor instability    Obesity     AZ (obstructive sleep apnea)     Osteoarthritis     Papilloma of left breast     Psoriasis     psoriatic arthropathy    Psoriatic arthropathy ([facility])     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    RLS (restless legs syndrome)     Sciatica     Shortness of breath     Sleep apnea     unspecified type    Tendinitis     Trigger thumb, left thumb     Urinary frequency     UTI (urinary tract infection)     Vitamin D deficiency      Past Surgical History:   Procedure Laterality Date    BLADDER SURGERY  1999    lift and repair    BREAST BIOPSY Left 05/23/2016     CORE BIOPSY-BENIGN    CARDIAC CATHETERIZATION      outcome:  successful    CARDIAC DEFIBRILLATOR PLACEMENT      CARDIAC ELECTROPHYSIOLOGY PROCEDURE N/A 8/22/2023    Procedure: Cardiac biv icd generator change;  Surgeon: Lillard Schlatter, MD;  Location: [facility]; Service: Cardiology    CARPAL TUNNEL RELEASE Bilateral 1997    CATARACT EXTRACTION      COLONOSCOPY      CORONARY ANGIOPLASTY WITH STENT PLACEMENT      CYSTOSCOPY W/ URETEROSCOPY  2009    DE QUERVAIN'S RELEASE Left 2011    and trigger finger release    EYE SURGERY Left 1999    EYE SURGERY Left 11/13/2019    Cataract    EYE SURGERY Right 11/09/2019    Cataract    INSERT / REPLACE / REMOVE PACEMAKER      JOINT REPLACEMENT Right 09/2017    Knee    KNEE ARTHROSCOPY      therapeutic    NEUROPLASTY / TRANSPOSITION MEDIAN NERVE AT CARPAL TUNNEL      OOPHORECTOMY Bilateral     AGE 53    NC TENDON SHEATH INCISION Left 3/2/2017    Procedure: SMALL TRIGGER FINGER RELEASE;  Surgeon: Mary Ling MD;  Location: [facility];  Service: Orthopedics    RECTAL SURGERY  2006    repair of rectal ulcer    SHOULDER ARTHROSCOPY Left 2006    TOTAL ABDOMINAL HYSTERECTOMY      AGE 53    TOTAL KNEE ARTHROPLASTY Left     TOTAL SHOULDER REPLACEMENT Left 2007    TRIGGER FINGER RELEASE Bilateral 2011    right haand 201,2015,left hand 2012,2015    US GUIDED BREAST BIOPSY LEFT COMPLETE Left 5/23/2016     Family History   Problem Relation Age of Onset    Hypertension Mother     Alzheimer's disease Mother     Cancer Father 70        bladder    Prostate cancer Father 70    Cancer Brother         pt shared some type of blood cancer    Breast cancer Paternal Grandmother         age dx unk    Stomach cancer Paternal Aunt 72     Social History     Socioeconomic History    Marital status:       Spouse name: None    Number of children: None    Years of education: None    Highest education level: None   Occupational History    None   Tobacco Use    Smoking status: Former     Packs/day: 1.00     Years: 15.00     Total pack years: 15.00     Types: Cigarettes     Quit date: 1977     Years since quittin.8    Smokeless tobacco: Never   Vaping Use    Vaping Use: Never used   Substance and Sexual Activity    Alcohol use: No    Drug use: No    Sexual activity: None     Comment: birth control   Other Topics Concern    None   Social History Narrative    Always uses seat belt     Social Determinants of Health     Financial Resource Strain: Low Risk  (2023)    Overall Financial Resource Strain (CARDIA)     Difficulty of Paying Living Expenses: Not very hard   Food Insecurity: Not on file   Transportation Needs: No Transportation Needs (2023)    PRAPARE - Transportation     Lack of Transportation (Medical): No     Lack of Transportation (Non-Medical): No   Physical Activity: Not on file   Stress: Not on file   Social Connections: Not on file   Intimate Partner Violence: Not on file   Housing Stability: Not on file     Current Outpatient Medications on File Prior to Visit   Medication Sig    allopurinol (ZYLOPRIM) 100 mg tablet TAKE 1 TABLET BY MOUTH DAILY    aspirin 81 MG tablet Take 81 mg by mouth daily.     buPROPion (WELLBUTRIN XL) 150 mg 24 hr tablet Take 1 tablet (150 mg total) by mouth every morning    calcium carbonate (OS-MANINDER) 600 MG tablet Take 1,200 mg by mouth daily    candesartan (ATACAND) 32 MG tablet TAKE 1 TABLET BY MOUTH DAILY    celecoxib (CeleBREX) 200 mg capsule Take 1 capsule (200 mg total) by mouth 2 (two) times a day (Patient taking differently: Take 200 mg by mouth every other day)    cetirizine (ZyrTEC) 10 mg tablet Take 10 mg by mouth daily    chlorhexidine (PERIDEX) 0.12 % solution     Cholecalciferol (VITAMIN D3) 1000 units CAPS Take by mouth    FLUoxetine (PROzac) 20 mg capsule Take 1 capsule (20 mg total) by mouth daily    levothyroxine 25 mcg tablet Take 1 tablet (25 mcg total) by mouth daily    Magnesium 250 MG TABS Take by mouth daily    metoprolol succinate (TOPROL-XL) 100 mg 24 hr tablet Take 1 tablet (100 mg total) by mouth daily    Minoxidil (ROGAINE WOMENS EX) Apply 1 mL topically. montelukast (SINGULAIR) 10 mg tablet Take 1 tablet (10 mg total) by mouth daily at bedtime    omeprazole (PriLOSEC) 40 MG capsule TAKE 1 CAPSULE BY MOUTH DAILY    potassium chloride (K-DUR,KLOR-CON) 20 mEq tablet Take 1 tablet (20 mEq total) by mouth 2 (two) times a day    pyridoxine (VITAMIN B6) 100 mg tablet Take 100 mg by mouth daily. torsemide (DEMADEX) 20 mg tablet TAKE 1 TABLET BY MOUTH TWICE DAILY    traMADol (ULTRAM) 50 mg tablet Take 1 tablet (50 mg total) by mouth every 8 (eight) hours as needed for moderate pain    Magnesium Oxide 250 MG TABS Take by mouth (Patient not taking: Reported on 6/6/2023)     Allergies   Allergen Reactions    Dust Mite Extract Sneezing     Immunization History   Administered Date(s) Administered    COVID-19 PFIZER VACCINE 0.3 ML IM 03/08/2021, 03/30/2021, 10/12/2021, 04/15/2022    COVID-19 Pfizer Vac BIVALENT [place]-sucrose 12 Yr+ IM 12/14/2022    INFLUENZA 10/11/2022, 09/19/2023    Influenza Split High Dose Preservative Free IM 09/24/2013, 10/16/2014, 11/03/2015, 10/05/2016, 10/13/2017    Influenza, high dose seasonal 0.7 mL 09/20/2018, 10/01/2019, 10/07/2020, 09/23/2021    Influenza, seasonal, injectable 09/07/2012    Pneumococcal Conjugate 13-Valent 06/24/2015    Pneumococcal Polysaccharide PPV23 10/01/2000, 10/01/2005, 11/26/2018    Zoster Vaccine Recombinant 09/19/2019, 11/20/2019       Objective     /83 (BP Location: Left arm, Patient Position: Sitting, Cuff Size: Standard)   Pulse 73   Temp (!) 97.3 °F (36.3 °C) (Tympanic)   Ht 4' 10" (1.473 m)   Wt 67.2 kg (148 lb 3.2 oz)   SpO2 98%   BMI 30.97 kg/m²     Physical Exam  Vitals reviewed. Constitutional:       Appearance: Normal appearance. Cardiovascular:      Rate and Rhythm: Normal rate and regular rhythm. Pulses: Normal pulses. Heart sounds: Murmur heard. Pulmonary:      Breath sounds: Normal breath sounds. Musculoskeletal:      Right lower leg: No edema.       Left lower leg: No edema. Neurological:      Mental Status: She is alert and oriented to person, place, and time.    Psychiatric:         Mood and Affect: Mood normal.       Linnea Becerra MD

## 2023-10-13 DIAGNOSIS — E78.5 HYPERLIPIDEMIA, UNSPECIFIED HYPERLIPIDEMIA TYPE: ICD-10-CM

## 2023-10-13 RX ORDER — SIMVASTATIN 40 MG
40 TABLET ORAL DAILY
Qty: 30 TABLET | Refills: 3 | Status: SHIPPED | OUTPATIENT
Start: 2023-10-13

## 2023-10-14 NOTE — PATIENT INSTRUCTIONS
Basic Carbohydrate Counting   AMBULATORY CARE:   Carbohydrate counting  is a way to plan your meals by counting the amount of carbohydrate in foods. Carbohydrates are the sugars, starches, and fiber found in fruit, grains, vegetables, and milk products. Carbohydrates increase your blood sugar levels. Carbohydrate counting can help you eat the right amount of carbohydrate to keep your blood sugar levels under control. What you need to know about planning meals using carbohydrate counting:  A dietitian or healthcare provider will help you develop a healthy meal plan that works best for you. You will be taught how much carbohydrate to eat or drink for each meal and snack. Your meal plan will be based on your age, weight, usual food intake, and physical activity level. If you have diabetes, it will also include your blood sugar levels and diabetes medicine. Once you know how much carbohydrate you should eat, you can decide what type of food you want to eat. You will need to know what foods contain carbohydrate and how much they contain. Keep track of the amount of carbohydrate in meals and snacks in order to follow your meal plan. Do not avoid carbohydrates or skip meals. Your blood sugar may fall too low if you do not eat enough carbohydrate or you skip meals. Foods that contain carbohydrate:   Breads:  Each serving of food listed below contains about 15 g of carbohydrate . 1 slice of bread (1 ounce) or 1 flour or corn tortilla (6 inch)    ½ of a hamburger bun or ¼ of a large bagel (about 1 ounce)    1 pancake (about 4 inches across and ¼ inch thick)    Cereals and grains:  Serving sizes of ready-to-eat cereals vary. Look at the serving size and the total carbohydrate amount listed on the food label. Each serving of food listed below contains about 15 g of carbohydrate .     ¾ cup of dry, unsweetened, ready-to-eat cereal or ¼ cup of low-fat granola     ½ cup of oatmeal or other cooked cereal     ? cup of cooked rice or pasta    Starchy vegetables and beans:  Each serving of food listed below contains about 15 g of carbohydrate . ½ cup of corn, green peas, sweet potatoes, or mashed potatoes    ¼ of a large baked potato    ½ cup of beans, lentils, and peas (garbanzo, ohara, kidney, white, split, black-eyed)    Crackers and snacks:  Each serving of food listed below contains about 15 g of carbohydrate . 3 raul cracker squares or 8 animal crackers     6 saltine-type crackers    3 cups of popcorn or ¾ ounce of pretzels, potato chips, or tortilla chips    Fruit:  Each serving of food listed below contains about 15 g of carbohydrate . 1 small (4 ounce) piece of fresh fruit or ¾ to 1 cup of fresh fruit    ½ cup of canned or frozen fruit, packed in natural juice    ½ cup (4 ounces) of unsweetened fruit juice    2 tablespoons of dried fruit    Desserts or sugary foods:  Each serving of food listed below contains about 15 g of carbohydrate . 2-inch square unfrosted cake or brownie     2 small cookies    ½ cup of ice cream, frozen yogurt, or nondairy frozen yogurt    ¼ cup of sherbet or sorbet    1 tablespoon of regular syrup, jam, or jelly    2 tablespoons of light syrup    Milk and yogurt:  Foods from the milk group contain about 12 g of carbohydrate per serving. 1 cup of fat-free or low-fat milk    1 cup of soy milk    ? cup of fat-free, yogurt sweetened with artificial sweetener    Non-starchy vegetables:  Each serving contains about 5 g of carbohydrate . Three servings of non-starch vegetables count as 1 carbohydrate serving. ½ cup of cooked vegetables or 1 cup of raw vegetables. This includes beets, broccoli, cabbage, cauliflower, cucumber, mushrooms, tomatoes, and zucchini    ½ cup of vegetable juice    How to use carbohydrate counting to plan meals:   Count carbohydrate amounts using serving sizes:      Pasta dinner example: You plan to have pasta, tossed salad, and an 8-ounce glass of milk.  Your healthcare provider tells you that you may have 4 carbohydrate servings for dinner. One carbohydrate serving of pasta is ? cup. One cup of pasta will equal 3 carbohydrate servings. An 8-ounce glass of milk will count as 1 carbohydrate serving. These amounts of food would equal 4 carbohydrate servings. One cup of tossed salad does not count toward your carbohydrate servings. Count carbohydrate amounts using food labels:  Find the total amount of carbohydrate in a packaged food by reading the food label. Food labels tell you the serving size of the food and the total carbohydrate amount in each serving. Find the serving size on the food label and then decide how many servings you will eat. Multiply the number of servings you plan to eat by the carbohydrate amount per serving. Granola bar snack example: Your meal plan allows you to have 2 carbohydrate servings (30 grams) of carbohydrate for a snack. You plan to eat 1 package of granola bars, which contains 2 bars. According to the food label, the serving size of food in this package is 1 bar. Each serving (1 bar) contains 25 grams of carbohydrate. The total amount of carbohydrate in this package of granola bars would be 50 g. Based on your meal plan, you should eat only 1 bar. Follow up with your doctor as directed:  Write down your questions so you remember to ask them during your visits. © Copyright [employer] Moreira 2023 Information is for End User's use only and may not be sold, redistributed or otherwise used for commercial purposes. The above information is an  only. It is not intended as medical advice for individual conditions or treatments. Talk to your doctor, nurse or pharmacist before following any medical regimen to see if it is safe and effective for you.

## 2023-11-06 DIAGNOSIS — I10 ESSENTIAL HYPERTENSION: ICD-10-CM

## 2023-11-06 DIAGNOSIS — M10.00 IDIOPATHIC GOUT, UNSPECIFIED CHRONICITY, UNSPECIFIED SITE: ICD-10-CM

## 2023-11-06 DIAGNOSIS — M25.50 ARTHRALGIA, UNSPECIFIED JOINT: ICD-10-CM

## 2023-11-06 DIAGNOSIS — F32.1 CURRENT MODERATE EPISODE OF MAJOR DEPRESSIVE DISORDER WITHOUT PRIOR EPISODE (HCC): ICD-10-CM

## 2023-11-07 RX ORDER — TRAMADOL HYDROCHLORIDE 50 MG/1
50 TABLET ORAL EVERY 8 HOURS PRN
Qty: 90 TABLET | Refills: 1 | Status: SHIPPED | OUTPATIENT
Start: 2023-11-07

## 2023-11-07 RX ORDER — BUPROPION HYDROCHLORIDE 150 MG/1
150 TABLET ORAL EVERY MORNING
Qty: 30 TABLET | Refills: 5 | Status: SHIPPED | OUTPATIENT
Start: 2023-11-07 | End: 2024-05-05

## 2023-11-07 RX ORDER — CANDESARTAN 32 MG/1
32 TABLET ORAL DAILY
Qty: 30 TABLET | Refills: 5 | Status: SHIPPED | OUTPATIENT
Start: 2023-11-07

## 2023-11-07 RX ORDER — ALLOPURINOL 100 MG/1
100 TABLET ORAL DAILY
Qty: 90 TABLET | Refills: 3 | Status: SHIPPED | OUTPATIENT
Start: 2023-11-07

## 2023-11-28 ENCOUNTER — APPOINTMENT (OUTPATIENT)
Dept: LAB | Facility: HOSPITAL | Age: 73
End: 2023-11-28
Payer: MEDICARE

## 2023-11-28 ENCOUNTER — OFFICE VISIT (OUTPATIENT)
Dept: FAMILY MEDICINE CLINIC | Facility: HOSPITAL | Age: 73
End: 2023-11-28
Payer: MEDICARE

## 2023-11-28 VITALS
BODY MASS INDEX: 30.27 KG/M2 | WEIGHT: 144.2 LBS | TEMPERATURE: 98 F | HEART RATE: 79 BPM | OXYGEN SATURATION: 100 % | DIASTOLIC BLOOD PRESSURE: 88 MMHG | HEIGHT: 58 IN | SYSTOLIC BLOOD PRESSURE: 143 MMHG

## 2023-11-28 DIAGNOSIS — E03.9 ACQUIRED HYPOTHYROIDISM: ICD-10-CM

## 2023-11-28 DIAGNOSIS — N18.31 STAGE 3A CHRONIC KIDNEY DISEASE (HCC): ICD-10-CM

## 2023-11-28 DIAGNOSIS — R29.6 FALLING EPISODES: ICD-10-CM

## 2023-11-28 DIAGNOSIS — R25.1 TREMOR OF BOTH HANDS: Primary | ICD-10-CM

## 2023-11-28 DIAGNOSIS — I10 ESSENTIAL HYPERTENSION: ICD-10-CM

## 2023-11-28 DIAGNOSIS — E78.2 MIXED HYPERLIPIDEMIA: ICD-10-CM

## 2023-11-28 DIAGNOSIS — E11.22 TYPE 2 DIABETES MELLITUS WITH STAGE 3 CHRONIC KIDNEY DISEASE, WITHOUT LONG-TERM CURRENT USE OF INSULIN, UNSPECIFIED WHETHER STAGE 3A OR 3B CKD (HCC): ICD-10-CM

## 2023-11-28 DIAGNOSIS — N18.30 TYPE 2 DIABETES MELLITUS WITH STAGE 3 CHRONIC KIDNEY DISEASE, WITHOUT LONG-TERM CURRENT USE OF INSULIN, UNSPECIFIED WHETHER STAGE 3A OR 3B CKD (HCC): ICD-10-CM

## 2023-11-28 LAB
ALBUMIN SERPL BCP-MCNC: 4.4 G/DL (ref 3.5–5)
ALP SERPL-CCNC: 58 U/L (ref 34–104)
ALT SERPL W P-5'-P-CCNC: 19 U/L (ref 7–52)
ANION GAP SERPL CALCULATED.3IONS-SCNC: 8 MMOL/L
AST SERPL W P-5'-P-CCNC: 19 U/L (ref 13–39)
BILIRUB SERPL-MCNC: 0.83 MG/DL (ref 0.2–1)
BUN SERPL-MCNC: 17 MG/DL (ref 5–25)
CALCIUM SERPL-MCNC: 10.7 MG/DL (ref 8.4–10.2)
CHLORIDE SERPL-SCNC: 104 MMOL/L (ref 96–108)
CHOLEST SERPL-MCNC: 175 MG/DL
CO2 SERPL-SCNC: 30 MMOL/L (ref 21–32)
CREAT SERPL-MCNC: 1.15 MG/DL (ref 0.6–1.3)
ERYTHROCYTE [DISTWIDTH] IN BLOOD BY AUTOMATED COUNT: 13.5 % (ref 11.6–15.1)
EST. AVERAGE GLUCOSE BLD GHB EST-MCNC: 137 MG/DL
GFR SERPL CREATININE-BSD FRML MDRD: 46 ML/MIN/1.73SQ M
GLUCOSE P FAST SERPL-MCNC: 89 MG/DL (ref 65–99)
HBA1C MFR BLD: 6.4 %
HCT VFR BLD AUTO: 48.2 % (ref 34.8–46.1)
HDLC SERPL-MCNC: 85 MG/DL
HGB BLD-MCNC: 15.2 G/DL (ref 11.5–15.4)
LDLC SERPL CALC-MCNC: 79 MG/DL (ref 0–100)
MCH RBC QN AUTO: 29.1 PG (ref 26.8–34.3)
MCHC RBC AUTO-ENTMCNC: 31.5 G/DL (ref 31.4–37.4)
MCV RBC AUTO: 92 FL (ref 82–98)
PLATELET # BLD AUTO: 191 THOUSANDS/UL (ref 149–390)
PMV BLD AUTO: 10.4 FL (ref 8.9–12.7)
POTASSIUM SERPL-SCNC: 4.2 MMOL/L (ref 3.5–5.3)
PROT SERPL-MCNC: 7 G/DL (ref 6.4–8.4)
RBC # BLD AUTO: 5.23 MILLION/UL (ref 3.81–5.12)
SODIUM SERPL-SCNC: 142 MMOL/L (ref 135–147)
TRIGL SERPL-MCNC: 55 MG/DL
TSH SERPL DL<=0.05 MIU/L-ACNC: 0.88 UIU/ML (ref 0.45–4.5)
WBC # BLD AUTO: 9.45 THOUSAND/UL (ref 4.31–10.16)

## 2023-11-28 PROCEDURE — 80061 LIPID PANEL: CPT

## 2023-11-28 PROCEDURE — 99214 OFFICE O/P EST MOD 30 MIN: CPT | Performed by: FAMILY MEDICINE

## 2023-11-28 PROCEDURE — 84443 ASSAY THYROID STIM HORMONE: CPT

## 2023-11-28 PROCEDURE — 85027 COMPLETE CBC AUTOMATED: CPT

## 2023-11-28 PROCEDURE — 83036 HEMOGLOBIN GLYCOSYLATED A1C: CPT

## 2023-11-28 PROCEDURE — 80053 COMPREHEN METABOLIC PANEL: CPT

## 2023-11-28 PROCEDURE — 36415 COLL VENOUS BLD VENIPUNCTURE: CPT

## 2023-11-28 NOTE — PROGRESS NOTES
Name: Keven Talavera      : 1950      MRN: [id number]  Encounter Provider: Rosa Maria Bennett MD  Encounter Date: 2023   Encounter department: [address]     1. Tremor of both hands    2. Falling episodes    3. Stage 3a chronic kidney disease (HCC)  -     Basic metabolic panel; Future    4. Type 2 diabetes mellitus with stage 3 chronic kidney disease, without long-term current use of insulin, unspecified whether stage 3a or 3b CKD ([address])  Assessment & Plan:    Lab Results   Component Value Date    HGBA1C 6.4 (H) 2023                Subjective      Visit for increase in tremor of hands over the past week or two  Becoming difficult to write    Notes more difficulty with remembering what she wants to say  Has been falling more often past few months    We started Bupropion about 2 months ago    Some weight loss- not eating as much      Review of Systems   Constitutional:  Positive for unexpected weight change. HENT: Negative. Eyes:  Negative for visual disturbance. Respiratory: Negative. Cardiovascular: Negative. Musculoskeletal:  Positive for arthralgias and back pain. Neurological:  Positive for tremors. Psychiatric/Behavioral:  Positive for decreased concentration. All other systems reviewed and are negative. Current Outpatient Medications on File Prior to Visit   Medication Sig    allopurinol (ZYLOPRIM) 100 mg tablet Take 1 tablet (100 mg total) by mouth daily    aspirin 81 MG tablet Take 81 mg by mouth daily.     calcium carbonate (OS-MANINDER) 600 MG tablet Take 1,200 mg by mouth daily    candesartan (ATACAND) 32 MG tablet Take 1 tablet (32 mg total) by mouth daily    celecoxib (CeleBREX) 200 mg capsule Take 1 capsule (200 mg total) by mouth 2 (two) times a day (Patient taking differently: Take 200 mg by mouth every other day)    cetirizine (ZyrTEC) 10 mg tablet Take 10 mg by mouth daily    chlorhexidine (PERIDEX) 0.12 % solution     Cholecalciferol (VITAMIN D3) 1000 units CAPS Take by mouth    Empagliflozin (JARDIANCE) 10 MG TABS tablet Take 1 tablet (10 mg total) by mouth daily    FLUoxetine (PROzac) 20 mg capsule Take 1 capsule (20 mg total) by mouth daily    levothyroxine 25 mcg tablet Take 1 tablet (25 mcg total) by mouth daily    Magnesium 250 MG TABS Take by mouth daily    metoprolol succinate (TOPROL-XL) 100 mg 24 hr tablet Take 1 tablet (100 mg total) by mouth daily    Minoxidil (ROGAINE WOMENS EX) Apply 1 mL topically. montelukast (SINGULAIR) 10 mg tablet Take 1 tablet (10 mg total) by mouth daily at bedtime    potassium chloride (K-DUR,KLOR-CON) 20 mEq tablet Take 1 tablet (20 mEq total) by mouth 2 (two) times a day    pyridoxine (VITAMIN B6) 100 mg tablet Take 100 mg by mouth daily. simvastatin (ZOCOR) 40 mg tablet Take 1 tablet (40 mg total) by mouth daily    traMADol (ULTRAM) 50 mg tablet Take 1 tablet (50 mg total) by mouth every 8 (eight) hours as needed for moderate pain    [DISCONTINUED] buPROPion (WELLBUTRIN XL) 150 mg 24 hr tablet Take 1 tablet (150 mg total) by mouth every morning    [DISCONTINUED] Magnesium Oxide 250 MG TABS Take by mouth (Patient not taking: Reported on 6/6/2023)       Objective     /88 (BP Location: Left arm, Patient Position: Sitting, Cuff Size: Standard)   Pulse 79   Temp 98 °F (36.7 °C) (Tympanic)   Ht 4' 10" (1.473 m)   Wt 65.4 kg (144 lb 3.2 oz)   SpO2 100%   BMI 30.14 kg/m²     Physical Exam  Vitals reviewed. Cardiovascular:      Rate and Rhythm: Regular rhythm. Heart sounds: Normal heart sounds. Pulmonary:      Breath sounds: Normal breath sounds. Musculoskeletal:      Right lower leg: No edema. Left lower leg: No edema. Neurological:      Mental Status: She is alert. Motor: Tremor present.    Psychiatric:         Mood and Affect: Mood normal.       Rosa Maria Bennett MD

## 2023-12-01 DIAGNOSIS — R60.9 EDEMA, UNSPECIFIED TYPE: ICD-10-CM

## 2023-12-01 DIAGNOSIS — K21.00 GASTROESOPHAGEAL REFLUX DISEASE WITH ESOPHAGITIS: ICD-10-CM

## 2023-12-01 RX ORDER — OMEPRAZOLE 40 MG/1
CAPSULE, DELAYED RELEASE ORAL
Qty: 60 CAPSULE | Refills: 3 | Status: SHIPPED | OUTPATIENT
Start: 2023-12-01

## 2023-12-02 ENCOUNTER — NURSE TRIAGE (OUTPATIENT)
Dept: OTHER | Facility: OTHER | Age: 73
End: 2023-12-02

## 2023-12-02 RX ORDER — TORSEMIDE 20 MG/1
TABLET ORAL
Qty: 60 TABLET | Refills: 5 | Status: SHIPPED | OUTPATIENT
Start: 2023-12-02

## 2023-12-02 NOTE — TELEPHONE ENCOUNTER
Regarding: pacemaker went off  ----- Message from Mar Oseguera sent at 12/2/2023 12:10 PM EST -----  "My pacemaker went off and I'm not sure why"

## 2023-12-02 NOTE — TELEPHONE ENCOUNTER
Reason for Disposition   Deuel or felt device alarm (e.g. beeping or buzzing)    Answer Assessment - Initial Assessment Questions  1. DESCRIPTION: "Please describe the concern you have with your pacemaker or defibrillator" (e.g.,  delivered a shock, palpitations, beeping heard)      Starting beeping     2. ONSET: "When did this occur?" (e.g., minutes, hours or days)      About 30 mins ago    3. DURATION: "How long did it last" (e.g., seconds, minutes, hours)   4. [place] RECURRENT SYMPTOM: "Have you ever had this before?" If YES,  "When was the last time?" and "What happened that time?"       Last a few minutes    5. CARDIAC HISTORY: "What other heart problems do you have?" (e.g., heart attack, angina, bypass surgery, stents, heart failure, rhythm problem)       See chart    6. OTHER SYMPTOMS: "Do you have any other symptoms?" (e.g., dizziness, chest pain, fever, sweating, difficulty breathing)      Denies chest pain, shortness of breath, or any other symptoms    Protocols used: ICD and Pacemaker Symptoms and Questions-ADULT-AH        Notified on call provider for EP - per Dr. Errol Martinez, he will be contacting [employer] and [employer] will be contacting patient directly as issue sounds like the device isn't communicating with the bedside monitor. Let patient know that the rep will be calling her directly and to keep phone loud and nearby. Also instructed to call back if any symptoms occurs. Patient verbalized understanding.

## 2023-12-03 ENCOUNTER — HOSPITAL ENCOUNTER (OUTPATIENT)
Dept: CT IMAGING | Facility: HOSPITAL | Age: 73
Discharge: HOME | End: 2023-12-03
Payer: MEDICARE

## 2023-12-03 DIAGNOSIS — R25.1 TREMOR OF BOTH HANDS: ICD-10-CM

## 2023-12-03 DIAGNOSIS — R29.6 FALLING EPISODES: ICD-10-CM

## 2023-12-03 PROCEDURE — 70470 CT HEAD/BRAIN W/O & W/DYE: CPT

## 2023-12-03 PROCEDURE — G1004 CDSM NDSC: HCPCS

## 2023-12-03 RX ADMIN — IOHEXOL 85 ML: 350 INJECTION, SOLUTION INTRAVENOUS at 14:25

## 2023-12-08 ENCOUNTER — IN-CLINIC DEVICE VISIT (OUTPATIENT)
Dept: CARDIOLOGY CLINIC | Facility: CLINIC | Age: 73
End: 2023-12-08

## 2023-12-08 DIAGNOSIS — Z95.810 AICD (AUTOMATIC CARDIOVERTER/DEFIBRILLATOR) PRESENT: Primary | ICD-10-CM

## 2023-12-08 PROCEDURE — RECHECK: Performed by: INTERNAL MEDICINE

## 2023-12-08 NOTE — PROGRESS NOTES
Results for orders placed or performed in visit on 12/08/23   Cardiac EP device report    Narrative    MDT/BIV-ICD/ NOT MRI CONDITIONAL  DEVICE INTERROGATED IN THE [place] OFFICE FOR REPROGRAMMING PER DR. Becki Ly. BATTERY VOLTAGE ADEQUATE (4.5 YRS). AP 81.1% BVP 99.5% ( 99.5% + VSRP <0.1%) ALL AVAILABLE LEAD PARAMETERS WITHIN NORMAL LIMITS. NO SIGNIFICANT HIGH RATE EPISODES. ACTIVE CAN/SVC COIL CHANGED TO SVC OFF; SVC DEFIB LEAD IMPEDANCE TONE & ALERT TURNED OFF. OPTI-VOL WITHIN NORMAL LIMITS. NORMAL DEVICE FUNCTION.  AM/GV

## 2023-12-15 ENCOUNTER — REMOTE DEVICE CLINIC VISIT (OUTPATIENT)
Dept: CARDIOLOGY CLINIC | Facility: CLINIC | Age: 73
End: 2023-12-15
Payer: MEDICARE

## 2023-12-15 DIAGNOSIS — Z95.810 AICD (AUTOMATIC CARDIOVERTER/DEFIBRILLATOR) PRESENT: Primary | ICD-10-CM

## 2023-12-15 PROCEDURE — 93296 REM INTERROG EVL PM/IDS: CPT | Performed by: STUDENT IN AN ORGANIZED HEALTH CARE EDUCATION/TRAINING PROGRAM

## 2023-12-15 PROCEDURE — 93295 DEV INTERROG REMOTE 1/2/MLT: CPT | Performed by: STUDENT IN AN ORGANIZED HEALTH CARE EDUCATION/TRAINING PROGRAM

## 2023-12-15 NOTE — PROGRESS NOTES
Results for orders placed or performed in visit on 12/15/23   Cardiac EP device report    Narrative    MDT/BIV-ICD/ NOT MRI CONDITIONAL  CARELINK TRANSMISSION: BATTERY VOLTAGE ADEQUATE (3.6 YRS). AP-26%, BVP>99% (VSR PACE-0.6%). ALL AVAILABLE LEAD PARAMETERS WITHIN NORMAL LIMITS. NO SIGNIFICANT HIGH RATE EPISODES. OPTI-VOL WITHIN NORMAL LIMITS. NORMAL DEVICE FUNCTION.  GV

## 2023-12-18 ENCOUNTER — TELEPHONE (OUTPATIENT)
Dept: FAMILY MEDICINE CLINIC | Facility: HOSPITAL | Age: 73
End: 2023-12-18

## 2023-12-18 NOTE — TELEPHONE ENCOUNTER
Patient left the following message requesting her meds be sent to [facility] in [place]    I updated her pharmacy in her chart    My insurance will no longer let me get my prescriptions at  [facility] and I need all of my prescriptions to be changed to [facility] in [place]. I hope I said everything I need information I needed to give you. If not, call me back [phone]. Thank you. Diana.

## 2023-12-21 DIAGNOSIS — F32.A DEPRESSION, UNSPECIFIED DEPRESSION TYPE: ICD-10-CM

## 2023-12-21 RX ORDER — FLUOXETINE HYDROCHLORIDE 20 MG/1
20 CAPSULE ORAL DAILY
Qty: 30 CAPSULE | Refills: 3 | Status: SHIPPED | OUTPATIENT
Start: 2023-12-21

## 2023-12-24 DIAGNOSIS — M25.50 ARTHRALGIA, UNSPECIFIED JOINT: ICD-10-CM

## 2023-12-26 DIAGNOSIS — E78.5 HYPERLIPIDEMIA, UNSPECIFIED HYPERLIPIDEMIA TYPE: ICD-10-CM

## 2023-12-26 RX ORDER — TRAMADOL HYDROCHLORIDE 50 MG/1
50 TABLET ORAL EVERY 8 HOURS PRN
Qty: 90 TABLET | Refills: 0 | Status: SHIPPED | OUTPATIENT
Start: 2023-12-26

## 2023-12-26 RX ORDER — SIMVASTATIN 40 MG
40 TABLET ORAL DAILY
Qty: 30 TABLET | Refills: 3 | Status: SHIPPED | OUTPATIENT
Start: 2023-12-26

## 2023-12-28 ENCOUNTER — IN-CLINIC DEVICE VISIT (OUTPATIENT)
Dept: CARDIOLOGY CLINIC | Facility: CLINIC | Age: 73
End: 2023-12-28

## 2023-12-28 DIAGNOSIS — Z95.810 AICD (AUTOMATIC CARDIOVERTER/DEFIBRILLATOR) PRESENT: Primary | ICD-10-CM

## 2023-12-28 DIAGNOSIS — I42.0 DILATED CARDIOMYOPATHY (HCC): Primary | ICD-10-CM

## 2023-12-28 PROCEDURE — RECHECK: Performed by: INTERNAL MEDICINE

## 2023-12-28 NOTE — PROGRESS NOTES
Results for orders placed or performed in visit on 12/28/23   Cardiac EP device report    Narrative    MDT/BIV-ICD/ NOT MRI CONDITIONAL  NB DEVICE INTERROGATED IN THE [place] OFFICE - FOR BEEPING DUE TO RV DEFIB LEAD IMPEDANCEL. BATTERY VOLTAGE ADEQUATE (4.4 YRS). AP 34% BVP 98.4% (VSRP 0.6%) ALL AVAILABLE LEAD PARAMETERS WITHIN NORMAL LIMITS. NO SIGNIFICANT HIGH RATE EPISODES. NO NEW V SENSING EPISODES. DISCUSSED W/ . DT AND PT: VF DETECTION, VT DETECTION, & VT MONITOR TURNED OFF. RV LEAD IMPEDANCE ALTER TURNED OFF; SHOCK ALERT TURNED OFF, & VF DETECTION OFF ALERT TURNED OFF. OPTI-VOL WITHIN NORMAL LIMITS. NORMAL DEVICE FUNCTION. AM

## 2023-12-28 NOTE — PROGRESS NOTES
Patient's ICD lead has high impedance in the SVC coil and now in the patient's coil.  She has a history of Jose lead placement with fracture.  She has epicardial LV lead right atrial lead that is a fine line and two dual coil ICD leads.  Her ejection fraction has improved to 45 to 50% and she has never had appropriate therapy from her defibrillator.  I discussed with the patient and her daughter the option of changing her to a biventricular pacemaker only.  She is at risk for inappropriate shock therapy from her defibrillator and she will be fairly high risk for for extraction given the presence of 2 dual coil ICD's.  Patient and daughter are agreeable to this.  Will get an echocardiogram to make sure her ejection fraction remains stable.  All questions answered is a really interesting

## 2023-12-29 ENCOUNTER — HOSPITAL ENCOUNTER (OUTPATIENT)
Dept: NON INVASIVE DIAGNOSTICS | Facility: CLINIC | Age: 73
Discharge: HOME | End: 2023-12-29
Payer: MEDICARE

## 2023-12-29 VITALS
BODY MASS INDEX: 30.23 KG/M2 | HEART RATE: 79 BPM | DIASTOLIC BLOOD PRESSURE: 88 MMHG | SYSTOLIC BLOOD PRESSURE: 143 MMHG | HEIGHT: 58 IN | WEIGHT: 144 LBS

## 2023-12-29 DIAGNOSIS — I42.0 DILATED CARDIOMYOPATHY (HCC): ICD-10-CM

## 2023-12-29 LAB
AORTIC ROOT: 3.1 CM
APICAL FOUR CHAMBER EJECTION FRACTION: 40 %
LEFT VENTRICLE DIASTOLIC VOLUME (MOD BIPLANE): 60 ML
LEFT VENTRICLE SYSTOLIC VOLUME (MOD BIPLANE): 32 ML
LV EF: 46 %
SL CV LV EF: 40

## 2023-12-29 PROCEDURE — 93325 DOPPLER ECHO COLOR FLOW MAPG: CPT

## 2023-12-29 PROCEDURE — 93308 TTE F-UP OR LMTD: CPT

## 2023-12-29 PROCEDURE — 93321 DOPPLER ECHO F-UP/LMTD STD: CPT

## 2023-12-29 PROCEDURE — 93325 DOPPLER ECHO COLOR FLOW MAPG: CPT | Performed by: INTERNAL MEDICINE

## 2023-12-29 PROCEDURE — 93321 DOPPLER ECHO F-UP/LMTD STD: CPT | Performed by: INTERNAL MEDICINE

## 2023-12-29 PROCEDURE — 93308 TTE F-UP OR LMTD: CPT | Performed by: INTERNAL MEDICINE

## 2024-01-03 ENCOUNTER — TELEPHONE (OUTPATIENT)
Dept: FAMILY MEDICINE CLINIC | Facility: HOSPITAL | Age: 74
End: 2024-01-03

## 2024-01-23 ENCOUNTER — HOSPITAL ENCOUNTER (OUTPATIENT)
Dept: MAMMOGRAPHY | Facility: CLINIC | Age: 74
Discharge: HOME | End: 2024-01-23
Payer: MEDICARE

## 2024-01-23 VITALS — WEIGHT: 144 LBS | HEIGHT: 58 IN | BODY MASS INDEX: 30.23 KG/M2

## 2024-01-23 DIAGNOSIS — Z12.31 ENCOUNTER FOR SCREENING MAMMOGRAM FOR MALIGNANT NEOPLASM OF BREAST: ICD-10-CM

## 2024-01-23 PROCEDURE — 77063 BREAST TOMOSYNTHESIS BI: CPT

## 2024-01-23 PROCEDURE — 77067 SCR MAMMO BI INCL CAD: CPT

## 2024-01-30 ENCOUNTER — TELEPHONE (OUTPATIENT)
Dept: FAMILY MEDICINE CLINIC | Facility: HOSPITAL | Age: 74
End: 2024-01-30

## 2024-01-30 DIAGNOSIS — F32.A DEPRESSION, UNSPECIFIED DEPRESSION TYPE: ICD-10-CM

## 2024-01-30 DIAGNOSIS — M10.00 IDIOPATHIC GOUT, UNSPECIFIED CHRONICITY, UNSPECIFIED SITE: ICD-10-CM

## 2024-01-30 DIAGNOSIS — E03.9 ACQUIRED HYPOTHYROIDISM: ICD-10-CM

## 2024-01-30 DIAGNOSIS — I10 ESSENTIAL HYPERTENSION: ICD-10-CM

## 2024-01-30 DIAGNOSIS — R60.9 EDEMA, UNSPECIFIED TYPE: ICD-10-CM

## 2024-01-30 DIAGNOSIS — E11.22 TYPE 2 DIABETES MELLITUS WITH STAGE 3 CHRONIC KIDNEY DISEASE, WITHOUT LONG-TERM CURRENT USE OF INSULIN, UNSPECIFIED WHETHER STAGE 3A OR 3B CKD (HCC): ICD-10-CM

## 2024-01-30 DIAGNOSIS — E78.5 HYPERLIPIDEMIA, UNSPECIFIED HYPERLIPIDEMIA TYPE: ICD-10-CM

## 2024-01-30 DIAGNOSIS — E87.6 HYPOKALEMIA: ICD-10-CM

## 2024-01-30 DIAGNOSIS — N18.30 TYPE 2 DIABETES MELLITUS WITH STAGE 3 CHRONIC KIDNEY DISEASE, WITHOUT LONG-TERM CURRENT USE OF INSULIN, UNSPECIFIED WHETHER STAGE 3A OR 3B CKD (HCC): ICD-10-CM

## 2024-01-30 DIAGNOSIS — K21.00 GASTROESOPHAGEAL REFLUX DISEASE WITH ESOPHAGITIS: ICD-10-CM

## 2024-01-30 DIAGNOSIS — M47.816 LUMBAR SPONDYLOSIS: ICD-10-CM

## 2024-01-30 DIAGNOSIS — M25.50 ARTHRALGIA, UNSPECIFIED JOINT: ICD-10-CM

## 2024-01-30 NOTE — TELEPHONE ENCOUNTER
Julisa, this is Dejah Parish. My insurance changed and I was no longer able to get all my prescriptions at [facility] in [place]. However, I found out that was a mistake and I am allowed to get them there, which is where I've gotten them for 30 years. And so right now all my prescription information is at [facility] and [place] and I requested that it be switch to [facility] in [place]. However, it's been weeks and apparently [facility] has not done that. So it was suggested to me by your office that I have Doctor Aydee send something to, I guess professional pharmacy, switching all my prescriptions to them and no longer to [facility]. My number is [phone]. Thank you very much. Bye.    This came over on rx line.

## 2024-01-31 RX ORDER — SIMVASTATIN 40 MG
40 TABLET ORAL DAILY
Qty: 30 TABLET | Refills: 3 | Status: SHIPPED | OUTPATIENT
Start: 2024-01-31

## 2024-01-31 RX ORDER — POTASSIUM CHLORIDE 20 MEQ/1
20 TABLET, EXTENDED RELEASE ORAL 2 TIMES DAILY
Qty: 60 TABLET | Refills: 3 | Status: SHIPPED | OUTPATIENT
Start: 2024-01-31

## 2024-01-31 RX ORDER — METOPROLOL SUCCINATE 100 MG/1
100 TABLET, EXTENDED RELEASE ORAL DAILY
Qty: 90 TABLET | Refills: 3 | Status: SHIPPED | OUTPATIENT
Start: 2024-01-31

## 2024-01-31 RX ORDER — CANDESARTAN 32 MG/1
32 TABLET ORAL DAILY
Qty: 30 TABLET | Refills: 5 | Status: SHIPPED | OUTPATIENT
Start: 2024-01-31

## 2024-01-31 RX ORDER — TORSEMIDE 20 MG/1
TABLET ORAL
Qty: 60 TABLET | Refills: 5 | Status: SHIPPED | OUTPATIENT
Start: 2024-01-31

## 2024-01-31 RX ORDER — TRAMADOL HYDROCHLORIDE 50 MG/1
50 TABLET ORAL EVERY 8 HOURS PRN
Qty: 90 TABLET | Refills: 0 | Status: SHIPPED | OUTPATIENT
Start: 2024-01-31

## 2024-01-31 RX ORDER — ALLOPURINOL 100 MG/1
100 TABLET ORAL DAILY
Qty: 90 TABLET | Refills: 3 | Status: SHIPPED | OUTPATIENT
Start: 2024-01-31

## 2024-01-31 RX ORDER — CELECOXIB 200 MG/1
200 CAPSULE ORAL 2 TIMES DAILY
Qty: 180 CAPSULE | Refills: 3 | Status: SHIPPED | OUTPATIENT
Start: 2024-01-31

## 2024-01-31 RX ORDER — FLUOXETINE HYDROCHLORIDE 20 MG/1
20 CAPSULE ORAL DAILY
Qty: 30 CAPSULE | Refills: 3 | Status: SHIPPED | OUTPATIENT
Start: 2024-01-31

## 2024-01-31 RX ORDER — OMEPRAZOLE 40 MG/1
40 CAPSULE, DELAYED RELEASE ORAL DAILY
Qty: 90 CAPSULE | Refills: 3 | Status: SHIPPED | OUTPATIENT
Start: 2024-01-31

## 2024-01-31 RX ORDER — LEVOTHYROXINE SODIUM 0.03 MG/1
25 TABLET ORAL DAILY
Qty: 90 TABLET | Refills: 3 | Status: SHIPPED | OUTPATIENT
Start: 2024-01-31

## 2024-02-07 ENCOUNTER — RA CDI HCC (OUTPATIENT)
Dept: OTHER | Facility: HOSPITAL | Age: 74
End: 2024-02-07

## 2024-02-07 DIAGNOSIS — Z12.11 SCREENING FOR COLON CANCER: Primary | ICD-10-CM

## 2024-02-07 PROBLEM — Z12.31 SCREENING MAMMOGRAM, ENCOUNTER FOR: Status: RESOLVED | Noted: 2019-12-05 | Resolved: 2024-02-07

## 2024-02-07 PROBLEM — I13.0 HYPERTENSIVE HEART AND CHRONIC KIDNEY DISEASE WITH HEART FAILURE AND STAGE 1 THROUGH STAGE 4 CHRONIC KIDNEY DISEASE, OR CHRONIC KIDNEY DISEASE (HCC): Status: ACTIVE | Noted: 2024-02-07

## 2024-02-08 ENCOUNTER — TELEPHONE (OUTPATIENT)
Dept: CARDIOLOGY CLINIC | Facility: CLINIC | Age: 74
End: 2024-02-08

## 2024-02-08 NOTE — TELEPHONE ENCOUNTER
Pt called stating that her pacemaker alarm went off one time. Pt is requesting a call. Please call patient to discuss.     Thank you

## 2024-02-14 ENCOUNTER — IN-CLINIC DEVICE VISIT (OUTPATIENT)
Dept: CARDIOLOGY CLINIC | Facility: CLINIC | Age: 74
End: 2024-02-14

## 2024-02-14 DIAGNOSIS — Z95.810 PRESENCE OF AUTOMATIC CARDIOVERTER/DEFIBRILLATOR (AICD): Primary | ICD-10-CM

## 2024-02-14 PROCEDURE — RECHECK: Performed by: INTERNAL MEDICINE

## 2024-02-14 NOTE — PROGRESS NOTES
Results for orders placed or performed in visit on 02/14/24   Cardiac EP device report    Narrative    MDT/BIV-ICD/ NOT MRI CONDITIONAL  NON-BILLABLE DEVICE INTERROGATED IN THE [place] OFFICE: BATTERY VOLTAGE ADEQUATE (4.7 YRS). AP: 99%. : 99% + VSRP: 0.3%. ALL  LEAD PARAMETERS WITHIN NORMAL LIMITS, PT SEEN TODAY DUE TO BEEPING 2/9/24 DUE TO SVC lead impedance warning on 06-Jan-2024.Â· Alert: RV defib lead impedance warning on 06-Jan-2024, NOTE 1 X MEASUREMENT OOR 2/9/24, ALL WITHIN NORMAL LIMITS. NO SIGNIFICANT HIGH RATE EPISODES. 5 V-SENSE EPISODES W/ AVAIL MARKERS SHOWING PAT, MAX DURATION 2 MINS 55 SECS. PT TAKES METOPROLOL SUCC, ASA 81MG. EF: 40% (ECHO 12/29/23). OPTI-VOL WITHIN NORMAL LIMITS. NO PROGRAMMING CHANGES MADE TO DEVICE PARAMETERS. NORMAL DEVICE FUNCTION. CH

## 2024-02-21 PROBLEM — Z00.00 MEDICARE ANNUAL WELLNESS VISIT, SUBSEQUENT: Status: RESOLVED | Noted: 2018-07-23 | Resolved: 2024-02-21

## 2024-02-28 ENCOUNTER — TELEPHONE (OUTPATIENT)
Dept: FAMILY MEDICINE CLINIC | Facility: HOSPITAL | Age: 74
End: 2024-02-28

## 2024-02-28 NOTE — TELEPHONE ENCOUNTER
This is Elizabeth Rich. My phone number is [phone]. I'm a patient of Doctor Graham's, but I'm not that sure that's who I should be seeing. I've been having a really horrific pain on my left upper leg where it meets my buttocks area and that I can't sit on a like a hard chair or anything like that. And the pain is just there all the time and it's been going on for weeks. My phone number is [phone]. Thank you. Diana.

## 2024-03-08 ENCOUNTER — OFFICE VISIT (OUTPATIENT)
Dept: FAMILY MEDICINE CLINIC | Facility: HOSPITAL | Age: 74
End: 2024-03-08
Payer: MEDICARE

## 2024-03-08 VITALS
HEART RATE: 80 BPM | BODY MASS INDEX: 30.23 KG/M2 | WEIGHT: 144 LBS | OXYGEN SATURATION: 98 % | SYSTOLIC BLOOD PRESSURE: 138 MMHG | DIASTOLIC BLOOD PRESSURE: 83 MMHG | HEIGHT: 58 IN | TEMPERATURE: 97.9 F

## 2024-03-08 DIAGNOSIS — E21.3 HYPERPARATHYROIDISM (HCC): ICD-10-CM

## 2024-03-08 DIAGNOSIS — J43.9 PULMONARY EMPHYSEMA, UNSPECIFIED EMPHYSEMA TYPE (HCC): ICD-10-CM

## 2024-03-08 DIAGNOSIS — E66.09 CLASS 1 OBESITY DUE TO EXCESS CALORIES WITH SERIOUS COMORBIDITY AND BODY MASS INDEX (BMI) OF 30.0 TO 30.9 IN ADULT: ICD-10-CM

## 2024-03-08 DIAGNOSIS — F11.20 CONTINUOUS OPIOID DEPENDENCE (HCC): ICD-10-CM

## 2024-03-08 DIAGNOSIS — N18.30 TYPE 2 DIABETES MELLITUS WITH STAGE 3 CHRONIC KIDNEY DISEASE, WITHOUT LONG-TERM CURRENT USE OF INSULIN, UNSPECIFIED WHETHER STAGE 3A OR 3B CKD (HCC): ICD-10-CM

## 2024-03-08 DIAGNOSIS — F32.1 CURRENT MODERATE EPISODE OF MAJOR DEPRESSIVE DISORDER WITHOUT PRIOR EPISODE (HCC): ICD-10-CM

## 2024-03-08 DIAGNOSIS — E03.9 ACQUIRED HYPOTHYROIDISM: ICD-10-CM

## 2024-03-08 DIAGNOSIS — I13.0 HYPERTENSIVE HEART AND CHRONIC KIDNEY DISEASE WITH HEART FAILURE AND STAGE 1 THROUGH STAGE 4 CHRONIC KIDNEY DISEASE, OR CHRONIC KIDNEY DISEASE (HCC): ICD-10-CM

## 2024-03-08 DIAGNOSIS — E11.22 TYPE 2 DIABETES MELLITUS WITH STAGE 3 CHRONIC KIDNEY DISEASE, WITHOUT LONG-TERM CURRENT USE OF INSULIN, UNSPECIFIED WHETHER STAGE 3A OR 3B CKD (HCC): ICD-10-CM

## 2024-03-08 DIAGNOSIS — I42.0 DILATED CARDIOMYOPATHY (HCC): ICD-10-CM

## 2024-03-08 DIAGNOSIS — E78.2 MIXED HYPERLIPIDEMIA: Primary | ICD-10-CM

## 2024-03-08 DIAGNOSIS — M70.72 ISCHIAL BURSITIS OF LEFT SIDE: ICD-10-CM

## 2024-03-08 PROBLEM — M46.1 BILATERAL SACROILIITIS (HCC): Status: RESOLVED | Noted: 2017-03-06 | Resolved: 2024-03-08

## 2024-03-08 PROCEDURE — G2211 COMPLEX E/M VISIT ADD ON: HCPCS | Performed by: FAMILY MEDICINE

## 2024-03-08 PROCEDURE — 99214 OFFICE O/P EST MOD 30 MIN: CPT | Performed by: FAMILY MEDICINE

## 2024-03-08 RX ORDER — POTASSIUM CHLORIDE 1500 MG/1
TABLET, EXTENDED RELEASE ORAL
COMMUNITY
Start: 2024-01-25

## 2024-03-08 RX ORDER — SITAGLIPTIN 100 MG/1
100 TABLET, FILM COATED ORAL DAILY
COMMUNITY
Start: 2023-12-26

## 2024-03-08 NOTE — PATIENT INSTRUCTIONS
Basic Carbohydrate Counting   AMBULATORY CARE:   Carbohydrate counting  is a way to plan your meals by counting the amount of carbohydrate in foods. Carbohydrates are the sugars, starches, and fiber found in fruit, grains, vegetables, and milk products. Carbohydrates increase your blood sugar levels. Carbohydrate counting can help you eat the right amount of carbohydrate to keep your blood sugar levels under control.   What you need to know about planning meals using carbohydrate counting:  A dietitian or healthcare provider will help you develop a healthy meal plan that works best for you. You will be taught how much carbohydrate to eat or drink for each meal and snack. Your meal plan will be based on your age, weight, usual food intake, and physical activity level. If you have diabetes, it will also include your blood sugar levels and diabetes medicine. Once you know how much carbohydrate you should eat, you can decide what type of food you want to eat.    You will need to know what foods contain carbohydrate and how much they contain. Keep track of the amount of carbohydrate in meals and snacks in order to follow your meal plan. Do not avoid carbohydrates or skip meals. Your blood sugar may fall too low if you do not eat enough carbohydrate or you skip meals.    Foods that contain carbohydrate:   Breads:  Each serving of food listed below contains about 15 g of carbohydrate .    1 slice of bread (1 ounce) or 1 flour or corn tortilla (6 inch)    ½ of a hamburger bun or ¼ of a large bagel (about 1 ounce)    1 pancake (about 4 inches across and ¼ inch thick)    Cereals and grains:  Serving sizes of ready-to-eat cereals vary. Look at the serving size and the total carbohydrate amount listed on the food label. Each serving of food listed below contains about 15 g of carbohydrate .    ¾ cup of dry, unsweetened, ready-to-eat cereal or ¼ cup of low-fat granola     ½ cup of oatmeal or other cooked cereal     ? cup of  cooked rice or pasta    Starchy vegetables and beans:  Each serving of food listed below contains about 15 g of carbohydrate .    ½ cup of corn, green peas, sweet potatoes, or mashed potatoes    ¼ of a large baked potato    ½ cup of beans, lentils, and peas (garbanzo, ohara, kidney, white, split, black-eyed)    Crackers and snacks:  Each serving of food listed below contains about 15 g of carbohydrate .    3 raul cracker squares or 8 animal crackers     6 saltine-type crackers    3 cups of popcorn or ¾ ounce of pretzels, potato chips, or tortilla chips    Fruit:  Each serving of food listed below contains about 15 g of carbohydrate .    1 small (4 ounce) piece of fresh fruit or ¾ to 1 cup of fresh fruit    ½ cup of canned or frozen fruit, packed in natural juice    ½ cup (4 ounces) of unsweetened fruit juice    2 tablespoons of dried fruit    Desserts or sugary foods:  Each serving of food listed below contains about 15 g of carbohydrate .    2-inch square unfrosted cake or brownie     2 small cookies    ½ cup of ice cream, frozen yogurt, or nondairy frozen yogurt    ¼ cup of sherbet or sorbet    1 tablespoon of regular syrup, jam, or jelly    2 tablespoons of light syrup    Milk and yogurt:  Foods from the milk group contain about 12 g of carbohydrate per serving.    1 cup of fat-free or low-fat milk    1 cup of soy milk    ? cup of fat-free, yogurt sweetened with artificial sweetener    Non-starchy vegetables:  Each serving contains about 5 g of carbohydrate . Three servings of non-starch vegetables count as 1 carbohydrate serving.     ½ cup of cooked vegetables or 1 cup of raw vegetables. This includes beets, broccoli, cabbage, cauliflower, cucumber, mushrooms, tomatoes, and zucchini    ½ cup of vegetable juice    How to use carbohydrate counting to plan meals:   Count carbohydrate amounts using serving sizes:      Pasta dinner example:  You plan to have pasta, tossed salad, and an 8-ounce glass of milk. Your  healthcare provider tells you that you may have 4 carbohydrate servings for dinner. One carbohydrate serving of pasta is ? cup. One cup of pasta will equal 3 carbohydrate servings. An 8-ounce glass of milk will count as 1 carbohydrate serving. These amounts of food would equal 4 carbohydrate servings. One cup of tossed salad does not count toward your carbohydrate servings.       Count carbohydrate amounts using food labels:  Find the total amount of carbohydrate in a packaged food by reading the food label. Food labels tell you the serving size of the food and the total carbohydrate amount in each serving. Find the serving size on the food label and then decide how many servings you will eat. Multiply the number of servings you plan to eat by the carbohydrate amount per serving.     Granola bar snack example:  Your meal plan allows you to have 2 carbohydrate servings (30 grams) of carbohydrate for a snack. You plan to eat 1 package of granola bars, which contains 2 bars. According to the food label, the serving size of food in this package is 1 bar. Each serving (1 bar) contains 25 grams of carbohydrate. The total amount of carbohydrate in this package of granola bars would be 50 g. Based on your meal plan, you should eat only 1 bar.      Follow up with your doctor as directed:  Write down your questions so you remember to ask them during your visits.  © Copyright [employer] 2023 Information is for End User's use only and may not be sold, redistributed or otherwise used for commercial purposes.  The above information is an  only. It is not intended as medical advice for individual conditions or treatments. Talk to your doctor, nurse or pharmacist before following any medical regimen to see if it is safe and effective for you.

## 2024-03-08 NOTE — PROGRESS NOTES
Name: Dejah Parish      : 1950      MRN: [id number]  Encounter Provider: Nestor Stevenson MD  Encounter Date: 3/8/2024   Encounter department: [facility] [address]     Assessment & Plan     1. Mixed hyperlipidemia  -     Lipid Panel with Direct LDL reflex; Future    2. Type 2 diabetes mellitus with stage 3 chronic kidney disease, without long-term current use of insulin, unspecified whether stage 3a or 3b CKD (HCC)  Assessment & Plan:    Lab Results   Component Value Date    HGBA1C 6.4 (H) 2023       Orders:  -     CBC and differential; Future  -     Comprehensive metabolic panel; Future  -     Hemoglobin A1C; Future    3. Acquired hypothyroidism    4. Hyperparathyroidism (HCC)  -     PTH, intact; Future    5. Hypertensive heart and chronic kidney disease with heart failure and stage 1 through stage 4 chronic kidney disease, or chronic kidney disease (HCC)    6. Dilated cardiomyopathy (HCC)    7. Pulmonary emphysema, unspecified emphysema type (HCC)    8. Class 1 obesity due to excess calories with serious comorbidity and body mass index (BMI) of 30.0 to 30.9 in adult    9. Ischial bursitis of left side  -     Ambulatory referral to Spine & Pain Management; Future    10. Current moderate episode of major depressive disorder without prior episode (HCC)    11. Continuous opioid dependence (HCC)           Subjective     4 month follow up,    Recurrence of L sided pain below buttock      Review of Systems   Constitutional:  Negative for unexpected weight change.   Respiratory: Negative.     Cardiovascular: Negative.    Musculoskeletal:  Positive for back pain.   Psychiatric/Behavioral: Negative.     All other systems reviewed and are negative.      Past Medical History:   Diagnosis Date    Abnormal finding on breast imaging     last assessed 17    Acute bacterial bronchitis     Allergic rhinitis     Arthralgia     Arthritis     Atherosclerotic heart disease of native  coronary artery without angina pectoris     BBB (bundle branch block)     left,last assesed 6/4/12    BBB (bundle branch block)     left    Benign paroxysmal vertigo     last assessed 9/7/12    Benign paroxysmal vertigo of left ear     Bilateral fibrocystic breast changes     Bilateral sacroiliitis (HCC)     Cardiac defibrillator in situ     Carpal tunnel syndrome, unspecified upper limb     CHF (congestive heart failure) (HCC)     chronic systolic/diastolic    Chronic diastolic congestive heart failure (HCC)     Chronic kidney disease     stage 3    Chronic systolic congestive heart failure (HCC)     Coronary artery disease     Cough     Depression     Detrusor instability     Diabetes mellitus (HCC)     Difficulty walking up stairs     Dilated cardiomyopathy (HCC)     Dilated cardiomyopathy (HCC)     Disease of thyroid gland     Disorder of intervertebral disc of cervical spine     Esophageal reflux     GERD (gastroesophageal reflux disease)     Gout     Hemorrhoids     History of blood clots     Hormone replacement therapy     Hx of blood clots     Hyperlipidemia     Hypertension     Hypokalemia     Hypothyroidism     Indigestion     Inflamed toenail, left     Ingrown nail     Low back pain     left    OAB (overactive bladder)     detrusor instability    Obesity     AZ (obstructive sleep apnea)     Osteoarthritis     Papilloma of left breast     Psoriasis     psoriatic arthropathy    Psoriatic arthropathy (HCC)     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    RLS (restless legs syndrome)     Sciatica     Screening mammogram, encounter for 12/05/2019    Shortness of breath     Sleep apnea     unspecified type    Tendinitis     Trigger thumb, left thumb     Urinary frequency     UTI (urinary tract infection)     Vitamin D deficiency      Past Surgical History:   Procedure Laterality Date    BLADDER SURGERY  1999    lift and repair    BREAST BIOPSY Left 05/23/2016     CORE BIOPSY-BENIGN    CARDIAC CATHETERIZATION      outcome:   successful    CARDIAC DEFIBRILLATOR PLACEMENT      CARDIAC ELECTROPHYSIOLOGY PROCEDURE N/A 8/22/2023    Procedure: Cardiac biv icd generator change;  Surgeon: Nahid Dickerson MD;  Location: [facility];  Service: Cardiology    CARPAL TUNNEL RELEASE Bilateral 1997    CATARACT EXTRACTION      COLONOSCOPY      CORONARY ANGIOPLASTY WITH STENT PLACEMENT      CYSTOSCOPY W/ URETEROSCOPY  2009    DE QUERVAIN'S RELEASE Left 2011    and trigger finger release    EYE SURGERY Left 1999    EYE SURGERY Left 11/13/2019    Cataract    EYE SURGERY Right 11/09/2019    Cataract    INSERT / REPLACE / REMOVE PACEMAKER      JOINT REPLACEMENT Right 09/2017    Knee    KNEE ARTHROSCOPY      therapeutic    NEUROPLASTY / TRANSPOSITION MEDIAN NERVE AT CARPAL TUNNEL      OOPHORECTOMY Bilateral     AGE 44    IA TENDON SHEATH INCISION Left 3/2/2017    Procedure: SMALL TRIGGER FINGER RELEASE;  Surgeon: Camden Lancaster MD;  Location: [facility];  Service: Orthopedics    RECTAL SURGERY  2006    repair of rectal ulcer    SHOULDER ARTHROSCOPY Left 2006    TOTAL ABDOMINAL HYSTERECTOMY      AGE 44    TOTAL KNEE ARTHROPLASTY Left     TOTAL SHOULDER REPLACEMENT Left 2007    TRIGGER FINGER RELEASE Bilateral 2011    right haand 201,2015,left hand 2012,2015    US GUIDED BREAST BIOPSY LEFT COMPLETE Left 5/23/2016     Family History   Problem Relation Age of Onset    Hypertension Mother     Alzheimer's disease Mother     Cancer Father 71        bladder    Prostate cancer Father 71    Breast cancer Daughter 52    No Known Problems Daughter     No Known Problems Maternal Grandmother     No Known Problems Maternal Grandfather     Breast cancer Paternal Grandmother         age dx unk    No Known Problems Paternal Grandfather     Cancer Brother         pt shared some type of blood cancer    No Known Problems Son     Stomach cancer Paternal Aunt 65    No Known Problems Paternal Aunt      Social History     Socioeconomic History    Marital status:       Spouse name: None    Number of children: None    Years of education: None    Highest education level: None   Occupational History    None   Tobacco Use    Smoking status: Former     Current packs/day: 0.00     Average packs/day: 1 pack/day for 15.0 years (15.0 ttl pk-yrs)     Types: Cigarettes     Start date:      Quit date:      Years since quittin.2    Smokeless tobacco: Never   Vaping Use    Vaping status: Never Used   Substance and Sexual Activity    Alcohol use: No    Drug use: No    Sexual activity: None     Comment: birth control   Other Topics Concern    None   Social History Narrative    Always uses seat belt     Social Determinants of Health     Financial Resource Strain: Low Risk  (2023)    Overall Financial Resource Strain (CARDIA)     Difficulty of Paying Living Expenses: Not very hard   Food Insecurity: Not on file   Transportation Needs: No Transportation Needs (2023)    PRAPARE - Transportation     Lack of Transportation (Medical): No     Lack of Transportation (Non-Medical): No   Physical Activity: Not on file   Stress: Not on file   Social Connections: Not on file   Intimate Partner Violence: Not on file   Housing Stability: Not on file     Current Outpatient Medications on File Prior to Visit   Medication Sig    allopurinol (ZYLOPRIM) 100 mg tablet Take 1 tablet (100 mg total) by mouth daily    aspirin 81 MG tablet Take 81 mg by mouth daily.    calcium carbonate (OS-MANINDER) 600 MG tablet Take 1,200 mg by mouth daily    candesartan (ATACAND) 32 MG tablet Take 1 tablet (32 mg total) by mouth daily    celecoxib (CeleBREX) 200 mg capsule Take 1 capsule (200 mg total) by mouth 2 (two) times a day    cetirizine (ZyrTEC) 10 mg tablet Take 10 mg by mouth daily    chlorhexidine (PERIDEX) 0.12 % solution     Cholecalciferol (VITAMIN D3) 1000 units CAPS Take by mouth    Empagliflozin (JARDIANCE) 10 MG TABS tablet Take 1 tablet (10 mg total) by mouth daily    FLUoxetine (PROzac) 20 mg  "capsule Take 1 capsule (20 mg total) by mouth daily    Januvia 100 MG tablet Take 100 mg by mouth daily    levothyroxine 25 mcg tablet Take 1 tablet (25 mcg total) by mouth daily    Magnesium 250 MG TABS Take by mouth daily    metoprolol succinate (TOPROL-XL) 100 mg 24 hr tablet Take 1 tablet (100 mg total) by mouth daily    Minoxidil (ROGAINE WOMENS EX) Apply 1 mL topically.    montelukast (SINGULAIR) 10 mg tablet Take 1 tablet (10 mg total) by mouth daily at bedtime    omeprazole (PriLOSEC) 40 MG capsule Take 1 capsule (40 mg total) by mouth daily    potassium chloride (Klor-Con M20) 20 mEq tablet Take 1 tablet (20 mEq total) by mouth 2 (two) times a day    Potassium Chloride ER 20 MEQ TBCR     pyridoxine (VITAMIN B6) 100 mg tablet Take 100 mg by mouth daily.    simvastatin (ZOCOR) 40 mg tablet Take 1 tablet (40 mg total) by mouth daily    torsemide (DEMADEX) 20 mg tablet Take 1 tablet by mouth twice daily    traMADol (ULTRAM) 50 mg tablet Take 1 tablet (50 mg total) by mouth every 8 (eight) hours as needed for moderate pain     Allergies   Allergen Reactions    Dust Mite Extract Sneezing     Immunization History   Administered Date(s) Administered    COVID-19 PFIZER VACCINE 0.3 ML IM 03/08/2021, 03/30/2021, 10/12/2021, 04/15/2022    COVID-19 Pfizer Vac BIVALENT [place]-sucrose 12 Yr+ IM 12/14/2022    INFLUENZA 10/11/2022, 09/19/2023    Influenza Split High Dose Preservative Free IM 09/24/2013, 10/16/2014, 11/03/2015, 10/05/2016, 10/13/2017    Influenza, high dose seasonal 0.7 mL 09/20/2018, 10/01/2019, 10/07/2020, 09/23/2021    Influenza, seasonal, injectable 09/07/2012    Pneumococcal Conjugate 13-Valent 06/24/2015    Pneumococcal Polysaccharide PPV23 10/01/2000, 10/01/2005, 11/26/2018    Zoster Vaccine Recombinant 09/19/2019, 11/20/2019       Objective     /83 (BP Location: Left arm, Patient Position: Sitting, Cuff Size: Standard)   Pulse 80   Temp 97.9 °F (36.6 °C) (Tympanic)   Ht 4' 10\" (1.473 m)   Wt " 65.3 kg (144 lb)   SpO2 98%   BMI 30.10 kg/m²     Physical Exam  Vitals and nursing note reviewed.   Neck:      Vascular: No carotid bruit.   Cardiovascular:      Rate and Rhythm: Regular rhythm.      Heart sounds: Murmur heard.   Pulmonary:      Breath sounds: Normal breath sounds.   Musculoskeletal:      Lumbar back: Positive left straight leg raise test. Negative right straight leg raise test.      Right lower leg: No edema.      Left lower leg: No edema.      Comments: Tender L ischial bursa   Neurological:      Mental Status: She is oriented to person, place, and time.   Psychiatric:         Mood and Affect: Mood normal.       Nestor Stevenson MD

## 2024-03-09 PROBLEM — M70.72 ISCHIAL BURSITIS OF LEFT SIDE: Status: ACTIVE | Noted: 2024-03-09

## 2024-03-15 ENCOUNTER — REMOTE DEVICE CLINIC VISIT (OUTPATIENT)
Dept: CARDIOLOGY CLINIC | Facility: CLINIC | Age: 74
End: 2024-03-15
Payer: MEDICARE

## 2024-03-15 ENCOUNTER — TELEPHONE (OUTPATIENT)
Dept: CARDIOLOGY CLINIC | Facility: CLINIC | Age: 74
End: 2024-03-15

## 2024-03-15 DIAGNOSIS — Z95.810 PRESENCE OF AUTOMATIC CARDIOVERTER/DEFIBRILLATOR (AICD): Primary | ICD-10-CM

## 2024-03-15 PROCEDURE — 93295 DEV INTERROG REMOTE 1/2/MLT: CPT | Performed by: INTERNAL MEDICINE

## 2024-03-15 PROCEDURE — 93296 REM INTERROG EVL PM/IDS: CPT | Performed by: INTERNAL MEDICINE

## 2024-03-15 NOTE — TELEPHONE ENCOUNTER
Spoke with pt. She is doing good. Denies wt gain or any CHF symptoms.  She will call the office if rapid wt gain and / or CHF symptoms occur.

## 2024-03-15 NOTE — TELEPHONE ENCOUNTER
----- Message from Sulma Sandoval sent at 3/15/2024 10:33 AM EDT -----  Regarding: optivol cross  Optivol cross; (+) torsemide, K+. Recheck 4/16/24. Thank you       CARELINK TRANSMISSION: BATTERY VOLTAGE ADEQUATE (4.3 YRS). AP 94.5%  99.2% & VSRP <0.1%; ALL AVAILABLE LEAD PARAMETERS WITHIN NORMAL LIMITS. NO SIGNIFICANT HIGH RATE OR V SENSE EPISODES. OPTI-VOL FLUID THRESHOLD CROSSED 3/14/24 & ONGOING; EF 40% (12/2023 ECHO); PATIENT TAKING TORSEMIDE, K+; TASK TO CHF RN; RECHECK 31 DAYS; NORMAL DEVICE FUNCTION.  ES

## 2024-03-15 NOTE — PROGRESS NOTES
Results for orders placed or performed in visit on 03/15/24   Cardiac EP device report    Narrative    MDT/BIV-ICD (THERAPIES PROGRAMMED OFF) / NOT MRI CONDITIONAL  CARELINK TRANSMISSION: BATTERY VOLTAGE ADEQUATE (4.3 YRS). AP 94.5%  99.2% & VSRP <0.1%; ALL AVAILABLE LEAD PARAMETERS WITHIN NORMAL LIMITS. NO SIGNIFICANT HIGH RATE OR V SENSE EPISODES. OPTI-VOL FLUID THRESHOLD CROSSED 3/14/24 & ONGOING; EF 40% (12/2023 ECHO); PATIENT TAKING TORSEMIDE, K+; TASK TO CHF RN; RECHECK 31 DAYS; NORMAL DEVICE FUNCTION.  ES

## 2024-03-19 ENCOUNTER — OFFICE VISIT (OUTPATIENT)
Dept: PAIN MEDICINE | Facility: CLINIC | Age: 74
End: 2024-03-19
Payer: MEDICARE

## 2024-03-19 VITALS
TEMPERATURE: 98.2 F | DIASTOLIC BLOOD PRESSURE: 78 MMHG | SYSTOLIC BLOOD PRESSURE: 116 MMHG | HEIGHT: 58 IN | HEART RATE: 68 BPM | WEIGHT: 143 LBS | BODY MASS INDEX: 30.02 KG/M2

## 2024-03-19 DIAGNOSIS — G89.29 CHRONIC LEFT-SIDED LOW BACK PAIN WITHOUT SCIATICA: ICD-10-CM

## 2024-03-19 DIAGNOSIS — E11.22 TYPE 2 DIABETES MELLITUS WITH STAGE 3 CHRONIC KIDNEY DISEASE, WITHOUT LONG-TERM CURRENT USE OF INSULIN, UNSPECIFIED WHETHER STAGE 3A OR 3B CKD (HCC): ICD-10-CM

## 2024-03-19 DIAGNOSIS — M25.552 PAIN OF LEFT HIP: Primary | ICD-10-CM

## 2024-03-19 DIAGNOSIS — M54.50 CHRONIC LEFT-SIDED LOW BACK PAIN WITHOUT SCIATICA: ICD-10-CM

## 2024-03-19 DIAGNOSIS — M70.72 ISCHIAL BURSITIS OF LEFT SIDE: ICD-10-CM

## 2024-03-19 DIAGNOSIS — N18.30 TYPE 2 DIABETES MELLITUS WITH STAGE 3 CHRONIC KIDNEY DISEASE, WITHOUT LONG-TERM CURRENT USE OF INSULIN, UNSPECIFIED WHETHER STAGE 3A OR 3B CKD (HCC): ICD-10-CM

## 2024-03-19 PROCEDURE — G2211 COMPLEX E/M VISIT ADD ON: HCPCS | Performed by: ANESTHESIOLOGY

## 2024-03-19 PROCEDURE — 99204 OFFICE O/P NEW MOD 45 MIN: CPT | Performed by: ANESTHESIOLOGY

## 2024-03-19 NOTE — PROGRESS NOTES
Assessment  1. Pain of left hip    2. Ischial bursitis of left side    3. Chronic left-sided low back pain without sciatica    4. Type 2 diabetes mellitus with stage 3 chronic kidney disease, without long-term current use of insulin, unspecified whether stage 3a or 3b CKD (HCC)        Plan    Elizabeth with history of chronic back pain.  Her current symptoms I believe are related to her left ischial bursa.  She has tenderness palpation of the left ischial bursa and pain with sitting.    I will schedule the patient for left ischial bursa injection under fluoroscopic guidance that would serve both diagnostic and hopefully therapeutic purposes.    In the office today I did review the nature of her pathology in depth using diagrams and models, discussed the approach would use for the ischial bursa injection and provided literature from review.  Patient understands the risks associate with the procedure not limited to but including infection, tissue injury, allergic reaction, exacerbation of symptoms and patient provided verbal consent.    Given the patient's history of diabetes, there may be an increased risk of infection in association with the procedure although the overall risk is low.  In addition, following the injection there may be a transient elevation in serum glucose levels for several days.  The patient understands that this may require additional glycemic coverage in coordination with the treating physician.    My impressions and treatment recommendations were discussed in detail with the patient who verbalized understanding and had no further questions.  Discharge instructions were provided. I personally saw and examined the patient and I agree with the above discussed plan of care.  This note is created using dictation transcription.  It may contain typographical errors, grammatical errors, improperly dictated words, background noise and other errors.    Orders Placed This Encounter   Procedures    FL spine  and pain procedure     Standing Status:   Future     Standing Expiration Date:   3/19/2028     Order Specific Question:   Reason for Exam:     Answer:   Left ischial bursa injection under fluro     Order Specific Question:   Anticoagulant hold needed?     Answer:   no       Referred By: Nestor Stevenson MD  History of Present Illness    Dejah Parish is a 74 y.o. female last seen in 2018 for low back pain.  She presents with left buttock pain worse with sitting.  She is unaware of any recent clear precipitant event denies any trauma or injury.  She reports her pain is moderate to severe 8 out of 10 the visual analog scale significant impairing with her daily living activities.  Scribes as pressure-like throbbing and achy.  This occurs when she is sitting on hard objects.  Exercise decreases her symptoms.  After low back pain she has undergone physical therapy in the past with moderate relief.  Denies any loss of bowel bladder function.    I have personally reviewed and/or updated the patient's past medical history, past surgical history, family history, social history, current medications, allergies, and vital signs today.     Review of Systems   Constitutional:  Negative for fever and unexpected weight change.   HENT:  Negative for trouble swallowing.    Eyes:  Negative for visual disturbance.   Respiratory:  Negative for shortness of breath and wheezing.    Cardiovascular:  Negative for chest pain and palpitations.   Gastrointestinal:  Negative for constipation, diarrhea, nausea and vomiting.   Endocrine: Positive for polyuria. Negative for cold intolerance, heat intolerance and polydipsia.   Genitourinary:  Negative for difficulty urinating and frequency.   Musculoskeletal:  Positive for arthralgias. Negative for gait problem, joint swelling and myalgias.   Skin:  Negative for rash.   Neurological:  Negative for dizziness, seizures, syncope, weakness and headaches.   Hematological:  Does not bruise/bleed  easily.   Psychiatric/Behavioral:  Negative for dysphoric mood.    All other systems reviewed and are negative.      Patient Active Problem List   Diagnosis    Trigger little finger of left hand    Allergic rhinitis    Arthralgia of knee, left    Arthralgia of knee, right    Coronary artery disease    Benign positional vertigo, left    Bilateral fibrocystic breast changes    Chronic systolic congestive heart failure (HCC)    Stage 3a chronic kidney disease (HCC)    Chronic diastolic congestive heart failure (HCC)    Biventricular ICD (implantable cardioverter-defibrillator) in place    DDD (degenerative disc disease), cervical    Depression    Esophageal reflux    Gout    Mixed hyperlipidemia    Hypokalemia    Acquired hypothyroidism    Class 1 obesity due to excess calories with serious comorbidity and body mass index (BMI) of 30.0 to 30.9 in adult    Obstructive sleep apnea    Osteoarthritis    Overactive bladder    Psoriasis    Type 2 diabetes mellitus ([facility])    Type 2 diabetes mellitus with stage 3 chronic kidney disease, without long-term current use of insulin ([facility])    Vitamin D deficiency    Lumbar spondylosis    Chronic left-sided low back pain without sciatica    Hypercalcemia    Leukocytosis    Dense breast tissue    Family history of breast cancer    Nausea and vomiting    Gastroesophageal reflux disease with esophagitis    Abnormal computerized axial tomography of abdomen    Hyperparathyroidism ([facility])    PVC's (premature ventricular contractions)    Chronic left shoulder pain    Dilated cardiomyopathy ([facility])    Pulmonary emphysema, unspecified emphysema type ([facility])    Continuous opioid dependence ([facility])    Biventricular implantable cardioverter-defibrillator (ICD) at end of device life    Falling episodes    Tremor of both hands    Hypertensive heart and chronic kidney disease with heart failure and stage 1 through stage 4 chronic kidney disease, or chronic kidney disease (HCC)    Current moderate episode of  major depressive disorder without prior episode (HCC)    Ischial bursitis of left side       Past Medical History:   Diagnosis Date    Abnormal finding on breast imaging     last assessed 11/30/17    Acute bacterial bronchitis     Allergic rhinitis     Anxiety     Arthralgia     Arthritis     Atherosclerotic heart disease of native coronary artery without angina pectoris     BBB (bundle branch block)     left,last assesed 6/4/12    BBB (bundle branch block)     left    Benign paroxysmal vertigo     last assessed 9/7/12    Benign paroxysmal vertigo of left ear     Bilateral fibrocystic breast changes     Bilateral sacroiliitis (HCC)     Cardiac defibrillator in situ     Carpal tunnel syndrome, unspecified upper limb     CHF (congestive heart failure) (HCC)     chronic systolic/diastolic    Chronic diastolic congestive heart failure (HCC)     Chronic kidney disease     stage 3    Chronic systolic congestive heart failure (HCC)     Coronary artery disease     Cough     Depression     Detrusor instability     Diabetes mellitus (HCC)     Difficulty walking up stairs     Dilated cardiomyopathy (HCC)     Dilated cardiomyopathy (HCC)     Disease of thyroid gland     Disorder of intervertebral disc of cervical spine     Esophageal reflux     GERD (gastroesophageal reflux disease)     Gout     Hemorrhoids     History of blood clots     Hormone replacement therapy     Hx of blood clots     Hyperlipidemia     Hypertension     Hypokalemia     Hypothyroidism     Indigestion     Inflamed toenail, left     Ingrown nail     Low back pain     left    OAB (overactive bladder)     detrusor instability    Obesity     AZ (obstructive sleep apnea)     Osteoarthritis     Papilloma of left breast     Psoriasis     psoriatic arthropathy    Psoriatic arthropathy (HCC)     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    RLS (restless legs syndrome)     Sciatica     Screening mammogram, encounter for 12/05/2019    Shortness of breath     Sleep apnea      unspecified type    Tendinitis     Trigger thumb, left thumb     Urinary frequency     UTI (urinary tract infection)     Vitamin D deficiency        Past Surgical History:   Procedure Laterality Date    BLADDER SURGERY  1999    lift and repair    BREAST BIOPSY Left 05/23/2016    US CORE BIOPSY-BENIGN    CARDIAC CATHETERIZATION      outcome:  successful    CARDIAC DEFIBRILLATOR PLACEMENT      CARDIAC ELECTROPHYSIOLOGY PROCEDURE N/A 8/22/2023    Procedure: Cardiac biv icd generator change;  Surgeon: Nahid Dickerson MD;  [facility]: [facility];  Service: Cardiology    CARPAL TUNNEL RELEASE Bilateral 1997    CATARACT EXTRACTION      COLONOSCOPY      CORONARY ANGIOPLASTY WITH STENT PLACEMENT      CYSTOSCOPY W/ URETEROSCOPY  2009    DE QUERVAIN'S RELEASE Left 2011    and trigger finger release    EYE SURGERY Left 1999    EYE SURGERY Left 11/13/2019    Cataract    EYE SURGERY Right 11/09/2019    Cataract    INSERT / REPLACE / REMOVE PACEMAKER      JOINT REPLACEMENT Right 09/2017    Knee    KNEE ARTHROSCOPY      therapeutic    NEUROPLASTY / TRANSPOSITION MEDIAN NERVE AT CARPAL TUNNEL      OOPHORECTOMY Bilateral     AGE 44    RI TENDON SHEATH INCISION Left 3/2/2017    Procedure: SMALL TRIGGER FINGER RELEASE;  Surgeon: Camden Lancaster MD;  Location:  MAIN OR;  Service: Orthopedics    RECTAL SURGERY  2006    repair of rectal ulcer    SHOULDER ARTHROSCOPY Left 2006    TOTAL ABDOMINAL HYSTERECTOMY      AGE 44    TOTAL KNEE ARTHROPLASTY Left     TOTAL SHOULDER REPLACEMENT Left 2007    TRIGGER FINGER RELEASE Bilateral 2011    right haand 201,2015,left hand 2012,2015    US GUIDED BREAST BIOPSY LEFT COMPLETE Left 5/23/2016       Family History   Problem Relation Age of Onset    Hypertension Mother     Alzheimer's disease Mother     Cancer Father 71        bladder    Prostate cancer Father 71    Breast cancer Daughter 52    No Known Problems Daughter     No Known Problems Maternal Grandmother     No Known Problems  Maternal Grandfather     Breast cancer Paternal Grandmother         age dx unk    No Known Problems Paternal Grandfather     Cancer Brother         pt shared some type of blood cancer    No Known Problems Son     Stomach cancer Paternal Aunt 65    No Known Problems Paternal Aunt        Social History     Occupational History    Not on file   Tobacco Use    Smoking status: Former     Current packs/day: 0.00     Average packs/day: 1 pack/day for 15.0 years (15.0 ttl pk-yrs)     Types: Cigarettes     Start date:      Quit date:      Years since quittin.2    Smokeless tobacco: Never   Vaping Use    Vaping status: Never Used   Substance and Sexual Activity    Alcohol use: No    Drug use: No    Sexual activity: Not on file     Comment: birth control       Current Outpatient Medications on File Prior to Visit   Medication Sig    allopurinol (ZYLOPRIM) 100 mg tablet Take 1 tablet (100 mg total) by mouth daily    aspirin 81 MG tablet Take 81 mg by mouth daily.    calcium carbonate (OS-MANINDER) 600 MG tablet Take 1,200 mg by mouth daily    candesartan (ATACAND) 32 MG tablet Take 1 tablet (32 mg total) by mouth daily    celecoxib (CeleBREX) 200 mg capsule Take 1 capsule (200 mg total) by mouth 2 (two) times a day    cetirizine (ZyrTEC) 10 mg tablet Take 10 mg by mouth daily    Cholecalciferol (VITAMIN D3) 1000 units CAPS Take by mouth    FLUoxetine (PROzac) 20 mg capsule Take 1 capsule (20 mg total) by mouth daily    Januvia 100 MG tablet Take 100 mg by mouth daily    levothyroxine 25 mcg tablet Take 1 tablet (25 mcg total) by mouth daily    Magnesium 250 MG TABS Take by mouth daily    metoprolol succinate (TOPROL-XL) 100 mg 24 hr tablet Take 1 tablet (100 mg total) by mouth daily    Minoxidil (ROGAINE WOMENS EX) Apply 1 mL topically.    montelukast (SINGULAIR) 10 mg tablet Take 1 tablet (10 mg total) by mouth daily at bedtime    omeprazole (PriLOSEC) 40 MG capsule Take 1 capsule (40 mg total) by mouth daily     "potassium chloride (Klor-Con M20) 20 mEq tablet Take 1 tablet (20 mEq total) by mouth 2 (two) times a day    Potassium Chloride ER 20 MEQ TBCR     pyridoxine (VITAMIN B6) 100 mg tablet Take 100 mg by mouth daily.    simvastatin (ZOCOR) 40 mg tablet Take 1 tablet (40 mg total) by mouth daily    torsemide (DEMADEX) 20 mg tablet Take 1 tablet by mouth twice daily    traMADol (ULTRAM) 50 mg tablet Take 1 tablet (50 mg total) by mouth every 8 (eight) hours as needed for moderate pain    chlorhexidine (PERIDEX) 0.12 % solution     Empagliflozin (JARDIANCE) 10 MG TABS tablet Take 1 tablet (10 mg total) by mouth daily     No current facility-administered medications on file prior to visit.       Allergies   Allergen Reactions    Dust Mite Extract Sneezing       Physical Exam    /78 (BP Location: Left arm, Patient Position: Sitting, Cuff Size: Standard)   Pulse 68   Temp 98.2 °F (36.8 °C)   Ht 4' 10\" (1.473 m)   Wt 64.9 kg (143 lb)   BMI 29.89 kg/m²     Constitutional: normal, well developed, well nourished, alert, in no distress and non-toxic and no overt pain behavior. and overweight  Eyes: anicteric  HEENT: grossly intact  Neck: supple, symmetric, trachea midline and no masses   Pulmonary:even and unlabored  Cardiovascular:No edema or pitting edema present  Skin:Normal without rashes or lesions and well hydrated  Psychiatric:Mood and affect appropriate  Neurologic:Cranial Nerves II-XII grossly intact  Musculoskeletal:normal, difficulty going from sitting to standing sitting position, no obvious skin is erythema lumbar sacral spine there is no tenderness along the PSIS or greater trochanter bilateral, positive left ischial bursa tenderness, deep tendon reflexes diminished and symmetrical bilateral patellar and Achilles, no focal motor deficit appreciated lower limbs, negative bilateral Des's maneuver, negative bilateral straight leg raising    Imaging  LUMBAR SPINE @  2-17-21     INDICATION:   M47.816: " Spondylosis without myelopathy or radiculopathy, lumbar region.     COMPARISON:  1/20/2014     VIEWS:  XR SPINE LUMBAR MINIMUM 4 VIEWS NON INJURY        FINDINGS:     There are 5 non rib bearing lumbar vertebral bodies.      There is no evidence of acute fracture or destructive osseous lesion.     L1-L2, L2-L3 slight retrolisthesis.  L3-L4 slight, L4-5 mild spondylolisthesis.  Anatomic alignment otherwise.  Lower thoracolumbar levoscoliosis.      Bilateral posterior facet degenerative sclerosis.  L1-L2, L2-L3 moderate progressive degenerative disc changes.  L3-L4 progressive mild posterior disc space narrowing.  L4-L5 marked degenerative disc change again noted.     The pedicles appear intact.     Soft tissues are unremarkable.     IMPRESSION:     Multilevel degenerative changes aggressive in the upper and mid lumbar spine.    BILATERAL HIPS AND PELVIS @  2-23-17     INDICATION:  Right hip pain.  Occasional left hip pain.     COMPARISON:  None     VIEWS: AP pelvis and coned down views of each hip     IMAGES:  5     FINDINGS:  Moderate to severe lower lumbar and bilateral sacroiliac degenerative changes are noted.  No acute pelvic fracture or pathologic bone lesions.  Visualized bony pelvis appears intact.     LEFT HIP:  No significant degenerative changes.  Bony alignment is maintained.  Soft tissues are unremarkable.     RIGHT HIP:  No significant degenerative changes.  Bony alignment is maintained.  Soft tissues are unremarkable.     IMPRESSION:     No acute osseous abnormality.  No significant hip degenerative changes.     Moderate to severe lower lumbar bilateral sacroiliac degenerative changes, incompletely imaged.        LUMBAR SPINE  @  1-28-14  INDICATION-  Low back pain.  Posterior pelvic pain.  No history of   trauma.   COMPARISON- January 25, 2006   VIEWS-  AP, lateral, bilateral oblique and coned down projections    5   images   FINDINGS-   The lumbar vertebrae demonstrate normal height.   There is  no fracture or pathologic bone lesions.   Disc space narrowing diffusely throughout the lumbar spine, most   pronounced at L2-L3 and L4-L5.  Progressive degenerative endplate   changes at these levels.  Facet hypertrophic changes throughout the   lumbar spine.   Degenerative grade 1 retrolisthesis L2 on L3.  Degenerative grade 2   anterolisthesis L4 on L5.  These have progressed.   The pedicles are intact.   No pars defects.    IMPRESSION- Progressive, moderate degenerative changes of the lumbar   spine.  Given the presence of a pacing device, MRI is contraindicated.       I have personally reviewed pertinent films in PACS and my interpretation is to level lumbar spondylosis.

## 2024-03-25 DIAGNOSIS — J30.2 SEASONAL ALLERGIC RHINITIS, UNSPECIFIED TRIGGER: ICD-10-CM

## 2024-03-25 RX ORDER — MONTELUKAST SODIUM 10 MG/1
10 TABLET ORAL
Qty: 90 TABLET | Refills: 3 | Status: SHIPPED | OUTPATIENT
Start: 2024-03-25

## 2024-03-29 DIAGNOSIS — M25.50 ARTHRALGIA, UNSPECIFIED JOINT: ICD-10-CM

## 2024-03-29 RX ORDER — TRAMADOL HYDROCHLORIDE 50 MG/1
50 TABLET ORAL EVERY 8 HOURS PRN
Qty: 90 TABLET | Refills: 0 | Status: SHIPPED | OUTPATIENT
Start: 2024-03-29

## 2024-04-01 DIAGNOSIS — E11.22 TYPE 2 DIABETES MELLITUS WITH STAGE 3 CHRONIC KIDNEY DISEASE, WITHOUT LONG-TERM CURRENT USE OF INSULIN, UNSPECIFIED WHETHER STAGE 3A OR 3B CKD (HCC): Primary | ICD-10-CM

## 2024-04-01 DIAGNOSIS — N18.30 TYPE 2 DIABETES MELLITUS WITH STAGE 3 CHRONIC KIDNEY DISEASE, WITHOUT LONG-TERM CURRENT USE OF INSULIN, UNSPECIFIED WHETHER STAGE 3A OR 3B CKD (HCC): Primary | ICD-10-CM

## 2024-04-01 RX ORDER — SITAGLIPTIN 100 MG/1
100 TABLET, FILM COATED ORAL DAILY
Qty: 90 TABLET | Refills: 1 | Status: SHIPPED | OUTPATIENT
Start: 2024-04-01

## 2024-04-11 ENCOUNTER — HOSPITAL ENCOUNTER (OUTPATIENT)
Dept: RADIOLOGY | Facility: CLINIC | Age: 74
Discharge: HOME | End: 2024-04-11
Payer: MEDICARE

## 2024-04-11 VITALS
RESPIRATION RATE: 18 BRPM | DIASTOLIC BLOOD PRESSURE: 76 MMHG | TEMPERATURE: 98.7 F | OXYGEN SATURATION: 97 % | HEART RATE: 80 BPM | SYSTOLIC BLOOD PRESSURE: 122 MMHG

## 2024-04-11 DIAGNOSIS — M25.552 PAIN OF LEFT HIP: ICD-10-CM

## 2024-04-11 DIAGNOSIS — M70.72 ISCHIAL BURSITIS OF LEFT SIDE: ICD-10-CM

## 2024-04-11 PROCEDURE — 20610 DRAIN/INJ JOINT/BURSA W/O US: CPT | Performed by: ANESTHESIOLOGY

## 2024-04-11 PROCEDURE — 77002 NEEDLE LOCALIZATION BY XRAY: CPT | Performed by: ANESTHESIOLOGY

## 2024-04-11 RX ORDER — ROPIVACAINE HYDROCHLORIDE 2 MG/ML
2 INJECTION, SOLUTION EPIDURAL; INFILTRATION; PERINEURAL ONCE
Status: COMPLETED | OUTPATIENT
Start: 2024-04-11 | End: 2024-04-11

## 2024-04-11 RX ORDER — METHYLPREDNISOLONE ACETATE 80 MG/ML
80 INJECTION, SUSPENSION INTRA-ARTICULAR; INTRALESIONAL; INTRAMUSCULAR; PARENTERAL; SOFT TISSUE ONCE
Status: COMPLETED | OUTPATIENT
Start: 2024-04-11 | End: 2024-04-11

## 2024-04-11 RX ADMIN — ROPIVACAINE HYDROCHLORIDE 2 ML: 2 INJECTION EPIDURAL; INFILTRATION; PERINEURAL at 14:17

## 2024-04-11 RX ADMIN — IOHEXOL 1 ML: 300 INJECTION, SOLUTION INTRAVENOUS at 14:17

## 2024-04-11 RX ADMIN — METHYLPREDNISOLONE ACETATE 80 MG: 80 INJECTION, SUSPENSION INTRA-ARTICULAR; INTRALESIONAL; INTRAMUSCULAR; SOFT TISSUE at 14:17

## 2024-04-11 NOTE — H&P
History of Present Illness: The patient is a 74 y.o. female who presents with complaints of buttock pain.    Past Medical History:   Diagnosis Date    Abnormal finding on breast imaging     last assessed 11/30/17    Acute bacterial bronchitis     Allergic rhinitis     Anxiety     Arthralgia     Arthritis     Atherosclerotic heart disease of native coronary artery without angina pectoris     BBB (bundle branch block)     left,last assesed 6/4/12    BBB (bundle branch block)     left    Benign paroxysmal vertigo     last assessed 9/7/12    Benign paroxysmal vertigo of left ear     Bilateral fibrocystic breast changes     Bilateral sacroiliitis (HCC)     Cardiac defibrillator in situ     Carpal tunnel syndrome, unspecified upper limb     CHF (congestive heart failure) (HCC)     chronic systolic/diastolic    Chronic diastolic congestive heart failure (HCC)     Chronic kidney disease     stage 3    Chronic systolic congestive heart failure (HCC)     Coronary artery disease     Cough     Depression     Detrusor instability     Diabetes mellitus (HCC)     Difficulty walking up stairs     Dilated cardiomyopathy (HCC)     Dilated cardiomyopathy (HCC)     Disease of thyroid gland     Disorder of intervertebral disc of cervical spine     Esophageal reflux     GERD (gastroesophageal reflux disease)     Gout     Hemorrhoids     History of blood clots     Hormone replacement therapy     Hx of blood clots     Hyperlipidemia     Hypertension     Hypokalemia     Hypothyroidism     Indigestion     Inflamed toenail, left     Ingrown nail     Low back pain     left    OAB (overactive bladder)     detrusor instability    Obesity     AZ (obstructive sleep apnea)     Osteoarthritis     Papilloma of left breast     Psoriasis     psoriatic arthropathy    Psoriatic arthropathy (HCC)     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    RLS (restless legs syndrome)     Sciatica     Screening mammogram, encounter for 12/05/2019    Shortness of breath     Sleep  apnea     unspecified type    Tendinitis     Trigger thumb, left thumb     Urinary frequency     UTI (urinary tract infection)     Vitamin D deficiency        Past Surgical History:   Procedure Laterality Date    BLADDER SURGERY  1999    lift and repair    BREAST BIOPSY Left 05/23/2016    US CORE BIOPSY-BENIGN    CARDIAC CATHETERIZATION      outcome:  successful    CARDIAC DEFIBRILLATOR PLACEMENT      CARDIAC ELECTROPHYSIOLOGY PROCEDURE N/A 8/22/2023    Procedure: Cardiac biv icd generator change;  Surgeon: Nahid Dickerson MD;  Location: [facility];  Service: Cardiology    CARPAL TUNNEL RELEASE Bilateral 1997    CATARACT EXTRACTION      COLONOSCOPY      CORONARY ANGIOPLASTY WITH STENT PLACEMENT      CYSTOSCOPY W/ URETEROSCOPY  2009    DE QUERVAIN'S RELEASE Left 2011    and trigger finger release    EYE SURGERY Left 1999    EYE SURGERY Left 11/13/2019    Cataract    EYE SURGERY Right 11/09/2019    Cataract    INSERT / REPLACE / REMOVE PACEMAKER      JOINT REPLACEMENT Right 09/2017    Knee    KNEE ARTHROSCOPY      therapeutic    NEUROPLASTY / TRANSPOSITION MEDIAN NERVE AT CARPAL TUNNEL      OOPHORECTOMY Bilateral     AGE 44    [place] TENDON SHEATH INCISION Left 3/2/2017    Procedure: SMALL TRIGGER FINGER RELEASE;  Surgeon: Camden Lancaster MD;  Location:  MAIN OR;  Service: Orthopedics    RECTAL SURGERY  2006    repair of rectal ulcer    SHOULDER ARTHROSCOPY Left 2006    TOTAL ABDOMINAL HYSTERECTOMY      AGE 44    TOTAL KNEE ARTHROPLASTY Left     TOTAL SHOULDER REPLACEMENT Left 2007    TRIGGER FINGER RELEASE Bilateral 2011    right haand 201,2015,left hand 2012,2015    US GUIDED BREAST BIOPSY LEFT COMPLETE Left 5/23/2016         Current Outpatient Medications:     allopurinol (ZYLOPRIM) 100 mg tablet, Take 1 tablet (100 mg total) by mouth daily, Disp: 90 tablet, Rfl: 3    aspirin 81 MG tablet, Take 81 mg by mouth daily., Disp: , Rfl:     calcium carbonate (OS-MANINDER) 600 MG tablet, Take 1,200 mg by mouth daily,  Disp: , Rfl:     candesartan (ATACAND) 32 MG tablet, Take 1 tablet (32 mg total) by mouth daily, Disp: 30 tablet, Rfl: 5    celecoxib (CeleBREX) 200 mg capsule, Take 1 capsule (200 mg total) by mouth 2 (two) times a day, Disp: 180 capsule, Rfl: 3    cetirizine (ZyrTEC) 10 mg tablet, Take 10 mg by mouth daily, Disp: , Rfl:     chlorhexidine (PERIDEX) 0.12 % solution, , Disp: , Rfl:     Cholecalciferol (VITAMIN D3) 1000 units CAPS, Take by mouth, Disp: , Rfl:     Empagliflozin (JARDIANCE) 10 MG TABS tablet, Take 1 tablet (10 mg total) by mouth daily, Disp: 30 tablet, Rfl: 5    FLUoxetine (PROzac) 20 mg capsule, Take 1 capsule (20 mg total) by mouth daily, Disp: 30 capsule, Rfl: 3    Januvia 100 MG tablet, Take 1 tablet (100 mg total) by mouth daily, Disp: 90 tablet, Rfl: 1    levothyroxine 25 mcg tablet, Take 1 tablet (25 mcg total) by mouth daily, Disp: 90 tablet, Rfl: 3    Magnesium 250 MG TABS, Take by mouth daily, Disp: , Rfl:     metoprolol succinate (TOPROL-XL) 100 mg 24 hr tablet, Take 1 tablet (100 mg total) by mouth daily, Disp: 90 tablet, Rfl: 3    Minoxidil (ROGAINE WOMENS EX), Apply 1 mL topically., Disp: , Rfl:     montelukast (SINGULAIR) 10 mg tablet, TAKE 1 TABLET BY MOUTH EVERY DAY AT BEDTIME, Disp: 90 tablet, Rfl: 3    omeprazole (PriLOSEC) 40 MG capsule, Take 1 capsule (40 mg total) by mouth daily, Disp: 90 capsule, Rfl: 3    potassium chloride (Klor-Con M20) 20 mEq tablet, Take 1 tablet (20 mEq total) by mouth 2 (two) times a day, Disp: 60 tablet, Rfl: 3    Potassium Chloride ER 20 MEQ TBCR, , Disp: , Rfl:     pyridoxine (VITAMIN B6) 100 mg tablet, Take 100 mg by mouth daily., Disp: , Rfl:     simvastatin (ZOCOR) 40 mg tablet, Take 1 tablet (40 mg total) by mouth daily, Disp: 30 tablet, Rfl: 3    torsemide (DEMADEX) 20 mg tablet, Take 1 tablet by mouth twice daily, Disp: 60 tablet, Rfl: 5    traMADol (ULTRAM) 50 mg tablet, Take 1 tablet (50 mg total) by mouth every 8 (eight) hours as needed for  moderate pain, Disp: 90 tablet, Rfl: 0    Current Facility-Administered Medications:     iohexol (OMNIPAQUE) 300 mg/mL injection 1 mL, 1 mL, Intra-articular, Once, Anmol Lozano DO    methylPREDNISolone acetate (DEPO-MEDROL) injection 80 mg, 80 mg, Intra-articular, Once, Anmol Lozano DO    ropivacaine (NAROPIN) injection 2 mL, 2 mL, Intra-articular, Once, Anmol Lozano DO    Allergies   Allergen Reactions    Dust Mite Extract Sneezing       Physical Exam:   Vitals:    04/11/24 1408   BP: 118/73   Pulse: 84   Resp: 20   Temp: 98.7 °F (37.1 °C)   SpO2: 94%     General: Awake, Alert, Oriented x 3, Mood and affect appropriate  Respiratory: Respirations even and unlabored  Cardiovascular: Peripheral pulses intact; no edema  Musculoskeletal Exam: Normal gait    ASA Score: III         Assessment:   1. Ischial bursitis of left side    2. Pain of left hip        Plan: Left ischial bursa injection under fluro

## 2024-04-11 NOTE — DISCHARGE INSTRUCTIONS
Do not apply heat to any area that is numb. If you have discomfort or soreness at the injection site, you may apply ice today, 20 minutes on and 20 minutes off. Tomorrow you may use ice or warm, moist heat. Do not apply ice or heat directly to the skin.  If you experience severe shortness of breath, go to the Emergency Room.  You may have numbness for several hours from the local anesthetic. Please use caution and common sense, especially with weight-bearing activities.  You may have an increase or change in the discomfort for 36-48 hours after your treatment. Apply ice and continue with any pain medicine you have been prescribed.  Do not do anything strenuous today. You may shower, but no tub baths or hot tubs today. You may resume your normal activities tomorrow, but do not “overdo it”. Resume normal activities slowly when you are feeling better.  If you experience redness, drainage or swelling at the injection site, or if you develop a fever above 100 degrees, please call [facility] at [phone] or go to the Emergency Room.  Continue to take all routine medicines prescribed by your primary care physician unless otherwise instructed by our staff. Most blood thinners should be started again according to your regularly scheduled dosing. If you have any questions, please give our office a call.    As no general anesthesia was used in today's procedure, you should not experience any side effects related to anesthesia.       If you have a problem specifically related to your procedure, please call our office at [phone].  Problems not related to your procedure should be directed to your primary care physician.

## 2024-04-16 ENCOUNTER — REMOTE DEVICE CLINIC VISIT (OUTPATIENT)
Dept: CARDIOLOGY CLINIC | Facility: CLINIC | Age: 74
End: 2024-04-16
Payer: MEDICARE

## 2024-04-16 DIAGNOSIS — Z95.810 PRESENCE OF AUTOMATIC CARDIOVERTER/DEFIBRILLATOR (AICD): Primary | ICD-10-CM

## 2024-04-16 PROCEDURE — 93297 REM INTERROG DEV EVAL ICPMS: CPT | Performed by: INTERNAL MEDICINE

## 2024-04-16 NOTE — PROGRESS NOTES
Results for orders placed or performed in visit on 04/16/24   Cardiac EP device report    Narrative    MDT/BIV-ICD (THERAPIES PROGRAMMED OFF) / NOT MRI CONDITIONAL  CARELINK TRANSMISSION - OPTI-VOL ONLY: BATTERY VOLTAGE ADEQUATE (3.7 YRS). AP: 92.5%. : 98.5% + VSRP: <0.1%. ALL AVAILABLE LEAD PARAMETERS WITHIN NORMAL LIMITS (PLEASE NOTE HX OF SAME: SVC lead impedance warning on 22-Mar-2024,Â· Â· Alert: RV defib lead impedance warning on 16-Mar-2024.Â· Alert: RVtip to RVcoil lead impedance warning on 27-Mar-2024.Alert: Left Ventricular LVtip to RVcoil lead impedance warning on 27-Mar-2024. NOTE VF TX PROGRAMMED SINCE 12/28/23. NO SIGNIFICANT HIGH RATE EPISODES. OPTI-VOL WITHIN NORMAL LIMITS. NORMAL DEVICE FUNCTION. CH

## 2024-04-18 ENCOUNTER — TELEPHONE (OUTPATIENT)
Dept: PAIN MEDICINE | Facility: CLINIC | Age: 74
End: 2024-04-18

## 2024-04-18 NOTE — TELEPHONE ENCOUNTER
Pt called in with 80% improvement and pain level /10.    Pt stated if she sits on a hard surface for a long period of time she has some discomfort    Pt stated she is happy with the results

## 2024-04-25 ENCOUNTER — OFFICE VISIT (OUTPATIENT)
Dept: PAIN MEDICINE | Facility: CLINIC | Age: 74
End: 2024-04-25
Payer: MEDICARE

## 2024-04-25 VITALS
TEMPERATURE: 98 F | HEIGHT: 58 IN | HEART RATE: 67 BPM | DIASTOLIC BLOOD PRESSURE: 72 MMHG | SYSTOLIC BLOOD PRESSURE: 120 MMHG | BODY MASS INDEX: 29.89 KG/M2

## 2024-04-25 DIAGNOSIS — M51.36 LUMBAR DEGENERATIVE DISC DISEASE: ICD-10-CM

## 2024-04-25 DIAGNOSIS — M70.72 ISCHIAL BURSITIS OF LEFT SIDE: Primary | ICD-10-CM

## 2024-04-25 DIAGNOSIS — M47.816 LUMBAR SPONDYLOSIS: ICD-10-CM

## 2024-04-25 PROCEDURE — 99213 OFFICE O/P EST LOW 20 MIN: CPT | Performed by: PHYSICIAN ASSISTANT

## 2024-04-25 NOTE — PROGRESS NOTES
Assessment:  1. Ischial bursitis of left side    2. Lumbar spondylosis    3. Lumbar degenerative disc disease        Plan:  While the patient was in the office today, I did have a thorough conversation regarding their chronic pain syndrome, medication management, and treatment plan options.    The patient is able to report 80% reduction of pain with the left ischial bursa injection that was performed on 4/11/2024.  I made the patient aware that injections can be repeated after 3 months if indicated.    She also has a history of chronic low back pain secondary to lumbar spondylosis; I told her that there are treatments that we could offer for her back pain however she states that it is something she is able to manage without intervention at this point in time.    Patient will follow-up with us on a as needed basis if the pain changes or worsens.    The patient was advised to contact the office should their symptoms worsen in the interim. The patient was agreeable and verbalized an understanding.        History of Present Illness:    The patient is a 74 y.o. female last seen on 4/11/2024 who presents for a follow up office visit in regards to left ischial bursitis.  The patient currently reports 80% reduction in left buttock pain following the left ischial bursa injection that was completed on 4/11/2024.  She rates her current pain level 2 out of 10 and describes it at this point as an occasional pressure-like pain that is made worse with sitting on hard surfaces.  Patient has a history of chronic low back pain with no lower extremity radicular features.  This pain is made worse with prolonged standing.  Overall, the patient is very pleased with the results of the injection and has no new symptoms or acute issues on today's visit.    I have personally reviewed and/or updated the patient's past medical history, past surgical history, family history, social history, current medications, allergies, and vital signs today.        Review of Systems:    Review of Systems   Respiratory:  Negative for shortness of breath.    Cardiovascular:  Negative for chest pain.   Gastrointestinal:  Negative for constipation, diarrhea, nausea and vomiting.   Musculoskeletal:  Negative for arthralgias, gait problem, joint swelling (Joint stiffness) and myalgias.   Skin:  Negative for rash.   Neurological:  Negative for dizziness, seizures and weakness.   All other systems reviewed and are negative.        Past Medical History:   Diagnosis Date   • Abnormal finding on breast imaging     last assessed 11/30/17   • Acute bacterial bronchitis    • Allergic rhinitis    • Anxiety    • Arthralgia    • Arthritis    • Atherosclerotic heart disease of native coronary artery without angina pectoris    • BBB (bundle branch block)     left,last assesed 6/4/12   • BBB (bundle branch block)     left   • Benign paroxysmal vertigo     last assessed 9/7/12   • Benign paroxysmal vertigo of left ear    • Bilateral fibrocystic breast changes    • Bilateral sacroiliitis (HCC)    • Cardiac defibrillator in situ    • Carpal tunnel syndrome, unspecified upper limb    • CHF (congestive heart failure) (HCC)     chronic systolic/diastolic   • Chronic diastolic congestive heart failure (HCC)    • Chronic kidney disease     stage 3   • Chronic systolic congestive heart failure (HCC)    • Coronary artery disease    • Cough    • Depression    • Detrusor instability    • Diabetes mellitus (HCC)    • Difficulty walking up stairs    • Dilated cardiomyopathy (HCC)    • Dilated cardiomyopathy (HCC)    • Disease of thyroid gland    • Disorder of intervertebral disc of cervical spine    • Esophageal reflux    • GERD (gastroesophageal reflux disease)    • Gout    • Hemorrhoids    • History of blood clots    • Hormone replacement therapy    • Hx of blood clots    • Hyperlipidemia    • Hypertension    • Hypokalemia    • Hypothyroidism    • Indigestion    • Inflamed toenail, left    • Ingrown  nail    • Low back pain     left   • OAB (overactive bladder)     detrusor instability   • Obesity    • AZ (obstructive sleep apnea)    • Osteoarthritis    • Papilloma of left breast    • Psoriasis     psoriatic arthropathy   • Psoriatic arthropathy (HCC)    • 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   • RLS (restless legs syndrome)    • Sciatica    • Screening mammogram, encounter for 12/05/2019   • Shortness of breath    • Sleep apnea     unspecified type   • Tendinitis    • Trigger thumb, left thumb    • Urinary frequency    • UTI (urinary tract infection)    • Vitamin D deficiency        Past Surgical History:   Procedure Laterality Date   • BLADDER SURGERY  1999    lift and repair   • BREAST BIOPSY Left 05/23/2016     CORE BIOPSY-BENIGN   • CARDIAC CATHETERIZATION      outcome:  successful   • CARDIAC DEFIBRILLATOR PLACEMENT     • CARDIAC ELECTROPHYSIOLOGY PROCEDURE N/A 8/22/2023    Procedure: Cardiac biv icd generator change;  Surgeon: Nahid Dickerson MD;  Location:  CARD[facility] CATH LAB;  Service: Cardiology   • CARPAL TUNNEL RELEASE Bilateral 1997   • CATARACT EXTRACTION     • COLONOSCOPY     • CORONARY ANGIOPLASTY WITH STENT PLACEMENT     • CYSTOSCOPY W/ URETEROSCOPY  2009   • DE QUERVAIN'S RELEASE Left 2011    and trigger finger release   • EYE SURGERY Left 1999   • EYE SURGERY Left 11/13/2019    Cataract   • EYE SURGERY Right 11/09/2019    Cataract   • INSERT / REPLACE / REMOVE PACEMAKER     • JOINT REPLACEMENT Right 09/2017    Knee   • KNEE ARTHROSCOPY      therapeutic   • NEUROPLASTY / TRANSPOSITION MEDIAN NERVE AT CARPAL TUNNEL     • OOPHORECTOMY Bilateral     AGE 44   • HI TENDON SHEATH INCISION Left 3/2/2017    Procedure: SMALL TRIGGER FINGER RELEASE;  Surgeon: Camden Lancaster MD;  Location: [facility] OR;  Service: Orthopedics   • RECTAL SURGERY  2006    repair of rectal ulcer   • SHOULDER ARTHROSCOPY Left 2006   • TOTAL ABDOMINAL HYSTERECTOMY      AGE 44   • TOTAL KNEE ARTHROPLASTY Left    • TOTAL SHOULDER  REPLACEMENT Left    • TRIGGER FINGER RELEASE Bilateral     right haand ,,left hand    • US GUIDED BREAST BIOPSY LEFT COMPLETE Left 2016       Family History   Problem Relation Age of Onset   • Hypertension Mother    • Alzheimer's disease Mother    • Cancer Father 71        bladder   • Prostate cancer Father 71   • Breast cancer Daughter 52   • No Known Problems Daughter    • No Known Problems Maternal Grandmother    • No Known Problems Maternal Grandfather    • Breast cancer Paternal Grandmother         age dx unk   • No Known Problems Paternal Grandfather    • Cancer Brother         pt shared some type of blood cancer   • No Known Problems Son    • Stomach cancer Paternal Aunt 65   • No Known Problems Paternal Aunt        Social History     Occupational History   • Not on file   Tobacco Use   • Smoking status: Former     Current packs/day: 0.00     Average packs/day: 1 pack/day for 15.0 years (15.0 ttl pk-yrs)     Types: Cigarettes     Start date:      Quit date:      Years since quittin.3   • Smokeless tobacco: Never   Vaping Use   • Vaping status: Never Used   Substance and Sexual Activity   • Alcohol use: No   • Drug use: No   • Sexual activity: Not on file     Comment: birth control         Current Outpatient Medications:   •  allopurinol (ZYLOPRIM) 100 mg tablet, Take 1 tablet (100 mg total) by mouth daily, Disp: 90 tablet, Rfl: 3  •  aspirin 81 MG tablet, Take 81 mg by mouth daily., Disp: , Rfl:   •  calcium carbonate (OS-MANINDER) 600 MG tablet, Take 1,200 mg by mouth daily, Disp: , Rfl:   •  candesartan (ATACAND) 32 MG tablet, Take 1 tablet (32 mg total) by mouth daily, Disp: 30 tablet, Rfl: 5  •  celecoxib (CeleBREX) 200 mg capsule, Take 1 capsule (200 mg total) by mouth 2 (two) times a day, Disp: 180 capsule, Rfl: 3  •  cetirizine (ZyrTEC) 10 mg tablet, Take 10 mg by mouth daily, Disp: , Rfl:   •  chlorhexidine (PERIDEX) 0.12 % solution, , Disp: , Rfl:   •   "Cholecalciferol (VITAMIN D3) 1000 units CAPS, Take by mouth, Disp: , Rfl:   •  FLUoxetine (PROzac) 20 mg capsule, Take 1 capsule (20 mg total) by mouth daily, Disp: 30 capsule, Rfl: 3  •  Januvia 100 MG tablet, Take 1 tablet (100 mg total) by mouth daily, Disp: 90 tablet, Rfl: 1  •  levothyroxine 25 mcg tablet, Take 1 tablet (25 mcg total) by mouth daily, Disp: 90 tablet, Rfl: 3  •  Magnesium 250 MG TABS, Take by mouth daily, Disp: , Rfl:   •  metoprolol succinate (TOPROL-XL) 100 mg 24 hr tablet, Take 1 tablet (100 mg total) by mouth daily, Disp: 90 tablet, Rfl: 3  •  Minoxidil (ROGAINE WOMENS EX), Apply 1 mL topically., Disp: , Rfl:   •  montelukast (SINGULAIR) 10 mg tablet, TAKE 1 TABLET BY MOUTH EVERY DAY AT BEDTIME, Disp: 90 tablet, Rfl: 3  •  omeprazole (PriLOSEC) 40 MG capsule, Take 1 capsule (40 mg total) by mouth daily, Disp: 90 capsule, Rfl: 3  •  potassium chloride (Klor-Con M20) 20 mEq tablet, Take 1 tablet (20 mEq total) by mouth 2 (two) times a day, Disp: 60 tablet, Rfl: 3  •  Potassium Chloride ER 20 MEQ TBCR, , Disp: , Rfl:   •  pyridoxine (VITAMIN B6) 100 mg tablet, Take 100 mg by mouth daily., Disp: , Rfl:   •  simvastatin (ZOCOR) 40 mg tablet, Take 1 tablet (40 mg total) by mouth daily, Disp: 30 tablet, Rfl: 3  •  torsemide (DEMADEX) 20 mg tablet, Take 1 tablet by mouth twice daily, Disp: 60 tablet, Rfl: 5  •  traMADol (ULTRAM) 50 mg tablet, Take 1 tablet (50 mg total) by mouth every 8 (eight) hours as needed for moderate pain, Disp: 90 tablet, Rfl: 0  •  Empagliflozin (JARDIANCE) 10 MG TABS tablet, Take 1 tablet (10 mg total) by mouth daily, Disp: 30 tablet, Rfl: 5    Allergies   Allergen Reactions   • Dust Mite Extract Sneezing       Physical Exam:    /72 (BP Location: Left arm, Patient Position: Sitting, Cuff Size: Standard)   Pulse 67   Temp 98 °F (36.7 °C)   Ht 4' 10\" (1.473 m)   BMI 29.89 kg/m²     Constitutional:normal, well developed, well nourished, alert, in no distress and " non-toxic and no overt pain behavior.  Eyes:anicteric  HEENT:grossly intact  Neck:supple, symmetric, trachea midline and no masses   Pulmonary:even and unlabored  Cardiovascular:No edema or pitting edema present  Skin:Normal without rashes or lesions and well hydrated  Psychiatric:Mood and affect appropriate  Neurologic:Cranial Nerves II-XII grossly intact  Musculoskeletal:normal      Imaging  No orders to display         No orders of the defined types were placed in this encounter.

## 2024-05-11 DIAGNOSIS — M25.50 ARTHRALGIA, UNSPECIFIED JOINT: ICD-10-CM

## 2024-05-12 RX ORDER — TRAMADOL HYDROCHLORIDE 50 MG/1
50 TABLET ORAL EVERY 8 HOURS PRN
Qty: 90 TABLET | Refills: 0 | Status: SHIPPED | OUTPATIENT
Start: 2024-05-12

## 2024-05-17 ENCOUNTER — TELEPHONE (OUTPATIENT)
Age: 74
End: 2024-05-17

## 2024-05-17 NOTE — TELEPHONE ENCOUNTER
PT call about getting message that she is due for a COVID - 19 vaccine and would like to ask if she should get the vaccine at the doctor office. Or if the PT can do the vaccine at any other location. Please feel free to contact the PT. Thank you

## 2024-05-17 NOTE — TELEPHONE ENCOUNTER
Patient will follow provider recommendations and get the booster from her pharmacy as they have been giving her the injections.

## 2024-05-29 DIAGNOSIS — E78.5 HYPERLIPIDEMIA, UNSPECIFIED HYPERLIPIDEMIA TYPE: ICD-10-CM

## 2024-05-29 DIAGNOSIS — F32.A DEPRESSION, UNSPECIFIED DEPRESSION TYPE: ICD-10-CM

## 2024-05-30 RX ORDER — FLUOXETINE HYDROCHLORIDE 20 MG/1
20 CAPSULE ORAL DAILY
Qty: 30 CAPSULE | Refills: 5 | Status: SHIPPED | OUTPATIENT
Start: 2024-05-30

## 2024-05-30 RX ORDER — SIMVASTATIN 40 MG
40 TABLET ORAL DAILY
Qty: 30 TABLET | Refills: 5 | Status: SHIPPED | OUTPATIENT
Start: 2024-05-30

## 2024-06-11 ENCOUNTER — TELEPHONE (OUTPATIENT)
Age: 74
End: 2024-06-11

## 2024-06-11 NOTE — TELEPHONE ENCOUNTER
Patient called requesting refill for Fluoxetine. Patient made aware medication was refilled on 05/30/2024 for 30 with 5 refills to [facility] [place]. Patient instructed to contact the pharmacy to obtain refills of medication. Patient verbalized understanding.

## 2024-06-14 ENCOUNTER — REMOTE DEVICE CLINIC VISIT (OUTPATIENT)
Dept: CARDIOLOGY CLINIC | Facility: CLINIC | Age: 74
End: 2024-06-14
Payer: MEDICARE

## 2024-06-14 DIAGNOSIS — Z95.0 CARDIAC PACEMAKER IN SITU: Primary | ICD-10-CM

## 2024-06-14 PROCEDURE — 93296 REM INTERROG EVL PM/IDS: CPT | Performed by: STUDENT IN AN ORGANIZED HEALTH CARE EDUCATION/TRAINING PROGRAM

## 2024-06-14 PROCEDURE — 93294 REM INTERROG EVL PM/LDLS PM: CPT | Performed by: STUDENT IN AN ORGANIZED HEALTH CARE EDUCATION/TRAINING PROGRAM

## 2024-06-14 NOTE — PROGRESS NOTES
Results for orders placed or performed in visit on 06/14/24   Cardiac EP device report    Narrative    MDT/BIV-ICD (THERAPIES PROGRAMMED OFF) / NOT MRI CONDITIONAL  CARELINK TRANSMISSION: BATTERY VOLTAGE ADEQUATE (4.2 YRS). AP-90%, BVP-95% (VSR PACE-0.2%). ALL AVAILABLE LEAD PARAMETERS WITHIN NORMAL LIMITS. NO SIGNIFICANT HIGH RATE EPISODES. OPTI-VOL WITHIN NORMAL LIMITS. NORMAL DEVICE FUNCTION. GV

## 2024-06-18 DIAGNOSIS — E87.6 HYPOKALEMIA: ICD-10-CM

## 2024-06-18 RX ORDER — POTASSIUM CHLORIDE 20 MEQ/1
20 TABLET, EXTENDED RELEASE ORAL 2 TIMES DAILY
Qty: 60 TABLET | Refills: 5 | Status: SHIPPED | OUTPATIENT
Start: 2024-06-18

## 2024-06-18 NOTE — TELEPHONE ENCOUNTER
Reason for call:   [x] Refill   [] Prior Auth  [] Other:     Office:   [x] PCP/Provider - Graham  [] Specialty/Provider -     Medication: Potassium chloride 20mEq    Dose/Frequency: 1 tab BID    Quantity: 60    Pharmacy: [facility] [address] [phone]     Does the patient have enough for 3 days?   [] Yes   [x] No - pt has not had meds in 5 days

## 2024-06-24 ENCOUNTER — TELEPHONE (OUTPATIENT)
Dept: PAIN MEDICINE | Facility: CLINIC | Age: 74
End: 2024-06-24

## 2024-06-24 NOTE — TELEPHONE ENCOUNTER
"S/w pt, c/o return of pain where the bottom of her buttocks meets her leg on the L side, \"the same place as before\". Pt stated that the pain has been significant for a couple weeks with no obvious cause. Advised pt, ok to use rest, ice / heat, medication as directed or prescribed. The writer will d/w Dr. Lozano per pt's request and cb to advise. Pt verbalized understanding and appreciation.   "

## 2024-06-24 NOTE — TELEPHONE ENCOUNTER
PRE OP INSTRUCTIONS:  -If you are on prescription blood thinners, you may have to hold the medication for several days before the procedure.    Please call the office to discuss medication holds at [phone].  -Do not eat or drink ONE HOUR prior to your procedure. If you are diabetic, may follow regular breakfast/lunch schedule and take usual    diabetic medications.  -Lumbar( low back) procedure, please wear comfortable slacks/pants.  -Cervical (neck) procedure, please wear a shirt/blouse that is easy to remove.  -A  is required to take you home form your procedure.  -Continue all to take prescribed medication the day of your procedure, including blood pressure medications.  -If you are prescribe antibiotics, have an active infection or have an open wound, please contact the office at [phone].  -Please refrain from any vaccinations two weeks before and two weeks after injection.  -Insurance authorization received in not a guarantee of payment per your insurance company's authorization disclaimer and it is    your responsibility to verify your benefits.          -If you have any questions about the instructions, please call me at [phone]          -PATIENT INSTRUCTED TO STOP ALL NSAID'S 4 DAYS PRIOR TO PROCEDURE            EXCEPT FOR IBUPROFEN IT CAN BE TAKEN UP TO 24 HOURS PRIOR TO PROCEDURE.

## 2024-06-24 NOTE — TELEPHONE ENCOUNTER
Caller: Elizabeth   Doctor: Blake    Reason for call: patients has increased pain and when she goes up the stairs she goes up one step at a time unable to lead with Left leg     Please advise     Call back#: [phone]

## 2024-06-28 DIAGNOSIS — M25.50 ARTHRALGIA, UNSPECIFIED JOINT: ICD-10-CM

## 2024-06-28 RX ORDER — TRAMADOL HYDROCHLORIDE 50 MG/1
50 TABLET ORAL EVERY 8 HOURS PRN
Qty: 90 TABLET | Refills: 0 | Status: SHIPPED | OUTPATIENT
Start: 2024-06-28

## 2024-07-14 DIAGNOSIS — R60.9 EDEMA, UNSPECIFIED TYPE: ICD-10-CM

## 2024-07-14 RX ORDER — TORSEMIDE 20 MG/1
TABLET ORAL
Qty: 60 TABLET | Refills: 5 | Status: SHIPPED | OUTPATIENT
Start: 2024-07-14

## 2024-07-17 ENCOUNTER — RA CDI HCC (OUTPATIENT)
Dept: OTHER | Facility: HOSPITAL | Age: 74
End: 2024-07-17

## 2024-07-22 ENCOUNTER — HOSPITAL ENCOUNTER (OUTPATIENT)
Dept: RADIOLOGY | Facility: CLINIC | Age: 74
Discharge: HOME | End: 2024-07-22
Payer: MEDICARE

## 2024-07-22 VITALS
RESPIRATION RATE: 16 BRPM | OXYGEN SATURATION: 96 % | TEMPERATURE: 97.5 F | DIASTOLIC BLOOD PRESSURE: 77 MMHG | SYSTOLIC BLOOD PRESSURE: 161 MMHG | HEART RATE: 85 BPM

## 2024-07-22 DIAGNOSIS — M25.552 PAIN OF LEFT HIP: ICD-10-CM

## 2024-07-22 DIAGNOSIS — M70.72 ISCHIAL BURSITIS OF LEFT SIDE: ICD-10-CM

## 2024-07-22 PROCEDURE — 77002 NEEDLE LOCALIZATION BY XRAY: CPT | Performed by: ANESTHESIOLOGY

## 2024-07-22 PROCEDURE — 20610 DRAIN/INJ JOINT/BURSA W/O US: CPT | Performed by: ANESTHESIOLOGY

## 2024-07-22 RX ORDER — METHYLPREDNISOLONE ACETATE 80 MG/ML
80 INJECTION, SUSPENSION INTRA-ARTICULAR; INTRALESIONAL; INTRAMUSCULAR; PARENTERAL; SOFT TISSUE ONCE
Status: COMPLETED | OUTPATIENT
Start: 2024-07-22 | End: 2024-07-22

## 2024-07-22 RX ORDER — ROPIVACAINE HYDROCHLORIDE 2 MG/ML
2 INJECTION, SOLUTION EPIDURAL; INFILTRATION; PERINEURAL ONCE
Status: COMPLETED | OUTPATIENT
Start: 2024-07-22 | End: 2024-07-22

## 2024-07-22 RX ADMIN — IOHEXOL 1 ML: 300 INJECTION, SOLUTION INTRAVENOUS at 14:08

## 2024-07-22 RX ADMIN — ROPIVACAINE HYDROCHLORIDE 2 ML: 2 INJECTION EPIDURAL; INFILTRATION; PERINEURAL at 14:08

## 2024-07-22 RX ADMIN — METHYLPREDNISOLONE ACETATE 80 MG: 80 INJECTION, SUSPENSION INTRA-ARTICULAR; INTRALESIONAL; INTRAMUSCULAR; SOFT TISSUE at 14:08

## 2024-07-22 NOTE — H&P
History of Present Illness: The patient is a 74 y.o. female who presents with complaints of Normal gaitbuttock pain.    Past Medical History:   Diagnosis Date    Abnormal finding on breast imaging     last assessed 11/30/17    Acute bacterial bronchitis     Allergic rhinitis     Anxiety     Arthralgia     Arthritis     Atherosclerotic heart disease of native coronary artery without angina pectoris     BBB (bundle branch block)     left,last assesed 6/4/12    BBB (bundle branch block)     left    Benign paroxysmal vertigo     last assessed 9/7/12    Benign paroxysmal vertigo of left ear     Bilateral fibrocystic breast changes     Bilateral sacroiliitis (HCC)     Cardiac defibrillator in situ     Carpal tunnel syndrome, unspecified upper limb     CHF (congestive heart failure) (HCC)     chronic systolic/diastolic    Chronic diastolic congestive heart failure (HCC)     Chronic kidney disease     stage 3    Chronic systolic congestive heart failure (HCC)     Coronary artery disease     Cough     Depression     Detrusor instability     Diabetes mellitus (HCC)     Difficulty walking up stairs     Dilated cardiomyopathy (HCC)     Dilated cardiomyopathy (HCC)     Disease of thyroid gland     Disorder of intervertebral disc of cervical spine     Esophageal reflux     GERD (gastroesophageal reflux disease)     Gout     Hemorrhoids     History of blood clots     Hormone replacement therapy     Hx of blood clots     Hyperlipidemia     Hypertension     Hypokalemia     Hypothyroidism     Indigestion     Inflamed toenail, left     Ingrown nail     Low back pain     left    OAB (overactive bladder)     detrusor instability    Obesity     AZ (obstructive sleep apnea)     Osteoarthritis     Papilloma of left breast     Psoriasis     psoriatic arthropathy    Psoriatic arthropathy (HCC)     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    RLS (restless legs syndrome)     Sciatica     Screening mammogram, encounter for 12/05/2019    Shortness of breath      Sleep apnea     unspecified type    Tendinitis     Trigger thumb, left thumb     Urinary frequency     UTI (urinary tract infection)     Vitamin D deficiency        Past Surgical History:   Procedure Laterality Date    BLADDER SURGERY  1999    lift and repair    BREAST BIOPSY Left 05/23/2016    US CORE BIOPSY-BENIGN    CARDIAC CATHETERIZATION      outcome:  successful    CARDIAC DEFIBRILLATOR PLACEMENT      CARDIAC ELECTROPHYSIOLOGY PROCEDURE N/A 8/22/2023    Procedure: Cardiac biv icd generator change;  Surgeon: Nahid Dickerson MD;  Location: [facility];  Service: Cardiology    CARPAL TUNNEL RELEASE Bilateral 1997    CATARACT EXTRACTION      COLONOSCOPY      CORONARY ANGIOPLASTY WITH STENT PLACEMENT      CYSTOSCOPY W/ URETEROSCOPY  2009    DE QUERVAIN'S RELEASE Left 2011    and trigger finger release    EYE SURGERY Left 1999    EYE SURGERY Left 11/13/2019    Cataract    EYE SURGERY Right 11/09/2019    Cataract    INSERT / REPLACE / REMOVE PACEMAKER      JOINT REPLACEMENT Right 09/2017    Knee    KNEE ARTHROSCOPY      therapeutic    NEUROPLASTY / TRANSPOSITION MEDIAN NERVE AT CARPAL TUNNEL      OOPHORECTOMY Bilateral     AGE 44    NV TENDON SHEATH INCISION Left 3/2/2017    Procedure: SMALL TRIGGER FINGER RELEASE;  Surgeon: Camden Lancaster MD;  Location:  MAIN OR;  Service: Orthopedics    RECTAL SURGERY  2006    repair of rectal ulcer    SHOULDER ARTHROSCOPY Left 2006    TOTAL ABDOMINAL HYSTERECTOMY      AGE 44    TOTAL KNEE ARTHROPLASTY Left     TOTAL SHOULDER REPLACEMENT Left 2007    TRIGGER FINGER RELEASE Bilateral 2011    right haand 201,2015,left hand 2012,2015    US GUIDED BREAST BIOPSY LEFT COMPLETE Left 5/23/2016         Current Outpatient Medications:     allopurinol (ZYLOPRIM) 100 mg tablet, Take 1 tablet (100 mg total) by mouth daily, Disp: 90 tablet, Rfl: 3    aspirin 81 MG tablet, Take 81 mg by mouth daily., Disp: , Rfl:     calcium carbonate (OS-MANINDER) 600 MG tablet, Take 1,200 mg by  mouth daily, Disp: , Rfl:     candesartan (ATACAND) 32 MG tablet, Take 1 tablet (32 mg total) by mouth daily, Disp: 30 tablet, Rfl: 5    celecoxib (CeleBREX) 200 mg capsule, Take 1 capsule (200 mg total) by mouth 2 (two) times a day, Disp: 180 capsule, Rfl: 3    cetirizine (ZyrTEC) 10 mg tablet, Take 10 mg by mouth daily, Disp: , Rfl:     chlorhexidine (PERIDEX) 0.12 % solution, , Disp: , Rfl:     Cholecalciferol (VITAMIN D3) 1000 units CAPS, Take by mouth, Disp: , Rfl:     Empagliflozin (JARDIANCE) 10 MG TABS tablet, Take 1 tablet (10 mg total) by mouth daily, Disp: 30 tablet, Rfl: 5    FLUoxetine (PROzac) 20 mg capsule, TAKE 1 CAPSULE BY MOUTH DAILY, Disp: 30 capsule, Rfl: 5    Januvia 100 MG tablet, Take 1 tablet (100 mg total) by mouth daily, Disp: 90 tablet, Rfl: 1    levothyroxine 25 mcg tablet, Take 1 tablet (25 mcg total) by mouth daily, Disp: 90 tablet, Rfl: 3    Magnesium 250 MG TABS, Take by mouth daily, Disp: , Rfl:     metoprolol succinate (TOPROL-XL) 100 mg 24 hr tablet, Take 1 tablet (100 mg total) by mouth daily, Disp: 90 tablet, Rfl: 3    Minoxidil (ROGAINE WOMENS EX), Apply 1 mL topically., Disp: , Rfl:     montelukast (SINGULAIR) 10 mg tablet, TAKE 1 TABLET BY MOUTH EVERY DAY AT BEDTIME, Disp: 90 tablet, Rfl: 3    omeprazole (PriLOSEC) 40 MG capsule, Take 1 capsule (40 mg total) by mouth daily, Disp: 90 capsule, Rfl: 3    potassium chloride (Klor-Con M20) 20 mEq tablet, Take 1 tablet (20 mEq total) by mouth 2 (two) times a day, Disp: 60 tablet, Rfl: 5    Potassium Chloride ER 20 MEQ TBCR, , Disp: , Rfl:     pyridoxine (VITAMIN B6) 100 mg tablet, Take 100 mg by mouth daily., Disp: , Rfl:     simvastatin (ZOCOR) 40 mg tablet, TAKE 1 TABLET BY MOUTH DAILY, Disp: 30 tablet, Rfl: 5    torsemide (DEMADEX) 20 mg tablet, TAKE 1 TABLET BY MOUTH TWICE DAILY, Disp: 60 tablet, Rfl: 5    traMADol (ULTRAM) 50 mg tablet, Take 1 tablet (50 mg total) by mouth every 8 (eight) hours as needed for moderate pain,  Disp: 90 tablet, Rfl: 0    Current Facility-Administered Medications:     iohexol (OMNIPAQUE) 300 mg/mL injection 1 mL, 1 mL, Intra-articular, Once, Anmol Lozano DO    methylPREDNISolone acetate (DEPO-MEDROL) injection 80 mg, 80 mg, Intra-articular, Once, Anmol Lozano,     ropivacaine (NAROPIN) injection 2 mL, 2 mL, Intra-articular, Once, Anmol Lozano DO    Allergies   Allergen Reactions    Dust Mite Extract Sneezing       Physical Exam:   General: Awake, Alert, Oriented x 3, Mood and affect appropriate  Respiratory: Respirations even and unlabored  Cardiovascular: Peripheral pulses intact; no edema  Musculoskeletal Exam: normal gait    ASA Score: III         Assessment: ischial bursitis    Plan: Left ischial bursa injection [id number]

## 2024-07-22 NOTE — DISCHARGE INSTRUCTIONS
Do not apply heat to any area that is numb. If you have discomfort or soreness at the injection site, you may apply ice today, 20 minutes on and 20 minutes off. Tomorrow you may use ice or warm, moist heat. Do not apply ice or heat directly to the skin.  If you experience severe shortness of breath, go to the Emergency Room.  You may have numbness for several hours from the local anesthetic. Please use caution and common sense, especially with weight-bearing activities.  You may have an increase or change in the discomfort for 36-48 hours after your treatment. Apply ice and continue with any pain medicine you have been prescribed.  Do not do anything strenuous today. You may shower, but no tub baths or hot tubs today. You may resume your normal activities tomorrow, but do not “overdo it”. Resume normal activities slowly when you are feeling better.  If you experience redness, drainage or swelling at the injection site, or if you develop a fever above 100 degrees, please call [facility] at [phone] or go to the Emergency Room.  Continue to take all routine medicines prescribed by your primary care physician unless otherwise instructed by our staff. Most blood thinners should be started again according to your regularly scheduled dosing. If you have any questions, please give our office a call.    As no general anesthesia was used in today's procedure, you should not experience any side effects related to anesthesia.       If you have a problem specifically related to your procedure, please call our office at [phone].  Problems not related to your procedure should be directed to your primary care physician.

## 2024-07-24 DIAGNOSIS — M25.50 ARTHRALGIA, UNSPECIFIED JOINT: ICD-10-CM

## 2024-07-24 RX ORDER — TRAMADOL HYDROCHLORIDE 50 MG/1
50 TABLET ORAL EVERY 8 HOURS PRN
Qty: 90 TABLET | Refills: 0 | Status: SHIPPED | OUTPATIENT
Start: 2024-07-24

## 2024-08-02 ENCOUNTER — OFFICE VISIT (OUTPATIENT)
Dept: FAMILY MEDICINE CLINIC | Facility: HOSPITAL | Age: 74
End: 2024-08-02
Payer: MEDICARE

## 2024-08-02 VITALS
SYSTOLIC BLOOD PRESSURE: 132 MMHG | DIASTOLIC BLOOD PRESSURE: 71 MMHG | TEMPERATURE: 98.2 F | HEIGHT: 58 IN | OXYGEN SATURATION: 100 % | BODY MASS INDEX: 29.77 KG/M2 | HEART RATE: 67 BPM | WEIGHT: 141.8 LBS

## 2024-08-02 DIAGNOSIS — G62.9 NEUROPATHY: ICD-10-CM

## 2024-08-02 DIAGNOSIS — N18.31 STAGE 3A CHRONIC KIDNEY DISEASE (HCC): ICD-10-CM

## 2024-08-02 DIAGNOSIS — H91.93 BILATERAL HEARING LOSS, UNSPECIFIED HEARING LOSS TYPE: ICD-10-CM

## 2024-08-02 DIAGNOSIS — I42.0 DILATED CARDIOMYOPATHY (HCC): ICD-10-CM

## 2024-08-02 DIAGNOSIS — Z00.00 MEDICARE ANNUAL WELLNESS VISIT, SUBSEQUENT: Primary | ICD-10-CM

## 2024-08-02 DIAGNOSIS — N18.30 TYPE 2 DIABETES MELLITUS WITH STAGE 3 CHRONIC KIDNEY DISEASE, WITHOUT LONG-TERM CURRENT USE OF INSULIN, UNSPECIFIED WHETHER STAGE 3A OR 3B CKD (HCC): ICD-10-CM

## 2024-08-02 DIAGNOSIS — E78.2 MIXED HYPERLIPIDEMIA: ICD-10-CM

## 2024-08-02 DIAGNOSIS — E03.9 ACQUIRED HYPOTHYROIDISM: ICD-10-CM

## 2024-08-02 DIAGNOSIS — E11.22 TYPE 2 DIABETES MELLITUS WITH STAGE 3 CHRONIC KIDNEY DISEASE, WITHOUT LONG-TERM CURRENT USE OF INSULIN, UNSPECIFIED WHETHER STAGE 3A OR 3B CKD (HCC): ICD-10-CM

## 2024-08-02 DIAGNOSIS — E53.8 VITAMIN B 12 DEFICIENCY: ICD-10-CM

## 2024-08-02 PROCEDURE — 99214 OFFICE O/P EST MOD 30 MIN: CPT | Performed by: FAMILY MEDICINE

## 2024-08-02 PROCEDURE — G0439 PPPS, SUBSEQ VISIT: HCPCS | Performed by: FAMILY MEDICINE

## 2024-08-02 NOTE — PROGRESS NOTES
Ambulatory Visit  Name: Dejah Parish      : 1950      MRN: [id number]  Encounter Provider: Nestor Stevenson MD  Encounter Date: 2024   Encounter department: [facility]     Assessment & Plan   1. Medicare annual wellness visit, subsequent  2. Mixed hyperlipidemia  3. Stage 3a chronic kidney disease (HCC)  4. Acquired hypothyroidism  5. Type 2 diabetes mellitus with stage 3 chronic kidney disease, without long-term current use of insulin, unspecified whether stage 3a or 3b CKD (HCC)  Assessment & Plan:    Lab Results   Component Value Date    HGBA1C 6.4 (H) 2023     Orders:  -     Albumin / creatinine urine ratio; Future  6. Dilated cardiomyopathy (HCC)  7. Bilateral hearing loss, unspecified hearing loss type  -     Ambulatory Referral to Otolaryngology; Future  8. Neuropathy  -     Vitamin B12; Future  9. Vitamin B 12 deficiency  -     Vitamin B12; Future      Depression Screening and Follow-up Plan: Patient was screened for depression during today's encounter. They screened negative with a PHQ-9 score of 1.    Falls Plan of Care: balance, strength, and gait training instructions were provided. Medications that increase falls were reviewed. Assessed feet and footwear.       Preventive health issues were discussed with patient, and age appropriate screening tests were ordered as noted in patient's After Visit Summary. Personalized health advice and appropriate referrals for health education or preventive services given if needed, as noted in patient's After Visit Summary.    History of Present Illness     AWV and follow up medical issues    Notes trouble with her hearing-feels  moreso on the phone       Patient Care Team:  Nestor Stevenson MD as PCP - General  MD Camden Aragon MD Scott Loev, DO Tricia Kelly, MD Daniel Stauffer, MD    Review of Systems   Constitutional:  Negative for unexpected weight change.   HENT:  Positive for  hearing loss.    Respiratory: Negative.     Cardiovascular: Negative.    Gastrointestinal: Negative.    Musculoskeletal:  Positive for arthralgias.   Psychiatric/Behavioral: Negative.     All other systems reviewed and are negative.    Medical History Reviewed by provider this encounter:  Tobacco  Allergies  Meds  Problems  Med Hx  Surg Hx  Fam Hx       Annual Wellness Visit Questionnaire   Dejah is here for her Subsequent Wellness visit. Last Medicare Wellness visit information reviewed, patient interviewed and updates made to the record today.      Health Risk Assessment:   Patient rates overall health as good. Patient feels that their physical health rating is same. Patient is satisfied with their life. Eyesight was rated as same. Hearing was rated as slightly worse. Patient feels that their emotional and mental health rating is same. Patients states they are never, rarely angry. Patient states they are sometimes unusually tired/fatigued. Pain experienced in the last 7 days has been none. Patient states that she has experienced no weight loss or gain in last 6 months.     Depression Screening:   PHQ-9 Score: 1      Fall Risk Screening:   In the past year, patient has experienced: history of falling in past year    Number of falls: 2 or more  Injured during fall?: No    Feels unsteady when standing or walking?: Yes    Worried about falling?: Yes      Urinary Incontinence Screening:   Patient has not leaked urine accidently in the last six months.     Home Safety:  Patient does not have trouble with stairs inside or outside of their home. Patient has working smoke alarms and has working carbon monoxide detector. Home safety hazards include: none.     Nutrition:   Current diet is Regular.     Medications:   Patient is not currently taking any over-the-counter supplements. Patient is able to manage medications.     Activities of Daily Living (ADLs)/Instrumental Activities of Daily Living (IADLs):   Walk and  transfer into and out of bed and chair?: Yes  Dress and groom yourself?: Yes    Bathe or shower yourself?: Yes    Feed yourself? Yes  Do your laundry/housekeeping?: Yes  Manage your money, pay your bills and track your expenses?: Yes  Make your own meals?: Yes    Do your own shopping?: Yes    Previous Hospitalizations:   Any hospitalizations or ED visits within the last 12 months?: No      Advance Care Planning:   Living will: No    Durable POA for healthcare: No    Advanced directive: No    Advanced directive counseling given: Yes    Five wishes given: No    Patient declined ACP directive: No    End of Life Decisions reviewed with patient: Yes    Provider agrees with end of life decisions: Yes      Cognitive Screening:   Provider or family/friend/caregiver concerned regarding cognition?: No    PREVENTIVE SCREENINGS      Cardiovascular Screening:    General: Screening Not Indicated and History Lipid Disorder      Diabetes Screening:     General: Screening Not Indicated and History Diabetes      Colorectal Cancer Screening:     General: Screening Current      Breast Cancer Screening:     General: Screening Current      Cervical Cancer Screening:    General: Screening Not Indicated      Osteoporosis Screening:    General: Screening Current      Abdominal Aortic Aneurysm (AAA) Screening:        General: Screening Not Indicated      Lung Cancer Screening:     General: Screening Not Indicated      Hepatitis C Screening:    General: Screening Current    Screening, Brief Intervention, and Referral to Treatment (SBIRT)    Screening  Typical number of drinks in a day: 0  Typical number of drinks in a week: 0  Interpretation: Low risk drinking behavior.    Single Item Drug Screening:  How often have you used an illegal drug (including marijuana) or a prescription medication for non-medical reasons in the past year? never    Single Item Drug Screen Score: 0  Interpretation: Negative screen for possible drug use  "disorder    Review of Current Opioid Use  Opioid Risk Tool (ORT) Score: 0  Opioid Risk Tool (ORT) Interpretation: Score 0-3: Low risk for opioid misuse    Social Determinants of Health     Financial Resource Strain: Low Risk  (6/6/2023)    Overall Financial Resource Strain (CARDIA)     Difficulty of Paying Living Expenses: Not very hard   Food Insecurity: No Food Insecurity (8/2/2024)    Hunger Vital Sign     Worried About Running Out of Food in the Last Year: Never true     Ran Out of Food in the Last Year: Never true   Transportation Needs: No Transportation Needs (8/2/2024)    PRAPARE - Transportation     Lack of Transportation (Medical): No     Lack of Transportation (Non-Medical): No   Housing Stability: Low Risk  (8/2/2024)    Housing Stability Vital Sign     Unable to Pay for Housing in the Last Year: No     Number of Times Moved in the Last Year: 0     Homeless in the Last Year: No   Utilities: Not At Risk (8/2/2024)    [facility] Utilities     Threatened with loss of utilities: No     Vision Screening    Right eye Left eye Both eyes   Without correction      With correction 20/40 20/30 20/25       Objective     /71 (BP Location: Left arm, Patient Position: Sitting, Cuff Size: Standard)   Pulse 67   Temp 98.2 °F (36.8 °C) (Tympanic)   Ht 4' 10\" (1.473 m)   Wt 64.3 kg (141 lb 12.8 oz)   SpO2 100%   BMI 29.64 kg/m²     Physical Exam  Vitals and nursing note reviewed.   Constitutional:       Appearance: Normal appearance.   Neck:      Vascular: No carotid bruit.   Cardiovascular:      Rate and Rhythm: Regular rhythm.      Pulses: no weak pulses.           Dorsalis pedis pulses are 1+ on the right side and 1+ on the left side.        Posterior tibial pulses are 1+ on the right side and 1+ on the left side.      Heart sounds: Normal heart sounds.   Pulmonary:      Breath sounds: Normal breath sounds.   Musculoskeletal:      Right lower leg: No edema.      Left lower leg: No edema.   Feet:      Right foot:    "   Skin integrity: No ulcer, skin breakdown, erythema, warmth, callus or dry skin.      Left foot:      Skin integrity: No ulcer, skin breakdown, erythema, warmth, callus or dry skin.   Neurological:      Mental Status: She is alert and oriented to person, place, and time.   Psychiatric:         Mood and Affect: Mood normal.         Patient's shoes and socks removed.    Right Foot/Ankle   Right Foot Inspection  Skin Exam: skin normal and skin intact. No dry skin, no warmth, no callus, no erythema, no maceration, no abnormal color, no pre-ulcer, no ulcer and no callus.     Toe Exam: ROM and strength within normal limits.     Sensory   Vibration: intact  Proprioception: intact  Monofilament testing: intact    Vascular  Capillary refills: < 3 seconds  The right DP pulse is 1+. The right PT pulse is 1+.     Left Foot/Ankle  Left Foot Inspection  Skin Exam: skin normal and skin intact. No dry skin, no warmth, no erythema, no maceration, normal color, no pre-ulcer, no ulcer and no callus.     Toe Exam: ROM and strength within normal limits.     Sensory   Vibration: intact  Proprioception: intact  Monofilament testing: intact    Vascular  Capillary refills: < 3 seconds  The left DP pulse is 1+. The left PT pulse is 1+.     Assign Risk Category  No deformity present  No loss of protective sensation  No weak pulses  Risk: 0

## 2024-08-02 NOTE — PATIENT INSTRUCTIONS
Medicare Preventive Visit Patient Instructions  Thank you for completing your Welcome to Medicare Visit or Medicare Annual Wellness Visit today. Your next wellness visit will be due in one year (8/3/2025).  The screening/preventive services that you may require over the next 5-10 years are detailed below. Some tests may not apply to you based off risk factors and/or age. Screening tests ordered at today's visit but not completed yet may show as past due. Also, please note that scanned in results may not display below.  Preventive Screenings:  Service Recommendations Previous Testing/Comments   Colorectal Cancer Screening  * Colonoscopy    * Fecal Occult Blood Test (FOBT)/Fecal Immunochemical Test (FIT)  * Fecal DNA/Cologuard Test  * Flexible Sigmoidoscopy Age: 45-75 years old   Colonoscopy: every 10 years (may be performed more frequently if at higher risk)  OR  FOBT/FIT: every 1 year  OR  Cologuard: every 3 years  OR  Sigmoidoscopy: every 5 years  Screening may be recommended earlier than age 45 if at higher risk for colorectal cancer. Also, an individualized decision between you and your healthcare provider will decide whether screening between the ages of 76-85 would be appropriate. Colonoscopy: 03/02/2022  FOBT/FIT: Not on file  Cologuard: Not on file  Sigmoidoscopy: Not on file          Breast Cancer Screening Age: 40+ years old  Frequency: every 1-2 years  Not required if history of left and right mastectomy Mammogram: 01/23/2024    Screening Current   Cervical Cancer Screening Between the ages of 21-29, pap smear recommended once every 3 years.   Between the ages of 30-65, can perform pap smear with HPV co-testing every 5 years.   Recommendations may differ for women with a history of total hysterectomy, cervical cancer, or abnormal pap smears in past. Pap Smear: Not on file    Screening Not Indicated   Hepatitis C Screening Once for adults born between 1945 and 1965  More frequently in patients at high risk  for Hepatitis C Hep C Antibody: 06/05/2020    Screening Current   Diabetes Screening 1-2 times per year if you're at risk for diabetes or have pre-diabetes Fasting glucose: 89 mg/dL (11/28/2023)  A1C: 6.4 % (11/28/2023)  Screening Not Indicated  History Diabetes   Cholesterol Screening Once every 5 years if you don't have a lipid disorder. May order more often based on risk factors. Lipid panel: 11/28/2023    Screening Not Indicated  History Lipid Disorder     Other Preventive Screenings Covered by Medicare:  Abdominal Aortic Aneurysm (AAA) Screening: covered once if your at risk. You're considered to be at risk if you have a family history of AAA.  Lung Cancer Screening: covers low dose CT scan once per year if you meet all of the following conditions: (1) Age 55-77; (2) No signs or symptoms of lung cancer; (3) Current smoker or have quit smoking within the last 15 years; (4) You have a tobacco smoking history of at least 20 pack years (packs per day multiplied by number of years you smoked); (5) You get a written order from a healthcare provider.  Glaucoma Screening: covered annually if you're considered high risk: (1) You have diabetes OR (2) Family history of glaucoma OR (3)  aged 50 and older OR (4)  American aged 65 and older  Osteoporosis Screening: covered every 2 years if you meet one of the following conditions: (1) You're estrogen deficient and at risk for osteoporosis based off medical history and other findings; (2) Have a vertebral abnormality; (3) On glucocorticoid therapy for more than 3 months; (4) Have primary hyperparathyroidism; (5) On osteoporosis medications and need to assess response to drug therapy.   Last bone density test (DXA Scan): 06/12/2023.  HIV Screening: covered annually if you're between the age of 15-65. Also covered annually if you are younger than 15 and older than 65 with risk factors for HIV infection. For pregnant patients, it is covered up to 3 times  per pregnancy.    Immunizations:  Immunization Recommendations   Influenza Vaccine Annual influenza vaccination during flu season is recommended for all persons aged >= 6 months who do not have contraindications   Pneumococcal Vaccine   * Pneumococcal conjugate vaccine = PCV13 (Prevnar 13), PCV15 (Vaxneuvance), PCV20 (Prevnar 20)  * Pneumococcal polysaccharide vaccine = PPSV23 (Pneumovax) Adults 19-65 yo with certain risk factors or if 65+ yo  If never received any pneumonia vaccine: recommend Prevnar 20 (PCV20)  Give PCV20 if previously received 1 dose of PCV13 or PPSV23   Hepatitis B Vaccine 3 dose series if at intermediate or high risk (ex: diabetes, end stage renal disease, liver disease)   Respiratory syncytial virus (RSV) Vaccine - COVERED BY MEDICARE PART D  * RSVPreF3 (Arexvy) CDC recommends that adults 60 years of age and older may receive a single dose of RSV vaccine using shared clinical decision-making (SCDM)   Tetanus (Td) Vaccine - COST NOT COVERED BY MEDICARE PART B Following completion of primary series, a booster dose should be given every 10 years to maintain immunity against tetanus. Td may also be given as tetanus wound prophylaxis.   Tdap Vaccine - COST NOT COVERED BY MEDICARE PART B Recommended at least once for all adults. For pregnant patients, recommended with each pregnancy.   Shingles Vaccine (Shingrix) - COST NOT COVERED BY MEDICARE PART B  2 shot series recommended in those 19 years and older who have or will have weakened immune systems or those 50 years and older     Health Maintenance Due:      Topic Date Due   • Breast Cancer Screening: Mammogram  01/23/2025   • DXA SCAN  06/12/2025   • Hepatitis C Screening  Completed   • Colorectal Cancer Screening  Discontinued     Immunizations Due:      Topic Date Due   • Influenza Vaccine (1) 09/01/2024     Advance Directives   What are advance directives?  Advance directives are legal documents that state your wishes and plans for medical  care. These plans are made ahead of time in case you lose your ability to make decisions for yourself. Advance directives can apply to any medical decision, such as the treatments you want, and if you want to donate organs.   What are the types of advance directives?  There are many types of advance directives, and each state has rules about how to use them. You may choose a combination of any of the following:  Living will:  This is a written record of the treatment you want. You can also choose which treatments you do not want, which to limit, and which to stop at a certain time. This includes surgery, medicine, IV fluid, and tube feedings.   Durable power of  for healthcare (DPAHC):  This is a written record that states who you want to make healthcare choices for you when you are unable to make them for yourself. This person, called a proxy, is usually a family member or a friend. You may choose more than 1 proxy.  Do not resuscitate (DNR) order:  A DNR order is used in case your heart stops beating or you stop breathing. It is a request not to have certain forms of treatment, such as CPR. A DNR order may be included in other types of advance directives.  Medical directive:  This covers the care that you want if you are in a coma, near death, or unable to make decisions for yourself. You can list the treatments you want for each condition. Treatment may include pain medicine, surgery, blood transfusions, dialysis, IV or tube feedings, and a ventilator (breathing machine).  Values history:  This document has questions about your views, beliefs, and how you feel and think about life. This information can help others choose the care that you would choose.  Why are advance directives important?  An advance directive helps you control your care. Although spoken wishes may be used, it is better to have your wishes written down. Spoken wishes can be misunderstood, or not followed. Treatments may be given even if  you do not want them. An advance directive may make it easier for your family to make difficult choices about your care.   Fall Prevention    Fall prevention  includes ways to make your home and other areas safer. It also includes ways you can move more carefully to prevent a fall. Health conditions that cause changes in your blood pressure, vision, or muscle strength and coordination may increase your risk for falls. Medicines may also increase your risk for falls if they make you dizzy, weak, or sleepy.   Fall prevention tips:   Stand or sit up slowly.    Use assistive devices as directed.    Wear shoes that fit well and have soles that .    Wear a personal alarm.    Stay active.    Manage your medical conditions.    Home Safety Tips:  Add items to prevent falls in the bathroom.    Keep paths clear.    Install bright lights in your home.    Keep items you use often on shelves within reach.    Paint or place reflective tape on the edges of your stairs.    Weight Management   Why it is important to manage your weight:  Being overweight increases your risk of health conditions such as heart disease, high blood pressure, type 2 diabetes, and certain types of cancer. It can also increase your risk for osteoarthritis, sleep apnea, and other respiratory problems. Aim for a slow, steady weight loss. Even a small amount of weight loss can lower your risk of health problems.  How to lose weight safely:  A safe and healthy way to lose weight is to eat fewer calories and get regular exercise. You can lose up about 1 pound a week by decreasing the number of calories you eat by 500 calories each day.   Healthy meal plan for weight management:  A healthy meal plan includes a variety of foods, contains fewer calories, and helps you stay healthy. A healthy meal plan includes the following:  Eat whole-grain foods more often.  A healthy meal plan should contain fiber. Fiber is the part of grains, fruits, and vegetables that is  not broken down by your body. Whole-grain foods are healthy and provide extra fiber in your diet. Some examples of whole-grain foods are whole-wheat breads and pastas, oatmeal, brown rice, and bulgur.  Eat a variety of vegetables every day.  Include dark, leafy greens such as spinach, kale, taj greens, and mustard greens. Eat yellow and orange vegetables such as carrots, sweet potatoes, and winter squash.   Eat a variety of fruits every day.  Choose fresh or canned fruit (canned in its own juice or light syrup) instead of juice. Fruit juice has very little or no fiber.  Eat low-fat dairy foods.  Drink fat-free (skim) milk or 1% milk. Eat fat-free yogurt and low-fat cottage cheese. Try low-fat cheeses such as mozzarella and other reduced-fat cheeses.  Choose meat and other protein foods that are low in fat.  Choose beans or other legumes such as split peas or lentils. Choose fish, skinless poultry (chicken or turkey), or lean cuts of red meat (beef or pork). Before you cook meat or poultry, cut off any visible fat.   Use less fat and oil.  Try baking foods instead of frying them. Add less fat, such as margarine, sour cream, regular salad dressing and mayonnaise to foods. Eat fewer high-fat foods. Some examples of high-fat foods include french fries, doughnuts, ice cream, and cakes.  Eat fewer sweets.  Limit foods and drinks that are high in sugar. This includes candy, cookies, regular soda, and sweetened drinks.  Exercise:  Exercise at least 30 minutes per day on most days of the week. Some examples of exercise include walking, biking, dancing, and swimming. You can also fit in more physical activity by taking the stairs instead of the elevator or parking farther away from stores. Ask your healthcare provider about the best exercise plan for you.   Narcotic (Opioid) Safety    Use narcotics safely:  Take prescribed narcotics exactly as directed  Do not give narcotics to others or take narcotics that belong to  someone else  Do not mix narcotics without medicines or alcohol  Do not drive or operate heavy machinery after you take the narcotic  Monitor for side effects and notify your healthcare provider if you experienced side effects such as nausea, sleepiness, itching, or trouble thinking clearly.    Manage constipation:    Constipation is the most common side effect of narcotic medicine. Constipation is when you have hard, dry bowel movements, or you go longer than usual between bowel movements. Tell your healthcare provider about all changes in your bowel movements while you are taking narcotics. He or she may recommend laxative medicine to help you have a bowel movement. He or she may also change the kind of narcotic you are taking, or change when you take it. The following are more ways you can prevent or relieve constipation:    Drink liquids as directed.  You may need to drink extra liquids to help soften and move your bowels. Ask how much liquid to drink each day and which liquids are best for you.  Eat high-fiber foods.  This may help decrease constipation by adding bulk to your bowel movements. High-fiber foods include fruits, vegetables, whole-grain breads and cereals, and beans. Your healthcare provider or dietitian can help you create a high-fiber meal plan. Your provider may also recommend a fiber supplement if you cannot get enough fiber from food.  Exercise regularly.  Regular physical activity can help stimulate your intestines. Walking is a good exercise to prevent or relieve constipation. Ask which exercises are best for you.  Schedule a time each day to have a bowel movement.  This may help train your body to have regular bowel movements. Bend forward while you are on the toilet to help move the bowel movement out. Sit on the toilet for at least 10 minutes, even if you do not have a bowel movement.    Store narcotics safely:   Store narcotics where others cannot easily get them.  Keep them in a locked  cabinet or secure area. Do not  keep them in a purse or other bag you carry with you. A person may be looking for something else and find the narcotics.  Make sure narcotics are stored out of the reach of children.  A child can easily overdose on narcotics. Narcotics may look like candy to a small child.    The best way to dispose of narcotics:      The laws vary by country and area. In the [place], the best way is to return the narcotics through a take-back program. This program is offered by the [place] Drug Enforcement Agency (MIGU[employer]). The following are options for using the program:  Take the narcotics to a [employer] collection site.  The site is often a law enforcement center. Call your local law enforcement center for scheduled take-back days in your area. You will be given information on where to go if the collection site is in a different location.  Take the narcotics to an approved pharmacy or hospital.  A pharmacy or hospital may be set up as a collection site. You will need to ask if it is a [employer] collection site if you were not directed there. A pharmacy or doctor's office may not be able to take back narcotics unless it is a [employer] site.  Use a mail-back system.  This means you are given containers to put the narcotics into. You will then mail them in the containers.  Use a take-back drop box.  This is a place to leave the narcotics at any time. People and animals will not be able to get into the box. Your local law enforcement agency can tell you where to find a drop box in your area.    Other ways to manage pain:   Ask your healthcare provider about non-narcotic medicines to control pain.  Nonprescription medicines include NSAIDs (such as ibuprofen) and acetaminophen. Prescription medicines include muscle relaxers, antidepressants, and steroids.  Pain may be managed without any medicines.  Some ways to relieve pain include massage, aromatherapy, or meditation. Physical or occupational therapy may also help.    For  more information:   Drug Enforcement Administration  [address] [place] [address]  Phone: 3- 533 - 006-4665  Web Address: https://www.deadiversion.[facility].gov/drug_disposal/    [place] Food and Drug Administration  [address] [place] [address]  Phone: 2- 034 - 080-2503  Web Address: http://www.fda.gov     © Copyright [employer] 2018 Information is for End User's use only and may not be sold, redistributed or otherwise used for commercial purposes. All illustrations and images included in CareNotes® are the copyrighted property of [employer] or [employer]

## 2024-08-05 ENCOUNTER — TELEPHONE (OUTPATIENT)
Dept: PAIN MEDICINE | Facility: CLINIC | Age: 74
End: 2024-08-05

## 2024-08-05 NOTE — TELEPHONE ENCOUNTER
1st attempt  Lm to cb with % improvement and pain level.     Left ischial bursa injection 7/22, 8/19 F/u

## 2024-08-07 NOTE — TELEPHONE ENCOUNTER
Patient reports 75% improvement post inj  Pain level 0-1/10, patient stated just slight pain at this time.

## 2024-08-12 DIAGNOSIS — I10 ESSENTIAL HYPERTENSION: ICD-10-CM

## 2024-08-12 RX ORDER — CANDESARTAN 32 MG/1
32 TABLET ORAL DAILY
Qty: 30 TABLET | Refills: 5 | Status: SHIPPED | OUTPATIENT
Start: 2024-08-12

## 2024-08-19 ENCOUNTER — OFFICE VISIT (OUTPATIENT)
Dept: PAIN MEDICINE | Facility: CLINIC | Age: 74
End: 2024-08-19
Payer: MEDICARE

## 2024-08-19 VITALS
DIASTOLIC BLOOD PRESSURE: 78 MMHG | SYSTOLIC BLOOD PRESSURE: 138 MMHG | BODY MASS INDEX: 30.02 KG/M2 | WEIGHT: 143 LBS | HEIGHT: 58 IN | HEART RATE: 43 BPM | TEMPERATURE: 97.9 F

## 2024-08-19 DIAGNOSIS — M70.72 ISCHIAL BURSITIS OF LEFT SIDE: ICD-10-CM

## 2024-08-19 DIAGNOSIS — M47.816 LUMBAR SPONDYLOSIS: Primary | ICD-10-CM

## 2024-08-19 DIAGNOSIS — M54.50 CHRONIC BILATERAL LOW BACK PAIN WITHOUT SCIATICA: ICD-10-CM

## 2024-08-19 DIAGNOSIS — G89.29 CHRONIC BILATERAL LOW BACK PAIN WITHOUT SCIATICA: ICD-10-CM

## 2024-08-19 PROCEDURE — 99214 OFFICE O/P EST MOD 30 MIN: CPT | Performed by: PHYSICIAN ASSISTANT

## 2024-08-19 PROCEDURE — G2211 COMPLEX E/M VISIT ADD ON: HCPCS | Performed by: PHYSICIAN ASSISTANT

## 2024-08-19 NOTE — PROGRESS NOTES
Assessment:  1. Lumbar spondylosis    2. Chronic bilateral low back pain without sciatica    3. Ischial bursitis of left side        Plan:  While the patient was in the office today, I did have a thorough conversation regarding their chronic pain syndrome, medication management, and treatment plan options.    The patient's low back pain persists despite time, relative rest, activity modification and therapy. Based on the patient's symptoms examination, I suspect that the pain is being generated by the lumbar facet joints. The facet joints are only one of many possible low-back pain generators. We will schedule the patient for diagnostic lumbar medial branch blockade using the double block paradigm. If the patient demonstrates appropriate response to medial branch blockade we will schedule for radiofrequency ablation to provide long-term relief. Complete risks and benefits including bleeding, infection, tissue reaction, nerve injury and allergic reaction were discussed. The approach was demonstrated using models and literature was provided. Verbal and written consent was obtained.    The patient has been experiencing moderate to severe axial spine pain that is causing functional deficit.  The pain has been present for at least 3 months and is not improving with conservative care.  Currently the patient is not experiencing any radicular features nor neurogenic claudication.  Non-facet pathology has been ruled out on clinical evaluation.    She is able to report greater than 80% reduction in the buttock pain following the left ischial bursa injection done on 7/22/2024.  Patient is aware that this injection can be repeated in the future as long as it has been 3 months from the last injection.     The patient was advised to contact the office should their symptoms worsen in the interim. The patient was agreeable and verbalized an understanding.        History of Present Illness:    The patient is a 74 y.o. female last  seen on 7/22/2024 who presents for a follow up office visit in regards to chronic low back pain secondary to lumbar spondylosis, lumbar degenerative disc disease and left ischial bursitis.  The patient currently reports low back pain that she rates a 3 out of 10 and describes it as an intermittent dull and aching pain.  Low back pain is made worse with prolonged standing and bending.  She denies any lower extremity radicular features.  Pain affects her daily living activities such as performing household chores/home responsibilities.  On 7/22/2024 she underwent a left ischial bursa injection and reports 80% reduction of pain in that particular region.    I have personally reviewed and/or updated the patient's past medical history, past surgical history, family history, social history, current medications, allergies, and vital signs today.       Review of Systems:    Review of Systems   HENT:  Sinus pressure: +.    Respiratory:  Negative for shortness of breath.    Cardiovascular:  Negative for chest pain.   Gastrointestinal:  Negative for constipation, diarrhea, nausea and vomiting.   Musculoskeletal:  Positive for gait problem. Negative for arthralgias, joint swelling and myalgias.   Skin:  Negative for rash.   Neurological:  Negative for dizziness, seizures and weakness.   All other systems reviewed and are negative.        Past Medical History:   Diagnosis Date   • Abnormal finding on breast imaging     last assessed 11/30/17   • Acute bacterial bronchitis    • Allergic rhinitis    • Anxiety    • Arthralgia    • Arthritis    • Atherosclerotic heart disease of native coronary artery without angina pectoris    • BBB (bundle branch block)     left,last assesed 6/4/12   • BBB (bundle branch block)     left   • Benign paroxysmal vertigo     last assessed 9/7/12   • Benign paroxysmal vertigo of left ear    • Bilateral fibrocystic breast changes    • Bilateral sacroiliitis (HCC)    • Cardiac defibrillator in situ    •  Carpal tunnel syndrome, unspecified upper limb    • CHF (congestive heart failure) (HCC)     chronic systolic/diastolic   • Chronic diastolic congestive heart failure (HCC)    • Chronic kidney disease     stage 3   • Chronic systolic congestive heart failure (HCC)    • Coronary artery disease    • Cough    • Depression    • Detrusor instability    • Diabetes mellitus (HCC)    • Difficulty walking up stairs    • Dilated cardiomyopathy (HCC)    • Dilated cardiomyopathy (HCC)    • Disease of thyroid gland    • Disorder of intervertebral disc of cervical spine    • Esophageal reflux    • GERD (gastroesophageal reflux disease)    • Gout    • Hemorrhoids    • History of blood clots    • Hormone replacement therapy    • Hx of blood clots    • Hyperlipidemia    • Hypertension    • Hypokalemia    • Hypothyroidism    • Indigestion    • Inflamed toenail, left    • Ingrown nail    • Low back pain     left   • OAB (overactive bladder)     detrusor instability   • Obesity    • AZ (obstructive sleep apnea)    • Osteoarthritis    • Papilloma of left breast    • Psoriasis     psoriatic arthropathy   • Psoriatic arthropathy (HCC)    • 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   • RLS (restless legs syndrome)    • Sciatica    • Screening mammogram, encounter for 12/05/2019   • Shortness of breath    • Sleep apnea     unspecified type   • Tendinitis    • Trigger thumb, left thumb    • Urinary frequency    • UTI (urinary tract infection)    • Vitamin D deficiency        Past Surgical History:   Procedure Laterality Date   • BLADDER SURGERY  1999    lift and repair   • BREAST BIOPSY Left 05/23/2016    US CORE BIOPSY-BENIGN   • CARDIAC CATHETERIZATION      outcome:  successful   • CARDIAC DEFIBRILLATOR PLACEMENT     • CARDIAC ELECTROPHYSIOLOGY PROCEDURE N/A 8/22/2023    Procedure: Cardiac biv icd generator change;  Surgeon: Nahid Dickerson MD;  Location: [facility];  Service: Cardiology   • CARPAL TUNNEL RELEASE Bilateral 1997   • CATARACT  EXTRACTION     • COLONOSCOPY     • CORONARY ANGIOPLASTY WITH STENT PLACEMENT     • CYSTOSCOPY W/ URETEROSCOPY     • DE QUERVAIN'S RELEASE Left     and trigger finger release   • EYE SURGERY Left    • EYE SURGERY Left 2019    Cataract   • EYE SURGERY Right 2019    Cataract   • INSERT / REPLACE / REMOVE PACEMAKER     • JOINT REPLACEMENT Right 2017    Knee   • KNEE ARTHROSCOPY      therapeutic   • NEUROPLASTY / TRANSPOSITION MEDIAN NERVE AT CARPAL TUNNEL     • OOPHORECTOMY Bilateral     AGE 44   • NM TENDON SHEATH INCISION Left 3/2/2017    Procedure: SMALL TRIGGER FINGER RELEASE;  Surgeon: Camden Lancaster MD;  Location: [facility];  Service: Orthopedics   • RECTAL SURGERY  2006    repair of rectal ulcer   • SHOULDER ARTHROSCOPY Left 2006   • TOTAL ABDOMINAL HYSTERECTOMY      AGE 44   • TOTAL KNEE ARTHROPLASTY Left    • TOTAL SHOULDER REPLACEMENT Left    • TRIGGER FINGER RELEASE Bilateral     right haand ,left hand    • US GUIDED BREAST BIOPSY LEFT COMPLETE Left 2016       Family History   Problem Relation Age of Onset   • Hypertension Mother    • Alzheimer's disease Mother    • Cancer Father 71        bladder   • Prostate cancer Father 71   • Breast cancer Daughter 52   • No Known Problems Daughter    • No Known Problems Maternal Grandmother    • No Known Problems Maternal Grandfather    • Breast cancer Paternal Grandmother         age dx unk   • No Known Problems Paternal Grandfather    • Cancer Brother         pt shared some type of blood cancer   • No Known Problems Son    • Stomach cancer Paternal Aunt 65   • No Known Problems Paternal Aunt        Social History     Occupational History   • Not on file   Tobacco Use   • Smoking status: Former     Current packs/day: 0.00     Average packs/day: 1 pack/day for 15.0 years (15.0 ttl pk-yrs)     Types: Cigarettes     Start date:      Quit date:      Years since quittin.6   • Smokeless tobacco: Never    Vaping Use   • Vaping status: Never Used   Substance and Sexual Activity   • Alcohol use: No   • Drug use: No   • Sexual activity: Not on file     Comment: birth control         Current Outpatient Medications:   •  allopurinol (ZYLOPRIM) 100 mg tablet, Take 1 tablet (100 mg total) by mouth daily, Disp: 90 tablet, Rfl: 3  •  aspirin 81 MG tablet, Take 81 mg by mouth daily., Disp: , Rfl:   •  calcium carbonate (OS-MANINDER) 600 MG tablet, Take 1,200 mg by mouth daily, Disp: , Rfl:   •  candesartan (ATACAND) 32 MG tablet, TAKE 1 TABLET BY MOUTH DAILY, Disp: 30 tablet, Rfl: 5  •  celecoxib (CeleBREX) 200 mg capsule, Take 1 capsule (200 mg total) by mouth 2 (two) times a day, Disp: 180 capsule, Rfl: 3  •  cetirizine (ZyrTEC) 10 mg tablet, Take 10 mg by mouth daily, Disp: , Rfl:   •  chlorhexidine (PERIDEX) 0.12 % solution, , Disp: , Rfl:   •  Cholecalciferol (VITAMIN D3) 1000 units CAPS, Take by mouth, Disp: , Rfl:   •  FLUoxetine (PROzac) 20 mg capsule, TAKE 1 CAPSULE BY MOUTH DAILY, Disp: 30 capsule, Rfl: 5  •  Januvia 100 MG tablet, Take 1 tablet (100 mg total) by mouth daily, Disp: 90 tablet, Rfl: 1  •  levothyroxine 25 mcg tablet, Take 1 tablet (25 mcg total) by mouth daily, Disp: 90 tablet, Rfl: 3  •  Magnesium 250 MG TABS, Take by mouth daily, Disp: , Rfl:   •  metoprolol succinate (TOPROL-XL) 100 mg 24 hr tablet, Take 1 tablet (100 mg total) by mouth daily, Disp: 90 tablet, Rfl: 3  •  Minoxidil (ROGAINE WOMENS EX), Apply 1 mL topically., Disp: , Rfl:   •  montelukast (SINGULAIR) 10 mg tablet, TAKE 1 TABLET BY MOUTH EVERY DAY AT BEDTIME, Disp: 90 tablet, Rfl: 3  •  omeprazole (PriLOSEC) 40 MG capsule, Take 1 capsule (40 mg total) by mouth daily, Disp: 90 capsule, Rfl: 3  •  potassium chloride (Klor-Con M20) 20 mEq tablet, Take 1 tablet (20 mEq total) by mouth 2 (two) times a day, Disp: 60 tablet, Rfl: 5  •  Potassium Chloride ER 20 MEQ TBCR, , Disp: , Rfl:   •  pyridoxine (VITAMIN B6) 100 mg tablet, Take 100 mg by  "mouth daily., Disp: , Rfl:   •  simvastatin (ZOCOR) 40 mg tablet, TAKE 1 TABLET BY MOUTH DAILY, Disp: 30 tablet, Rfl: 5  •  torsemide (DEMADEX) 20 mg tablet, TAKE 1 TABLET BY MOUTH TWICE DAILY, Disp: 60 tablet, Rfl: 5  •  traMADol (ULTRAM) 50 mg tablet, Take 1 tablet (50 mg total) by mouth every 8 (eight) hours as needed for moderate pain, Disp: 90 tablet, Rfl: 0    Allergies   Allergen Reactions   • Dust Mite Extract Sneezing       Physical Exam:    /78 (BP Location: Left arm, Patient Position: Sitting, Cuff Size: Standard)   Pulse (!) 43   Temp 97.9 °F (36.6 °C)   Ht 4' 10\" (1.473 m)   Wt 64.9 kg (143 lb)   BMI 29.89 kg/m²     Constitutional:normal, well developed, well nourished, alert, in no distress and non-toxic and no overt pain behavior.  Eyes:anicteric  HEENT:grossly intact  Neck:supple, symmetric, trachea midline and no masses   Pulmonary:even and unlabored  Cardiovascular:No edema or pitting edema present  Skin:Normal without rashes or lesions and well hydrated  Psychiatric:Mood and affect appropriate  Neurologic:Cranial Nerves II-XII grossly intact  Musculoskeletal: Tenderness palpation over bilateral lumbar facet joints positive facet loading test.      Imaging  FL spine and pain procedure    (Results Pending)   XR spine lumbar minimum 4 views non injury    (Results Pending)         Orders Placed This Encounter   Procedures   • FL spine and pain procedure   • XR spine lumbar minimum 4 views non injury       "

## 2024-08-20 ENCOUNTER — APPOINTMENT (OUTPATIENT)
Dept: RADIOLOGY | Facility: CLINIC | Age: 74
End: 2024-08-20
Payer: MEDICARE

## 2024-08-20 ENCOUNTER — TELEPHONE (OUTPATIENT)
Dept: PAIN MEDICINE | Facility: CLINIC | Age: 74
End: 2024-08-20

## 2024-08-20 DIAGNOSIS — M47.816 LUMBAR SPONDYLOSIS: ICD-10-CM

## 2024-08-20 PROCEDURE — 72110 X-RAY EXAM L-2 SPINE 4/>VWS: CPT

## 2024-08-26 ENCOUNTER — TELEPHONE (OUTPATIENT)
Dept: PAIN MEDICINE | Facility: CLINIC | Age: 74
End: 2024-08-26

## 2024-08-26 NOTE — TELEPHONE ENCOUNTER
----- Message from Katie Hager PA-C sent at 8/26/2024 12:35 PM EDT -----  Please let the patient know that her lumbar spine x-ray reveals severe arthritic changes.  Continue with the current plan of the scheduled medial branch block.

## 2024-08-26 NOTE — TELEPHONE ENCOUNTER
S/w pt, advised of above. Pt verbalized understanding and appreciation. Will proceed as discussed.

## 2024-09-01 PROBLEM — Z00.00 MEDICARE ANNUAL WELLNESS VISIT, SUBSEQUENT: Status: RESOLVED | Noted: 2018-07-23 | Resolved: 2024-09-01

## 2024-09-16 ENCOUNTER — HOSPITAL ENCOUNTER (OUTPATIENT)
Dept: RADIOLOGY | Facility: CLINIC | Age: 74
Discharge: HOME | End: 2024-09-16
Payer: MEDICARE

## 2024-09-16 VITALS
RESPIRATION RATE: 18 BRPM | DIASTOLIC BLOOD PRESSURE: 70 MMHG | TEMPERATURE: 97.7 F | SYSTOLIC BLOOD PRESSURE: 131 MMHG | HEART RATE: 69 BPM | OXYGEN SATURATION: 96 %

## 2024-09-16 DIAGNOSIS — M47.816 LUMBAR SPONDYLOSIS: ICD-10-CM

## 2024-09-16 PROCEDURE — 64494 INJ PARAVERT F JNT L/S 2 LEV: CPT | Performed by: ANESTHESIOLOGY

## 2024-09-16 PROCEDURE — 64493 INJ PARAVERT F JNT L/S 1 LEV: CPT | Performed by: ANESTHESIOLOGY

## 2024-09-16 RX ORDER — BUPIVACAINE HCL/PF 2.5 MG/ML
6 VIAL (ML) INJECTION ONCE
Status: COMPLETED | OUTPATIENT
Start: 2024-09-16 | End: 2024-09-16

## 2024-09-16 RX ADMIN — BUPIVACAINE HYDROCHLORIDE 6 ML: 2.5 INJECTION, SOLUTION EPIDURAL; INFILTRATION; INTRACAUDAL at 14:44

## 2024-09-16 NOTE — DISCHARGE INSTRUCTIONS

## 2024-09-16 NOTE — H&P
History of Present Illness: The patient is a 74 y.o. female who presents with complaints of low back pain.    Past Medical History:   Diagnosis Date    Abnormal finding on breast imaging     last assessed 11/30/17    Acute bacterial bronchitis     Allergic rhinitis     Anxiety     Arthralgia     Arthritis     Atherosclerotic heart disease of native coronary artery without angina pectoris     BBB (bundle branch block)     left,last assesed 6/4/12    BBB (bundle branch block)     left    Benign paroxysmal vertigo     last assessed 9/7/12    Benign paroxysmal vertigo of left ear     Bilateral fibrocystic breast changes     Bilateral sacroiliitis (HCC)     Cardiac defibrillator in situ     Carpal tunnel syndrome, unspecified upper limb     CHF (congestive heart failure) (HCC)     chronic systolic/diastolic    Chronic diastolic congestive heart failure (HCC)     Chronic kidney disease     stage 3    Chronic systolic congestive heart failure (HCC)     Coronary artery disease     Cough     Depression     Detrusor instability     Diabetes mellitus (HCC)     Difficulty walking up stairs     Dilated cardiomyopathy (HCC)     Dilated cardiomyopathy (HCC)     Disease of thyroid gland     Disorder of intervertebral disc of cervical spine     Esophageal reflux     GERD (gastroesophageal reflux disease)     Gout     Hemorrhoids     History of blood clots     Hormone replacement therapy     Hx of blood clots     Hyperlipidemia     Hypertension     Hypokalemia     Hypothyroidism     Indigestion     Inflamed toenail, left     Ingrown nail     Low back pain     left    OAB (overactive bladder)     detrusor instability    Obesity     AZ (obstructive sleep apnea)     Osteoarthritis     Papilloma of left breast     Psoriasis     psoriatic arthropathy    Psoriatic arthropathy (HCC)     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    RLS (restless legs syndrome)     Sciatica     Screening mammogram, encounter for 12/05/2019    Shortness of breath     Sleep  apnea     unspecified type    Tendinitis     Trigger thumb, left thumb     Urinary frequency     UTI (urinary tract infection)     Vitamin D deficiency        Past Surgical History:   Procedure Laterality Date    BLADDER SURGERY  1999    lift and repair    BREAST BIOPSY Left 05/23/2016    US CORE BIOPSY-BENIGN    CARDIAC CATHETERIZATION      outcome:  successful    CARDIAC DEFIBRILLATOR PLACEMENT      CARDIAC ELECTROPHYSIOLOGY PROCEDURE N/A 8/22/2023    Procedure: Cardiac biv icd generator change;  Surgeon: Nahid Dickerson MD;  Location: [facility];  Service: Cardiology    CARPAL TUNNEL RELEASE Bilateral 1997    CATARACT EXTRACTION      COLONOSCOPY      CORONARY ANGIOPLASTY WITH STENT PLACEMENT      CYSTOSCOPY W/ URETEROSCOPY  2009    DE QUERVAIN'S RELEASE Left 2011    and trigger finger release    EYE SURGERY Left 1999    EYE SURGERY Left 11/13/2019    Cataract    EYE SURGERY Right 11/09/2019    Cataract    INSERT / REPLACE / REMOVE PACEMAKER      JOINT REPLACEMENT Right 09/2017    Knee    KNEE ARTHROSCOPY      therapeutic    NEUROPLASTY / TRANSPOSITION MEDIAN NERVE AT CARPAL TUNNEL      OOPHORECTOMY Bilateral     AGE 44    [place] TENDON SHEATH INCISION Left 3/2/2017    Procedure: SMALL TRIGGER FINGER RELEASE;  Surgeon: Camden Lancaster MD;  Location:  [facility] OR;  Service: Orthopedics    RECTAL SURGERY  2006    repair of rectal ulcer    SHOULDER ARTHROSCOPY Left 2006    TOTAL ABDOMINAL HYSTERECTOMY      AGE 44    TOTAL KNEE ARTHROPLASTY Left     TOTAL SHOULDER REPLACEMENT Left 2007    TRIGGER FINGER RELEASE Bilateral 2011    right haand 201,2015,left hand 2012,2015    US GUIDED BREAST BIOPSY LEFT COMPLETE Left 5/23/2016         Current Outpatient Medications:     allopurinol (ZYLOPRIM) 100 mg tablet, Take 1 tablet (100 mg total) by mouth daily, Disp: 90 tablet, Rfl: 3    aspirin 81 MG tablet, Take 81 mg by mouth daily., Disp: , Rfl:     calcium carbonate (OS-MANINDER) 600 MG tablet, Take 1,200 mg by mouth daily,  Disp: , Rfl:     candesartan (ATACAND) 32 MG tablet, TAKE 1 TABLET BY MOUTH DAILY, Disp: 30 tablet, Rfl: 5    celecoxib (CeleBREX) 200 mg capsule, Take 1 capsule (200 mg total) by mouth 2 (two) times a day, Disp: 180 capsule, Rfl: 3    cetirizine (ZyrTEC) 10 mg tablet, Take 10 mg by mouth daily, Disp: , Rfl:     chlorhexidine (PERIDEX) 0.12 % solution, , Disp: , Rfl:     Cholecalciferol (VITAMIN D3) 1000 units CAPS, Take by mouth, Disp: , Rfl:     FLUoxetine (PROzac) 20 mg capsule, TAKE 1 CAPSULE BY MOUTH DAILY, Disp: 30 capsule, Rfl: 5    Januvia 100 MG tablet, Take 1 tablet (100 mg total) by mouth daily, Disp: 90 tablet, Rfl: 1    levothyroxine 25 mcg tablet, Take 1 tablet (25 mcg total) by mouth daily, Disp: 90 tablet, Rfl: 3    Magnesium 250 MG TABS, Take by mouth daily, Disp: , Rfl:     metoprolol succinate (TOPROL-XL) 100 mg 24 hr tablet, Take 1 tablet (100 mg total) by mouth daily, Disp: 90 tablet, Rfl: 3    Minoxidil (ROGAINE WOMENS EX), Apply 1 mL topically., Disp: , Rfl:     montelukast (SINGULAIR) 10 mg tablet, TAKE 1 TABLET BY MOUTH EVERY DAY AT BEDTIME, Disp: 90 tablet, Rfl: 3    omeprazole (PriLOSEC) 40 MG capsule, Take 1 capsule (40 mg total) by mouth daily, Disp: 90 capsule, Rfl: 3    potassium chloride (Klor-Con M20) 20 mEq tablet, Take 1 tablet (20 mEq total) by mouth 2 (two) times a day, Disp: 60 tablet, Rfl: 5    Potassium Chloride ER 20 MEQ TBCR, , Disp: , Rfl:     pyridoxine (VITAMIN B6) 100 mg tablet, Take 100 mg by mouth daily., Disp: , Rfl:     simvastatin (ZOCOR) 40 mg tablet, TAKE 1 TABLET BY MOUTH DAILY, Disp: 30 tablet, Rfl: 5    torsemide (DEMADEX) 20 mg tablet, TAKE 1 TABLET BY MOUTH TWICE DAILY, Disp: 60 tablet, Rfl: 5    traMADol (ULTRAM) 50 mg tablet, Take 1 tablet (50 mg total) by mouth every 8 (eight) hours as needed for moderate pain, Disp: 90 tablet, Rfl: 0    Current Facility-Administered Medications:     bupivacaine (PF) (MARCAINE) 0.25 % injection 6 mL, 6 mL, Perineural,  Once, Anmol Lozano, DO    Allergies   Allergen Reactions    Dust Mite Extract Sneezing       Physical Exam:   General: Awake, Alert, Oriented x 3, Mood and affect appropriate  Respiratory: Respirations even and unlabored  Cardiovascular: Peripheral pulses intact; no edema  Musculoskeletal Exam: Pain with lumbar extension    ASA Score: Slightly antalgic gait         Assessment:   1. Lumbar spondylosis        Plan: B/L L3-5 MBB #1

## 2024-10-01 ENCOUNTER — TELEPHONE (OUTPATIENT)
Dept: PAIN MEDICINE | Facility: CLINIC | Age: 74
End: 2024-10-01

## 2024-10-01 DIAGNOSIS — M79.18 MYOFASCIAL PAIN SYNDROME: Primary | ICD-10-CM

## 2024-10-01 NOTE — TELEPHONE ENCOUNTER
----- Message from Anmol Lozano DO sent at 9/30/2024  3:41 PM EDT -----  Is review her pain diary, it appears she did not get significant relief for the first 3 hours, can you confirm?  Possibly 1 side versus the other?

## 2024-10-01 NOTE — TELEPHONE ENCOUNTER
Caller: alban Johnson     Doctor: Blake    Reason for call: pt returning nurses call     Call back#: [phone]

## 2024-10-01 NOTE — TELEPHONE ENCOUNTER
S/w pt and advised of same. Pt verbalized understanding and appreciation.   Pt would appreciate referral be placed.  Office number provided to pt.     Clerical- can referral be faxed to Dr Li's office? Thank you

## 2024-10-01 NOTE — TELEPHONE ENCOUNTER
Unfortunately, insurance will not cover any further procedure with that pain diary.  Would recommend evaluation with Dr. Li if interested I can place referral.  He treats myofascial pain and ligament pain in a different manner.

## 2024-10-01 NOTE — TELEPHONE ENCOUNTER
S/w pt who reports right LB pain relief for ~1 hour only, but overall does not feel that she had relief from the procedure.

## 2024-10-02 DIAGNOSIS — E11.22 TYPE 2 DIABETES MELLITUS WITH STAGE 3 CHRONIC KIDNEY DISEASE, WITHOUT LONG-TERM CURRENT USE OF INSULIN, UNSPECIFIED WHETHER STAGE 3A OR 3B CKD (HCC): ICD-10-CM

## 2024-10-02 DIAGNOSIS — N18.30 TYPE 2 DIABETES MELLITUS WITH STAGE 3 CHRONIC KIDNEY DISEASE, WITHOUT LONG-TERM CURRENT USE OF INSULIN, UNSPECIFIED WHETHER STAGE 3A OR 3B CKD (HCC): ICD-10-CM

## 2024-10-02 DIAGNOSIS — M25.50 ARTHRALGIA, UNSPECIFIED JOINT: ICD-10-CM

## 2024-10-02 RX ORDER — TRAMADOL HYDROCHLORIDE 50 MG/1
50 TABLET ORAL EVERY 8 HOURS PRN
Qty: 90 TABLET | Refills: 0 | Status: SHIPPED | OUTPATIENT
Start: 2024-10-02

## 2024-10-02 RX ORDER — SITAGLIPTIN 100 MG/1
100 TABLET, FILM COATED ORAL DAILY
Qty: 30 TABLET | Refills: 0 | Status: SHIPPED | OUTPATIENT
Start: 2024-10-02

## 2024-10-30 ENCOUNTER — TELEPHONE (OUTPATIENT)
Age: 74
End: 2024-10-30

## 2024-10-30 NOTE — TELEPHONE ENCOUNTER
Patient called stating she would like to start medication Aricept since she is having issues with her memory.  Patient stated she will make appointment if needed. Please advise.

## 2024-10-30 NOTE — TELEPHONE ENCOUNTER
Left voicemail for patient to set up an appt for memory issues- should be 40 mins - can be with either office since her last pcp is retired.

## 2024-11-01 ENCOUNTER — OFFICE VISIT (OUTPATIENT)
Dept: FAMILY MEDICINE CLINIC | Facility: HOSPITAL | Age: 74
End: 2024-11-01
Payer: MEDICARE

## 2024-11-01 VITALS
WEIGHT: 140 LBS | TEMPERATURE: 98.4 F | BODY MASS INDEX: 29.26 KG/M2 | HEART RATE: 89 BPM | DIASTOLIC BLOOD PRESSURE: 86 MMHG | OXYGEN SATURATION: 98 % | SYSTOLIC BLOOD PRESSURE: 150 MMHG

## 2024-11-01 DIAGNOSIS — R41.3 MEMORY IMPAIRMENT: Primary | ICD-10-CM

## 2024-11-01 DIAGNOSIS — E03.9 ACQUIRED HYPOTHYROIDISM: ICD-10-CM

## 2024-11-01 DIAGNOSIS — E53.8 VITAMIN B 12 DEFICIENCY: ICD-10-CM

## 2024-11-01 PROCEDURE — G2211 COMPLEX E/M VISIT ADD ON: HCPCS

## 2024-11-01 PROCEDURE — 99215 OFFICE O/P EST HI 40 MIN: CPT

## 2024-11-01 RX ORDER — DONEPEZIL HYDROCHLORIDE 5 MG/1
5 TABLET, ORALLY DISINTEGRATING ORAL
Qty: 30 TABLET | Refills: 2 | Status: SHIPPED | OUTPATIENT
Start: 2024-11-01 | End: 2025-01-30

## 2024-11-01 NOTE — PROGRESS NOTES
Ambulatory Visit  Name: Dejah Parish      : 1950      MRN: [id number]  Encounter Provider: Cayla León DO  Encounter Date: 2024   Encounter department: [facility] PRIMARY CARE [address]    Assessment & Plan  Memory impairment      MoCA score 20, pointing to MCI    Plan:  - Educated pt and daughter that treatment of MCI with Aricept has not been shown to show benefit  - Daughter and pt insist that they would like a trial d/t increasing concern for memory loss and fear of progression  - Will send to pharmacy, pharmacy confirmed  - F/u for next scheduled visit  - Additional w/u with vitamin B12 level & updated thryoid panel    Orders:    donepezil (ARICEPT ODT) 5 MG disintegrating tablet; Take 1 tablet (5 mg total) by mouth daily at bedtime    Vitamin B 12 deficiency  - Previously ordered, f/u results  Orders:    Vitamin B12; Future    Acquired hypothyroidism  Thyroid    Lab Results   Component Value Date    QSA7FHYEKRMX 0.884 2023    NAG4WCNYQBFO 1.443 10/02/2023    IWA4BABMTRTS 1.530 2023    SSZ6EACKJHPO 1.780 2022    UQQ4QXNTEPPC 0.931 2021    SIZ4LIMATLFN 2.021 2020    YLP6HARMAUSP 2.440 2019       - controlled, but concern for memory impairment  - Current regimen: levothyroxine 25 mcg  - Reports compliance, denies side effects    Plan:  - Will maintain patient on current regimen  - Lab work ordered, f/u results  - Refill sent, pharmacy confirmed    Orders:    TSH, 3rd generation with Free T4 reflex; Future       History of Present Illness     74-year-old female presents with daughter for complaint of ongoing memory loss, which has been present for many years.  No concern for issues with ADLs, no episodes of getting lost with patient does stay in town.  Are interested in medication that stops progression of memory loss.  Daughter and patient becoming more concerned about her memory loss, and wanting to try donepezil.        History  obtained from : patient and patient's daughter  Review of Systems   Psychiatric/Behavioral:  Positive for confusion. Negative for behavioral problems, self-injury and sleep disturbance.      Current Outpatient Medications on File Prior to Visit   Medication Sig Dispense Refill    allopurinol (ZYLOPRIM) 100 mg tablet Take 1 tablet (100 mg total) by mouth daily 90 tablet 3    aspirin 81 MG tablet Take 81 mg by mouth daily.      calcium carbonate (OS-MANINDER) 600 MG tablet Take 1,200 mg by mouth daily      candesartan (ATACAND) 32 MG tablet TAKE 1 TABLET BY MOUTH DAILY 30 tablet 5    celecoxib (CeleBREX) 200 mg capsule Take 1 capsule (200 mg total) by mouth 2 (two) times a day 180 capsule 3    cetirizine (ZyrTEC) 10 mg tablet Take 10 mg by mouth daily      chlorhexidine (PERIDEX) 0.12 % solution       Cholecalciferol (VITAMIN D3) 1000 units CAPS Take by mouth      FLUoxetine (PROzac) 20 mg capsule TAKE 1 CAPSULE BY MOUTH DAILY 30 capsule 5    levothyroxine 25 mcg tablet Take 1 tablet (25 mcg total) by mouth daily 90 tablet 3    Magnesium 250 MG TABS Take by mouth daily      metoprolol succinate (TOPROL-XL) 100 mg 24 hr tablet Take 1 tablet (100 mg total) by mouth daily 90 tablet 3    Minoxidil (ROGAINE WOMENS EX) Apply 1 mL topically.      montelukast (SINGULAIR) 10 mg tablet TAKE 1 TABLET BY MOUTH EVERY DAY AT BEDTIME 90 tablet 3    omeprazole (PriLOSEC) 40 MG capsule Take 1 capsule (40 mg total) by mouth daily 90 capsule 3    potassium chloride (Klor-Con M20) 20 mEq tablet Take 1 tablet (20 mEq total) by mouth 2 (two) times a day 60 tablet 5    pyridoxine (VITAMIN B6) 100 mg tablet Take 100 mg by mouth daily.      simvastatin (ZOCOR) 40 mg tablet TAKE 1 TABLET BY MOUTH DAILY 30 tablet 5    sitaGLIPtin (Januvia) 100 mg tablet Take 1 tablet by mouth once daily 30 tablet 0    torsemide (DEMADEX) 20 mg tablet TAKE 1 TABLET BY MOUTH TWICE DAILY 60 tablet 5    traMADol (ULTRAM) 50 mg tablet Take 1 tablet (50 mg total) by mouth  every 8 (eight) hours as needed for moderate pain 90 tablet 0    Potassium Chloride ER 20 MEQ TBCR  (Patient not taking: Reported on 2024)       No current facility-administered medications on file prior to visit.      Social History     Tobacco Use    Smoking status: Former     Current packs/day: 0.00     Average packs/day: 1 pack/day for 15.0 years (15.0 ttl pk-yrs)     Types: Cigarettes     Start date:      Quit date:      Years since quittin.8    Smokeless tobacco: Never    Tobacco comments:     Smoked on and off for the last few years before I quit   Vaping Use    Vaping status: Never Used   Substance and Sexual Activity    Alcohol use: Never    Drug use: Never    Sexual activity: Not Currently     Comment: Took birth control pills from  until          Objective     /86   Pulse 89   Temp 98.4 °F (36.9 °C) (Tympanic)   Wt 63.5 kg (140 lb)   SpO2 98%   BMI 29.26 kg/m²     Physical Exam  Exam conducted with a chaperone present.   Constitutional:       General: She is not in acute distress.     Appearance: Normal appearance. She is not ill-appearing.   HENT:      Head: Normocephalic and atraumatic.   Cardiovascular:      Rate and Rhythm: Normal rate and regular rhythm.      Heart sounds: Normal heart sounds.   Pulmonary:      Breath sounds: Normal breath sounds.   Neurological:      Mental Status: She is alert and oriented to person, place, and time.   Psychiatric:         Mood and Affect: Mood normal.         Behavior: Behavior normal.       I have spent a total time of 45 minutes in caring for this patient on the day of the visit/encounter including Prognosis, Instructions for management, Patient and family education, Impressions, Documenting in the medical record, Reviewing / ordering tests, medicine, procedures  , and Obtaining or reviewing history  .    Cayla León, DO  Family Medicine

## 2024-11-01 NOTE — ASSESSMENT & PLAN NOTE
Thyroid    Lab Results   Component Value Date    ODT5LZWFGODS 0.884 11/28/2023    WMB9WLBBBZSA 1.443 10/02/2023    LOY8DFUVRIYV 1.530 05/01/2023    QGH6EAPOVOZH 1.780 02/08/2022    XHW0SHQCCOQI 0.931 08/16/2021    UTE2NPNCYWHD 2.021 06/05/2020    HAC1FLULYKTS 2.440 07/22/2019       - controlled, but concern for memory impairment  - Current regimen: levothyroxine 25 mcg  - Reports compliance, denies side effects    Plan:  - Will maintain patient on current regimen  - Lab work ordered, f/u results  - Refill sent, pharmacy confirmed    Orders:    TSH, 3rd generation with Free T4 reflex; Future

## 2024-11-04 ENCOUNTER — LAB (OUTPATIENT)
Dept: LAB | Facility: CLINIC | Age: 74
End: 2024-11-04
Payer: MEDICARE

## 2024-11-04 DIAGNOSIS — E03.9 ACQUIRED HYPOTHYROIDISM: ICD-10-CM

## 2024-11-04 DIAGNOSIS — E53.8 VITAMIN B 12 DEFICIENCY: ICD-10-CM

## 2024-11-04 LAB
TSH SERPL DL<=0.05 MIU/L-ACNC: 0.54 UIU/ML (ref 0.45–4.5)
VIT B12 SERPL-MCNC: 973 PG/ML (ref 180–914)

## 2024-11-04 PROCEDURE — 36415 COLL VENOUS BLD VENIPUNCTURE: CPT

## 2024-11-04 PROCEDURE — 82607 VITAMIN B-12: CPT

## 2024-11-04 PROCEDURE — 84443 ASSAY THYROID STIM HORMONE: CPT

## 2024-11-05 ENCOUNTER — TELEPHONE (OUTPATIENT)
Dept: FAMILY MEDICINE CLINIC | Facility: HOSPITAL | Age: 74
End: 2024-11-05

## 2024-11-05 DIAGNOSIS — E11.22 TYPE 2 DIABETES MELLITUS WITH STAGE 3 CHRONIC KIDNEY DISEASE, WITHOUT LONG-TERM CURRENT USE OF INSULIN, UNSPECIFIED WHETHER STAGE 3A OR 3B CKD (HCC): ICD-10-CM

## 2024-11-05 DIAGNOSIS — N18.30 TYPE 2 DIABETES MELLITUS WITH STAGE 3 CHRONIC KIDNEY DISEASE, WITHOUT LONG-TERM CURRENT USE OF INSULIN, UNSPECIFIED WHETHER STAGE 3A OR 3B CKD (HCC): ICD-10-CM

## 2024-11-05 NOTE — TELEPHONE ENCOUNTER
Patient is out of medication     Reason for call:   [x] Refill   [] Prior Auth  [] Other:     Office:   [x] PCP/Provider -   [] Specialty/Provider -     Medication: (Januvia) 100 mg tablet     Dose/Frequency: Take 1 tablet by mouth once daily,     Quantity: 30 tablet    Pharmacy: [address] -      Does the patient have enough for 3 days?   [] Yes   [x] No - Send as HP to POD

## 2024-11-05 NOTE — TELEPHONE ENCOUNTER
Requested medication(s) are due for refill today: Yes  Patient has already received a courtesy refill: No  Other reason request has been forwarded to provider: was a Dr Stevenson pt, establishing care with you, please advise, thank you

## 2024-11-05 NOTE — TELEPHONE ENCOUNTER
----- Message from Cayla León DO sent at 11/5/2024  8:30 AM EST -----  Normal  Cayla León DO  Family Medicine

## 2024-11-06 ENCOUNTER — TELEPHONE (OUTPATIENT)
Age: 74
End: 2024-11-06

## 2024-11-06 NOTE — TELEPHONE ENCOUNTER
Pt daughter called about a prescription donepezil (ARICEPT ODT) 5 MG. The pharmacy does not have that prescription, they never received it. Please advise

## 2024-11-18 DIAGNOSIS — E87.6 HYPOKALEMIA: ICD-10-CM

## 2024-11-18 NOTE — TELEPHONE ENCOUNTER
Reason for call:   [x] Refill   [] Prior Auth  [] Other:     Office:   [x] PCP/Provider -  Nestor Stevenson MD   [] Specialty/Provider -     Medication: (Klor-Con M20) 20 mEq     Dose/Frequency: 1 tab BID / 60 tabs        Pharmacy: [facility] [place] [place] - [address]    Does the patient have enough for 3 days?   [x] Yes   [] No - Send as HP to POD

## 2024-11-19 RX ORDER — POTASSIUM CHLORIDE 1500 MG/1
20 TABLET, EXTENDED RELEASE ORAL 2 TIMES DAILY
Qty: 60 TABLET | Refills: 5 | Status: SHIPPED | OUTPATIENT
Start: 2024-11-19

## 2024-11-22 DIAGNOSIS — M25.50 ARTHRALGIA, UNSPECIFIED JOINT: ICD-10-CM

## 2024-11-22 RX ORDER — TRAMADOL HYDROCHLORIDE 50 MG/1
50 TABLET ORAL EVERY 8 HOURS PRN
Qty: 90 TABLET | Refills: 0 | Status: SHIPPED | OUTPATIENT
Start: 2024-11-22

## 2024-11-22 NOTE — TELEPHONE ENCOUNTER
Reason for call:   [x] Refill   [] Prior Auth  [] Other:     Office:   [x] PCP/Provider - Dr Henson  [] Specialty/Provider -     Medication:   Tramadol 50 mg, 1 q8 prn, 90    Pharmacy: [facility]    Does the patient have enough for 3 days?   [] Yes   [x] No - Send as HP to POD

## 2024-11-22 NOTE — TELEPHONE ENCOUNTER
Requested medication(s) are due for refill today: Yes  Patient has already received a courtesy refill: No  Other reason request has been forwarded to provider: was a DR Stevenson pt, has an appt with you to establish care.

## 2024-11-25 ENCOUNTER — RA CDI HCC (OUTPATIENT)
Dept: OTHER | Facility: HOSPITAL | Age: 74
End: 2024-11-25

## 2024-12-03 ENCOUNTER — OFFICE VISIT (OUTPATIENT)
Dept: FAMILY MEDICINE CLINIC | Facility: HOSPITAL | Age: 74
End: 2024-12-03
Payer: MEDICARE

## 2024-12-03 VITALS
OXYGEN SATURATION: 98 % | SYSTOLIC BLOOD PRESSURE: 128 MMHG | BODY MASS INDEX: 29.43 KG/M2 | TEMPERATURE: 98.8 F | DIASTOLIC BLOOD PRESSURE: 82 MMHG | HEART RATE: 73 BPM | HEIGHT: 58 IN | WEIGHT: 140.2 LBS

## 2024-12-03 DIAGNOSIS — F11.20 CONTINUOUS OPIOID DEPENDENCE (HCC): ICD-10-CM

## 2024-12-03 DIAGNOSIS — G31.84 MILD COGNITIVE IMPAIRMENT: ICD-10-CM

## 2024-12-03 DIAGNOSIS — E11.22 TYPE 2 DIABETES MELLITUS WITH STAGE 3A CHRONIC KIDNEY DISEASE, WITHOUT LONG-TERM CURRENT USE OF INSULIN (HCC): Primary | ICD-10-CM

## 2024-12-03 DIAGNOSIS — N18.31 TYPE 2 DIABETES MELLITUS WITH STAGE 3A CHRONIC KIDNEY DISEASE, WITHOUT LONG-TERM CURRENT USE OF INSULIN (HCC): Primary | ICD-10-CM

## 2024-12-03 DIAGNOSIS — E55.9 VITAMIN D DEFICIENCY: ICD-10-CM

## 2024-12-03 DIAGNOSIS — M54.50 CHRONIC LEFT-SIDED LOW BACK PAIN WITHOUT SCIATICA: ICD-10-CM

## 2024-12-03 DIAGNOSIS — G89.29 CHRONIC LEFT-SIDED LOW BACK PAIN WITHOUT SCIATICA: ICD-10-CM

## 2024-12-03 DIAGNOSIS — I13.0 HYPERTENSIVE HEART AND CHRONIC KIDNEY DISEASE WITH HEART FAILURE AND STAGE 1 THROUGH STAGE 4 CHRONIC KIDNEY DISEASE, OR CHRONIC KIDNEY DISEASE (HCC): ICD-10-CM

## 2024-12-03 DIAGNOSIS — N18.31 STAGE 3A CHRONIC KIDNEY DISEASE (HCC): ICD-10-CM

## 2024-12-03 DIAGNOSIS — M10.00 IDIOPATHIC GOUT, UNSPECIFIED CHRONICITY, UNSPECIFIED SITE: ICD-10-CM

## 2024-12-03 LAB
CREAT UR-MCNC: 16.4 MG/DL
MICROALBUMIN UR-MCNC: 10.1 MG/L
MICROALBUMIN/CREAT 24H UR: 62 MG/G CREATININE (ref 0–30)
SL AMB POCT HEMOGLOBIN AIC: 7.3 (ref ?–6.5)

## 2024-12-03 PROCEDURE — 82570 ASSAY OF URINE CREATININE: CPT | Performed by: INTERNAL MEDICINE

## 2024-12-03 PROCEDURE — 99214 OFFICE O/P EST MOD 30 MIN: CPT | Performed by: INTERNAL MEDICINE

## 2024-12-03 PROCEDURE — 82043 UR ALBUMIN QUANTITATIVE: CPT | Performed by: INTERNAL MEDICINE

## 2024-12-03 PROCEDURE — 83036 HEMOGLOBIN GLYCOSYLATED A1C: CPT | Performed by: INTERNAL MEDICINE

## 2024-12-03 NOTE — ASSESSMENT & PLAN NOTE
Wt Readings from Last 3 Encounters:   12/03/24 63.6 kg (140 lb 3.2 oz)   11/01/24 63.5 kg (140 lb)   09/10/24 64.9 kg (143 lb)   BP acceptable today, con't current BP meds, recheck in 3-6 mos  Orders:    CBC and differential    Comprehensive metabolic panel    Lipid panel    TSH, 3rd generation with Free T4 reflex    Vitamin D 25 hydroxy; Future

## 2024-12-03 NOTE — ASSESSMENT & PLAN NOTE
PDMP rx website reviewed and no red flag use noted, urged to decrease Tramadol from 1 tab bid to 1 tab PO q day d/t continued pain and CKD/use of NSAIDs, T/C Gabapentin/Lyrica as an alternative, will follow  Orders:    CBC and differential    Comprehensive metabolic panel    Lipid panel    TSH, 3rd generation with Free T4 reflex    Vitamin D 25 hydroxy; Future

## 2024-12-03 NOTE — PROGRESS NOTES
Name: Dejah Parish      : 1950      MRN: [id number]  Encounter Provider: Leann Henson DO  Encounter Date: 12/3/2024   Encounter department: [facility] CARE [address]   :  Assessment & Plan  Type 2 diabetes mellitus with stage 3a chronic kidney disease, without long-term current use of insulin (HCC)    Lab Results   Component Value Date    HGBA1C 7.3 (A) 2024   DM type 2 with neprhopathy/CKD stage 3 and hyperglycemia - uncontrolled with A1c up a bit at 7.3, urged to get back on track with diet and try and keep active, con't current Januvia, recheck BW in 3 mos - BW order given, UTD on DM foot exam () and eye exam (), she is on an ARB and statin, will follow  Orders:    POCT hemoglobin A1c    CBC and differential    Comprehensive metabolic panel    Lipid panel    TSH, 3rd generation with Free T4 reflex    Vitamin D 25 hydroxy; Future    Hemoglobin A1C; Future    Basic metabolic panel; Future    Albumin / creatinine urine ratio    Stage 3a chronic kidney disease (HCC)  Lab Results   Component Value Date    EGFR 46 2023    EGFR 60 10/02/2023    EGFR 44 2023    CREATININE 1.15 2023    CREATININE 0.93 10/02/2023    CREATININE 1.19 2023   Significantly overdue for BW - order given to be done NOW, urged to decrease NSAID use (on both Celebrex and Tramadol) and keep hydrated, will follow  Orders:    CBC and differential    Comprehensive metabolic panel    Lipid panel    TSH, 3rd generation with Free T4 reflex    Vitamin D 25 hydroxy; Future    Basic metabolic panel; Future    Mild cognitive impairment  Pt saw Dr. Garcia and had MOCA which was c/w MCI, started on Aricept upon request of pts family, pt noting SE with the rx and will stop the medication, B12/TSH were both wnl, will follow  Orders:    CBC and differential    Comprehensive metabolic panel    Lipid panel    TSH, 3rd generation with Free T4 reflex    Vitamin D 25 hydroxy;  Future    Hypertensive heart and chronic kidney disease with heart failure and stage 1 through stage 4 chronic kidney disease, or chronic kidney disease (HCC)  Wt Readings from Last 3 Encounters:   12/03/24 63.6 kg (140 lb 3.2 oz)   11/01/24 63.5 kg (140 lb)   09/10/24 64.9 kg (143 lb)   BP acceptable today, con't current BP meds, recheck in 3-6 mos  Orders:    CBC and differential    Comprehensive metabolic panel    Lipid panel    TSH, 3rd generation with Free T4 reflex    Vitamin D 25 hydroxy; Future    Chronic left-sided low back pain without sciatica  Pain persistent despite both Tramadol and Celebrex, importance of decreasing use of NSAIDs reviewed, urged to decrease Tramadol to 1 tab daily, will need to address other medications for pain at next visit (Gabapentin), will follow, call with significant new/worse symptoms       Continuous opioid dependence (HCC)  PDMP rx website reviewed and no red flag use noted, urged to decrease Tramadol from 1 tab bid to 1 tab PO q day d/t continued pain and CKD/use of NSAIDs, T/C Gabapentin/Lyrica as an alternative, will follow  Orders:    CBC and differential    Comprehensive metabolic panel    Lipid panel    TSH, 3rd generation with Free T4 reflex    Vitamin D 25 hydroxy; Future    Idiopathic gout, unspecified chronicity, unspecified site  No recent flares, con't current Allopurinol, check uric acid levels w/labs - BW order given, call with s/sx of acute flare  Orders:    CBC and differential    Comprehensive metabolic panel    Lipid panel    TSH, 3rd generation with Free T4 reflex    Vitamin D 25 hydroxy; Future    Vitamin D deficiency  Con't current supplement for now, check levels with labs - BW order given  Orders:    Vitamin D 25 hydroxy; Future         Colonoscopy 3/22 - no further needed d/t age    Mammo 1/24 - pt deferring at this time    Dexa 6/23 - osteopenia    BW 11/23  POCT A1C 12/24 (7.3)  DM foot exam 8/24  DM eye exam 2/23 - 2 yrs - will do at next  appt      History of Present Illness     HPI Pt here for follow up appt and BW results,  transferring care from Dr. Stevenson    BW was not done.  Her last A1C in Nov 23 was 6.4.  POCT A1C 7.3.  Diet/exercise reviewed.  She admits her diet has been poor.  She does no formal exercise.  She con't to take Januvia for her DM.  She does not check her sugars at home. She is UTD on DM foot and eye exam.  She is following with podiatry regularly.  She is on a statin and an ARB.      Pt saw Dr. León in Nov for memory loss. She was started on Aricept and told to do BW. She had B12/TSH done and both were good. She feels memory is worse with the Aricept.  She notes no other SE with the medication.    BP at goal today and meds were reviewed and no changes have occurred.  She denies missing doses of meds or SE with the meds.  She does not check her BP outside the office.  She notes no frequent Ha's/dizziness/double vision/CP.     Pt is on Tramadol and Celebrex for back pain. She notes pain is currently at her low back. She has had no recent gout flares.  She is on allopurinol daily.        Review of Systems   Constitutional:  Negative for chills, fever and unexpected weight change.   HENT:  Negative for congestion and trouble swallowing.    Eyes:  Negative for pain and visual disturbance.   Respiratory:  Negative for cough, shortness of breath and wheezing.    Cardiovascular:  Negative for chest pain and palpitations.   Gastrointestinal:  Negative for abdominal pain, blood in stool, constipation, diarrhea, nausea and vomiting.   Genitourinary:  Negative for difficulty urinating and dysuria.   Musculoskeletal:  Positive for arthralgias and back pain.   Skin:  Negative for rash and wound.   Neurological:  Negative for dizziness, light-headedness and headaches.   Hematological:  Does not bruise/bleed easily.   Psychiatric/Behavioral:  Positive for confusion.           Objective   /82 (BP Location: Left arm, Patient  "Position: Sitting, Cuff Size: Standard)   Pulse 73   Temp 98.8 °F (37.1 °C) (Tympanic)   Ht 4' 10\" (1.473 m)   Wt 63.6 kg (140 lb 3.2 oz)   SpO2 98%   BMI 29.30 kg/m²      Physical Exam  Vitals and nursing note reviewed.   Constitutional:       General: She is not in acute distress.     Appearance: She is well-developed. She is not ill-appearing.   HENT:      Head: Normocephalic and atraumatic.      Right Ear: External ear normal.      Left Ear: External ear normal.   Eyes:      General:         Right eye: No discharge.         Left eye: No discharge.      Conjunctiva/sclera: Conjunctivae normal.   Neck:      Thyroid: No thyromegaly.      Trachea: No tracheal deviation.   Cardiovascular:      Rate and Rhythm: Normal rate and regular rhythm.      Heart sounds: Normal heart sounds. No murmur heard.  Pulmonary:      Effort: Pulmonary effort is normal. No respiratory distress.      Breath sounds: Normal breath sounds. No wheezing, rhonchi or rales.   Musculoskeletal:         General: No deformity or signs of injury.      Cervical back: Neck supple.   Skin:     General: Skin is warm and dry.      Coloration: Skin is not pale.      Findings: Bruising present. No rash.   Neurological:      General: No focal deficit present.      Mental Status: She is alert.      Motor: No abnormal muscle tone.      Gait: Gait normal.   Psychiatric:         Mood and Affect: Mood normal.         Behavior: Behavior normal.      Comments: Poor historian         "

## 2024-12-03 NOTE — PATIENT INSTRUCTIONS
"Patient Education     Type 2 diabetes   The Basics   Written by the doctors and editors at [facility]   What is type 2 diabetes? -- This is a disorder that disrupts the way the body uses sugar. It is sometimes called type 2 diabetes mellitus.  All of the cells in the body need sugar to work normally. Sugar gets into the cells with the help of a hormone called insulin. Insulin is made by the pancreas, an organ in the belly. If there is not enough insulin, or if cells in the body don't respond normally to insulin, sugar builds up in the blood. That is what happens to people with diabetes.  There are 2 different types of diabetes:   In type 1 diabetes, the pancreas makes little or no insulin.   In type 2 diabetes, the pancreas still makes some insulin, but the cells in the body stop responding normally. Eventually, the pancreas cannot make enough insulin to keep up.  Having excess body weight or obesity increases a person's risk of developing type 2 diabetes. But people without excess body weight can get diabetes, too.  What are the symptoms of type 2 diabetes? -- Type 2 diabetes usually causes no symptoms. When symptoms do happen, they include:   Needing to urinate often   Intense thirst   Blurry vision  Can diabetes lead to other health problems? -- Yes. Type 2 diabetes might not make you feel sick. But if it is not managed, it can lead to serious problems over time, such as:   Heart attacks   Strokes   Kidney disease   Vision problems (or even blindness)   Pain or loss of feeling in the hands and feet   Needing to have fingers, toes, or other body parts removed (amputated)  How do I know if I have type 2 diabetes? -- Your doctor or nurse can do a blood test. There are 2 tests that can be used for this. Both involve measuring the amount of sugar in your blood, called your \"blood sugar\" or \"blood glucose\":   One of the tests measures your blood sugar at the time the blood sample is taken. This test is done in the " "morning. You can't eat or drink anything except water for at least 8 hours before the test.   The other test shows what your average blood sugar has been for the past 2 to 3 months. This blood test is called \"hemoglobin A1C\" or just \"A1C.\" It can be checked at any time of the day, even if you have recently eaten.  How is type 2 diabetes treated? -- The goals of treatment are to manage your blood sugar and lower the risk of future problems that can happen in people with diabetes.  Treatment might include:   Lifestyle changes - This is an important part of managing diabetes. It includes eating healthy foods and getting plenty of physical activity.   Medicines - There are a few medicines that help lower blood sugar. Some people need to take pills that help the body make more insulin or that help insulin do its job. Others need insulin shots.  Depending on what medicines you take, you might need to check your blood sugar regularly at home. But not everyone with type 2 diabetes needs to do this. Your doctor or nurse will tell you if you should be checking your blood sugar, and when and how to do this.  Sometimes, people with type 2 diabetes also need medicines to help prevent problems caused by the disease. For instance, medicines used to lower blood pressure can reduce the chances of a heart attack or stroke.   General medical care - It's also important to take care of other areas of your health. This includes watching your blood pressure and cholesterol levels. You should also get certain vaccines, such as vaccines to protect against the flu and coronavirus disease 2019 (\"COVID-19\"). Some people also need a vaccine to prevent pneumonia.  Can type 2 diabetes be prevented? -- Yes. To lower your chances of getting type 2 diabetes, the most important thing you can do is eat a healthy diet and get plenty of physical activity. This can help you lose weight if you are overweight. But eating well and being active are also good " for your overall health. Even gentle activity, like walking, has benefits.  If you smoke, quitting can also lower your risk of type 2 diabetes. Quitting smoking can be difficult, but your doctor or nurse can help.  All topics are updated as new evidence becomes available and our peer review process is complete.  This topic retrieved from [employer] on: Apr 24, 2024.  Topic 14250 Version 23.0  Release: 32.3.2 - C32.113  © 2024 UpToDate, Inc. and/or its affiliates. All rights reserved.  Consumer Information Use and Disclaimer   Disclaimer: This generalized information is a limited summary of diagnosis, treatment, and/or medication information. It is not meant to be comprehensive and should be used as a tool to help the user understand and/or assess potential diagnostic and treatment options. It does NOT include all information about conditions, treatments, medications, side effects, or risks that may apply to a specific patient. It is not intended to be medical advice or a substitute for the medical advice, diagnosis, or treatment of a health care provider based on the health care provider's examination and assessment of a patient's specific and unique circumstances. Patients must speak with a health care provider for complete information about their health, medical questions, and treatment options, including any risks or benefits regarding use of medications. This information does not endorse any treatments or medications as safe, effective, or approved for treating a specific patient. UpToDate, Inc. and its affiliates disclaim any warranty or liability relating to this information or the use thereof.The use of this information is governed by the Terms of Use, available at https://www.wolterskluwer.com/en/know/clinical-effectiveness-terms. 2024© UpToDate, Inc. and its affiliates and/or licensors. All rights reserved.  Copyright   © 2024 UpToDate, Inc. and/or its affiliates. All rights reserved.

## 2024-12-03 NOTE — ASSESSMENT & PLAN NOTE
Con't current supplement for now, check levels with labs - BW order given  Orders:    Vitamin D 25 hydroxy; Future

## 2024-12-03 NOTE — ASSESSMENT & PLAN NOTE
Lab Results   Component Value Date    EGFR 46 11/28/2023    EGFR 60 10/02/2023    EGFR 44 08/22/2023    CREATININE 1.15 11/28/2023    CREATININE 0.93 10/02/2023    CREATININE 1.19 08/22/2023   Significantly overdue for BW - order given to be done NOW, urged to decrease NSAID use (on both Celebrex and Tramadol) and keep hydrated, will follow  Orders:    CBC and differential    Comprehensive metabolic panel    Lipid panel    TSH, 3rd generation with Free T4 reflex    Vitamin D 25 hydroxy; Future    Basic metabolic panel; Future

## 2024-12-03 NOTE — ASSESSMENT & PLAN NOTE
No recent flares, con't current Allopurinol, check uric acid levels w/labs - BW order given, call with s/sx of acute flare  Orders:    CBC and differential    Comprehensive metabolic panel    Lipid panel    TSH, 3rd generation with Free T4 reflex    Vitamin D 25 hydroxy; Future

## 2024-12-03 NOTE — ASSESSMENT & PLAN NOTE
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

## 2024-12-03 NOTE — ASSESSMENT & PLAN NOTE
Pain persistent despite both Tramadol and Celebrex, importance of decreasing use of NSAIDs reviewed, urged to decrease Tramadol to 1 tab daily, will need to address other medications for pain at next visit (Gabapentin), will follow, call with significant new/worse symptoms

## 2024-12-04 ENCOUNTER — APPOINTMENT (OUTPATIENT)
Dept: LAB | Facility: CLINIC | Age: 74
End: 2024-12-04
Payer: MEDICARE

## 2024-12-04 ENCOUNTER — RESULTS FOLLOW-UP (OUTPATIENT)
Dept: FAMILY MEDICINE CLINIC | Facility: HOSPITAL | Age: 74
End: 2024-12-04

## 2024-12-04 DIAGNOSIS — N18.31 TYPE 2 DIABETES MELLITUS WITH STAGE 3A CHRONIC KIDNEY DISEASE, WITHOUT LONG-TERM CURRENT USE OF INSULIN (HCC): ICD-10-CM

## 2024-12-04 DIAGNOSIS — E55.9 VITAMIN D DEFICIENCY: ICD-10-CM

## 2024-12-04 DIAGNOSIS — F11.20 CONTINUOUS OPIOID DEPENDENCE (HCC): ICD-10-CM

## 2024-12-04 DIAGNOSIS — G31.84 MILD COGNITIVE IMPAIRMENT: ICD-10-CM

## 2024-12-04 DIAGNOSIS — E11.22 TYPE 2 DIABETES MELLITUS WITH STAGE 3A CHRONIC KIDNEY DISEASE, WITHOUT LONG-TERM CURRENT USE OF INSULIN (HCC): ICD-10-CM

## 2024-12-04 DIAGNOSIS — M10.00 IDIOPATHIC GOUT, UNSPECIFIED CHRONICITY, UNSPECIFIED SITE: ICD-10-CM

## 2024-12-04 DIAGNOSIS — I13.0 HYPERTENSIVE HEART AND CHRONIC KIDNEY DISEASE WITH HEART FAILURE AND STAGE 1 THROUGH STAGE 4 CHRONIC KIDNEY DISEASE, OR CHRONIC KIDNEY DISEASE (HCC): ICD-10-CM

## 2024-12-04 DIAGNOSIS — N18.31 STAGE 3A CHRONIC KIDNEY DISEASE (HCC): ICD-10-CM

## 2024-12-04 LAB
25(OH)D3 SERPL-MCNC: >120 NG/ML (ref 30–100)
ALBUMIN SERPL BCG-MCNC: 3.9 G/DL (ref 3.5–5)
ALP SERPL-CCNC: 69 U/L (ref 34–104)
ALT SERPL W P-5'-P-CCNC: 15 U/L (ref 7–52)
ANION GAP SERPL CALCULATED.3IONS-SCNC: 8 MMOL/L (ref 4–13)
AST SERPL W P-5'-P-CCNC: 12 U/L (ref 13–39)
BASOPHILS # BLD AUTO: 0.06 THOUSANDS/ΜL (ref 0–0.1)
BASOPHILS NFR BLD AUTO: 0 % (ref 0–1)
BILIRUB SERPL-MCNC: 0.66 MG/DL (ref 0.2–1)
BUN SERPL-MCNC: 20 MG/DL (ref 5–25)
CALCIUM SERPL-MCNC: 10 MG/DL (ref 8.4–10.2)
CHLORIDE SERPL-SCNC: 99 MMOL/L (ref 96–108)
CHOLEST SERPL-MCNC: 184 MG/DL (ref ?–200)
CO2 SERPL-SCNC: 33 MMOL/L (ref 21–32)
CREAT SERPL-MCNC: 0.83 MG/DL (ref 0.6–1.3)
EOSINOPHIL # BLD AUTO: 0.04 THOUSAND/ΜL (ref 0–0.61)
EOSINOPHIL NFR BLD AUTO: 0 % (ref 0–6)
ERYTHROCYTE [DISTWIDTH] IN BLOOD BY AUTOMATED COUNT: 13.5 % (ref 11.6–15.1)
GFR SERPL CREATININE-BSD FRML MDRD: 68 ML/MIN/1.73SQ M
GLUCOSE P FAST SERPL-MCNC: 168 MG/DL (ref 65–99)
HCT VFR BLD AUTO: 46.9 % (ref 34.8–46.1)
HDLC SERPL-MCNC: 75 MG/DL
HGB BLD-MCNC: 15.3 G/DL (ref 11.5–15.4)
IMM GRANULOCYTES # BLD AUTO: 0.18 THOUSAND/UL (ref 0–0.2)
IMM GRANULOCYTES NFR BLD AUTO: 1 % (ref 0–2)
LDLC SERPL CALC-MCNC: 95 MG/DL (ref 0–100)
LYMPHOCYTES # BLD AUTO: 1.94 THOUSANDS/ΜL (ref 0.6–4.47)
LYMPHOCYTES NFR BLD AUTO: 12 % (ref 14–44)
MCH RBC QN AUTO: 30.1 PG (ref 26.8–34.3)
MCHC RBC AUTO-ENTMCNC: 32.6 G/DL (ref 31.4–37.4)
MCV RBC AUTO: 92 FL (ref 82–98)
MONOCYTES # BLD AUTO: 1 THOUSAND/ΜL (ref 0.17–1.22)
MONOCYTES NFR BLD AUTO: 6 % (ref 4–12)
NEUTROPHILS # BLD AUTO: 13.02 THOUSANDS/ΜL (ref 1.85–7.62)
NEUTS SEG NFR BLD AUTO: 81 % (ref 43–75)
NONHDLC SERPL-MCNC: 109 MG/DL
NRBC BLD AUTO-RTO: 0 /100 WBCS
PLATELET # BLD AUTO: 260 THOUSANDS/UL (ref 149–390)
PMV BLD AUTO: 11 FL (ref 8.9–12.7)
POTASSIUM SERPL-SCNC: 4.6 MMOL/L (ref 3.5–5.3)
PROT SERPL-MCNC: 6.3 G/DL (ref 6.4–8.4)
RBC # BLD AUTO: 5.08 MILLION/UL (ref 3.81–5.12)
SODIUM SERPL-SCNC: 140 MMOL/L (ref 135–147)
TRIGL SERPL-MCNC: 70 MG/DL (ref ?–150)
TSH SERPL DL<=0.05 MIU/L-ACNC: 1.12 UIU/ML (ref 0.45–4.5)
WBC # BLD AUTO: 16.24 THOUSAND/UL (ref 4.31–10.16)

## 2024-12-04 PROCEDURE — 80053 COMPREHEN METABOLIC PANEL: CPT | Performed by: INTERNAL MEDICINE

## 2024-12-04 PROCEDURE — 84443 ASSAY THYROID STIM HORMONE: CPT | Performed by: INTERNAL MEDICINE

## 2024-12-04 PROCEDURE — 85025 COMPLETE CBC W/AUTO DIFF WBC: CPT | Performed by: INTERNAL MEDICINE

## 2024-12-04 PROCEDURE — 36415 COLL VENOUS BLD VENIPUNCTURE: CPT

## 2024-12-04 PROCEDURE — 82306 VITAMIN D 25 HYDROXY: CPT

## 2024-12-04 PROCEDURE — 80061 LIPID PANEL: CPT | Performed by: INTERNAL MEDICINE

## 2024-12-05 NOTE — TELEPHONE ENCOUNTER
Patient called and was read her lab results.  She understood and ok with discussing at her next appt. ROBERTA

## 2024-12-05 NOTE — TELEPHONE ENCOUNTER
----- Message from Leann Henson DO sent at 12/4/2024  8:39 PM EST -----  Please notify pt that her white cell count was elevated and her sugars were up.  Her kidney tests/thyroid/cholesterol were all normal. Her Vit D level was high. She should STOP Vit D supplement she is currently taking.  Will d/w pt in detail at next appt.

## 2024-12-06 DIAGNOSIS — E11.22 TYPE 2 DIABETES MELLITUS WITH STAGE 3 CHRONIC KIDNEY DISEASE, WITHOUT LONG-TERM CURRENT USE OF INSULIN, UNSPECIFIED WHETHER STAGE 3A OR 3B CKD (HCC): ICD-10-CM

## 2024-12-06 DIAGNOSIS — N18.30 TYPE 2 DIABETES MELLITUS WITH STAGE 3 CHRONIC KIDNEY DISEASE, WITHOUT LONG-TERM CURRENT USE OF INSULIN, UNSPECIFIED WHETHER STAGE 3A OR 3B CKD (HCC): ICD-10-CM

## 2024-12-06 NOTE — TELEPHONE ENCOUNTER
Reason for call: Leann Henson, manage this medication! Patient has an appointment on 03/21/2025    [x] Refill   [] Prior Auth  [] Other:     Office:   [x] PCP/Provider -   [] Specialty/Provider -     Medication:     sitaGLIPtin (Januvia) 100 mg tablet       Dose/Frequency: Take 1 tablet (100 mg total) by mouth daily,     Quantity:  30 tablet     Ph[facility]acy: [facility] [address] [phone]     Does the patient have enough for 3 days?   [] Yes   [x] No - Send as HP to POD

## 2024-12-11 DIAGNOSIS — F32.A DEPRESSION, UNSPECIFIED DEPRESSION TYPE: ICD-10-CM

## 2024-12-11 NOTE — TELEPHONE ENCOUNTER
Reason for call:   [x] Refill   [] Prior Auth  [] Other:     Office:   [x] PCP/Provider - [facility] [address]   [] Specialty/Provider -     Medication:   FLUoxetine (PROzac) 20 mg capsule - TAKE 1 CAPSULE BY MOUTH DAILY     Pharmacy:   [facility] [place], [place] - [address]     Does the patient have enough for 3 days?   [x] Yes   [] No - Send as HP to POD

## 2024-12-12 DIAGNOSIS — M25.50 ARTHRALGIA, UNSPECIFIED JOINT: ICD-10-CM

## 2024-12-12 RX ORDER — TRAMADOL HYDROCHLORIDE 50 MG/1
50 TABLET ORAL EVERY 8 HOURS PRN
Qty: 90 TABLET | Refills: 0 | Status: SHIPPED | OUTPATIENT
Start: 2024-12-12

## 2025-01-21 DIAGNOSIS — E78.5 HYPERLIPIDEMIA, UNSPECIFIED HYPERLIPIDEMIA TYPE: ICD-10-CM

## 2025-01-21 DIAGNOSIS — M10.00 IDIOPATHIC GOUT, UNSPECIFIED CHRONICITY, UNSPECIFIED SITE: ICD-10-CM

## 2025-01-21 NOTE — TELEPHONE ENCOUNTER
Reason for call:   [x] Refill   [] Prior Auth  [] Other:     Office:   [x] [facility]   [] Specialty/Provider -     Medication: allopurinol (ZYLOPRIM) 100 mg tablet     Dose/Frequency: Take 1 tablet (100 mg total) by mouth daily     Quantity: 90 tablet    Medication:  simvastatin (ZOCOR) 40 mg tablet    Dose/Frequency: TAKE 1 TABLET BY MOUTH DAILY     Quantity: 30 tablet     Pharmacy: [facility] [address] [phone]    Does the patient have enough for 3 days?   [x] Yes   [] No - Send as HP to POD

## 2025-01-22 RX ORDER — ALLOPURINOL 100 MG/1
100 TABLET ORAL DAILY
Qty: 90 TABLET | Refills: 1 | Status: SHIPPED | OUTPATIENT
Start: 2025-01-22

## 2025-01-22 RX ORDER — SIMVASTATIN 40 MG
40 TABLET ORAL DAILY
Qty: 90 TABLET | Refills: 1 | Status: SHIPPED | OUTPATIENT
Start: 2025-01-22

## 2025-01-27 DIAGNOSIS — R60.9 EDEMA, UNSPECIFIED TYPE: ICD-10-CM

## 2025-01-27 RX ORDER — TORSEMIDE 20 MG/1
TABLET ORAL
Qty: 60 TABLET | Refills: 5 | Status: SHIPPED | OUTPATIENT
Start: 2025-01-27

## 2025-01-27 RX ORDER — TORSEMIDE 20 MG/1
TABLET ORAL
Qty: 60 TABLET | Refills: 5 | Status: SHIPPED | OUTPATIENT
Start: 2025-01-27 | End: 2025-01-27 | Stop reason: SDUPTHER

## 2025-02-10 DIAGNOSIS — M25.50 ARTHRALGIA, UNSPECIFIED JOINT: ICD-10-CM

## 2025-02-10 DIAGNOSIS — I10 ESSENTIAL HYPERTENSION: ICD-10-CM

## 2025-02-10 NOTE — TELEPHONE ENCOUNTER
Reason for call:   [x] Refill   [] Prior Auth  [] Other:     Office:   [x] PCP/Provider - Leann Henson,    [] Specialty/Provider -     Medication:     - traMADol (ULTRAM) 50 mg tablet   Take 1 tablet (50 mg total) by mouth every 8 (eight) hours as needed for moderate pain   Qty: 90    - metoprolol succinate (TOPROL-XL) 100 mg 24 hr tablet   Take 1 tablet (100 mg total) by mouth daily   Qty: 90    - candesartan (ATACAND) 32 MG tablet   TAKE 1 TABLET BY MOUTH DAILY   Qty: 30    Pharmacy: [facility] [place], [place] - [address]     Does the patient have enough for 3 days?   [x] Yes   [] No - Send as HP to POD

## 2025-02-11 RX ORDER — TRAMADOL HYDROCHLORIDE 50 MG/1
50 TABLET ORAL EVERY 8 HOURS PRN
Qty: 90 TABLET | Refills: 0 | Status: SHIPPED | OUTPATIENT
Start: 2025-02-11

## 2025-02-11 RX ORDER — METOPROLOL SUCCINATE 100 MG/1
100 TABLET, EXTENDED RELEASE ORAL DAILY
Qty: 90 TABLET | Refills: 1 | Status: SHIPPED | OUTPATIENT
Start: 2025-02-11

## 2025-02-11 RX ORDER — CANDESARTAN 32 MG/1
32 TABLET ORAL DAILY
Qty: 90 TABLET | Refills: 1 | Status: SHIPPED | OUTPATIENT
Start: 2025-02-11

## 2025-02-18 ENCOUNTER — HOSPITAL ENCOUNTER (OUTPATIENT)
Dept: MAMMOGRAPHY | Facility: CLINIC | Age: 75
Discharge: HOME | End: 2025-02-18
Payer: MEDICARE

## 2025-02-18 VITALS — BODY MASS INDEX: 29.39 KG/M2 | HEIGHT: 58 IN | WEIGHT: 140 LBS

## 2025-02-18 DIAGNOSIS — Z12.31 ENCOUNTER FOR SCREENING MAMMOGRAM FOR MALIGNANT NEOPLASM OF BREAST: ICD-10-CM

## 2025-02-18 PROCEDURE — 77063 BREAST TOMOSYNTHESIS BI: CPT

## 2025-02-18 PROCEDURE — 77067 SCR MAMMO BI INCL CAD: CPT

## 2025-02-20 DIAGNOSIS — E03.9 ACQUIRED HYPOTHYROIDISM: ICD-10-CM

## 2025-02-20 DIAGNOSIS — K21.00 GASTROESOPHAGEAL REFLUX DISEASE WITH ESOPHAGITIS: ICD-10-CM

## 2025-02-20 RX ORDER — LEVOTHYROXINE SODIUM 25 UG/1
25 TABLET ORAL DAILY
Qty: 90 TABLET | Refills: 1 | Status: SHIPPED | OUTPATIENT
Start: 2025-02-20

## 2025-02-20 NOTE — TELEPHONE ENCOUNTER
Reason for call:   [x] Refill   [] Prior Auth  [] Other:     Office:   [x] PCP/Provider -   [] Specialty/Provider -     Medication:   - Omeprazole 40mg- take 1 capsule by mouth daily       Pharmacy: [facility]    Does the patient have enough for 3 days?   [x] Yes   [] No - Send as HP to POD

## 2025-02-20 NOTE — TELEPHONE ENCOUNTER
Reason for call:   [x] Refill   [] Prior Auth  [] Other:     Office:   [x] PCP/Provider -   [] Specialty/Provider -     Medication: levothyroxine 25 mcg tablet     Dose/Frequency: Take 1 tablet (25 mcg total) by mouth daily,     Quantity: 90 tablet    Pharmacy: [facility] [place] [place] -     Does the patient have enough for 3 days?   [x] Yes   [] No - Send as HP to POD

## 2025-02-21 RX ORDER — OMEPRAZOLE 40 MG/1
40 CAPSULE, DELAYED RELEASE ORAL DAILY
Qty: 90 CAPSULE | Refills: 1 | Status: SHIPPED | OUTPATIENT
Start: 2025-02-21

## 2025-03-03 ENCOUNTER — APPOINTMENT (OUTPATIENT)
Dept: LAB | Facility: CLINIC | Age: 75
End: 2025-03-03
Payer: MEDICARE

## 2025-03-03 LAB
ANION GAP SERPL CALCULATED.3IONS-SCNC: 8 MMOL/L (ref 4–13)
BUN SERPL-MCNC: 28 MG/DL (ref 5–25)
CALCIUM SERPL-MCNC: 10.8 MG/DL (ref 8.4–10.2)
CHLORIDE SERPL-SCNC: 99 MMOL/L (ref 96–108)
CO2 SERPL-SCNC: 31 MMOL/L (ref 21–32)
CREAT SERPL-MCNC: 0.91 MG/DL (ref 0.6–1.3)
EST. AVERAGE GLUCOSE BLD GHB EST-MCNC: 235 MG/DL
GFR SERPL CREATININE-BSD FRML MDRD: 61 ML/MIN/1.73SQ M
GLUCOSE P FAST SERPL-MCNC: 314 MG/DL (ref 65–99)
HBA1C MFR BLD: 9.8 %
POTASSIUM SERPL-SCNC: 5.3 MMOL/L (ref 3.5–5.3)
SODIUM SERPL-SCNC: 138 MMOL/L (ref 135–147)

## 2025-03-05 ENCOUNTER — TELEPHONE (OUTPATIENT)
Dept: FAMILY MEDICINE CLINIC | Facility: HOSPITAL | Age: 75
End: 2025-03-05

## 2025-03-05 NOTE — TELEPHONE ENCOUNTER
----- Message from Leann Henson DO sent at 3/5/2025  9:13 AM EST -----  Please notify pt that her sugars are severely uncontrolled and her A1C jumped from 7.3 to 9.8.  her calcium was also elevated.  Will review med changes at next appt.  But watch sugars/carbs btw now and appt.

## 2025-03-05 NOTE — TELEPHONE ENCOUNTER
Relayed results to pt as per provider message. Pt expressed understanding and did not have any further questions.                    Please complete Result Management task.

## 2025-03-17 ENCOUNTER — RA CDI HCC (OUTPATIENT)
Dept: OTHER | Facility: HOSPITAL | Age: 75
End: 2025-03-17

## 2025-03-17 NOTE — PROGRESS NOTES
HCC coding opportunities             Patients Insurance   E11.40, I42.0 and E11.65  Medicare Insurance: Medicare

## 2025-03-21 ENCOUNTER — RESULTS FOLLOW-UP (OUTPATIENT)
Dept: FAMILY MEDICINE CLINIC | Facility: HOSPITAL | Age: 75
End: 2025-03-21

## 2025-03-21 ENCOUNTER — OFFICE VISIT (OUTPATIENT)
Dept: FAMILY MEDICINE CLINIC | Facility: HOSPITAL | Age: 75
End: 2025-03-21
Payer: MEDICARE

## 2025-03-21 VITALS
BODY MASS INDEX: 29.64 KG/M2 | TEMPERATURE: 98.8 F | SYSTOLIC BLOOD PRESSURE: 129 MMHG | DIASTOLIC BLOOD PRESSURE: 72 MMHG | OXYGEN SATURATION: 97 % | HEIGHT: 58 IN | WEIGHT: 141.2 LBS | HEART RATE: 85 BPM

## 2025-03-21 DIAGNOSIS — F11.20 CONTINUOUS OPIOID DEPENDENCE (HCC): ICD-10-CM

## 2025-03-21 DIAGNOSIS — I13.0 HYPERTENSIVE HEART AND CHRONIC KIDNEY DISEASE WITH HEART FAILURE AND STAGE 1 THROUGH STAGE 4 CHRONIC KIDNEY DISEASE, OR CHRONIC KIDNEY DISEASE (HCC): ICD-10-CM

## 2025-03-21 DIAGNOSIS — I50.22 CHRONIC SYSTOLIC CONGESTIVE HEART FAILURE (HCC): ICD-10-CM

## 2025-03-21 DIAGNOSIS — E11.22 TYPE 2 DIABETES MELLITUS WITH STAGE 3A CHRONIC KIDNEY DISEASE, WITHOUT LONG-TERM CURRENT USE OF INSULIN (HCC): Primary | ICD-10-CM

## 2025-03-21 DIAGNOSIS — E83.52 HYPERCALCEMIA: ICD-10-CM

## 2025-03-21 DIAGNOSIS — J43.9 PULMONARY EMPHYSEMA, UNSPECIFIED EMPHYSEMA TYPE (HCC): ICD-10-CM

## 2025-03-21 DIAGNOSIS — F32.1 CURRENT MODERATE EPISODE OF MAJOR DEPRESSIVE DISORDER WITHOUT PRIOR EPISODE (HCC): ICD-10-CM

## 2025-03-21 DIAGNOSIS — N18.31 TYPE 2 DIABETES MELLITUS WITH STAGE 3A CHRONIC KIDNEY DISEASE, WITHOUT LONG-TERM CURRENT USE OF INSULIN (HCC): Primary | ICD-10-CM

## 2025-03-21 DIAGNOSIS — E21.3 HYPERPARATHYROIDISM (HCC): ICD-10-CM

## 2025-03-21 DIAGNOSIS — G89.29 CHRONIC LEFT-SIDED LOW BACK PAIN WITHOUT SCIATICA: ICD-10-CM

## 2025-03-21 DIAGNOSIS — M54.50 CHRONIC LEFT-SIDED LOW BACK PAIN WITHOUT SCIATICA: ICD-10-CM

## 2025-03-21 DIAGNOSIS — E11.65 TYPE 2 DIABETES MELLITUS WITH HYPERGLYCEMIA, WITHOUT LONG-TERM CURRENT USE OF INSULIN (HCC): ICD-10-CM

## 2025-03-21 LAB
LEFT EYE DIABETIC RETINOPATHY: NORMAL
LEFT EYE IMAGE QUALITY: NORMAL
LEFT EYE MACULAR EDEMA: NORMAL
LEFT EYE OTHER RETINOPATHY: NORMAL
RIGHT EYE DIABETIC RETINOPATHY: NORMAL
RIGHT EYE IMAGE QUALITY: NORMAL
RIGHT EYE MACULAR EDEMA: NORMAL
RIGHT EYE OTHER RETINOPATHY: NORMAL
SEVERITY (EYE EXAM): NORMAL

## 2025-03-21 PROCEDURE — 92250 FUNDUS PHOTOGRAPHY W/I&R: CPT | Performed by: INTERNAL MEDICINE

## 2025-03-21 PROCEDURE — 99214 OFFICE O/P EST MOD 30 MIN: CPT | Performed by: INTERNAL MEDICINE

## 2025-03-21 PROCEDURE — G2211 COMPLEX E/M VISIT ADD ON: HCPCS | Performed by: INTERNAL MEDICINE

## 2025-03-21 NOTE — ASSESSMENT & PLAN NOTE
Wt Readings from Last 3 Encounters:   03/21/25 64 kg (141 lb 3.2 oz)   02/18/25 63.5 kg (140 lb)   12/03/24 63.6 kg (140 lb 3.2 oz)   Overdue for f/u with Cardio and Echo - referral to Cardio placed and Echo ordered - last EF was 40%, pt is on Toprol, Candesartan, Simvastatin and we added Jardiance today, red flag symptoms reviewed - call if they occur, will follow  Orders:    Empagliflozin (Jardiance) 10 MG TABS tablet; Take 1 tablet (10 mg total) by mouth in the morning    Echo complete w/ contrast if indicated; Future    Ambulatory Referral to Cardiology; Future

## 2025-03-21 NOTE — ASSESSMENT & PLAN NOTE
No current resp illness and pt denies recent exacerbation, not on a daily maintenance inhaler, call with resp concerns

## 2025-03-21 NOTE — ASSESSMENT & PLAN NOTE
Wt Readings from Last 3 Encounters:   03/21/25 64 kg (141 lb 3.2 oz)   02/18/25 63.5 kg (140 lb)   12/03/24 63.6 kg (140 lb 3.2 oz)   Bouncing btw CKD stage 2/3a, BP great today, BS NOT great, importance of BP/BS control as well as avoiding regular NSAID use and avoiding dehydration, she is on an ARB, will follow and repeat in 3 mos - BW order given

## 2025-03-21 NOTE — ASSESSMENT & PLAN NOTE
Lab Results   Component Value Date    HGBA1C 9.8 (H) 03/03/2025   DM type 2 with hyperglycemia and CKD stage 2/3a - severely uncontrolled with A1C now at 9.8- pt denies missing doses of meds but I worry about her meds with her memory, on max Januvia and will add Jardiance with DM benefit and HF benefit, SE reviewed, urged to watch diet and keep as active as her back will allow, may need to start checking BS if not seeing A1C come down, pt refusing DM edu, DM eye exam done in office today, DM foot exam done 8/24, She is on an ARB and a statin, will repeat BW in 3 mos - BW order given, will follow closely   Orders:    IRIS Diabetic eye exam

## 2025-03-21 NOTE — PROGRESS NOTES
Name: Dejah Parish      : 1950      MRN: [id number]  Encounter Provider: Leann Henson DO  Encounter Date: 3/21/2025   Encounter department: [facility] [address]   :  Assessment & Plan  Type 2 diabetes mellitus with stage 3a chronic kidney disease, without long-term current use of insulin (HCC)    Lab Results   Component Value Date    HGBA1C 9.8 (H) 2025   DM type 2 with hyperglycemia and CKD stage 2/3a - severely uncontrolled with A1C now at 9.8- pt denies missing doses of meds but I worry about her meds with her memory, on max Januvia and will add Jardiance with DM benefit and HF benefit, SE reviewed, urged to watch diet and keep as active as her back will allow, may need to start checking BS if not seeing A1C come down, pt refusing DM edu, DM eye exam done in office today, DM foot exam done , She is on an ARB and a statin, will repeat BW in 3 mos - BW order given, will follow closely   Orders:    IRIS Diabetic eye exam    Type 2 diabetes mellitus with hyperglycemia, without long-term current use of insulin (HCC)    Lab Results   Component Value Date    HGBA1C 9.8 (H) 2025   DM type 2 with hyperglycemia and CKD stage 2/3a - severely uncontrolled with A1C now at 9.8- pt denies missing doses of meds but I worry about her meds with her memory, on max Januvia and will add Jardiance with DM benefit and HF benefit, SE reviewed, urged to watch diet and keep as active as her back will allow, may need to start checking BS if not seeing A1C come down, pt refusing DM edu, DM eye exam done in office today, DM foot exam done , She is on an ARB and a statin, will repeat BW in 3 mos - BW order given, will follow closely   Orders:    Empagliflozin (Jardiance) 10 MG TABS tablet; Take 1 tablet (10 mg total) by mouth in the morning    Basic metabolic panel; Future    Hemoglobin A1C; Future    Hypercalcemia  Stop Ca supplement, check Ca/PTH with labs in 3 mos        Hyperparathyroidism (HCC)  Check PTH with  labs in 3 mos, will follow  Orders:    PTH, intact; Future    Chronic left-sided low back pain without sciatica  Following with Dr. Angeles and just saw Dr. Li, going for US tx 4/11/25, importance of sparing use of NSAIDs reviewed, will follow       Continuous opioid dependence (HCC)  On Tramadol and pt thinks it helps, CV SE reviewed, urged to use at lowest dose/least frequency possible, PDMP Rx website reviewed, will follow       Current moderate episode of major depressive disorder without prior episode (HCC)  Pt reports mood is good overall, she is deferring need for mood med changes - call with new/worse mood, re-eval in 3-6 mos       Hypertensive heart and chronic kidney disease with heart failure and stage 1 through stage 4 chronic kidney disease, or chronic kidney disease (HCC)  Wt Readings from Last 3 Encounters:   03/21/25 64 kg (141 lb 3.2 oz)   02/18/25 63.5 kg (140 lb)   12/03/24 63.6 kg (140 lb 3.2 oz)   Bouncing btw CKD stage 2/3a, BP great today, BS NOT great, importance of BP/BS control as well as avoiding regular NSAID use and avoiding dehydration, she is on an ARB, will follow and repeat in 3 mos - BW order given         Pulmonary emphysema, unspecified emphysema type (HCC)  No current resp illness and pt denies recent exacerbation, not on a daily maintenance inhaler, call with resp concerns        Chronic systolic congestive heart failure (HCC)  Wt Readings from Last 3 Encounters:   03/21/25 64 kg (141 lb 3.2 oz)   02/18/25 63.5 kg (140 lb)   12/03/24 63.6 kg (140 lb 3.2 oz)   Overdue for f/u with Cardio and Echo - referral to Cardio placed and Echo ordered - last EF was 40%, pt is on Toprol, Candesartan, Simvastatin and we added Jardiance today, red flag symptoms reviewed - call if they occur, will follow  Orders:    Empagliflozin (Jardiance) 10 MG TABS tablet; Take 1 tablet (10 mg total) by mouth in the morning    Echo complete w/ contrast if  "indicated; Future    Ambulatory Referral to Cardiology; Future         Colonoscopy 3/22 - no further needed d/t age     Mammo 2/25    Dexa 6/23 - osteopenia     BW 3/25 (9.8)  DM foot exam 8/24  DM eye exam 2/24 - done in office today  Ur microalbumin:Cr 12/24      History of Present Illness   HPI Pt here for follow up appt and BW results    BW results were d/w pt in detail: FBS/A1C jumping up to 314/9.8!! Ca 10.8 and BUN/Cr 28/0.91 (GFR 61)     Def of controlled vs uncontrolled DM was reviewed.  Diet was reviewed - wgt up 1 lb from Dec 24. She does not feel her diet has changed. She does admit to \"sometimes\" eating a lot of sweets and likes carbs. She does no formal exercise.  She is taking her DM meds as directed - she denies missing doses of the Januvia or forgetting to take the rx.  She does not check her sugars at home.  She is UTD on DM foot exam (8/24) but is due for DM eye exam.  She is on statin and an ARB.     Nml range for Ca reviewed.  She has dx of hyperparathyroidism on problem list.  Last PTH 5/1/23 was elevated at 81.3.  Vit D 12/4/24 was >120.  Supplements reviewed     Pt saw Neuro (Dr Li) in Jan 2025 for f/u back pain - OV note reviewed.  A MSK US for eval of lumbar spine and sacrotuberous ligament was recommended.  She has an appt with a specialist in April.  She is taking Celebrex and Tramadol as needed and states \"I think so\" when asked if they help.      Pt con't to take her Fluoxetine daily for depression w/o SE.  She feels mood is doing well on current regimen.  She denies down/sad mood as well as significant anxiety/stress.  She reports she is sleeping well.     BP at goal today and meds were reviewed and no changes have occurred.  She denies missing doses of meds or SE with the meds.  She does not check her BP outside the office.  She notes no frequent Ha's/dizziness/double vision/CP.     Pt notes no recent significant resp illness or exacerbations of COPD. She notes no chronic cough " "or wheezing. She has SOB with exertion. She is on Singulair for allergies but does not use a daily maintenance inhaler.    She has not seen Cardio in some time.  Last Echo 12/23 - EF was 40%. She notes no CP/palp/LE edema/orthopnea/PND/wgt gain.  She notes SOB with significant  exertion but does feel that has gotten a bit worse.       Review of Systems   Constitutional:  Negative for chills, fever and unexpected weight change.   HENT:  Positive for postnasal drip and rhinorrhea. Negative for congestion and sore throat.    Eyes:  Negative for pain and visual disturbance.   Respiratory:  Positive for cough and shortness of breath. Negative for wheezing.    Cardiovascular:  Negative for chest pain, palpitations and leg swelling.   Gastrointestinal:  Negative for abdominal pain, constipation, diarrhea, nausea and vomiting.   Genitourinary:  Negative for difficulty urinating and dysuria.   Musculoskeletal:  Positive for back pain and gait problem.   Skin:  Negative for rash and wound.   Neurological:  Negative for dizziness and headaches.   Hematological:  Does not bruise/bleed easily.   Psychiatric/Behavioral:  Negative for dysphoric mood. The patient is not nervous/anxious.        Objective   /72 (BP Location: Left arm, Patient Position: Sitting, Cuff Size: Standard)   Pulse 85   Temp 98.8 °F (37.1 °C)   Ht 4' 10\" (1.473 m)   Wt 64 kg (141 lb 3.2 oz)   SpO2 97%   BMI 29.51 kg/m²      Physical Exam  Vitals and nursing note reviewed.   Constitutional:       General: She is not in acute distress.     Appearance: She is well-developed. She is not ill-appearing.   HENT:      Head: Normocephalic and atraumatic.      Right Ear: External ear normal.      Left Ear: External ear normal.   Eyes:      General:         Right eye: No discharge.         Left eye: No discharge.      Conjunctiva/sclera: Conjunctivae normal.   Neck:      Thyroid: No thyromegaly.      Trachea: No tracheal deviation.   Cardiovascular:      " Rate and Rhythm: Normal rate and regular rhythm.      Heart sounds: Normal heart sounds. No murmur heard.  Pulmonary:      Effort: Pulmonary effort is normal. No respiratory distress.      Breath sounds: Normal breath sounds. No wheezing, rhonchi or rales.   Musculoskeletal:         General: No deformity or signs of injury.   Skin:     General: Skin is warm and dry.      Coloration: Skin is not pale.      Findings: No rash.   Neurological:      General: No focal deficit present.      Mental Status: She is alert. Mental status is at baseline.      Motor: No abnormal muscle tone.      Gait: Gait normal.   Psychiatric:         Mood and Affect: Mood normal.         Behavior: Behavior normal.      Comments: Poor historian

## 2025-03-21 NOTE — ASSESSMENT & PLAN NOTE
On Tramadol and pt thinks it helps, CV SE reviewed, urged to use at lowest dose/least frequency possible, PDMP Rx website reviewed, will follow

## 2025-03-21 NOTE — ASSESSMENT & PLAN NOTE
Lab Results   Component Value Date    HGBA1C 9.8 (H) 03/03/2025   DM type 2 with hyperglycemia and CKD stage 2/3a - severely uncontrolled with A1C now at 9.8- pt denies missing doses of meds but I worry about her meds with her memory, on max Januvia and will add Jardiance with DM benefit and HF benefit, SE reviewed, urged to watch diet and keep as active as her back will allow, may need to start checking BS if not seeing A1C come down, pt refusing DM edu, DM eye exam done in office today, DM foot exam done 8/24, She is on an ARB and a statin, will repeat BW in 3 mos - BW order given, will follow closely   Orders:    Empagliflozin (Jardiance) 10 MG TABS tablet; Take 1 tablet (10 mg total) by mouth in the morning    Basic metabolic panel; Future    Hemoglobin A1C; Future

## 2025-03-21 NOTE — ASSESSMENT & PLAN NOTE
Pt reports mood is good overall, she is deferring need for mood med changes - call with new/worse mood, re-eval in 3-6 mos

## 2025-03-21 NOTE — ASSESSMENT & PLAN NOTE
Following with Dr. Angeles and just saw Dr. Li, going for US tx 4/11/25, importance of sparing use of NSAIDs reviewed, will follow        [[email or web address]] : EXAM: CT ANGIO NECK (W)AW IC \par  EXAM: CT ANGIO BRAIN (W)AW IC \par \par PROCEDURE DATE: 09/10/2019 \par \par \par \par INTERPRETATION: EXAMS: \par 1. CT ANGIOGRAPHY NECK WITH INTRAVENOUS CONTRAST. \par 2. CT ANGIOGRAPHY BRAIN WITH INTRAVENOUS CONTRAST. \par \par CLINICAL HISTORY: R-sided numbness/weakness. . . \par \par TECHNIQUE: Contrast enhanced axial CT images were acquired from the aortic \par arch to the vertex of the calvarium, during the angiographic phase. \par Three-dimensional maximum intensity projection reformats were generated. 90 \par ml of Omnipaque-350 mg/ml were administered intravenously, without immediate \par complication. \par \par COMPARISON STUDY: CT head 9/10/2019. MRI brain 7/1/2019. CT neck 11/27/2018. \par \par FINDINGS: \par \par CT ANGIOGRAPHY NECK: \par \par Thoracic aorta and branch vessels: Calcified plaque. Incidental direct \par origin of the left vertebral artery from the aortic arch. \par \par Right carotid system: Calcified plaque of the proximal right cervical ICA \par results in approximately 60% stenosis by NASCET criteria. \par \par Left carotid system: Calcified plaque at the proximal left cervical ICA \par results in approximately 60% stenosis by NASCET criteria. \par \par Vertebral arteries: Moderate to severe stenosis of the left V3 segment. \par Dominant right vertebral artery. \par \par Soft tissues of the neck: Right sphenoid and left maxillary sinus polyps or \par retention cysts. \par \par Visualized spine: Degenerative changes in spine. \par \par Visualized upper chest: Emphysema. \par \par CT ANGIOGRAPHY BRAIN: \par \par Internal carotid arteries: Patent. \par \par Anterior cerebral arteries: Patent. \par \par Middle cerebral arteries: Patent. \par \par Posterior cerebral arteries: Patent. \par \par Vertebrobasilar: Patent. Mild narrowing of the left intradural vertebral \par artery, which terminates predominantly in the left PICA. Dominant right \par intradural vertebral artery. Mild stenoses of the distal right intradural \par vertebral and mid basilar arteries. Branch vasculature of the posterior \par circulation is within normal limits. \par \par Vascular lesions: No evidence of intracranial aneurysm or large vascular \par malformation, within limits of CT technique \par \par IMPRESSION: \par CT angiography neck: Approximately 60% stenosis bilateral proximal cervical \par ICAs by NASCET criteria. \par Moderate to severe stenosis of the left V3 segment. \par \par CT angiography brain: No major vessel occlusion. No evidence of aneurysm. \par \par \par \par \par \par \par \par ALCIDES JAVED \par This document has been electronically signed. Sep 10 2019 2:44PM \par \par

## 2025-04-01 NOTE — PROGRESS NOTES
"Advanced Heart Failure/Pulmonary Hypertension Outpatient Note - Dejah Parish 75 y.o. female MRN: [id number]    @ Encounter: [id number]      Assessment:  75 y.o. female PMH and acute problems listed later in this note (a partial list may also be included within 'assessment' section) presents for consultation.  I first met Dejah Parish on 4/3/25.  Referred by Leann Henson,*.     The patient's primary cardiac team is general cardiology, follows Dr. CAMPOS Cotto  HFimpEF. 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  Per old 2023 notes: \"She has BIV ICD and at [facility]. Prior shocks for Fractured lead. EF improved w BIV pacing. \"  PVC  DM  GERD  HTN  CAD s/p PCI to D1 and RCA  LBBB  Patient Active Problem List   Diagnosis    Trigger little finger of left hand    Allergic rhinitis    Arthralgia of knee, left    Arthralgia of knee, right    Coronary artery disease    Benign positional vertigo, left    Bilateral fibrocystic breast changes    Chronic systolic congestive heart failure (HCC)    Stage 3a chronic kidney disease (HCC)    Biventricular ICD (implantable cardioverter-defibrillator) in place    DDD (degenerative disc disease), cervical    Gout    Mixed hyperlipidemia    Hypokalemia    Acquired hypothyroidism    Obstructive sleep apnea    Osteoarthritis    Overactive bladder    Psoriasis    Type 2 diabetes mellitus with stage 3 chronic kidney disease, without long-term current use of insulin (HCC)    Vitamin D deficiency    Lumbar spondylosis    Chronic left-sided low back pain without sciatica    Hypercalcemia    Leukocytosis    Dense breast tissue    Family history of breast cancer    Gastroesophageal reflux disease with esophagitis    Abnormal computerized axial tomography of abdomen    Hyperparathyroidism (HCC)    PVC's (premature ventricular contractions)    Chronic left shoulder pain    Dilated cardiomyopathy (HCC)    Pulmonary emphysema, unspecified emphysema type (HCC)    Continuous opioid dependence (HCC)    " "Tremor of both hands    Hypertensive heart and chronic kidney disease with heart failure and stage 1 through stage 4 chronic kidney disease, or chronic kidney disease (HCC)    Current moderate episode of major depressive disorder without prior episode (HCC)    Ischial bursitis of left side    Pain of left hip    Type 2 diabetes mellitus with hyperglycemia, without long-term current use of insulin (HCC)         Today I have reviewed all pertinent labs/imaging/data including but not limited to:        Lab Units 03/03/25  0926 12/04/24  1012 11/28/23  1023   CREATININE mg/dL 0.91 0.83 1.15         Lab Results   Component Value Date    K 5.3 03/03/2025     Lab Results   Component Value Date    HGBA1C 9.8 (H) 03/03/2025     Lab Results   Component Value Date    GLP4ZWKTSLAY 1.120 12/04/2024     Lab Results   Component Value Date    LDLCALC 95 12/04/2024     No results found for: \"BNP\"   No results found for: \"NTBNP\"       TODAY'S PLAN:     04/03/25  I am meeting patient for the first time today  Warm, euvolemic  No new cardiac complaints, feels generally well  BP acceptable    Recent labs with borderline hyperK on supplementation bid > she will reduce to qdaily    No other Rx changes today    EP group contacted for device fu    Has upcoming echo scheduled by another provider    Advised diet, exercise as able    Follow up:  With the general cardiology team Dr. CAMPOS Cotto in 6 mo. This patient does not have advanced heart failure needs at present.  In addition to follow up with their other medical providers    PPM,  ICD , CRT (if applicable):  Interrogation:  6/2024  MDT/BIV-ICD (THERAPIES PROGRAMMED OFF) / NOT MRI CONDITIONAL   CARELINK TRANSMISSION: BATTERY VOLTAGE ADEQUATE (4.2 YRS). AP-90%, BVP-95% (VSR PACE-0.2%). ALL AVAILABLE LEAD PARAMETERS WITHIN NORMAL LIMITS. NO SIGNIFICANT HIGH RATE EPISODES. OPTI-VOL WITHIN NORMAL LIMITS. NORMAL DEVICE FUNCTION.   Advanced Therapies (if applicable): "   --Inotrope:  --LVAD/Transplant Candidacy:        Studies:  Today I have reviewed all pertinent patient data/labs/imaging where available, including but not limited to the below studies. This includes my independent interpretation. Selected results may be displayed here but comprehensive listing is omitted for note clarity and can be found in the epic chart.    ECG.    Echo.    Stress.    Cath.    HPI:   75 y.o. female PMH and acute problems listed later in this note (a partial list may also be included within 'assessment' section) presents for consultation.  No new CP/SOB/dizziness/palpitations/syncope.  No new fatigue.  No new unintentional weight changes.  No new leg swelling, PND, pillow orthopnea.  No new fevers, chills, cough, nausea, vomiting, diarrhea, dysuria.        ROS:  10 point ROS negative except as specified in HPI    Past Medical History:   Diagnosis Date    Abnormal finding on breast imaging     last assessed 11/30/17    Allergic rhinitis     Anxiety     BBB (bundle branch block)     left,last assesed 6/4/12    Bilateral fibrocystic breast changes     Bilateral sacroiliitis (HCC)     Carpal tunnel syndrome, unspecified upper limb     Chronic systolic congestive heart failure (HCC)     Coronary artery disease     Detrusor instability     Dilated cardiomyopathy (HCC)     Disorder of intervertebral disc of cervical spine     Gout     Hormone replacement therapy     Hx of blood clots     Hypokalemia     Osteoarthritis     Papilloma of left breast     Psoriasis     psoriatic arthropathy    Psoriatic arthropathy (HCC)     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    RLS (restless legs syndrome)     Screening mammogram, encounter for 12/05/2019    Vitamin D deficiency      Patient Active Problem List   Diagnosis    Trigger little finger of left hand    Allergic rhinitis    Arthralgia of knee, left    Arthralgia of knee, right    Coronary artery disease    Benign positional vertigo, left    Bilateral fibrocystic breast  changes    Chronic systolic congestive heart failure (HCC)    Stage 3a chronic kidney disease ([facility])    Biventricular ICD (implantable cardioverter-defibrillator) in place    DDD (degenerative disc disease), cervical    Gout    Mixed hyperlipidemia    Hypokalemia    Acquired hypothyroidism    Obstructive sleep apnea    Osteoarthritis    Overactive bladder    Psoriasis    Type 2 diabetes mellitus with stage 3 chronic kidney disease, without long-term current use of insulin (HCC)    Vitamin D deficiency    Lumbar spondylosis    Chronic left-sided low back pain without sciatica    Hypercalcemia    Leukocytosis    Dense breast tissue    Family history of breast cancer    Gastroesophageal reflux disease with esophagitis    Abnormal computerized axial tomography of abdomen    Hyperparathyroidism ([facility])    PVC's (premature ventricular contractions)    Chronic left shoulder pain    Dilated cardiomyopathy ([facility])    Pulmonary emphysema, unspecified emphysema type ([facility])    Continuous opioid dependence ([facility])    Tremor of both hands    Hypertensive heart and chronic kidney disease with heart failure and stage 1 through stage 4 chronic kidney disease, or chronic kidney disease (HCC)    Current moderate episode of major depressive disorder without prior episode ([facility])    Ischial bursitis of left side    Pain of left hip    Type 2 diabetes mellitus with hyperglycemia, without long-term current use of insulin ([facility])     No current facility-administered medications for this visit.    Current Outpatient Medications   Medication Instructions    allopurinol (ZYLOPRIM) 100 mg, Oral, Daily    aspirin 81 mg, Daily    calcium carbonate (OS-MANINDER) 1,200 mg, Daily    candesartan (ATACAND) 32 mg, Oral, Daily    celecoxib (CELEBREX) 200 mg, Oral, 2 times daily    cetirizine (ZYRTEC) 10 mg, Daily    Cholecalciferol (VITAMIN D3) 1000 units CAPS Take by mouth    Empagliflozin (JARDIANCE) 10 mg, Oral, Daily    FLUoxetine (PROZAC) 20 mg, Oral, Daily     levothyroxine 25 mcg, Oral, Daily    Magnesium 250 MG TABS Daily    Melatonin 5 mg, Oral, Daily at bedtime    metoprolol succinate (TOPROL-XL) 100 mg, Oral, Daily    Minoxidil (ROGAINE WOMENS EX) 1 mL    montelukast (SINGULAIR) 10 mg, Oral, Daily at bedtime    omeprazole (PRILOSEC) 40 mg, Oral, Daily    potassium chloride (Klor-Con M20) 20 mEq tablet 20 mEq, Oral, Daily    pyridoxine (VITAMIN B6) 100 mg, Daily    simvastatin (ZOCOR) 40 mg, Oral, Daily    sitaGLIPtin (JANUVIA) 100 mg, Oral, Daily    torsemide (DEMADEX) 20 mg tablet Take 1 tablet by mouth twice daily    traMADol (ULTRAM) 50 mg, Oral, Every 8 hours PRN      Allergies   Allergen Reactions    Dust Mite Extract Sneezing     Social History     Socioeconomic History    Marital status:      Spouse name: Not on file    Number of children: Not on file    Years of education: Not on file    Highest education level: Not on file   Occupational History    Not on file   Tobacco Use    Smoking status: Former     Current packs/day: 0.00     Average packs/day: 1 pack/day for 15.0 years (15.0 ttl pk-yrs)     Types: Cigarettes     Start date:      Quit date:      Years since quittin.2    Smokeless tobacco: Never    Tobacco comments:     Smoked on and off for the last few years before I quit   Vaping Use    Vaping status: Never Used   Substance and Sexual Activity    Alcohol use: Never    Drug use: Never    Sexual activity: Not Currently     Comment: Took birth control pills from  until    Other Topics Concern    Not on file   Social History Narrative    Always uses seat belt     Social Drivers of Health     Financial Resource Strain: Low Risk  (2023)    Overall Financial Resource Strain (CARDIA)     Difficulty of Paying Living Expenses: Not very hard   Food Insecurity: No Food Insecurity (2024)    Nursing - Inadequate Food Risk Classification     Worried About Running Out of Food in the Last Year: Never true     Ran Out of Food in the  "Last Year: Never true     Ran Out of Food in the Last Year: Not on file   Transportation Needs: No Transportation Needs (8/2/2024)    PRAPARE - Transportation     Lack of Transportation (Medical): No     Lack of Transportation (Non-Medical): No   Physical Activity: Not on file   Stress: Not on file   Social Connections: Not on file   Intimate Partner Violence: Not on file   Housing Stability: Low Risk  (8/2/2024)    Housing Stability Vital Sign     Unable to Pay for Housing in the Last Year: No     Number of Times Moved in the Last Year: 0     Homeless in the Last Year: No     Family History   Problem Relation Age of Onset    Hypertension Mother     Alzheimer's disease Mother     Cancer Father 71        bladder    Prostate cancer Father 71    Breast cancer Daughter 52    No Known Problems Daughter     No Known Problems Maternal Grandmother     No Known Problems Maternal Grandfather     Breast cancer Paternal Grandmother         age dx unk    No Known Problems Paternal Grandfather     Cancer Brother         pt shared some type of blood cancer    No Known Problems Son     Stomach cancer Paternal Aunt 65    No Known Problems Paternal Aunt        Physical Exam:  Vitals:    04/03/25 1417   BP: 120/68   BP Location: Left arm   Patient Position: Sitting   Cuff Size: Standard   Pulse: 76   SpO2: 97%   Weight: 63.5 kg (140 lb)   Height: 4' 10\" (1.473 m)     Constitutional: NAD, non toxic  Ears/nose/mouth/throat: atraumatic  CV: RRR, nl S1S2, no murmurs/rubs/gallups, no JVD, no HJR  Resp: CTABL  GI: Soft, NTND  MSK: no swollen joints in exposed areas  Extr: No edema, warm LE  Pysche: Normal affect  Neuro: appropriate in conversation  Skin: dry and intact in exposed areas    Labs & Results:  Lab Results   Component Value Date    WBC 16.24 (H) 12/04/2024    HGB 15.3 12/04/2024    HCT 46.9 (H) 12/04/2024    MCV 92 12/04/2024     12/04/2024     Lab Results   Component Value Date    SODIUM 138 03/03/2025    K 5.3 03/03/2025 "    CL 99 03/03/2025    CO2 31 03/03/2025    BUN 28 (H) 03/03/2025    CREATININE 0.91 03/03/2025    GLUC 66 08/22/2023    CALCIUM 10.8 (H) 03/03/2025       Counseling / Coordination of Care  Greater than 50% of total time was spent with the patient and / or family counseling and / or coordination of care. Discussion included diagnoses, most recent studies and any changes in treatment.    Thank you for the opportunity to participate in the care of this patient.    Malachi Mcelroy MD  Attending Physician  Advanced Heart Failure Cardiology  [facility]

## 2025-04-03 ENCOUNTER — CONSULT (OUTPATIENT)
Dept: CARDIOLOGY CLINIC | Facility: CLINIC | Age: 75
End: 2025-04-03
Payer: MEDICARE

## 2025-04-03 ENCOUNTER — TELEPHONE (OUTPATIENT)
Dept: CARDIOLOGY CLINIC | Facility: CLINIC | Age: 75
End: 2025-04-03

## 2025-04-03 VITALS
WEIGHT: 140 LBS | SYSTOLIC BLOOD PRESSURE: 120 MMHG | HEIGHT: 58 IN | HEART RATE: 76 BPM | BODY MASS INDEX: 29.39 KG/M2 | OXYGEN SATURATION: 97 % | DIASTOLIC BLOOD PRESSURE: 68 MMHG

## 2025-04-03 DIAGNOSIS — Z95.810 AICD (AUTOMATIC CARDIOVERTER/DEFIBRILLATOR) PRESENT: ICD-10-CM

## 2025-04-03 DIAGNOSIS — I50.22 CHRONIC SYSTOLIC CONGESTIVE HEART FAILURE (HCC): Primary | ICD-10-CM

## 2025-04-03 DIAGNOSIS — E87.6 HYPOKALEMIA: ICD-10-CM

## 2025-04-03 DIAGNOSIS — Z95.0 CARDIAC PACEMAKER IN SITU: ICD-10-CM

## 2025-04-03 DIAGNOSIS — I25.10 CORONARY ARTERY DISEASE INVOLVING NATIVE CORONARY ARTERY OF NATIVE HEART WITHOUT ANGINA PECTORIS: ICD-10-CM

## 2025-04-03 PROCEDURE — 99214 OFFICE O/P EST MOD 30 MIN: CPT | Performed by: STUDENT IN AN ORGANIZED HEALTH CARE EDUCATION/TRAINING PROGRAM

## 2025-04-03 RX ORDER — POTASSIUM CHLORIDE 1500 MG/1
20 TABLET, EXTENDED RELEASE ORAL DAILY
Qty: 60 TABLET | Refills: 5 | Status: SHIPPED | OUTPATIENT
Start: 2025-04-03

## 2025-04-03 NOTE — TELEPHONE ENCOUNTER
4/3/25 Per IB from Dr Mcelroy: help pt w/device: Called pt, she is overdue to come in the clinic. No recent transmissions. LVM to have pt call back. Assist pt w/ manual transmission and set up yearly. BMc

## 2025-04-03 NOTE — LETTER
"April 3, 2025     Leann Henson DO  [facility] [place] [address]    Patient: Dejah Parish   YOB: 1950   Date of Visit: 4/3/2025       Dear Dr. Leann Henson, :    Thank you for referring Dejah Parish to me for evaluation. Below are my notes for this consultation.    If you have questions, please do not hesitate to call me. I look forward to following your patient along with you.         Sincerely,        Malachi Mcelroy MD        CC: No Recipients    Malachi Mcelroy MD  4/3/2025  2:39 PM  Sign when Signing Visit  Advanced Heart Failure/Pulmonary Hypertension Outpatient Note - Dejah Parish 75 y.o. female MRN: [id number]    @ Encounter: [id number]      Assessment:  75 y.o. female PMH and acute problems listed later in this note (a partial list may also be included within 'assessment' section) presents for consultation.  I first met Dejah Parish on 4/3/25.  Referred by Leann Henson,*.     The patient's primary cardiac team is general cardiology, follows Dr. CAMPOS Cotto  HFimpEF. 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  Per old 2023 notes: \"She has BIV ICD and at [facility]. Prior shocks for Fractured lead. EF improved w BIV pacing. \"  PVC  DM  GERD  HTN  CAD s/p PCI to D1 and RCA  LBBB  Patient Active Problem List   Diagnosis   • Trigger little finger of left hand   • Allergic rhinitis   • Arthralgia of knee, left   • Arthralgia of knee, right   • Coronary artery disease   • Benign positional vertigo, left   • Bilateral fibrocystic breast changes   • Chronic systolic congestive heart failure (HCC)   • Stage 3a chronic kidney disease (HCC)   • Biventricular ICD (implantable cardioverter-defibrillator) in place   • DDD (degenerative disc disease), cervical   • Gout   • Mixed hyperlipidemia   • Hypokalemia   • Acquired hypothyroidism   • Obstructive sleep apnea   • Osteoarthritis   • Overactive bladder   • Psoriasis   • Type 2 diabetes mellitus with stage 3 " "chronic kidney disease, without long-term current use of insulin ([facility])   • Vitamin D deficiency   • Lumbar spondylosis   • Chronic left-sided low back pain without sciatica   • Hypercalcemia   • Leukocytosis   • Dense breast tissue   • Family history of breast cancer   • Gastroesophageal reflux disease with esophagitis   • Abnormal computerized axial tomography of abdomen   • Hyperparathyroidism ([facility])   • PVC's (premature ventricular contractions)   • Chronic left shoulder pain   • Dilated cardiomyopathy ([facility])   • Pulmonary emphysema, unspecified emphysema type ([facility])   • Continuous opioid dependence ([facility])   • Tremor of both hands   • Hypertensive heart and chronic kidney disease with heart failure and stage 1 through stage 4 chronic kidney disease, or chronic kidney disease ([facility])   • Current moderate episode of major depressive disorder without prior episode ([facility])   • Ischial bursitis of left side   • Pain of left hip   • Type 2 diabetes mellitus with hyperglycemia, without long-term current use of insulin ([facility])         Today I have reviewed all pertinent labs/imaging/data including but not limited to:        Lab Units 03/03/25  0926 12/04/24  1012 11/28/23  1023   CREATININE mg/dL 0.91 0.83 1.15         Lab Results   Component Value Date    K 5.3 03/03/2025     Lab Results   Component Value Date    HGBA1C 9.8 (H) 03/03/2025     Lab Results   Component Value Date    CPX1NXSRJVCV 1.120 12/04/2024     Lab Results   Component Value Date    LDLCALC 95 12/04/2024     No results found for: \"BNP\"   No results found for: \"NTBNP\"       TODAY'S PLAN:     04/03/25  I am meeting patient for the first time today  Warm, euvolemic  No new cardiac complaints, feels generally well  BP acceptable    Recent labs with borderline hyperK on supplementation bid > she will reduce to qdaily    No other Rx changes today    EP group contacted for device fu    Has upcoming echo scheduled by another provider    Advised diet, exercise as " able    Follow up:  With the general cardiology team Dr. CAMPOS Cotto in 6 mo. This patient does not have advanced heart failure needs at present.  In addition to follow up with their other medical providers    PPM,  ICD , CRT (if applicable):  Interrogation:  6/2024  MDT/BIV-ICD (THERAPIES PROGRAMMED OFF) / NOT MRI CONDITIONAL   CARELINK TRANSMISSION: BATTERY VOLTAGE ADEQUATE (4.2 YRS). AP-90%, BVP-95% (VSR PACE-0.2%). ALL AVAILABLE LEAD PARAMETERS WITHIN NORMAL LIMITS. NO SIGNIFICANT HIGH RATE EPISODES. OPTI-VOL WITHIN NORMAL LIMITS. NORMAL DEVICE FUNCTION.   Advanced Therapies (if applicable):   --Inotrope:  --LVAD/Transplant Candidacy:        Studies:  Today I have reviewed all pertinent patient data/labs/imaging where available, including but not limited to the below studies. This includes my independent interpretation. Selected results may be displayed here but comprehensive listing is omitted for note clarity and can be found in the epic chart.    ECG.    Echo.    Stress.    Cath.    HPI:   75 y.o. female PMH and acute problems listed later in this note (a partial list may also be included within 'assessment' section) presents for consultation.  No new CP/SOB/dizziness/palpitations/syncope.  No new fatigue.  No new unintentional weight changes.  No new leg swelling, PND, pillow orthopnea.  No new fevers, chills, cough, nausea, vomiting, diarrhea, dysuria.        ROS:  10 point ROS negative except as specified in HPI    Past Medical History:   Diagnosis Date   • Abnormal finding on breast imaging     last assessed 11/30/17   • Allergic rhinitis    • Anxiety    • BBB (bundle branch block)     left,last assesed 6/4/12   • Bilateral fibrocystic breast changes    • Bilateral sacroiliitis (HCC)    • Carpal tunnel syndrome, unspecified upper limb    • Chronic systolic congestive heart failure (HCC)    • Coronary artery disease    • Detrusor instability    • Dilated cardiomyopathy (HCC)    • Disorder of intervertebral  disc of cervical spine    • Gout    • Hormone replacement therapy    • Hx of blood clots    • Hypokalemia    • Osteoarthritis    • Papilloma of left breast    • Psoriasis     psoriatic arthropathy   • Psoriatic arthropathy ([facility])    • 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   • RLS (restless legs syndrome)    • Screening mammogram, encounter for 12/05/2019   • Vitamin D deficiency      Patient Active Problem List   Diagnosis   • Trigger little finger of left hand   • Allergic rhinitis   • Arthralgia of knee, left   • Arthralgia of knee, right   • Coronary artery disease   • Benign positional vertigo, left   • Bilateral fibrocystic breast changes   • Chronic systolic congestive heart failure (HCC)   • Stage 3a chronic kidney disease ([facility])   • Biventricular ICD (implantable cardioverter-defibrillator) in place   • DDD (degenerative disc disease), cervical   • Gout   • Mixed hyperlipidemia   • Hypokalemia   • Acquired hypothyroidism   • Obstructive sleep apnea   • Osteoarthritis   • Overactive bladder   • Psoriasis   • Type 2 diabetes mellitus with stage 3 chronic kidney disease, without long-term current use of insulin ([facility])   • Vitamin D deficiency   • Lumbar spondylosis   • Chronic left-sided low back pain without sciatica   • Hypercalcemia   • Leukocytosis   • Dense breast tissue   • Family history of breast cancer   • Gastroesophageal reflux disease with esophagitis   • Abnormal computerized axial tomography of abdomen   • Hyperparathyroidism ([facility])   • PVC's (premature ventricular contractions)   • Chronic left shoulder pain   • Dilated cardiomyopathy ([facility])   • Pulmonary emphysema, unspecified emphysema type ([facility])   • Continuous opioid dependence ([facility])   • Tremor of both hands   • Hypertensive heart and chronic kidney disease with heart failure and stage 1 through stage 4 chronic kidney disease, or chronic kidney disease ([facility])   • Current moderate episode of major depressive disorder without prior episode ([facility])   • Ischial  bursitis of left side   • Pain of left hip   • Type 2 diabetes mellitus with hyperglycemia, without long-term current use of insulin (HCC)     No current facility-administered medications for this visit.    Current Outpatient Medications   Medication Instructions   • allopurinol (ZYLOPRIM) 100 mg, Oral, Daily   • aspirin 81 mg, Daily   • calcium carbonate (OS-MANINDER) 1,200 mg, Daily   • candesartan (ATACAND) 32 mg, Oral, Daily   • celecoxib (CELEBREX) 200 mg, Oral, 2 times daily   • cetirizine (ZYRTEC) 10 mg, Daily   • Cholecalciferol (VITAMIN D3) 1000 units CAPS Take by mouth   • Empagliflozin (JARDIANCE) 10 mg, Oral, Daily   • FLUoxetine (PROZAC) 20 mg, Oral, Daily   • levothyroxine 25 mcg, Oral, Daily   • Magnesium 250 MG TABS Daily   • Melatonin 5 mg, Oral, Daily at bedtime   • metoprolol succinate (TOPROL-XL) 100 mg, Oral, Daily   • Minoxidil (ROGAINE WOMENS EX) 1 mL   • montelukast (SINGULAIR) 10 mg, Oral, Daily at bedtime   • omeprazole (PRILOSEC) 40 mg, Oral, Daily   • potassium chloride (Klor-Con M20) 20 mEq tablet 20 mEq, Oral, Daily   • pyridoxine (VITAMIN B6) 100 mg, Daily   • simvastatin (ZOCOR) 40 mg, Oral, Daily   • sitaGLIPtin (JANUVIA) 100 mg, Oral, Daily   • torsemide (DEMADEX) 20 mg tablet Take 1 tablet by mouth twice daily   • traMADol (ULTRAM) 50 mg, Oral, Every 8 hours PRN      Allergies   Allergen Reactions   • Dust Mite Extract Sneezing     Social History     Socioeconomic History   • Marital status:      Spouse name: Not on file   • Number of children: Not on file   • Years of education: Not on file   • Highest education level: Not on file   Occupational History   • Not on file   Tobacco Use   • Smoking status: Former     Current packs/day: 0.00     Average packs/day: 1 pack/day for 15.0 years (15.0 ttl pk-yrs)     Types: Cigarettes     Start date:      Quit date:      Years since quittin.2   • Smokeless tobacco: Never   • Tobacco comments:     Smoked on and off for the last  few years before I quit   Vaping Use   • Vaping status: Never Used   Substance and Sexual Activity   • Alcohol use: Never   • Drug use: Never   • Sexual activity: Not Currently     Comment: Took birth control pills from 1969 until 1994   Other Topics Concern   • Not on file   Social History Narrative    Always uses seat belt     Social Drivers of Health     Financial Resource Strain: Low Risk  (6/6/2023)    Overall Financial Resource Strain (CARDIA)    • Difficulty of Paying Living Expenses: Not very hard   Food Insecurity: No Food Insecurity (8/2/2024)    Nursing - Inadequate Food Risk Classification    • Worried About Running Out of Food in the Last Year: Never true    • Ran Out of Food in the Last Year: Never true    • Ran Out of Food in the Last Year: Not on file   Transportation Needs: No Transportation Needs (8/2/2024)    PRAPARE - Transportation    • Lack of Transportation (Medical): No    • Lack of Transportation (Non-Medical): No   Physical Activity: Not on file   Stress: Not on file   Social Connections: Not on file   Intimate Partner Violence: Not on file   Housing Stability: Low Risk  (8/2/2024)    Housing Stability Vital Sign    • Unable to Pay for Housing in the Last Year: No    • Number of Times Moved in the Last Year: 0    • Homeless in the Last Year: No     Family History   Problem Relation Age of Onset   • Hypertension Mother    • Alzheimer's disease Mother    • Cancer Father 71        bladder   • Prostate cancer Father 71   • Breast cancer Daughter 52   • No Known Problems Daughter    • No Known Problems Maternal Grandmother    • No Known Problems Maternal Grandfather    • Breast cancer Paternal Grandmother         age dx unk   • No Known Problems Paternal Grandfather    • Cancer Brother         pt shared some type of blood cancer   • No Known Problems Son    • Stomach cancer Paternal Aunt 65   • No Known Problems Paternal Aunt        Physical Exam:  Vitals:    04/03/25 1417   BP: 120/68   BP  "Location: Left arm   Patient Position: Sitting   Cuff Size: Standard   Pulse: 76   SpO2: 97%   Weight: 63.5 kg (140 lb)   Height: 4' 10\" (1.473 m)     Constitutional: NAD, non toxic  Ears/nose/mouth/throat: atraumatic  CV: RRR, nl S1S2, no murmurs/rubs/gallups, no JVD, no HJR  Resp: CTABL  GI: Soft, NTND  MSK: no swollen joints in exposed areas  Extr: No edema, warm LE  Pysche: Normal affect  Neuro: appropriate in conversation  Skin: dry and intact in exposed areas    Labs & Results:  Lab Results   Component Value Date    WBC 16.24 (H) 12/04/2024    HGB 15.3 12/04/2024    HCT 46.9 (H) 12/04/2024    MCV 92 12/04/2024     12/04/2024     Lab Results   Component Value Date    SODIUM 138 03/03/2025    K 5.3 03/03/2025    CL 99 03/03/2025    CO2 31 03/03/2025    BUN 28 (H) 03/03/2025    CREATININE 0.91 03/03/2025    GLUC 66 08/22/2023    CALCIUM 10.8 (H) 03/03/2025       Counseling / Coordination of Care  Greater than 50% of total time was spent with the patient and / or family counseling and / or coordination of care. Discussion included diagnoses, most recent studies and any changes in treatment.    Thank you for the opportunity to participate in the care of this patient.    Malachi Mcelroy MD  Attending Physician  Advanced Heart Failure Cardiology  [facility]  "

## 2025-04-04 ENCOUNTER — HOSPITAL ENCOUNTER (OUTPATIENT)
Dept: NON INVASIVE DIAGNOSTICS | Age: 75
Discharge: HOME | End: 2025-04-04
Payer: MEDICARE

## 2025-04-04 ENCOUNTER — RESULTS FOLLOW-UP (OUTPATIENT)
Dept: CARDIOLOGY CLINIC | Facility: CLINIC | Age: 75
End: 2025-04-04

## 2025-04-04 VITALS
WEIGHT: 141 LBS | HEIGHT: 58 IN | SYSTOLIC BLOOD PRESSURE: 129 MMHG | BODY MASS INDEX: 29.6 KG/M2 | DIASTOLIC BLOOD PRESSURE: 72 MMHG | HEART RATE: 78 BPM

## 2025-04-04 DIAGNOSIS — I50.22 CHRONIC SYSTOLIC CONGESTIVE HEART FAILURE (HCC): ICD-10-CM

## 2025-04-04 PROCEDURE — 93306 TTE W/DOPPLER COMPLETE: CPT

## 2025-04-04 PROCEDURE — 93306 TTE W/DOPPLER COMPLETE: CPT | Performed by: INTERNAL MEDICINE

## 2025-04-06 LAB
AORTIC ROOT: 2.8 CM
ASCENDING AORTA: 2.8 CM
BSA FOR ECHO PROCEDURE: 1.57 M2
DOP CALC LVOT AREA: 3.14 CM2
DOP CALC LVOT DIAMETER: 2 CM
E WAVE DECELERATION TIME: 234 MS
E/A RATIO: 0.46
FRACTIONAL SHORTENING: 32 (ref 28–44)
INTERVENTRICULAR SEPTUM IN DIASTOLE (PARASTERNAL SHORT AXIS VIEW): 1.5 CM
INTERVENTRICULAR SEPTUM: 1.5 CM (ref 0.6–1.1)
LAAS-AP2: 17.2 CM2
LAAS-AP4: 17.1 CM2
LEFT ATRIUM SIZE: 4.1 CM
LEFT ATRIUM VOLUME (MOD BIPLANE): 49 ML
LEFT ATRIUM VOLUME INDEX (MOD BIPLANE): 31.4 ML/M2
LEFT INTERNAL DIMENSION IN SYSTOLE: 3.4 CM (ref 2.1–4)
LEFT VENTRICLE DIASTOLIC VOLUME (MOD BIPLANE): 172 ML
LEFT VENTRICLE DIASTOLIC VOLUME INDEX (MOD BIPLANE): 109.6 ML/M2
LEFT VENTRICLE SYSTOLIC VOLUME (MOD BIPLANE): 94 ML
LEFT VENTRICLE SYSTOLIC VOLUME INDEX (MOD BIPLANE): 59.9 ML/M2
LEFT VENTRICULAR INTERNAL DIMENSION IN DIASTOLE: 5 CM (ref 3.5–6)
LEFT VENTRICULAR POSTERIOR WALL IN END DIASTOLE: 1.7 CM
LEFT VENTRICULAR STROKE VOLUME: 70 ML
LV EF BIPLANE MOD: 45 %
LV EF US.2D.A4C+ESTIMATED: 48 %
LVSV (TEICH): 70 ML
MV E'TISSUE VEL-LAT: 4 CM/S
MV E'TISSUE VEL-SEP: 5 CM/S
MV PEAK A VEL: 1.2 M/S
MV PEAK E VEL: 55 CM/S
MV STENOSIS PRESSURE HALF TIME: 68 MS
MV VALVE AREA P 1/2 METHOD: 3.24
RA PRESSURE ESTIMATED: 3 MMHG
RIGHT ATRIUM AREA SYSTOLE A4C: 26.3 CM2
RIGHT VENTRICLE ID DIMENSION: 4.7 CM
SL CV LEFT ATRIUM LENGTH A2C: 4.7 CM
SL CV LV EF: 45
SL CV PED ECHO LEFT VENTRICLE DIASTOLIC VOLUME (MOD BIPLANE) 2D: 118 ML
SL CV PED ECHO LEFT VENTRICLE SYSTOLIC VOLUME (MOD BIPLANE) 2D: 49 ML
TRICUSPID ANNULAR PLANE SYSTOLIC EXCURSION: 1.5 CM

## 2025-05-14 ENCOUNTER — IN-CLINIC DEVICE VISIT (OUTPATIENT)
Dept: CARDIOLOGY CLINIC | Facility: CLINIC | Age: 75
End: 2025-05-14
Payer: MEDICARE

## 2025-05-14 DIAGNOSIS — Z95.810 AICD (AUTOMATIC CARDIOVERTER/DEFIBRILLATOR) PRESENT: Primary | ICD-10-CM

## 2025-05-14 PROCEDURE — 93284 PRGRMG EVAL IMPLANTABLE DFB: CPT | Performed by: INTERNAL MEDICINE

## 2025-05-15 ENCOUNTER — RESULTS FOLLOW-UP (OUTPATIENT)
Dept: CARDIOLOGY CLINIC | Facility: CLINIC | Age: 75
End: 2025-05-15

## 2025-05-15 NOTE — PROGRESS NOTES
Results for orders placed or performed in visit on 05/14/25   Cardiac EP device report    Narrative    MDT/BIV-ICD (THERAPIES PROGRAMMED OFF) / NOT MRI CONDITIONAL  DEVICE INTERROGATED IN THE [place] OFFICE: PRESENTING EGRAM AP BVP@ 78 BPM. BATTERY VOLTAGE ADEQUATE-2.6 YRS. AP 85% CRT PACING (BIV) 99% (LV) 0%. ALL AVAILABLE LEAD PARAMETERS & TRENDS WITHIN NORMAL LIMITS. NO SIGNIFICANT HIGH RATE EPISODES. VENT SENSING MARKER NOTED. OPTI-VOL WITHIN NORMAL LIMITS. NO PROGRAMMING CHANGES MADE TO DEVICE PARAMETERS. NORMAL DEVICE FUNCTION. NC

## 2025-05-16 ENCOUNTER — APPOINTMENT (OUTPATIENT)
Dept: RADIOLOGY | Facility: HOSPITAL | Age: 75
End: 2025-05-16
Payer: MEDICARE

## 2025-05-16 ENCOUNTER — HOSPITAL ENCOUNTER (INPATIENT)
Facility: HOSPITAL | Age: 75
LOS: 4 days | Discharge: HOME | End: 2025-05-21
Attending: EMERGENCY MEDICINE | Admitting: INTERNAL MEDICINE
Payer: MEDICARE

## 2025-05-16 DIAGNOSIS — L03.119 CELLULITIS OF HAND: ICD-10-CM
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
## 2025-05-16 LAB
ALBUMIN SERPL BCG-MCNC: 3.5 G/DL (ref 3.5–5)
ALP SERPL-CCNC: 63 U/L (ref 34–104)
ALT SERPL W P-5'-P-CCNC: 23 U/L (ref 7–52)
ANION GAP SERPL CALCULATED.3IONS-SCNC: 9 MMOL/L (ref 4–13)
ANISOCYTOSIS BLD QL SMEAR: PRESENT
AST SERPL W P-5'-P-CCNC: 13 U/L (ref 13–39)
BASOPHILS # BLD MANUAL: 0 THOUSAND/UL (ref 0–0.1)
BASOPHILS NFR MAR MANUAL: 0 % (ref 0–1)
BILIRUB SERPL-MCNC: 0.44 MG/DL (ref 0.2–1)
BUN SERPL-MCNC: 25 MG/DL (ref 5–25)
CALCIUM SERPL-MCNC: 9.7 MG/DL (ref 8.4–10.2)
CHLORIDE SERPL-SCNC: 98 MMOL/L (ref 96–108)
CO2 SERPL-SCNC: 28 MMOL/L (ref 21–32)
CREAT SERPL-MCNC: 0.91 MG/DL (ref 0.6–1.3)
EOSINOPHIL # BLD MANUAL: 0 THOUSAND/UL (ref 0–0.4)
EOSINOPHIL NFR BLD MANUAL: 0 % (ref 0–6)
ERYTHROCYTE [DISTWIDTH] IN BLOOD BY AUTOMATED COUNT: 13.9 % (ref 11.6–15.1)
ERYTHROCYTE [SEDIMENTATION RATE] IN BLOOD: 6 MM/HOUR (ref 0–29)
GFR SERPL CREATININE-BSD FRML MDRD: 61 ML/MIN/1.73SQ M
GLUCOSE SERPL-MCNC: 269 MG/DL (ref 65–140)
HCT VFR BLD AUTO: 39.5 % (ref 34.8–46.1)
HGB BLD-MCNC: 13.4 G/DL (ref 11.5–15.4)
LYMPHOCYTES # BLD AUTO: 1.95 THOUSAND/UL (ref 0.6–4.47)
LYMPHOCYTES # BLD AUTO: 14 % (ref 14–44)
MCH RBC QN AUTO: 31.6 PG (ref 26.8–34.3)
MCHC RBC AUTO-ENTMCNC: 33.9 G/DL (ref 31.4–37.4)
MCV RBC AUTO: 93 FL (ref 82–98)
MONOCYTES # BLD AUTO: 1.39 THOUSAND/UL (ref 0–1.22)
MONOCYTES NFR BLD: 10 % (ref 4–12)
NEUTROPHILS # BLD MANUAL: 10.57 THOUSAND/UL (ref 1.85–7.62)
NEUTS SEG NFR BLD AUTO: 76 % (ref 43–75)
PLATELET # BLD AUTO: 142 THOUSANDS/UL (ref 149–390)
PLATELET BLD QL SMEAR: ABNORMAL
PMV BLD AUTO: 9.5 FL (ref 8.9–12.7)
POIKILOCYTOSIS BLD QL SMEAR: PRESENT
POTASSIUM SERPL-SCNC: 3.8 MMOL/L (ref 3.5–5.3)
PROT SERPL-MCNC: 5.6 G/DL (ref 6.4–8.4)
RBC # BLD AUTO: 4.24 MILLION/UL (ref 3.81–5.12)
RBC MORPH BLD: PRESENT
SODIUM SERPL-SCNC: 135 MMOL/L (ref 135–147)
WBC # BLD AUTO: 13.91 THOUSAND/UL (ref 4.31–10.16)

## 2025-05-16 PROCEDURE — 85652 RBC SED RATE AUTOMATED: CPT | Performed by: EMERGENCY MEDICINE

## 2025-05-16 PROCEDURE — 99284 EMERGENCY DEPT VISIT MOD MDM: CPT

## 2025-05-16 PROCEDURE — 85007 BL SMEAR W/DIFF WBC COUNT: CPT

## 2025-05-16 PROCEDURE — 80053 COMPREHEN METABOLIC PANEL: CPT

## 2025-05-16 PROCEDURE — 84145 PROCALCITONIN (PCT): CPT | Performed by: EMERGENCY MEDICINE

## 2025-05-16 PROCEDURE — 73130 X-RAY EXAM OF HAND: CPT

## 2025-05-16 PROCEDURE — 86140 C-REACTIVE PROTEIN: CPT | Performed by: EMERGENCY MEDICINE

## 2025-05-16 PROCEDURE — 36415 COLL VENOUS BLD VENIPUNCTURE: CPT

## 2025-05-16 PROCEDURE — 85027 COMPLETE CBC AUTOMATED: CPT

## 2025-05-16 PROCEDURE — 90471 IMMUNIZATION ADMIN: CPT

## 2025-05-16 PROCEDURE — 99285 EMERGENCY DEPT VISIT HI MDM: CPT | Performed by: EMERGENCY MEDICINE

## 2025-05-17 ENCOUNTER — APPOINTMENT (INPATIENT)
Dept: CT IMAGING | Facility: HOSPITAL | Age: 75
End: 2025-05-17
Payer: MEDICARE

## 2025-05-17 PROBLEM — L03.114 CELLULITIS OF LEFT HAND: Status: ACTIVE | Noted: 2025-05-17

## 2025-05-17 PROBLEM — I10 HYPERTENSION: Status: ACTIVE | Noted: 2025-05-17

## 2025-05-17 LAB
ANION GAP SERPL CALCULATED.3IONS-SCNC: 9 MMOL/L (ref 4–13)
APTT PPP: 22 SECONDS (ref 23–34)
BUN SERPL-MCNC: 20 MG/DL (ref 5–25)
CALCIUM SERPL-MCNC: 9.6 MG/DL (ref 8.4–10.2)
CHLORIDE SERPL-SCNC: 101 MMOL/L (ref 96–108)
CO2 SERPL-SCNC: 27 MMOL/L (ref 21–32)
CREAT SERPL-MCNC: 0.82 MG/DL (ref 0.6–1.3)
CRP SERPL QL: 30.3 MG/L
CRP SERPL QL: 75.1 MG/L
ERYTHROCYTE [DISTWIDTH] IN BLOOD BY AUTOMATED COUNT: 13.8 % (ref 11.6–15.1)
GFR SERPL CREATININE-BSD FRML MDRD: 69 ML/MIN/1.73SQ M
GLUCOSE SERPL-MCNC: 141 MG/DL (ref 65–140)
GLUCOSE SERPL-MCNC: 157 MG/DL (ref 65–140)
GLUCOSE SERPL-MCNC: 167 MG/DL (ref 65–140)
GLUCOSE SERPL-MCNC: 216 MG/DL (ref 65–140)
GLUCOSE SERPL-MCNC: 238 MG/DL (ref 65–140)
HCT VFR BLD AUTO: 40.9 % (ref 34.8–46.1)
HGB BLD-MCNC: 13.6 G/DL (ref 11.5–15.4)
INR PPP: 0.9 (ref 0.85–1.19)
LACTATE SERPL-SCNC: 2.1 MMOL/L (ref 0.5–2)
LACTATE SERPL-SCNC: 2.2 MMOL/L (ref 0.5–2)
LACTATE SERPL-SCNC: 2.5 MMOL/L (ref 0.5–2)
LACTATE SERPL-SCNC: 2.6 MMOL/L (ref 0.5–2)
MCH RBC QN AUTO: 31.5 PG (ref 26.8–34.3)
MCHC RBC AUTO-ENTMCNC: 33.3 G/DL (ref 31.4–37.4)
MCV RBC AUTO: 95 FL (ref 82–98)
PLATELET # BLD AUTO: 134 THOUSANDS/UL (ref 149–390)
PMV BLD AUTO: 9.7 FL (ref 8.9–12.7)
POTASSIUM SERPL-SCNC: 3.9 MMOL/L (ref 3.5–5.3)
PROCALCITONIN SERPL-MCNC: 0.19 NG/ML
PROCALCITONIN SERPL-MCNC: 0.23 NG/ML
PROTHROMBIN TIME: 12.7 SECONDS (ref 12.3–15)
RBC # BLD AUTO: 4.32 MILLION/UL (ref 3.81–5.12)
SODIUM SERPL-SCNC: 137 MMOL/L (ref 135–147)
WBC # BLD AUTO: 13.4 THOUSAND/UL (ref 4.31–10.16)

## 2025-05-17 PROCEDURE — 99223 1ST HOSP IP/OBS HIGH 75: CPT

## 2025-05-17 PROCEDURE — 73201 CT UPPER EXTREMITY W/DYE: CPT

## 2025-05-17 PROCEDURE — 86140 C-REACTIVE PROTEIN: CPT | Performed by: PHYSICIAN ASSISTANT

## 2025-05-17 PROCEDURE — 83605 ASSAY OF LACTIC ACID: CPT | Performed by: EMERGENCY MEDICINE

## 2025-05-17 PROCEDURE — 85610 PROTHROMBIN TIME: CPT | Performed by: EMERGENCY MEDICINE

## 2025-05-17 PROCEDURE — 83605 ASSAY OF LACTIC ACID: CPT | Performed by: PHYSICIAN ASSISTANT

## 2025-05-17 PROCEDURE — 36415 COLL VENOUS BLD VENIPUNCTURE: CPT | Performed by: EMERGENCY MEDICINE

## 2025-05-17 PROCEDURE — 87040 BLOOD CULTURE FOR BACTERIA: CPT | Performed by: EMERGENCY MEDICINE

## 2025-05-17 PROCEDURE — 82948 REAGENT STRIP/BLOOD GLUCOSE: CPT

## 2025-05-17 PROCEDURE — 84145 PROCALCITONIN (PCT): CPT | Performed by: PHYSICIAN ASSISTANT

## 2025-05-17 PROCEDURE — 80048 BASIC METABOLIC PNL TOTAL CA: CPT | Performed by: PHYSICIAN ASSISTANT

## 2025-05-17 PROCEDURE — 96374 THER/PROPH/DIAG INJ IV PUSH: CPT

## 2025-05-17 PROCEDURE — 85027 COMPLETE CBC AUTOMATED: CPT | Performed by: PHYSICIAN ASSISTANT

## 2025-05-17 PROCEDURE — 85730 THROMBOPLASTIN TIME PARTIAL: CPT | Performed by: EMERGENCY MEDICINE

## 2025-05-17 PROCEDURE — 90715 TDAP VACCINE 7 YRS/> IM: CPT | Performed by: EMERGENCY MEDICINE

## 2025-05-17 RX ORDER — INSULIN LISPRO 100 [IU]/ML
1-6 INJECTION, SOLUTION INTRAVENOUS; SUBCUTANEOUS
Status: DISCONTINUED | OUTPATIENT
Start: 2025-05-17 | End: 2025-05-21 | Stop reason: HOSPADM

## 2025-05-17 RX ORDER — PANTOPRAZOLE SODIUM 40 MG/1
40 TABLET, DELAYED RELEASE ORAL
Status: DISCONTINUED | OUTPATIENT
Start: 2025-05-17 | End: 2025-05-21 | Stop reason: HOSPADM

## 2025-05-17 RX ORDER — ALLOPURINOL 100 MG/1
100 TABLET ORAL DAILY
Status: DISCONTINUED | OUTPATIENT
Start: 2025-05-17 | End: 2025-05-21 | Stop reason: HOSPADM

## 2025-05-17 RX ORDER — ASPIRIN 81 MG/1
81 TABLET, CHEWABLE ORAL DAILY
Status: DISCONTINUED | OUTPATIENT
Start: 2025-05-17 | End: 2025-05-21 | Stop reason: HOSPADM

## 2025-05-17 RX ORDER — SODIUM CHLORIDE, SODIUM GLUCONATE, SODIUM ACETATE, POTASSIUM CHLORIDE, MAGNESIUM CHLORIDE, SODIUM PHOSPHATE, DIBASIC, AND POTASSIUM PHOSPHATE .53; .5; .37; .037; .03; .012; .00082 G/100ML; G/100ML; G/100ML; G/100ML; G/100ML; G/100ML; G/100ML
500 INJECTION, SOLUTION INTRAVENOUS ONCE
Status: COMPLETED | OUTPATIENT
Start: 2025-05-17 | End: 2025-05-17

## 2025-05-17 RX ORDER — CELECOXIB 100 MG/1
100 CAPSULE ORAL DAILY
Status: DISCONTINUED | OUTPATIENT
Start: 2025-05-17 | End: 2025-05-21 | Stop reason: HOSPADM

## 2025-05-17 RX ORDER — TORSEMIDE 20 MG/1
20 TABLET ORAL ONCE
Status: COMPLETED | OUTPATIENT
Start: 2025-05-17 | End: 2025-05-17

## 2025-05-17 RX ORDER — HYDRALAZINE HYDROCHLORIDE 20 MG/ML
5 INJECTION INTRAMUSCULAR; INTRAVENOUS EVERY 6 HOURS PRN
Status: DISCONTINUED | OUTPATIENT
Start: 2025-05-17 | End: 2025-05-21

## 2025-05-17 RX ORDER — ENOXAPARIN SODIUM 100 MG/ML
40 INJECTION SUBCUTANEOUS DAILY
Status: DISCONTINUED | OUTPATIENT
Start: 2025-05-17 | End: 2025-05-21 | Stop reason: HOSPADM

## 2025-05-17 RX ORDER — INSULIN LISPRO 100 [IU]/ML
1-5 INJECTION, SOLUTION INTRAVENOUS; SUBCUTANEOUS
Status: DISCONTINUED | OUTPATIENT
Start: 2025-05-17 | End: 2025-05-21 | Stop reason: HOSPADM

## 2025-05-17 RX ORDER — METOPROLOL SUCCINATE 50 MG/1
100 TABLET, EXTENDED RELEASE ORAL DAILY
Status: DISCONTINUED | OUTPATIENT
Start: 2025-05-17 | End: 2025-05-21 | Stop reason: HOSPADM

## 2025-05-17 RX ORDER — TRAMADOL HYDROCHLORIDE 50 MG/1
50 TABLET ORAL EVERY 8 HOURS PRN
Status: DISCONTINUED | OUTPATIENT
Start: 2025-05-17 | End: 2025-05-21 | Stop reason: HOSPADM

## 2025-05-17 RX ORDER — PRAVASTATIN SODIUM 80 MG/1
80 TABLET ORAL
Status: DISCONTINUED | OUTPATIENT
Start: 2025-05-17 | End: 2025-05-21 | Stop reason: HOSPADM

## 2025-05-17 RX ORDER — LOSARTAN POTASSIUM 50 MG/1
100 TABLET ORAL
Status: DISCONTINUED | OUTPATIENT
Start: 2025-05-17 | End: 2025-05-21 | Stop reason: HOSPADM

## 2025-05-17 RX ORDER — ACETAMINOPHEN 325 MG/1
650 TABLET ORAL EVERY 6 HOURS PRN
Status: DISCONTINUED | OUTPATIENT
Start: 2025-05-17 | End: 2025-05-21 | Stop reason: HOSPADM

## 2025-05-17 RX ORDER — PYRIDOXINE HCL (VITAMIN B6) 50 MG
100 TABLET ORAL DAILY
Status: DISCONTINUED | OUTPATIENT
Start: 2025-05-17 | End: 2025-05-21 | Stop reason: HOSPADM

## 2025-05-17 RX ORDER — LEVOTHYROXINE SODIUM 25 UG/1
25 TABLET ORAL
Status: DISCONTINUED | OUTPATIENT
Start: 2025-05-17 | End: 2025-05-21 | Stop reason: HOSPADM

## 2025-05-17 RX ADMIN — AMPICILLIN SODIUM AND SULBACTAM SODIUM 3 G: 2; 1 INJECTION, POWDER, FOR SOLUTION INTRAMUSCULAR; INTRAVENOUS at 18:04

## 2025-05-17 RX ADMIN — AMPICILLIN SODIUM AND SULBACTAM SODIUM 3 G: 2; 1 INJECTION, POWDER, FOR SOLUTION INTRAMUSCULAR; INTRAVENOUS at 11:58

## 2025-05-17 RX ADMIN — ENOXAPARIN SODIUM 40 MG: 40 INJECTION SUBCUTANEOUS at 08:40

## 2025-05-17 RX ADMIN — Medication 5 MG: at 21:29

## 2025-05-17 RX ADMIN — LOSARTAN POTASSIUM 100 MG: 50 TABLET, FILM COATED ORAL at 21:29

## 2025-05-17 RX ADMIN — SODIUM CHLORIDE 500 ML: 0.9 INJECTION, SOLUTION INTRAVENOUS at 08:32

## 2025-05-17 RX ADMIN — PANTOPRAZOLE SODIUM 40 MG: 40 TABLET, DELAYED RELEASE ORAL at 05:47

## 2025-05-17 RX ADMIN — ALLOPURINOL 100 MG: 100 TABLET ORAL at 08:42

## 2025-05-17 RX ADMIN — PRAVASTATIN SODIUM 80 MG: 80 TABLET ORAL at 16:41

## 2025-05-17 RX ADMIN — HYDRALAZINE HYDROCHLORIDE 5 MG: 20 INJECTION INTRAMUSCULAR; INTRAVENOUS at 06:05

## 2025-05-17 RX ADMIN — INSULIN LISPRO 1 UNITS: 100 INJECTION, SOLUTION INTRAVENOUS; SUBCUTANEOUS at 16:40

## 2025-05-17 RX ADMIN — INSULIN LISPRO 2 UNITS: 100 INJECTION, SOLUTION INTRAVENOUS; SUBCUTANEOUS at 11:54

## 2025-05-17 RX ADMIN — AMPICILLIN SODIUM AND SULBACTAM SODIUM 3 G: 2; 1 INJECTION, POWDER, FOR SOLUTION INTRAMUSCULAR; INTRAVENOUS at 00:32

## 2025-05-17 RX ADMIN — AMPICILLIN SODIUM AND SULBACTAM SODIUM 3 G: 2; 1 INJECTION, POWDER, FOR SOLUTION INTRAMUSCULAR; INTRAVENOUS at 06:05

## 2025-05-17 RX ADMIN — TETANUS TOXOID, REDUCED DIPHTHERIA TOXOID AND ACELLULAR PERTUSSIS VACCINE, ADSORBED 0.5 ML: 5; 2.5; 8; 8; 2.5 SUSPENSION INTRAMUSCULAR at 00:26

## 2025-05-17 RX ADMIN — METOPROLOL SUCCINATE 100 MG: 50 TABLET, EXTENDED RELEASE ORAL at 08:40

## 2025-05-17 RX ADMIN — PYRIDOXINE HCL TAB 50 MG 100 MG: 50 TAB at 08:40

## 2025-05-17 RX ADMIN — SODIUM CHLORIDE, SODIUM GLUCONATE, SODIUM ACETATE, POTASSIUM CHLORIDE, MAGNESIUM CHLORIDE, SODIUM PHOSPHATE, DIBASIC, AND POTASSIUM PHOSPHATE 500 ML: .53; .5; .37; .037; .03; .012; .00082 INJECTION, SOLUTION INTRAVENOUS at 04:45

## 2025-05-17 RX ADMIN — INSULIN LISPRO 2 UNITS: 100 INJECTION, SOLUTION INTRAVENOUS; SUBCUTANEOUS at 21:29

## 2025-05-17 RX ADMIN — IOHEXOL 90 ML: 350 INJECTION, SOLUTION INTRAVENOUS at 14:52

## 2025-05-17 RX ADMIN — LEVOTHYROXINE SODIUM 25 MCG: 25 TABLET ORAL at 05:48

## 2025-05-17 RX ADMIN — TRAMADOL HYDROCHLORIDE 50 MG: 50 TABLET, FILM COATED ORAL at 11:52

## 2025-05-17 RX ADMIN — FLUOXETINE HYDROCHLORIDE 20 MG: 20 CAPSULE ORAL at 08:40

## 2025-05-17 RX ADMIN — ASPIRIN 81 MG CHEWABLE TABLET 81 MG: 81 TABLET CHEWABLE at 08:40

## 2025-05-17 RX ADMIN — INSULIN LISPRO 1 UNITS: 100 INJECTION, SOLUTION INTRAVENOUS; SUBCUTANEOUS at 08:32

## 2025-05-17 RX ADMIN — TORSEMIDE 20 MG: 20 TABLET ORAL at 05:48

## 2025-05-17 RX ADMIN — ACETAMINOPHEN 650 MG: 325 TABLET, FILM COATED ORAL at 03:40

## 2025-05-17 RX ADMIN — CELECOXIB 100 MG: 100 CAPSULE ORAL at 08:40

## 2025-05-17 NOTE — PLAN OF CARE
Problem: PAIN - ADULT  Goal: Verbalizes/displays adequate comfort level or baseline comfort level  Description: Interventions:  - Encourage patient to monitor pain and request assistance  - Assess pain using appropriate pain scale  - Administer analgesics as ordered based on type and severity of pain and evaluate response  - Implement non-pharmacological measures as appropriate and evaluate response  - Consider cultural and social influences on pain and pain management  - Notify physician/advanced practitioner if interventions unsuccessful or patient reports new pain  - Educate patient/family on pain management process including their role and importance of  reporting pain   - Provide non-pharmacologic/complimentary pain relief interventions  Outcome: Progressing     Problem: SAFETY ADULT  Goal: Patient will remain free of falls  Description: INTERVENTIONS:  - Educate patient/family on patient safety including physical limitations  - Instruct patient to call for assistance with activity   - Consider consulting OT/PT to assist with strengthening/mobility based on AM PAC & JH-HLM score  - Consult OT/PT to assist with strengthening/mobility   - Keep Call bell within reach  - Keep bed low and locked with side rails adjusted as appropriate  - Keep care items and personal belongings within reach  - Initiate and maintain comfort rounds  - Make Fall Risk Sign visible to staff  - Offer Toileting every 2 Hours, in advance of need  - Initiate/Maintain bed alarm  - Obtain necessary fall risk management equipment:   - Apply yellow socks and bracelet for high fall risk patients  - Consider moving patient to room near nurses station  Outcome: Progressing  Goal: Maintain or return to baseline ADL function  Description: INTERVENTIONS:  -  Assess patient's ability to carry out ADLs; assess patient's baseline for ADL function and identify physical deficits which impact ability to perform ADLs (bathing, care of mouth/teeth,  toileting, grooming, dressing, etc.)  - Assess/evaluate cause of self-care deficits   - Assess range of motion  - Assess patient's mobility; develop plan if impaired  - Assess patient's need for assistive devices and provide as appropriate  - Encourage maximum independence but intervene and supervise when necessary  - Involve family in performance of ADLs  - Assess for home care needs following discharge   - Consider OT consult to assist with ADL evaluation and planning for discharge  - Provide patient education as appropriate  - Monitor functional capacity and physical performance, use of AM PAC & JH-HLM   - Monitor gait, balance and fatigue with ambulation    Outcome: Progressing  Goal: Maintains/Returns to pre admission functional level  Description: INTERVENTIONS:  - Perform AM-PAC 6 Click Basic Mobility/ Daily Activity assessment daily.  - Set and communicate daily mobility goal to care team and patient/family/caregiver.   - Collaborate with rehabilitation services on mobility goals if consulted  - Perform Range of Motion 4 times a day.  - Reposition patient every 2 hours.  - Dangle patient 3 times a day  - Stand patient 3 times a day  - Ambulate patient 3 times a day  - Out of bed to chair 3 times a day   - Out of bed for meals 3 times a day  - Out of bed for toileting  - Record patient progress and toleration of activity level   Outcome: Progressing     Problem: DISCHARGE PLANNING  Goal: Discharge to home or other facility with appropriate resources  Description: INTERVENTIONS:  - Identify barriers to discharge w/patient and caregiver  - Arrange for needed discharge resources and transportation as appropriate  - Identify discharge learning needs (meds, wound care, etc.)  - Arrange for interpretive services to assist at discharge as needed  - Refer to Case Management Department for coordinating discharge planning if the patient needs post-hospital services based on physician/advanced practitioner order or  complex needs related to functional status, cognitive ability, or social support system  Outcome: Progressing     Problem: Knowledge Deficit  Goal: Patient/family/caregiver demonstrates understanding of disease process, treatment plan, medications, and discharge instructions  Description: Complete learning assessment and assess knowledge base.  Interventions:  - Provide teaching at level of understanding  - Provide teaching via preferred learning methods  Outcome: Progressing

## 2025-05-17 NOTE — H&P
"H&P - Hospitalist   Name: Dejah Parish 75 y.o. female I MRN: [id number]  Unit/Bed#: -01 I Date of Admission: 5/16/2025   Date of Service: 5/17/2025 I Hospital Day: 0     Assessment & Plan  Cellulitis of left hand  Pt reports being bitten by her cat overnight from 5/15 into 5/16  Cat in up-to-date on vaccines and has been otherwise been acting normally at home  Rapidly progressing swelling, erythema, and pain in L hand  Cellulitis of L hand- pictures available in the chart     Plan:  Start Unasyn  Follow-up culture data  Trend fever curve and WBC count  Coronary artery disease  Hx of CAD with multiple stents and cardiomyopathy with BiV AICD in place  Continue home aspirin and statin  Chronic systolic congestive heart failure (HCC)  Wt Readings from Last 3 Encounters:   05/17/25 65 kg (143 lb 4.8 oz)   04/04/25 64 kg (141 lb)   04/03/25 63.5 kg (140 lb)       Hx of ischemic cardiomyopathy with multiple cardiac stents and BiVICD in place  Follows with Dr. Cotto outpatient  Home regimen: Metoprolol succinate 100 mg daily, torsemide 20 mg daily  Examines euvolemic  Continue home medications  Cardiac diet  Daily weights      Mixed hyperlipidemia  Continue statin  Acquired hypothyroidism  Continue home levothyroxine 25 mcg daily  Obstructive sleep apnea  Pt reports wearing home CPAP but does not wish to use a CPAP while in the hospital  Type 2 diabetes mellitus with stage 3 chronic kidney disease, without long-term current use of insulin (HCC)  Lab Results   Component Value Date    HGBA1C 9.8 (H) 03/03/2025       No results for input(s): \"POCGLU\" in the last 72 hours.    Blood Sugar Average: Last 72 hrs:      Home regimen: Januvia 100 mg daily  Hold home Januvia  Start SSI while inpatient  Gastroesophageal reflux disease with esophagitis  Continue home PPI  Hypertension  Continue home metoprolol, losartan, and ARB      VTE Pharmacologic Prophylaxis: VTE Score: 5 High Risk (Score >/= 5) - Pharmacological DVT " Prophylaxis Ordered: enoxaparin (Lovenox). Sequential Compression Devices Ordered.  Code Status: Level 2 - DNAR: but accepts endotracheal intubation   Discussion with family: Patient declined call to .     Anticipated Length of Stay: Patient will be admitted on an inpatient basis with an anticipated length of stay of greater than 2 midnights secondary to cellulitis.    History of Present Illness   Chief Complaint: Cat bite of L hand    Dejah Parish is a 75 y.o. female with a PMH significant for ischemic cardiomyopathy with EF 45% (s/p BiV ICD), CAD s/p multiple cardiac stents, HTN, T2DM, hypothyroidism, HLD, and anxiety who presented to the emergency department with swelling and pain of her L hand after being bitten by her cat. Pt reports that overnight from 5/15 into 5/16 she was petting her cat when the cat suddenly bit her L hand. The cat has otherwise been acting normally and is up-to-date on vaccinations. Throughout the day she had rapid development of swelling, erythema, limited motion of L hand, and pain prompting her to come the ED. She denies any fevers, chills, N/V. She was noted to have cellulitis and given her extensive comorbidities and rapid progression of cellulitis, the decision was made to admit for IV antibiotics.    Review of Systems   Constitutional:  Negative for chills and fever.   Respiratory:  Negative for cough and shortness of breath.    Cardiovascular:  Negative for chest pain and palpitations.   Gastrointestinal:  Negative for abdominal distention and nausea.   Neurological:  Negative for light-headedness and headaches.   All other systems reviewed and are negative.      Historical Information   Past Medical History:   Diagnosis Date    Abnormal finding on breast imaging     last assessed 11/30/17    Allergic rhinitis     Anxiety     BBB (bundle branch block)     left,last assesed 6/4/12    Bilateral fibrocystic breast changes     Bilateral sacroiliitis (HCC)      Carpal tunnel syndrome, unspecified upper limb     Chronic systolic congestive heart failure (HCC)     Coronary artery disease     Detrusor instability     Dilated cardiomyopathy (HCC)     Disorder of intervertebral disc of cervical spine     Gout     Hormone replacement therapy     Hx of blood clots     Hypokalemia     Osteoarthritis     Papilloma of left breast     Psoriasis     psoriatic arthropathy    Psoriatic arthropathy (HCC)     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    RLS (restless legs syndrome)     Screening mammogram, encounter for 12/05/2019    Vitamin D deficiency      Past Surgical History:   Procedure Laterality Date    BLADDER SURGERY  1999    lift and repair    BREAST BIOPSY Left 05/23/2016     CORE BIOPSY-BENIGN    CARDIAC CATHETERIZATION      outcome:  successful    CARDIAC DEFIBRILLATOR PLACEMENT      CARDIAC ELECTROPHYSIOLOGY PROCEDURE N/A 8/22/2023    Procedure: Cardiac biv icd generator change;  Surgeon: Nahid Dickerson MD;  [facility]: [facility];  Service: Cardiology    CARPAL TUNNEL RELEASE Bilateral 1997    CATARACT EXTRACTION      COLONOSCOPY      CORONARY ANGIOPLASTY WITH STENT PLACEMENT      CYSTOSCOPY W/ URETEROSCOPY  2009    DE QUERVAIN'S RELEASE Left 2011    and trigger finger release    EYE SURGERY Left 1999    EYE SURGERY Left 11/13/2019    Cataract    EYE SURGERY Right 11/09/2019    Cataract    INSERT / REPLACE / REMOVE PACEMAKER      JOINT REPLACEMENT Right 09/2017    Knee    KNEE ARTHROSCOPY      therapeutic    NEUROPLASTY / TRANSPOSITION MEDIAN NERVE AT CARPAL TUNNEL      OOPHORECTOMY Bilateral     AGE 44    WI TENDON SHEATH INCISION Left 3/2/2017    Procedure: SMALL TRIGGER FINGER RELEASE;  Surgeon: Camden Lancaster MD;  Location: [facility];  Service: Orthopedics    RECTAL SURGERY  2006    repair of rectal ulcer    SHOULDER ARTHROSCOPY Left 2006    TOTAL ABDOMINAL HYSTERECTOMY      AGE 44    TOTAL KNEE ARTHROPLASTY Left     TOTAL SHOULDER REPLACEMENT Left 2007    TRIGGER  FINGER RELEASE Bilateral 2011    right haand ,,left hand     US GUIDED BREAST BIOPSY LEFT COMPLETE Left 2016     Social History     Tobacco Use    Smoking status: Former     Current packs/day: 0.00     Average packs/day: 1 pack/day for 15.0 years (15.0 ttl pk-yrs)     Types: Cigarettes     Start date:      Quit date:      Years since quittin.4    Smokeless tobacco: Never    Tobacco comments:     Smoked on and off for the last few years before I quit   Vaping Use    Vaping status: Never Used   Substance and Sexual Activity    Alcohol use: Never    Drug use: Never    Sexual activity: Not Currently     Comment: Took birth control pills from  until      E-Cigarette/Vaping    E-Cigarette Use Never User      E-Cigarette/Vaping Substances     Family history non-contributory  Social History:  Marital Status:    Patient Pre-hospital Living Situation: Home  Patient Pre-hospital Level of Mobility: walks    Meds/Allergies   I have reviewed home medications with patient personally.  Prior to Admission medications    Medication Sig Start Date End Date Taking? Authorizing Provider   allopurinol (ZYLOPRIM) 100 mg tablet Take 1 tablet (100 mg total) by mouth daily 25  Yes Leann Henson,    aspirin 81 MG tablet Take 81 mg by mouth in the morning.   Yes Historical Provider, MD   calcium carbonate (OS-MANINDER) 600 MG tablet Take 1,200 mg by mouth in the morning.   Yes Historical Provider, MD   candesartan (ATACAND) 32 MG tablet Take 1 tablet (32 mg total) by mouth daily 25  Yes Leann Henson DO   celecoxib (CeleBREX) 200 mg capsule Take 1 capsule (200 mg total) by mouth 2 (two) times a day 24  Yes Nestor Stevenson MD   cetirizine (ZyrTEC) 10 mg tablet Take 10 mg by mouth in the morning.   Yes Historical Provider, MD   Cholecalciferol (VITAMIN D3) 1000 units CAPS Take by mouth   Yes Historical Provider, MD   FLUoxetine (PROzac) 20 mg capsule Take 1 capsule  (20 mg total) by mouth daily 12/12/24  Yes Leann Henson DO   levothyroxine 25 mcg tablet Take 1 tablet (25 mcg total) by mouth daily 2/20/25  Yes Leann Henson DO   Magnesium 250 MG TABS Take by mouth in the morning.   Yes Historical Provider, MD   Melatonin 5 MG TABS Take 5 mg by mouth daily at bedtime   Yes Historical Provider, MD   metoprolol succinate (TOPROL-XL) 100 mg 24 hr tablet Take 1 tablet (100 mg total) by mouth daily 2/11/25  Yes Leann Henson DO   omeprazole (PriLOSEC) 40 MG capsule Take 1 capsule (40 mg total) by mouth daily 2/21/25  Yes Leann Henson DO   potassium chloride (Klor-Con M20) 20 mEq tablet Take 1 tablet (20 mEq total) by mouth daily 4/3/25  Yes Malachi Mcelroy MD   pyridoxine (VITAMIN B6) 100 mg tablet Take 100 mg by mouth in the morning.   Yes Historical Provider, MD   sitaGLIPtin (Januvia) 100 mg tablet Take 1 tablet (100 mg total) by mouth daily 12/6/24  Yes Leann Henson DO   torsemide (DEMADEX) 20 mg tablet Take 1 tablet by mouth twice daily 1/27/25  Yes Leann Henson DO   traMADol (ULTRAM) 50 mg tablet Take 1 tablet (50 mg total) by mouth every 8 (eight) hours as needed for moderate pain 2/11/25  Yes Leann Henson DO   Empagliflozin (Jardiance) 10 MG TABS tablet Take 1 tablet (10 mg total) by mouth in the morning  Patient not taking: Reported on 4/3/2025 3/21/25   Leann Henson DO   Minoxidil (ROGAINE WOMENS EX) Apply 1 mL topically    Historical Provider, MD   montelukast (SINGULAIR) 10 mg tablet TAKE 1 TABLET BY MOUTH EVERY DAY AT BEDTIME  Patient not taking: Reported on 5/17/2025 3/25/24   Nestor Stevenson MD   simvastatin (ZOCOR) 40 mg tablet Take 1 tablet (40 mg total) by mouth daily 1/22/25   Leann Henson DO     Allergies   Allergen Reactions    Dust Mite Extract Sneezing       Objective :  Temp:  [96.8 °F (36 °C)-98.5 °F (36.9 °C)] 96.8 °F (36 °C)  HR:  [60-71] 60  BP: (182196)/(44-91)  182/82  Resp:  [18] 18  SpO2:  [97 %-98 %] 97 %  O2 Device: None (Room air)    Physical Exam  Vitals reviewed.   Constitutional:       Appearance: Normal appearance.   HENT:      Head: Normocephalic and atraumatic.      Nose: Nose normal.      Mouth/Throat:      Mouth: Mucous membranes are moist.     Eyes:      Extraocular Movements: Extraocular movements intact.      Pupils: Pupils are equal, round, and reactive to light.       Cardiovascular:      Rate and Rhythm: Normal rate and regular rhythm.      Heart sounds: No murmur heard.     No friction rub. No gallop.   Pulmonary:      Effort: Pulmonary effort is normal.      Breath sounds: No wheezing, rhonchi or rales.   Abdominal:      General: There is no distension.      Palpations: Abdomen is soft.      Tenderness: There is no abdominal tenderness.     Skin:     General: Skin is warm and dry.      Comments: Swelling and erythema of L hand with scabing over area of puncture. Limited motion of L hand due to pain. NVI     Neurological:      General: No focal deficit present.      Mental Status: She is alert and oriented to person, place, and time. Mental status is at baseline.          Lines/Drains:            Lab Results: I have reviewed the following results:  Results from last 7 days   Lab Units 05/16/25  2224   WBC Thousand/uL 13.91*   HEMOGLOBIN g/dL 13.4   HEMATOCRIT % 39.5   PLATELETS Thousands/uL 142*   LYMPHO PCT % 14   MONO PCT % 10   EOS PCT % 0     Results from last 7 days   Lab Units 05/16/25  2224   SODIUM mmol/L 135   POTASSIUM mmol/L 3.8   CHLORIDE mmol/L 98   CO2 mmol/L 28   BUN mg/dL 25   CREATININE mg/dL 0.91   ANION GAP mmol/L 9   CALCIUM mg/dL 9.7   ALBUMIN g/dL 3.5   TOTAL BILIRUBIN mg/dL 0.44   ALK PHOS U/L 63   ALT U/L 23   AST U/L 13   GLUCOSE RANDOM mg/dL 269*     Results from last 7 days   Lab Units 05/17/25  0018   INR  0.90         Lab Results   Component Value Date    HGBA1C 9.8 (H) 03/03/2025    HGBA1C 7.3 (A) 12/03/2024    HGBA1C 6.4  (H) 11/28/2023     Results from last 7 days   Lab Units 05/17/25  0234 05/17/25  0018 05/16/25  2224   LACTIC ACID mmol/L 2.1* 2.2*  --    PROCALCITONIN ng/ml  --   --  0.19       Other Study Results Review: EKG was reviewed.     Administrative Statements       ** Please Note: This note has been constructed using a voice recognition system. **

## 2025-05-17 NOTE — ASSESSMENT & PLAN NOTE
Pt reports being bitten by her cat overnight from 5/15 into 5/16  Cat in up-to-date on vaccines and has been otherwise been acting normally at home  Rapidly progressing swelling, erythema, and pain in L hand  Cellulitis of L hand- pictures available in the chart     Plan:  Start Unasyn  Follow-up culture data  Trend fever curve and WBC count

## 2025-05-17 NOTE — CONSULTS
Consultation - Orthopedics   Name: Dejah Parish 75 y.o. female I MRN: [id number]  Unit/Bed#: -01 I Date of Admission: 5/16/2025   Date of Service: 5/17/2025 I Hospital Day: 0   Inpatient consult to Orthopedic Surgery  Consult performed by: Dwayne Dozier PA-C  Consult ordered by: Annie Sarmiento MD        Physician Requesting Evaluation: Annie Sarmiento MD   Reason for Evaluation / Principal Problem: Left hand pain    Assessment & Plan  Cellulitis of left hand  S/p cat bite to left dorsal aspect of hand, now with significant swelling, pain and ROM difficulties   - NWB LUE in volar splint   - CT scan order. We will review once complete   - IV unasyn   - warm compresses   - elevation for swelling control   - case reviewed and discussed with Dr. Barajas  - Ortho will follow. We will monitor for symptom improvement      I have discussed the above management plan in detail with the primary service.   Orthopedics service will follow.  Please contact the SecureChat role for the Orthopedics service with any questions/concerns.    History of Present Illness   HPI: Dejah Parish is a 75 y.o. year old female who presents s/p cat bite 2 days ago, now with dorsal hand swelling pain and cellulitis around the bite region. Patient states her pain has worsened yesterday which prompted her to come to the ED. Denies fever chills, CP, SOB. No other orthopedic issues discussed.     Review of Systems significant for findings described in the HPI.  Historical Information   Past Medical History:   Diagnosis Date    Abnormal finding on breast imaging     last assessed 11/30/17    Allergic rhinitis     Anxiety     BBB (bundle branch block)     left,last assesed 6/4/12    Bilateral fibrocystic breast changes     Bilateral sacroiliitis (HCC)     Carpal tunnel syndrome, unspecified upper limb     Chronic systolic congestive heart failure (HCC)     Coronary artery disease     Detrusor instability     Dilated cardiomyopathy (HCC)      Disorder of intervertebral disc of cervical spine     Gout     Hormone replacement therapy     Hx of blood clots     Hypokalemia     Osteoarthritis     Papilloma of left breast     Psoriasis     psoriatic arthropathy    Psoriatic arthropathy (HCC)     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    RLS (restless legs syndrome)     Screening mammogram, encounter for 12/05/2019    Vitamin D deficiency      Past Surgical History:   Procedure Laterality Date    BLADDER SURGERY  1999    lift and repair    BREAST BIOPSY Left 05/23/2016    US CORE BIOPSY-BENIGN    CARDIAC CATHETERIZATION      outcome:  successful    CARDIAC DEFIBRILLATOR PLACEMENT      CARDIAC ELECTROPHYSIOLOGY PROCEDURE N/A 8/22/2023    Procedure: Cardiac biv icd generator change;  Surgeon: Nahid Dickerson MD;  Location: [facility];  Service: Cardiology    CARPAL TUNNEL RELEASE Bilateral 1997    CATARACT EXTRACTION      COLONOSCOPY      CORONARY ANGIOPLASTY WITH STENT PLACEMENT      CYSTOSCOPY W/ URETEROSCOPY  2009    DE QUERVAIN'S RELEASE Left 2011    and trigger finger release    EYE SURGERY Left 1999    EYE SURGERY Left 11/13/2019    Cataract    EYE SURGERY Right 11/09/2019    Cataract    INSERT / REPLACE / REMOVE PACEMAKER      JOINT REPLACEMENT Right 09/2017    Knee    KNEE ARTHROSCOPY      therapeutic    NEUROPLASTY / TRANSPOSITION MEDIAN NERVE AT CARPAL TUNNEL      OOPHORECTOMY Bilateral     AGE 44    ME TENDON SHEATH INCISION Left 3/2/2017    Procedure: SMALL TRIGGER FINGER RELEASE;  Surgeon: Camden Lancaster MD;  Location: [facility];  Service: Orthopedics    RECTAL SURGERY  2006    repair of rectal ulcer    SHOULDER ARTHROSCOPY Left 2006    TOTAL ABDOMINAL HYSTERECTOMY      AGE 44    TOTAL KNEE ARTHROPLASTY Left     TOTAL SHOULDER REPLACEMENT Left 2007    TRIGGER FINGER RELEASE Bilateral 2011    right haand 201,2015,left hand 2012,2015    US GUIDED BREAST BIOPSY LEFT COMPLETE Left 5/23/2016     Social History     Tobacco Use    Smoking status: Former  "    Current packs/day: 0.00     Average packs/day: 1 pack/day for 15.0 years (15.0 ttl pk-yrs)     Types: Cigarettes     Start date:      Quit date:      Years since quittin.4    Smokeless tobacco: Never    Tobacco comments:     Smoked on and off for the last few years before I quit   Vaping Use    Vaping status: Never Used   Substance and Sexual Activity    Alcohol use: Never    Drug use: Never    Sexual activity: Not Currently     Comment: Took birth control pills from  until      E-Cigarette/Vaping    E-Cigarette Use Never User      E-Cigarette/Vaping Substances         Objective :  Temp:  [96.8 °F (36 °C)-98.5 °F (36.9 °C)] 97 °F (36.1 °C)  HR:  [57-71] 60  BP: (163-196)/(72-91) 163/72  Resp:  [18] 18  SpO2:  [92 %-98 %] 93 %  O2 Device: None (Room air)  Physical ExamOrtho Exam   Musculoskeletal: Left hand  Skin poke hole bite mary to dorsal hand with surrounding erythema. No ecchymosis.  Significant dorsal swelling   ROM difficulty, most notably wrist extension  Sensation intact to median/radial/ulnar nerve distribution   Motor intact anterior interosseous nerve/posterior interosseous nerve/median/radial/ulnar nerve distributions  2+ radial pulse      Lab Results: I have reviewed the following results:   Recent Labs     25  2224 25  0018 25  0617   WBC 13.91*  --  13.40*   HGB 13.4  --  13.6   HCT 39.5  --  40.9   *  --  134*   BUN 25  --  20   CREATININE 0.91  --  0.82   PTT  --  22*  --    INR  --  0.90  --    ESR 6  --   --    CRP 30.3*  --  75.1*     Blood Culture:   Lab Results   Component Value Date    BLOODCX Received in Microbiology Lab. Culture in Progress. 2025    BLOODCX Received in Microbiology Lab. Culture in Progress. 2025     Wound Culture: No results found for: \"WOUNDCULT\"    Imaging Results Review: I personally reviewed the following image studies/reports in PACS and discussed pertinent findings with Radiology: xray(s). My interpretation " of the radiology images/reports is: no osseous or crush injury noted.      Dwayne Dozier PA-C

## 2025-05-17 NOTE — ASSESSMENT & PLAN NOTE
S/p cat bite to left dorsal aspect of hand, now with significant swelling, pain and ROM difficulties   - NWB LUE in volar splint   - CT scan order. We will review once complete   - IV unasyn   - warm compresses   - elevation for swelling control   - case reviewed and discussed with Dr. Barajas  - Ortho will follow. We will monitor for symptom improvement

## 2025-05-17 NOTE — ASSESSMENT & PLAN NOTE
"Lab Results   Component Value Date    HGBA1C 9.8 (H) 03/03/2025       No results for input(s): \"POCGLU\" in the last 72 hours.    Blood Sugar Average: Last 72 hrs:      Home regimen: Januvia 100 mg daily  Hold home Januvia  Start SSI while inpatient  "

## 2025-05-17 NOTE — ASSESSMENT & PLAN NOTE
Hx of CAD with multiple stents and cardiomyopathy with BiV AICD in place  Continue home aspirin and statin

## 2025-05-17 NOTE — ED PROVIDER NOTES
Time reflects when diagnosis was documented in both MDM as applicable and the Disposition within this note       Time User Action Codes Description Comment    5/16/2025 11:59 PM Ya Fernandez [W55.01XA] Cat bite, initial encounter     5/16/2025 11:59 PM Ya Fernandez [L03.119] Cellulitis of hand           ED Disposition       ED Disposition   Admit    Condition   Stable    Date/Time   Sat May 17, 2025 12:32 AM    Comment   Case was discussed with ALBERTO and the patient's admission status was agreed to be Admission Status: inpatient status to the service of Dr. Sarmiento .               Assessment & Plan       Medical Decision Making  75-year-old female with left hand swelling and pain after a cat bite, has a rapidly progressive erythema and swelling of the left hand with streaking to the left forearm will obtain lab work, given patient's multiple comorbidities low threshold for admission due to rapidly progressive infection    Amount and/or Complexity of Data Reviewed  Labs: ordered.    Risk  Prescription drug management.  Decision regarding hospitalization.        ED Course as of 05/17/25 0033   Sat May 17, 2025   0015 Patient with streaking erythema, elevated WBC as well as CRP, with rapidly progressive swelling and erythema to the hand will start on IV antibiotics will admit for further care       Medications   ampicillin-sulbactam (UNASYN) in sodium chloride 0.9% 3 g (has no administration in time range)   tetanus-diphtheria-acellular pertussis (BOOSTRIX) IM injection 0.5 mL (0.5 mL Intramuscular Given 5/17/25 0026)       ED Risk Strat Scores                    No data recorded        SBIRT 22yo+      Flowsheet Row Most Recent Value   Initial Alcohol Screen: US AUDIT-C     1. How often do you have a drink containing alcohol? 0 Filed at: 05/16/2025 2221   2. How many drinks containing alcohol do you have on a typical day you are drinking?  0 Filed at: 05/16/2025 2221   3b. FEMALE Any Age, or MALE 65+: How often do  you have 4 or more drinks on one occassion? 0 Filed at: 05/16/2025 2221   Audit-C Score 0 Filed at: 05/16/2025 2221   COLE: How many times in the past year have you...    Used an illegal drug or used a prescription medication for non-medical reasons? Never Filed at: 05/16/2025 2221                            History of Present Illness       Chief Complaint   Patient presents with    Animal Bite     Left hand cat bite.  Own cat.  UTD on vaccines.  Pain and swelling today       Past Medical History:   Diagnosis Date    Abnormal finding on breast imaging     last assessed 11/30/17    Allergic rhinitis     Anxiety     BBB (bundle branch block)     left,last assesed 6/4/12    Bilateral fibrocystic breast changes     Bilateral sacroiliitis (HCC)     Carpal tunnel syndrome, unspecified upper limb     Chronic systolic congestive heart failure (HCC)     Coronary artery disease     Detrusor instability     Dilated cardiomyopathy (HCC)     Disorder of intervertebral disc of cervical spine     Gout     Hormone replacement therapy     Hx of blood clots     Hypokalemia     Osteoarthritis     Papilloma of left breast     Psoriasis     psoriatic arthropathy    Psoriatic arthropathy (HCC)     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    RLS (restless legs syndrome)     Screening mammogram, encounter for 12/05/2019    Vitamin D deficiency       Past Surgical History:   Procedure Laterality Date    BLADDER SURGERY  1999    lift and repair    BREAST BIOPSY Left 05/23/2016     CORE BIOPSY-BENIGN    CARDIAC CATHETERIZATION      outcome:  successful    CARDIAC DEFIBRILLATOR PLACEMENT      CARDIAC ELECTROPHYSIOLOGY PROCEDURE N/A 8/22/2023    Procedure: Cardiac biv icd generator change;  Surgeon: Nahid Dickerson MD;  Location: [facility];  Service: Cardiology    CARPAL TUNNEL RELEASE Bilateral 1997    CATARACT EXTRACTION      COLONOSCOPY      CORONARY ANGIOPLASTY WITH STENT PLACEMENT      CYSTOSCOPY W/ URETEROSCOPY  2009    DE QUERVAIN'S  RELEASE Left 2011    and trigger finger release    EYE SURGERY Left 1999    EYE SURGERY Left 11/13/2019    Cataract    EYE SURGERY Right 11/09/2019    Cataract    INSERT / REPLACE / REMOVE PACEMAKER      JOINT REPLACEMENT Right 09/2017    Knee    KNEE ARTHROSCOPY      therapeutic    NEUROPLASTY / TRANSPOSITION MEDIAN NERVE AT CARPAL TUNNEL      OOPHORECTOMY Bilateral     AGE 44    NE TENDON SHEATH INCISION Left 3/2/2017    Procedure: SMALL TRIGGER FINGER RELEASE;  Surgeon: Camden Lancaster MD;  Location: [facility];  Service: Orthopedics    RECTAL SURGERY  2006    repair of rectal ulcer    SHOULDER ARTHROSCOPY Left 2006    TOTAL ABDOMINAL HYSTERECTOMY      AGE 44    TOTAL KNEE ARTHROPLASTY Left     TOTAL SHOULDER REPLACEMENT Left 2007    TRIGGER FINGER RELEASE Bilateral 2011    right haand 201,2015,left hand 2012,2015    US GUIDED BREAST BIOPSY LEFT COMPLETE Left 5/23/2016      Family History   Problem Relation Age of Onset    Hypertension Mother     Alzheimer's disease Mother     Cancer Father 71        bladder    Prostate cancer Father 71    Breast cancer Daughter 52    No Known Problems Daughter     No Known Problems Maternal Grandmother     No Known Problems Maternal Grandfather     Breast cancer Paternal Grandmother         age dx unk    No Known Problems Paternal Grandfather     Cancer Brother         pt shared some type of blood cancer    No Known Problems Son     Stomach cancer Paternal Aunt 65    No Known Problems Paternal Aunt       Social History[1]   E-Cigarette/Vaping    E-Cigarette Use Never User       E-Cigarette/Vaping Substances      I have reviewed and agree with the history as documented.     75-year-old female with history of CHF, diabetes, CKD, status post AICD placement presents for evaluation of a cat bite to the left hand, states that she was laying with her cat yesterday and petting her when the cat bit her left hand, otherwise the cat has been acting normally up-to-date on vaccinations.   The patient is unsure of her last tetanus.  Initially the patient wash the hand this morning woke up with some redness and swelling that progressed throughout the day now making it difficult for her to move her fingers, no fevers, no chills no nausea no vomiting        Review of Systems   Constitutional:  Negative for appetite change and fever.   HENT:  Negative for rhinorrhea and sore throat.    Eyes:  Negative for photophobia and visual disturbance.   Respiratory:  Negative for cough, chest tightness and wheezing.    Cardiovascular:  Negative for chest pain, palpitations and leg swelling.   Gastrointestinal:  Negative for abdominal distention, abdominal pain, blood in stool, constipation and diarrhea.   Genitourinary:  Negative for dysuria, flank pain, frequency, hematuria and urgency.   Musculoskeletal:  Negative for back pain.   Skin:  Positive for color change and wound. Negative for rash.   Neurological:  Negative for dizziness, weakness and headaches.   All other systems reviewed and are negative.          Objective       ED Triage Vitals [05/16/25 2219]   Temperature Pulse Blood Pressure Respirations SpO2 Patient Position - Orthostatic VS   98.5 °F (36.9 °C) 71 (!) 196/91 18 98 % Sitting      Temp src Heart Rate Source BP Location FiO2 (%) Pain Score    -- -- Left arm -- 5      Vitals      Date and Time Temp Pulse SpO2 Resp BP Pain Score FACES Pain Rating User   05/16/25 2219 98.5 °F (36.9 °C) 71 98 % 18 196/91 5 -- SV            Physical Exam  Vitals and nursing note reviewed.   Constitutional:       Appearance: She is well-developed.   HENT:      Head: Normocephalic and atraumatic.     Eyes:      Pupils: Pupils are equal, round, and reactive to light.       Cardiovascular:      Rate and Rhythm: Normal rate and regular rhythm.      Heart sounds: No murmur heard.     No friction rub. No gallop.   Pulmonary:      Effort: Pulmonary effort is normal.      Breath sounds: No wheezing or rales.   Chest:      Chest  wall: No tenderness.   Abdominal:      General: There is no distension.      Palpations: Abdomen is soft. There is no mass.      Tenderness: There is no guarding or rebound.     Musculoskeletal:      Cervical back: Normal range of motion and neck supple.      Comments: Swelling erythema to left dorsum of the hand with puncture wound to mid hand, she is able to make a fist with some difficulty due to diffuse swelling, there is some mild streaking erythema to the forearm per the patient this has been progressive throughout the day     Skin:     General: Skin is warm and dry.     Neurological:      Mental Status: She is alert and oriented to person, place, and time.         Results Reviewed       Procedure Component Value Units Date/Time    Protime-INR [[id number]] Collected: 05/17/25 0018    Lab Status: In process Specimen: Blood from Arm, Right Updated: 05/17/25 0024    APTT [[id number]] Collected: 05/17/25 0018    Lab Status: In process Specimen: Blood from Arm, Right Updated: 05/17/25 0024    Lactic acid, plasma (w/reflex if result > 2.0) [[id number]] Collected: 05/17/25 0018    Lab Status: In process Specimen: Blood from Hand, Right Updated: 05/17/25 0024    Blood culture [[id number]] Collected: 05/17/25 0018    Lab Status: In process Specimen: Blood from Arm, Right Updated: 05/17/25 0023    Blood culture [[id number]] Collected: 05/17/25 0018    Lab Status: In process Specimen: Blood from Arm, Right Updated: 05/17/25 0023    C-reactive protein [[id number]]  (Abnormal) Collected: 05/16/25 2224    Lab Status: Final result Specimen: Blood from Arm, Right Updated: 05/17/25 0014     CRP 30.3 mg/L     Procalcitonin [[id number]] Collected: 05/16/25 2224    Lab Status: In process Specimen: Blood from Arm, Right Updated: 05/17/25 0014    Sedimentation rate, automated [[id number]]  (Normal) Collected: 05/16/25 2224    Lab Status: Final result Specimen: Blood from Arm, Right Updated: 05/16/25 2358     Sed Rate 6 mm/hour     Manual  Differential(PHLEBS Do Not Order) [[id number]]  (Abnormal) Collected: 05/16/25 2224    Lab Status: Final result Specimen: Blood from Arm, Right Updated: 05/16/25 2259     Segmented % 76 %      Lymphocytes % 14 %      Monocytes % 10 %      Eosinophils % 0 %      Basophils % 0 %      Absolute Neutrophils 10.57 Thousand/uL      Absolute Lymphocytes 1.95 Thousand/uL      Absolute Monocytes 1.39 Thousand/uL      Absolute Eosinophils 0.00 Thousand/uL      Absolute Basophils 0.00 Thousand/uL      Total Counted --     RBC Morphology Present     Platelet Estimate Borderline     Anisocytosis Present     Poikilocytes Present    Comprehensive metabolic panel [[id number]]  (Abnormal) Collected: 05/16/25 2224    Lab Status: Final result Specimen: Blood from Arm, Right Updated: 05/16/25 2252     Sodium 135 mmol/L      Potassium 3.8 mmol/L      Chloride 98 mmol/L      CO2 28 mmol/L      ANION GAP 9 mmol/L      BUN 25 mg/dL      Creatinine 0.91 mg/dL      Glucose 269 mg/dL      Calcium 9.7 mg/dL      AST 13 U/L      ALT 23 U/L      Alkaline Phosphatase 63 U/L      Total Protein 5.6 g/dL      Albumin 3.5 g/dL      Total Bilirubin 0.44 mg/dL      eGFR 61 ml/min/1.73sq m     Narrative:      National Kid[employer] guidelines for Chronic Kidney Disease (CKD):     Stage 1 with normal or high GFR (GFR > 90 mL/min/1.73 square meters)    Stage 2 Mild CKD (GFR = 60-89 mL/min/1.73 square meters)    Stage 3A Moderate CKD (GFR = 45-59 mL/min/1.73 square meters)    Stage 3B Moderate CKD (GFR = 30-44 mL/min/1.73 square meters)    Stage 4 Severe CKD (GFR = 15-29 mL/min/1.73 square meters)    Stage 5 End Stage CKD (GFR <15 mL/min/1.73 square meters)  Note: GFR calculation is accurate only with a steady state creatinine    CBC and differential [[id number]]  (Abnormal) Collected: 05/16/25 2224    Lab Status: Final result Specimen: Blood from Arm, Right Updated: 05/16/25 2241     WBC 13.91 Thousand/uL      RBC 4.24 Million/uL      Hemoglobin  13.4 g/dL      Hematocrit 39.5 %      MCV 93 fL      MCH 31.6 pg      MCHC 33.9 g/dL      RDW 13.9 %      MPV 9.5 fL      Platelets 142 Thousands/uL             XR hand 3+ views LEFT    (Results Pending)       Procedures    ED Medication and Procedure Management   Prior to Admission Medications   Prescriptions Last Dose Informant Patient Reported? Taking?   Cholecalciferol (VITAMIN D3) 1000 units CAPS  Self Yes No   Sig: Take by mouth   Empagliflozin (Jardiance) 10 MG TABS tablet  Self No No   Sig: Take 1 tablet (10 mg total) by mouth in the morning   Patient not taking: Reported on 4/3/2025   FLUoxetine (PROzac) 20 mg capsule  Self No No   Sig: Take 1 capsule (20 mg total) by mouth daily   Magnesium 250 MG TABS  Self Yes No   Sig: Take by mouth daily   Melatonin 5 MG TABS  Self Yes No   Sig: Take 5 mg by mouth daily at bedtime   Minoxidil (ROGAINE WOMENS EX)  Self Yes No   Sig: Apply 1 mL topically.   allopurinol (ZYLOPRIM) 100 mg tablet  Self No No   Sig: Take 1 tablet (100 mg total) by mouth daily   aspirin 81 MG tablet  Self Yes No   Sig: Take 81 mg by mouth daily.   calcium carbonate (OS-MANINDER) 600 MG tablet  Self Yes No   Sig: Take 1,200 mg by mouth daily   candesartan (ATACAND) 32 MG tablet  Self No No   Sig: Take 1 tablet (32 mg total) by mouth daily   celecoxib (CeleBREX) 200 mg capsule  Self No No   Sig: Take 1 capsule (200 mg total) by mouth 2 (two) times a day   cetirizine (ZyrTEC) 10 mg tablet  Self Yes No   Sig: Take 10 mg by mouth daily   levothyroxine 25 mcg tablet  Self No No   Sig: Take 1 tablet (25 mcg total) by mouth daily   metoprolol succinate (TOPROL-XL) 100 mg 24 hr tablet  Self No No   Sig: Take 1 tablet (100 mg total) by mouth daily   montelukast (SINGULAIR) 10 mg tablet  Self No No   Sig: TAKE 1 TABLET BY MOUTH EVERY DAY AT BEDTIME   omeprazole (PriLOSEC) 40 MG capsule  Self No No   Sig: Take 1 capsule (40 mg total) by mouth daily   potassium chloride (Klor-Con M20) 20 mEq tablet   No No    Sig: Take 1 tablet (20 mEq total) by mouth daily   pyridoxine (VITAMIN B6) 100 mg tablet  Self Yes No   Sig: Take 100 mg by mouth daily.   simvastatin (ZOCOR) 40 mg tablet  Self No No   Sig: Take 1 tablet (40 mg total) by mouth daily   sitaGLIPtin (Januvia) 100 mg tablet  Self No No   Sig: Take 1 tablet (100 mg total) by mouth daily   torsemide (DEMADEX) 20 mg tablet  Self No No   Sig: Take 1 tablet by mouth twice daily   traMADol (ULTRAM) 50 mg tablet  Self No No   Sig: Take 1 tablet (50 mg total) by mouth every 8 (eight) hours as needed for moderate pain      Facility-Administered Medications: None     Patient's Medications   Discharge Prescriptions    No medications on file     No discharge procedures on file.  ED SEPSIS DOCUMENTATION   Time reflects when diagnosis was documented in both MDM as applicable and the Disposition within this note       Time User Action Codes Description Comment    2025 11:59 PM Ya Fernandez [W55.01XA] Cat bite, initial encounter     2025 11:59 PM Ya Fernandez [L03.119] Cellulitis of hand                    [1]   Social History  Tobacco Use    Smoking status: Former     Current packs/day: 0.00     Average packs/day: 1 pack/day for 15.0 years (15.0 ttl pk-yrs)     Types: Cigarettes     Start date:      Quit date:      Years since quittin.4    Smokeless tobacco: Never    Tobacco comments:     Smoked on and off for the last few years before I quit   Vaping Use    Vaping status: Never Used   Substance Use Topics    Alcohol use: Never    Drug use: Never        Ya Fernandez DO  25 0033

## 2025-05-17 NOTE — ASSESSMENT & PLAN NOTE
Wt Readings from Last 3 Encounters:   05/17/25 65 kg (143 lb 4.8 oz)   04/04/25 64 kg (141 lb)   04/03/25 63.5 kg (140 lb)       Hx of ischemic cardiomyopathy with multiple cardiac stents and BiVICD in place  Follows with Dr. Cotto outpatient  Home regimen: Metoprolol succinate 100 mg daily, torsemide 20 mg daily  Examines euvolemic  Continue home medications  Cardiac diet  Daily weights

## 2025-05-17 NOTE — PLAN OF CARE
Problem: PAIN - ADULT  Goal: Verbalizes/displays adequate comfort level or baseline comfort level  Description: Interventions:  - Encourage patient to monitor pain and request assistance  - Assess pain using appropriate pain scale  - Administer analgesics as ordered based on type and severity of pain and evaluate response  - Implement non-pharmacological measures as appropriate and evaluate response  - Consider cultural and social influences on pain and pain management  - Notify physician/advanced practitioner if interventions unsuccessful or patient reports new pain  - Educate patient/family on pain management process including their role and importance of  reporting pain   - Provide non-pharmacologic/complimentary pain relief interventions  Outcome: Progressing     Problem: SAFETY ADULT  Goal: Patient will remain free of falls  Description: INTERVENTIONS:  - Educate patient/family on patient safety including physical limitations  - Instruct patient to call for assistance with activity   - Consider consulting OT/PT to assist with strengthening/mobility based on AM PAC & JH-HLM score  - Consult OT/PT to assist with strengthening/mobility   - Keep Call bell within reach  - Keep bed low and locked with side rails adjusted as appropriate  - Keep care items and personal belongings within reach  - Initiate and maintain comfort rounds  - Make Fall Risk Sign visible to staff  - Offer Toileting every 2 Hours, in advance of need  - Initiate/Maintain bed alarm  - Obtain necessary fall risk management equipment: yellow socks/yellow bracelet   - Apply yellow socks and bracelet for high fall risk patients  - Consider moving patient to room near nurses station  Outcome: Progressing  Goal: Maintain or return to baseline ADL function  Description: INTERVENTIONS:  -  Assess patient's ability to carry out ADLs; assess patient's baseline for ADL function and identify physical deficits which impact ability to perform ADLs (bathing,  care of mouth/teeth, toileting, grooming, dressing, etc.)  - Assess/evaluate cause of self-care deficits   - Assess range of motion  - Assess patient's mobility; develop plan if impaired  - Assess patient's need for assistive devices and provide as appropriate  - Encourage maximum independence but intervene and supervise when necessary  - Involve family in performance of ADLs  - Assess for home care needs following discharge   - Consider OT consult to assist with ADL evaluation and planning for discharge  - Provide patient education as appropriate  - Monitor functional capacity and physical performance, use of AM PAC & JH-HLM   - Monitor gait, balance and fatigue with ambulation    Outcome: Progressing  Goal: Maintains/Returns to pre admission functional level  Description: INTERVENTIONS:  - Perform AM-PAC 6 Click Basic Mobility/ Daily Activity assessment daily.  - Set and communicate daily mobility goal to care team and patient/family/caregiver.   - Collaborate with rehabilitation services on mobility goals if consulted  - Perform Range of Motion 3 times a day.  - Reposition patient every 3 hours.  - Dangle patient 3 times a day  - Stand patient 3 times a day  - Ambulate patient 3 times a day  - Out of bed to chair 3 times a day   - Out of bed for meals 3 times a day  - Out of bed for toileting  - Record patient progress and toleration of activity level   Outcome: Progressing     Problem: DISCHARGE PLANNING  Goal: Discharge to home or other facility with appropriate resources  Description: INTERVENTIONS:  - Identify barriers to discharge w/patient and caregiver  - Arrange for needed discharge resources and transportation as appropriate  - Identify discharge learning needs (meds, wound care, etc.)  - Arrange for interpretive services to assist at discharge as needed  - Refer to Case Management Department for coordinating discharge planning if the patient needs post-hospital services based on physician/advanced  practitioner order or complex needs related to functional status, cognitive ability, or social support system  Outcome: Progressing       Problem: Knowledge Deficit  Goal: Patient/family/caregiver demonstrates understanding of disease process, treatment plan, medications, and discharge instructions  Description: Complete learning assessment and assess knowledge base.  Interventions:  - Provide teaching at level of understanding  - Provide teaching via preferred learning methods  Outcome: Progressing

## 2025-05-18 LAB
ANION GAP SERPL CALCULATED.3IONS-SCNC: 7 MMOL/L (ref 4–13)
BUN SERPL-MCNC: 14 MG/DL (ref 5–25)
CALCIUM SERPL-MCNC: 9 MG/DL (ref 8.4–10.2)
CHLORIDE SERPL-SCNC: 106 MMOL/L (ref 96–108)
CO2 SERPL-SCNC: 27 MMOL/L (ref 21–32)
CREAT SERPL-MCNC: 0.85 MG/DL (ref 0.6–1.3)
ERYTHROCYTE [DISTWIDTH] IN BLOOD BY AUTOMATED COUNT: 14.1 % (ref 11.6–15.1)
GFR SERPL CREATININE-BSD FRML MDRD: 66 ML/MIN/1.73SQ M
GLUCOSE SERPL-MCNC: 131 MG/DL (ref 65–140)
GLUCOSE SERPL-MCNC: 140 MG/DL (ref 65–140)
GLUCOSE SERPL-MCNC: 147 MG/DL (ref 65–140)
GLUCOSE SERPL-MCNC: 154 MG/DL (ref 65–140)
GLUCOSE SERPL-MCNC: 163 MG/DL (ref 65–140)
HCT VFR BLD AUTO: 40 % (ref 34.8–46.1)
HGB BLD-MCNC: 12.8 G/DL (ref 11.5–15.4)
LACTATE SERPL-SCNC: 0.9 MMOL/L (ref 0.5–2)
MCH RBC QN AUTO: 30.2 PG (ref 26.8–34.3)
MCHC RBC AUTO-ENTMCNC: 32 G/DL (ref 31.4–37.4)
MCV RBC AUTO: 94 FL (ref 82–98)
PLATELET # BLD AUTO: 133 THOUSANDS/UL (ref 149–390)
PMV BLD AUTO: 9.6 FL (ref 8.9–12.7)
POTASSIUM SERPL-SCNC: 3.2 MMOL/L (ref 3.5–5.3)
RBC # BLD AUTO: 4.24 MILLION/UL (ref 3.81–5.12)
SODIUM SERPL-SCNC: 140 MMOL/L (ref 135–147)
WBC # BLD AUTO: 9.25 THOUSAND/UL (ref 4.31–10.16)

## 2025-05-18 PROCEDURE — 82948 REAGENT STRIP/BLOOD GLUCOSE: CPT

## 2025-05-18 PROCEDURE — 99233 SBSQ HOSP IP/OBS HIGH 50: CPT | Performed by: PHYSICIAN ASSISTANT

## 2025-05-18 PROCEDURE — 99232 SBSQ HOSP IP/OBS MODERATE 35: CPT | Performed by: STUDENT IN AN ORGANIZED HEALTH CARE EDUCATION/TRAINING PROGRAM

## 2025-05-18 PROCEDURE — 85027 COMPLETE CBC AUTOMATED: CPT | Performed by: INTERNAL MEDICINE

## 2025-05-18 PROCEDURE — 80048 BASIC METABOLIC PNL TOTAL CA: CPT | Performed by: INTERNAL MEDICINE

## 2025-05-18 PROCEDURE — 83605 ASSAY OF LACTIC ACID: CPT | Performed by: INTERNAL MEDICINE

## 2025-05-18 RX ORDER — POTASSIUM CHLORIDE 1500 MG/1
40 TABLET, EXTENDED RELEASE ORAL ONCE
Status: COMPLETED | OUTPATIENT
Start: 2025-05-18 | End: 2025-05-18

## 2025-05-18 RX ADMIN — AMPICILLIN SODIUM AND SULBACTAM SODIUM 3 G: 2; 1 INJECTION, POWDER, FOR SOLUTION INTRAMUSCULAR; INTRAVENOUS at 05:18

## 2025-05-18 RX ADMIN — POTASSIUM CHLORIDE 40 MEQ: 1500 TABLET, EXTENDED RELEASE ORAL at 08:16

## 2025-05-18 RX ADMIN — INSULIN LISPRO 1 UNITS: 100 INJECTION, SOLUTION INTRAVENOUS; SUBCUTANEOUS at 11:52

## 2025-05-18 RX ADMIN — ENOXAPARIN SODIUM 40 MG: 40 INJECTION SUBCUTANEOUS at 08:15

## 2025-05-18 RX ADMIN — ACETAMINOPHEN 650 MG: 325 TABLET, FILM COATED ORAL at 08:15

## 2025-05-18 RX ADMIN — INSULIN LISPRO 1 UNITS: 100 INJECTION, SOLUTION INTRAVENOUS; SUBCUTANEOUS at 16:41

## 2025-05-18 RX ADMIN — Medication 5 MG: at 21:29

## 2025-05-18 RX ADMIN — FLUOXETINE HYDROCHLORIDE 20 MG: 20 CAPSULE ORAL at 08:16

## 2025-05-18 RX ADMIN — ALLOPURINOL 100 MG: 100 TABLET ORAL at 08:16

## 2025-05-18 RX ADMIN — LOSARTAN POTASSIUM 100 MG: 50 TABLET, FILM COATED ORAL at 21:29

## 2025-05-18 RX ADMIN — PYRIDOXINE HCL TAB 50 MG 100 MG: 50 TAB at 08:15

## 2025-05-18 RX ADMIN — PANTOPRAZOLE SODIUM 40 MG: 40 TABLET, DELAYED RELEASE ORAL at 05:18

## 2025-05-18 RX ADMIN — PRAVASTATIN SODIUM 80 MG: 80 TABLET ORAL at 16:44

## 2025-05-18 RX ADMIN — AMPICILLIN SODIUM AND SULBACTAM SODIUM 3 G: 2; 1 INJECTION, POWDER, FOR SOLUTION INTRAMUSCULAR; INTRAVENOUS at 18:39

## 2025-05-18 RX ADMIN — AMPICILLIN SODIUM AND SULBACTAM SODIUM 3 G: 2; 1 INJECTION, POWDER, FOR SOLUTION INTRAMUSCULAR; INTRAVENOUS at 00:09

## 2025-05-18 RX ADMIN — AMPICILLIN SODIUM AND SULBACTAM SODIUM 3 G: 2; 1 INJECTION, POWDER, FOR SOLUTION INTRAMUSCULAR; INTRAVENOUS at 11:52

## 2025-05-18 RX ADMIN — LEVOTHYROXINE SODIUM 25 MCG: 25 TABLET ORAL at 05:18

## 2025-05-18 RX ADMIN — ASPIRIN 81 MG CHEWABLE TABLET 81 MG: 81 TABLET CHEWABLE at 08:16

## 2025-05-18 RX ADMIN — AMPICILLIN SODIUM AND SULBACTAM SODIUM 3 G: 2; 1 INJECTION, POWDER, FOR SOLUTION INTRAMUSCULAR; INTRAVENOUS at 23:23

## 2025-05-18 RX ADMIN — METOPROLOL SUCCINATE 100 MG: 50 TABLET, EXTENDED RELEASE ORAL at 08:16

## 2025-05-18 RX ADMIN — CELECOXIB 100 MG: 100 CAPSULE ORAL at 08:16

## 2025-05-18 NOTE — PLAN OF CARE
Problem: PAIN - ADULT  Goal: Verbalizes/displays adequate comfort level or baseline comfort level  Description: Interventions:  - Encourage patient to monitor pain and request assistance  - Assess pain using appropriate pain scale  - Administer analgesics as ordered based on type and severity of pain and evaluate response  - Implement non-pharmacological measures as appropriate and evaluate response  - Consider cultural and social influences on pain and pain management  - Notify physician/advanced practitioner if interventions unsuccessful or patient reports new pain  - Educate patient/family on pain management process including their role and importance of  reporting pain   - Provide non-pharmacologic/complimentary pain relief interventions  Outcome: Progressing     Problem: SAFETY ADULT  Goal: Patient will remain free of falls  Description: INTERVENTIONS:  - Educate patient/family on patient safety including physical limitations  - Instruct patient to call for assistance with activity   - Consider consulting OT/PT to assist with strengthening/mobility based on AM PAC & JH-HLM score  - Consult OT/PT to assist with strengthening/mobility   - Keep Call bell within reach  - Keep bed low and locked with side rails adjusted as appropriate  - Keep care items and personal belongings within reach  - Initiate and maintain comfort rounds  - Make Fall Risk Sign visible to staff  - Offer Toileting every 2 Hours, in advance of need  - Initiate/Maintain no alarm  - Obtain necessary fall risk management equipment: yellow socks   - Apply yellow socks and bracelet for high fall risk patients  - Consider moving patient to room near nurses station  Outcome: Progressing  Goal: Maintain or return to baseline ADL function  Description: INTERVENTIONS:  -  Assess patient's ability to carry out ADLs; assess patient's baseline for ADL function and identify physical deficits which impact ability to perform ADLs (bathing, care of  mouth/teeth, toileting, grooming, dressing, etc.)  - Assess/evaluate cause of self-care deficits   - Assess range of motion  - Assess patient's mobility; develop plan if impaired  - Assess patient's need for assistive devices and provide as appropriate  - Encourage maximum independence but intervene and supervise when necessary  - Involve family in performance of ADLs  - Assess for home care needs following discharge   - Consider OT consult to assist with ADL evaluation and planning for discharge  - Provide patient education as appropriate  - Monitor functional capacity and physical performance, use of AM PAC & JH-HLM   - Monitor gait, balance and fatigue with ambulation    Outcome: Progressing  Goal: Maintains/Returns to pre admission functional level  Description: INTERVENTIONS:  - Perform AM-PAC 6 Click Basic Mobility/ Daily Activity assessment daily.  - Set and communicate daily mobility goal to care team and patient/family/caregiver.   - Collaborate with rehabilitation services on mobility goals if consulted  - Perform Range of Motion 3 times a day.  - Reposition patient every 3 hours.  - Dangle patient 3 times a day  - Stand patient 3 times a day  - Ambulate patient 3 times a day  - Out of bed to chair 3 times a day   - Out of bed for meals 3 times a day  - Out of bed for toileting  - Record patient progress and toleration of activity level   Outcome: Progressing     Problem: DISCHARGE PLANNING  Goal: Discharge to home or other facility with appropriate resources  Description: INTERVENTIONS:  - Identify barriers to discharge w/patient and caregiver  - Arrange for needed discharge resources and transportation as appropriate  - Identify discharge learning needs (meds, wound care, etc.)  - Arrange for interpretive services to assist at discharge as needed  - Refer to Case Management Department for coordinating discharge planning if the patient needs post-hospital services based on physician/advanced practitioner  order or complex needs related to functional status, cognitive ability, or social support system  Outcome: Progressing     Problem: Knowledge Deficit  Goal: Patient/family/caregiver demonstrates understanding of disease process, treatment plan, medications, and discharge instructions  Description: Complete learning assessment and assess knowledge base.  Interventions:  - Provide teaching at level of understanding  - Provide teaching via preferred learning methods  Outcome: Progressing

## 2025-05-18 NOTE — PROGRESS NOTES
Progress Note - Hospitalist   Name: Dejah Parish 75 y.o. female I MRN: [id number]  Unit/Bed#: -01 I Date of Admission: 5/16/2025   Date of Service: 5/19/2025 I Hospital Day: 2    Assessment & Plan  Cellulitis of left hand  Pt reports being bitten by her cat overnight from 5/15 into 5/16  Cat in up-to-date on vaccines and has been otherwise been acting normally at home  Rapidly progressing swelling, erythema, and pain in L hand  Cellulitis of L hand- pictures available in the chart  Blood Cx's without growth at 24h     Plan:  Continue Unasyn, cellulitis is overall improving today  Trend fever curve and WBC count  Ortho following-no indication for surgery at this time  Continue volar splint/NWB LUE. Elevate extremity  Coronary artery disease  Hx of CAD with multiple stents and cardiomyopathy with BiV AICD in place  Continue home aspirin and statin  Chronic systolic congestive heart failure (HCC)  Wt Readings from Last 3 Encounters:   05/19/25 64.4 kg (141 lb 15.6 oz)   04/04/25 64 kg (141 lb)   04/03/25 63.5 kg (140 lb)       Hx of ischemic cardiomyopathy with multiple cardiac stents and BiVICD in place  Follows with Dr. Cotto outpatient  Home regimen: Metoprolol succinate 100 mg daily, torsemide 20 mg daily  Examines euvolemic  Continue home medications  Cardiac diet  Daily weights      Mixed hyperlipidemia  Continue statin  Acquired hypothyroidism  Continue home levothyroxine 25 mcg daily  Obstructive sleep apnea  Pt reports wearing home CPAP but does not wish to use a CPAP while in the hospital  Type 2 diabetes mellitus with stage 3 chronic kidney disease, without long-term current use of insulin (HCC)  Lab Results   Component Value Date    HGBA1C 9.8 (H) 03/03/2025       Recent Labs     05/18/25  1611 05/18/25  2045 05/19/25  0819 05/19/25  1054   POCGLU 154* 140 121 149*       Blood Sugar Average: Last 72 hrs:  (P) 165.2    Home regimen: Januvia 100 mg daily  Hold home Januvia  Start SSI while  inpatient  Gastroesophageal reflux disease with esophagitis  Continue home PPI  Hypertension  Continue home metoprolol, losartan, and ARB    VTE Pharmacologic Prophylaxis: VTE Score: 5 High Risk (Score >/= 5) - Pharmacological DVT Prophylaxis Ordered: enoxaparin (Lovenox). Sequential Compression Devices Ordered.    Mobility:   Basic Mobility Inpatient Raw Score: 24  JH-HLM Goal: 8: Walk 250 feet or more  JH-HLM Achieved: 8: Walk 250 feet ot more  JH-HLM Goal achieved. Continue to encourage appropriate mobility.    Patient Centered Rounds: I performed bedside rounds with nursing staff today.   Discussions with Specialists or Other Care Team Provider: Ortho    Education and Discussions with Family / Patient: Patient declined call to .     Current Length of Stay: 2 day(s)  Current Patient Status: Inpatient   Certification Statement: The patient will continue to require additional inpatient hospital stay due to continued IV abx  Discharge Plan: Anticipate discharge in 24-48 hrs to home.    Code Status: Level 2 - DNAR: but accepts endotracheal intubation    Subjective   Patient reports her pain is very minimal when she keeps her left forearm and hand still.  However she says with minimal movement of her hand or wrist she has significant pain.  She does feel the swelling has improved.  She denies any fever or chills overnight.  Denies numbness or tingling.  All other review of systems negative.    Objective :  Temp:  [97.8 °F (36.6 °C)] 97.8 °F (36.6 °C)  HR:  [60-66] 60  BP: (160-167)/(69-89) 167/89  SpO2:  [90 %-95 %] 90 %  O2 Device: None (Room air)    Body mass index is 29.67 kg/m².     Input and Output Summary (last 24 hours):     Intake/Output Summary (Last 24 hours) at 5/19/2025 1059  Last data filed at 5/19/2025 0925  Gross per 24 hour   Intake 716 ml   Output --   Net 716 ml       Physical Exam  Vitals and nursing note reviewed.   Constitutional:       General: She is not in acute distress.      Appearance: Normal appearance. She is normal weight. She is not ill-appearing or toxic-appearing.   HENT:      Head: Normocephalic and atraumatic.      Right Ear: External ear normal.      Left Ear: External ear normal.      Nose: Nose normal.      Mouth/Throat:      Mouth: Mucous membranes are moist.      Pharynx: Oropharynx is clear.     Eyes:      Conjunctiva/sclera: Conjunctivae normal.       Cardiovascular:      Rate and Rhythm: Normal rate and regular rhythm.      Pulses: Normal pulses.      Heart sounds: Normal heart sounds. No murmur heard.     No gallop.   Pulmonary:      Effort: Pulmonary effort is normal. No respiratory distress.      Breath sounds: Normal breath sounds. No stridor. No wheezing, rhonchi or rales.   Abdominal:      General: Abdomen is flat. There is no distension.      Palpations: Abdomen is soft. There is no mass.      Tenderness: There is no abdominal tenderness. There is no guarding or rebound.      Hernia: No hernia is present.     Musculoskeletal:         General: Normal range of motion.      Cervical back: Normal range of motion.      Comments: LUE with volar splint and ACE wrap, unwrapped with Ortho at bedside. Erythema receding from border markings. Edema improved but still with some stiffness and discomfort with passive ROM at digits, wrist and elbow     Skin:     General: Skin is warm and dry.     Neurological:      Mental Status: She is alert. Mental status is at baseline.      Sensory: No sensory deficit.      Motor: No weakness.         Lines/Drains:              Lab Results: I have reviewed the following results:   Results from last 7 days   Lab Units 05/19/25  0519   WBC Thousand/uL 6.72   HEMOGLOBIN g/dL 11.7   HEMATOCRIT % 36.6   PLATELETS Thousands/uL 137*   LYMPHO PCT % 16   MONO PCT % 6   EOS PCT % 0     Results from last 7 days   Lab Units 05/19/25  0519 05/17/25  0617 05/16/25  2224   SODIUM mmol/L 139   < > 135   POTASSIUM mmol/L 3.7   < > 3.8   CHLORIDE mmol/L 109*    < > 98   CO2 mmol/L 25   < > 28   BUN mg/dL 12   < > 25   CREATININE mg/dL 0.77   < > 0.91   ANION GAP mmol/L 5   < > 9   CALCIUM mg/dL 9.0   < > 9.7   ALBUMIN g/dL  --   --  3.5   TOTAL BILIRUBIN mg/dL  --   --  0.44   ALK PHOS U/L  --   --  63   ALT U/L  --   --  23   AST U/L  --   --  13   GLUCOSE RANDOM mg/dL 120   < > 269*    < > = values in this interval not displayed.     Results from last 7 days   Lab Units 05/17/25  0018   INR  0.90     Results from last 7 days   Lab Units 05/19/25  1054 05/19/25  0819 05/18/25  2045 05/18/25  1611 05/18/25  1042 05/18/25  0703 05/17/25  2125 05/17/25  1632 05/17/25  1033 05/17/25  0726   POC GLUCOSE mg/dl 149* 121 140 154* 163* 147* 238* 167* 216* 157*         Results from last 7 days   Lab Units 05/18/25  0445 05/17/25  1028 05/17/25  0617 05/17/25  0234 05/17/25  0018 05/16/25  2224   LACTIC ACID mmol/L 0.9 2.5* 2.6* 2.1*   < >  --    PROCALCITONIN ng/ml  --   --  0.23  --   --  0.19    < > = values in this interval not displayed.       Recent Cultures (last 7 days):   Results from last 7 days   Lab Units 05/17/25  0018   BLOOD CULTURE  No Growth at 48 hrs.  No Growth at 48 hrs.       Imaging Results Review: I reviewed radiology reports from this admission including: CT LUE.  Other Study Results Review: No additional pertinent studies reviewed.    Last 24 Hours Medication List:     Current Facility-Administered Medications:     acetaminophen (TYLENOL) tablet 650 mg, Q6H PRN    allopurinol (ZYLOPRIM) tablet 100 mg, Daily    ampicillin-sulbactam (UNASYN) 3 g in sodium chloride 0.9 % 100 mL IVPB, Q6H, Last Rate: 3 g (05/19/25 0525)    aspirin chewable tablet 81 mg, Daily    celecoxib (CeleBREX) capsule 100 mg, Daily    enoxaparin (LOVENOX) subcutaneous injection 40 mg, Daily    FLUoxetine (PROzac) capsule 20 mg, Daily    hydrALAZINE (APRESOLINE) injection 5 mg, Q6H PRN    insulin lispro (HumALOG/ADMELOG) 100 units/mL subcutaneous injection 1-5 Units, HS    insulin lispro  (HumALOG/ADMELOG) 100 units/mL subcutaneous injection 1-6 Units, TID AC **AND** Fingerstick Glucose (POCT), TID AC    levothyroxine tablet 25 mcg, Early Morning    losartan (COZAAR) tablet 100 mg, HS    melatonin tablet 5 mg, HS    metoprolol succinate (TOPROL-XL) 24 hr tablet 100 mg, Daily    pantoprazole (PROTONIX) EC tablet 40 mg, Early Morning    pravastatin (PRAVACHOL) tablet 80 mg, Daily With Dinner    pyridoxine (VITAMIN B6) tablet 100 mg, Daily    traMADol (ULTRAM) tablet 50 mg, Q8H PRN    Administrative Statements   Today, Patient Was Seen By: Carolina Cao PA-C  I have spent a total time of 40 minutes in caring for this patient on the day of the visit/encounter including Diagnostic results, Impressions, Counseling / Coordination of care, Documenting in the medical record, Reviewing/placing orders in the medical record (including tests, medications, and/or procedures), Obtaining or reviewing history  , and Communicating with other healthcare professionals .    **Please Note: This note may have been constructed using a voice recognition system.**

## 2025-05-18 NOTE — ASSESSMENT & PLAN NOTE
S/p cat bite to left dorsal aspect of hand, now with significant swelling, pain and ROM difficulties   - Symptoms are clinically improved this morning  - NWB LUE in volar splint   - CT scans reviewed and demonstrates no collection or indications of abscess.  Findings are consistent with cellulitis  - Continue IV unasyn   - warm compresses   - elevation for swelling control   - Information was communicated with Dr. Barajas, hand surgeon on-call  - Ortho will follow. We will continue to monitor for symptom improvement

## 2025-05-18 NOTE — PROGRESS NOTES
Progress Note - Orthopedics   Name: Dejah Parish 75 y.o. female I MRN: [id number]  Unit/Bed#: -01 I Date of Admission: 5/16/2025   Date of Service: 5/18/2025 I Hospital Day: 1    Assessment & Plan  Cellulitis of left hand  S/p cat bite to left dorsal aspect of hand, now with significant swelling, pain and ROM difficulties   - Symptoms are clinically improved this morning  - NWB LUE in volar splint   - CT scans reviewed and demonstrates no collection or indications of abscess.  Findings are consistent with cellulitis  - Continue IV unasyn   - warm compresses   - elevation for swelling control   - Information was communicated with Dr. Barajas, hand surgeon on-call  - Ortho will follow. We will continue to monitor for symptom improvement                Subjective   75 y.o.female with left hand and wrist swelling resulting from a cat bite.  Patient states that she feels somewhat more comfortable this morning.  Pain is well-controlled.  No other injuries or complaints.    Objective :  Temp:  [97 °F (36.1 °C)-98.1 °F (36.7 °C)] 98.1 °F (36.7 °C)  HR:  [60-64] 63  BP: (147-161)/(66-70) 161/70  Resp:  [12-18] 18  SpO2:  [90 %-94 %] 94 %  O2 Device: None (Room air)    Physical Exam  Musculoskeletal: Left hand and wrist  Punctate lesions on dorsal aspect of hand with serosanguineous drainage  Margin of erythema has receded from previous border  Painless passive range of motion of digits  Difficulty forming composite fist and supinating the forearm  Motor and sensation grossly intact  Digits warm and well-perfused

## 2025-05-18 NOTE — PLAN OF CARE
Problem: PAIN - ADULT  Goal: Verbalizes/displays adequate comfort level or baseline comfort level  Description: Interventions:  - Encourage patient to monitor pain and request assistance  - Assess pain using appropriate pain scale  - Administer analgesics as ordered based on type and severity of pain and evaluate response  - Implement non-pharmacological measures as appropriate and evaluate response  - Consider cultural and social influences on pain and pain management  - Notify physician/advanced practitioner if interventions unsuccessful or patient reports new pain  - Educate patient/family on pain management process including their role and importance of  reporting pain   - Provide non-pharmacologic/complimentary pain relief interventions  Outcome: Progressing     Problem: SAFETY ADULT  Goal: Patient will remain free of falls  Description: INTERVENTIONS:  - Educate patient/family on patient safety including physical limitations  - Instruct patient to call for assistance with activity   - Consider consulting OT/PT to assist with strengthening/mobility based on AM PAC & JH-HLM score  - Consult OT/PT to assist with strengthening/mobility   - Keep Call bell within reach  - Keep bed low and locked with side rails adjusted as appropriate  - Keep care items and personal belongings within reach  - Initiate and maintain comfort rounds  - Make Fall Risk Sign visible to staff  - Offer Toileting every 2 Hours, in advance of need  - Initiate/Maintain alarm  - Obtain necessary fall risk management equipment:   - Apply yellow socks and bracelet for high fall risk patients  - Consider moving patient to room near nurses station  Outcome: Progressing  Goal: Maintain or return to baseline ADL function  Description: INTERVENTIONS:  -  Assess patient's ability to carry out ADLs; assess patient's baseline for ADL function and identify physical deficits which impact ability to perform ADLs (bathing, care of mouth/teeth, toileting,  grooming, dressing, etc.)  - Assess/evaluate cause of self-care deficits   - Assess range of motion  - Assess patient's mobility; develop plan if impaired  - Assess patient's need for assistive devices and provide as appropriate  - Encourage maximum independence but intervene and supervise when necessary  - Involve family in performance of ADLs  - Assess for home care needs following discharge   - Consider OT consult to assist with ADL evaluation and planning for discharge  - Provide patient education as appropriate  - Monitor functional capacity and physical performance, use of AM PAC & JH-HLM   - Monitor gait, balance and fatigue with ambulation    Outcome: Progressing  Goal: Maintains/Returns to pre admission functional level  Description: INTERVENTIONS:  - Perform AM-PAC 6 Click Basic Mobility/ Daily Activity assessment daily.  - Set and communicate daily mobility goal to care team and patient/family/caregiver.   - Collaborate with rehabilitation services on mobility goals if consulted  - Perform Range of Motion 3 times a day.  - Reposition patient every 2 hours.  - Dangle patient 3 times a day  - Stand patient 3 times a day  - Ambulate patient 3 times a day  - Out of bed to chair 3 times a day   - Out of bed for meals 3 times a day  - Out of bed for toileting  - Record patient progress and toleration of activity level   Outcome: Progressing     Problem: DISCHARGE PLANNING  Goal: Discharge to home or other facility with appropriate resources  Description: INTERVENTIONS:  - Identify barriers to discharge w/patient and caregiver  - Arrange for needed discharge resources and transportation as appropriate  - Identify discharge learning needs (meds, wound care, etc.)  - Arrange for interpretive services to assist at discharge as needed  - Refer to Case Management Department for coordinating discharge planning if the patient needs post-hospital services based on physician/advanced practitioner order or complex needs  related to functional status, cognitive ability, or social support system  Outcome: Progressing     Problem: Knowledge Deficit  Goal: Patient/family/caregiver demonstrates understanding of disease process, treatment plan, medications, and discharge instructions  Description: Complete learning assessment and assess knowledge base.  Interventions:  - Provide teaching at level of understanding  - Provide teaching via preferred learning methods  Outcome: Progressing

## 2025-05-18 NOTE — ASSESSMENT & PLAN NOTE
Pt reports being bitten by her cat overnight from 5/15 into 5/16  Cat in up-to-date on vaccines and has been otherwise been acting normally at home  Rapidly progressing swelling, erythema, and pain in L hand  Cellulitis of L hand- pictures available in the chart  Blood Cx's without growth at 24h     Plan:  Continue Unasyn, cellulitis is overall improving today  Trend fever curve and WBC count  Ortho following-no indication for surgery at this time  Continue volar splint/NWB LUE. Elevate extremity

## 2025-05-18 NOTE — ASSESSMENT & PLAN NOTE
Lab Results   Component Value Date    HGBA1C 9.8 (H) 03/03/2025       Recent Labs     05/18/25  1611 05/18/25  2045 05/19/25  0819 05/19/25  1054   POCGLU 154* 140 121 149*       Blood Sugar Average: Last 72 hrs:  (P) 165.2    Home regimen: Januvia 100 mg daily  Hold home Januvia  Start SSI while inpatient

## 2025-05-18 NOTE — ASSESSMENT & PLAN NOTE
Wt Readings from Last 3 Encounters:   05/19/25 64.4 kg (141 lb 15.6 oz)   04/04/25 64 kg (141 lb)   04/03/25 63.5 kg (140 lb)       Hx of ischemic cardiomyopathy with multiple cardiac stents and BiVICD in place  Follows with Dr. Cotto outpatient  Home regimen: Metoprolol succinate 100 mg daily, torsemide 20 mg daily  Examines euvolemic  Continue home medications  Cardiac diet  Daily weights

## 2025-05-19 LAB
ANION GAP SERPL CALCULATED.3IONS-SCNC: 5 MMOL/L (ref 4–13)
BASOPHILS # BLD MANUAL: 0 THOUSAND/UL (ref 0–0.1)
BASOPHILS NFR MAR MANUAL: 0 % (ref 0–1)
BUN SERPL-MCNC: 12 MG/DL (ref 5–25)
CALCIUM SERPL-MCNC: 9 MG/DL (ref 8.4–10.2)
CHLORIDE SERPL-SCNC: 109 MMOL/L (ref 96–108)
CO2 SERPL-SCNC: 25 MMOL/L (ref 21–32)
CREAT SERPL-MCNC: 0.77 MG/DL (ref 0.6–1.3)
EOSINOPHIL # BLD MANUAL: 0 THOUSAND/UL (ref 0–0.4)
EOSINOPHIL NFR BLD MANUAL: 0 % (ref 0–6)
ERYTHROCYTE [DISTWIDTH] IN BLOOD BY AUTOMATED COUNT: 14 % (ref 11.6–15.1)
GFR SERPL CREATININE-BSD FRML MDRD: 74 ML/MIN/1.73SQ M
GLUCOSE SERPL-MCNC: 120 MG/DL (ref 65–140)
GLUCOSE SERPL-MCNC: 121 MG/DL (ref 65–140)
GLUCOSE SERPL-MCNC: 149 MG/DL (ref 65–140)
GLUCOSE SERPL-MCNC: 191 MG/DL (ref 65–140)
GLUCOSE SERPL-MCNC: 213 MG/DL (ref 65–140)
HCT VFR BLD AUTO: 36.6 % (ref 34.8–46.1)
HGB BLD-MCNC: 11.7 G/DL (ref 11.5–15.4)
LYMPHOCYTES # BLD AUTO: 1.08 THOUSAND/UL (ref 0.6–4.47)
LYMPHOCYTES # BLD AUTO: 16 % (ref 14–44)
MCH RBC QN AUTO: 29.9 PG (ref 26.8–34.3)
MCHC RBC AUTO-ENTMCNC: 32 G/DL (ref 31.4–37.4)
MCV RBC AUTO: 94 FL (ref 82–98)
METAMYELOCYTE ABSOLUTE CT: 0.13 THOUSAND/UL (ref 0–0.1)
METAMYELOCYTES NFR BLD MANUAL: 2 % (ref 0–1)
MONOCYTES # BLD AUTO: 0.4 THOUSAND/UL (ref 0–1.22)
MONOCYTES NFR BLD: 6 % (ref 4–12)
MYELOCYTE ABSOLUTE CT: 0.07 THOUSAND/UL (ref 0–0.1)
MYELOCYTES NFR BLD MANUAL: 1 % (ref 0–1)
NEUTROPHILS # BLD MANUAL: 5.04 THOUSAND/UL (ref 1.85–7.62)
NEUTS SEG NFR BLD AUTO: 75 % (ref 43–75)
PLATELET # BLD AUTO: 137 THOUSANDS/UL (ref 149–390)
PLATELET BLD QL SMEAR: ABNORMAL
PMV BLD AUTO: 9.6 FL (ref 8.9–12.7)
POTASSIUM SERPL-SCNC: 3.7 MMOL/L (ref 3.5–5.3)
RBC # BLD AUTO: 3.91 MILLION/UL (ref 3.81–5.12)
RBC MORPH BLD: NORMAL
SODIUM SERPL-SCNC: 139 MMOL/L (ref 135–147)
WBC # BLD AUTO: 6.72 THOUSAND/UL (ref 4.31–10.16)

## 2025-05-19 PROCEDURE — 82948 REAGENT STRIP/BLOOD GLUCOSE: CPT

## 2025-05-19 PROCEDURE — 85027 COMPLETE CBC AUTOMATED: CPT | Performed by: PHYSICIAN ASSISTANT

## 2025-05-19 PROCEDURE — 99233 SBSQ HOSP IP/OBS HIGH 50: CPT | Performed by: PHYSICIAN ASSISTANT

## 2025-05-19 PROCEDURE — 99232 SBSQ HOSP IP/OBS MODERATE 35: CPT | Performed by: PHYSICIAN ASSISTANT

## 2025-05-19 PROCEDURE — 85007 BL SMEAR W/DIFF WBC COUNT: CPT | Performed by: PHYSICIAN ASSISTANT

## 2025-05-19 PROCEDURE — 80048 BASIC METABOLIC PNL TOTAL CA: CPT | Performed by: PHYSICIAN ASSISTANT

## 2025-05-19 RX ADMIN — FLUOXETINE HYDROCHLORIDE 20 MG: 20 CAPSULE ORAL at 08:33

## 2025-05-19 RX ADMIN — CELECOXIB 100 MG: 100 CAPSULE ORAL at 08:33

## 2025-05-19 RX ADMIN — LOSARTAN POTASSIUM 100 MG: 50 TABLET, FILM COATED ORAL at 21:26

## 2025-05-19 RX ADMIN — ENOXAPARIN SODIUM 40 MG: 40 INJECTION SUBCUTANEOUS at 08:34

## 2025-05-19 RX ADMIN — METOPROLOL SUCCINATE 100 MG: 50 TABLET, EXTENDED RELEASE ORAL at 08:33

## 2025-05-19 RX ADMIN — AMPICILLIN SODIUM AND SULBACTAM SODIUM 3 G: 2; 1 INJECTION, POWDER, FOR SOLUTION INTRAMUSCULAR; INTRAVENOUS at 11:03

## 2025-05-19 RX ADMIN — PRAVASTATIN SODIUM 80 MG: 80 TABLET ORAL at 15:44

## 2025-05-19 RX ADMIN — AMPICILLIN SODIUM AND SULBACTAM SODIUM 3 G: 2; 1 INJECTION, POWDER, FOR SOLUTION INTRAMUSCULAR; INTRAVENOUS at 17:20

## 2025-05-19 RX ADMIN — PANTOPRAZOLE SODIUM 40 MG: 40 TABLET, DELAYED RELEASE ORAL at 05:24

## 2025-05-19 RX ADMIN — AMPICILLIN SODIUM AND SULBACTAM SODIUM 3 G: 2; 1 INJECTION, POWDER, FOR SOLUTION INTRAMUSCULAR; INTRAVENOUS at 05:25

## 2025-05-19 RX ADMIN — Medication 5 MG: at 21:26

## 2025-05-19 RX ADMIN — INSULIN LISPRO 2 UNITS: 100 INJECTION, SOLUTION INTRAVENOUS; SUBCUTANEOUS at 21:26

## 2025-05-19 RX ADMIN — ALLOPURINOL 100 MG: 100 TABLET ORAL at 08:33

## 2025-05-19 RX ADMIN — AMPICILLIN SODIUM AND SULBACTAM SODIUM 3 G: 2; 1 INJECTION, POWDER, FOR SOLUTION INTRAMUSCULAR; INTRAVENOUS at 23:44

## 2025-05-19 RX ADMIN — LEVOTHYROXINE SODIUM 25 MCG: 25 TABLET ORAL at 05:24

## 2025-05-19 RX ADMIN — PYRIDOXINE HCL TAB 50 MG 100 MG: 50 TAB at 08:33

## 2025-05-19 RX ADMIN — HYDRALAZINE HYDROCHLORIDE 5 MG: 20 INJECTION INTRAMUSCULAR; INTRAVENOUS at 15:44

## 2025-05-19 RX ADMIN — ASPIRIN 81 MG CHEWABLE TABLET 81 MG: 81 TABLET CHEWABLE at 08:33

## 2025-05-19 RX ADMIN — INSULIN LISPRO 1 UNITS: 100 INJECTION, SOLUTION INTRAVENOUS; SUBCUTANEOUS at 15:47

## 2025-05-19 NOTE — ASSESSMENT & PLAN NOTE
S/p cat bite to left dorsal aspect of hand,  - Symptoms clinically continue to improve on IV antibiotics.  Margin lines appear less with erythema and swelling as previously marked  - NWB LUE in volar splint   - Previous CT scans reviewed and demonstrates no collection or indications of abscess.  Findings are consistent with cellulitis  - Continue IV unasyn   - warm compresses   - elevation for swelling control   - Ortho will follow. We will continue to monitor for symptom improvement  White blood cell count down to 6.72 from yesterday 9.25, and previously 13.40

## 2025-05-19 NOTE — ASSESSMENT & PLAN NOTE
Pt reports being bitten by her cat overnight from 5/15 into 5/16  Cat in up-to-date on vaccines and has been otherwise been acting normally at home  Rapidly progressing swelling, erythema, and pain in L hand  Cellulitis of L hand- pictures available in the chart  Blood Cx's without growth at 24h     Plan:  Continue Unasyn, cellulitis continues to improve clinically   Trend fever curve and WBC count  Ortho following-no indication for surgery   Continue volar splint/NWB LUE. Elevate extremity  Given continued improvement on Unasyn, will plan to transition to PO Augmentin tomorrow

## 2025-05-19 NOTE — PROGRESS NOTES
Progress Note - Orthopedics   Name: Dejah Parish 75 y.o. female I MRN: [id number]  Unit/Bed#: -01 I Date of Admission: 5/16/2025   Date of Service: 5/19/2025 I Hospital Day: 2     Assessment & Plan  Cellulitis of left hand  S/p cat bite to left dorsal aspect of hand,  - Symptoms clinically continue to improve on IV antibiotics.  Margin lines appear less with erythema and swelling as previously marked  - NWB LUE in volar splint   - Previous CT scans reviewed and demonstrates no collection or indications of abscess.  Findings are consistent with cellulitis  - Continue IV unasyn   - warm compresses   - elevation for swelling control   - Ortho will follow. We will continue to monitor for symptom improvement  White blood cell count down to 6.72 from yesterday 9.25, and previously 13.40            Orthopedics service will follow.  Please contact the SecureChat role for the Orthopedics service with any questions/concerns.    Subjective   75 y.o.female, following for left hand cat bite with associated wrist swelling and discomfort.  Patient states that she feels she is improving.  Little pain at rest, some pain with attempts of movement of digits.  She states her pain is controlled.  No other complaints.  Patient states that the wrist area is improved.  She still has swelling on the dorsal aspect of hand.  Objective :  Temp:  [97.8 °F (36.6 °C)] 97.8 °F (36.6 °C)  HR:  [60-66] 60  BP: (160-167)/(69-89) 167/89  SpO2:  [90 %-95 %] 90 %  O2 Device: None (Room air)    Physical Exam  Musculoskeletal: Left hand  Wound over dorsal hand from previous cat bite, slight serosanguineous drainage.  Per margin lines previously has certainly appeared that the erythema is less.  Patient has only minor discomfort with passive range of motion with extension of her digits, cannot fully flex her digits into a fist due to swelling.  Less than 2 seconds capillary refill.  2+ radial pulse left wrist  Motor or sensory grossly  "intact.      Lab Results: I have reviewed the following results:  Recent Labs     05/16/25  2224 05/17/25  0018 05/17/25  0617 05/18/25  0445 05/19/25  0519   WBC 13.91*  --  13.40* 9.25 6.72   HGB 13.4  --  13.6 12.8 11.7   HCT 39.5  --  40.9 40.0 36.6   *  --  134* 133* 137*   BUN 25  --  20 14 12   CREATININE 0.91  --  0.82 0.85 0.77   PTT  --  22*  --   --   --    INR  --  0.90  --   --   --    ESR 6  --   --   --   --    CRP 30.3*  --  75.1*  --   --      Blood Culture:    Lab Results   Component Value Date    BLOODCX No Growth at 48 hrs. 05/17/2025    BLOODCX No Growth at 48 hrs. 05/17/2025     Wound Culture: No results found for: \"WOUNDCULT\"        "

## 2025-05-19 NOTE — CASE MANAGEMENT
Case Management Assessment & Discharge Planning Note    Patient name Dejah Parish  Location /-01 MRN [id number]  : 1950 Date 2025       Current Admission Date: 2025  Current Admission Diagnosis:Cellulitis of left hand   Patient Active Problem List    Diagnosis Date Noted    Cellulitis of left hand 2025    Hypertension 2025    Type 2 diabetes mellitus with hyperglycemia, without long-term current use of insulin (HCC) 2025    Pain of left hip 2024    Ischial bursitis of left side 2024    Tremor of both hands 2023    Continuous opioid dependence ([facility]) 2023    Pulmonary emphysema, unspecified emphysema type ([facility]) 2023    Dilated cardiomyopathy ([facility])     PVC's (premature ventricular contractions) 06/10/2020    Chronic left shoulder pain 06/10/2020    Abnormal computerized axial tomography of abdomen 2019    Hyperparathyroidism ([facility]) 2019    Dense breast tissue 2018    Family history of breast cancer 2018    Hypercalcemia 2018    Leukocytosis 2018    Lumbar spondylosis 10/10/2018    Chronic left-sided low back pain without sciatica 10/10/2018    Bilateral fibrocystic breast changes 2017    Benign positional vertigo, left 2017    Gout 2017    Trigger little finger of left hand 2017    Arthralgia of knee, left 2016    Arthralgia of knee, right 2016    Stage 3a chronic kidney disease (HCC) 10/07/2015    Biventricular ICD (implantable cardioverter-defibrillator) in place 2014    Hypokalemia 2013    Mixed hyperlipidemia 2013    Vitamin D deficiency 2013    Psoriasis 2012    Allergic rhinitis 2012    Coronary artery disease 2012    Chronic systolic congestive heart failure (HCC) 2012    DDD (degenerative disc disease), cervical 2012    Acquired hypothyroidism 2012    Obstructive sleep apnea 2012     Osteoarthritis 06/14/2012    Overactive bladder 06/14/2012    Type 2 diabetes mellitus with stage 3 chronic kidney disease, without long-term current use of insulin (HCC) 06/14/2012    Gastroesophageal reflux disease with esophagitis 06/14/2012    Hypertensive heart and chronic kidney disease with heart failure and stage 1 through stage 4 chronic kidney disease, or chronic kidney disease (HCC) 06/14/2012    Current moderate episode of major depressive disorder without prior episode (HCC) 05/10/2012      LOS (days): 2  Geometric Mean LOS (GMLOS) (days): 3.1  Days to GMLOS:0.5     OBJECTIVE:    Risk of Unplanned Readmission Score: 12.51         Current admission status: Inpatient       Preferred Pharmacy:   [facility] [address] [address]  Phone: [phone] Fax: [phone]    [facility]/pharmacy #[id number] [place], [place] - [address] [address]  Phone: [phone] Fax: [phone]    [facility] [address] [place] [address]  Phone: [phone] Fax: [phone]    Primary Care Provider: Leann Henson DO    Primary Insurance: MEDICARE  Secondary Insurance: [employer]ET[employer]    ASSESSMENT:  Active Health Care Proxies    There are no active Health Care Proxies on file.       Advance Directives  Does patient have a Health Care POA?: Yes  Does patient have Advance Directives?: Yes  Advance Directives: Living will, Power of  for health care  Primary Contact: Tamia Craven dtr         Readmission Root Cause  30 Day Readmission: No    Patient Information  Admitted from:: Home  Mental Status: Alert  During Assessment patient was accompanied by: Not accompanied during assessment  Assessment information provided by:: Patient  Primary Caregiver: Self  Support Systems: Friend, Friends/neighbors, Son, Self  County of Residence: [place]  What city do you  live in?: [place]  Home entry access options. Select all that apply.: Stairs  Number of steps to enter home.: 2  Type of Current Residence: 2 story home  Upon entering residence, is there a bedroom on the main floor (no further steps)?: No  A bedroom is located on the following floor levels of residence (select all that apply):: 2nd Floor  Upon entering residence, is there a bathroom on the main floor (no further steps)?: Yes  Number of steps to 2nd floor from main floor: One Flight  Living Arrangements: Lives Alone    Activities of Daily Living Prior to Admission  Completes ADLs independently?: Yes  Ambulates independently?: Yes  Does patient use assisted devices?: No  Does patient currently own DME?: No  Does patient have a history of Outpatient Therapy (PT/OT)?: No  Does the patient have a history of Short-Term Rehab?: No  Does patient have a history of HHC?: No  Does patient currently have HHC?: No         Patient Information Continued  Income Source: Pension/prison  Does patient have prescription coverage?: Yes  Can the patient afford their medications and any related supplies (such as glucometers or test strips)?: Yes  Does patient receive dialysis treatments?: No  Does patient have a history of substance abuse?: No  Does patient have a history of Mental Health Diagnosis?: No         Means of Transportation  Means of Transport to Appts:: Drives Self          DISCHARGE DETAILS:    Discharge planning discussed with:: Patient  Freedom of Choice: Yes     CM contacted family/caregiver?: No- see comments (Pt is alert and oriented)  Were Treatment Team discharge recommendations reviewed with patient/caregiver?: Yes  Did patient/caregiver verbalize understanding of patient care needs?: Yes  Were patient/caregiver advised of the risks associated with not following Treatment Team discharge recommendations?: Yes         Requested Home Health Care         Is the patient interested in HHC at discharge?: No    DME  Referral Provided  Referral made for DME?: No              Treatment Team Recommendation: Home  Discharge Destination Plan:: Home  Transport at Discharge : Family                             IMM Given (Date):: 05/19/25 (1019am)  IMM Given to:: Patient     Additional Comments: CM met with pt to discuss role of CM and any needs prior to discharge. Pt resides alone in 2SH w/ 2STE and 2nd flr setup. Indp PTA. No DME. Pt owns CPAP. No hx STR/OP PT/HH/DA/MH. pt is retired and drives. [facility]. Family will transport at discharge.

## 2025-05-19 NOTE — PROGRESS NOTES
Progress Note - Hospitalist   Name: Dejah Parish 75 y.o. female I MRN: [id number]  Unit/Bed#: -01 I Date of Admission: 5/16/2025   Date of Service: 5/19/2025 I Hospital Day: 2    Assessment & Plan  Cellulitis of left hand  Pt reports being bitten by her cat overnight from 5/15 into 5/16  Cat in up-to-date on vaccines and has been otherwise been acting normally at home  Rapidly progressing swelling, erythema, and pain in L hand  Cellulitis of L hand- pictures available in the chart  Blood Cx's without growth at 24h     Plan:  Continue Unasyn, cellulitis continues to improve clinically   Trend fever curve and WBC count  Ortho following-no indication for surgery   Continue volar splint/NWB LUE. Elevate extremity  Given continued improvement on Unasyn, will plan to transition to PO Augmentin tomorrow   Coronary artery disease  Hx of CAD with multiple stents and cardiomyopathy with BiV AICD in place  Continue home aspirin and statin  Chronic systolic congestive heart failure (HCC)  Wt Readings from Last 3 Encounters:   05/19/25 64.4 kg (141 lb 15.6 oz)   04/04/25 64 kg (141 lb)   04/03/25 63.5 kg (140 lb)       Hx of ischemic cardiomyopathy with multiple cardiac stents and BiVICD in place  Follows with Dr. Cotto outpatient  Home regimen: Metoprolol succinate 100 mg daily, torsemide 20 mg daily  Examines euvolemic  Continue home medications  Cardiac diet  Daily weights      Mixed hyperlipidemia  Continue statin  Acquired hypothyroidism  Continue home levothyroxine 25 mcg daily  Obstructive sleep apnea  Pt reports wearing home CPAP but does not wish to use a CPAP while in the hospital  Type 2 diabetes mellitus with stage 3 chronic kidney disease, without long-term current use of insulin (HCC)  Lab Results   Component Value Date    HGBA1C 9.8 (H) 03/03/2025       Recent Labs     05/18/25  1611 05/18/25  2045 05/19/25  0819 05/19/25  1054   POCGLU 154* 140 121 149*       Blood Sugar Average: Last 72 hrs:  (P)  165.2    Home regimen: Januvia 100 mg daily  Hold home Januvia  Start SSI while inpatient  Gastroesophageal reflux disease with esophagitis  Continue home PPI  Hypertension  Continue home metoprolol, losartan, and ARB  Torsemide BID  PRN hydral     VTE Pharmacologic Prophylaxis: VTE Score: 5 High Risk (Score >/= 5) - Pharmacological DVT Prophylaxis Ordered: enoxaparin (Lovenox). Sequential Compression Devices Ordered.    Mobility:   Basic Mobility Inpatient Raw Score: 24  JH-HLM Goal: 8: Walk 250 feet or more  JH-HLM Achieved: 8: Walk 250 feet ot more  JH-HLM Goal achieved. Continue to encourage appropriate mobility.    Patient Centered Rounds: I performed bedside rounds with nursing staff today.   Discussions with Specialists or Other Care Team Provider: Ortho, nursing    Education and Discussions with Family / Patient: Patient declined call to .     Current Length of Stay: 2 day(s)  Current Patient Status: Inpatient   Certification Statement: The patient will continue to require additional inpatient hospital stay due to IV abx for LUE cellulitis  Discharge Plan: Anticipate discharge in 24-48 hrs to home.    Code Status: Level 2 - DNAR: but accepts endotracheal intubation    Subjective   Patient states she feels better today and notes improvement in her pain and swelling. She reports doing the exercises instructed by Ortho and has noticed less stiffness of her fingers and wrist.  She denies fever or chills. Has somewhat of a decrease appetite since admission but denies nausea and vomiting. Denies constipation or diarrhea. All other ROS negative.     Objective :  Temp:  [97.8 °F (36.6 °C)] 97.8 °F (36.6 °C)  HR:  [60-66] 60  BP: (160-167)/(69-89) 167/89  SpO2:  [90 %-95 %] 90 %  O2 Device: None (Room air)    Body mass index is 29.67 kg/m².     Input and Output Summary (last 24 hours):     Intake/Output Summary (Last 24 hours) at 5/19/2025 1122  Last data filed at 5/19/2025 0925  Gross per 24 hour    Intake 716 ml   Output --   Net 716 ml       Physical Exam  Vitals and nursing note reviewed.   Constitutional:       General: She is not in acute distress.     Appearance: Normal appearance. She is normal weight. She is not ill-appearing or toxic-appearing.   HENT:      Head: Normocephalic and atraumatic.      Right Ear: External ear normal.      Left Ear: External ear normal.      Nose: Nose normal.      Mouth/Throat:      Mouth: Mucous membranes are moist.      Pharynx: Oropharynx is clear.     Eyes:      Conjunctiva/sclera: Conjunctivae normal.       Cardiovascular:      Rate and Rhythm: Normal rate and regular rhythm.      Pulses: Normal pulses.      Heart sounds: Normal heart sounds. No murmur heard.     No gallop.   Pulmonary:      Effort: Pulmonary effort is normal. No respiratory distress.      Breath sounds: Normal breath sounds. No stridor. No wheezing, rhonchi or rales.   Abdominal:      General: Abdomen is flat. There is no distension.      Palpations: Abdomen is soft. There is no mass.      Tenderness: There is no abdominal tenderness. There is no guarding or rebound.      Hernia: No hernia is present.     Musculoskeletal:         General: Swelling present. Normal range of motion.      Cervical back: Normal range of motion.      Comments: UE with volar splint and ACE wrap, peripheral edema appears markedly improved. Pt with significant improvement in active ROM at elbow, wrist and digits of LUE as well       Neurological:      Mental Status: She is alert. Mental status is at baseline.     Psychiatric:         Mood and Affect: Mood normal.         Lines/Drains:              Lab Results: I have reviewed the following results:   Results from last 7 days   Lab Units 05/19/25  0519   WBC Thousand/uL 6.72   HEMOGLOBIN g/dL 11.7   HEMATOCRIT % 36.6   PLATELETS Thousands/uL 137*   LYMPHO PCT % 16   MONO PCT % 6   EOS PCT % 0     Results from last 7 days   Lab Units 05/19/25  0519 05/17/25  0617  05/16/25  2224   SODIUM mmol/L 139   < > 135   POTASSIUM mmol/L 3.7   < > 3.8   CHLORIDE mmol/L 109*   < > 98   CO2 mmol/L 25   < > 28   BUN mg/dL 12   < > 25   CREATININE mg/dL 0.77   < > 0.91   ANION GAP mmol/L 5   < > 9   CALCIUM mg/dL 9.0   < > 9.7   ALBUMIN g/dL  --   --  3.5   TOTAL BILIRUBIN mg/dL  --   --  0.44   ALK PHOS U/L  --   --  63   ALT U/L  --   --  23   AST U/L  --   --  13   GLUCOSE RANDOM mg/dL 120   < > 269*    < > = values in this interval not displayed.     Results from last 7 days   Lab Units 05/17/25  0018   INR  0.90     Results from last 7 days   Lab Units 05/19/25  1054 05/19/25  0819 05/18/25  2045 05/18/25  1611 05/18/25  1042 05/18/25  0703 05/17/25  2125 05/17/25  1632 05/17/25  1033 05/17/25  0726   POC GLUCOSE mg/dl 149* 121 140 154* 163* 147* 238* 167* 216* 157*         Results from last 7 days   Lab Units 05/18/25  0445 05/17/25  1028 05/17/25  0617 05/17/25  0234 05/17/25  0018 05/16/25  2224   LACTIC ACID mmol/L 0.9 2.5* 2.6* 2.1*   < >  --    PROCALCITONIN ng/ml  --   --  0.23  --   --  0.19    < > = values in this interval not displayed.       Recent Cultures (last 7 days):   Results from last 7 days   Lab Units 05/17/25  0018   BLOOD CULTURE  No Growth at 48 hrs.  No Growth at 48 hrs.       Imaging Results Review: I reviewed radiology reports from this admission including: CT LUE.  Other Study Results Review: No additional pertinent studies reviewed.    Last 24 Hours Medication List:     Current Facility-Administered Medications:     acetaminophen (TYLENOL) tablet 650 mg, Q6H PRN    allopurinol (ZYLOPRIM) tablet 100 mg, Daily    [START ON 5/20/2025] amoxicillin-clavulanate (AUGMENTIN) 875-125 mg per tablet 1 tablet, Q12H LISHA    ampicillin-sulbactam (UNASYN) 3 g in sodium chloride 0.9 % 100 mL IVPB, Q6H    aspirin chewable tablet 81 mg, Daily    celecoxib (CeleBREX) capsule 100 mg, Daily    enoxaparin (LOVENOX) subcutaneous injection 40 mg, Daily    FLUoxetine (PROzac)  capsule 20 mg, Daily    hydrALAZINE (APRESOLINE) injection 5 mg, Q6H PRN    insulin lispro (HumALOG/ADMELOG) 100 units/mL subcutaneous injection 1-5 Units, HS    insulin lispro (HumALOG/ADMELOG) 100 units/mL subcutaneous injection 1-6 Units, TID AC **AND** Fingerstick Glucose (POCT), TID AC    levothyroxine tablet 25 mcg, Early Morning    losartan (COZAAR) tablet 100 mg, HS    melatonin tablet 5 mg, HS    metoprolol succinate (TOPROL-XL) 24 hr tablet 100 mg, Daily    pantoprazole (PROTONIX) EC tablet 40 mg, Early Morning    pravastatin (PRAVACHOL) tablet 80 mg, Daily With Dinner    pyridoxine (VITAMIN B6) tablet 100 mg, Daily    traMADol (ULTRAM) tablet 50 mg, Q8H PRN    Administrative Statements   Today, Patient Was Seen By: Carolina Cao PA-C  I have spent a total time of 45 minutes in caring for this patient on the day of the visit/encounter including Diagnostic results, Prognosis, Impressions, Counseling / Coordination of care, Documenting in the medical record, Reviewing/placing orders in the medical record (including tests, medications, and/or procedures), Obtaining or reviewing history  , and Communicating with other healthcare professionals .    **Please Note: This note may have been constructed using a voice recognition system.**

## 2025-05-20 LAB
ERYTHROCYTE [DISTWIDTH] IN BLOOD BY AUTOMATED COUNT: 14.1 % (ref 11.6–15.1)
GLUCOSE SERPL-MCNC: 129 MG/DL (ref 65–140)
GLUCOSE SERPL-MCNC: 147 MG/DL (ref 65–140)
GLUCOSE SERPL-MCNC: 175 MG/DL (ref 65–140)
GLUCOSE SERPL-MCNC: 179 MG/DL (ref 65–140)
HCT VFR BLD AUTO: 36.3 % (ref 34.8–46.1)
HGB BLD-MCNC: 12.1 G/DL (ref 11.5–15.4)
MCH RBC QN AUTO: 31.3 PG (ref 26.8–34.3)
MCHC RBC AUTO-ENTMCNC: 33.3 G/DL (ref 31.4–37.4)
MCV RBC AUTO: 94 FL (ref 82–98)
NRBC BLD AUTO-RTO: 0 /100 WBCS
PLATELET # BLD AUTO: 147 THOUSANDS/UL (ref 149–390)
PMV BLD AUTO: 9.6 FL (ref 8.9–12.7)
RBC # BLD AUTO: 3.86 MILLION/UL (ref 3.81–5.12)
WBC # BLD AUTO: 5.25 THOUSAND/UL (ref 4.31–10.16)

## 2025-05-20 PROCEDURE — 99232 SBSQ HOSP IP/OBS MODERATE 35: CPT | Performed by: STUDENT IN AN ORGANIZED HEALTH CARE EDUCATION/TRAINING PROGRAM

## 2025-05-20 PROCEDURE — 99232 SBSQ HOSP IP/OBS MODERATE 35: CPT | Performed by: PHYSICIAN ASSISTANT

## 2025-05-20 PROCEDURE — 82948 REAGENT STRIP/BLOOD GLUCOSE: CPT

## 2025-05-20 PROCEDURE — 85027 COMPLETE CBC AUTOMATED: CPT | Performed by: PHYSICIAN ASSISTANT

## 2025-05-20 RX ADMIN — AMPICILLIN SODIUM AND SULBACTAM SODIUM 3 G: 2; 1 INJECTION, POWDER, FOR SOLUTION INTRAMUSCULAR; INTRAVENOUS at 11:48

## 2025-05-20 RX ADMIN — PYRIDOXINE HCL TAB 50 MG 100 MG: 50 TAB at 08:39

## 2025-05-20 RX ADMIN — AMOXICILLIN AND CLAVULANATE POTASSIUM 1 TABLET: 875; 125 TABLET, FILM COATED ORAL at 08:39

## 2025-05-20 RX ADMIN — Medication 5 MG: at 22:00

## 2025-05-20 RX ADMIN — AMPICILLIN SODIUM AND SULBACTAM SODIUM 3 G: 2; 1 INJECTION, POWDER, FOR SOLUTION INTRAMUSCULAR; INTRAVENOUS at 16:52

## 2025-05-20 RX ADMIN — LOSARTAN POTASSIUM 100 MG: 50 TABLET, FILM COATED ORAL at 22:01

## 2025-05-20 RX ADMIN — ENOXAPARIN SODIUM 40 MG: 40 INJECTION SUBCUTANEOUS at 08:39

## 2025-05-20 RX ADMIN — PANTOPRAZOLE SODIUM 40 MG: 40 TABLET, DELAYED RELEASE ORAL at 05:04

## 2025-05-20 RX ADMIN — CELECOXIB 100 MG: 100 CAPSULE ORAL at 08:39

## 2025-05-20 RX ADMIN — ASPIRIN 81 MG CHEWABLE TABLET 81 MG: 81 TABLET CHEWABLE at 08:39

## 2025-05-20 RX ADMIN — ALLOPURINOL 100 MG: 100 TABLET ORAL at 08:39

## 2025-05-20 RX ADMIN — FLUOXETINE HYDROCHLORIDE 20 MG: 20 CAPSULE ORAL at 08:39

## 2025-05-20 RX ADMIN — INSULIN LISPRO 1 UNITS: 100 INJECTION, SOLUTION INTRAVENOUS; SUBCUTANEOUS at 21:57

## 2025-05-20 RX ADMIN — PRAVASTATIN SODIUM 80 MG: 80 TABLET ORAL at 16:52

## 2025-05-20 RX ADMIN — METOPROLOL SUCCINATE 100 MG: 50 TABLET, EXTENDED RELEASE ORAL at 08:39

## 2025-05-20 RX ADMIN — LEVOTHYROXINE SODIUM 25 MCG: 25 TABLET ORAL at 05:03

## 2025-05-20 NOTE — ASSESSMENT & PLAN NOTE
Wt Readings from Last 3 Encounters:   05/20/25 64.8 kg (142 lb 13.7 oz)   04/04/25 64 kg (141 lb)   04/03/25 63.5 kg (140 lb)     H/o ischemic cardiomyopathy; know to Dr. Cotto   Home regimen: Toprol- mg daily, losartan 100 mg daily, torsemide 20 mg BID   Examines euvolemic, wt stable   Resume torsemide when no longer NPO status

## 2025-05-20 NOTE — ASSESSMENT & PLAN NOTE
S/p cat bite to left dorsal aspect of hand,  - Symptoms clinically continue to improve on IV antibiotics.  Margin lines appear less with erythema and swelling as previously marked  - NWB LUE in volar splint   - Previous CT scans reviewed and demonstrates no collection or indications of abscess.  Findings are consistent with cellulitis  - Continue IV unasyn, can switch to PO abx per primary once improved  - warm compresses   - elevation for swelling control   - Ortho will follow. We will continue to monitor for symptom improvement  White blood cell count down to 5.25 from yesterday 6.72  Follow up as outpatient

## 2025-05-20 NOTE — PLAN OF CARE
Problem: PAIN - ADULT  Goal: Verbalizes/displays adequate comfort level or baseline comfort level  Description: Interventions:  - Encourage patient to monitor pain and request assistance  - Assess pain using appropriate pain scale  - Administer analgesics as ordered based on type and severity of pain and evaluate response  - Implement non-pharmacological measures as appropriate and evaluate response  - Consider cultural and social influences on pain and pain management  - Notify physician/advanced practitioner if interventions unsuccessful or patient reports new pain  - Educate patient/family on pain management process including their role and importance of  reporting pain   - Provide non-pharmacologic/complimentary pain relief interventions  Outcome: Progressing     Problem: SAFETY ADULT  Goal: Patient will remain free of falls  Description: INTERVENTIONS:  - Educate patient/family on patient safety including physical limitations  - Instruct patient to call for assistance with activity   - Consider consulting OT/PT to assist with strengthening/mobility based on AM PAC & JH-HLM score  - Consult OT/PT to assist with strengthening/mobility   - Keep Call bell within reach  - Keep bed low and locked with side rails adjusted as appropriate  - Keep care items and personal belongings within reach  - Initiate and maintain comfort rounds  - Make Fall Risk Sign visible to staff  - Offer Toileting every x Hours, in advance of need  - Initiate/Maintain xalarm  - Obtain necessary fall risk management equipment: x  - Apply yellow socks and bracelet for high fall risk patients  - Consider moving patient to room near nurses station  Outcome: Progressing  Goal: Maintain or return to baseline ADL function  Description: INTERVENTIONS:  -  Assess patient's ability to carry out ADLs; assess patient's baseline for ADL function and identify physical deficits which impact ability to perform ADLs (bathing, care of mouth/teeth, toileting,  grooming, dressing, etc.)  - Assess/evaluate cause of self-care deficits   - Assess range of motion  - Assess patient's mobility; develop plan if impaired  - Assess patient's need for assistive devices and provide as appropriate  - Encourage maximum independence but intervene and supervise when necessary  - Involve family in performance of ADLs  - Assess for home care needs following discharge   - Consider OT consult to assist with ADL evaluation and planning for discharge  - Provide patient education as appropriate  - Monitor functional capacity and physical performance, use of AM PAC & -HLM   - Monitor gait, balance and fatigue with ambulation    Outcome: Progressing  Goal: Maintains/Returns to pre admission functional level  Description: INTERVENTIONS:  - Perform AM-PAC 6 Click Basic Mobility/ Daily Activity assessment daily.  - Set and communicate daily mobility goal to care team and patient/family/caregiver.   - Collaborate with rehabilitation services on mobility goals if consulted  - Perform Range of Motion x times a day.  - Reposition patient every x hours.  - Dangle patient x times a day  - Stand patient x times a day  - Ambulate patient x times a day  - Out of bed to chair x times a day   - Out of bed for meals x times a day  - Out of bed for toileting  - Record patient progress and toleration of activity level   Outcome: Progressing     Problem: DISCHARGE PLANNING  Goal: Discharge to home or other facility with appropriate resources  Description: INTERVENTIONS:  - Identify barriers to discharge w/patient and caregiver  - Arrange for needed discharge resources and transportation as appropriate  - Identify discharge learning needs (meds, wound care, etc.)  - Arrange for interpretive services to assist at discharge as needed  - Refer to Case Management Department for coordinating discharge planning if the patient needs post-hospital services based on physician/advanced practitioner order or complex needs  related to functional status, cognitive ability, or social support system  Outcome: Progressing     Problem: Knowledge Deficit  Goal: Patient/family/caregiver demonstrates understanding of disease process, treatment plan, medications, and discharge instructions  Description: Complete learning assessment and assess knowledge base.  Interventions:  - Provide teaching at level of understanding  - Provide teaching via preferred learning methods  Outcome: Progressing

## 2025-05-20 NOTE — PROGRESS NOTES
Progress Note - Orthopedics   Name: Dejah Parish 75 y.o. female I MRN: [id number]  Unit/Bed#: -01 I Date of Admission: 5/16/2025   Date of Service: 5/20/2025 I Hospital Day: 3    Assessment & Plan  Cellulitis of left hand  S/p cat bite to left dorsal aspect of hand,  - Symptoms clinically continue to improve on IV antibiotics.  Margin lines appear less with erythema and swelling as previously marked  - NWB LUE in volar splint   - Previous CT scans reviewed and demonstrates no collection or indications of abscess.  Findings are consistent with cellulitis  - Continue IV unasyn, can switch to PO abx per primary once improved  - warm compresses   - elevation for swelling control   - Ortho will follow. We will continue to monitor for symptom improvement  White blood cell count down to 5.25 from yesterday 6.72  Follow up as outpatient           Subjective   75 y.o.female seen and examined at bedside, notes that she continues to have pain and limited ROM, has noticed improvement.  No acute events, no new complaints. Pain well controlled. Denies fevers, chills, CP, SOB, N/V, numbness or tingling.    Objective :  Temp:  [97.6 °F (36.4 °C)-98 °F (36.7 °C)] 98 °F (36.7 °C)  HR:  [60-66] 66  BP: (162-167)/(68-89) 162/68  Resp:  [17] 17  SpO2:  [98 %] 98 %  O2 Device: None (Room air)    Physical Exam  Musculoskeletal: Left hand  Skin with swelling and erythema over dorsal hand with improved margins, erythema has improved along with swelling   Splint in place which was removed for physical exam  TTP dorsal hand  Diminished active flexion of all digits at dip and pip joint due to pain  Unable to make full composite fist  Dressing: gauze shows scant serosanguineous drainage with dorsal hand puncture wound, no active drainage  Sensation intact to median/radial/ulnar nerve distribution   Motor intact anterior interosseous nerve/posterior interosseous nerve/median/radial/ulnar nerve distributions  2+ radial pulse      Lab  "Results: I have reviewed the following results:  Recent Labs     05/18/25  0445 05/19/25  0519 05/20/25  0509   WBC 9.25 6.72 5.25   HGB 12.8 11.7 12.1   HCT 40.0 36.6 36.3   * 137* 147*   BUN 14 12  --    CREATININE 0.85 0.77  --      Blood Culture:    Lab Results   Component Value Date    BLOODCX No Growth at 48 hrs. 05/17/2025    BLOODCX No Growth at 48 hrs. 05/17/2025     Wound Culture: No results found for: \"WOUNDCULT\"    "

## 2025-05-20 NOTE — ASSESSMENT & PLAN NOTE
Pt reports being bitten by her cat in the evening on 05/15.  Presented to ED w/ Rapidly progressing swelling, erythema, and pain in L hand.   Cat UTD w/ vaccines; cat behavior is normal  CT LUE w/ contrast consistent w/ cellulitis, no gas/fluid collection  BCx w/o growth x 72 hrs   C/w Unasyn, ABX day #4  Consider transition to Augmentin w/ clinical improvement   Orthopedic/hand following  Some mild expansion of erythema past initial skin outline; pain w/ PROM of digits 2-5   Ortho to re-eval, NPO for now   C/w volar splint/NWB LUE; elevate extremity  WBC downtrending     Recent Labs     05/18/25  0445 05/19/25  0519 05/20/25  0509   WBC 9.25 6.72 5.25

## 2025-05-20 NOTE — PROGRESS NOTES
Progress Note - Hospitalist   Name: Dejah Parish 75 y.o. female I MRN: [id number]  Unit/Bed#: -[facility] I Date of Admission: 5/16/2025   Date of Service: 5/20/2025 I Hospital Day: 3    Assessment & Plan  Cellulitis of left hand  Pt reports being bitten by her cat in the evening on 05/15.  Presented to ED w/ Rapidly progressing swelling, erythema, and pain in L hand.   Cat UTD w/ vaccines; cat behavior is normal  CT LUE w/ contrast consistent w/ cellulitis, no gas/fluid collection  BCx w/o growth x 72 hrs   C/w Unasyn, ABX day #4  Consider transition to Augmentin w/ clinical improvement   Orthopedic/hand following  Some mild expansion of erythema past initial skin outline; pain w/ PROM of digits 2-5   Ortho to re-eval, NPO for now   C/w volar splint/NWB LUE; elevate extremity  WBC downtrending     Recent Labs     05/18/25  0445 05/19/25  0519 05/20/25  0509   WBC 9.25 6.72 5.25     Chronic systolic congestive heart failure (HCC)  Wt Readings from Last 3 Encounters:   05/20/25 64.8 kg (142 lb 13.7 oz)   04/04/25 64 kg (141 lb)   04/03/25 63.5 kg (140 lb)     H/o ischemic cardiomyopathy; know to Dr. Cotto   Home regimen: Toprol- mg daily, losartan 100 mg daily, torsemide 20 mg BID   Examines euvolemic, wt stable   Resume torsemide when no longer NPO status   Coronary artery disease  H/o multiple stents and cardiomyopathy w/ BiV AICD in place  C/w PTA aspirin and statin  Type 2 diabetes mellitus with stage 3 chronic kidney disease, without long-term current use of insulin (HCC)  Lab Results   Component Value Date    HGBA1C 9.8 (H) 03/03/2025     Recent Labs     05/19/25  1054 05/19/25  1546 05/19/25 2055 05/20/25  0711   POCGLU 149* 191* 213* 147*     Blood Sugar Average: Last 72 hrs:  (P) 169.7255616061172797    Home regimen: Januvia 100 mg daily, on hold   C/w SSI AC/QHS & CCD while inpatient  Hypertension  's   C/w PTA metoprolol, losartan   Torsemide BID  PRN hydral   Acquired  hypothyroidism  C/w PTA levothyroxine 25 mcg daily  TSH therapeutic in 12/2024  Mixed hyperlipidemia  C/w statin  Gastroesophageal reflux disease with esophagitis  C/w PTA PPI  W/o GI complaints   Obstructive sleep apnea  Pt reports wearing home CPAP but does not wish to use a CPAP while in the hospital    VTE Pharmacologic Prophylaxis: VTE Score: 5 Moderate Risk (Score 3-4) - Pharmacological DVT Prophylaxis Ordered: enoxaparin (Lovenox).    Mobility:   Basic Mobility Inpatient Raw Score: 24  JH-HLM Goal: 8: Walk 250 feet or more  JH-HLM Achieved: 8: Walk 250 feet ot more  JH-HLM Goal achieved. Continue to encourage appropriate mobility.    Patient Centered Rounds: I performed bedside rounds with nursing staff today.   Discussions with Specialists or Other Care Team Provider: Here with orthopedics, case management, nursing    Education and Discussions with Family / Patient: Updated  (daughter) via phone.    Current Length of Stay: 3 day(s)  Current Patient Status: Inpatient   Certification Statement: The patient will continue to require additional inpatient hospital stay due to cellulitis  Discharge Plan: Anticipate discharge in 24-48 hrs to home.    Code Status: Level 2 - DNAR: but accepts endotracheal intubation    Subjective   Patient is doing okay.  She still endorses moderate to severe pain with any motion of the right hand.  Daughter on phone expresses concern that she will be discharged home and lives alone and does not qualify for VNA.    Objective :  Temp:  [96.8 °F (36 °C)-98 °F (36.7 °C)] 96.8 °F (36 °C)  HR:  [66] 66  BP: (162-167)/(68-82) 167/74  Resp:  [17] 17  SpO2:  [98 %] 98 %  O2 Device: None (Room air)    Body mass index is 29.86 kg/m².     Input and Output Summary (last 24 hours):     Intake/Output Summary (Last 24 hours) at 5/20/2025 0868  Last data filed at 5/20/2025 0845  Gross per 24 hour   Intake 1376 ml   Output --   Net 1376 ml       Physical Exam  Constitutional:        General: She is not in acute distress.     Appearance: She is obese. She is not toxic-appearing.   HENT:      Head: Normocephalic.      Right Ear: External ear normal.      Left Ear: External ear normal.      Nose: Nose normal.      Mouth/Throat:      Mouth: Mucous membranes are dry.      Pharynx: Oropharynx is clear.     Eyes:      Extraocular Movements: Extraocular movements intact.      Conjunctiva/sclera: Conjunctivae normal.       Cardiovascular:      Rate and Rhythm: Normal rate and regular rhythm.      Pulses: Normal pulses.      Heart sounds: Normal heart sounds.   Pulmonary:      Effort: Pulmonary effort is normal. No respiratory distress.      Breath sounds: Normal breath sounds. No wheezing or rales.   Abdominal:      General: There is no distension.      Palpations: Abdomen is soft.      Tenderness: There is no abdominal tenderness. There is no guarding or rebound.      Comments:  Obese abdomen      Musculoskeletal:         General: Swelling (Edematous L hand) present.      Cervical back: Normal range of motion.      Comments: Pain present with passive range of motion of all 5 fingers; no clinical tenosynovitis     Skin:     General: Skin is warm and dry.      Comments: See attached photo of hand below; some erythema is extending past marked region on dorsal hand     Neurological:      General: No focal deficit present.      Mental Status: She is alert and oriented to person, place, and time. Mental status is at baseline.     Psychiatric:         Mood and Affect: Mood normal.         Behavior: Behavior normal.             Lab Results: I have reviewed the following results:   Results from last 7 days   Lab Units 05/20/25  0509 05/19/25  0519   WBC Thousand/uL 5.25 6.72   HEMOGLOBIN g/dL 12.1 11.7   HEMATOCRIT % 36.3 36.6   PLATELETS Thousands/uL 147* 137*   LYMPHO PCT %  --  16   MONO PCT %  --  6   EOS PCT %  --  0     Results from last 7 days   Lab Units 05/19/25  0519 05/17/25  0617 05/16/25  2223    SODIUM mmol/L 139   < > 135   POTASSIUM mmol/L 3.7   < > 3.8   CHLORIDE mmol/L 109*   < > 98   CO2 mmol/L 25   < > 28   BUN mg/dL 12   < > 25   CREATININE mg/dL 0.77   < > 0.91   ANION GAP mmol/L 5   < > 9   CALCIUM mg/dL 9.0   < > 9.7   ALBUMIN g/dL  --   --  3.5   TOTAL BILIRUBIN mg/dL  --   --  0.44   ALK PHOS U/L  --   --  63   ALT U/L  --   --  23   AST U/L  --   --  13   GLUCOSE RANDOM mg/dL 120   < > 269*    < > = values in this interval not displayed.     Results from last 7 days   Lab Units 05/17/25  0018   INR  0.90     Results from last 7 days   Lab Units 05/20/25  0711 05/19/25  2055 05/19/25  1546 05/19/25  1054 05/19/25  0819 05/18/25  2045 05/18/25  1611 05/18/25  1042 05/18/25  0703 05/17/25  2125 05/17/25  1632 05/17/25  1033   POC GLUCOSE mg/dl 147* 213* 191* 149* 121 140 154* 163* 147* 238* 167* 216*         Results from last 7 days   Lab Units 05/18/25  0445 05/17/25  1028 05/17/25  0617 05/17/25  0234 05/17/25  0018 05/16/25  2224   LACTIC ACID mmol/L 0.9 2.5* 2.6* 2.1*   < >  --    PROCALCITONIN ng/ml  --   --  0.23  --   --  0.19    < > = values in this interval not displayed.       Recent Cultures (last 7 days):   Results from last 7 days   Lab Units 05/17/25  0018   BLOOD CULTURE  No Growth at 72 hrs.  No Growth at 72 hrs.       Imaging Results Review: I reviewed radiology reports from this admission including: CT LUE.  Other Study Results Review: No additional pertinent studies reviewed.    Last 24 Hours Medication List:     Current Facility-Administered Medications:     acetaminophen (TYLENOL) tablet 650 mg, Q6H PRN    allopurinol (ZYLOPRIM) tablet 100 mg, Daily    amoxicillin-clavulanate (AUGMENTIN) 875-125 mg per tablet 1 tablet, Q12H LISHA    aspirin chewable tablet 81 mg, Daily    celecoxib (CeleBREX) capsule 100 mg, Daily    enoxaparin (LOVENOX) subcutaneous injection 40 mg, Daily    FLUoxetine (PROzac) capsule 20 mg, Daily    hydrALAZINE (APRESOLINE) injection 5 mg, Q6H PRN     insulin lispro (HumALOG/ADMELOG) 100 units/mL subcutaneous injection 1-5 Units, HS    insulin lispro (HumALOG/ADMELOG) 100 units/mL subcutaneous injection 1-6 Units, TID AC **AND** Fingerstick Glucose (POCT), TID AC    levothyroxine tablet 25 mcg, Early Morning    losartan (COZAAR) tablet 100 mg, HS    melatonin tablet 5 mg, HS    metoprolol succinate (TOPROL-XL) 24 hr tablet 100 mg, Daily    pantoprazole (PROTONIX) EC tablet 40 mg, Early Morning    pravastatin (PRAVACHOL) tablet 80 mg, Daily With Dinner    pyridoxine (VITAMIN B6) tablet 100 mg, Daily    traMADol (ULTRAM) tablet 50 mg, Q8H PRN    Administrative Statements   Today, Patient Was Seen By: Erika Powers PA-C  I have spent a total time of 50 minutes in caring for this patient on the day of the visit/encounter including Diagnostic results, Prognosis, Risks and benefits of tx options, Instructions for management, Patient and family education, Impressions, Counseling / Coordination of care, Documenting in the medical record, Reviewing/placing orders in the medical record (including tests, medications, and/or procedures), Obtaining or reviewing history  , and Communicating with other healthcare professionals .    **Please Note: This note may have been constructed using a voice recognition system.**

## 2025-05-20 NOTE — ASSESSMENT & PLAN NOTE
Lab Results   Component Value Date    HGBA1C 9.8 (H) 03/03/2025     Recent Labs     05/19/25  1054 05/19/25  1546 05/19/25 2055 05/20/25  0711   POCGLU 149* 191* 213* 147*     Blood Sugar Average: Last 72 hrs:  (P) 169.8685388697058165    Home regimen: Januvia 100 mg daily, on hold   C/w SSI AC/QHS & CCD while inpatient

## 2025-05-21 ENCOUNTER — TRANSITIONAL CARE MANAGEMENT (OUTPATIENT)
Dept: FAMILY MEDICINE CLINIC | Facility: HOSPITAL | Age: 75
End: 2025-05-21

## 2025-05-21 VITALS
WEIGHT: 145.5 LBS | OXYGEN SATURATION: 94 % | RESPIRATION RATE: 14 BRPM | HEIGHT: 58 IN | DIASTOLIC BLOOD PRESSURE: 69 MMHG | TEMPERATURE: 96.8 F | HEART RATE: 61 BPM | BODY MASS INDEX: 30.54 KG/M2 | SYSTOLIC BLOOD PRESSURE: 145 MMHG

## 2025-05-21 LAB
ANION GAP SERPL CALCULATED.3IONS-SCNC: 7 MMOL/L (ref 4–13)
BUN SERPL-MCNC: 10 MG/DL (ref 5–25)
CALCIUM SERPL-MCNC: 9.2 MG/DL (ref 8.4–10.2)
CHLORIDE SERPL-SCNC: 111 MMOL/L (ref 96–108)
CO2 SERPL-SCNC: 21 MMOL/L (ref 21–32)
CREAT SERPL-MCNC: 0.89 MG/DL (ref 0.6–1.3)
ERYTHROCYTE [DISTWIDTH] IN BLOOD BY AUTOMATED COUNT: 14 % (ref 11.6–15.1)
GFR SERPL CREATININE-BSD FRML MDRD: 62 ML/MIN/1.73SQ M
GLUCOSE SERPL-MCNC: 141 MG/DL (ref 65–140)
GLUCOSE SERPL-MCNC: 147 MG/DL (ref 65–140)
GLUCOSE SERPL-MCNC: 152 MG/DL (ref 65–140)
HCT VFR BLD AUTO: 36.6 % (ref 34.8–46.1)
HGB BLD-MCNC: 11.8 G/DL (ref 11.5–15.4)
MCH RBC QN AUTO: 30.3 PG (ref 26.8–34.3)
MCHC RBC AUTO-ENTMCNC: 32.2 G/DL (ref 31.4–37.4)
MCV RBC AUTO: 94 FL (ref 82–98)
PLATELET # BLD AUTO: 168 THOUSANDS/UL (ref 149–390)
PMV BLD AUTO: 9.4 FL (ref 8.9–12.7)
POTASSIUM SERPL-SCNC: 3.8 MMOL/L (ref 3.5–5.3)
RBC # BLD AUTO: 3.89 MILLION/UL (ref 3.81–5.12)
SODIUM SERPL-SCNC: 139 MMOL/L (ref 135–147)
WBC # BLD AUTO: 5.66 THOUSAND/UL (ref 4.31–10.16)

## 2025-05-21 PROCEDURE — 85027 COMPLETE CBC AUTOMATED: CPT | Performed by: INTERNAL MEDICINE

## 2025-05-21 PROCEDURE — 82948 REAGENT STRIP/BLOOD GLUCOSE: CPT

## 2025-05-21 PROCEDURE — 99239 HOSP IP/OBS DSCHRG MGMT >30: CPT | Performed by: PHYSICIAN ASSISTANT

## 2025-05-21 PROCEDURE — 80048 BASIC METABOLIC PNL TOTAL CA: CPT | Performed by: INTERNAL MEDICINE

## 2025-05-21 PROCEDURE — 99231 SBSQ HOSP IP/OBS SF/LOW 25: CPT

## 2025-05-21 RX ORDER — HYDRALAZINE HYDROCHLORIDE 20 MG/ML
5 INJECTION INTRAMUSCULAR; INTRAVENOUS ONCE
Status: COMPLETED | OUTPATIENT
Start: 2025-05-21 | End: 2025-05-21

## 2025-05-21 RX ORDER — HYDRALAZINE HYDROCHLORIDE 20 MG/ML
10 INJECTION INTRAMUSCULAR; INTRAVENOUS EVERY 6 HOURS PRN
Status: DISCONTINUED | OUTPATIENT
Start: 2025-05-21 | End: 2025-05-21 | Stop reason: HOSPADM

## 2025-05-21 RX ADMIN — FLUOXETINE HYDROCHLORIDE 20 MG: 20 CAPSULE ORAL at 08:40

## 2025-05-21 RX ADMIN — METOPROLOL SUCCINATE 100 MG: 50 TABLET, EXTENDED RELEASE ORAL at 08:40

## 2025-05-21 RX ADMIN — AMOXICILLIN AND CLAVULANATE POTASSIUM 1 TABLET: 875; 125 TABLET, FILM COATED ORAL at 08:39

## 2025-05-21 RX ADMIN — ACETAMINOPHEN 650 MG: 325 TABLET, FILM COATED ORAL at 03:36

## 2025-05-21 RX ADMIN — INSULIN LISPRO 1 UNITS: 100 INJECTION, SOLUTION INTRAVENOUS; SUBCUTANEOUS at 12:12

## 2025-05-21 RX ADMIN — LEVOTHYROXINE SODIUM 25 MCG: 25 TABLET ORAL at 05:44

## 2025-05-21 RX ADMIN — HYDRALAZINE HYDROCHLORIDE 5 MG: 20 INJECTION INTRAMUSCULAR; INTRAVENOUS at 03:36

## 2025-05-21 RX ADMIN — ENOXAPARIN SODIUM 40 MG: 40 INJECTION SUBCUTANEOUS at 08:40

## 2025-05-21 RX ADMIN — HYDRALAZINE HYDROCHLORIDE 5 MG: 20 INJECTION INTRAMUSCULAR; INTRAVENOUS at 00:27

## 2025-05-21 RX ADMIN — ASPIRIN 81 MG CHEWABLE TABLET 81 MG: 81 TABLET CHEWABLE at 08:39

## 2025-05-21 RX ADMIN — PANTOPRAZOLE SODIUM 40 MG: 40 TABLET, DELAYED RELEASE ORAL at 05:44

## 2025-05-21 RX ADMIN — PYRIDOXINE HCL TAB 50 MG 100 MG: 50 TAB at 08:39

## 2025-05-21 RX ADMIN — ALLOPURINOL 100 MG: 100 TABLET ORAL at 08:39

## 2025-05-21 RX ADMIN — CELECOXIB 100 MG: 100 CAPSULE ORAL at 08:40

## 2025-05-21 NOTE — ASSESSMENT & PLAN NOTE
Pt reports being bitten by her cat in the evening on 05/15.  Presented to ED w/ Rapidly progressing swelling, erythema, and pain in L hand.   Cat UTD w/ vaccines; cat behavior is normal  CT LUE w/ contrast consistent w/ cellulitis, no gas/fluid collection  Blood cultures negative x 2 after 4 days  S/p IV Unasyn  Transition to Augmentin on discharge  Orthopedic/hand following  Stable for discharge home with outpatient follow-up  C/w volar splint/NWB LUE; elevate extremity  Leukocytosis resolved    Recent Labs     05/19/25  0519 05/20/25  0509 05/21/25  0322   WBC 6.72 5.25 5.66

## 2025-05-21 NOTE — ASSESSMENT & PLAN NOTE
Lab Results   Component Value Date    HGBA1C 9.8 (H) 03/03/2025     Recent Labs     05/20/25  1114 05/20/25  1649 05/20/25  2154 05/21/25  0723   POCGLU 175* 129 179* 141*     Blood Sugar Average: Last 72 hrs:  (P) 157.1138113263022990    Resume home regimen on discharge

## 2025-05-21 NOTE — DISCHARGE INSTR - AVS FIRST PAGE
Discharge Instructions - Orthopedics  Dejah Parish 75 y.o. female MRN: [id number]  Unit/Bed#: -01    Weight Bearing Status:                                           Non-weight bearing to left hand, remain in splint until outpatient follow up appointment    Pain:  Continue analgesics as directed    Dressing Instructions:   Please keep clean, dry and intact until follow up   Cover splint with cling wrap or trash bag to keep dry when showering.    Appt Instructions:   If you do not have your appointment, please call the clinic at [phone]  Otherwise follow up as scheduled.    Contact the office sooner if you experience any increased numbness/tingling in the extremities.      Miscellaneous:  Follow up with Dr. Lancaster or Dr. Fraser as outpatient in 1 week

## 2025-05-21 NOTE — DISCHARGE SUMMARY
Discharge Summary - Hospitalist   Name: Dejah Parish 75 y.o. female I MRN: [id number]  Unit/Bed#: -01 I Date of Admission: 5/16/2025   Date of Service: 5/21/2025 I Hospital Day: 4     Assessment & Plan  Cellulitis of left hand  Pt reports being bitten by her cat in the evening on 05/15.  Presented to ED w/ Rapidly progressing swelling, erythema, and pain in L hand.   Cat UTD w/ vaccines; cat behavior is normal  CT LUE w/ contrast consistent w/ cellulitis, no gas/fluid collection  Blood cultures negative x 2 after 4 days  S/p IV Unasyn  Transition to Augmentin on discharge  Orthopedic/hand following  Stable for discharge home with outpatient follow-up  C/w volar splint/NWB LUE; elevate extremity  Leukocytosis resolved    Recent Labs     05/19/25  0519 05/20/25  0509 05/21/25  0322   WBC 6.72 5.25 5.66     Chronic systolic congestive heart failure (HCC)  Wt Readings from Last 3 Encounters:   05/21/25 66 kg (145 lb 8.1 oz)   04/04/25 64 kg (141 lb)   04/03/25 63.5 kg (140 lb)     H/o ischemic cardiomyopathy; know to Dr. Cotto   Home regimen: Toprol- mg daily, losartan 100 mg daily, torsemide 20 mg BID   Appears euvolemic  Coronary artery disease  H/o multiple stents and cardiomyopathy w/ BiV AICD in place  C/w PTA aspirin and statin  Type 2 diabetes mellitus with stage 3 chronic kidney disease, without long-term current use of insulin ([facility])  Lab Results   Component Value Date    HGBA1C 9.8 (H) 03/03/2025     Recent Labs     05/20/25  1114 05/20/25  1649 05/20/25  2154 05/21/25  0723   POCGLU 175* 129 179* 141*     Blood Sugar Average: Last 72 hrs:  (P) 157.1089994061092267    Resume home regimen on discharge  Hypertension  Continue home regimen  Acquired hypothyroidism  C/w PTA levothyroxine 25 mcg daily  TSH therapeutic in 12/2024  Mixed hyperlipidemia  C/w statin  Gastroesophageal reflux disease with esophagitis  C/w PTA PPI  W/o GI complaints   Obstructive sleep apnea  Pt reports wearing home  CPAP but does not wish to use a CPAP while in the hospital     Medical Problems       Resolved Problems  Date Reviewed: 5/19/2025   None       Discharging Physician / Practitioner: Celine Combs PA-C  PCP: Leann Henson DO  Admission Date:   Admission Orders (From admission, onward)       Ordered        05/17/25 0033  INPATIENT ADMISSION  Once                          Discharge Date: 05/21/25    Next Steps for Physician/AP Assuming Care:  Follow-up with orthopedics/hand surgery as outpatient within 1 week  Complete course of Augmentin    Test Results Pending at Discharge (will require follow up):  None    Medication Changes for Discharge & Rationale:   Augmentin x 5 days to complete antibiotic course for left hand cellulitis  See after visit summary for reconciled discharge medications provided to patient and/or family.     Consultations During Hospital Stay:  Orthopedics    Procedures Performed:   None    Significant Findings / Test Results:   XR left hand: Soft tissue swelling of the hand without an acute osseous abnormality. No radiopaque foreign body.   CT left upper extremity: Subcutaneous edema and mild skin thickening along the ulnar aspect of the forearm and dorsum of the hand, which can be seen with cellulitis. No fluid collection. No soft tissue gas.   Blood cultures negative x 2 to date    Incidental Findings:   None    Hospital Course:   Dejah Parish is a 75 y.o. female patient who originally presented to the hospital on 5/16/2025 due to cat bite of left hand.    Past medical history significant for cardiomyopathy, coronary artery disease, hypertension, type 2 diabetes, hypothyroidism, hyperlipidemia.  Presented to the emergency department due to left hand swelling and pain after being bitten by her cat.  Patient was started on IV Unasyn.  Imaging negative for any fluid collection.  Orthopedics was consulted.  Clinically patient improved on IV antibiotic.  Blood cultures were  "negative to date.  Orthopedics recommended outpatient follow-up with hand surgery within 1 week.  Transitioned to Augmentin to complete antibiotic course.  On day of discharge patient was afebrile, hemodynamically stable and verbalized understanding for requested outpatient follow-up.    Please see above list of diagnoses and related plan for additional information.     Discharge Day Visit / Exam:   Subjective: Feeling better, eager for discharge home today.  Vitals: Blood Pressure: 145/69 (05/21/25 1308)  Pulse: 61 (05/21/25 1308)  Temperature: (!) 96.8 °F (36 °C) (05/21/25 1308)  Temp Source: Temporal (05/20/25 2216)  Respirations: 14 (05/20/25 2216)  Height: 4' 10\" (147.3 cm) (05/17/25 0100)  Weight - Scale: 66 kg (145 lb 8.1 oz) (05/21/25 0546)  SpO2: 94 % (05/21/25 1308)  Physical Exam  Vitals and nursing note reviewed.   Constitutional:       Appearance: Normal appearance.      Comments: No acute distress   HENT:      Head: Normocephalic.     Eyes:      General: No scleral icterus.     Extraocular Movements: Extraocular movements intact.      Conjunctiva/sclera: Conjunctivae normal.       Cardiovascular:      Rate and Rhythm: Normal rate and regular rhythm.   Pulmonary:      Effort: Pulmonary effort is normal.      Breath sounds: Normal breath sounds. No wheezing, rhonchi or rales.   Abdominal:      General: Bowel sounds are normal.      Palpations: Abdomen is soft.      Tenderness: There is no abdominal tenderness. There is no guarding or rebound.     Musculoskeletal:         General: No swelling, tenderness or deformity.      Cervical back: Normal range of motion.      Comments: Ambulating room without difficulty, mild left dorsal swelling improved from prior     Skin:     General: Skin is warm and dry.     Neurological:      Mental Status: She is alert. Mental status is at baseline.     Psychiatric:         Mood and Affect: Mood normal.         Speech: Speech normal.         Behavior: Behavior normal.      "     Discussion with Family: Updated  (daughter) via phone.    Discharge instructions/Information to patient and family:   See after visit summary for information provided to patient and family.      Provisions for Follow-Up Care:  See after visit summary for information related to follow-up care and any pertinent home health orders.      Mobility at time of Discharge:   Basic Mobility Inpatient Raw Score: 24  JH-HLM Goal: 8: Walk 250 feet or more  JH-HLM Achieved: 8: Walk 250 feet ot more  HLM Goal achieved. Continue to encourage appropriate mobility.     Disposition:   Home    Planned Readmission: None    Administrative Statements   Discharge Statement:  I have spent a total time of 70 minutes in caring for this patient on the day of the visit/encounter. >30 minutes of time was spent on: Diagnostic results, Instructions for management, Patient and family education, Importance of tx compliance, Risk factor reductions, Impressions, Counseling / Coordination of care, Documenting in the medical record, Reviewing / ordering tests, medicine, procedures  , and Communicating with other healthcare professionals .    **Please Note: This note may have been constructed using a voice recognition system**

## 2025-05-21 NOTE — PLAN OF CARE
Problem: PAIN - ADULT  Goal: Verbalizes/displays adequate comfort level or baseline comfort level  Description: Interventions:  - Encourage patient to monitor pain and request assistance  - Assess pain using appropriate pain scale  - Administer analgesics as ordered based on type and severity of pain and evaluate response  - Implement non-pharmacological measures as appropriate and evaluate response  - Consider cultural and social influences on pain and pain management  - Notify physician/advanced practitioner if interventions unsuccessful or patient reports new pain  - Educate patient/family on pain management process including their role and importance of  reporting pain   - Provide non-pharmacologic/complimentary pain relief interventions  Outcome: Progressing     Problem: SAFETY ADULT  Goal: Patient will remain free of falls  Description: INTERVENTIONS:  - Educate patient/family on patient safety including physical limitations  - Instruct patient to call for assistance with activity   - Consider consulting OT/PT to assist with strengthening/mobility based on AM PAC & JH-HLM score  - Consult OT/PT to assist with strengthening/mobility   - Keep Call bell within reach  - Keep bed low and locked with side rails adjusted as appropriate  - Keep care items and personal belongings within reach  - Initiate and maintain comfort rounds  - Make Fall Risk Sign visible to staff  - Offer Toileting every 2 Hours, in advance of need  - Initiate/Maintain alarm  - Obtain necessary fall risk management equipment:   - Apply yellow socks and bracelet for high fall risk patients  - Consider moving patient to room near nurses station  Outcome: Progressing

## 2025-05-21 NOTE — PROGRESS NOTES
Progress Note - Orthopedics   Name: Dejah Parish 75 y.o. female I MRN: [id number]  Unit/Bed#: -01 I Date of Admission: 5/16/2025   Date of Service: 5/21/2025 I Hospital Day: 4    Assessment & Plan  Cellulitis of left hand  S/p cat bite to left dorsal aspect of hand,  - Symptoms clinically continue to improve on IV antibiotics.  Margin lines appear less with erythema and swelling as previously marked  - NWB LUE in volar splint   - Previous CT scans reviewed and demonstrates no collection or indications of abscess.  Findings are consistent with cellulitis  - Continue IV unasyn and Augmentin, can switch to PO abx per primary once improved  - warm compresses   - elevation for swelling control   White blood cell count down to 5.66  No further intervention needed from ortho perspective, ortho signing off   Follow up as outpatient with Dr. Lancaster or Dr. Fraser      Ortho signing off, if any new acute concerns please reach out to ortho team        Subjective   75 y.o.female seen and examined at bedside, she notes that she has noticed improvement since yesterday and would like to go home today.  No acute events, no new complaints. Pain well controlled. Denies fevers, chills, CP, SOB, N/V, numbness or tingling.   Objective :  Temp:  [96.6 °F (35.9 °C)-97.2 °F (36.2 °C)] 96.8 °F (36 °C)  HR:  [59-67] 59  BP: (153-180)/(68-80) 160/73  Resp:  [14-17] 14  SpO2:  [91 %-96 %] 92 %  O2 Device: None (Room air)    Physical Exam  Musculoskeletal: Left hand  Skin with swelling and erythema over dorsal hand with improved margins, erythema has improved along with swelling   Splint in place   TTP dorsal hand, improvement  Active flexion of all digits shows improvement  Unable to make full composite fist  Sensation intact to median/radial/ulnar nerve distribution   Motor intact anterior interosseous nerve/posterior interosseous nerve/median/radial/ulnar nerve distributions        Lab Results: I have reviewed the following  "results:  Recent Labs     05/19/25  0519 05/20/25  0509 05/21/25  0322   WBC 6.72 5.25 5.66   HGB 11.7 12.1 11.8   HCT 36.6 36.3 36.6   * 147* 168   BUN 12  --  10   CREATININE 0.77  --  0.89     Blood Culture:    Lab Results   Component Value Date    BLOODCX No Growth After 4 Days. 05/17/2025    BLOODCX No Growth After 4 Days. 05/17/2025     Wound Culture: No results found for: \"WOUNDCULT\"      "

## 2025-05-21 NOTE — PLAN OF CARE
Problem: PAIN - ADULT  Goal: Verbalizes/displays adequate comfort level or baseline comfort level  Description: Interventions:  - Encourage patient to monitor pain and request assistance  - Assess pain using appropriate pain scale  - Administer analgesics as ordered based on type and severity of pain and evaluate response  - Implement non-pharmacological measures as appropriate and evaluate response  - Consider cultural and social influences on pain and pain management  - Notify physician/advanced practitioner if interventions unsuccessful or patient reports new pain  - Educate patient/family on pain management process including their role and importance of  reporting pain   - Provide non-pharmacologic/complimentary pain relief interventions  Outcome: Progressing     Problem: SAFETY ADULT  Goal: Patient will remain free of falls  Description: INTERVENTIONS:  - Educate patient/family on patient safety including physical limitations  - Instruct patient to call for assistance with activity   - Consider consulting OT/PT to assist with strengthening/mobility based on AM PAC & JH-HLM score  - Consult OT/PT to assist with strengthening/mobility   - Keep Call bell within reach  - Keep bed low and locked with side rails adjusted as appropriate  - Keep care items and personal belongings within reach  - Initiate and maintain comfort rounds  - Make Fall Risk Sign visible to staff  - Offer Toileting every 2 Hours, in advance of need  - Initiate/Maintain alarm  - Obtain necessary fall risk management equipment:   - Apply yellow socks and bracelet for high fall risk patients  - Consider moving patient to room near nurses station  Outcome: Progressing  Goal: Maintain or return to baseline ADL function  Description: INTERVENTIONS:  -  Assess patient's ability to carry out ADLs; assess patient's baseline for ADL function and identify physical deficits which impact ability to perform ADLs (bathing, care of mouth/teeth, toileting,  grooming, dressing, etc.)  - Assess/evaluate cause of self-care deficits   - Assess range of motion  - Assess patient's mobility; develop plan if impaired  - Assess patient's need for assistive devices and provide as appropriate  - Encourage maximum independence but intervene and supervise when necessary  - Involve family in performance of ADLs  - Assess for home care needs following discharge   - Consider OT consult to assist with ADL evaluation and planning for discharge  - Provide patient education as appropriate  - Monitor functional capacity and physical performance, use of AM PAC & -HLM   - Monitor gait, balance and fatigue with ambulation    Outcome: Progressing  Goal: Maintains/Returns to pre admission functional level  Description: INTERVENTIONS:  - Perform AM-PAC 6 Click Basic Mobility/ Daily Activity assessment daily.  - Set and communicate daily mobility goal to care team and patient/family/caregiver.   - Collaborate with rehabilitation services on mobility goals if consulted  - Perform Range of Motion 2 times a day.  - Reposition patient every 3 hours.  - Dangle patient 3 times a day  - Stand patient 3 times a day  - Ambulate patient 3 times a day  - Out of bed to chair 3 times a day   - Out of bed for meals 3 times a day  - Out of bed for toileting  - Record patient progress and toleration of activity level   Outcome: Progressing     Problem: DISCHARGE PLANNING  Goal: Discharge to home or other facility with appropriate resources  Description: INTERVENTIONS:  - Identify barriers to discharge w/patient and caregiver  - Arrange for needed discharge resources and transportation as appropriate  - Identify discharge learning needs (meds, wound care, etc.)  - Arrange for interpretive services to assist at discharge as needed  - Refer to Case Management Department for coordinating discharge planning if the patient needs post-hospital services based on physician/advanced practitioner order or complex needs  related to functional status, cognitive ability, or social support system  Outcome: Progressing     Problem: Knowledge Deficit  Goal: Patient/family/caregiver demonstrates understanding of disease process, treatment plan, medications, and discharge instructions  Description: Complete learning assessment and assess knowledge base.  Interventions:  - Provide teaching at level of understanding  - Provide teaching via preferred learning methods  Outcome: Progressing

## 2025-05-21 NOTE — ASSESSMENT & PLAN NOTE
S/p cat bite to left dorsal aspect of hand,  - Symptoms clinically continue to improve on IV antibiotics.  Margin lines appear less with erythema and swelling as previously marked  - NWB LUE in volar splint   - Previous CT scans reviewed and demonstrates no collection or indications of abscess.  Findings are consistent with cellulitis  - Continue IV unasyn and Augmentin, can switch to PO abx per primary once improved  - warm compresses   - elevation for swelling control   White blood cell count down to 5.66  No further intervention needed from ortho perspective, ortho signing off   Follow up as outpatient with Dr. Lancaster or Dr. Fraser      Ortho signing off, if any new acute concerns please reach out to ortho team

## 2025-05-21 NOTE — CASE MANAGEMENT
Case Management Discharge Planning Note    Patient name Dejah Parish  Location /-01 MRN [id number]  : 1950 Date 2025       Current Admission Date: 2025  Current Admission Diagnosis:Cellulitis of left hand   Patient Active Problem List    Diagnosis Date Noted    Cellulitis of left hand 2025    Hypertension 2025    Type 2 diabetes mellitus with hyperglycemia, without long-term current use of insulin (HCC) 2025    Pain of left hip 2024    Ischial bursitis of left side 2024    Tremor of both hands 2023    Continuous opioid dependence (HCC) 2023    Pulmonary emphysema, unspecified emphysema type ([facility]) 2023    Dilated cardiomyopathy ([facility])     PVC's (premature ventricular contractions) 06/10/2020    Chronic left shoulder pain 06/10/2020    Abnormal computerized axial tomography of abdomen 2019    Hyperparathyroidism ([facility]) 2019    Dense breast tissue 2018    Family history of breast cancer 2018    Hypercalcemia 2018    Leukocytosis 2018    Lumbar spondylosis 10/10/2018    Chronic left-sided low back pain without sciatica 10/10/2018    Bilateral fibrocystic breast changes 2017    Benign positional vertigo, left 2017    Gout 2017    Trigger little finger of left hand 2017    Arthralgia of knee, left 2016    Arthralgia of knee, right 2016    Stage 3a chronic kidney disease (HCC) 10/07/2015    Biventricular ICD (implantable cardioverter-defibrillator) in place 2014    Hypokalemia 2013    Mixed hyperlipidemia 2013    Vitamin D deficiency 2013    Psoriasis 2012    Allergic rhinitis 2012    Coronary artery disease 2012    Chronic systolic congestive heart failure (HCC) 2012    DDD (degenerative disc disease), cervical 2012    Acquired hypothyroidism 2012    Obstructive sleep apnea 2012    Osteoarthritis  06/14/2012    Overactive bladder 06/14/2012    Type 2 diabetes mellitus with stage 3 chronic kidney disease, without long-term current use of insulin (HCC) 06/14/2012    Gastroesophageal reflux disease with esophagitis 06/14/2012    Hypertensive heart and chronic kidney disease with heart failure and stage 1 through stage 4 chronic kidney disease, or chronic kidney disease (HCC) 06/14/2012    Current moderate episode of major depressive disorder without prior episode (HCC) 05/10/2012      LOS (days): 4  Geometric Mean LOS (GMLOS) (days): 3.1  Days to GMLOS:-1.4     OBJECTIVE:  Risk of Unplanned Readmission Score: 13.01         Current admission status: Inpatient   Preferred Pharmacy:   [facility] [address] [address]  Phone: [phone] Fax: [phone]    [facility]pharmacy #[id number] [place], [place] - [address] [address]  Phone: [phone] Fax: [phone]    [facility] [address] [place] [id number]  Phone: [phone] Fax: [phone]    Primary Care Provider: Leann Henson DO    Primary Insurance: MEDICARE  Secondary Insurance: AET[employer]    DISCHARGE DETAILS:     Met with patient, She is aware she is being discharged today. Discussed HHC as patient has a splint on LLE. She declined SN/OP as she feels she will function okay at home. Her family will transport her home.

## 2025-05-21 NOTE — ASSESSMENT & PLAN NOTE
Wt Readings from Last 3 Encounters:   05/21/25 66 kg (145 lb 8.1 oz)   04/04/25 64 kg (141 lb)   04/03/25 63.5 kg (140 lb)     H/o ischemic cardiomyopathy; know to Dr. Cotto   Home regimen: Toprol- mg daily, losartan 100 mg daily, torsemide 20 mg BID   Appears euvolemic

## 2025-05-22 ENCOUNTER — PATIENT OUTREACH (OUTPATIENT)
Dept: CASE MANAGEMENT | Facility: OTHER | Age: 75
End: 2025-05-22

## 2025-05-22 LAB
BACTERIA BLD CULT: NORMAL
BACTERIA BLD CULT: NORMAL

## 2025-05-23 ENCOUNTER — PATIENT OUTREACH (OUTPATIENT)
Dept: CASE MANAGEMENT | Facility: OTHER | Age: 75
End: 2025-05-23

## 2025-05-23 NOTE — PROGRESS NOTES
"Outpatient Care Management Note    Covering RN WHITNEY received HRR referral. Chart review completed.  Patient with PMH that includes: cardiomyopathy, CAD, hypertension, type 2 diabetes, hypothyroidism, hyperlipidemia.    Patient was hospitalized at [facility] 5/16/25-5/21/25 for cellulitis of left hand.  Per chart, patient presented to ED with left hand swelling and pain after being bitten by her cat.  Imaging completed. Received IV Unasyn. Orthopedics consulted.  Blood cultures negative.  Patient transitioned to Augmentin at discharge. Patient to follow up with Orthopedic- hand surgery in one week.    Covering RN CM will place outreach call to patient for follow up to this hospitalization and to assess for care management needs.  Call placed and spoke with patient. RN WHITNEY introduced self, role, and reason for call.  Patient states that she is doing \"pretty much okay\". She shared what happened leading up to the ED visit, explaining how her cat bit her and her hand swelled up.  Patient states that left hand in splint with dressing that is clean, dry, and intact. No drainage noted.  Patient states hand is painful with movement. She denies any numbness or tingling. Denies fevers.  Patient aware of no weight bearing to left hand and need to keep dressing clean, dry, and intact until follow up with Orthopedics.    Patient confirmed that she has an appointment with  Orthopedics on 5/28/25.  She is also scheduled to see PCP on 5/27/25.  She states that she plans to drive self to appointments.    Covering RN WHITNEY confirmed that patient received and copy of AVS instructions and medication list. AVS instructions reviewed with patient.  Patient confirmed that she is continuing on antibiotic- Augmentin. Patient states that she is taking as prescribed.  Patient states no other medication changes or additions were made.  She declined need to complete medication review with RN CM at this time.  Patient had no questions or concerns " regarding AVS instructions or medications.    Covering RN CM offered care management support and outreach however patient declined.  She denies having any care management needs at this time and did not feel that she has any need for additional resources or education stating that she is managing care well.    Covering RN CM will remove self from care team and close care management episode/referral at this time.    Covering RN CM instructed patient to notify physician and/or to return to the ED for any changes in condition or for any new or worsening symptoms including fevers, worsening pain, numbness, tingling, redness or swelling- patient verbalized understanding.

## 2025-05-27 ENCOUNTER — OFFICE VISIT (OUTPATIENT)
Dept: FAMILY MEDICINE CLINIC | Facility: HOSPITAL | Age: 75
End: 2025-05-27
Payer: MEDICARE

## 2025-05-27 VITALS
HEART RATE: 75 BPM | SYSTOLIC BLOOD PRESSURE: 144 MMHG | HEIGHT: 58 IN | DIASTOLIC BLOOD PRESSURE: 84 MMHG | WEIGHT: 135.6 LBS | OXYGEN SATURATION: 95 % | BODY MASS INDEX: 28.46 KG/M2

## 2025-05-27 DIAGNOSIS — Z09 HOSPITAL DISCHARGE FOLLOW-UP: Primary | ICD-10-CM

## 2025-05-27 DIAGNOSIS — L03.114 CELLULITIS OF LEFT HAND: ICD-10-CM

## 2025-05-27 DIAGNOSIS — M47.816 LUMBAR SPONDYLOSIS: ICD-10-CM

## 2025-05-27 DIAGNOSIS — E11.65 TYPE 2 DIABETES MELLITUS WITH HYPERGLYCEMIA, WITHOUT LONG-TERM CURRENT USE OF INSULIN (HCC): ICD-10-CM

## 2025-05-27 DIAGNOSIS — R41.3 MEMORY IMPAIRMENT: ICD-10-CM

## 2025-05-27 PROCEDURE — 99495 TRANSJ CARE MGMT MOD F2F 14D: CPT | Performed by: INTERNAL MEDICINE

## 2025-05-27 NOTE — ASSESSMENT & PLAN NOTE
Lab Results   Component Value Date    HGBA1C 9.8 (H) 03/03/2025   Dm type 2 with nephropathy/CKD stage 3 and hyperglycemia - uncontrolled with A1C 9.8 - pt  not taking Jardiance as started at last visit (?), rx restarted today, con't Januvia, recheck BW in June as previously ordered (order given again today), discussed w/pt and dgtr that her A1C is unlikely to be significantly better as she has not been taking the new rx and had above noted infection which can increase BS, she is not checking her BS at home, she is UTD on DM foot exam (8/24) and eye exam (3/25), she is on an ARB and a statin, will follow  Orders:    Empagliflozin (JARDIANCE) 10 MG TABS tablet; Take 1 tablet (10 mg total) by mouth daily

## 2025-05-27 NOTE — ASSESSMENT & PLAN NOTE
Pt dgtr here today and requesting pt come off some of the pain meds as she is concerned they are not good for her and may affect memory, SE of NSAIDs and Tramadol reviewed, I will stop Celebrex 200 mg bid first, will re-eval at appt in July and if still controlled with wean off Tramadol and discuss switching to Gabapentin or other non-narcotic/non-NSAID pain medication option, will follow

## 2025-05-27 NOTE — ASSESSMENT & PLAN NOTE
Finished abx and swelling and pain are improved, wound examined today and bandages changed, urged to keep f/u with hand Ortho tomorrow, call with any relapse in symptoms/F/C

## 2025-05-27 NOTE — PROGRESS NOTES
Transition of Care Visit:  Name: Dejah Parish      : 1950      MRN: [id number]  Encounter Provider: Leann Henson DO  Encounter Date: 2025   Encounter department: [facility] CARE [address]     Assessment & Plan  Hospital discharge follow-up         Cellulitis of left hand  Finished abx and swelling and pain are improved, wound examined today and bandages changed, urged to keep f/u with hand Ortho tomorrow, call with any relapse in symptoms/F/C       Type 2 diabetes mellitus with hyperglycemia, without long-term current use of insulin (HCC)    Lab Results   Component Value Date    HGBA1C 9.8 (H) 2025   Dm type 2 with nephropathy/CKD stage 3 and hyperglycemia - uncontrolled with A1C 9.8 - pt  not taking Jardiance as started at last visit (?), rx restarted today, con't Januvia, recheck BW in  as previously ordered (order given again today), discussed w/pt and dgtr that her A1C is unlikely to be significantly better as she has not been taking the new rx and had above noted infection which can increase BS, she is not checking her BS at home, she is UTD on DM foot exam () and eye exam (3/25), she is on an ARB and a statin, will follow  Orders:    Empagliflozin (JARDIANCE) 10 MG TABS tablet; Take 1 tablet (10 mg total) by mouth daily    Memory impairment  MMSE 27/30, encouraged lifestyle changes to decrease risk for stroke/vascular dementia as well as keeping mentally active, will check B12/folate/RPR, TSH nml , will hold on imaging of brain d/t nml MMSE but will follow  Orders:    Vitamin B12    Folate    RPR-Syphilis Screening (Total Syphilis IGG/IGM); Future    Lumbar spondylosis  Pt dgtr here today and requesting pt come off some of the pain meds as she is concerned they are not good for her and may affect memory, SE of NSAIDs and Tramadol reviewed, I will stop Celebrex 200 mg bid first, will re-eval at appt in July and if still controlled with wean  off Tramadol and discuss switching to Gabapentin or other non-narcotic/non-NSAID pain medication option, will follow         Depression Screening and Follow-up Plan: Patient was screened for depression during today's encounter. They screened negative with a PHQ-9 score of 1.        Colonoscopy 3/22 - no further needed d/t age    Mammo 2/25    Dexa 6/23 - osteopenia    BW 3/25 (A1C 9.8)  DM foot exam 8/24  DM eye exam 3/25  Ur microalbumin:Cr 12/24    AWV due after 8/2/25      History of Present Illness     Transitional Care Management Review:   Dejah Parish is a 75 y.o. female here for TCM follow up.  Pt was admitted to [facility] from 5/16/25 to 5/21/25. Records were reviewed by myself in detail and events are summarized below.    During the TCM phone call patient stated:  TCM Call (since 5/13/2025)       Date and time call was made  5/21/2025  4:56 PM    Hospital care reviewed  Records reviewed    Patient was hospitialized at  [facility]    Date of Admission  05/16/25    Date of discharge  05/21/25    Diagnosis  Cellulitis of left hand    Disposition  Home    Were the patients medications reviewed and updated  Yes    Current Symptoms  None          TCM Call (since 5/13/2025)       Post hospital issues  None    Scheduled for follow up?  Yes    Did you obtain your prescribed medications  Yes    Do you need help managing your prescriptions or medications  No    Is transportation to your appointment needed  No    I have advised the patient to call PCP with any new or worsening symptoms  Mago Saez MA    Living Arrangements  Family members    Support System  Family    The type of support provided  Emotional; Financial; Physical    Do you have social support  Yes, as much as I need    Are you recieving home care services  No          HPI Pt presented to [facility] ED on 5/16/25 with c/o L hand swelling and pain after a cat bite. Pt was at home petting her cat and her cat bit her.  By the next day she had  "significant hand pain and swelling.   BP in the ED was 196/91 but rest of VSS.  Exam notable for swelling, erythema to dorsum of L hand with a puncture wound. Some streaking up the forearm was noted as well.  W/U in the ED showed WBC up at 13.91 and CRP 30. Plt wer 142.  Glu was 269 and total protein was 5.6.  Xray of L hand showed soft tissue swelling of L hand w/o acute osseous abnormality.  Pt was admitted for cellulitis of L hand. BC were drawn and she was started on IV abx. Ortho was consulted.  CT L hand was ordered.  NO abscess was noted on CT. Pt improved clinically with abx. She was discharged home on 5/21/25.  Med list reviewed.     Pt has been doing well since discharge.  She finished the abx w/o SE. She notes no F/C.  She notes some pain at the dorsum of the hand. She has not taken the bandage off since the hospital stay and has not cleaned it nor does she know how it looks.  She has a f/u with the hand surgeon tomorrow    She does not think she has been taking her Jardiance at all since our last visit when it was added.  She does not check her sugars at home.      Dgtr noting over the past year she has seemed more \"brain fog\" and has had issues with memory.  Memory issues are more with STM.  Dgtr notes she repeats herself a lot. Pt admits her memory is poor.  She notes no issues putting things in funny places. Family notes no issues with mood changes. She states she is sleeping well and notes she probably sleeps too much.          Review of Systems   Constitutional:  Negative for chills and fever.   HENT:  Negative for congestion and sore throat.    Eyes:  Negative for pain and visual disturbance.   Respiratory:  Negative for cough and shortness of breath.    Cardiovascular:  Negative for chest pain and palpitations.   Gastrointestinal:  Negative for abdominal pain, diarrhea, nausea and vomiting.   Genitourinary:  Negative for difficulty urinating and dysuria.   Musculoskeletal:  Positive for back pain. " "Negative for gait problem.   Skin:  Negative for rash and wound.   Neurological:  Negative for dizziness, light-headedness and headaches.   Hematological:  Does not bruise/bleed easily.   Psychiatric/Behavioral:  Positive for confusion. Negative for dysphoric mood and sleep disturbance.      Objective   /84 (BP Location: Left arm, Patient Position: Sitting, Cuff Size: Standard)   Pulse 75   Ht 4' 10\" (1.473 m)   Wt 61.5 kg (135 lb 9.6 oz)   SpO2 95%   BMI 28.34 kg/m²     Physical Exam  Vitals and nursing note reviewed.   Constitutional:       General: She is not in acute distress.     Appearance: She is well-developed. She is not ill-appearing.   HENT:      Head: Normocephalic and atraumatic.      Right Ear: External ear normal.      Left Ear: External ear normal.     Eyes:      General:         Right eye: No discharge.         Left eye: No discharge.      Conjunctiva/sclera: Conjunctivae normal.     Neck:      Thyroid: No thyromegaly.      Trachea: No tracheal deviation.     Cardiovascular:      Rate and Rhythm: Normal rate and regular rhythm.      Heart sounds: Normal heart sounds. No murmur heard.  Pulmonary:      Effort: Pulmonary effort is normal. No respiratory distress.      Breath sounds: Normal breath sounds. No wheezing, rhonchi or rales.   Abdominal:      General: There is no distension.      Palpations: Abdomen is soft.      Tenderness: There is no abdominal tenderness. There is no guarding or rebound.     Musculoskeletal:         General: No tenderness.      Cervical back: Neck supple.      Comments: L hand: wrapped with splint, 2nd-4th digits flexed and pt unable to fully straighten w/o manual assistance, dorsum of hand with scabbed macule with minimal surrounding edema, no erythema/warmth/drainage/tenderness noted on palp     Skin:     General: Skin is warm and dry.      Coloration: Skin is not pale.      Findings: No erythema or rash.     Neurological:      General: No focal deficit " present.      Mental Status: She is alert.      Motor: No abnormal muscle tone.      Gait: Gait normal.     Psychiatric:         Behavior: Behavior normal.         Thought Content: Thought content normal.         Judgment: Judgment normal.     MMSE 27/30    Medications have been reviewed by provider in current encounter

## 2025-05-28 ENCOUNTER — OFFICE VISIT (OUTPATIENT)
Dept: OBGYN CLINIC | Facility: CLINIC | Age: 75
End: 2025-05-28
Payer: MEDICARE

## 2025-05-28 VITALS — BODY MASS INDEX: 28.34 KG/M2 | HEIGHT: 58 IN | WEIGHT: 135 LBS
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
## 2025-05-28 DIAGNOSIS — S66.812A EXTENSOR TENDON RUPTURE OF HAND, LEFT, INITIAL ENCOUNTER: Primary | ICD-10-CM

## 2025-05-28 PROCEDURE — 99204 OFFICE O/P NEW MOD 45 MIN: CPT | Performed by: ORTHOPAEDIC SURGERY

## 2025-05-28 NOTE — PROGRESS NOTES
ORTHOPAEDIC HAND, WRIST, AND ELBOW OFFICE  VISIT     Name: Dejah Parish      : 1950      MRN: [id number]  Encounter Provider: Bridgette Fraser MD  Encounter Date: 2025   Encounter department: [facility]  :  Assessment & Plan  Extensor tendon rupture of hand, left, initial encounter  Cat bite, subsequent encounter  Given the clinical examination, I am concerned for rupture of the EDC to the long and the ring fingers.  The patient has been immobilized in it is difficult to assess using a tenodesis effect, given the stiffness in the wrist and the pain that passive range of motion produces in the wrist.    MRI ordered to evaluate for extensor tendon rupture.  If MRI cannot be done, as the patient has a pacemaker and an defibrillator, then we will order MS[facility] ultrasound to look for extensor tendon rupture.    Encouraged patient to perform range of motion of the wrist and the fingers.    No splint to use for now.  Follow up after MRI    Orders:    MRI wrist left wo contrast; Future          History of Present Illness   HPI  Chief Complaint   Patient presents with    Left Hand - Pain, Cat Bite       Dejah Parish is a 75 y.o. female who presents for orthopedic follow-up after admission to the hospital for cellulitis related to a cat bite.  Hospital admission was from 2025 until 2025.  The patient underwent a CT scan which demonstrated cellulitis.  Orthopedic surgery was consulted.  The patient reported today that she has completed antibiotics.  Her hand and wrist have been in a splint since the time of diagnosis.    Hand dominance: right    REVIEW OF SYSTEMS:  General: no fever, no chills  HEENT:  No loss of hearing or eyesight problems  Eyes:  No red eyes  Respiratory:  No coughing, shortness of breath or wheezing  Cardiovascular:  No chest pain, no palpitations  GI:  Abdomen soft nontender, denies nausea  Endocrine:  No muscle weakness,  "no frequent urination, no excessive thirst  Urinary:  No dysuria, no incontinence  Musculoskeletal: see HPI and PE  SKIN:  No skin rash, no dry skin  Neurological:  No headaches, no confusion  Psychiatric:  No suicide thoughts, no anxiety, no depression  Review of all other systems is negative    Objective   Ht 4' 10\" (1.473 m)   Wt 61.2 kg (135 lb)   BMI 28.22 kg/m²      General: well developed and well nourished, alert, oriented times 3, and appears comfortable  Psychiatric: Normal  HEENT: Trachea Midline, No torticollis  Cardiovascular: No discernable arrhythmia  Pulmonary: No wheezing or stridor  Abdomen: No rebound or guarding  Extremities: No peripheral edema  Skin: No masses, erythema, lacerations, fluctation, ulcerations  Neurovascular: Sensation Intact to the Median, Ulnar, Radial Nerve, Motor Intact to the Median, Ulnar, Radial Nerve, and Pulses Intact    Musculoskeletal exam:  Left hand and wrist was examined.  There is no swelling or ecchymosis.  The patient i was posturing the hand and was apprehensive to perform range of motion.  No obvious extensor lag of the fingers. Fullness over wrist.  TTP overlying fullness.     Difficult to assess for extensor tendon rupture as patient with limited tolerance for wrist ROM to assess tenodesis of extensors.  Patient not able to hold fingers in extended position when fingers placed with MCP and IP joints in extension.  Wrist is stiff.    STUDIES REVIEWED:  No new studies to review      PROCEDURES PERFORMED:  Procedures  No Procedures performed today      "

## 2025-05-29 ENCOUNTER — TELEPHONE (OUTPATIENT)
Dept: RADIOLOGY | Facility: HOSPITAL | Age: 75
End: 2025-05-29

## 2025-05-29 DIAGNOSIS — S66.812A EXTENSOR TENDON RUPTURE OF HAND, LEFT, INITIAL ENCOUNTER: Primary | ICD-10-CM
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
## 2025-05-29 NOTE — TELEPHONE ENCOUNTER
Per [employer] pt has a mixed vendor pacemaker consisting of a [employer] generator and a [employer] lead. She also has an abandoned ICD lead from a previous system and abandoned epicardial leads. Ordering physician made aware. I did leave a message for the pt to return my call to inform her.

## 2025-05-30 DIAGNOSIS — N18.30 TYPE 2 DIABETES MELLITUS WITH STAGE 3 CHRONIC KIDNEY DISEASE, WITHOUT LONG-TERM CURRENT USE OF INSULIN, UNSPECIFIED WHETHER STAGE 3A OR 3B CKD (HCC): ICD-10-CM

## 2025-05-30 DIAGNOSIS — E11.22 TYPE 2 DIABETES MELLITUS WITH STAGE 3 CHRONIC KIDNEY DISEASE, WITHOUT LONG-TERM CURRENT USE OF INSULIN, UNSPECIFIED WHETHER STAGE 3A OR 3B CKD (HCC): ICD-10-CM

## 2025-05-30 DIAGNOSIS — F32.A DEPRESSION, UNSPECIFIED DEPRESSION TYPE: ICD-10-CM

## 2025-05-30 RX ORDER — SITAGLIPTIN 100 MG/1
100 TABLET, FILM COATED ORAL DAILY
Qty: 30 TABLET | Refills: 0 | Status: SHIPPED | OUTPATIENT
Start: 2025-05-30

## 2025-06-21 ENCOUNTER — APPOINTMENT (OUTPATIENT)
Dept: LAB | Facility: CLINIC | Age: 75
End: 2025-06-21
Payer: MEDICARE

## 2025-06-21 DIAGNOSIS — E11.65 TYPE 2 DIABETES MELLITUS WITH HYPERGLYCEMIA, WITHOUT LONG-TERM CURRENT USE OF INSULIN (HCC): ICD-10-CM

## 2025-06-21 DIAGNOSIS — E21.3 HYPERPARATHYROIDISM (HCC): ICD-10-CM

## 2025-06-21 DIAGNOSIS — R41.3 MEMORY IMPAIRMENT: ICD-10-CM

## 2025-06-21 LAB
ANION GAP SERPL CALCULATED.3IONS-SCNC: 9 MMOL/L (ref 4–13)
BUN SERPL-MCNC: 25 MG/DL (ref 5–25)
CALCIUM SERPL-MCNC: 11.2 MG/DL (ref 8.4–10.2)
CHLORIDE SERPL-SCNC: 100 MMOL/L (ref 96–108)
CO2 SERPL-SCNC: 33 MMOL/L (ref 21–32)
CREAT SERPL-MCNC: 1.34 MG/DL (ref 0.6–1.3)
EST. AVERAGE GLUCOSE BLD GHB EST-MCNC: 212 MG/DL
FOLATE SERPL-MCNC: >22.3 NG/ML
GFR SERPL CREATININE-BSD FRML MDRD: 38 ML/MIN/1.73SQ M
GLUCOSE SERPL-MCNC: 101 MG/DL (ref 65–140)
HBA1C MFR BLD: 9 %
POTASSIUM SERPL-SCNC: 4.4 MMOL/L (ref 3.5–5.3)
PTH-INTACT SERPL-MCNC: 92.9 PG/ML (ref 12–88)
SODIUM SERPL-SCNC: 142 MMOL/L (ref 135–147)
VIT B12 SERPL-MCNC: 1536 PG/ML (ref 180–914)

## 2025-06-21 PROCEDURE — 80048 BASIC METABOLIC PNL TOTAL CA: CPT

## 2025-06-21 PROCEDURE — 83036 HEMOGLOBIN GLYCOSYLATED A1C: CPT

## 2025-06-21 PROCEDURE — 83970 ASSAY OF PARATHORMONE: CPT

## 2025-06-21 PROCEDURE — 86780 TREPONEMA PALLIDUM: CPT

## 2025-06-21 PROCEDURE — 36415 COLL VENOUS BLD VENIPUNCTURE: CPT

## 2025-06-22 LAB — TREPONEMA PALLIDUM IGG+IGM AB [PRESENCE] IN SERUM OR PLASMA BY IMMUNOASSAY: NORMAL

## 2025-06-25 ENCOUNTER — RA CDI HCC (OUTPATIENT)
Dept: OTHER | Facility: HOSPITAL | Age: 75
End: 2025-06-25

## 2025-06-25 NOTE — PROGRESS NOTES
HCC coding opportunities          Chart Reviewed number of suggestions sent to Provider: 2     Patients Insurance   E11.40 and I42.0  Medicare Insurance: Medicare

## 2025-06-29 DIAGNOSIS — E78.5 HYPERLIPIDEMIA, UNSPECIFIED HYPERLIPIDEMIA TYPE: ICD-10-CM

## 2025-06-29 DIAGNOSIS — M10.00 IDIOPATHIC GOUT, UNSPECIFIED CHRONICITY, UNSPECIFIED SITE: ICD-10-CM

## 2025-07-01 ENCOUNTER — OFFICE VISIT (OUTPATIENT)
Dept: FAMILY MEDICINE CLINIC | Facility: HOSPITAL | Age: 75
End: 2025-07-01
Payer: MEDICARE

## 2025-07-01 VITALS
DIASTOLIC BLOOD PRESSURE: 60 MMHG | HEIGHT: 58 IN | HEART RATE: 66 BPM | WEIGHT: 125.2 LBS | SYSTOLIC BLOOD PRESSURE: 112 MMHG | BODY MASS INDEX: 26.28 KG/M2 | OXYGEN SATURATION: 99 %

## 2025-07-01 DIAGNOSIS — R39.9 UTI SYMPTOMS: ICD-10-CM

## 2025-07-01 DIAGNOSIS — R41.3 MEMORY IMPAIRMENT: ICD-10-CM

## 2025-07-01 DIAGNOSIS — R44.0 AUDITORY HALLUCINATION: ICD-10-CM

## 2025-07-01 DIAGNOSIS — N18.32 TYPE 2 DIABETES MELLITUS WITH STAGE 3B CHRONIC KIDNEY DISEASE, WITHOUT LONG-TERM CURRENT USE OF INSULIN (HCC): ICD-10-CM

## 2025-07-01 DIAGNOSIS — E11.65 TYPE 2 DIABETES MELLITUS WITH HYPERGLYCEMIA, WITHOUT LONG-TERM CURRENT USE OF INSULIN (HCC): Primary | ICD-10-CM

## 2025-07-01 DIAGNOSIS — E21.3 HYPERPARATHYROIDISM (HCC): ICD-10-CM

## 2025-07-01 DIAGNOSIS — N18.32 STAGE 3B CHRONIC KIDNEY DISEASE (HCC): ICD-10-CM

## 2025-07-01 DIAGNOSIS — R79.89 ELEVATED VITAMIN B12 LEVEL: ICD-10-CM

## 2025-07-01 DIAGNOSIS — E83.52 HYPERCALCEMIA: ICD-10-CM

## 2025-07-01 DIAGNOSIS — E11.22 TYPE 2 DIABETES MELLITUS WITH STAGE 3B CHRONIC KIDNEY DISEASE, WITHOUT LONG-TERM CURRENT USE OF INSULIN (HCC): ICD-10-CM

## 2025-07-01 DIAGNOSIS — M25.561 ARTHRALGIA OF KNEE, RIGHT: ICD-10-CM

## 2025-07-01 DIAGNOSIS — G89.29 CHRONIC LEFT-SIDED LOW BACK PAIN WITHOUT SCIATICA: ICD-10-CM

## 2025-07-01 DIAGNOSIS — M54.50 CHRONIC LEFT-SIDED LOW BACK PAIN WITHOUT SCIATICA: ICD-10-CM

## 2025-07-01 PROBLEM — L03.114 CELLULITIS OF LEFT HAND: Status: RESOLVED | Noted: 2025-05-17 | Resolved: 2025-07-01

## 2025-07-01 LAB
SL AMB  POCT GLUCOSE, UA: ABNORMAL
SL AMB LEUKOCYTE ESTERASE,UA: ABNORMAL
SL AMB POCT BILIRUBIN,UA: ABNORMAL
SL AMB POCT BLOOD,UA: ABNORMAL
SL AMB POCT CLARITY,UA: CLEAR
SL AMB POCT COLOR,UA: ABNORMAL
SL AMB POCT KETONES,UA: ABNORMAL
SL AMB POCT NITRITE,UA: ABNORMAL
SL AMB POCT PH,UA: 6
SL AMB POCT SPECIFIC GRAVITY,UA: 1.01
SL AMB POCT URINE PROTEIN: 1
SL AMB POCT UROBILINOGEN: ABNORMAL

## 2025-07-01 PROCEDURE — 99215 OFFICE O/P EST HI 40 MIN: CPT | Performed by: INTERNAL MEDICINE

## 2025-07-01 PROCEDURE — G2211 COMPLEX E/M VISIT ADD ON: HCPCS | Performed by: INTERNAL MEDICINE

## 2025-07-01 PROCEDURE — 81002 URINALYSIS NONAUTO W/O SCOPE: CPT | Performed by: INTERNAL MEDICINE

## 2025-07-01 RX ORDER — GABAPENTIN 100 MG/1
CAPSULE ORAL
Qty: 90 CAPSULE | Refills: 1 | Status: SHIPPED | OUTPATIENT
Start: 2025-07-01

## 2025-07-01 RX ORDER — SIMVASTATIN 40 MG
40 TABLET ORAL DAILY
Qty: 90 TABLET | Refills: 1 | Status: SHIPPED | OUTPATIENT
Start: 2025-07-01

## 2025-07-01 RX ORDER — ALLOPURINOL 100 MG/1
100 TABLET ORAL DAILY
Qty: 90 TABLET | Refills: 1 | Status: SHIPPED | OUTPATIENT
Start: 2025-07-01

## 2025-07-01 NOTE — ASSESSMENT & PLAN NOTE
Ca and PTH elevated - h/o hyperparathyroidism - has never seen Endo, had parathyroid scan a few years ago - no nodules/masses noted, should seen Endo - referral placed, urged to stop Vit D/C/MVI, will follow

## 2025-07-01 NOTE — ASSESSMENT & PLAN NOTE
Lab Results   Component Value Date    EGFR 38 06/21/2025    EGFR 62 05/21/2025    EGFR 74 05/19/2025    CREATININE 1.34 (H) 06/21/2025    CREATININE 0.89 05/21/2025    CREATININE 0.77 05/19/2025   Importance of BS control as well as BP control and avoiding NSAIDs - Celebrex was stopped at last appt, pt still using Tramadol prn, david start Gabapentin in hopes of getting off Tramadol as well, should seen Nephro with stage 3b progression, urged to keep hydrated  Orders:    Ambulatory Referral to Nephrology; Future

## 2025-07-01 NOTE — ASSESSMENT & PLAN NOTE
Lab Results   Component Value Date    HGBA1C 9.0 (H) 06/21/2025   DM type 2 with hyperglycemia - uncontrolled but improved with A1c 9.0 - urged to con't watching diet and keep active, UTT Jardiance d/t nausea, on max Januvia, will start Rybelsus 3 mg 1 tab q day x 1 mo then increase to 7 mg q day and stay on that until re-eval in 3 mos - BW order given, DM foot exam done today, UTD on DM eye exam (3/25), on a statin and an ARB, will follow closely  Orders:    semaglutide (Rybelsus) 3 MG tablet; Take 1 tablet (3 mg total) by mouth daily Take on an empty stomach with 4 oz water, and wait 30 minutes before eating    semaglutide (Rybelsus) 7 MG tablet; Take 1 tablet (7 mg total) by mouth daily Take on an empty stomach with 4 oz water, and wait 30 minutes before eating Do not start before July 31, 2025.    Hemoglobin A1C; Future    Basic metabolic panel; Future

## 2025-07-01 NOTE — ASSESSMENT & PLAN NOTE
Chronic LBP and joint pains, importance of avoiding NSAIDs reviewed, will trial Gabapentin and start 100 mg 1 tab PO q day and titrate up to 100 mg 1 tab PO tid, SE reviewed - call if they occur, recheck in 3 mos or so  Orders:    gabapentin (Neurontin) 100 mg capsule; 1 tab PO q am x 3 days then 1 tab PO bid x 3 days then 1 tab PO tid and stay on that until re-evaluated

## 2025-07-01 NOTE — ASSESSMENT & PLAN NOTE
Ca and PTH elevated - h/o hyperparathyroidism - has never seen Endo, had parathyroid scan a few years ago - no nodules/masses noted, should seen Endo - referral placed, urged to stop Vit D/C/MVI, will follow  Orders:    Ambulatory Referral to Endocrinology; Future

## 2025-07-01 NOTE — ASSESSMENT & PLAN NOTE
Lab Results   Component Value Date    HGBA1C 9.0 (H) 06/21/2025   Importance of BS control as well as BP control and avoiding NSAIDs - Celebrex was stopped at last appt, pt still using Tramadol prn, david start Gabapentin in hopes of getting off Tramadol as well, should seen Nephro with stage 3b progression, urged to keep hydrated  Orders:    Ambulatory Referral to Nephrology; Future

## 2025-07-01 NOTE — ASSESSMENT & PLAN NOTE
Chronic LBP and joint pains, importance of avoiding NSAIDs reviewed, will trial Gabapentin and start 100 mg 1 tab PO q day and titrate up to 100 mg 1 tab PO tid, SE reviewed - call if they occur, recheck in 3 mos or so

## 2025-07-01 NOTE — PROGRESS NOTES
Name: Dejah Parish      : 1950      MRN: [id number]  Encounter Provider: Leann Henson DO  Encounter Date: 2025   Encounter department: [facility] [address]   :  Assessment & Plan  Type 2 diabetes mellitus with hyperglycemia, without long-term current use of insulin (HCC)    Lab Results   Component Value Date    HGBA1C 9.0 (H) 2025   DM type 2 with hyperglycemia - uncontrolled but improved with A1c 9.0 - urged to con't watching diet and keep active, UTT Jardiance d/t nausea, on max Januvia, will start Rybelsus 3 mg 1 tab q day x 1 mo then increase to 7 mg q day and stay on that until re-eval in 3 mos - BW order given, DM foot exam done today, UTD on DM eye exam (3/25), on a statin and an ARB, will follow closely  Orders:    semaglutide (Rybelsus) 3 MG tablet; Take 1 tablet (3 mg total) by mouth daily Take on an empty stomach with 4 oz water, and wait 30 minutes before eating    semaglutide (Rybelsus) 7 MG tablet; Take 1 tablet (7 mg total) by mouth daily Take on an empty stomach with 4 oz water, and wait 30 minutes before eating Do not start before 2025.    Hemoglobin A1C; Future    Basic metabolic panel; Future    Type 2 diabetes mellitus with stage 3b chronic kidney disease, without long-term current use of insulin (HCC)    Lab Results   Component Value Date    HGBA1C 9.0 (H) 2025   Importance of BS control as well as BP control and avoiding NSAIDs - Celebrex was stopped at last appt, pt still using Tramadol prn, david start Gabapentin in hopes of getting off Tramadol as well, should seen Nephro with stage 3b progression, urged to keep hydrated  Orders:    Ambulatory Referral to Nephrology; Future    Stage 3b chronic kidney disease (HCC)  Lab Results   Component Value Date    EGFR 38 2025    EGFR 62 2025    EGFR 74 2025    CREATININE 1.34 (H) 2025    CREATININE 0.89 2025    CREATININE 0.77 2025   Importance  of BS control as well as BP control and avoiding NSAIDs - Celebrex was stopped at last appt, pt still using Tramadol prn, david start Gabapentin in hopes of getting off Tramadol as well, should seen Nephro with stage 3b progression, urged to keep hydrated  Orders:    Ambulatory Referral to Nephrology; Future    Hypercalcemia  Ca and PTH elevated - h/o hyperparathyroidism - has never seen Endo, had parathyroid scan a few years ago - no nodules/masses noted, should seen Endo - referral placed, urged to stop Vit D/C/MVI, will follow       Hyperparathyroidism (HCC)  Ca and PTH elevated - h/o hyperparathyroidism - has never seen Endo, had parathyroid scan a few years ago - no nodules/masses noted, should seen Endo - referral placed, urged to stop Vit D/C/MVI, will follow  Orders:    Ambulatory Referral to Endocrinology; Future    Elevated vitamin B12 level  Unsure if she is taking B12/dose if she is taking it - urged to stop B12 supplement, will follow       Chronic left-sided low back pain without sciatica  Chronic LBP and joint pains, importance of avoiding NSAIDs reviewed, will trial Gabapentin and start 100 mg 1 tab PO q day and titrate up to 100 mg 1 tab PO tid, SE reviewed - call if they occur, recheck in 3 mos or so  Orders:    gabapentin (Neurontin) 100 mg capsule; 1 tab PO q am x 3 days then 1 tab PO bid x 3 days then 1 tab PO tid and stay on that until re-evaluated    Arthralgia of knee, right  Chronic LBP and joint pains, importance of avoiding NSAIDs reviewed, will trial Gabapentin and start 100 mg 1 tab PO q day and titrate up to 100 mg 1 tab PO tid, SE reviewed - call if they occur, recheck in 3 mos or so       Memory impairment  MMSE last exam 27/30, memory labs with elevated B12 but nml TSH/folate/RPR, having new auditory hallucinations, will proceed with MRI of brain       Auditory hallucination  With new onset auditory hallucinations and pts family noting memory impairment will proceed with imaging of  "brain, MRI ordered, call with sudden new/worse symptoms      *SYSTEM NOT ALLOWING ME TO ORDER MRI- STATING PT NOT MRI SAFE*  WILL NEED TO F/U WITH [facility] DURING BUSINESS HRS IN REGARDS TO ORDER TEST  UTI symptoms  UA in office with leukocytes and UC needed but sample given in office inadequate for UC - will have pt give sample and drop off at lab, call with new/worse symptoms  Orders:    POCT urine dip      Colonoscopy 3/22 - no further needed d/t age     Mammo 2/25     Dexa 6/23 - osteopenia - will order with Mammo in Feb 26     BW 6/25 (A1C 9.0)  DM foot exam done in office today  DM eye exam 3/25  Ur microalbumin:Cr 12/24     AWV due after 8/2/25     History of Present Illness   HPI Pt here for follow up appt and BW results    BW results were d/w pt in detail: FBS/A1C 101/9.0, Ca 11.2, BUN/Cr 25/1.34 (GFR 38), rest of BMP/folate/RPR were nml, B12 was elevated, PTH was elevated     Def of controlled vs uncontrolled DM was reviewed.  Diet was reviewed - wgt down 10 lbs since May 27th 2025.  She is taking her DM meds as directed.  She has had a lot of nausea and decrease in appetite since starting the Jardiance.  She is Januvia.  She is checking her sugars and home readings brought in - 148, 111, 125 are home readings.  She is coming up due for her DM foot exam (8/24) and her eye exam is UTD (3/25).  She notes no sores/ulcers with her feet.  CKD stage 3 was reviewed.  Importance of BS control as well as BP control/avoiding NSAIDs/dehydration was reviewed.  She is on statin and an ARB.     Pt with dx of hyperparathyroidism.  Nml range for Ca reviewed.  Ca/Vit D supplements reviewed.     We discussed memory impairment at last visit (see 5/27/25 OV note for details).  MMSE was nml at 27/30.  Again noted B12 elevation as well as nml folate/RPR.  Current B12 supplement reviewed.  Family notes she is hearing \"songs and voices\" - told them its occurring the past 2 wks or so.  She notes no HA's/dizziness/vision changes/focal " "weakness/numbness.  She notes no burning urination/blood in the urine.    She notes since stopping Celebrex at last visit her joint and back is worse. She is interested in starting a different pain medication.          Review of Systems   Constitutional:  Positive for appetite change and unexpected weight change. Negative for chills and fever.   HENT:  Negative for congestion and trouble swallowing.    Eyes:  Negative for pain and visual disturbance.   Respiratory:  Negative for cough, shortness of breath and wheezing.    Cardiovascular:  Negative for chest pain, palpitations and leg swelling.   Gastrointestinal:  Positive for nausea. Negative for abdominal pain, blood in stool, constipation, diarrhea and vomiting.   Genitourinary:  Negative for difficulty urinating, dysuria and hematuria.   Musculoskeletal:  Positive for arthralgias and back pain.   Skin:  Negative for rash and wound.   Neurological:  Negative for dizziness and headaches.   Psychiatric/Behavioral:  Positive for confusion and hallucinations.        Objective   /60   Pulse 66   Ht 4' 10\" (1.473 m)   Wt 56.8 kg (125 lb 3.2 oz)   SpO2 99%   BMI 26.17 kg/m²      Physical Exam  Vitals and nursing note reviewed.   Constitutional:       General: She is not in acute distress.     Appearance: She is well-developed. She is not ill-appearing.   HENT:      Head: Normocephalic and atraumatic.      Right Ear: External ear normal.      Left Ear: External ear normal.      Mouth/Throat:      Mouth: Mucous membranes are moist.      Pharynx: Oropharynx is clear. No oropharyngeal exudate.     Eyes:      General:         Right eye: No discharge.         Left eye: No discharge.      Conjunctiva/sclera: Conjunctivae normal.     Neck:      Thyroid: No thyromegaly.      Vascular: No carotid bruit.      Trachea: No tracheal deviation.     Cardiovascular:      Rate and Rhythm: Normal rate and regular rhythm.      Pulses: no weak pulses.           Dorsalis pedis " pulses are 2+ on the right side and 2+ on the left side.      Heart sounds: Normal heart sounds. No murmur heard.  Pulmonary:      Effort: Pulmonary effort is normal. No respiratory distress.      Breath sounds: Normal breath sounds. No wheezing, rhonchi or rales.     Musculoskeletal:         General: No deformity or signs of injury.      Cervical back: Neck supple.        Feet:    Feet:      Right foot:      Skin integrity: Callus present. No ulcer, skin breakdown, erythema, warmth or dry skin.      Left foot:      Skin integrity: Callus present. No ulcer, skin breakdown, erythema, warmth or dry skin.   Lymphadenopathy:      Cervical: No cervical adenopathy.     Skin:     General: Skin is warm and dry.      Coloration: Skin is not pale.      Findings: No rash.     Neurological:      General: No focal deficit present.      Mental Status: She is alert. Mental status is at baseline.      Motor: No abnormal muscle tone.      Gait: Gait normal.     Psychiatric:         Behavior: Behavior normal.         Thought Content: Thought content normal.     Diabetic Foot Exam    Patient's shoes and socks removed.    Right Foot/Ankle   Right Foot Inspection  Skin Exam: skin normal, skin intact, callus and callus. No dry skin, no warmth, no erythema, no maceration, no abnormal color, no pre-ulcer and no ulcer.     Toe Exam: ROM and strength within normal limits.     Sensory   Vibration: intact  Monofilament testing: diminished    Vascular  The right DP pulse is 2+.     Left Foot/Ankle  Left Foot Inspection  Skin Exam: skin normal, skin intact and callus. No dry skin, no warmth, no erythema, no maceration, normal color, no pre-ulcer and no ulcer.     Toe Exam: ROM and strength within normal limits.     Sensory   Vibration: intact  Monofilament testing: diminished    Vascular  The left DP pulse is 2+.     Assign Risk Category  No deformity present  No loss of protective sensation  No weak pulses  Risk: 0

## 2025-07-02 ENCOUNTER — HOSPITAL ENCOUNTER (EMERGENCY)
Facility: HOSPITAL | Age: 75
Discharge: HOME | DRG: 176 | End: 2025-07-02
Attending: EMERGENCY MEDICINE
Payer: MEDICARE

## 2025-07-02 ENCOUNTER — APPOINTMENT (EMERGENCY)
Dept: CT IMAGING | Facility: HOSPITAL | Age: 75
DRG: 176 | End: 2025-07-02
Payer: MEDICARE

## 2025-07-02 VITALS
RESPIRATION RATE: 18 BRPM | OXYGEN SATURATION: 96 % | SYSTOLIC BLOOD PRESSURE: 122 MMHG | TEMPERATURE: 100.6 F | HEART RATE: 75 BPM | DIASTOLIC BLOOD PRESSURE: 58 MMHG

## 2025-07-02 DIAGNOSIS — N39.0 UTI (URINARY TRACT INFECTION): ICD-10-CM

## 2025-07-02 DIAGNOSIS — R93.89 ABNORMAL CT SCAN: ICD-10-CM

## 2025-07-02 DIAGNOSIS — R10.9 ABDOMINAL PAIN: Primary | ICD-10-CM

## 2025-07-02 LAB
2HR DELTA HS TROPONIN: 4 NG/L
ALBUMIN SERPL BCG-MCNC: 3.7 G/DL (ref 3.5–5)
ALP SERPL-CCNC: 82 U/L (ref 34–104)
ALT SERPL W P-5'-P-CCNC: 16 U/L (ref 7–52)
ANION GAP SERPL CALCULATED.3IONS-SCNC: 10 MMOL/L (ref 4–13)
AST SERPL W P-5'-P-CCNC: 23 U/L (ref 13–39)
BACTERIA UR QL AUTO: ABNORMAL /HPF
BASOPHILS # BLD AUTO: 0.04 THOUSANDS/ÂΜL (ref 0–0.1)
BASOPHILS NFR BLD AUTO: 0 % (ref 0–1)
BILIRUB SERPL-MCNC: 0.83 MG/DL (ref 0.2–1)
BILIRUB UR QL STRIP: NEGATIVE
BUN SERPL-MCNC: 16 MG/DL (ref 5–25)
CALCIUM SERPL-MCNC: 10.3 MG/DL (ref 8.4–10.2)
CARDIAC TROPONIN I PNL SERPL HS: 13 NG/L (ref ?–50)
CARDIAC TROPONIN I PNL SERPL HS: 17 NG/L (ref ?–50)
CHLORIDE SERPL-SCNC: 101 MMOL/L (ref 96–108)
CLARITY UR: CLEAR
CO2 SERPL-SCNC: 25 MMOL/L (ref 21–32)
COLOR UR: YELLOW
CREAT SERPL-MCNC: 1.08 MG/DL (ref 0.6–1.3)
EOSINOPHIL # BLD AUTO: 0.03 THOUSAND/ÂΜL (ref 0–0.61)
EOSINOPHIL NFR BLD AUTO: 0 % (ref 0–6)
ERYTHROCYTE [DISTWIDTH] IN BLOOD BY AUTOMATED COUNT: 14.6 % (ref 11.6–15.1)
GFR SERPL CREATININE-BSD FRML MDRD: 49 ML/MIN/1.73SQ M
GLUCOSE SERPL-MCNC: 154 MG/DL (ref 65–140)
GLUCOSE UR STRIP-MCNC: ABNORMAL MG/DL
HCT VFR BLD AUTO: 44.4 % (ref 34.8–46.1)
HGB BLD-MCNC: 14.4 G/DL (ref 11.5–15.4)
HGB UR QL STRIP.AUTO: NEGATIVE
IMM GRANULOCYTES # BLD AUTO: 0.17 THOUSAND/UL (ref 0–0.2)
IMM GRANULOCYTES NFR BLD AUTO: 2 % (ref 0–2)
KETONES UR STRIP-MCNC: ABNORMAL MG/DL
LEUKOCYTE ESTERASE UR QL STRIP: ABNORMAL
LIPASE SERPL-CCNC: 23 U/L (ref 11–82)
LYMPHOCYTES # BLD AUTO: 1.1 THOUSANDS/ÂΜL (ref 0.6–4.47)
LYMPHOCYTES NFR BLD AUTO: 10 % (ref 14–44)
MCH RBC QN AUTO: 30.6 PG (ref 26.8–34.3)
MCHC RBC AUTO-ENTMCNC: 32.4 G/DL (ref 31.4–37.4)
MCV RBC AUTO: 95 FL (ref 82–98)
MONOCYTES # BLD AUTO: 1.36 THOUSAND/ÂΜL (ref 0.17–1.22)
MONOCYTES NFR BLD AUTO: 12 % (ref 4–12)
MUCOUS THREADS UR QL AUTO: ABNORMAL
NEUTROPHILS # BLD AUTO: 8.42 THOUSANDS/ÂΜL (ref 1.85–7.62)
NEUTS SEG NFR BLD AUTO: 76 % (ref 43–75)
NITRITE UR QL STRIP: NEGATIVE
NON-SQ EPI CELLS URNS QL MICRO: ABNORMAL /HPF
NRBC BLD AUTO-RTO: 0 /100 WBCS
PH UR STRIP.AUTO: 6 [PH]
PLATELET # BLD AUTO: 171 THOUSANDS/UL (ref 149–390)
PMV BLD AUTO: 9.5 FL (ref 8.9–12.7)
POTASSIUM SERPL-SCNC: 4.4 MMOL/L (ref 3.5–5.3)
PROT SERPL-MCNC: 6.3 G/DL (ref 6.4–8.4)
PROT UR STRIP-MCNC: NEGATIVE MG/DL
RBC # BLD AUTO: 4.7 MILLION/UL (ref 3.81–5.12)
RBC #/AREA URNS AUTO: ABNORMAL /HPF
SODIUM SERPL-SCNC: 136 MMOL/L (ref 135–147)
SP GR UR STRIP.AUTO: <1.005 (ref 1–1.03)
UROBILINOGEN UR STRIP-ACNC: <2 MG/DL
WBC # BLD AUTO: 11.12 THOUSAND/UL (ref 4.31–10.16)
WBC #/AREA URNS AUTO: ABNORMAL /HPF

## 2025-07-02 PROCEDURE — 87086 URINE CULTURE/COLONY COUNT: CPT | Performed by: INTERNAL MEDICINE

## 2025-07-02 PROCEDURE — 74177 CT ABD & PELVIS W/CONTRAST: CPT

## 2025-07-02 PROCEDURE — 96361 HYDRATE IV INFUSION ADD-ON: CPT

## 2025-07-02 PROCEDURE — 83690 ASSAY OF LIPASE: CPT

## 2025-07-02 PROCEDURE — 96365 THER/PROPH/DIAG IV INF INIT: CPT

## 2025-07-02 PROCEDURE — 84484 ASSAY OF TROPONIN QUANT: CPT | Performed by: PHYSICIAN ASSISTANT

## 2025-07-02 PROCEDURE — 81001 URINALYSIS AUTO W/SCOPE: CPT | Performed by: PHYSICIAN ASSISTANT

## 2025-07-02 PROCEDURE — 80053 COMPREHEN METABOLIC PANEL: CPT

## 2025-07-02 PROCEDURE — 99285 EMERGENCY DEPT VISIT HI MDM: CPT | Performed by: PHYSICIAN ASSISTANT

## 2025-07-02 PROCEDURE — 93005 ELECTROCARDIOGRAM TRACING: CPT

## 2025-07-02 PROCEDURE — 36415 COLL VENOUS BLD VENIPUNCTURE: CPT

## 2025-07-02 PROCEDURE — 99284 EMERGENCY DEPT VISIT MOD MDM: CPT

## 2025-07-02 PROCEDURE — 85025 COMPLETE CBC W/AUTO DIFF WBC: CPT

## 2025-07-02 RX ORDER — CEPHALEXIN 500 MG/1
500 CAPSULE ORAL EVERY 12 HOURS SCHEDULED
Qty: 14 CAPSULE | Refills: 0 | Status: SHIPPED | OUTPATIENT
Start: 2025-07-02 | End: 2025-07-07

## 2025-07-02 RX ORDER — ACETAMINOPHEN 10 MG/ML
1000 INJECTION, SOLUTION INTRAVENOUS ONCE
Status: COMPLETED | OUTPATIENT
Start: 2025-07-02 | End: 2025-07-02

## 2025-07-02 RX ADMIN — IOHEXOL 50 ML: 350 INJECTION, SOLUTION INTRAVENOUS at 17:14

## 2025-07-02 RX ADMIN — CEPHALEXIN 500 MG: 250 CAPSULE ORAL at 20:04

## 2025-07-02 RX ADMIN — SODIUM CHLORIDE 500 ML: 9 INJECTION, SOLUTION INTRAVENOUS at 17:02

## 2025-07-02 RX ADMIN — ACETAMINOPHEN 1000 MG: 10 INJECTION INTRAVENOUS at 16:59

## 2025-07-02 NOTE — ED PROVIDER NOTES
Time reflects when diagnosis was documented in both MDM as applicable and the Disposition within this note       Time User Action Codes Description Comment    7/2/2025  7:48 PM Lili Akins Add [R10.9] Abdominal pain     7/2/2025  7:48 PM Lili Akins Add [N39.0] UTI (urinary tract infection)     7/2/2025  7:48 PM Lili Akins Add [R93.89] Abnormal CT scan     7/2/2025  7:49 PM Lili Akins Modify [R93.89] Abnormal CT scan Enlarging multilobulated exophytic soft tissue density lesion at the left inferior pole measuring up to 2.3 cm with interspersed fat density. 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 Nonemergent MR  abdomen recommended for further evaluation.   Additional indeterminate isodense lesions at the right inferior pole and left upper pole. These may also be evaluated on MRI.          ED Disposition       ED Disposition   Discharge    Condition   Stable    Date/Time   Wed Jul 2, 2025  7:48 PM    Comment   Dejah Parish discharge to home/self care.                   Assessment & Plan       Medical Decision Making  Patient with abdominal pain, fever, abnormal urine at office today, will order labs, CT scan to r/o UTI, colitis, gastritis, pancreatitis.  Patient with UTI, will start on keflex, urine culture pending.  Patient with enlarging lesion in kidney, advised f/u with PCP for further evaluation with MRI to r/o malignancy.      Amount and/or Complexity of Data Reviewed  Labs: ordered.  Radiology: ordered.  ECG/medicine tests: ordered and independent interpretation performed.    Risk  Prescription drug management.             Medications   sodium chloride 0.9 % bolus 500 mL (0 mL Intravenous Stopped 7/2/25 1818)   acetaminophen (Ofirmev) injection 1,000 mg (0 mg Intravenous Stopped 7/2/25 1731)   iohexol (OMNIPAQUE) 350 MG/ML injection (MULTI-DOSE) 50 mL (50 mL Intravenous Given 7/2/25 1714)   cephalexin (KEFLEX) capsule  500 mg (500 mg Oral Given 7/2/25 2004)       ED Risk Strat Scores   HEART Risk Score      Flowsheet Row Most Recent Value   Heart Score Risk Calculator    History 0 Filed at: 07/02/2025 2318   ECG 0 Filed at: 07/02/2025 2318   Age 2 Filed at: 07/02/2025 2318   Risk Factors 1 Filed at: 07/02/2025 2318   Troponin 0 Filed at: 07/02/2025 2318   HEART Score 3 Filed at: 07/02/2025 2318          HEART Risk Score      Flowsheet Row Most Recent Value   Heart Score Risk Calculator    History 0 Filed at: 07/02/2025 2318   ECG 0 Filed at: 07/02/2025 2318   Age 2 Filed at: 07/02/2025 2318   Risk Factors 1 Filed at: 07/02/2025 2318   Troponin 0 Filed at: 07/02/2025 2318   HEART Score 3 Filed at: 07/02/2025 2318                      No data recorded        SBIRT 22yo+      Flowsheet Row Most Recent Value   Initial Alcohol Screen: US AUDIT-C     1. How often do you have a drink containing alcohol? 0 Filed at: 07/02/2025 1620   2. How many drinks containing alcohol do you have on a typical day you are drinking?  0 Filed at: 07/02/2025 1620   3a. Male UNDER 65: How often do you have five or more drinks on one occasion? 0 Filed at: 07/02/2025 1620   3b. FEMALE Any Age, or MALE 65+: How often do you have 4 or more drinks on one occassion? 0 Filed at: 07/02/2025 1620   Audit-C Score 0 Filed at: 07/02/2025 1620   COLE: How many times in the past year have you...    Used an illegal drug or used a prescription medication for non-medical reasons? Never Filed at: 07/02/2025 1620                            History of Present Illness       Chief Complaint   Patient presents with    Flank Pain     Pt to er with daughter with reports of getting a sharp pain in her right flank. Urine test that was done this am came back abnormal. Decreased appetite. Daughter reports fever of 101. Nausea no vomiting.        Past Medical History[1]   Past Surgical History[2]   Family History[3]   Social History[4]   E-Cigarette/Vaping    E-Cigarette Use Never User        E-Cigarette/Vaping Substances      I have reviewed and agree with the history as documented.     Patient is a 76 y/o F with h/o DM, CKD that presents to the ED with upper abdominal pain that started 3 days ago. She was seen by PCP and told she had an abnormal urine.  Patient denies dysuria.  She does have mild back pain.  She has a fever today.  She has nausea, no vomiting or diarrhea.  She has loss of appetite.        History provided by:  Patient and relative  Flank Pain  Associated symptoms: chills, fever and nausea    Associated symptoms: no cough, no diarrhea, no dysuria, no shortness of breath and no vomiting        Review of Systems   Constitutional:  Positive for chills and fever.   HENT: Negative.     Respiratory:  Negative for cough and shortness of breath.    Gastrointestinal:  Positive for abdominal pain and nausea. Negative for blood in stool, diarrhea and vomiting.   Genitourinary:  Positive for flank pain. Negative for dysuria.   Skin:  Negative for color change and pallor.   Neurological:  Negative for dizziness, weakness and numbness.   Psychiatric/Behavioral:  Negative for confusion.    All other systems reviewed and are negative.          Objective       ED Triage Vitals   Temperature Pulse Blood Pressure Respirations SpO2 Patient Position - Orthostatic VS   07/02/25 1619 07/02/25 1619 07/02/25 1619 07/02/25 1619 07/02/25 1619 07/02/25 1619   (!) 100.6 °F (38.1 °C) 87 128/64 18 99 % Sitting      Temp Source Heart Rate Source BP Location FiO2 (%) Pain Score    07/02/25 1619 07/02/25 1619 07/02/25 1619 -- 07/02/25 1804    Oral Monitor Left arm  4      Vitals      Date and Time Temp Pulse SpO2 Resp BP Pain Score FACES Pain Rating User   07/02/25 1957 -- 75 96 % 18 122/58 -- -- AC   07/02/25 1804 -- 75 95 % 18 134/62 4 -- JEC   07/02/25 1619 100.6 °F (38.1 °C) 87 99 % 18 128/64 -- -- HR            Physical Exam  Vitals and nursing note reviewed.   Constitutional:       General: She is not in  acute distress.     Appearance: Normal appearance. She is well-developed, well-groomed and normal weight. She is not ill-appearing or diaphoretic.   HENT:      Head: Normocephalic and atraumatic.      Right Ear: Hearing normal.      Left Ear: Hearing normal.      Nose: Nose normal.     Eyes:      Conjunctiva/sclera: Conjunctivae normal.       Cardiovascular:      Rate and Rhythm: Normal rate and regular rhythm.   Pulmonary:      Effort: Pulmonary effort is normal.      Breath sounds: Normal breath sounds.   Abdominal:      General: Abdomen is flat. Bowel sounds are normal.      Palpations: Abdomen is soft.      Tenderness: There is abdominal tenderness in the right upper quadrant, epigastric area and left upper quadrant. There is no right CVA tenderness, left CVA tenderness, guarding or rebound.     Musculoskeletal:         General: Normal range of motion.      Cervical back: Normal range of motion and neck supple.      Right lower leg: No edema.      Left lower leg: No edema.     Skin:     General: Skin is warm and dry.      Coloration: Skin is not jaundiced or pale.      Findings: No rash.     Neurological:      General: No focal deficit present.      Mental Status: She is alert and oriented to person, place, and time.      Motor: No weakness.     Psychiatric:         Mood and Affect: Mood normal.         Behavior: Behavior is cooperative.         Results Reviewed       Procedure Component Value Units Date/Time    HS Troponin I 2hr [[id number]]  (Normal) Collected: 07/02/25 1857    Lab Status: Final result Specimen: Blood from Arm, Right Updated: 07/02/25 1931     hs TnI 2hr 17 ng/L      Delta 2hr hsTnI 4 ng/L     Urine Microscopic [[id number]]  (Abnormal) Collected: 07/02/25 1734    Lab Status: Final result Specimen: Urine, Clean Catch Updated: 07/02/25 1807     RBC, UA 0-1 /hpf      WBC, UA 4-10 /hpf      Epithelial Cells Occasional /hpf      Bacteria, UA Occasional /hpf      MUCUS THREADS Occasional    UA w  Reflex to Microscopic w Reflex to Culture [[id number]]  (Abnormal) Collected: 07/02/25 1734    Lab Status: Final result Specimen: Urine, Clean Catch Updated: 07/02/25 1744     Color, UA Yellow     Clarity, UA Clear     Specific Gravity, UA <1.005     pH, UA 6.0     Leukocytes, UA Moderate     Nitrite, UA Negative     Protein, UA Negative mg/dl      Glucose, UA 1000 (1%) mg/dl      Ketones, UA 10 (1+) mg/dl      Urobilinogen, UA <2.0 mg/dl      Bilirubin, UA Negative     Occult Blood, UA Negative    HS Troponin 0hr (reflex protocol) [[id number]]  (Normal) Collected: 07/02/25 1620    Lab Status: Final result Specimen: Blood from Arm, Right Updated: 07/02/25 1729     hs TnI 0hr 13 ng/L     Comprehensive metabolic panel [[id number]]  (Abnormal) Collected: 07/02/25 1620    Lab Status: Final result Specimen: Blood from Arm, Right Updated: 07/02/25 1653     Sodium 136 mmol/L      Potassium 4.4 mmol/L      Chloride 101 mmol/L      CO2 25 mmol/L      ANION GAP 10 mmol/L      BUN 16 mg/dL      Creatinine 1.08 mg/dL      Glucose 154 mg/dL      Calcium 10.3 mg/dL      AST 23 U/L      ALT 16 U/L      Alkaline Phosphatase 82 U/L      Total Protein 6.3 g/dL      Albumin 3.7 g/dL      Total Bilirubin 0.83 mg/dL      eGFR 49 ml/min/1.73sq m     Narrative:      National Kid[employer] guidelines for Chronic Kidney Disease (CKD):     Stage 1 with normal or high GFR (GFR > 90 mL/min/1.73 square meters)    Stage 2 Mild CKD (GFR = 60-89 mL/min/1.73 square meters)    Stage 3A Moderate CKD (GFR = 45-59 mL/min/1.73 square meters)    Stage 3B Moderate CKD (GFR = 30-44 mL/min/1.73 square meters)    Stage 4 Severe CKD (GFR = 15-29 mL/min/1.73 square meters)    Stage 5 End Stage CKD (GFR <15 mL/min/1.73 square meters)  Note: GFR calculation is accurate only with a steady state creatinine    Lipase [[id number]]  (Normal) Collected: 07/02/25 1620    Lab Status: Final result Specimen: Blood from Arm, Right Updated: 07/02/25 1653     Lipase  23 u/L     CBC and differential [[id number]]  (Abnormal) Collected: 07/02/25 1620    Lab Status: Final result Specimen: Blood from Arm, Right Updated: 07/02/25 1627     WBC 11.12 Thousand/uL      RBC 4.70 Million/uL      Hemoglobin 14.4 g/dL      Hematocrit 44.4 %      MCV 95 fL      MCH 30.6 pg      MCHC 32.4 g/dL      RDW 14.6 %      MPV 9.5 fL      Platelets 171 Thousands/uL      nRBC 0 /100 WBCs      Segmented % 76 %      Immature Grans % 2 %      Lymphocytes % 10 %      Monocytes % 12 %      Eosinophils Relative 0 %      Basophils Relative 0 %      Absolute Neutrophils 8.42 Thousands/µL      Absolute Immature Grans 0.17 Thousand/uL      Absolute Lymphocytes 1.10 Thousands/µL      Absolute Monocytes 1.36 Thousand/µL      Eosinophils Absolute 0.03 Thousand/µL      Basophils Absolute 0.04 Thousands/µL             CT abdomen pelvis with contrast   Final Interpretation by Marilyn Mcgrath MD (07/02 1903)      No evidence of acute intra-abdominal or pelvic pathology.      Enlarging multilobulated exophytic soft tissue density lesion at the left inferior pole measuring up to 2.3 cm with interspersed fat density. 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 Nonemergent MR    abdomen recommended for further evaluation.      Additional indeterminate isodense lesions at the right inferior pole and left upper pole. These may also be evaluated on MRI.      The study was marked in EPIC for immediate notification.         Workstation performed: [id number]             ECG 12 Lead Documentation Only    Date/Time: 7/2/2025 6:07 PM    Performed by: Lili Akins PA-C  Authorized by: Lili Akins PA-C    Indications / Diagnosis:  Epigastric pain  ECG reviewed by me, the ED Provider: yes    Patient location:  ED  Previous ECG:     Previous ECG:  Compared to current    Similarity:  No change  Rate:     ECG rate:  77  Rhythm:     Rhythm: paced        ED Medication and  Procedure Management   Prior to Admission Medications   Prescriptions Last Dose Informant Patient Reported? Taking?   Cholecalciferol (VITAMIN D3) 1000 units CAPS  Self Yes No   Sig: Take by mouth   FLUoxetine (PROzac) 20 mg capsule   No No   Sig: TAKE 1 CAPSULE BY MOUTH DAILY   Januvia 100 MG tablet   No No   Sig: Take 1 tablet by mouth once daily   Magnesium 250 MG TABS  Self Yes No   Sig: Take by mouth in the morning.   Melatonin 5 MG TABS  Self Yes No   Sig: Take 5 mg by mouth daily at bedtime   Minoxidil (ROGAINE WOMENS EX)  Self Yes No   Sig: Apply 1 mL topically   allopurinol (ZYLOPRIM) 100 mg tablet   No No   Sig: TAKE 1 TABLET BY MOUTH DAILY   aspirin 81 MG tablet  Self Yes No   Sig: Take 81 mg by mouth in the morning.   calcium carbonate (OS-MANINDER) 600 MG tablet  Self Yes No   Sig: Take 1,200 mg by mouth in the morning.   candesartan (ATACAND) 32 MG tablet  Self No No   Sig: Take 1 tablet (32 mg total) by mouth daily   cetirizine (ZyrTEC) 10 mg tablet  Self Yes No   Sig: Take 10 mg by mouth in the morning.   gabapentin (Neurontin) 100 mg capsule   No No   Si tab PO q am x 3 days then 1 tab PO bid x 3 days then 1 tab PO tid and stay on that until re-evaluated   levothyroxine 25 mcg tablet  Self No No   Sig: Take 1 tablet (25 mcg total) by mouth daily   metoprolol succinate (TOPROL-XL) 100 mg 24 hr tablet  Self No No   Sig: Take 1 tablet (100 mg total) by mouth daily   omeprazole (PriLOSEC) 40 MG capsule  Self No No   Sig: Take 1 capsule (40 mg total) by mouth daily   potassium chloride (Klor-Con M20) 20 mEq tablet  Self No No   Sig: Take 1 tablet (20 mEq total) by mouth daily   pyridoxine (VITAMIN B6) 100 mg tablet  Self Yes No   Sig: Take 100 mg by mouth in the morning.   semaglutide (Rybelsus) 3 MG tablet   No No   Sig: Take 1 tablet (3 mg total) by mouth daily Take on an empty stomach with 4 oz water, and wait 30 minutes before eating   semaglutide (Rybelsus) 7 MG tablet   No No   Sig: Take 1 tablet (7  mg total) by mouth daily Take on an empty stomach with 4 oz water, and wait 30 minutes before eating Do not start before July 31, 2025.   simvastatin (ZOCOR) 40 mg tablet   No No   Sig: TAKE 1 TABLET BY MOUTH DAILY   torsemide (DEMADEX) 20 mg tablet  Self No No   Sig: Take 1 tablet by mouth twice daily   traMADol (ULTRAM) 50 mg tablet  Self No No   Sig: Take 1 tablet (50 mg total) by mouth every 8 (eight) hours as needed for moderate pain      Facility-Administered Medications: None     Discharge Medication List as of 7/2/2025  7:52 PM        START taking these medications    Details   cephalexin (KEFLEX) 500 mg capsule Take 1 capsule (500 mg total) by mouth every 12 (twelve) hours for 7 days, Starting Wed 7/2/2025, Until Wed 7/9/2025, Normal           CONTINUE these medications which have NOT CHANGED    Details   allopurinol (ZYLOPRIM) 100 mg tablet TAKE 1 TABLET BY MOUTH DAILY, Starting Tue 7/1/2025, Normal      aspirin 81 MG tablet Take 81 mg by mouth in the morning., Historical Med      calcium carbonate (OS-MANINDER) 600 MG tablet Take 1,200 mg by mouth in the morning., Historical Med      candesartan (ATACAND) 32 MG tablet Take 1 tablet (32 mg total) by mouth daily, Starting Tue 2/11/2025, Normal      cetirizine (ZyrTEC) 10 mg tablet Take 10 mg by mouth in the morning., Historical Med      Cholecalciferol (VITAMIN D3) 1000 units CAPS Take by mouth, Historical Med      FLUoxetine (PROzac) 20 mg capsule TAKE 1 CAPSULE BY MOUTH DAILY, Starting Fri 5/30/2025, Normal      gabapentin (Neurontin) 100 mg capsule 1 tab PO q am x 3 days then 1 tab PO bid x 3 days then 1 tab PO tid and stay on that until re-evaluated, Normal      Januvia 100 MG tablet Take 1 tablet by mouth once daily, Starting Fri 5/30/2025, Normal      levothyroxine 25 mcg tablet Take 1 tablet (25 mcg total) by mouth daily, Starting Thu 2/20/2025, Normal      Magnesium 250 MG TABS Take by mouth in the morning., Historical Med      Melatonin 5 MG TABS Take 5  mg by mouth daily at bedtime, Historical Med      metoprolol succinate (TOPROL-XL) 100 mg 24 hr tablet Take 1 tablet (100 mg total) by mouth daily, Starting Tue 2/11/2025, Normal      Minoxidil (ROGAINE WOMENS EX) Apply 1 mL topically, Historical Med      omeprazole (PriLOSEC) 40 MG capsule Take 1 capsule (40 mg total) by mouth daily, Starting Fri 2/21/2025, Normal      potassium chloride (Klor-Con M20) 20 mEq tablet Take 1 tablet (20 mEq total) by mouth daily, Starting Thu 4/3/2025, Normal      pyridoxine (VITAMIN B6) 100 mg tablet Take 100 mg by mouth in the morning., Historical Med      !! semaglutide (Rybelsus) 3 MG tablet Take 1 tablet (3 mg total) by mouth daily Take on an empty stomach with 4 oz water, and wait 30 minutes before eating, Starting Tue 7/1/2025, Until Thu 7/31/2025, Normal      !! semaglutide (Rybelsus) 7 MG tablet Take 1 tablet (7 mg total) by mouth daily Take on an empty stomach with 4 oz water, and wait 30 minutes before eating Do not start before July 31, 2025., Starting u 7/31/2025, Until Tue 1/27/2026, Normal      simvastatin (ZOCOR) 40 mg tablet TAKE 1 TABLET BY MOUTH DAILY, Starting Tue 7/1/2025, Normal      torsemide (DEMADEX) 20 mg tablet Take 1 tablet by mouth twice daily, Normal      traMADol (ULTRAM) 50 mg tablet Take 1 tablet (50 mg total) by mouth every 8 (eight) hours as needed for moderate pain, Starting Tue 2/11/2025, Normal       !! - Potential duplicate medications found. Please discuss with provider.        No discharge procedures on file.  ED SEPSIS DOCUMENTATION   Time reflects when diagnosis was documented in both MDM as applicable and the Disposition within this note       Time User Action Codes Description Comment    7/2/2025  7:48 PM Lili Akins [R10.9] Abdominal pain     7/2/2025  7:48 PM Lili Akins [N39.0] UTI (urinary tract infection)     7/2/2025  7:48 PM Lili Akins [R93.89] Abnormal CT scan     7/2/2025  7:49 PM Tashi  Lili Harrison [R93.89] Abnormal CT scan Enlarging multilobulated exophytic soft tissue density lesion at the left inferior pole measuring up to 2.3 cm with interspersed fat density. 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 Nonemergent MR  abdomen recommended for further evaluation.   Additional indeterminate isodense lesions at the right inferior pole and left upper pole. These may also be evaluated on MRI.                     [1]   Past Medical History:  Diagnosis Date    Abnormal finding on breast imaging     last assessed 11/30/17    Allergic rhinitis     Anxiety     BBB (bundle branch block)     left,last assesed 6/4/12    Bilateral fibrocystic breast changes     Bilateral sacroiliitis (HCC)     Carpal tunnel syndrome, unspecified upper limb     Cellulitis of left hand 05/17/2025    Chronic systolic congestive heart failure (HCC)     Coronary artery disease     Detrusor instability     Dilated cardiomyopathy (HCC)     Disorder of intervertebral disc of cervical spine     Fibroid     Gout     Hormone replacement therapy     Hx of blood clots     Hyperlipidemia     Hypertension     Hypokalemia     Obesity     Osteoarthritis     Papilloma of left breast     Psoriasis     psoriatic arthropathy    Psoriatic arthropathy (HCC)     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    RLS (restless legs syndrome)     Screening mammogram, encounter for 12/05/2019    Vitamin D deficiency    [2]   Past Surgical History:  Procedure Laterality Date    BLADDER SURGERY  1999    lift and repair    BREAST BIOPSY Left 05/23/2016     CORE BIOPSY-BENIGN    CARDIAC CATHETERIZATION      outcome:  successful    CARDIAC DEFIBRILLATOR PLACEMENT      CARDIAC ELECTROPHYSIOLOGY PROCEDURE N/A 08/22/2023    Procedure: Cardiac biv icd generator change;  Surgeon: Nahid Dickerson MD;  Location: [facility];  Service: Cardiology    CARPAL TUNNEL RELEASE Bilateral 1997    CATARACT EXTRACTION       COLONOSCOPY      CORONARY ANGIOPLASTY WITH STENT PLACEMENT      CYSTOSCOPY W/ URETEROSCOPY      DE QUERVAIN'S RELEASE Left     and trigger finger release    EYE SURGERY Left     EYE SURGERY Left 2019    Cataract    EYE SURGERY Right 2019    Cataract    INSERT / REPLACE / REMOVE PACEMAKER      JOINT REPLACEMENT Right 2017    Knee    KNEE ARTHROSCOPY      therapeutic    NEUROPLASTY / TRANSPOSITION MEDIAN NERVE AT CARPAL TUNNEL      OOPHORECTOMY Bilateral     AGE 44    ORTHOPEDIC SURGERY      CO TENDON SHEATH INCISION Left 2017    Procedure: SMALL TRIGGER FINGER RELEASE;  Surgeon: Camden Lancaster MD;  Location: [facility];  Service: Orthopedics    RECTAL SURGERY  2006    repair of rectal ulcer    SHOULDER ARTHROSCOPY Left 2006    TOTAL ABDOMINAL HYSTERECTOMY      AGE 44    TOTAL KNEE ARTHROPLASTY Left     TOTAL SHOULDER REPLACEMENT Left     TRIGGER FINGER RELEASE Bilateral     right haand ,,left hand ,    US GUIDED BREAST BIOPSY LEFT COMPLETE Left 2016   [3]   Family History  Problem Relation Name Age of Onset    Hypertension Mother Mary     Alzheimer's disease Mother Mary     Cancer Father Garrett 71        bladder    Prostate cancer Father Garrett 71    Breast cancer Daughter TOÑO VILLALPANDO 52    No Known Problems Daughter DILMA     No Known Problems Maternal Grandmother      No Known Problems Maternal Grandfather      Breast cancer Paternal Grandmother Elisa Darling         age dx unk    No Known Problems Paternal Grandfather      Cancer Brother Zack         pt shared some type of blood cancer    No Known Problems Son      Stomach cancer Paternal Aunt  65    No Known Problems Paternal Aunt     [4]   Social History  Tobacco Use    Smoking status: Former     Current packs/day: 0.00     Average packs/day: 1 pack/day for 15.0 years (15.0 ttl pk-yrs)     Types: Cigarettes     Start date:      Quit date:      Years since quittin.5     Smokeless tobacco: Never    Tobacco comments:     Smoked on and off for the last few years before I quit   Vaping Use    Vaping status: Never Used   Substance Use Topics    Alcohol use: Never    Drug use: Never        Lili Akins PA-C  07/02/25 [id number]

## 2025-07-02 NOTE — DISCHARGE INSTRUCTIONS
Rest, increase fluids.  Take keflex twice a day for next 7 days.  Call you family doctor tomorrow for a follow up appointment and MRI of abdomen to evaluate cysts and mass in kidney.  CTEnlarging multilobulated exophytic soft tissue density lesion at the left inferior pole measuring up to 2.3 cm with interspersed fat density. 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 Nonemergent MR   abdomen recommended for further evaluation.     Additional indeterminate isodense lesions at the right inferior pole and left upper pole. These may also be evaluated on MRI. results:

## 2025-07-02 NOTE — INCIDENTAL FINDINGS
The following findings require follow up:  Radiographic finding   Finding: lesion left kidney, cysts in kidney   Follow up required: yes   Follow up should be done within 1 month(s)    Please notify the following clinician to assist with the follow up:   Dr. Henson    Incidental finding results were discussed with the Patient by Lili Akins PA-C on 07/02/25.   They expressed understanding and all questions answered.

## 2025-07-03 ENCOUNTER — APPOINTMENT (EMERGENCY)
Dept: CT IMAGING | Facility: HOSPITAL | Age: 75
DRG: 176 | End: 2025-07-03
Payer: MEDICARE

## 2025-07-03 ENCOUNTER — VBI (OUTPATIENT)
Dept: FAMILY MEDICINE CLINIC | Facility: HOSPITAL | Age: 75
End: 2025-07-03

## 2025-07-03 ENCOUNTER — APPOINTMENT (EMERGENCY)
Dept: RADIOLOGY | Facility: HOSPITAL | Age: 75
DRG: 176 | End: 2025-07-03
Payer: MEDICARE

## 2025-07-03 ENCOUNTER — TELEPHONE (OUTPATIENT)
Age: 75
End: 2025-07-03

## 2025-07-03 ENCOUNTER — NURSE TRIAGE (OUTPATIENT)
Dept: OTHER | Facility: OTHER | Age: 75
End: 2025-07-03

## 2025-07-03 ENCOUNTER — HOSPITAL ENCOUNTER (INPATIENT)
Facility: HOSPITAL | Age: 75
LOS: 3 days | Discharge: NURSING, REHAB OR LONG-TERM CARE | DRG: 176 | End: 2025-07-07
Attending: EMERGENCY MEDICINE | Admitting: INTERNAL MEDICINE
Payer: MEDICARE

## 2025-07-03 DIAGNOSIS — S22.39XA RIB FRACTURE: ICD-10-CM

## 2025-07-03 DIAGNOSIS — N39.0 UTI (URINARY TRACT INFECTION): ICD-10-CM

## 2025-07-03 DIAGNOSIS — Z95.810 BIVENTRICULAR ICD (IMPLANTABLE CARDIOVERTER-DEFIBRILLATOR) IN PLACE: ICD-10-CM

## 2025-07-03 DIAGNOSIS — R10.9 FLANK PAIN: ICD-10-CM

## 2025-07-03 DIAGNOSIS — R00.1 BRADYCARDIA: ICD-10-CM

## 2025-07-03 DIAGNOSIS — I50.22 CHRONIC SYSTOLIC CONGESTIVE HEART FAILURE (HCC): ICD-10-CM

## 2025-07-03 DIAGNOSIS — I26.99 PULMONARY EMBOLISM (HCC): Primary | ICD-10-CM

## 2025-07-03 DIAGNOSIS — I26.99 PULMONARY INFARCTION (HCC): ICD-10-CM

## 2025-07-03 LAB
2HR DELTA HS TROPONIN: 0 NG/L
ALBUMIN SERPL BCG-MCNC: 3.4 G/DL (ref 3.5–5)
ALP SERPL-CCNC: 85 U/L (ref 34–104)
ALT SERPL W P-5'-P-CCNC: 15 U/L (ref 7–52)
ANION GAP SERPL CALCULATED.3IONS-SCNC: 9 MMOL/L (ref 4–13)
APTT PPP: 29 SECONDS (ref 23–34)
AST SERPL W P-5'-P-CCNC: 16 U/L (ref 13–39)
ATRIAL RATE: 71 BPM
ATRIAL RATE: 77 BPM
BACTERIA UR CULT: NORMAL
BASOPHILS # BLD AUTO: 0.04 THOUSANDS/ÂΜL (ref 0–0.1)
BASOPHILS NFR BLD AUTO: 0 % (ref 0–1)
BILIRUB SERPL-MCNC: 0.8 MG/DL (ref 0.2–1)
BUN SERPL-MCNC: 16 MG/DL (ref 5–25)
CALCIUM ALBUM COR SERPL-MCNC: 10.7 MG/DL (ref 8.3–10.1)
CALCIUM SERPL-MCNC: 10.2 MG/DL (ref 8.4–10.2)
CARDIAC TROPONIN I PNL SERPL HS: 13 NG/L (ref ?–50)
CARDIAC TROPONIN I PNL SERPL HS: 13 NG/L (ref ?–50)
CHLORIDE SERPL-SCNC: 99 MMOL/L (ref 96–108)
CO2 SERPL-SCNC: 29 MMOL/L (ref 21–32)
CREAT SERPL-MCNC: 0.98 MG/DL (ref 0.6–1.3)
D DIMER PPP FEU-MCNC: 2.8 UG/ML FEU
EOSINOPHIL # BLD AUTO: 0.05 THOUSAND/ÂΜL (ref 0–0.61)
EOSINOPHIL NFR BLD AUTO: 1 % (ref 0–6)
ERYTHROCYTE [DISTWIDTH] IN BLOOD BY AUTOMATED COUNT: 14.7 % (ref 11.6–15.1)
FLUAV AG UPPER RESP QL IA.RAPID: NEGATIVE
FLUBV AG UPPER RESP QL IA.RAPID: NEGATIVE
GFR SERPL CREATININE-BSD FRML MDRD: 55 ML/MIN/1.73SQ M
GLUCOSE SERPL-MCNC: 146 MG/DL (ref 65–140)
HCT VFR BLD AUTO: 43 % (ref 34.8–46.1)
HGB BLD-MCNC: 13.8 G/DL (ref 11.5–15.4)
IMM GRANULOCYTES # BLD AUTO: 0.17 THOUSAND/UL (ref 0–0.2)
IMM GRANULOCYTES NFR BLD AUTO: 2 % (ref 0–2)
INR PPP: 1.02 (ref 0.85–1.19)
LACTATE SERPL-SCNC: 1.1 MMOL/L (ref 0.5–2)
LIPASE SERPL-CCNC: 19 U/L (ref 11–82)
LYMPHOCYTES # BLD AUTO: 1 THOUSANDS/ÂΜL (ref 0.6–4.47)
LYMPHOCYTES NFR BLD AUTO: 9 % (ref 14–44)
MCH RBC QN AUTO: 30.3 PG (ref 26.8–34.3)
MCHC RBC AUTO-ENTMCNC: 32.1 G/DL (ref 31.4–37.4)
MCV RBC AUTO: 95 FL (ref 82–98)
MONOCYTES # BLD AUTO: 1.27 THOUSAND/ÂΜL (ref 0.17–1.22)
MONOCYTES NFR BLD AUTO: 12 % (ref 4–12)
NEUTROPHILS # BLD AUTO: 8.42 THOUSANDS/ÂΜL (ref 1.85–7.62)
NEUTS SEG NFR BLD AUTO: 76 % (ref 43–75)
NRBC BLD AUTO-RTO: 0 /100 WBCS
P AXIS: 36 DEGREES
P AXIS: 51 DEGREES
PLATELET # BLD AUTO: 189 THOUSANDS/UL (ref 149–390)
PMV BLD AUTO: 9.3 FL (ref 8.9–12.7)
POTASSIUM SERPL-SCNC: 3.9 MMOL/L (ref 3.5–5.3)
PR INTERVAL: 138 MS
PR INTERVAL: 140 MS
PROCALCITONIN SERPL-MCNC: 0.29 NG/ML
PROT SERPL-MCNC: 6.2 G/DL (ref 6.4–8.4)
PROTHROMBIN TIME: 13.9 SECONDS (ref 12.3–15)
QRS AXIS: 176 DEGREES
QRS AXIS: 200 DEGREES
QRSD INTERVAL: 168 MS
QRSD INTERVAL: 174 MS
QT INTERVAL: 452 MS
QT INTERVAL: 480 MS
QTC INTERVAL: 511 MS
QTC INTERVAL: 521 MS
RBC # BLD AUTO: 4.55 MILLION/UL (ref 3.81–5.12)
SARS-COV+SARS-COV-2 AG RESP QL IA.RAPID: NEGATIVE
SODIUM SERPL-SCNC: 137 MMOL/L (ref 135–147)
T WAVE AXIS: -9 DEGREES
T WAVE AXIS: 18 DEGREES
VENTRICULAR RATE: 71 BPM
VENTRICULAR RATE: 77 BPM
WBC # BLD AUTO: 10.95 THOUSAND/UL (ref 4.31–10.16)

## 2025-07-03 PROCEDURE — 71045 X-RAY EXAM CHEST 1 VIEW: CPT

## 2025-07-03 PROCEDURE — 85730 THROMBOPLASTIN TIME PARTIAL: CPT

## 2025-07-03 PROCEDURE — 85025 COMPLETE CBC W/AUTO DIFF WBC: CPT

## 2025-07-03 PROCEDURE — 36415 COLL VENOUS BLD VENIPUNCTURE: CPT

## 2025-07-03 PROCEDURE — 85379 FIBRIN DEGRADATION QUANT: CPT

## 2025-07-03 PROCEDURE — 84145 PROCALCITONIN (PCT): CPT

## 2025-07-03 PROCEDURE — 87040 BLOOD CULTURE FOR BACTERIA: CPT

## 2025-07-03 PROCEDURE — 99285 EMERGENCY DEPT VISIT HI MDM: CPT

## 2025-07-03 PROCEDURE — 70450 CT HEAD/BRAIN W/O DYE: CPT

## 2025-07-03 PROCEDURE — 83605 ASSAY OF LACTIC ACID: CPT

## 2025-07-03 PROCEDURE — 84484 ASSAY OF TROPONIN QUANT: CPT

## 2025-07-03 PROCEDURE — 87811 SARS-COV-2 COVID19 W/OPTIC: CPT

## 2025-07-03 PROCEDURE — 93010 ELECTROCARDIOGRAM REPORT: CPT | Performed by: INTERNAL MEDICINE

## 2025-07-03 PROCEDURE — 93005 ELECTROCARDIOGRAM TRACING: CPT

## 2025-07-03 PROCEDURE — 71275 CT ANGIOGRAPHY CHEST: CPT

## 2025-07-03 PROCEDURE — 83690 ASSAY OF LIPASE: CPT

## 2025-07-03 PROCEDURE — 85610 PROTHROMBIN TIME: CPT

## 2025-07-03 PROCEDURE — 72125 CT NECK SPINE W/O DYE: CPT

## 2025-07-03 PROCEDURE — 83880 ASSAY OF NATRIURETIC PEPTIDE: CPT

## 2025-07-03 PROCEDURE — 74177 CT ABD & PELVIS W/CONTRAST: CPT

## 2025-07-03 PROCEDURE — 80053 COMPREHEN METABOLIC PANEL: CPT

## 2025-07-03 PROCEDURE — 99291 CRITICAL CARE FIRST HOUR: CPT | Performed by: EMERGENCY MEDICINE

## 2025-07-03 PROCEDURE — 87804 INFLUENZA ASSAY W/OPTIC: CPT

## 2025-07-03 RX ADMIN — IOHEXOL 100 ML: 350 INJECTION, SOLUTION INTRAVENOUS at 21:42

## 2025-07-03 NOTE — TELEPHONE ENCOUNTER
Tanisha called stating she wanted to inform Dr Henson that patient was brought to hospital yesterday and the hospital is requesting for Dr Henson to place an MRI order, stated that they found a mass on her kidney.     Stated that the hospital should also be in contact with Dr Henson

## 2025-07-03 NOTE — TELEPHONE ENCOUNTER
07/03/25 9:27 AM    Patient contacted post ED visit, [facility] department spoke with patient/caregiver and outreach was successful.    Thank you.  Beba Judd MA  [facility]

## 2025-07-03 NOTE — TELEPHONE ENCOUNTER
"REASON FOR CONVERSATION: Back Pain    SYMPTOMS: Weakness, 7/10 pain in the left mid back, dizziness that has now resolved, new onset incontinence with inability to get to the bathroom    OTHER HEALTH INFORMATION: None    PROTOCOL DISPOSITION: Go to ED Now (or PCP Triage)    CARE ADVICE PROVIDED: Please return to the Emergency Department.    PRACTICE FOLLOW-UP: None      Reason for Disposition   Patient sounds very sick or weak to the triager    Answer Assessment - Initial Assessment Questions  1. ONSET: \"When did the pain begin?\" (e.g., minutes, hours, days)    1700    2. LOCATION: \"Where does it hurt?\" (upper, mid or lower back)    At the level of her waist on the left side    3. SEVERITY: \"How bad is the pain?\"  (e.g., Scale 1-10; mild, moderate, or severe)        7/10    4. PATTERN: \"Is the pain constant?\" (e.g., yes, no; constant, intermittent)         Positional    5. RADIATION: \"Does the pain shoot into your legs or somewhere else?\"        Denies    6. CAUSE:  \"What do you think is causing the back pain?\"         Diagnosed with UTI; also has a mass on that kidney    7. BACK OVERUSE:  \"Any recent lifting of heavy objects, strenuous work or exercise?\"        Denies    8. MEDICINES: \"What have you taken so far for the pain?\" (e.g., nothing, acetaminophen, NSAIDS)        Took her normal medications, two of which are for pain; no Tylenol or Ibuprofen.    9. NEUROLOGIC SYMPTOMS: \"Do you have any weakness, numbness, or problems with bowel/bladder control?\"        Feeling weak; unsure if she could walk. Denies numbness / loss of bowel control; hasn't made it to the bathroom a few times     10. OTHER SYMPTOMS: \"Do you have any other symptoms?\" (e.g., fever, abdomen pain, burning with urination, blood in urine)          Was dizzy, which is now improved.    Protocols used: Back Pain-Adult-    "

## 2025-07-03 NOTE — TELEPHONE ENCOUNTER
"Regarding: Dizzy/Weak/ UTI  ----- Message from Kaya WADSWORTH sent at 7/3/2025  6:52 PM EDT -----  \"I don't know if we should take my mom back to the hospital, she went to the ER yesterday and is dx with a mass on her kidney and UTI and I gave her the antibiotics this morning and she ate and slept most the day but now she says she is weak and dizzy and was having a hard time sitting up. We got her sitting and she says she feels a little better\"      Tanisha isn't on current comm form but Elizabeth gave verbal permission to speak w/her.    "

## 2025-07-04 PROBLEM — S22.49XA RIB FRACTURES: Status: ACTIVE | Noted: 2025-07-04

## 2025-07-04 PROBLEM — R82.90 ABNORMAL URINALYSIS: Status: ACTIVE | Noted: 2025-07-04

## 2025-07-04 PROBLEM — I26.99 PULMONARY EMBOLISM (HCC): Status: ACTIVE | Noted: 2025-07-04

## 2025-07-04 PROBLEM — R41.3 MEMORY CHANGES: Status: ACTIVE | Noted: 2025-07-04

## 2025-07-04 PROBLEM — N28.9 RENAL LESION: Status: ACTIVE | Noted: 2025-07-04

## 2025-07-04 LAB
ANION GAP SERPL CALCULATED.3IONS-SCNC: 9 MMOL/L (ref 4–13)
APTT PPP: 113 SECONDS (ref 23–34)
APTT PPP: 74 SECONDS (ref 23–34)
APTT PPP: >210 SECONDS (ref 23–34)
BACTERIA UR QL AUTO: ABNORMAL /HPF
BILIRUB UR QL STRIP: NEGATIVE
BNP SERPL-MCNC: 157 PG/ML (ref 0–100)
BUN SERPL-MCNC: 14 MG/DL (ref 5–25)
CALCIUM SERPL-MCNC: 9.5 MG/DL (ref 8.4–10.2)
CHLORIDE SERPL-SCNC: 101 MMOL/L (ref 96–108)
CLARITY UR: CLEAR
CO2 SERPL-SCNC: 26 MMOL/L (ref 21–32)
COLOR UR: ABNORMAL
CREAT SERPL-MCNC: 0.88 MG/DL (ref 0.6–1.3)
ERYTHROCYTE [DISTWIDTH] IN BLOOD BY AUTOMATED COUNT: 14.7 % (ref 11.6–15.1)
GFR SERPL CREATININE-BSD FRML MDRD: 63 ML/MIN/1.73SQ M
GLUCOSE SERPL-MCNC: 109 MG/DL (ref 65–140)
GLUCOSE SERPL-MCNC: 113 MG/DL (ref 65–140)
GLUCOSE SERPL-MCNC: 136 MG/DL (ref 65–140)
GLUCOSE SERPL-MCNC: 163 MG/DL (ref 65–140)
GLUCOSE SERPL-MCNC: 166 MG/DL (ref 65–140)
GLUCOSE UR STRIP-MCNC: ABNORMAL MG/DL
HCT VFR BLD AUTO: 39.8 % (ref 34.8–46.1)
HGB BLD-MCNC: 12.7 G/DL (ref 11.5–15.4)
HGB UR QL STRIP.AUTO: NEGATIVE
KETONES UR STRIP-MCNC: ABNORMAL MG/DL
LEUKOCYTE ESTERASE UR QL STRIP: ABNORMAL
MAGNESIUM SERPL-MCNC: 1.9 MG/DL (ref 1.9–2.7)
MCH RBC QN AUTO: 30.6 PG (ref 26.8–34.3)
MCHC RBC AUTO-ENTMCNC: 31.9 G/DL (ref 31.4–37.4)
MCV RBC AUTO: 96 FL (ref 82–98)
MUCOUS THREADS UR QL AUTO: ABNORMAL
NITRITE UR QL STRIP: NEGATIVE
NON-SQ EPI CELLS URNS QL MICRO: ABNORMAL /HPF
PH UR STRIP.AUTO: 6.5 [PH]
PHOSPHATE SERPL-MCNC: 2.3 MG/DL (ref 2.3–4.1)
PLATELET # BLD AUTO: 178 THOUSANDS/UL (ref 149–390)
PMV BLD AUTO: 9.3 FL (ref 8.9–12.7)
POTASSIUM SERPL-SCNC: 3.4 MMOL/L (ref 3.5–5.3)
PROT UR STRIP-MCNC: ABNORMAL MG/DL
RBC # BLD AUTO: 4.15 MILLION/UL (ref 3.81–5.12)
RBC #/AREA URNS AUTO: ABNORMAL /HPF
SODIUM SERPL-SCNC: 136 MMOL/L (ref 135–147)
SP GR UR STRIP.AUTO: <1.005 (ref 1–1.03)
UROBILINOGEN UR STRIP-ACNC: <2 MG/DL
WBC # BLD AUTO: 9.12 THOUSAND/UL (ref 4.31–10.16)
WBC #/AREA URNS AUTO: ABNORMAL /HPF

## 2025-07-04 PROCEDURE — 81001 URINALYSIS AUTO W/SCOPE: CPT

## 2025-07-04 PROCEDURE — 99223 1ST HOSP IP/OBS HIGH 75: CPT | Performed by: INTERNAL MEDICINE

## 2025-07-04 PROCEDURE — 87086 URINE CULTURE/COLONY COUNT: CPT | Performed by: PHYSICIAN ASSISTANT

## 2025-07-04 PROCEDURE — 82948 REAGENT STRIP/BLOOD GLUCOSE: CPT

## 2025-07-04 PROCEDURE — 85027 COMPLETE CBC AUTOMATED: CPT | Performed by: PHYSICIAN ASSISTANT

## 2025-07-04 PROCEDURE — 83735 ASSAY OF MAGNESIUM: CPT | Performed by: PHYSICIAN ASSISTANT

## 2025-07-04 PROCEDURE — 80048 BASIC METABOLIC PNL TOTAL CA: CPT | Performed by: PHYSICIAN ASSISTANT

## 2025-07-04 PROCEDURE — 99223 1ST HOSP IP/OBS HIGH 75: CPT | Performed by: FAMILY MEDICINE

## 2025-07-04 PROCEDURE — 85730 THROMBOPLASTIN TIME PARTIAL: CPT | Performed by: INTERNAL MEDICINE

## 2025-07-04 PROCEDURE — 85730 THROMBOPLASTIN TIME PARTIAL: CPT | Performed by: FAMILY MEDICINE

## 2025-07-04 PROCEDURE — 84100 ASSAY OF PHOSPHORUS: CPT | Performed by: PHYSICIAN ASSISTANT

## 2025-07-04 RX ORDER — METHYLPREDNISOLONE 4 MG/1
4 TABLET ORAL EVERY OTHER DAY
Status: DISCONTINUED | OUTPATIENT
Start: 2025-07-05 | End: 2025-07-07 | Stop reason: HOSPADM

## 2025-07-04 RX ORDER — LIDOCAINE 50 MG/G
1 PATCH TOPICAL DAILY
Status: DISCONTINUED | OUTPATIENT
Start: 2025-07-04 | End: 2025-07-07 | Stop reason: HOSPADM

## 2025-07-04 RX ORDER — GABAPENTIN 100 MG/1
100 CAPSULE ORAL DAILY
Status: DISCONTINUED | OUTPATIENT
Start: 2025-07-04 | End: 2025-07-07 | Stop reason: HOSPADM

## 2025-07-04 RX ORDER — METOPROLOL SUCCINATE 50 MG/1
100 TABLET, EXTENDED RELEASE ORAL DAILY
Status: DISCONTINUED | OUTPATIENT
Start: 2025-07-04 | End: 2025-07-07 | Stop reason: HOSPADM

## 2025-07-04 RX ORDER — INSULIN LISPRO 100 [IU]/ML
1-6 INJECTION, SOLUTION INTRAVENOUS; SUBCUTANEOUS
Status: DISCONTINUED | OUTPATIENT
Start: 2025-07-04 | End: 2025-07-07 | Stop reason: HOSPADM

## 2025-07-04 RX ORDER — INSULIN LISPRO 100 [IU]/ML
1-5 INJECTION, SOLUTION INTRAVENOUS; SUBCUTANEOUS
Status: DISCONTINUED | OUTPATIENT
Start: 2025-07-04 | End: 2025-07-07 | Stop reason: HOSPADM

## 2025-07-04 RX ORDER — ACETAMINOPHEN 325 MG/1
650 TABLET ORAL EVERY 6 HOURS SCHEDULED
Status: DISCONTINUED | OUTPATIENT
Start: 2025-07-04 | End: 2025-07-07 | Stop reason: HOSPADM

## 2025-07-04 RX ORDER — HEPARIN SODIUM 10000 [USP'U]/100ML
3-30 INJECTION, SOLUTION INTRAVENOUS
Status: DISCONTINUED | OUTPATIENT
Start: 2025-07-04 | End: 2025-07-05

## 2025-07-04 RX ORDER — HEPARIN SODIUM 1000 [USP'U]/ML
4400 INJECTION, SOLUTION INTRAVENOUS; SUBCUTANEOUS ONCE
Status: COMPLETED | OUTPATIENT
Start: 2025-07-04 | End: 2025-07-04

## 2025-07-04 RX ORDER — ACETAMINOPHEN 325 MG/1
650 TABLET ORAL EVERY 6 HOURS PRN
Status: DISCONTINUED | OUTPATIENT
Start: 2025-07-04 | End: 2025-07-04

## 2025-07-04 RX ORDER — POTASSIUM CHLORIDE 1500 MG/1
20 TABLET, EXTENDED RELEASE ORAL 2 TIMES DAILY WITH MEALS
Status: DISCONTINUED | OUTPATIENT
Start: 2025-07-04 | End: 2025-07-07 | Stop reason: HOSPADM

## 2025-07-04 RX ORDER — ALLOPURINOL 100 MG/1
100 TABLET ORAL DAILY
Status: DISCONTINUED | OUTPATIENT
Start: 2025-07-04 | End: 2025-07-07 | Stop reason: HOSPADM

## 2025-07-04 RX ORDER — CALCIUM CARBONATE 500 MG/1
500 TABLET, CHEWABLE ORAL DAILY
Status: DISCONTINUED | OUTPATIENT
Start: 2025-07-04 | End: 2025-07-07 | Stop reason: HOSPADM

## 2025-07-04 RX ORDER — HEPARIN SODIUM 1000 [USP'U]/ML
4400 INJECTION, SOLUTION INTRAVENOUS; SUBCUTANEOUS EVERY 6 HOURS PRN
Status: DISCONTINUED | OUTPATIENT
Start: 2025-07-04 | End: 2025-07-06

## 2025-07-04 RX ORDER — HEPARIN SODIUM 1000 [USP'U]/ML
2200 INJECTION, SOLUTION INTRAVENOUS; SUBCUTANEOUS EVERY 6 HOURS PRN
Status: DISCONTINUED | OUTPATIENT
Start: 2025-07-04 | End: 2025-07-06

## 2025-07-04 RX ORDER — LOSARTAN POTASSIUM 50 MG/1
100 TABLET ORAL
Status: DISCONTINUED | OUTPATIENT
Start: 2025-07-04 | End: 2025-07-07 | Stop reason: HOSPADM

## 2025-07-04 RX ORDER — MAGNESIUM HYDROXIDE/ALUMINUM HYDROXICE/SIMETHICONE 120; 1200; 1200 MG/30ML; MG/30ML; MG/30ML
30 SUSPENSION ORAL EVERY 6 HOURS PRN
Status: DISCONTINUED | OUTPATIENT
Start: 2025-07-04 | End: 2025-07-07 | Stop reason: HOSPADM

## 2025-07-04 RX ORDER — PANTOPRAZOLE SODIUM 40 MG/1
40 TABLET, DELAYED RELEASE ORAL
Status: DISCONTINUED | OUTPATIENT
Start: 2025-07-04 | End: 2025-07-07 | Stop reason: HOSPADM

## 2025-07-04 RX ORDER — ASPIRIN 81 MG/1
81 TABLET ORAL 3 TIMES WEEKLY
Status: DISCONTINUED | OUTPATIENT
Start: 2025-07-04 | End: 2025-07-07 | Stop reason: HOSPADM

## 2025-07-04 RX ORDER — MULTIVITAMIN WITH IRON
100 TABLET ORAL DAILY
Status: DISCONTINUED | OUTPATIENT
Start: 2025-07-04 | End: 2025-07-07 | Stop reason: HOSPADM

## 2025-07-04 RX ORDER — SENNOSIDES 8.6 MG
1 TABLET ORAL
Status: DISCONTINUED | OUTPATIENT
Start: 2025-07-04 | End: 2025-07-07 | Stop reason: HOSPADM

## 2025-07-04 RX ORDER — TORSEMIDE 20 MG/1
40 TABLET ORAL DAILY
Status: DISCONTINUED | OUTPATIENT
Start: 2025-07-04 | End: 2025-07-07 | Stop reason: HOSPADM

## 2025-07-04 RX ORDER — LORATADINE 10 MG/1
10 TABLET ORAL DAILY
Status: DISCONTINUED | OUTPATIENT
Start: 2025-07-04 | End: 2025-07-07 | Stop reason: HOSPADM

## 2025-07-04 RX ORDER — TRAMADOL HYDROCHLORIDE 50 MG/1
50 TABLET ORAL 2 TIMES DAILY PRN
Status: DISCONTINUED | OUTPATIENT
Start: 2025-07-04 | End: 2025-07-07 | Stop reason: HOSPADM

## 2025-07-04 RX ORDER — ONDANSETRON 2 MG/ML
4 INJECTION INTRAMUSCULAR; INTRAVENOUS EVERY 6 HOURS PRN
Status: DISCONTINUED | OUTPATIENT
Start: 2025-07-04 | End: 2025-07-07 | Stop reason: HOSPADM

## 2025-07-04 RX ORDER — LEVOTHYROXINE SODIUM 25 UG/1
25 TABLET ORAL
Status: DISCONTINUED | OUTPATIENT
Start: 2025-07-04 | End: 2025-07-07 | Stop reason: HOSPADM

## 2025-07-04 RX ORDER — LANOLIN ALCOHOL/MO/W.PET/CERES
400 CREAM (GRAM) TOPICAL DAILY
Status: DISCONTINUED | OUTPATIENT
Start: 2025-07-04 | End: 2025-07-07 | Stop reason: HOSPADM

## 2025-07-04 RX ORDER — PRAVASTATIN SODIUM 80 MG/1
80 TABLET ORAL
Status: DISCONTINUED | OUTPATIENT
Start: 2025-07-04 | End: 2025-07-07 | Stop reason: HOSPADM

## 2025-07-04 RX ORDER — CEFTRIAXONE 1 G/50ML
1000 INJECTION, SOLUTION INTRAVENOUS EVERY 24 HOURS
Status: DISCONTINUED | OUTPATIENT
Start: 2025-07-04 | End: 2025-07-05

## 2025-07-04 RX ORDER — CELECOXIB 200 MG/1
200 CAPSULE ORAL EVERY OTHER DAY
Status: ON HOLD | COMMUNITY
End: 2025-07-04 | Stop reason: ALTCHOICE

## 2025-07-04 RX ORDER — METHYLPREDNISOLONE 4 MG/1
4 TABLET ORAL EVERY OTHER DAY
COMMUNITY

## 2025-07-04 RX ADMIN — LEVOTHYROXINE SODIUM 25 MCG: 0.03 TABLET ORAL at 05:43

## 2025-07-04 RX ADMIN — ASPIRIN 81 MG: 81 TABLET, COATED ORAL at 09:14

## 2025-07-04 RX ADMIN — GABAPENTIN 100 MG: 100 CAPSULE ORAL at 09:14

## 2025-07-04 RX ADMIN — INSULIN LISPRO 1 UNITS: 100 INJECTION, SOLUTION INTRAVENOUS; SUBCUTANEOUS at 22:41

## 2025-07-04 RX ADMIN — TORSEMIDE 40 MG: 20 TABLET ORAL at 09:15

## 2025-07-04 RX ADMIN — Medication 400 MG: at 09:14

## 2025-07-04 RX ADMIN — LIDOCAINE 1 PATCH: 700 PATCH TOPICAL at 11:50

## 2025-07-04 RX ADMIN — PANTOPRAZOLE SODIUM 40 MG: 40 TABLET, DELAYED RELEASE ORAL at 05:43

## 2025-07-04 RX ADMIN — LORATADINE 10 MG: 10 TABLET ORAL at 09:14

## 2025-07-04 RX ADMIN — FLUOXETINE HYDROCHLORIDE 20 MG: 20 CAPSULE ORAL at 09:14

## 2025-07-04 RX ADMIN — PRAVASTATIN SODIUM 80 MG: 80 TABLET ORAL at 17:03

## 2025-07-04 RX ADMIN — ACETAMINOPHEN 650 MG: 325 TABLET, FILM COATED ORAL at 17:03

## 2025-07-04 RX ADMIN — CEFTRIAXONE 1000 MG: 1 INJECTION, SOLUTION INTRAVENOUS at 04:42

## 2025-07-04 RX ADMIN — Medication 6 MG: at 22:43

## 2025-07-04 RX ADMIN — POTASSIUM CHLORIDE 20 MEQ: 1500 TABLET, EXTENDED RELEASE ORAL at 17:03

## 2025-07-04 RX ADMIN — ACETAMINOPHEN 650 MG: 325 TABLET, FILM COATED ORAL at 22:40

## 2025-07-04 RX ADMIN — POTASSIUM CHLORIDE 20 MEQ: 1500 TABLET, EXTENDED RELEASE ORAL at 09:15

## 2025-07-04 RX ADMIN — LOSARTAN POTASSIUM 100 MG: 50 TABLET, FILM COATED ORAL at 22:40

## 2025-07-04 RX ADMIN — ALLOPURINOL 100 MG: 100 TABLET ORAL at 09:14

## 2025-07-04 RX ADMIN — ACETAMINOPHEN 650 MG: 325 TABLET, FILM COATED ORAL at 11:49

## 2025-07-04 RX ADMIN — INSULIN LISPRO 1 UNITS: 100 INJECTION, SOLUTION INTRAVENOUS; SUBCUTANEOUS at 11:50

## 2025-07-04 RX ADMIN — HEPARIN SODIUM 4400 UNITS: 1000 INJECTION INTRAVENOUS; SUBCUTANEOUS at 01:08

## 2025-07-04 RX ADMIN — HEPARIN SODIUM 18 UNITS/KG/HR: 10000 INJECTION, SOLUTION INTRAVENOUS at 01:09

## 2025-07-04 NOTE — ASSESSMENT & PLAN NOTE
Home regimen: Candesartan 32 Mg daily, metoprolol succinate 100 Mg daily  Continue formulary equivalent for home candesartan and continue home metoprolol  Monitor BP per unit protocol

## 2025-07-04 NOTE — ASSESSMENT & PLAN NOTE
History of hyperparathyroidism in the past  PTH elevated on outpatient labs 6/21/2025 -ECP referred to Endo at that time  Continue holding home calcium, NVD, and vitamin D

## 2025-07-04 NOTE — ASSESSMENT & PLAN NOTE
UA with 4-10 WBCs but no bacteria, will send for urine culture  Patient reportedly did have fever at home therefore we will continue ceftriaxone until repeat urine culture results

## 2025-07-04 NOTE — ASSESSMENT & PLAN NOTE
"Currently being managed by PCP, MMSE 27/30  Memory labs obtained outpatient only showed elevated B12  Had developed recent auditory hallucinations, however unable to obtain MRI as patient's ICD is not MRI conditional  CTh on admit: \"No acute intracranial abnormality. Stable mild microangiopathic changes \"  Oriented x 4 on admission, however does have some confusion regarding situation  Consider referral for formal cognitive testing on discharge  "

## 2025-07-04 NOTE — CONSULTS
Pulmonary Consultation   Dejah Parish 75 y.o. female MRN: [id number]  Unit/Bed#: -01 Encounter: [id number]    Reason for consultation: Acute pulmonary embolism.    Requesting physician: Dr. Christensen.    Impressions:  Low-risk pulmonary embolism provoked by a minor transient risk factor (recent illness related to left wrist infection).  Pulmonary infarction.    Recommendations:  Continue heparin for the day today.  Asked nursing to walk with patient later today to assess ambulatory stability given recent fall, and monitor vitals with ambulation.  If patient has no issues with ambulating, likely ok to switch to Eliquis or Xarelto tomorrow depending on insurance coverage.  Would recommend LE duplex bilaterally which can be done post-discharge, given the somewhat weak risk factor for thrombosis, to assist with decisions on discontinuing anticoagulation during follow up.  Please note patient had baseline RV dysfunction on prior ECHO.  Would suggest that ECHO is not necessary for her prior to discharge if she is able to ambulate without concerning symptoms or vital sign changes.    History of Present Illness   HPI:  Dejah Parish is a 75 y.o. female who is admitted for a pulmonary embolism.  She had right flank pain and was evaluated with a CTPE which revealed a large PE but without signs of RV dysfunction.  PESI score is 87, class III.    She has been working with Orthopedics due to concerns for a deep tissue injury related to a cat bite on the left wrist.  Family reports she has been sleeping more and much less active since dealing with this complication.  She otherwise has not had prolonged bedrest, long distance travel, or hospitalization recently.  There is no family history of venous thrombosis although her son had an arterial thrombosis that reportedly was related to a heart valve issue.    Chest pain is now resolving.  No hemoptysis despite the pulmonary infarction.  She is not short of breath at  rest.  She was not anticoagulated prior to admission.  No pleuritic discomfort.    Review of systems:  Review of Systems   Respiratory:  Negative for shortness of breath.    Cardiovascular:  Negative for chest pain and leg swelling.       All other 12-point review of systems are negative.    Historical Information   Past Medical History[1]  Past Surgical History[2]  Family History[3]    Tobacco history: Nonsmokre.    Family history: As above.    Meds/Allergies     Current Facility-Administered Medications:     acetaminophen (TYLENOL) tablet 650 mg, Q6H LISHA    allopurinol (ZYLOPRIM) tablet 100 mg, Daily    aluminum-magnesium hydroxide-simethicone (MAALOX) oral suspension 30 mL, Q6H PRN    aspirin (ECOTRIN LOW STRENGTH) EC tablet 81 mg, Once per day on Monday Wednesday Friday    [Held by provider] calcium carbonate (TUMS) chewable tablet 500 mg, Daily    cefTRIAXone (ROCEPHIN) IVPB (premix in dextrose) 1,000 mg 50 mL, Q24H, Last Rate: 1,000 mg (07/04/25 0442)    [Held by provider] Cholecalciferol (VITAMIN D3) tablet 1,000 Units, Daily    FLUoxetine (PROzac) capsule 20 mg, Daily    gabapentin (NEURONTIN) capsule 100 mg, Daily    heparin (porcine) 25,000 units in 0.45% NaCl 250 mL infusion (premix), Titrated, Last Rate: 15 Units/kg/hr (07/04/25 0856)    heparin (porcine) injection 2,200 Units, Q6H PRN    heparin (porcine) injection 4,400 Units, Q6H PRN    insulin lispro (HumALOG/ADMELOG) 100 units/mL subcutaneous injection 1-5 Units, HS    insulin lispro (HumALOG/ADMELOG) 100 units/mL subcutaneous injection 1-6 Units, TID AC **AND** Fingerstick Glucose (POCT), TID AC    levothyroxine tablet 25 mcg, Early Morning    lidocaine (LIDODERM) 5 % patch 1 patch, Daily    loratadine (CLARITIN) tablet 10 mg, Daily    losartan (COZAAR) tablet 100 mg, HS    magnesium Oxide (MAG-OX) tablet 400 mg, Daily    melatonin tablet 6 mg, HS    [START ON 7/5/2025] methylprednisolone (MEDROL) tablet 4 mg, Every Other Day    metoprolol  "succinate (TOPROL-XL) 24 hr tablet 100 mg, Daily    ondansetron (ZOFRAN) injection 4 mg, Q6H PRN    pantoprazole (PROTONIX) EC tablet 40 mg, Early Morning    potassium chloride (Klor-Con M20) CR tablet 20 mEq, BID With Meals    pravastatin (PRAVACHOL) tablet 80 mg, Daily With Dinner    [Held by provider] pyridoxine (VITAMIN B6) tablet 100 mg, Daily    senna (SENOKOT) tablet 8.6 mg, HS PRN    torsemide (DEMADEX) tablet 40 mg, Daily    traMADol (ULTRAM) tablet 50 mg, BID PRN  Allergies[4]    Vitals: Blood pressure 131/67, pulse 77, temperature 98.1 °F (36.7 °C), resp. rate 19, height 4' 10\" (1.473 m), weight 56.7 kg (124 lb 14.4 oz), SpO2 94%., on room air, Body mass index is 26.1 kg/m².    Intake/Output Summary (Last 24 hours) at 7/4/2025 1326  Last data filed at 7/4/2025 1155  Gross per 24 hour   Intake 970.59 ml   Output 200 ml   Net 770.59 ml     Physical exam:     Physical Exam  Vitals reviewed.   Constitutional:       General: She is not in acute distress.     Appearance: Normal appearance. She is well-developed. She is not ill-appearing.   HENT:      Head: Normocephalic and atraumatic.     Eyes:      General: No scleral icterus.     Conjunctiva/sclera: Conjunctivae normal.     Neck:      Vascular: No JVD.     Cardiovascular:      Rate and Rhythm: Normal rate and regular rhythm.      Heart sounds: Normal heart sounds. No murmur heard.     No friction rub. No gallop.   Pulmonary:      Effort: Pulmonary effort is normal. No respiratory distress.      Breath sounds: Normal breath sounds. No wheezing or rales.     Musculoskeletal:      Cervical back: Neck supple.      Right lower leg: No edema.      Left lower leg: No edema.     Skin:     General: Skin is warm and dry.      Findings: No rash.     Neurological:      General: No focal deficit present.      Mental Status: She is alert and oriented to person, place, and time. Mental status is at baseline.     Psychiatric:         Mood and Affect: Mood normal.         " Behavior: Behavior normal.     Labs: I have personally reviewed pertinent lab results.    Results from last 7 days   Lab Units 07/04/25  0641 07/03/25 2042 07/02/25  1620   WBC Thousand/uL 9.12 10.95* 11.12*   HEMOGLOBIN g/dL 12.7 13.8 14.4   HEMATOCRIT % 39.8 43.0 44.4   PLATELETS Thousands/uL 178 189 171         Results from last 7 days   Lab Units 07/04/25  0641 07/03/25 2042 07/02/25  1620   POTASSIUM mmol/L 3.4* 3.9 4.4   CHLORIDE mmol/L 101 99 101   CO2 mmol/L 26 29 25   BUN mg/dL 14 16 16   CREATININE mg/dL 0.88 0.98 1.08   CALCIUM mg/dL 9.5 10.2 10.3*   ALK PHOS U/L  --  85 82   ALT U/L  --  15 16   AST U/L  --  16 23     Results from last 7 days   Lab Units 07/04/25  0641 07/03/25 2042   INR   --  1.02   PTT seconds >210* 29     Results from last 7 days   Lab Units 07/04/25  0641   MAGNESIUM mg/dL 1.9     Imaging and other studies: Results Review Statement: I personally reviewed the following image studies in PACS and associated radiology reports: CT chest. My interpretation of the radiology images/reports is: Agree with interpretation provided.    Code Status: Level 1 - Full Code    Thank you for allowing us to participate in the care of your patient.    Deonte Simmons M.D.         [1]   Past Medical History:  Diagnosis Date    Abnormal finding on breast imaging     last assessed 11/30/17    Allergic rhinitis     Anxiety     BBB (bundle branch block)     left,last assesed 6/4/12    Bilateral fibrocystic breast changes     Bilateral sacroiliitis (HCC)     Carpal tunnel syndrome, unspecified upper limb     Cellulitis of left hand 05/17/2025    Chronic systolic congestive heart failure (HCC)     Coronary artery disease     Detrusor instability     Dilated cardiomyopathy (HCC)     Disorder of intervertebral disc of cervical spine     Fibroid     Gout     Hormone replacement therapy     Hx of blood clots     Hyperlipidemia     Hypertension     Hypokalemia     Obesity     Osteoarthritis     Papilloma of  left breast     Psoriasis     psoriatic arthropathy    Psoriatic arthropathy (HCC)     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    RLS (restless legs syndrome)     Screening mammogram, encounter for 12/05/2019    Vitamin D deficiency    [2]   Past Surgical History:  Procedure Laterality Date    BLADDER SURGERY  1999    lift and repair    BREAST BIOPSY Left 05/23/2016    US CORE BIOPSY-BENIGN    CARDIAC CATHETERIZATION      outcome:  successful    CARDIAC DEFIBRILLATOR PLACEMENT      CARDIAC ELECTROPHYSIOLOGY PROCEDURE N/A 08/22/2023    Procedure: Cardiac biv icd generator change;  Surgeon: Nahid Dickerson MD;  Location: [facility];  Service: Cardiology    CARPAL TUNNEL RELEASE Bilateral 1997    CATARACT EXTRACTION      COLONOSCOPY      CORONARY ANGIOPLASTY WITH STENT PLACEMENT      CYSTOSCOPY W/ URETEROSCOPY  2009    DE QUERVAIN'S RELEASE Left 2011    and trigger finger release    EYE SURGERY Left 1999    EYE SURGERY Left 11/13/2019    Cataract    EYE SURGERY Right 11/09/2019    Cataract    INSERT / REPLACE / REMOVE PACEMAKER      JOINT REPLACEMENT Right 09/2017    Knee    KNEE ARTHROSCOPY      therapeutic    NEUROPLASTY / TRANSPOSITION MEDIAN NERVE AT CARPAL TUNNEL      OOPHORECTOMY Bilateral     AGE 44    ORTHOPEDIC SURGERY      CO TENDON SHEATH INCISION Left 03/02/2017    Procedure: SMALL TRIGGER FINGER RELEASE;  Surgeon: Camden Lancaster MD;  Location:  MAIN OR;  Service: Orthopedics    RECTAL SURGERY  2006    repair of rectal ulcer    SHOULDER ARTHROSCOPY Left 2006    TOTAL ABDOMINAL HYSTERECTOMY      AGE 44    TOTAL KNEE ARTHROPLASTY Left     TOTAL SHOULDER REPLACEMENT Left 2007    TRIGGER FINGER RELEASE Bilateral 2011    right haand 201,2015,left hand 2012,2015    US GUIDED BREAST BIOPSY LEFT COMPLETE Left 05/23/2016   [3]   Family History  Problem Relation Name Age of Onset    Hypertension Mother Mary     Alzheimer's disease Mother Mary     Cancer Father Garrett 71        bladder    Prostate cancer  Father Garrett 71    Breast cancer Daughter TOÑO VILLALPANDO 52    No Known Problems Daughter DILMA     No Known Problems Maternal Grandmother      No Known Problems Maternal Grandfather      Breast cancer Paternal Grandmother Elisa Diamondo         age dx unk    No Known Problems Paternal Grandfather      Cancer Brother Zack         pt shared some type of blood cancer    No Known Problems Son      Stomach cancer Paternal Aunt  65    No Known Problems Paternal Aunt     [4]   Allergies  Allergen Reactions    Dust Mite Extract Sneezing

## 2025-07-04 NOTE — PLAN OF CARE
Problem: Potential for Falls  Goal: Patient will remain free of falls  Description: INTERVENTIONS:  - Educate patient/family on patient safety including physical limitations  - Instruct patient to call for assistance with activity   - Consider consulting OT/PT to assist with strengthening/mobility based on AM PAC & JH-HLM score  - Consult OT/PT to assist with strengthening/mobility   - Keep Call bell within reach  - Keep bed low and locked with side rails adjusted as appropriate  - Keep care items and personal belongings within reach  - Initiate and maintain comfort rounds  - Make Fall Risk Sign visible to staff  - Offer Toileting every x Hours, in advance of need  - Initiate/Maintain xalarm  - Obtain necessary fall risk management equipment: x  - Apply yellow socks and bracelet for high fall risk patients  - Consider moving patient to room near nurses station  Outcome: Progressing     Problem: PAIN - ADULT  Goal: Verbalizes/displays adequate comfort level or baseline comfort level  Description: Interventions:  - Encourage patient to monitor pain and request assistance  - Assess pain using appropriate pain scale  - Administer analgesics as ordered based on type and severity of pain and evaluate response  - Implement non-pharmacological measures as appropriate and evaluate response  - Consider cultural and social influences on pain and pain management  - Notify physician/advanced practitioner if interventions unsuccessful or patient reports new pain  - Educate patient/family on pain management process including their role and importance of  reporting pain   - Provide non-pharmacologic/complimentary pain relief interventions  Outcome: Progressing     Problem: INFECTION - ADULT  Goal: Absence or prevention of progression during hospitalization  Description: INTERVENTIONS:  - Assess and monitor for signs and symptoms of infection  - Monitor lab/diagnostic results  - Monitor all insertion sites, i.e. indwelling lines,  tubes, and drains  - Monitor endotracheal if appropriate and nasal secretions for changes in amount and color  - [place] appropriate cooling/warming therapies per order  - Administer medications as ordered  - Instruct and encourage patient and family to use good hand hygiene technique  - Identify and instruct in appropriate isolation precautions for identified infection/condition  Outcome: Progressing  Goal: Absence of fever/infection during neutropenic period  Description: INTERVENTIONS:  - Monitor WBC  - Perform strict hand hygiene  - Limit to healthy visitors only  - No plants, dried, fresh or silk flowers with gupta in patient room  Outcome: Progressing     Problem: SAFETY ADULT  Goal: Patient will remain free of falls  Description: INTERVENTIONS:  - Educate patient/family on patient safety including physical limitations  - Instruct patient to call for assistance with activity   - Consider consulting OT/PT to assist with strengthening/mobility based on AM PAC & JH-HLM score  - Consult OT/PT to assist with strengthening/mobility   - Keep Call bell within reach  - Keep bed low and locked with side rails adjusted as appropriate  - Keep care items and personal belongings within reach  - Initiate and maintain comfort rounds  - Make Fall Risk Sign visible to staff  - Offer Toileting every x Hours, in advance of need  - Initiate/Maintain xalarm  - Obtain necessary fall risk management equipment: xx  - Apply yellow socks and bracelet for high fall risk patients  - Consider moving patient to room near nurses station  Outcome: Progressing  Goal: Maintain or return to baseline ADL function  Description: INTERVENTIONS:  -  Assess patient's ability to carry out ADLs; assess patient's baseline for ADL function and identify physical deficits which impact ability to perform ADLs (bathing, care of mouth/teeth, toileting, grooming, dressing, etc.)  - Assess/evaluate cause of self-care deficits   - Assess range of motion  -  Assess patient's mobility; develop plan if impaired  - Assess patient's need for assistive devices and provide as appropriate  - Encourage maximum independence but intervene and supervise when necessary  - Involve family in performance of ADLs  - Assess for home care needs following discharge   - Consider OT consult to assist with ADL evaluation and planning for discharge  - Provide patient education as appropriate  - Monitor functional capacity and physical performance, use of AM PAC & JH-HLM   - Monitor gait, balance and fatigue with ambulation    Outcome: Progressing  Goal: Maintains/Returns to pre admission functional level  Description: INTERVENTIONS:  - Perform AM-PAC 6 Click Basic Mobility/ Daily Activity assessment daily.  - Set and communicate daily mobility goal to care team and patient/family/caregiver.   - Collaborate with rehabilitation services on mobility goals if consulted  - Perform Range of Motion x times a day.  - Reposition patient every x hours.  - Dangle patient x times a day  - Stand patient x times a day  - Ambulate patient x times a day  - Out of bed to chair x times a day   - Out of bed for meals x times a day  - Out of bed for toileting  - Record patient progress and toleration of activity level   Outcome: Progressing     Problem: DISCHARGE PLANNING  Goal: Discharge to home or other facility with appropriate resources  Description: INTERVENTIONS:  - Identify barriers to discharge w/patient and caregiver  - Arrange for needed discharge resources and transportation as appropriate  - Identify discharge learning needs (meds, wound care, etc.)  - Arrange for interpretive services to assist at discharge as needed  - Refer to Case Management Department for coordinating discharge planning if the patient needs post-hospital services based on physician/advanced practitioner order or complex needs related to functional status, cognitive ability, or social support system  Outcome: Progressing      Problem: Knowledge Deficit  Goal: Patient/family/caregiver demonstrates understanding of disease process, treatment plan, medications, and discharge instructions  Description: Complete learning assessment and assess knowledge base.  Interventions:  - Provide teaching at level of understanding  - Provide teaching via preferred learning methods  Outcome: Progressing

## 2025-07-04 NOTE — ASSESSMENT & PLAN NOTE
Wt Readings from Last 3 Encounters:   07/04/25 56.7 kg (124 lb 14.4 oz)   07/01/25 56.8 kg (125 lb 3.2 oz)   05/28/25 61.2 kg (135 lb)   History of systolic heart failure thought to be possibly secondary to RMSF  Most recent echo 4/2025 with LVEF of 45%.  G1 DD  Maintained on torsemide 40 Mg daily  Examines euvolemic on admission, continue home torsemide  Repeat echo ordered due to presence of large pulmonary embolism

## 2025-07-04 NOTE — ED PROVIDER NOTES
Time reflects when diagnosis was documented in both MDM as applicable and the Disposition within this note       Time User Action Codes Description Comment    7/4/2025 12:51 AM Leann Buckner Add [I26.99] Pulmonary embolism (HCC)     7/4/2025 12:51 AM Leann Buckner Add [N39.0] UTI (urinary tract infection)     7/4/2025 12:51 AM Kellydashawn Leann CAMPOS Add [S22.39XA] Rib fracture     7/4/2025 12:51 AM Leann Buckner M Add [I26.99] Pulmonary infarction (HCC)     7/4/2025 12:53 AM Leann Buckner Add [R10.9] Flank pain           ED Disposition       ED Disposition   Admit    Condition   Stable    Date/Time   Fri Jul 4, 2025 12:51 AM    Comment   Case was discussed with HILLARY James and the patient's admission status was agreed to be Admission Status: inpatient status to the service of Dr. Landry .               Assessment & Plan       Medical Decision Making  Differential diagnosis including pyelonephritis, PE, intercranial hemorrhage, rib fracture   Plan to check CT head, C-spine for fall multiple weeks ago.  Plan to check D-dimer as patient is low risk Wells.  Patient was diagnosed with a UTI last evening, will check septic order set for possible worsening UTI.  With DDimer positive, CT PE a/p ordered.   Blood work shows an improving leukocytosis, no anemia, no emi, no gross electrolyte abnormality.   Patient CT scan reviewed with Dr. Zelaya of radiology. PE finding appreciate. PESI of 77. Patient has no increase work of breathing, no shortness of breath, no tachypnea, or respiratory insufficiency.  Discussed rib fractures with Dr. Bearden of trauma who reviewed and reports rib fractures are not acute and patient can be admitted to [facility], as well as can be anticoagulated.   Patient reviewed with [facility] and critical care and patient to be admitted to [facility] service.   Critical Care Time Statement: Upon my evaluation, this patient had a high probability of imminent or life-threatening deterioration due to PE, which required  my direct attention, intervention, and personal management.  I spent a total of 40 minutes directly providing critical care services, including interpretation of complex medical databases, evaluating for the presence of life-threatening injuries or illnesses, management of organ system failure(s) , complex medical decision making (to support/prevent further life-threatening deterioration)., interpretation of hemodynamic data, and titration of continuous IV medications (drips). This time is exclusive of procedures, teaching, treating other patients, family meetings, and any prior time recorded by providers other than myself.       Amount and/or Complexity of Data Reviewed  Labs: ordered.  Radiology: ordered.    Risk  Prescription drug management.  Decision regarding hospitalization.             Medications   heparin (porcine) injection 4,400 Units (has no administration in time range)   heparin (porcine) 25,000 units in 0.45% NaCl 250 mL infusion (premix) (has no administration in time range)   heparin (porcine) injection 4,400 Units (has no administration in time range)   heparin (porcine) injection 2,200 Units (has no administration in time range)   iohexol (OMNIPAQUE) 350 MG/ML injection (MULTI-DOSE) 100 mL (100 mL Intravenous Given 7/3/25 2142)       ED Risk Strat Scores   HEART Risk Score      Flowsheet Row Most Recent Value   Heart Score Risk Calculator    History 1 Filed at: 07/04/2025 0059   ECG 1 Filed at: 07/04/2025 0059   Age 2 Filed at: 07/04/2025 0059   Risk Factors 1 Filed at: 07/04/2025 0059   Troponin 1 Filed at: 07/04/2025 0059   HEART Score 6 Filed at: 07/04/2025 0059          HEART Risk Score      Flowsheet Row Most Recent Value   Heart Score Risk Calculator    History 1 Filed at: 07/04/2025 0059   ECG 1 Filed at: 07/04/2025 0059   Age 2 Filed at: 07/04/2025 0059   Risk Factors 1 Filed at: 07/04/2025 0059   Troponin 1 Filed at: 07/04/2025 0059   HEART Score 6 Filed at: 07/04/2025 0059                       No data recorded                  PESI  Age: 75 years old  Sex: 0  History of Cancer: 0  History of Heart Failure: 0  History of Chronic Lung Disease: 0  Heart rate greater than or equal to 110: 0  Systolic BP < 100 mmH  Respiratory rate greater than or equal to 30: 0  Temperature <36°C/96.8°F: 0  Altered Mental Status (Disorientation, lethargy, stupor, or coma): 0  O2 saturation <90%: 0  PESI Score Results: 77             History of Present Illness       Chief Complaint   Patient presents with    Flank Pain     Pt c/o worsening right flank pain and weakness, started abx this AM. Dx mass to right kidney last night.        Past Medical History[1]   Past Surgical History[2]   Family History[3]   Social History[4]   E-Cigarette/Vaping    E-Cigarette Use Never User       E-Cigarette/Vaping Substances      I have reviewed and agree with the history as documented.     Pt Is a 75-year-old female arriving for evaluation of left flank pain.  Patient reports that she has been having ongoing left flank pain for a couple days.  Patient arriving with her daughter who primarily lives in [place] and got here a few days ago for the holiday to visit.  Patient denies any dysuria.  Patient reports ongoing left upper quadrant pain as well.  Patient was seen in the ER last evening and diagnosed with a kidney mass in the left side.  Patient reports she was also diagnosed with a UTI.  Patient's daughter reports fever yesterday of 101.  Patient returning for worsening pain.  Daughter also endorsing that a few weeks ago patient reported to her that she fell and did strike her head on tile.  Reports that the daughter told her that she has been hearing voices and music intermittently for the past few weeks.  Daughter reports that patient had a neurologic exam and was diagnosed with cognitive decline and is at her baseline mentation.        Review of Systems   Constitutional:  Positive for activity change and  fatigue.   HENT: Negative.     Eyes: Negative.    Respiratory: Negative.     Cardiovascular: Negative.    Gastrointestinal:  Positive for abdominal pain.   Endocrine: Negative.    Genitourinary:  Positive for flank pain.   Skin: Negative.    Allergic/Immunologic: Negative.    Neurological: Negative.    Hematological: Negative.    Psychiatric/Behavioral: Negative.     All other systems reviewed and are negative.          Objective       ED Triage Vitals [07/03/25 2001]   Temperature Pulse Blood Pressure Respirations SpO2 Patient Position - Orthostatic VS   (!) 97.1 °F (36.2 °C) 69 142/88 18 96 % Sitting      Temp Source Heart Rate Source BP Location FiO2 (%) Pain Score    Temporal Monitor Left arm -- 5      Vitals      Date and Time Temp Pulse SpO2 Resp BP Pain Score FACES Pain Rating User   07/03/25 2300 -- 73 95 % 20 116/64 -- -- MG   07/03/25 2230 -- 73 96 % 18 117/64 -- -- MG   07/03/25 2200 -- 74 96 % 18 146/60 -- -- MG   07/03/25 2130 -- 72 94 % 18 115/65 -- -- MG   07/03/25 2115 -- 70 94 % 20 107/54 -- -- MG   07/03/25 2045 -- 70 94 % 18 124/63 -- -- MG   07/03/25 2015 -- 68 98 % 18 123/58 -- -- MG   07/03/25 2001 97.1 °F (36.2 °C) 69 96 % 18 142/88 5 -- PURA            Physical Exam  Vitals and nursing note reviewed.   Constitutional:       Appearance: Normal appearance. She is normal weight.   HENT:      Head: Normocephalic.      Right Ear: External ear normal.      Left Ear: External ear normal.      Nose: Nose normal.      Mouth/Throat:      Mouth: Mucous membranes are moist.      Pharynx: Oropharynx is clear.     Eyes:      Extraocular Movements: Extraocular movements intact.      Conjunctiva/sclera: Conjunctivae normal.      Pupils: Pupils are equal, round, and reactive to light.       Cardiovascular:      Rate and Rhythm: Normal rate and regular rhythm.      Pulses: Normal pulses.      Heart sounds: Normal heart sounds.   Pulmonary:      Effort: Pulmonary effort is normal.      Breath sounds: Normal  breath sounds.   Abdominal:      General: Abdomen is flat.      Palpations: Abdomen is soft.      Tenderness: There is abdominal tenderness. There is left CVA tenderness.     Musculoskeletal:         General: Normal range of motion.      Cervical back: Normal range of motion and neck supple.     Skin:     General: Skin is warm.      Capillary Refill: Capillary refill takes less than 2 seconds.     Neurological:      General: No focal deficit present.      Mental Status: She is alert and oriented to person, place, and time. Mental status is at baseline.     Psychiatric:         Mood and Affect: Mood normal.         Behavior: Behavior normal.         Thought Content: Thought content normal.         Judgment: Judgment normal.         Results Reviewed       Procedure Component Value Units Date/Time    B-Type Natriuretic Peptide(BNP) [[id number]]  (Abnormal) Collected: 07/03/25 2042    Lab Status: Final result Specimen: Blood from Arm, Left Updated: 07/04/25 0049      pg/mL     HS Troponin I 2hr [[id number]]  (Normal) Collected: 07/03/25 2251    Lab Status: Final result Specimen: Blood from Arm, Left Updated: 07/03/25 2319     hs TnI 2hr 13 ng/L      Delta 2hr hsTnI 0 ng/L     Procalcitonin [[id number]]  (Abnormal) Collected: 07/03/25 2042    Lab Status: Final result Specimen: Blood from Arm, Left Updated: 07/03/25 2116     Procalcitonin 0.29 ng/ml     HS Troponin 0hr (reflex protocol) [[id number]]  (Normal) Collected: 07/03/25 2042    Lab Status: Final result Specimen: Blood from Arm, Left Updated: 07/03/25 2113     hs TnI 0hr 13 ng/L     Lactic acid [[id number]]  (Normal) Collected: 07/03/25 2042    Lab Status: Final result Specimen: Blood from Arm, Left Updated: 07/03/25 2108     LACTIC ACID 1.1 mmol/L     Narrative:      Result may be elevated if tourniquet was used during collection.    Comprehensive metabolic panel [[id number]]  (Abnormal) Collected: 07/03/25 2042    Lab Status: Final result Specimen: Blood from  Arm, Left Updated: 07/03/25 2108     Sodium 137 mmol/L      Potassium 3.9 mmol/L      Chloride 99 mmol/L      CO2 29 mmol/L      ANION GAP 9 mmol/L      BUN 16 mg/dL      Creatinine 0.98 mg/dL      Glucose 146 mg/dL      Calcium 10.2 mg/dL      Corrected Calcium 10.7 mg/dL      AST 16 U/L      ALT 15 U/L      Alkaline Phosphatase 85 U/L      Total Protein 6.2 g/dL      Albumin 3.4 g/dL      Total Bilirubin 0.80 mg/dL      eGFR 55 ml/min/1.73sq m     Narrative:      National Kidney Disease Foundation guidelines for Chronic Kidney Disease (CKD):     Stage 1 with normal or high GFR (GFR > 90 mL/min/1.73 square meters)    Stage 2 Mild CKD (GFR = 60-89 mL/min/1.73 square meters)    Stage 3A Moderate CKD (GFR = 45-59 mL/min/1.73 square meters)    Stage 3B Moderate CKD (GFR = 30-44 mL/min/1.73 square meters)    Stage 4 Severe CKD (GFR = 15-29 mL/min/1.73 square meters)    Stage 5 End Stage CKD (GFR <15 mL/min/1.73 square meters)  Note: GFR calculation is accurate only with a steady state creatinine    Lipase [[id number]]  (Normal) Collected: 07/03/25 2042    Lab Status: Final result Specimen: Blood from Arm, Left Updated: 07/03/25 2108     Lipase 19 u/L     D-dimer, quantitative [[id number]]  (Abnormal) Collected: 07/03/25 2042    Lab Status: Final result Specimen: Blood from Arm, Left Updated: 07/03/25 2107     D-Dimer, Quant 2.80 ug/ml FEU     Narrative:      In the evaluation for possible pulmonary embolism, in the appropriate (Well's Score of 4 or less) patient, the age adjusted d-dimer cutoff for this patient can be calculated as:    Age x 0.01 (in ug/mL) for Age-adjusted D-dimer exclusion threshold for a patient over 50 years.    FLU/COVID Rapid Antigen (30 min. TAT) - Preferred screening test in ED [[id number]]  (Normal) Collected: 07/03/25 2042    Lab Status: Final result Specimen: Nares from Nose Updated: 07/03/25 2106     SARS COV Rapid Antigen Negative     Influenza A Rapid Antigen Negative     Influenza B Rapid  Antigen Negative    Narrative:      This test has been performed using the Quidel Mirela 2 FLU+SARS Antigen test under the Emergency Use Authorization (EUA). This test has been validated by the  and verified by the performing laboratory. The Mirela uses lateral flow immunofluorescent sandwich assay to detect SARS-COV, Influenza A and Influenza B Antigen.     The Quidel Mirela 2 SARS Antigen test does not differentiate between SARS-CoV and SARS-CoV-2.     Negative results are presumptive and may be confirmed with a molecular assay, if necessary, for patient management. Negative results do not rule out SARS-CoV-2 or influenza infection and should not be used as the sole basis for treatment or patient management decisions. A negative test result may occur if the level of antigen in a sample is below the limit of detection of this test.     Positive results are indicative of the presence of viral antigens, but do not rule out bacterial infection or co-infection with other viruses.     All test results should be used as an adjunct to clinical observations and other information available to the provider.    FOR PEDIATRIC PATIENTS - copy/paste COVID Guidelines URL to browser: [email or web address]    Protime-INR [[id number]]  (Normal) Collected: 07/03/25 2042    Lab Status: Final result Specimen: Blood from Arm, Left Updated: 07/03/25 2104     Protime 13.9 seconds      INR 1.02    Narrative:      INR Therapeutic Range    Indication                                             INR Range      Atrial Fibrillation                                               2.0-3.0  Hypercoagulable State                                    2.0.2.3  Left Ventricular Asist Device                            2.0-3.0  Mechanical Heart Valve                                  -    Aortic(with afib, MI, embolism, HF, LA enlargement,    and/or coagulopathy)                                      2.0-3.0 (2.5-3.5)     Mitral                                                             2.5-3.5  Prosthetic/Bioprosthetic Heart Valve               2.0-3.0  Venous thromboembolism (VTE: VT, PE        2.0-3.0    APTT [[id number]]  (Normal) Collected: 07/03/25 2042    Lab Status: Final result Specimen: Blood from Arm, Left Updated: 07/03/25 2104     PTT 29 seconds     Blood culture #1 [[id number]] Collected: 07/03/25 2042    Lab Status: In process Specimen: Blood from Arm, Left Updated: 07/03/25 2052    Blood culture #2 [[id number]] Collected: 07/03/25 2042    Lab Status: In process Specimen: Blood from Arm, Left Updated: 07/03/25 2052    CBC and differential [[id number]]  (Abnormal) Collected: 07/03/25 2042    Lab Status: Final result Specimen: Blood from Arm, Left Updated: 07/03/25 2051     WBC 10.95 Thousand/uL      RBC 4.55 Million/uL      Hemoglobin 13.8 g/dL      Hematocrit 43.0 %      MCV 95 fL      MCH 30.3 pg      MCHC 32.1 g/dL      RDW 14.7 %      MPV 9.3 fL      Platelets 189 Thousands/uL      nRBC 0 /100 WBCs      Segmented % 76 %      Immature Grans % 2 %      Lymphocytes % 9 %      Monocytes % 12 %      Eosinophils Relative 1 %      Basophils Relative 0 %      Absolute Neutrophils 8.42 Thousands/µL      Absolute Immature Grans 0.17 Thousand/uL      Absolute Lymphocytes 1.00 Thousands/µL      Absolute Monocytes 1.27 Thousand/µL      Eosinophils Absolute 0.05 Thousand/µL      Basophils Absolute 0.04 Thousands/µL     UA w Reflex to Microscopic w Reflex to Culture [[id number]]     Lab Status: No result Specimen: Urine             CT head without contrast   Final Interpretation by Corey Zelaya DO (07/03 2315)      No acute intracranial abnormality. Stable mild microangiopathic changes                        Workstation performed: [id number]         CT spine cervical without contrast   Final Interpretation by Corey Zelaya DO (07/03 2322)      No acute vertebral body compression fracture. No traumatic  malalignment. No prevertebral soft tissue swelling.                     Workstation performed: [id number]         CT pe study w abdomen pelvis w contrast   Final Interpretation by Corey Zelaya DO (07/04 0011)      There is a large pulmonary embolus at the right main pulmonary artery extending into segmental branches of the lower lobe.   No evidence for right heart strain. The pulmonary artery is dilated measuring up to 41 mm which may reflect component of    pulmonary hypertension.      There our somewhat triangular somewhat confluent and groundglass opacities at the right lower lobe, which are nonspecific, however may reflect pulmonary infarction. Developing pneumonia may have a similar appearance.      Fractures with callus formation at the anterior left fourth and fifth ribs (6/133 and 153).  Questionable trace pneumothorax at the left upper lobe (6/136).      Additionally there is a somewhat triangular appearing density at the lingula which could reflect atelectasis. However, developing pneumonia or pulmonary contusion or infarct are in the differential (3/164).      Prominent gastric wall thickening which could be due to nondistention, however correlate clinically for gastritis.      Redemonstration of multilobulated exophytic soft tissue density lesion at the left renal lower pole, stable from prior measuring up to 23 mm (3/81), however enlarged from other prior examinations as mentioned on 7/2/2025 report. Again nonemergent MRI of    the abdomen is recommended for further evaluation, as previously recommended.      Other findings as detailed above.         I personally discussed this study with ELLEN CAMPBELL on 7/3/2025 11:59 PM.                           Computerized Assisted Algorithm (CAA) may have aided analysis of applicable images.      Workstation performed: [id number]         XR chest portable    (Results Pending)       ECG 12 Lead Documentation Only    Date/Time: 7/3/2025 8:30 PM    Performed  by: ALVINA Ceballos  Authorized by: ALVINA Ceballos    Indications / Diagnosis:  Left flank pain  Rate:     ECG rate:  71  Rhythm:     Rhythm: paced    Pacing:     Type of pacing:  AV      ED Medication and Procedure Management   Prior to Admission Medications   Prescriptions Last Dose Informant Patient Reported? Taking?   Cholecalciferol (VITAMIN D3) 1000 units CAPS  Self Yes No   Sig: Take by mouth   FLUoxetine (PROzac) 20 mg capsule   No No   Sig: TAKE 1 CAPSULE BY MOUTH DAILY   Januvia 100 MG tablet   No No   Sig: Take 1 tablet by mouth once daily   Magnesium 250 MG TABS  Self Yes No   Sig: Take by mouth in the morning.   Melatonin 5 MG TABS  Self Yes No   Sig: Take 5 mg by mouth daily at bedtime   Minoxidil (ROGAINE WOMENS EX)  Self Yes No   Sig: Apply 1 mL topically   allopurinol (ZYLOPRIM) 100 mg tablet   No No   Sig: TAKE 1 TABLET BY MOUTH DAILY   aspirin 81 MG tablet  Self Yes No   Sig: Take 81 mg by mouth in the morning.   calcium carbonate (OS-MANINDER) 600 MG tablet  Self Yes No   Sig: Take 1,200 mg by mouth in the morning.   candesartan (ATACAND) 32 MG tablet  Self No No   Sig: Take 1 tablet (32 mg total) by mouth daily   cephalexin (KEFLEX) 500 mg capsule   No No   Sig: Take 1 capsule (500 mg total) by mouth every 12 (twelve) hours for 7 days   cetirizine (ZyrTEC) 10 mg tablet  Self Yes No   Sig: Take 10 mg by mouth in the morning.   gabapentin (Neurontin) 100 mg capsule   No No   Si tab PO q am x 3 days then 1 tab PO bid x 3 days then 1 tab PO tid and stay on that until re-evaluated   levothyroxine 25 mcg tablet  Self No No   Sig: Take 1 tablet (25 mcg total) by mouth daily   metoprolol succinate (TOPROL-XL) 100 mg 24 hr tablet  Self No No   Sig: Take 1 tablet (100 mg total) by mouth daily   omeprazole (PriLOSEC) 40 MG capsule  Self No No   Sig: Take 1 capsule (40 mg total) by mouth daily   potassium chloride (Klor-Con M20) 20 mEq tablet  Self No No   Sig: Take 1 tablet (20 mEq total)  by mouth daily   pyridoxine (VITAMIN B6) 100 mg tablet  Self Yes No   Sig: Take 100 mg by mouth in the morning.   semaglutide (Rybelsus) 3 MG tablet   No No   Sig: Take 1 tablet (3 mg total) by mouth daily Take on an empty stomach with 4 oz water, and wait 30 minutes before eating   semaglutide (Rybelsus) 7 MG tablet   No No   Sig: Take 1 tablet (7 mg total) by mouth daily Take on an empty stomach with 4 oz water, and wait 30 minutes before eating Do not start before July 31, 2025.   simvastatin (ZOCOR) 40 mg tablet   No No   Sig: TAKE 1 TABLET BY MOUTH DAILY   torsemide (DEMADEX) 20 mg tablet  Self No No   Sig: Take 1 tablet by mouth twice daily   traMADol (ULTRAM) 50 mg tablet  Self No No   Sig: Take 1 tablet (50 mg total) by mouth every 8 (eight) hours as needed for moderate pain      Facility-Administered Medications: None     Patient's Medications   Discharge Prescriptions    No medications on file     No discharge procedures on file.  ED SEPSIS DOCUMENTATION   Time reflects when diagnosis was documented in both MDM as applicable and the Disposition within this note       Time User Action Codes Description Comment    7/4/2025 12:51 AM Leann Buckner [I26.99] Pulmonary embolism (HCC)     7/4/2025 12:51 AM Leann Buckner [N39.0] UTI (urinary tract infection)     7/4/2025 12:51 AM Leann Buckner [S22.39XA] Rib fracture     7/4/2025 12:51 AM Leann Buckner [I26.99] Pulmonary infarction (HCC)     7/4/2025 12:53 AM Leann Buckner [R10.9] Flank pain                    ALVINA Ceballos  07/04/25 0059         [1]   Past Medical History:  Diagnosis Date    Abnormal finding on breast imaging     last assessed 11/30/17    Allergic rhinitis     Anxiety     BBB (bundle branch block)     left,last assesed 6/4/12    Bilateral fibrocystic breast changes     Bilateral sacroiliitis (HCC)     Carpal tunnel syndrome, unspecified upper limb     Cellulitis of left hand 05/17/2025     Chronic systolic congestive heart failure (HCC)     Coronary artery disease     Detrusor instability     Dilated cardiomyopathy (HCC)     Disorder of intervertebral disc of cervical spine     Fibroid     Gout     Hormone replacement therapy     Hx of blood clots     Hyperlipidemia     Hypertension     Hypokalemia     Obesity     Osteoarthritis     Papilloma of left breast     Psoriasis     psoriatic arthropathy    Psoriatic arthropathy (HCC)     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    RLS (restless legs syndrome)     Screening mammogram, encounter for 12/05/2019    Vitamin D deficiency    [2]   Past Surgical History:  Procedure Laterality Date    BLADDER SURGERY  1999    lift and repair    BREAST BIOPSY Left 05/23/2016     CORE BIOPSY-BENIGN    CARDIAC CATHETERIZATION      outcome:  successful    CARDIAC DEFIBRILLATOR PLACEMENT      CARDIAC ELECTROPHYSIOLOGY PROCEDURE N/A 08/22/2023    Procedure: Cardiac biv icd generator change;  Surgeon: Nahid Dickerson MD;  Location: [facility];  Service: Cardiology    CARPAL TUNNEL RELEASE Bilateral 1997    CATARACT EXTRACTION      COLONOSCOPY      CORONARY ANGIOPLASTY WITH STENT PLACEMENT      CYSTOSCOPY W/ URETEROSCOPY  2009    DE QUERVAIN'S RELEASE Left 2011    and trigger finger release    EYE SURGERY Left 1999    EYE SURGERY Left 11/13/2019    Cataract    EYE SURGERY Right 11/09/2019    Cataract    INSERT / REPLACE / REMOVE PACEMAKER      JOINT REPLACEMENT Right 09/2017    Knee    KNEE ARTHROSCOPY      therapeutic    NEUROPLASTY / TRANSPOSITION MEDIAN NERVE AT CARPAL TUNNEL      OOPHORECTOMY Bilateral     AGE 44    ORTHOPEDIC SURGERY      [place] TENDON SHEATH INCISION Left 03/02/2017    Procedure: SMALL TRIGGER FINGER RELEASE;  Surgeon: Camden Lancaster MD;  Location: [facility];  Service: Orthopedics    RECTAL SURGERY  2006    repair of rectal ulcer    SHOULDER ARTHROSCOPY Left 2006    TOTAL ABDOMINAL HYSTERECTOMY      AGE 44    TOTAL KNEE ARTHROPLASTY Left     TOTAL  SHOULDER REPLACEMENT Left 2007    TRIGGER FINGER RELEASE Bilateral 2011    right haand 201,,left hand ,    US GUIDED BREAST BIOPSY LEFT COMPLETE Left 2016   [3]   Family History  Problem Relation Name Age of Onset    Hypertension Mother Mary     Alzheimer's disease Mother Mary     Cancer Father Garrett 71        bladder    Prostate cancer Father Garrett 71    Breast cancer Daughter TOÑO VILLALPANDO 52    No Known Problems Daughter DILMA     No Known Problems Maternal Grandmother      No Known Problems Maternal Grandfather      Breast cancer Paternal Grandmother Elisa Darling         age dx unk    No Known Problems Paternal Grandfather      Cancer Brother Zack         pt shared some type of blood cancer    No Known Problems Son      Stomach cancer Paternal Aunt  65    No Known Problems Paternal Aunt     [4]   Social History  Tobacco Use    Smoking status: Former     Current packs/day: 0.00     Average packs/day: 1 pack/day for 15.0 years (15.0 ttl pk-yrs)     Types: Cigarettes     Start date:      Quit date:      Years since quittin.5    Smokeless tobacco: Never    Tobacco comments:     Smoked on and off for the last few years before I quit   Vaping Use    Vaping status: Never Used   Substance Use Topics    Alcohol use: Never    Drug use: Never        ALVINA Ceballos  [phone]

## 2025-07-04 NOTE — ASSESSMENT & PLAN NOTE
"CT A/P: \"Enlarging multilobulated exophytic soft tissue density lesion at the left inferior pole measuring up to 2.3 cm with interspersed fat density. 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 Additional indeterminate isodense lesions at the right inferior pole and left upper pole.\"  Patient and family were made aware of these findings during recent ED visit, recommend MRI abdomen outpatient  Patient reported left flank pain in the ED, however states that this has since resolved.  Unlikely that this would be contributory to pain  "

## 2025-07-04 NOTE — PLAN OF CARE
Problem: PAIN - ADULT  Goal: Verbalizes/displays adequate comfort level or baseline comfort level  Description: Interventions:  - Encourage patient to monitor pain and request assistance  - Assess pain using appropriate pain scale  - Administer analgesics as ordered based on type and severity of pain and evaluate response  - Implement non-pharmacological measures as appropriate and evaluate response  - Consider cultural and social influences on pain and pain management  - Notify physician/advanced practitioner if interventions unsuccessful or patient reports new pain  - Educate patient/family on pain management process including their role and importance of  reporting pain   - Provide non-pharmacologic/complimentary pain relief interventions  Outcome: Progressing     Problem: INFECTION - ADULT  Goal: Absence or prevention of progression during hospitalization  Description: INTERVENTIONS:  - Assess and monitor for signs and symptoms of infection  - Monitor lab/diagnostic results  - Monitor all insertion sites, i.e. indwelling lines, tubes, and drains  - Monitor endotracheal if appropriate and nasal secretions for changes in amount and color  - [place] appropriate cooling/warming therapies per order  - Administer medications as ordered  - Instruct and encourage patient and family to use good hand hygiene technique  - Identify and instruct in appropriate isolation precautions for identified infection/condition  Outcome: Progressing  Goal: Absence of fever/infection during neutropenic period  Description: INTERVENTIONS:  - Monitor WBC  - Perform strict hand hygiene  - Limit to healthy visitors only  - No plants, dried, fresh or silk flowers with gupta in patient room  Outcome: Progressing

## 2025-07-04 NOTE — ASSESSMENT & PLAN NOTE
CAD s/p PCI in January 2005 in March 2010  Continue home aspirin beta-blocker, and formulary equivalent for statin,

## 2025-07-04 NOTE — ASSESSMENT & PLAN NOTE
Previously was on Celebrex, however this was discontinued due to renal function  Currently maintained on tramadol twice daily as needed  PDMP reviewed, last fill 2/11/2025  PCP recently started gabapentin 100 Mg daily with plan to uptitrate to 100 Mg 3 times daily every 3 days to eventually wean patient off tramadol  Gabapentin 100 Mg daily ordered on admit

## 2025-07-04 NOTE — NURSING NOTE
The patient was taken on a walk in the hallway, ambulating about 50 feet with nursing staff. Denies dizziness or lightheadedness.  No dyspnea on exertion noted; although the patient became easily fatigued.

## 2025-07-04 NOTE — ASSESSMENT & PLAN NOTE
"CT chest: \"Fractures with callus formation at the anterior left fourth and fifth ribs \"  Questionable trace pneumothorax of the left upper lobe - this was discussed with trauma by the ED who felt this was negligible as fractures were old due to evidence of callus formation  Patient on room air on admission  "

## 2025-07-04 NOTE — ASSESSMENT & PLAN NOTE
"Lab Results   Component Value Date    HGBA1C 9.0 (H) 06/21/2025       No results for input(s): \"POCGLU\" in the last 72 hours.    Blood Sugar Average: Last 72 hrs:  Home regimen: Januvia 100 mg daily, PCP just prescribed  semaglutide, which patient has not yet started  Hold home medications and placed on SSI 3 times daily and at bedtime  Trend glucose, may need standing insulin as outpatient A1c was 9.0    "

## 2025-07-04 NOTE — H&P
"H&P - Hospitalist   Name: Dejah Parish 75 y.o. female I MRN: [id number]  Unit/Bed#: -[facility] I Date of Admission: 7/3/2025   Date of Service: 7/4/2025 I Hospital Day: 0     Assessment & Plan  Pulmonary embolism (HCC)  CT PE study: \"There is a large pulmonary embolus at the right main pulmonary artery extending into segmental branches of the lower lobe.   No evidence for right heart strain. The pulmonary artery is dilated measuring up to 41 mm which may reflect component of pulmonary hypertension. There our somewhat triangular somewhat confluent and groundglass opacities at the right lower lobe. Additionally there is a somewhat triangular appearing density at the lingula which could reflect atelectasis. \"  Patient not requiring supplemental oxygen at time of admission  Suspect lung abnormalities are likely pulmonary infarcts  Will obtain PERT prior to echo due to large size of PE  Pulmonology consulted  Abnormal urinalysis  UA with 4-10 WBCs but no bacteria, will send for urine culture  Patient reportedly did have fever at home therefore we will continue ceftriaxone until repeat urine culture results  Type 2 diabetes mellitus with hyperglycemia, without long-term current use of insulin ([facility])  Lab Results   Component Value Date    HGBA1C 9.0 (H) 06/21/2025       No results for input(s): \"POCGLU\" in the last 72 hours.    Blood Sugar Average: Last 72 hrs:  Home regimen: Januvia 100 mg daily, PCP just prescribed  semaglutide, which patient has not yet started  Hold home medications and placed on SSI 3 times daily and at bedtime  Trend glucose, may need standing insulin as outpatient A1c was 9.0    Chronic systolic congestive heart failure (HCC)  Wt Readings from Last 3 Encounters:   07/04/25 56.7 kg (124 lb 14.4 oz)   07/01/25 56.8 kg (125 lb 3.2 oz)   05/28/25 61.2 kg (135 lb)   History of systolic heart failure thought to be possibly secondary to RMSF  Most recent echo 4/2025 with LVEF of 45%.  G1 " "DD  Maintained on torsemide 40 Mg daily  Examines euvolemic on admission, continue home torsemide  Repeat echo ordered due to presence of large pulmonary embolism  Hypertension  Home regimen: Candesartan 32 Mg daily, metoprolol succinate 100 Mg daily  Continue formulary equivalent for home candesartan and continue home metoprolol  Monitor BP per unit protocol  Biventricular ICD (implantable cardioverter-defibrillator) in place  S/p ICD in March 2005 due to history of systolic HF  Last device check 5/14/2025 at shows normal device function and OptiVol within normal limits  Memory changes  Currently being managed by PCP, MMSE 27/30  Memory labs obtained outpatient only showed elevated B12  Had developed recent auditory hallucinations, however unable to obtain MRI as patient's ICD is not MRI conditional  CTh on admit: \"No acute intracranial abnormality. Stable mild microangiopathic changes \"  Oriented x 4 on admission, however does have some confusion regarding situation  Consider referral for formal cognitive testing on discharge  Coronary artery disease  CAD s/p PCI in January 2005 in March 2010  Continue home aspirin beta-blocker, and formulary equivalent for statin,   Hyperparathyroidism (HCC)  History of hyperparathyroidism in the past  PTH elevated on outpatient labs 6/21/2025 -ECP referred to Endo at that time  Continue holding home calcium, NVD, and vitamin D  Continuous opioid dependence (HCC)  Previously was on Celebrex, however this was discontinued due to renal function  Currently maintained on tramadol twice daily as needed  PDMP reviewed, last fill 2/11/2025  PCP recently started gabapentin 100 Mg daily with plan to uptitrate to 100 Mg 3 times daily every 3 days to eventually wean patient off tramadol  Gabapentin 100 Mg daily ordered on admit  Obstructive sleep apnea  Uses BiPAP 12-16 at home  Current moderate episode of major depressive disorder without prior episode (HCC)  Continue home Prozac 20 Mg " "daily  Euthymic on admission  Acquired hypothyroidism  Continue home Synthroid 25 mcg daily  Renal lesion  CT A/P: \"Enlarging multilobulated exophytic soft tissue density lesion at the left inferior pole measuring up to 2.3 cm with interspersed fat density. 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 Additional indeterminate isodense lesions at the right inferior pole and left upper pole.\"  Patient and family were made aware of these findings during recent ED visit, recommend MRI abdomen outpatient  Patient reported left flank pain in the ED, however states that this has since resolved.  Unlikely that this would be contributory to pain  Rib fractures  CT chest: \"Fractures with callus formation at the anterior left fourth and fifth ribs \"  Questionable trace pneumothorax of the left upper lobe - this was discussed with trauma by the ED who felt this was negligible as fractures were old due to evidence of callus formation  Patient on room air on admission      VTE Pharmacologic Prophylaxis: VTE Score: 7 High Risk (Score >/= 5) - Pharmacological DVT Prophylaxis Ordered: heparin drip. Sequential Compression Devices Ordered.  Code Status: Level 1 - Full Code   Discussion with family: Asked for family to be called during the day.     Anticipated Length of Stay: Patient will be admitted on an inpatient basis with an anticipated length of stay of greater than 2 midnights secondary to pulmonary embolism requiring echo, pulmonology consult, and IV heparin.    History of Present Illness   Chief Complaint: \" Left flank pain\"    Dejah Parish is a 75 y.o. female with a PMH of CAD, systolic HF s/p ICD, hypothyroidism, HTN, and CKD who presents with daughters for evaluation of left flank pain.  Reports pain has been ongoing for couple of days.  Reported fever of 101 °F last night.  Recently has been having memory decline, which is being worked up by PCP.  Patient also having " auditory hallucinations of voices and music for the past few weeks.  Patient reportedly fell a few weeks ago with head strike on tile. Patient denies current flank pain on admission.  Denies any chest pain or dyspnea.  No chills.  No URI-like symptoms.  Denies dysuria or hematuria.  No nausea or vomiting.  No change in bowel habits.    History obtained from patient and ED documentation.    Review of Systems   Constitutional:  Positive for fever. Negative for chills.   HENT:  Negative for congestion.    Respiratory:  Negative for cough and shortness of breath.    Cardiovascular:  Negative for chest pain and leg swelling.   Gastrointestinal:  Negative for constipation, diarrhea, nausea and vomiting.   Genitourinary:  Positive for flank pain. Negative for dysuria and hematuria.   Musculoskeletal:  Negative for gait problem.   Neurological:  Negative for weakness and numbness.   Psychiatric/Behavioral:  Positive for confusion.    All other systems reviewed and are negative.      Historical Information   Past Medical History[1]  Past Surgical History[2]  Social History[3]  E-Cigarette/Vaping    E-Cigarette Use Never User      E-Cigarette/Vaping Substances     Family History[4]  Social History:  Marital Status:    Occupation: Retired  Patient Pre-hospital Living Situation: Home, Alone  Patient Pre-hospital Level of Mobility: walks  Patient Pre-hospital Diet Restrictions: None    Meds/Allergies   I have reviewed home medications with patient personally.  Prior to Admission medications    Medication Sig Start Date End Date Taking? Authorizing Provider   allopurinol (ZYLOPRIM) 100 mg tablet TAKE 1 TABLET BY MOUTH DAILY 7/1/25  Yes Leann Henson, DO   aspirin 81 MG tablet Take 81 mg by mouth in the morning.   Yes Historical Provider, MD   calcium carbonate (OS-MANINDER) 600 MG tablet Take 1,200 mg by mouth in the morning.   Yes Historical Provider, MD   candesartan (ATACAND) 32 MG tablet Take 1 tablet (32 mg total)  by mouth daily 2/11/25  Yes Leann Henson DO   cetirizine (ZyrTEC) 10 mg tablet Take 10 mg by mouth in the morning.   Yes Historical Provider, MD   Cholecalciferol (VITAMIN D3) 1000 units CAPS Take by mouth   Yes Historical Provider, MD   FLUoxetine (PROzac) 20 mg capsule TAKE 1 CAPSULE BY MOUTH DAILY 5/30/25  Yes Leann Henson DO   gabapentin (Neurontin) 100 mg capsule 1 tab PO q am x 3 days then 1 tab PO bid x 3 days then 1 tab PO tid and stay on that until re-evaluated 7/1/25  Yes Leann Henson DO   Januvia 100 MG tablet Take 1 tablet by mouth once daily 5/30/25  Yes Leann Henson DO   levothyroxine 25 mcg tablet Take 1 tablet (25 mcg total) by mouth daily 2/20/25  Yes Leann Henson DO   Magnesium 250 MG TABS Take by mouth in the morning.   Yes Historical Provider, MD   Melatonin 5 MG TABS Take 5 mg by mouth daily at bedtime   Yes Historical Provider, MD   methylprednisolone (MEDROL) 4 mg tablet Take 4 mg by mouth every other day Take it on odd day of the month   Yes Historical Provider, MD   metoprolol succinate (TOPROL-XL) 100 mg 24 hr tablet Take 1 tablet (100 mg total) by mouth daily 2/11/25  Yes Leann Henson DO   Minoxidil (ROGAINE WOMENS EX) Apply 1 mL topically   Yes Historical Provider, MD   omeprazole (PriLOSEC) 40 MG capsule Take 1 capsule (40 mg total) by mouth daily 2/21/25  Yes Leann Henson DO   potassium chloride (Klor-Con M20) 20 mEq tablet Take 1 tablet (20 mEq total) by mouth daily 4/3/25  Yes Malachi Mcelroy MD   pyridoxine (VITAMIN B6) 100 mg tablet Take 100 mg by mouth in the morning.   Yes Historical Provider, MD   simvastatin (ZOCOR) 40 mg tablet TAKE 1 TABLET BY MOUTH DAILY 7/1/25  Yes Leann Henson DO   torsemide (DEMADEX) 20 mg tablet Take 1 tablet by mouth twice daily 1/27/25  Yes Leann Henson DO   traMADol (ULTRAM) 50 mg tablet Take 1 tablet (50 mg total) by mouth every 8 (eight) hours as needed for moderate  pain 2/11/25  Yes Leann Henson DO   celecoxib (CeleBREX) 200 mg capsule Take 200 mg by mouth every other day Take it on even day of the month  7/4/25 Yes Historical Provider, MD   cephalexin (KEFLEX) 500 mg capsule Take 1 capsule (500 mg total) by mouth every 12 (twelve) hours for 7 days 7/2/25 7/9/25  Lili Akins PA-C   semaglutide (Rybelsus) 3 MG tablet Take 1 tablet (3 mg total) by mouth daily Take on an empty stomach with 4 oz water, and wait 30 minutes before eating 7/1/25 7/31/25  Leann Henson DO   semaglutide (Rybelsus) 7 MG tablet Take 1 tablet (7 mg total) by mouth daily Take on an empty stomach with 4 oz water, and wait 30 minutes before eating Do not start before July 31, 2025. 7/31/25 1/27/26  Leann Henson DO     Allergies   Allergen Reactions    Dust Mite Extract Sneezing       Objective :  Temp:  [97.1 °F (36.2 °C)-98 °F (36.7 °C)] 98 °F (36.7 °C)  HR:  [68-74] 71  BP: (107-146)/(54-88) 121/73  Resp:  [18-20] 18  SpO2:  [92 %-98 %] 92 %  O2 Device: None (Room air)    Physical Exam  Vitals and nursing note reviewed.   Constitutional:       General: She is not in acute distress.     Appearance: Normal appearance. She is not ill-appearing.      Comments: Pleasant and conversational   HENT:      Head: Normocephalic.      Nose: Nose normal.      Mouth/Throat:      Mouth: Mucous membranes are moist.     Eyes:      Conjunctiva/sclera: Conjunctivae normal.       Cardiovascular:      Rate and Rhythm: Normal rate and regular rhythm.   Pulmonary:      Effort: Pulmonary effort is normal. No respiratory distress.      Breath sounds: Normal breath sounds. No wheezing, rhonchi or rales.   Abdominal:      General: Abdomen is flat. There is no distension.      Palpations: Abdomen is soft.      Tenderness: There is no abdominal tenderness. There is no right CVA tenderness, left CVA tenderness, guarding or rebound.     Musculoskeletal:         General: Normal range of motion.       Cervical back: Normal range of motion.      Right lower leg: No edema.      Left lower leg: No edema.     Skin:     General: Skin is warm and dry.      Coloration: Skin is not pale.     Neurological:      General: No focal deficit present.      Mental Status: She is alert.      Comments: Oriented to self, place, month, year, president.  Slight confusion about situation that brought her to the hospital   Psychiatric:         Mood and Affect: Mood normal.         Thought Content: Thought content normal.          Lines/Drains:            Lab Results: I have reviewed the following results:  Results from last 7 days   Lab Units 07/03/25  2042   WBC Thousand/uL 10.95*   HEMOGLOBIN g/dL 13.8   HEMATOCRIT % 43.0   PLATELETS Thousands/uL 189   SEGS PCT % 76*   LYMPHO PCT % 9*   MONO PCT % 12   EOS PCT % 1     Results from last 7 days   Lab Units 07/03/25  2042   SODIUM mmol/L 137   POTASSIUM mmol/L 3.9   CHLORIDE mmol/L 99   CO2 mmol/L 29   BUN mg/dL 16   CREATININE mg/dL 0.98   ANION GAP mmol/L 9   CALCIUM mg/dL 10.2   ALBUMIN g/dL 3.4*   TOTAL BILIRUBIN mg/dL 0.80   ALK PHOS U/L 85   ALT U/L 15   AST U/L 16   GLUCOSE RANDOM mg/dL 146*     Results from last 7 days   Lab Units 07/03/25  2042   INR  1.02         Lab Results   Component Value Date    HGBA1C 9.0 (H) 06/21/2025    HGBA1C 9.8 (H) 03/03/2025    HGBA1C 7.3 (A) 12/03/2024     Results from last 7 days   Lab Units 07/03/25  2042   LACTIC ACID mmol/L 1.1   PROCALCITONIN ng/ml 0.29*       Imaging Results Review: I reviewed radiology reports from this admission including: CT chest, CT abdomen/pelvis, CT head, and CT C-spine.  Other Study Results Review: EKG was personally reviewed and my interpretation is: Paced rhythm at 71 bpm..    Administrative Statements       ** Please Note: This note has been constructed using a voice recognition system. **         [1]   Past Medical History:  Diagnosis Date    Abnormal finding on breast imaging     last assessed 11/30/17     Allergic rhinitis     Anxiety     BBB (bundle branch block)     left,last assesed 6/4/12    Bilateral fibrocystic breast changes     Bilateral sacroiliitis (HCC)     Carpal tunnel syndrome, unspecified upper limb     Cellulitis of left hand 05/17/2025    Chronic systolic congestive heart failure (HCC)     Coronary artery disease     Detrusor instability     Dilated cardiomyopathy (HCC)     Disorder of intervertebral disc of cervical spine     Fibroid     Gout     Hormone replacement therapy     Hx of blood clots     Hyperlipidemia     Hypertension     Hypokalemia     Obesity     Osteoarthritis     Papilloma of left breast     Psoriasis     psoriatic arthropathy    Psoriatic arthropathy (HCC)     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    RLS (restless legs syndrome)     Screening mammogram, encounter for 12/05/2019    Vitamin D deficiency    [2]   Past Surgical History:  Procedure Laterality Date    BLADDER SURGERY  1999    lift and repair    BREAST BIOPSY Left 05/23/2016     CORE BIOPSY-BENIGN    CARDIAC CATHETERIZATION      outcome:  successful    CARDIAC DEFIBRILLATOR PLACEMENT      CARDIAC ELECTROPHYSIOLOGY PROCEDURE N/A 08/22/2023    Procedure: Cardiac biv icd generator change;  Surgeon: Nahid Dickerson MD;  Location: [facility];  Service: Cardiology    CARPAL TUNNEL RELEASE Bilateral 1997    CATARACT EXTRACTION      COLONOSCOPY      CORONARY ANGIOPLASTY WITH STENT PLACEMENT      CYSTOSCOPY W/ URETEROSCOPY  2009    DE QUERVAIN'S RELEASE Left 2011    and trigger finger release    EYE SURGERY Left 1999    EYE SURGERY Left 11/13/2019    Cataract    EYE SURGERY Right 11/09/2019    Cataract    INSERT / REPLACE / REMOVE PACEMAKER      JOINT REPLACEMENT Right 09/2017    Knee    KNEE ARTHROSCOPY      therapeutic    NEUROPLASTY / TRANSPOSITION MEDIAN NERVE AT CARPAL TUNNEL      OOPHORECTOMY Bilateral     AGE 44    ORTHOPEDIC SURGERY      OR TENDON SHEATH INCISION Left 03/02/2017    Procedure: SMALL TRIGGER FINGER  RELEASE;  Surgeon: Camden Lancaster MD;  Location: [facility];  Service: Orthopedics    RECTAL SURGERY  2006    repair of rectal ulcer    SHOULDER ARTHROSCOPY Left 2006    TOTAL ABDOMINAL HYSTERECTOMY      AGE 44    TOTAL KNEE ARTHROPLASTY Left     TOTAL SHOULDER REPLACEMENT Left 2007    TRIGGER FINGER RELEASE Bilateral     right haand ,left hand     US GUIDED BREAST BIOPSY LEFT COMPLETE Left 2016   [3]   Social History  Tobacco Use    Smoking status: Former     Current packs/day: 0.00     Average packs/day: 1 pack/day for 15.0 years (15.0 ttl pk-yrs)     Types: Cigarettes     Start date:      Quit date:      Years since quittin.5    Smokeless tobacco: Never    Tobacco comments:     Smoked on and off for the last few years before I quit   Vaping Use    Vaping status: Never Used   Substance and Sexual Activity    Alcohol use: Never    Drug use: Never    Sexual activity: Not Currently     Birth control/protection: Ring, None     Comment: Took birth control pills from  until    [4]   Family History  Problem Relation Name Age of Onset    Hypertension Mother Mary     Alzheimer's disease Mother Mary     Cancer Father Garrett 71        bladder    Prostate cancer Father Garrett 71    Breast cancer Daughter TOÑO VILLALPANDO 52    No Known Problems Daughter DILMA     No Known Problems Maternal Grandmother      No Known Problems Maternal Grandfather      Breast cancer Paternal Grandmother Elisa Darling         age dx unk    No Known Problems Paternal Grandfather      Cancer Brother Zack         pt shared some type of blood cancer    No Known Problems Son      Stomach cancer Paternal Aunt  65    No Known Problems Paternal Aunt

## 2025-07-04 NOTE — ASSESSMENT & PLAN NOTE
"CT PE study: \"There is a large pulmonary embolus at the right main pulmonary artery extending into segmental branches of the lower lobe.   No evidence for right heart strain. The pulmonary artery is dilated measuring up to 41 mm which may reflect component of pulmonary hypertension. There our somewhat triangular somewhat confluent and groundglass opacities at the right lower lobe. Additionally there is a somewhat triangular appearing density at the lingula which could reflect atelectasis. \"  Patient not requiring supplemental oxygen at time of admission  Suspect lung abnormalities are likely pulmonary infarcts  Will obtain PERT prior to echo due to large size of PE  Pulmonology consulted  "

## 2025-07-04 NOTE — ASSESSMENT & PLAN NOTE
S/p ICD in March 2005 due to history of systolic HF  Last device check 5/14/2025 at shows normal device function and OptiVol within normal limits

## 2025-07-05 PROBLEM — N17.9 AKI (ACUTE KIDNEY INJURY) (HCC): Status: ACTIVE | Noted: 2025-07-05

## 2025-07-05 LAB
ANION GAP SERPL CALCULATED.3IONS-SCNC: 9 MMOL/L (ref 4–13)
APTT PPP: 65 SECONDS (ref 23–34)
APTT PPP: 93 SECONDS (ref 23–34)
BACTERIA UR CULT: NORMAL
BASOPHILS # BLD AUTO: 0.04 THOUSANDS/ÂΜL (ref 0–0.1)
BASOPHILS NFR BLD AUTO: 1 % (ref 0–1)
BUN SERPL-MCNC: 18 MG/DL (ref 5–25)
CALCIUM SERPL-MCNC: 10 MG/DL (ref 8.4–10.2)
CHLORIDE SERPL-SCNC: 102 MMOL/L (ref 96–108)
CO2 SERPL-SCNC: 27 MMOL/L (ref 21–32)
CREAT SERPL-MCNC: 1.23 MG/DL (ref 0.6–1.3)
EOSINOPHIL # BLD AUTO: 0.07 THOUSAND/ÂΜL (ref 0–0.61)
EOSINOPHIL NFR BLD AUTO: 1 % (ref 0–6)
ERYTHROCYTE [DISTWIDTH] IN BLOOD BY AUTOMATED COUNT: 14.6 % (ref 11.6–15.1)
GFR SERPL CREATININE-BSD FRML MDRD: 42 ML/MIN/1.73SQ M
GLUCOSE SERPL-MCNC: 109 MG/DL (ref 65–140)
GLUCOSE SERPL-MCNC: 157 MG/DL (ref 65–140)
GLUCOSE SERPL-MCNC: 160 MG/DL (ref 65–140)
GLUCOSE SERPL-MCNC: 173 MG/DL (ref 65–140)
GLUCOSE SERPL-MCNC: 96 MG/DL (ref 65–140)
HCT VFR BLD AUTO: 41.6 % (ref 34.8–46.1)
HGB BLD-MCNC: 13.8 G/DL (ref 11.5–15.4)
IMM GRANULOCYTES # BLD AUTO: 0.12 THOUSAND/UL (ref 0–0.2)
IMM GRANULOCYTES NFR BLD AUTO: 2 % (ref 0–2)
LYMPHOCYTES # BLD AUTO: 1.18 THOUSANDS/ÂΜL (ref 0.6–4.47)
LYMPHOCYTES NFR BLD AUTO: 16 % (ref 14–44)
MAGNESIUM SERPL-MCNC: 2.1 MG/DL (ref 1.9–2.7)
MCH RBC QN AUTO: 30.5 PG (ref 26.8–34.3)
MCHC RBC AUTO-ENTMCNC: 33.2 G/DL (ref 31.4–37.4)
MCV RBC AUTO: 92 FL (ref 82–98)
MONOCYTES # BLD AUTO: 0.87 THOUSAND/ÂΜL (ref 0.17–1.22)
MONOCYTES NFR BLD AUTO: 12 % (ref 4–12)
NEUTROPHILS # BLD AUTO: 4.91 THOUSANDS/ÂΜL (ref 1.85–7.62)
NEUTS SEG NFR BLD AUTO: 68 % (ref 43–75)
NRBC BLD AUTO-RTO: 0 /100 WBCS
PLATELET # BLD AUTO: 201 THOUSANDS/UL (ref 149–390)
PMV BLD AUTO: 9.4 FL (ref 8.9–12.7)
POTASSIUM SERPL-SCNC: 3.4 MMOL/L (ref 3.5–5.3)
RBC # BLD AUTO: 4.53 MILLION/UL (ref 3.81–5.12)
SODIUM SERPL-SCNC: 138 MMOL/L (ref 135–147)
WBC # BLD AUTO: 7.19 THOUSAND/UL (ref 4.31–10.16)

## 2025-07-05 PROCEDURE — 85730 THROMBOPLASTIN TIME PARTIAL: CPT | Performed by: FAMILY MEDICINE

## 2025-07-05 PROCEDURE — 97163 PT EVAL HIGH COMPLEX 45 MIN: CPT

## 2025-07-05 PROCEDURE — 82948 REAGENT STRIP/BLOOD GLUCOSE: CPT

## 2025-07-05 PROCEDURE — 99232 SBSQ HOSP IP/OBS MODERATE 35: CPT | Performed by: FAMILY MEDICINE

## 2025-07-05 PROCEDURE — 99232 SBSQ HOSP IP/OBS MODERATE 35: CPT | Performed by: INTERNAL MEDICINE

## 2025-07-05 PROCEDURE — 83735 ASSAY OF MAGNESIUM: CPT | Performed by: FAMILY MEDICINE

## 2025-07-05 PROCEDURE — 80048 BASIC METABOLIC PNL TOTAL CA: CPT | Performed by: FAMILY MEDICINE

## 2025-07-05 PROCEDURE — 85025 COMPLETE CBC W/AUTO DIFF WBC: CPT | Performed by: FAMILY MEDICINE

## 2025-07-05 PROCEDURE — 94760 N-INVAS EAR/PLS OXIMETRY 1: CPT

## 2025-07-05 RX ORDER — HEPARIN SODIUM 10000 [USP'U]/100ML
3-30 INJECTION, SOLUTION INTRAVENOUS
Status: ACTIVE | OUTPATIENT
Start: 2025-07-05 | End: 2025-07-05

## 2025-07-05 RX ORDER — SODIUM CHLORIDE 9 MG/ML
50 INJECTION, SOLUTION INTRAVENOUS CONTINUOUS
Status: DISPENSED | OUTPATIENT
Start: 2025-07-05 | End: 2025-07-05

## 2025-07-05 RX ADMIN — FLUOXETINE HYDROCHLORIDE 20 MG: 20 CAPSULE ORAL at 08:38

## 2025-07-05 RX ADMIN — LORATADINE 10 MG: 10 TABLET ORAL at 08:39

## 2025-07-05 RX ADMIN — Medication 400 MG: at 08:38

## 2025-07-05 RX ADMIN — PANTOPRAZOLE SODIUM 40 MG: 40 TABLET, DELAYED RELEASE ORAL at 05:30

## 2025-07-05 RX ADMIN — CEFTRIAXONE 1000 MG: 1 INJECTION, SOLUTION INTRAVENOUS at 05:24

## 2025-07-05 RX ADMIN — PRAVASTATIN SODIUM 80 MG: 80 TABLET ORAL at 17:30

## 2025-07-05 RX ADMIN — METHYLPREDNISOLONE 4 MG: 4 TABLET ORAL at 08:39

## 2025-07-05 RX ADMIN — INSULIN LISPRO 1 UNITS: 100 INJECTION, SOLUTION INTRAVENOUS; SUBCUTANEOUS at 17:30

## 2025-07-05 RX ADMIN — GABAPENTIN 100 MG: 100 CAPSULE ORAL at 08:38

## 2025-07-05 RX ADMIN — ALLOPURINOL 100 MG: 100 TABLET ORAL at 08:39

## 2025-07-05 RX ADMIN — METOPROLOL SUCCINATE 100 MG: 50 TABLET, EXTENDED RELEASE ORAL at 08:39

## 2025-07-05 RX ADMIN — HEPARIN SODIUM 12 UNITS/KG/HR: 10000 INJECTION, SOLUTION INTRAVENOUS at 05:28

## 2025-07-05 RX ADMIN — LEVOTHYROXINE SODIUM 25 MCG: 0.03 TABLET ORAL at 05:29

## 2025-07-05 RX ADMIN — SODIUM CHLORIDE 50 ML/HR: 0.9 INJECTION, SOLUTION INTRAVENOUS at 11:52

## 2025-07-05 RX ADMIN — Medication 6 MG: at 21:24

## 2025-07-05 RX ADMIN — TORSEMIDE 40 MG: 20 TABLET ORAL at 08:39

## 2025-07-05 RX ADMIN — ACETAMINOPHEN 650 MG: 325 TABLET, FILM COATED ORAL at 17:30

## 2025-07-05 RX ADMIN — ACETAMINOPHEN 650 MG: 325 TABLET, FILM COATED ORAL at 05:29

## 2025-07-05 RX ADMIN — POTASSIUM CHLORIDE 20 MEQ: 1500 TABLET, EXTENDED RELEASE ORAL at 17:30

## 2025-07-05 RX ADMIN — POTASSIUM CHLORIDE 20 MEQ: 1500 TABLET, EXTENDED RELEASE ORAL at 05:30

## 2025-07-05 RX ADMIN — INSULIN LISPRO 1 UNITS: 100 INJECTION, SOLUTION INTRAVENOUS; SUBCUTANEOUS at 21:19

## 2025-07-05 RX ADMIN — APIXABAN 10 MG: 5 TABLET, FILM COATED ORAL at 17:30

## 2025-07-05 RX ADMIN — ACETAMINOPHEN 650 MG: 325 TABLET, FILM COATED ORAL at 11:51

## 2025-07-05 NOTE — ASSESSMENT & PLAN NOTE
Lab Results   Component Value Date    HGBA1C 9.0 (H) 06/21/2025       Recent Labs     07/04/25  1642 07/04/25  2132 07/05/25  0725 07/05/25  1151   POCGLU 136 163* 109 157*       Blood Sugar Average: Last 72 hrs:  (P) [id number] regimen: Januvia 100 mg daily, PCP just prescribed  semaglutide, which patient has not yet started  Hold home medications and placed on SSI 3 times daily and at bedtime  Trend glucose, may need standing insulin as outpatient A1c was 9.0

## 2025-07-05 NOTE — ASSESSMENT & PLAN NOTE
Creatinine 1.23 from 0.88, probably in the settings of recent contrast use and diuretics  Hold torsemide and losartan, gentle hydration  Recheck BMP in the morning

## 2025-07-05 NOTE — PLAN OF CARE
Problem: Potential for Falls  Goal: Patient will remain free of falls  Description: INTERVENTIONS:  - Educate patient/family on patient safety including physical limitations  - Instruct patient to call for assistance with activity   - Consider consulting OT/PT to assist with strengthening/mobility based on AM PAC & JH-HLM score  - Consult OT/PT to assist with strengthening/mobility   - Keep Call bell within reach  - Keep bed low and locked with side rails adjusted as appropriate  - Keep care items and personal belongings within reach  - Initiate and maintain comfort rounds  - Make Fall Risk Sign visible to staff  - Offer Toileting every 2 Hours, in advance of need  - Initiate/Maintain bed alarm  - Obtain necessary fall risk management equipment: socks  - Apply yellow socks and bracelet for high fall risk patients  - Consider moving patient to room near nurses station  Outcome: Progressing     Problem: PAIN - ADULT  Goal: Verbalizes/displays adequate comfort level or baseline comfort level  Description: Interventions:  - Encourage patient to monitor pain and request assistance  - Assess pain using appropriate pain scale  - Administer analgesics as ordered based on type and severity of pain and evaluate response  - Implement non-pharmacological measures as appropriate and evaluate response  - Consider cultural and social influences on pain and pain management  - Notify physician/advanced practitioner if interventions unsuccessful or patient reports new pain  - Educate patient/family on pain management process including their role and importance of  reporting pain   - Provide non-pharmacologic/complimentary pain relief interventions  Outcome: Progressing     Problem: INFECTION - ADULT  Goal: Absence or prevention of progression during hospitalization  Description: INTERVENTIONS:  - Assess and monitor for signs and symptoms of infection  - Monitor lab/diagnostic results  - Monitor all insertion sites, i.e. indwelling  lines, tubes, and drains  - Monitor endotracheal if appropriate and nasal secretions for changes in amount and color  - [place] appropriate cooling/warming therapies per order  - Administer medications as ordered  - Instruct and encourage patient and family to use good hand hygiene technique  - Identify and instruct in appropriate isolation precautions for identified infection/condition  Outcome: Progressing     Problem: SAFETY ADULT  Goal: Patient will remain free of falls  Description: INTERVENTIONS:  - Educate patient/family on patient safety including physical limitations  - Instruct patient to call for assistance with activity   - Consider consulting OT/PT to assist with strengthening/mobility based on AM PAC & JH-HLM score  - Consult OT/PT to assist with strengthening/mobility   - Keep Call bell within reach  - Keep bed low and locked with side rails adjusted as appropriate  - Keep care items and personal belongings within reach  - Initiate and maintain comfort rounds  - Make Fall Risk Sign visible to staff  - Offer Toileting every 2 Hours, in advance of need  - Initiate/Maintain bed alarm  - Obtain necessary fall risk management equipment: socks  - Apply yellow socks and bracelet for high fall risk patients  - Consider moving patient to room near nurses station  Outcome: Progressing     Problem: SAFETY ADULT  Goal: Maintain or return to baseline ADL function  Description: INTERVENTIONS:  -  Assess patient's ability to carry out ADLs; assess patient's baseline for ADL function and identify physical deficits which impact ability to perform ADLs (bathing, care of mouth/teeth, toileting, grooming, dressing, etc.)  - Assess/evaluate cause of self-care deficits   - Assess range of motion  - Assess patient's mobility; develop plan if impaired  - Assess patient's need for assistive devices and provide as appropriate  - Encourage maximum independence but intervene and supervise when necessary  - Involve family in  performance of ADLs  - Assess for home care needs following discharge   - Consider OT consult to assist with ADL evaluation and planning for discharge  - Provide patient education as appropriate  - Monitor functional capacity and physical performance, use of AM PAC & JH-HLM   - Monitor gait, balance and fatigue with ambulation    Outcome: Progressing     Problem: DISCHARGE PLANNING  Goal: Discharge to home or other facility with appropriate resources  Description: INTERVENTIONS:  - Identify barriers to discharge w/patient and caregiver  - Arrange for needed discharge resources and transportation as appropriate  - Identify discharge learning needs (meds, wound care, etc.)  - Arrange for interpretive services to assist at discharge as needed  - Refer to Case Management Department for coordinating discharge planning if the patient needs post-hospital services based on physician/advanced practitioner order or complex needs related to functional status, cognitive ability, or social support system  Outcome: Progressing     Problem: Knowledge Deficit  Goal: Patient/family/caregiver demonstrates understanding of disease process, treatment plan, medications, and discharge instructions  Description: Complete learning assessment and assess knowledge base.  Interventions:  - Provide teaching at level of understanding  - Provide teaching via preferred learning methods  Outcome: Progressing

## 2025-07-05 NOTE — ASSESSMENT & PLAN NOTE
"CT PE study: \"There is a large pulmonary embolus at the right main pulmonary artery extending into segmental branches of the lower lobe.   No evidence for right heart strain. The pulmonary artery is dilated measuring up to 41 mm which may reflect component of pulmonary hypertension. There our somewhat triangular somewhat confluent and groundglass opacities at the right lower lobe. Additionally there is a somewhat triangular appearing density at the lingula which could reflect atelectasis. \"  Patient not requiring supplemental oxygen at time of admission  Suspect lung abnormalities are likely pulmonary infarcts  Pulmonology consulted, recommendations appreciated  transition to Eliquis on 7/5, price checked, first month is going to be $105 including starter pack, following months would be $85, affordable for family  Patient seen by PT/OT, recommending level 2 rehab, family in discussion  Order placed for venous duplex test for DVT to help guiding the duration of the therapy  "

## 2025-07-05 NOTE — PHYSICAL THERAPY NOTE
Physical Therapy Evaluation:    2 forms of pt ID verified:name,birthdate and pt ID marlene    Patient's Name: Dejah Parish    Admitting Diagnosis  Pulmonary infarction (HCC) [I26.99]  Back pain [M54.9]  Rib fracture [S22.39XA]  UTI (urinary tract infection) [N39.0]  Pulmonary embolism (HCC) [I26.99]  Flank pain [R10.9]    Problem List  Problem List[1]    Past Medical History  Past Medical History[2]    Past Surgical History  Past Surgical History[3]       07/05/25 1122   PT Last Visit   PT Visit Date 07/05/25   Note Type   Note type Evaluation   Pain Assessment   Pain Assessment Tool 0-10   Pain Score No Pain   Restrictions/Precautions   Other Precautions Impulsive;Chair Alarm;Bed Alarm;Telemetry;Multiple lines;Fall Risk   Home Living   Type of Home House   Home Layout Two level;Stairs to enter without rails;Bed/bath upstairs;1/2 bath on main level  (1 JASVIR no HR)   Home Equipment Other (Comment)  (no DME use PTA)   Additional Comments Pt lives alone in [facility], (+)fall history recently, (+)JASVIR, reports being completely (I) with IADLs and ADLs PTA. Family live in [facility]   Prior Function   Level of [place] Independent with ADLs;Independent with functional mobility;Independent with IADLS  (per pt PTA)   Lives With Alone   Receives Help From Family  (as needed, family live in [place] and [place])   IADLs Independent with driving;Independent with meal prep;Independent with medication management  (per pt PTA)   Falls in the last 6 months 1 to 4  (x1 fall (+)hit head on tile floor per pts medical chart;pt reports being able to get up from floor (I)ly following the fall)   Vocational Retired   General   Additional Pertinent History R flank pain, (+)PE   Family/Caregiver Present Yes  (daughter,granddaughters and ALISHA)   Cognition   Overall Cognitive Status Impaired  (slow to respond to social questions, flat affect)   Arousal/Participation Cooperative   Orientation Level Oriented X4   Following Commands  "Follows one step commands with increased time or repetition   Subjective   Subjective Pt sitting on BSC upon arrival; pt willing and agreeable to work with PT and to participate in therapy intervention; \"I can walk, let me try\".   RLE Assessment   RLE Assessment   (at least 4/5 grossly throughout)   LLE Assessment   LLE Assessment   (at least 4/5 grossly throughout)   Coordination   Movements are Fluid and Coordinated 0   Coordination and Movement Description ataxic and unsteady gait and movement pattern, slow vinny, forward flexed posture, shuffling gait pattern   Sensation WFL   Light Touch   RLE Light Touch Grossly intact   LLE Light Touch Grossly intact   Bed Mobility   Supine to Sit Unable to assess  (pt in static sit pre and post mobility)   Transfers   Sit to Stand 4  Minimal assistance   Additional items Assist x 1;Armrests;Increased time required;Verbal cues   Stand to Sit 4  Minimal assistance   Additional items Assist x 1;Armrests;Increased time required;Verbal cues   Toilet transfer 4  Minimal assistance   Additional items Assist x 1;Increased time required;Commode  (initial sit->stand transfer (+)posterior lean)   Ambulation/Elevation   Gait pattern Narrow JEAN-PAUL;Forward Flexion;Shuffling;Inconsistent vinny;Foward flexed;Short stride;Ataxia   Gait Assistance 5  Supervision   Additional items Assist x 1;Verbal cues   Assistive Device Rolling walker   Distance 90 feet with use of RW on hardwood mohsen;SOB with minimal exertion   Balance   Static Sitting Fair  (chair alarm intact prior to leaving pts room)   Dynamic Sitting Poor +   Static Standing Poor +   Dynamic Standing Poor +   Ambulatory Poor +   Endurance Deficit   Endurance Deficit Yes   Endurance Deficit Description SOB with minimal exertion   Activity Tolerance   Activity Tolerance Patient limited by fatigue  (fair;pt reports not at functional mobility baseline)   Medical Staff Made Aware MD and nursing, CM per [facility] secure chat   Nurse Made " Aware yes   Assessment   Prognosis Fair   Problem List Decreased strength;Decreased endurance;Impaired balance;Decreased mobility;Decreased cognition;Impaired judgement;Decreased safety awareness;Decreased skin integrity   Assessment Pt is 75 y.o. female seen for PT evaluation s/p admit to  [facility]  on 7/3/2025 w/ Pulmonary embolism (HCC). PT consulted to assess pt's functional mobility and d/c needs. Order placed for PT eval and tx, w/ up and OOB as tolerated and PT  order. Comorbidities affecting pt's physical performance at time of assessment include: fall risk, dec cognition, history of recent falls, R flank pain. PTA, pt was independent w/ all functional mobility w/ out DME, ambulates community distances and elevations, ambulates unrestricted distances and all terrain, ambulates household distances, has 1 JASVIR, lives alone in 2 level home, and retired. Personal factors affecting pt at time of IE include: lives in 2 story house, stairs to enter home, inability to navigate community distances, inability to navigate level surfaces w/o external assistance, unable to perform dynamic tasks in community, decreased cognition, limited home support, positive fall history, inability to perform IADLs, and inability to perform ADLs. Please find objective findings from PT assessment regarding body systems outlined above with impairments and limitations including weakness, impaired balance, decreased endurance, gait deviations, decreased activity tolerance, decreased functional mobility tolerance, decreased safety awareness, impaired judgement, fall risk, SOB upon exertion, decreased skin integrity, and decreased cognition. The following objective measures performed on IE also reveal limitations: AM-PAC 6-Clicks: 17/24. Pt's clinical presentation is currently unstable/unpredictable seen in pt's presentation of ataxic and unsteady gait and movement pattern, use of new DME (RW) for mobility,dec cognition, dec BLE  strength,multiple lines and masimo monitoring. Pt to benefit from continued PT tx to address deficits as defined above and maximize level of functional independent mobility and consistency. From PT/mobility standpoint, recommendation at time of d/c would be post acute rehabilitation services pending progress in order to facilitate return to PLOF. Currently recommend level 2 at this time. PENDING available A at home upon DC from family, pt may progress to level 3. Pt needs RW to be ordered prior to DC   Barriers to Discharge Inaccessible home environment;Decreased caregiver support  (2 SH, (+)JASVIR,lives alone with [place])   Goals   Patient Goals to be able to go home   STG Expiration Date 07/15/25   Short Term Goal #1 in 7-10 days:  (1) Pt will be able to ambulate greater than 150 feet  with use of RW on various surfaces needing S level of A in order to A pt to return to PLOF, (2) activity tolerance:45 mins/45mins, (3) pt will be able to perform sit to stand transfers needing S level of A to and from various surfaces consistently in order to return to PLOF, (4) pt will be able to perform BM needing S level of A to A pt to return to PLOF, (5) (I) with BLE therapeutic ex HEP in various positions to A pt to inc balance,strength,mobility,endurance and to A to dec pain, (6) inc balance 1/2 grade in order to dec fall risk, (7) pt will be able to go up and down 1 FF of steps and single curb step needing S level of A in order to navigate JASVIR and 2 SH as able and as needed prior to D/C, (8) cont to provide pt and pt family education for safe D/C planning, (9) inc BLE strength 1/2 to 1 full grade in order to A pt to inc balance,strength,mobility,endurance and to A to dec pain   PT Treatment Day 0   Plan   Treatment/Interventions Functional transfer training;LE strengthening/ROM;Elevations;Therapeutic exercise;Endurance training;Patient/family training;Equipment eval/education;Bed mobility;Gait  training;Spoke to MD;Spoke to nursing;Continued evaluation;Spoke to case management;Family   PT Frequency 3-5x/wk   Discharge Recommendation   Rehab Resource Intensity Level, PT II (Moderate Resource Intensity)  (may propress to level 3 PENDING available A at home upon DC)   Equipment Recommended Walker  (pt needs RW upon DC)   Walker Package Recommended Wheeled walker   Change/add to Walker Package? No   AM-PAC Basic Mobility Inpatient   Turning in Flat Bed Without Bedrails 3   Lying on Back to Sitting on Edge of Flat Bed Without Bedrails 3   Moving Bed to Chair 3   Standing Up From Chair Using Arms 3   Walk in Room 3   Climb 3-5 Stairs With Railing 2   Basic Mobility Inpatient Raw Score 17   Basic Mobility Standardized Score 39.67   [facility] Highest Level Of Mobility   -HLM Goal 5: Stand one or more mins   -HLM Achieved 7: Walk 25 feet or more         @Dhara Back, PT, DPT@        [1]   Patient Active Problem List  Diagnosis    Trigger little finger of left hand    Allergic rhinitis    Arthralgia of knee, left    Arthralgia of knee, right    Coronary artery disease    Benign positional vertigo, left    Bilateral fibrocystic breast changes    Chronic systolic congestive heart failure (HCC)    Stage 3b chronic kidney disease (HCC)    Biventricular ICD (implantable cardioverter-defibrillator) in place    DDD (degenerative disc disease), cervical    Gout    Mixed hyperlipidemia    Hypokalemia    Acquired hypothyroidism    Obstructive sleep apnea    Osteoarthritis    Overactive bladder    Psoriasis    Type 2 diabetes mellitus with stage 3 chronic kidney disease, without long-term current use of insulin (HCC)    Vitamin D deficiency    Lumbar spondylosis    Chronic left-sided low back pain without sciatica    Hypercalcemia    Leukocytosis    Dense breast tissue    Family history of breast cancer    Gastroesophageal reflux disease with esophagitis    Abnormal computerized axial tomography of abdomen     Hyperparathyroidism (HCC)    PVC's (premature ventricular contractions)    Chronic left shoulder pain    Dilated cardiomyopathy (HCC)    Pulmonary emphysema, unspecified emphysema type (HCC)    Continuous opioid dependence (HCC)    Tremor of both hands    Hypertensive heart and chronic kidney disease with heart failure and stage 1 through stage 4 chronic kidney disease, or chronic kidney disease (HCC)    Current moderate episode of major depressive disorder without prior episode (HCC)    Ischial bursitis of left side    Pain of left hip    Type 2 diabetes mellitus with hyperglycemia, without long-term current use of insulin (HCC)    Hypertension    Renal lesion    Pulmonary embolism (HCC)    Abnormal urinalysis    Memory changes    Rib fractures   [2]   Past Medical History:  Diagnosis Date    Abnormal finding on breast imaging     last assessed 11/30/17    Allergic rhinitis     Anxiety     BBB (bundle branch block)     left,last assesed 6/4/12    Bilateral fibrocystic breast changes     Bilateral sacroiliitis (HCC)     Carpal tunnel syndrome, unspecified upper limb     Cellulitis of left hand 05/17/2025    Chronic systolic congestive heart failure (HCC)     Coronary artery disease     Detrusor instability     Dilated cardiomyopathy (HCC)     Disorder of intervertebral disc of cervical spine     Fibroid     Gout     Hormone replacement therapy     Hx of blood clots     Hyperlipidemia     Hypertension     Hypokalemia     Obesity     Osteoarthritis     Papilloma of left breast     Psoriasis     psoriatic arthropathy    Psoriatic arthropathy (HCC)     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    RLS (restless legs syndrome)     Screening mammogram, encounter for 12/05/2019    Vitamin D deficiency    [3]   Past Surgical History:  Procedure Laterality Date    BLADDER SURGERY  1999    lift and repair    BREAST BIOPSY Left 05/23/2016     CORE BIOPSY-BENIGN    CARDIAC CATHETERIZATION      outcome:  successful    CARDIAC DEFIBRILLATOR  PLACEMENT      CARDIAC ELECTROPHYSIOLOGY PROCEDURE N/A 08/22/2023    Procedure: Cardiac biv icd generator change;  Surgeon: Nahid Dickerson MD;  Location: [facility];  Service: Cardiology    CARPAL TUNNEL RELEASE Bilateral 1997    CATARACT EXTRACTION      COLONOSCOPY      CORONARY ANGIOPLASTY WITH STENT PLACEMENT      CYSTOSCOPY W/ URETEROSCOPY  2009    DE QUERVAIN'S RELEASE Left 2011    and trigger finger release    EYE SURGERY Left 1999    EYE SURGERY Left 11/13/2019    Cataract    EYE SURGERY Right 11/09/2019    Cataract    INSERT / REPLACE / REMOVE PACEMAKER      JOINT REPLACEMENT Right 09/2017    Knee    KNEE ARTHROSCOPY      therapeutic    NEUROPLASTY / TRANSPOSITION MEDIAN NERVE AT CARPAL TUNNEL      OOPHORECTOMY Bilateral     AGE 44    ORTHOPEDIC SURGERY      CT TENDON SHEATH INCISION Left 03/02/2017    Procedure: SMALL TRIGGER FINGER RELEASE;  Surgeon: Camden Lancaster MD;  Location: [facility];  Service: Orthopedics    RECTAL SURGERY  2006    repair of rectal ulcer    SHOULDER ARTHROSCOPY Left 2006    TOTAL ABDOMINAL HYSTERECTOMY      AGE 44    TOTAL KNEE ARTHROPLASTY Left     TOTAL SHOULDER REPLACEMENT Left 2007    TRIGGER FINGER RELEASE Bilateral 2011    right haand 201,2015,left hand 2012,2015    US GUIDED BREAST BIOPSY LEFT COMPLETE Left 05/23/2016

## 2025-07-05 NOTE — PLAN OF CARE
Problem: Potential for Falls  Goal: Patient will remain free of falls  Description: INTERVENTIONS:  - Educate patient/family on patient safety including physical limitations  - Instruct patient to call for assistance with activity   - Consider consulting OT/PT to assist with strengthening/mobility based on AM PAC & JH-HLM score  - Consult OT/PT to assist with strengthening/mobility   - Keep Call bell within reach  - Keep bed low and locked with side rails adjusted as appropriate  - Keep care items and personal belongings within reach  - Initiate and maintain comfort rounds  - Make Fall Risk Sign visible to staff  - Offer Toileting every 2 Hours, in advance of need  - Initiate/Maintain bed alarm  - Obtain necessary fall risk management equipment: socks   - Apply yellow socks and bracelet for high fall risk patients  - Consider moving patient to room near nurses station  Outcome: Progressing

## 2025-07-05 NOTE — PROGRESS NOTES
Progress Note - Pulmonology   Name: Dejah Parish 75 y.o. female I MRN: [id number]  Unit/Bed#: [facility] I Date of Admission: 7/3/2025   Date of Service: 7/5/2025 I Hospital Day: 1     Assessment & Plan  Pulmonary embolism (HCC)  Stable for transition to oral anticoagulation  Recommend bilateral LE venous duplex but this can be done outpatient if patient is ready for discharge prior to Monday, can repeat prior to discontinuing anticoagulation in 3-6 months to assist with decision-making to stop AC   Will request 3 mo OP follow appt to discuss risks/benefits of discontinuation  No further inpatient recommendations, will sign off, please call with questions  Obstructive sleep apnea  Continue BIPAP qHS at usual home settings.    24 Hour Events : None.  Subjective : Ambulating with PT, no dizziness/lightheadedness, vitals stable.  No concerns.      Objective :  Temp:  [97.4 °F (36.3 °C)-98 °F (36.7 °C)] 97.4 °F (36.3 °C)  HR:  [73-87] 87  BP: (121-140)/(65-82) 132/72  Resp:  [16-18] 18  SpO2:  [93 %-97 %] 97 %  O2 Device: None (Room air)    Physical Exam  Vitals reviewed.   Constitutional:       General: She is not in acute distress.     Appearance: Normal appearance. She is well-developed. She is not ill-appearing.   HENT:      Head: Normocephalic and atraumatic.     Eyes:      General: No scleral icterus.     Conjunctiva/sclera: Conjunctivae normal.     Neck:      Vascular: No JVD.     Cardiovascular:      Rate and Rhythm: Normal rate and regular rhythm.      Heart sounds: Normal heart sounds. No murmur heard.     No friction rub. No gallop.   Pulmonary:      Effort: Pulmonary effort is normal. No respiratory distress.      Breath sounds: Normal breath sounds. No wheezing or rales.     Musculoskeletal:      Cervical back: Neck supple.      Right lower leg: No edema.      Left lower leg: No edema.     Skin:     General: Skin is warm and dry.      Findings: No rash.     Neurological:      General: No focal deficit  present.      Mental Status: She is alert and oriented to person, place, and time. Mental status is at baseline.     Psychiatric:         Mood and Affect: Mood normal.         Behavior: Behavior normal.           Lab Results: I have reviewed the following results:   .     07/05/25  0535   WBC 7.19   HGB 13.8   HCT 41.6      SODIUM 138   K 3.4*      CO2 27   BUN 18   CREATININE 1.23   GLUC 96   MG 2.1   PTT 93*     ABG: No new results in last 24 hours.    Imaging Results Review: I personally reviewed the following image studies in PACS and associated radiology reports: CT chest. My interpretation of the radiology images/reports is: Agree.

## 2025-07-05 NOTE — PLAN OF CARE
Problem: PHYSICAL THERAPY ADULT  Goal: Performs mobility at highest level of function for planned discharge setting.  See evaluation for individualized goals.  Description: Treatment/Interventions: Functional transfer training, LE strengthening/ROM, Elevations, Therapeutic exercise, Endurance training, Patient/family training, Equipment eval/education, Bed mobility, Gait training, Spoke to MD, Spoke to nursing, Continued evaluation, Spoke to case management, Family  Equipment Recommended: Walker (pt needs RW upon DC)       See flowsheet documentation for full assessment, interventions and recommendations.  Note: Prognosis: Fair  Problem List: Decreased strength, Decreased endurance, Impaired balance, Decreased mobility, Decreased cognition, Impaired judgement, Decreased safety awareness, Decreased skin integrity  Assessment: Pt is 75 y.o. female seen for PT evaluation s/p admit to  [facility]  on 7/3/2025 w/ Pulmonary embolism (HCC). PT consulted to assess pt's functional mobility and d/c needs. Order placed for PT eval and tx, w/ up and OOB as tolerated and PT  order. Comorbidities affecting pt's physical performance at time of assessment include: fall risk, dec cognition, history of recent falls, R flank pain. PTA, pt was independent w/ all functional mobility w/ out DME, ambulates community distances and elevations, ambulates unrestricted distances and all terrain, ambulates household distances, has 1 JASVIR, lives alone in 2 level home, and retired. Personal factors affecting pt at time of IE include: lives in 2 story house, stairs to enter home, inability to navigate community distances, inability to navigate level surfaces w/o external assistance, unable to perform dynamic tasks in community, decreased cognition, limited home support, positive fall history, inability to perform IADLs, and inability to perform ADLs. Please find objective findings from PT assessment regarding body systems outlined above with  impairments and limitations including weakness, impaired balance, decreased endurance, gait deviations, decreased activity tolerance, decreased functional mobility tolerance, decreased safety awareness, impaired judgement, fall risk, SOB upon exertion, decreased skin integrity, and decreased cognition. The following objective measures performed on IE also reveal limitations: AM-PAC 6-Clicks: 17/24. Pt's clinical presentation is currently unstable/unpredictable seen in pt's presentation of ataxic and unsteady gait and movement pattern, use of new DME (RW) for mobility,dec cognition, dec BLE strength,multiple lines and masimo monitoring. Pt to benefit from continued PT tx to address deficits as defined above and maximize level of functional independent mobility and consistency. From PT/mobility standpoint, recommendation at time of d/c would be post acute rehabilitation services pending progress in order to facilitate return to PLOF. Currently recommend level 2 at this time. PENDING available A at home upon DC from family, pt may progress to level 3. Pt needs RW to be ordered prior to DC  Barriers to Discharge: Inaccessible home environment, Decreased caregiver support (2 SH, (+)JASVIR,lives alone with family living in different states)     Rehab Resource Intensity Level, PT: II (Moderate Resource Intensity) (may propress to level 3 PENDING available A at home upon DC)    See flowsheet documentation for full assessment.

## 2025-07-05 NOTE — CASE MANAGEMENT
Case Management Progress Note    Patient name Dejah Parish  Location /-01 MRN [id number]  : 1950 Date 2025       LOS (days): 1  Geometric Mean LOS (GMLOS) (days):   Days to GMLOS:        OBJECTIVE:     Current admission status: Inpatient  Preferred Pharmacy:   [facility] [address] [address]  Phone: [phone] Fax: [phone]    [facility] [address] [place] [id number]  Phone: [phone] Fax: [phone]    Primary Care Provider: Leann Henson DO    Primary Insurance: MEDICARE  Secondary Insurance: [employer]    PROGRESS NOTE:    Pt anticipated to be stable for dc potentially on Monday. Pt recommending rehab. Referrals sent on Aidin and cm will follow for further discussion with family.

## 2025-07-05 NOTE — ASSESSMENT & PLAN NOTE
Stable for transition to oral anticoagulation  Recommend bilateral LE venous duplex but this can be done outpatient if patient is ready for discharge prior to Monday, can repeat prior to discontinuing anticoagulation in 3-6 months to assist with decision-making to stop AC   Will request 3 mo OP follow appt to discuss risks/benefits of discontinuation  No further inpatient recommendations, will sign off, please call with questions

## 2025-07-05 NOTE — ASSESSMENT & PLAN NOTE
Wt Readings from Last 3 Encounters:   07/04/25 56.7 kg (124 lb 14.4 oz)   07/01/25 56.8 kg (125 lb 3.2 oz)   05/28/25 61.2 kg (135 lb)   History of systolic heart failure thought to be possibly secondary to RMSF  Most recent echo 4/2025 with LVEF of 45%.  G1 DD  Maintained on torsemide 40 Mg daily  Examines euvolemic on admission, continue home torsemide  Repeat echo ordered due to presence of large pulmonary embolism  Hold torsemide in setting of CHASE

## 2025-07-05 NOTE — ASSESSMENT & PLAN NOTE
"Currently being managed by PCP, MMSE 27/30  Memory labs obtained outpatient only showed elevated B12  Had developed recent auditory hallucinations, however unable to obtain MRI as patient's ICD is not MRI conditional  CTh on admit: \"No acute intracranial abnormality. Stable mild microangiopathic changes \"  Oriented x 4 on admission, however does have some confusion regarding situation  "

## 2025-07-05 NOTE — PROGRESS NOTES
"Progress Note - Hospitalist   Name: Dejah Parish 75 y.o. female I MRN: [id number]  Unit/Bed#: [facility] I Date of Admission: 7/3/2025   Date of Service: 7/5/2025 I Hospital Day: 1    Assessment & Plan  Pulmonary embolism (HCC)  CT PE study: \"There is a large pulmonary embolus at the right main pulmonary artery extending into segmental branches of the lower lobe.   No evidence for right heart strain. The pulmonary artery is dilated measuring up to 41 mm which may reflect component of pulmonary hypertension. There our somewhat triangular somewhat confluent and groundglass opacities at the right lower lobe. Additionally there is a somewhat triangular appearing density at the lingula which could reflect atelectasis. \"  Patient not requiring supplemental oxygen at time of admission  Suspect lung abnormalities are likely pulmonary infarcts  Pulmonology consulted, recommendations appreciated  transition to Eliquis on 7/5, price checked, first month is going to be $105 including starter pack, following months would be $85, affordable for family  Patient seen by PT/OT, recommending level 2 rehab, family in discussion  Order placed for venous duplex test for DVT to help guiding the duration of the therapy  Abnormal urinalysis  UA with 4-10 WBCs but no bacteria, urine culture-contaminants  Discontinue antibiotics  Type 2 diabetes mellitus with hyperglycemia, without long-term current use of insulin ([facility])  Lab Results   Component Value Date    HGBA1C 9.0 (H) 06/21/2025       Recent Labs     07/04/25  1642 07/04/25  2132 07/05/25  0725 07/05/25  1151   POCGLU 136 163* 109 157*       Blood Sugar Average: Last 72 hrs:  (P) [id number] regimen: Januvia 100 mg daily, PCP just prescribed  semaglutide, which patient has not yet started  Hold home medications and placed on SSI 3 times daily and at bedtime  Trend glucose, may need standing insulin as outpatient A1c was 9.0    Chronic systolic congestive heart failure " "(HCC)  Wt Readings from Last 3 Encounters:   07/04/25 56.7 kg (124 lb 14.4 oz)   07/01/25 56.8 kg (125 lb 3.2 oz)   05/28/25 61.2 kg (135 lb)   History of systolic heart failure thought to be possibly secondary to RMSF  Most recent echo 4/2025 with LVEF of 45%.  G1 DD  Maintained on torsemide 40 Mg daily  Examines euvolemic on admission, continue home torsemide  Repeat echo ordered due to presence of large pulmonary embolism  Hold torsemide in setting of CHASE  Hypertension  Home regimen: Candesartan 32 Mg daily, metoprolol succinate 100 Mg daily  Continue formulary equivalent for home candesartan and continue home metoprolol  Monitor BP per unit protocol  Biventricular ICD (implantable cardioverter-defibrillator) in place  S/p ICD in March 2005 due to history of systolic HF  Last device check 5/14/2025 at shows normal device function and OptiVol within normal limits  CHASE (acute kidney injury) (HCC)  Creatinine 1.23 from 0.88, probably in the settings of recent contrast use and diuretics  Hold torsemide and losartan, gentle hydration  Recheck BMP in the morning  Memory changes  Currently being managed by PCP, MMSE 27/30  Memory labs obtained outpatient only showed elevated B12  Had developed recent auditory hallucinations, however unable to obtain MRI as patient's ICD is not MRI conditional  CTh on admit: \"No acute intracranial abnormality. Stable mild microangiopathic changes \"  Oriented x 4 on admission, however does have some confusion regarding situation  Coronary artery disease  CAD s/p PCI in January 2005 in March 2010  Continue home aspirin beta-blocker, and formulary equivalent for statin,   Hyperparathyroidism (HCC)  History of hyperparathyroidism in the past  PTH elevated on outpatient labs 6/21/2025 -ECP referred to Endo at that time  Continue holding home calcium, NVD, and vitamin D  Continuous opioid dependence (HCC)  Previously was on Celebrex, however this was discontinued due to renal " "function  Currently maintained on tramadol twice daily as needed  PDMP reviewed, last fill 2/11/2025  PCP recently started gabapentin 100 Mg daily with plan to uptitrate to 100 Mg 3 times daily every 3 days to eventually wean patient off tramadol  Gabapentin 100 Mg daily ordered on admit  Obstructive sleep apnea  Uses BiPAP 12-16 at home  Current moderate episode of major depressive disorder without prior episode (HCC)  Continue home Prozac 20 Mg daily  Euthymic on admission  Acquired hypothyroidism  Continue home Synthroid 25 mcg daily  Renal lesion  CT A/P: \"Enlarging multilobulated exophytic soft tissue density lesion at the left inferior pole measuring up to 2.3 cm with interspersed fat density. 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 Additional indeterminate isodense lesions at the right inferior pole and left upper pole.\"  Patient and family were made aware of these findings during recent ED visit, recommend MRI abdomen outpatient  Patient reported left flank pain in the ED, however states that this has since resolved.  Unlikely that this would be contributory to pain  Rib fractures  CT chest: \"Fractures with callus formation at the anterior left fourth and fifth ribs \"  Questionable trace pneumothorax of the left upper lobe - this was discussed with trauma by the ED who felt this was negligible as fractures were old due to evidence of callus formation  Patient on room air on admission    VTE Pharmacologic Prophylaxis: VTE Score: 7 High Risk (Score >/= 5) - Pharmacological DVT Prophylaxis Ordered: heparin drip. Sequential Compression Devices Ordered.    Mobility:   Basic Mobility Inpatient Raw Score: 17  JH-HLM Goal: 5: Stand one or more mins  JH-HLM Achieved: 7: Walk 25 feet or more  JH-HLM Goal achieved. Continue to encourage appropriate mobility.    Patient Centered Rounds: I performed bedside rounds with nursing staff today.   Discussions with Specialists " or Other Care Team Provider: pulmonology    Education and Discussions with Family / Patient: Updated  (daughter) at bedside.    Current Length of Stay: 1 day(s)  Current Patient Status: Inpatient   Certification Statement: The patient will continue to require additional inpatient hospital stay due to PE, transition heparin drip to DOAC, CHASE  Discharge Plan: Anticipate discharge in 24-48 hrs to discharge location to be determined pending rehab evaluations.    Code Status: Level 1 - Full Code    Subjective   Patient seen and examined, reports improvement in her SOB, but feels fatigued; chest pain improved      Objective :  Temp:  [97.4 °F (36.3 °C)-98 °F (36.7 °C)] 97.4 °F (36.3 °C)  HR:  [73-87] 87  BP: (121-140)/(65-82) 132/72  Resp:  [16-18] 18  SpO2:  [93 %-97 %] 97 %  O2 Device: None (Room air)    Body mass index is 26.1 kg/m².     Input and Output Summary (last 24 hours):     Intake/Output Summary (Last 24 hours) at 7/5/2025 1157  Last data filed at 7/5/2025 0528  Gross per 24 hour   Intake 213 ml   Output 250 ml   Net -37 ml       Physical Exam  Vitals and nursing note reviewed.   Constitutional:       General: She is not in acute distress.     Appearance: Normal appearance. She is not ill-appearing.   HENT:      Head: Normocephalic.     Cardiovascular:      Rate and Rhythm: Normal rate and regular rhythm.   Pulmonary:      Effort: Pulmonary effort is normal. No respiratory distress.      Breath sounds: Normal breath sounds. No wheezing, rhonchi or rales.   Abdominal:      General: Abdomen is flat. There is no distension.      Palpations: Abdomen is soft.      Tenderness: There is no abdominal tenderness. There is no right CVA tenderness, left CVA tenderness, guarding or rebound.     Musculoskeletal:         General: Normal range of motion.      Cervical back: Normal range of motion.      Right lower leg: No edema.      Left lower leg: No edema.     Skin:     General: Skin is warm and dry.       Coloration: Skin is not pale.     Neurological:      General: No focal deficit present.      Mental Status: She is alert and oriented to person, place, and time.     Psychiatric:         Mood and Affect: Mood normal.         Thought Content: Thought content normal.         Lines/Drains:              Lab Results: I have reviewed the following results:   Results from last 7 days   Lab Units 07/05/25  0535   WBC Thousand/uL 7.19   HEMOGLOBIN g/dL 13.8   HEMATOCRIT % 41.6   PLATELETS Thousands/uL 201   SEGS PCT % 68   LYMPHO PCT % 16   MONO PCT % 12   EOS PCT % 1     Results from last 7 days   Lab Units 07/05/25  0535 07/04/25  0641 07/03/25  2042   SODIUM mmol/L 138   < > 137   POTASSIUM mmol/L 3.4*   < > 3.9   CHLORIDE mmol/L 102   < > 99   CO2 mmol/L 27   < > 29   BUN mg/dL 18   < > 16   CREATININE mg/dL 1.23   < > 0.98   ANION GAP mmol/L 9   < > 9   CALCIUM mg/dL 10.0   < > 10.2   ALBUMIN g/dL  --   --  3.4*   TOTAL BILIRUBIN mg/dL  --   --  0.80   ALK PHOS U/L  --   --  85   ALT U/L  --   --  15   AST U/L  --   --  16   GLUCOSE RANDOM mg/dL 96   < > 146*    < > = values in this interval not displayed.     Results from last 7 days   Lab Units 07/03/25  2042   INR  1.02     Results from last 7 days   Lab Units 07/05/25  1151 07/05/25  0725 07/04/25  2132 07/04/25  1642 07/04/25  1125 07/04/25  0747   POC GLUCOSE mg/dl 157* 109 163* 136 166* 113         Results from last 7 days   Lab Units 07/03/25  2042   LACTIC ACID mmol/L 1.1   PROCALCITONIN ng/ml 0.29*       Recent Cultures (last 7 days):   Results from last 7 days   Lab Units 07/04/25  0322 07/03/25  2042 07/02/25  1151   BLOOD CULTURE   --  No Growth at 24 hrs.  No Growth at 24 hrs.  --    URINE CULTURE  10,000-19,000 cfu/ml  --  >100,000 cfu/ml       Imaging Results Review: I reviewed radiology reports from this admission including: CT chest.      Last 24 Hours Medication List:     Current Facility-Administered Medications:     acetaminophen (TYLENOL) tablet  650 mg, Q6H LISHA    allopurinol (ZYLOPRIM) tablet 100 mg, Daily    aluminum-magnesium hydroxide-simethicone (MAALOX) oral suspension 30 mL, Q6H PRN    aspirin (ECOTRIN LOW STRENGTH) EC tablet 81 mg, Once per day on Monday Wednesday Friday    [Held by provider] calcium carbonate (TUMS) chewable tablet 500 mg, Daily    [Held by provider] Cholecalciferol (VITAMIN D3) tablet 1,000 Units, Daily    FLUoxetine (PROzac) capsule 20 mg, Daily    gabapentin (NEURONTIN) capsule 100 mg, Daily    heparin (porcine) 25,000 units in 0.45% NaCl 250 mL infusion (premix), Titrated, Last Rate: 10 Units/kg/hr (07/05/25 0600)    heparin (porcine) injection 2,200 Units, Q6H PRN    heparin (porcine) injection 4,400 Units, Q6H PRN    insulin lispro (HumALOG/ADMELOG) 100 units/mL subcutaneous injection 1-5 Units, HS    insulin lispro (HumALOG/ADMELOG) 100 units/mL subcutaneous injection 1-6 Units, TID AC **AND** Fingerstick Glucose (POCT), TID AC    levothyroxine tablet 25 mcg, Early Morning    lidocaine (LIDODERM) 5 % patch 1 patch, Daily    loratadine (CLARITIN) tablet 10 mg, Daily    [Held by provider] losartan (COZAAR) tablet 100 mg, HS    magnesium Oxide (MAG-OX) tablet 400 mg, Daily    melatonin tablet 6 mg, HS    methylprednisolone (MEDROL) tablet 4 mg, Every Other Day    metoprolol succinate (TOPROL-XL) 24 hr tablet 100 mg, Daily    ondansetron (ZOFRAN) injection 4 mg, Q6H PRN    pantoprazole (PROTONIX) EC tablet 40 mg, Early Morning    potassium chloride (Klor-Con M20) CR tablet 20 mEq, BID With Meals    pravastatin (PRAVACHOL) tablet 80 mg, Daily With Dinner    [Held by provider] pyridoxine (VITAMIN B6) tablet 100 mg, Daily    senna (SENOKOT) tablet 8.6 mg, HS PRN    sodium chloride 0.9 % infusion, Continuous, Last Rate: 50 mL/hr (07/05/25 1152)    [Held by provider] torsemide (DEMADEX) tablet 40 mg, Daily    traMADol (ULTRAM) tablet 50 mg, BID PRN    Administrative Statements   Today, Patient Was Seen By: Fabiana Christensen,  MD      **Please Note: This note may have been constructed using a voice recognition system.**

## 2025-07-06 DIAGNOSIS — E11.22 TYPE 2 DIABETES MELLITUS WITH STAGE 3 CHRONIC KIDNEY DISEASE, WITHOUT LONG-TERM CURRENT USE OF INSULIN, UNSPECIFIED WHETHER STAGE 3A OR 3B CKD (HCC): ICD-10-CM

## 2025-07-06 DIAGNOSIS — N18.30 TYPE 2 DIABETES MELLITUS WITH STAGE 3 CHRONIC KIDNEY DISEASE, WITHOUT LONG-TERM CURRENT USE OF INSULIN, UNSPECIFIED WHETHER STAGE 3A OR 3B CKD (HCC): ICD-10-CM

## 2025-07-06 LAB
ANION GAP SERPL CALCULATED.3IONS-SCNC: 3 MMOL/L (ref 4–13)
BASOPHILS # BLD AUTO: 0.03 THOUSANDS/ÂΜL (ref 0–0.1)
BASOPHILS NFR BLD AUTO: 0 % (ref 0–1)
BUN SERPL-MCNC: 16 MG/DL (ref 5–25)
CALCIUM SERPL-MCNC: 9.8 MG/DL (ref 8.4–10.2)
CHLORIDE SERPL-SCNC: 106 MMOL/L (ref 96–108)
CO2 SERPL-SCNC: 30 MMOL/L (ref 21–32)
CREAT SERPL-MCNC: 1.15 MG/DL (ref 0.6–1.3)
EOSINOPHIL # BLD AUTO: 0.04 THOUSAND/ÂΜL (ref 0–0.61)
EOSINOPHIL NFR BLD AUTO: 1 % (ref 0–6)
ERYTHROCYTE [DISTWIDTH] IN BLOOD BY AUTOMATED COUNT: 14.5 % (ref 11.6–15.1)
GFR SERPL CREATININE-BSD FRML MDRD: 46 ML/MIN/1.73SQ M
GLUCOSE SERPL-MCNC: 103 MG/DL (ref 65–140)
GLUCOSE SERPL-MCNC: 106 MG/DL (ref 65–140)
GLUCOSE SERPL-MCNC: 116 MG/DL (ref 65–140)
GLUCOSE SERPL-MCNC: 117 MG/DL (ref 65–140)
GLUCOSE SERPL-MCNC: 89 MG/DL (ref 65–140)
HCT VFR BLD AUTO: 39.9 % (ref 34.8–46.1)
HGB BLD-MCNC: 12.9 G/DL (ref 11.5–15.4)
IMM GRANULOCYTES # BLD AUTO: 0.11 THOUSAND/UL (ref 0–0.2)
IMM GRANULOCYTES NFR BLD AUTO: 2 % (ref 0–2)
LYMPHOCYTES # BLD AUTO: 1.32 THOUSANDS/ÂΜL (ref 0.6–4.47)
LYMPHOCYTES NFR BLD AUTO: 18 % (ref 14–44)
MCH RBC QN AUTO: 30.3 PG (ref 26.8–34.3)
MCHC RBC AUTO-ENTMCNC: 32.3 G/DL (ref 31.4–37.4)
MCV RBC AUTO: 94 FL (ref 82–98)
MONOCYTES # BLD AUTO: 0.87 THOUSAND/ÂΜL (ref 0.17–1.22)
MONOCYTES NFR BLD AUTO: 12 % (ref 4–12)
NEUTROPHILS # BLD AUTO: 4.91 THOUSANDS/ÂΜL (ref 1.85–7.62)
NEUTS SEG NFR BLD AUTO: 67 % (ref 43–75)
NRBC BLD AUTO-RTO: 0 /100 WBCS
PLATELET # BLD AUTO: 248 THOUSANDS/UL (ref 149–390)
PMV BLD AUTO: 9.4 FL (ref 8.9–12.7)
POTASSIUM SERPL-SCNC: 5 MMOL/L (ref 3.5–5.3)
RBC # BLD AUTO: 4.26 MILLION/UL (ref 3.81–5.12)
SODIUM SERPL-SCNC: 139 MMOL/L (ref 135–147)
WBC # BLD AUTO: 7.28 THOUSAND/UL (ref 4.31–10.16)

## 2025-07-06 PROCEDURE — 94760 N-INVAS EAR/PLS OXIMETRY 1: CPT

## 2025-07-06 PROCEDURE — 99232 SBSQ HOSP IP/OBS MODERATE 35: CPT | Performed by: FAMILY MEDICINE

## 2025-07-06 PROCEDURE — 80048 BASIC METABOLIC PNL TOTAL CA: CPT | Performed by: FAMILY MEDICINE

## 2025-07-06 PROCEDURE — 85025 COMPLETE CBC W/AUTO DIFF WBC: CPT | Performed by: FAMILY MEDICINE

## 2025-07-06 PROCEDURE — 82948 REAGENT STRIP/BLOOD GLUCOSE: CPT

## 2025-07-06 PROCEDURE — 94660 CPAP INITIATION&MGMT: CPT

## 2025-07-06 RX ADMIN — ALLOPURINOL 100 MG: 100 TABLET ORAL at 09:42

## 2025-07-06 RX ADMIN — PANTOPRAZOLE SODIUM 40 MG: 40 TABLET, DELAYED RELEASE ORAL at 05:16

## 2025-07-06 RX ADMIN — LORATADINE 10 MG: 10 TABLET ORAL at 09:42

## 2025-07-06 RX ADMIN — CALCIUM CARBONATE (ANTACID) CHEW TAB 500 MG 500 MG: 500 CHEW TAB at 09:42

## 2025-07-06 RX ADMIN — Medication 6 MG: at 21:25

## 2025-07-06 RX ADMIN — Medication 1000 UNITS: at 09:42

## 2025-07-06 RX ADMIN — PRAVASTATIN SODIUM 80 MG: 80 TABLET ORAL at 17:39

## 2025-07-06 RX ADMIN — Medication 100 MG: at 09:42

## 2025-07-06 RX ADMIN — LEVOTHYROXINE SODIUM 25 MCG: 0.03 TABLET ORAL at 05:15

## 2025-07-06 RX ADMIN — LOSARTAN POTASSIUM 100 MG: 50 TABLET, FILM COATED ORAL at 21:25

## 2025-07-06 RX ADMIN — Medication 400 MG: at 09:42

## 2025-07-06 RX ADMIN — METOPROLOL SUCCINATE 100 MG: 50 TABLET, EXTENDED RELEASE ORAL at 09:42

## 2025-07-06 RX ADMIN — FLUOXETINE HYDROCHLORIDE 20 MG: 20 CAPSULE ORAL at 09:42

## 2025-07-06 RX ADMIN — GABAPENTIN 100 MG: 100 CAPSULE ORAL at 09:42

## 2025-07-06 RX ADMIN — ACETAMINOPHEN 650 MG: 325 TABLET, FILM COATED ORAL at 17:39

## 2025-07-06 RX ADMIN — POTASSIUM CHLORIDE 20 MEQ: 1500 TABLET, EXTENDED RELEASE ORAL at 05:15

## 2025-07-06 RX ADMIN — ACETAMINOPHEN 650 MG: 325 TABLET, FILM COATED ORAL at 05:15

## 2025-07-06 RX ADMIN — APIXABAN 10 MG: 5 TABLET, FILM COATED ORAL at 09:42

## 2025-07-06 RX ADMIN — APIXABAN 10 MG: 5 TABLET, FILM COATED ORAL at 17:39

## 2025-07-06 NOTE — PLAN OF CARE
Problem: Potential for Falls  Goal: Patient will remain free of falls  Description: INTERVENTIONS:  - Educate patient/family on patient safety including physical limitations  - Instruct patient to call for assistance with activity   - Consider consulting OT/PT to assist with strengthening/mobility based on AM PAC & JH-HLM score  - Consult OT/PT to assist with strengthening/mobility   - Keep Call bell within reach  - Keep bed low and locked with side rails adjusted as appropriate  - Keep care items and personal belongings within reach  - Initiate and maintain comfort rounds  - Make Fall Risk Sign visible to staff  - Offer Toileting every 2 Hours, in advance of need  - Initiate/Maintain bed alarm  - Obtain necessary fall risk management equipment: socks  - Apply yellow socks and bracelet for high fall risk patients  - Consider moving patient to room near nurses station  Outcome: Progressing     Problem: PAIN - ADULT  Goal: Verbalizes/displays adequate comfort level or baseline comfort level  Description: Interventions:  - Encourage patient to monitor pain and request assistance  - Assess pain using appropriate pain scale  - Administer analgesics as ordered based on type and severity of pain and evaluate response  - Implement non-pharmacological measures as appropriate and evaluate response  - Consider cultural and social influences on pain and pain management  - Notify physician/advanced practitioner if interventions unsuccessful or patient reports new pain  - Educate patient/family on pain management process including their role and importance of  reporting pain   - Provide non-pharmacologic/complimentary pain relief interventions  Outcome: Progressing     Problem: INFECTION - ADULT  Goal: Absence or prevention of progression during hospitalization  Description: INTERVENTIONS:  - Assess and monitor for signs and symptoms of infection  - Monitor lab/diagnostic results  - Monitor all insertion sites, i.e. indwelling  lines, tubes, and drains  - Monitor endotracheal if appropriate and nasal secretions for changes in amount and color  - [place] appropriate cooling/warming therapies per order  - Administer medications as ordered  - Instruct and encourage patient and family to use good hand hygiene technique  - Identify and instruct in appropriate isolation precautions for identified infection/condition  Outcome: Progressing  Goal: Absence of fever/infection during neutropenic period  Description: INTERVENTIONS:  - Monitor WBC  - Perform strict hand hygiene  - Limit to healthy visitors only  - No plants, dried, fresh or silk flowers with gupta in patient room  Outcome: Progressing     Problem: SAFETY ADULT  Goal: Patient will remain free of falls  Description: INTERVENTIONS:  - Educate patient/family on patient safety including physical limitations  - Instruct patient to call for assistance with activity   - Consider consulting OT/PT to assist with strengthening/mobility based on AM PAC & JH-HLM score  - Consult OT/PT to assist with strengthening/mobility   - Keep Call bell within reach  - Keep bed low and locked with side rails adjusted as appropriate  - Keep care items and personal belongings within reach  - Initiate and maintain comfort rounds  - Make Fall Risk Sign visible to staff  - Offer Toileting every 2 Hours, in advance of need  - Initiate/Maintain bed alarm  - Obtain necessary fall risk management equipment: socks  - Apply yellow socks and bracelet for high fall risk patients  - Consider moving patient to room near nurses station  Outcome: Progressing  Goal: Maintain or return to baseline ADL function  Description: INTERVENTIONS:  -  Assess patient's ability to carry out ADLs; assess patient's baseline for ADL function and identify physical deficits which impact ability to perform ADLs (bathing, care of mouth/teeth, toileting, grooming, dressing, etc.)  - Assess/evaluate cause of self-care deficits   - Assess range of  motion  - Assess patient's mobility; develop plan if impaired  - Assess patient's need for assistive devices and provide as appropriate  - Encourage maximum independence but intervene and supervise when necessary  - Involve family in performance of ADLs  - Assess for home care needs following discharge   - Consider OT consult to assist with ADL evaluation and planning for discharge  - Provide patient education as appropriate  - Monitor functional capacity and physical performance, use of AM PAC & JH-HLM   - Monitor gait, balance and fatigue with ambulation    Outcome: Progressing  Goal: Maintains/Returns to pre admission functional level  Description: INTERVENTIONS:  - Perform AM-PAC 6 Click Basic Mobility/ Daily Activity assessment daily.  - Set and communicate daily mobility goal to care team and patient/family/caregiver.   - Collaborate with rehabilitation services on mobility goals if consulted  - Perform Range of Motion 3 times a day.  - Reposition patient every 3 hours.  - Dangle patient 3 times a day  - Stand patient 3 times a day  - Ambulate patient 3 times a day  - Out of bed to chair 3 times a day   - Out of bed for meals 3 times a day  - Out of bed for toileting  - Record patient progress and toleration of activity level   Outcome: Progressing

## 2025-07-06 NOTE — ASSESSMENT & PLAN NOTE
Lab Results   Component Value Date    HGBA1C 9.0 (H) 06/21/2025       Recent Labs     07/05/25  1625 07/05/25  2112 07/06/25  0906 07/06/25  1204   POCGLU 173* 160* 89 103       Blood Sugar Average: Last 72 hrs:  (P) 136.9Home regimen: Januvia 100 mg daily, PCP just prescribed  semaglutide, which patient has not yet started  Hold home medications and placed on SSI 3 times daily and at bedtime  Trend glucose, may need standing insulin as outpatient A1c was 9.0

## 2025-07-06 NOTE — PROGRESS NOTES
"Progress Note - Hospitalist   Name: Dejah Parish 75 y.o. female I MRN: [id number]  Unit/Bed#: [facility] I Date of Admission: 7/3/2025   Date of Service: 7/6/2025 I Hospital Day: 2    Assessment & Plan  Pulmonary embolism (HCC)  CT PE study: \"There is a large pulmonary embolus at the right main pulmonary artery extending into segmental branches of the lower lobe.   No evidence for right heart strain. The pulmonary artery is dilated measuring up to 41 mm which may reflect component of pulmonary hypertension. There our somewhat triangular somewhat confluent and groundglass opacities at the right lower lobe. Additionally there is a somewhat triangular appearing density at the lingula which could reflect atelectasis. \"  Patient not requiring supplemental oxygen at time of admission  Suspect lung abnormalities are likely pulmonary infarcts  Pulmonology consulted, recommendations appreciated  transition to Eliquis on 7/5, price checked, first month is going to be $105 including starter pack, following months would be $85, affordable for family  Order placed for venous duplex test for DVT to help guiding the duration of the therapy  Echo pending  Patient seen by PT/OT, recommending level 2 rehab, family in agreement  Abnormal urinalysis  UA with 4-10 WBCs but no bacteria, urine culture-contaminants  Discontinue antibiotics  Type 2 diabetes mellitus with hyperglycemia, without long-term current use of insulin (Formerly [facility])  Lab Results   Component Value Date    HGBA1C 9.0 (H) 06/21/2025       Recent Labs     07/05/25  1625 07/05/25  2112 07/06/25  0906 07/06/25  1204   POCGLU 173* 160* 89 103       Blood Sugar Average: Last 72 hrs:  (P) 136.9Home regimen: Januvia 100 mg daily, PCP just prescribed  semaglutide, which patient has not yet started  Hold home medications and placed on SSI 3 times daily and at bedtime  Trend glucose, may need standing insulin as outpatient A1c was 9.0    Chronic systolic congestive heart failure " "(HCC)  Wt Readings from Last 3 Encounters:   07/06/25 57.2 kg (126 lb 1.6 oz)   07/01/25 56.8 kg (125 lb 3.2 oz)   05/28/25 61.2 kg (135 lb)   History of systolic heart failure thought to be possibly secondary to RMSF  Most recent echo 4/2025 with LVEF of 45%.  G1 DD  Maintained on torsemide 40 Mg daily  Examines euvolemic on admission, continue home torsemide  Repeat echo ordered due to presence of large pulmonary embolism  Held torsemide in setting of CHASE, creatinine improved, plan to resume torsemide on 7/7  Hypertension  Home regimen: Candesartan 32 Mg daily, metoprolol succinate 100 Mg daily  Continue formulary equivalent for home candesartan and continue home metoprolol  Monitor BP per unit protocol  Biventricular ICD (implantable cardioverter-defibrillator) in place  S/p ICD in March 2005 due to history of systolic HF  Last device check 5/14/2025 at shows normal device function and OptiVol within normal limits  CHASE (acute kidney injury) (HCC)  Improved, creatinine 1.15  Creatinine 1.23 from 0.88, probably in the settings of recent contrast use and diuretics  Held torsemide and losartan, gentle hydration  Recheck BMP in the morning  Creatinine improved, resume torsemide and losartan on 7/7  Memory changes  Currently being managed by PCP, MMSE 27/30  Memory labs obtained outpatient only showed elevated B12  Had developed recent auditory hallucinations, however unable to obtain MRI as patient's ICD is not MRI conditional  CTh on admit: \"No acute intracranial abnormality. Stable mild microangiopathic changes \"  Oriented x 4 on admission, however does have some confusion regarding situation  Coronary artery disease  CAD s/p PCI in January 2005 in March 2010  Continue home aspirin beta-blocker, and formulary equivalent for statin,   Hyperparathyroidism (HCC)  History of hyperparathyroidism in the past  PTH elevated on outpatient labs 6/21/2025 -ECP referred to Endo at that time  Continue holding home calcium, NVD, " "and vitamin D  Continuous opioid dependence (HCC)  Previously was on Celebrex, however this was discontinued due to renal function  Currently maintained on tramadol twice daily as needed  PDMP reviewed, last fill 2/11/2025  PCP recently started gabapentin 100 Mg daily with plan to uptitrate to 100 Mg 3 times daily every 3 days to eventually wean patient off tramadol  Gabapentin 100 Mg daily ordered on admit  Obstructive sleep apnea  Uses BiPAP 12-16 at home  Current moderate episode of major depressive disorder without prior episode (HCC)  Continue home Prozac 20 Mg daily  Euthymic on admission  Acquired hypothyroidism  Continue home Synthroid 25 mcg daily  Renal lesion  CT A/P: \"Enlarging multilobulated exophytic soft tissue density lesion at the left inferior pole measuring up to 2.3 cm with interspersed fat density. 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 Additional indeterminate isodense lesions at the right inferior pole and left upper pole.\"  Patient and family were made aware of these findings during recent ED visit, recommend MRI abdomen outpatient  Patient reported left flank pain in the ED, however states that this has since resolved.  Unlikely that this would be contributory to pain  Rib fractures  CT chest: \"Fractures with callus formation at the anterior left fourth and fifth ribs \"  Questionable trace pneumothorax of the left upper lobe - this was discussed with trauma by the ED who felt this was negligible as fractures were old due to evidence of callus formation  Patient on room air on admission    VTE Pharmacologic Prophylaxis: VTE Score: 7 High Risk (Score >/= 5) - Pharmacological DVT Prophylaxis Ordered: heparin drip. Sequential Compression Devices Ordered.    Mobility:   Basic Mobility Inpatient Raw Score: 17  -HLM Goal: 5: Stand one or more mins  JH-HLM Achieved: 5: Stand (1 or more minutes)  JH-HLM Goal achieved. Continue to encourage " appropriate mobility.    Patient Centered Rounds: I performed bedside rounds with nursing staff today.   Discussions with Specialists or Other Care Team Provider:    Education and Discussions with Family / Patient: Updated  (daughter) at bedside.    Current Length of Stay: 2 day(s)  Current Patient Status: Inpatient   Certification Statement: The patient will continue to require additional inpatient hospital stay due to PE, transition heparin drip to DOAC, CHASE  Discharge Plan: Anticipate discharge in 24-48 hrs to rehab    Code Status: Level 1 - Full Code    Subjective   Patient seen and examined, chest pain improved, denies any complaints      Objective :  Temp:  [97.2 °F (36.2 °C)-98.3 °F (36.8 °C)] 98 °F (36.7 °C)  HR:  [63-77] 73  BP: (128-147)/(71-76) 145/74  Resp:  [14-18] 18  SpO2:  [95 %-100 %] 97 %  O2 Device: None (Room air)  FiO2 (%):  [21] 21    Body mass index is 26.35 kg/m².     Input and Output Summary (last 24 hours):     Intake/Output Summary (Last 24 hours) at 7/6/2025 1428  Last data filed at 7/6/2025 0528  Gross per 24 hour   Intake 657.14 ml   Output 700 ml   Net -42.86 ml       Physical Exam  Vitals and nursing note reviewed.   Constitutional:       General: She is not in acute distress.     Appearance: Normal appearance. She is not ill-appearing.   HENT:      Head: Normocephalic.     Cardiovascular:      Rate and Rhythm: Normal rate and regular rhythm.   Pulmonary:      Effort: Pulmonary effort is normal. No respiratory distress.      Breath sounds: Normal breath sounds. No wheezing, rhonchi or rales.   Abdominal:      General: Abdomen is flat. There is no distension.      Palpations: Abdomen is soft.      Tenderness: There is no abdominal tenderness. There is no right CVA tenderness, left CVA tenderness, guarding or rebound.     Musculoskeletal:         General: Normal range of motion.      Cervical back: Normal range of motion.      Right lower leg: No edema.      Left lower  leg: No edema.     Skin:     General: Skin is warm and dry.      Coloration: Skin is not pale.     Neurological:      General: No focal deficit present.      Mental Status: She is alert and oriented to person, place, and time.     Psychiatric:         Mood and Affect: Mood normal.         Thought Content: Thought content normal.         Lines/Drains:              Lab Results: I have reviewed the following results:   Results from last 7 days   Lab Units 07/06/25  0522   WBC Thousand/uL 7.28   HEMOGLOBIN g/dL 12.9   HEMATOCRIT % 39.9   PLATELETS Thousands/uL 248   SEGS PCT % 67   LYMPHO PCT % 18   MONO PCT % 12   EOS PCT % 1     Results from last 7 days   Lab Units 07/06/25  0522 07/04/25  0641 07/03/25  2042   SODIUM mmol/L 139   < > 137   POTASSIUM mmol/L 5.0   < > 3.9   CHLORIDE mmol/L 106   < > 99   CO2 mmol/L 30   < > 29   BUN mg/dL 16   < > 16   CREATININE mg/dL 1.15   < > 0.98   ANION GAP mmol/L 3*   < > 9   CALCIUM mg/dL 9.8   < > 10.2   ALBUMIN g/dL  --   --  3.4*   TOTAL BILIRUBIN mg/dL  --   --  0.80   ALK PHOS U/L  --   --  85   ALT U/L  --   --  15   AST U/L  --   --  16   GLUCOSE RANDOM mg/dL 106   < > 146*    < > = values in this interval not displayed.     Results from last 7 days   Lab Units 07/03/25  2042   INR  1.02     Results from last 7 days   Lab Units 07/06/25  1204 07/06/25  0906 07/05/25  2112 07/05/25  1625 07/05/25  1151 07/05/25  0725 07/04/25  2132 07/04/25  1642 07/04/25  1125 07/04/25  0747   POC GLUCOSE mg/dl 103 89 160* 173* 157* 109 163* 136 166* 113         Results from last 7 days   Lab Units 07/03/25  2042   LACTIC ACID mmol/L 1.1   PROCALCITONIN ng/ml 0.29*       Recent Cultures (last 7 days):   Results from last 7 days   Lab Units 07/04/25  0322 07/03/25  2042 07/02/25  1151   BLOOD CULTURE   --  No Growth at 48 hrs.  No Growth at 48 hrs.  --    URINE CULTURE  10,000-19,000 cfu/ml  --  >100,000 cfu/ml       Imaging Results Review: I reviewed radiology reports from this  admission including: CT chest.      Last 24 Hours Medication List:     Current Facility-Administered Medications:     acetaminophen (TYLENOL) tablet 650 mg, Q6H LISHA    allopurinol (ZYLOPRIM) tablet 100 mg, Daily    aluminum-magnesium hydroxide-simethicone (MAALOX) oral suspension 30 mL, Q6H PRN    apixaban (ELIQUIS) tablet 10 mg, BID    [START ON 7/12/2025] apixaban (ELIQUIS) tablet 5 mg, BID    aspirin (ECOTRIN LOW STRENGTH) EC tablet 81 mg, Once per day on Monday Wednesday Friday    calcium carbonate (TUMS) chewable tablet 500 mg, Daily    Cholecalciferol (VITAMIN D3) tablet 1,000 Units, Daily    FLUoxetine (PROzac) capsule 20 mg, Daily    gabapentin (NEURONTIN) capsule 100 mg, Daily    insulin lispro (HumALOG/ADMELOG) 100 units/mL subcutaneous injection 1-5 Units, HS    insulin lispro (HumALOG/ADMELOG) 100 units/mL subcutaneous injection 1-6 Units, TID AC **AND** Fingerstick Glucose (POCT), TID AC    levothyroxine tablet 25 mcg, Early Morning    lidocaine (LIDODERM) 5 % patch 1 patch, Daily    loratadine (CLARITIN) tablet 10 mg, Daily    losartan (COZAAR) tablet 100 mg, HS    magnesium Oxide (MAG-OX) tablet 400 mg, Daily    melatonin tablet 6 mg, HS    methylprednisolone (MEDROL) tablet 4 mg, Every Other Day    metoprolol succinate (TOPROL-XL) 24 hr tablet 100 mg, Daily    ondansetron (ZOFRAN) injection 4 mg, Q6H PRN    pantoprazole (PROTONIX) EC tablet 40 mg, Early Morning    [Held by provider] potassium chloride (Klor-Con M20) CR tablet 20 mEq, BID With Meals    pravastatin (PRAVACHOL) tablet 80 mg, Daily With Dinner    pyridoxine (VITAMIN B6) tablet 100 mg, Daily    senna (SENOKOT) tablet 8.6 mg, HS PRN    torsemide (DEMADEX) tablet 40 mg, Daily    traMADol (ULTRAM) tablet 50 mg, BID PRN    Administrative Statements   Today, Patient Was Seen By: Fabiana Christensen MD      **Please Note: This note may have been constructed using a voice recognition system.**

## 2025-07-06 NOTE — ASSESSMENT & PLAN NOTE
"CT PE study: \"There is a large pulmonary embolus at the right main pulmonary artery extending into segmental branches of the lower lobe.   No evidence for right heart strain. The pulmonary artery is dilated measuring up to 41 mm which may reflect component of pulmonary hypertension. There our somewhat triangular somewhat confluent and groundglass opacities at the right lower lobe. Additionally there is a somewhat triangular appearing density at the lingula which could reflect atelectasis. \"  Patient not requiring supplemental oxygen at time of admission  Suspect lung abnormalities are likely pulmonary infarcts  Pulmonology consulted, recommendations appreciated  transition to Eliquis on 7/5, price checked, first month is going to be $105 including starter pack, following months would be $85, affordable for family  Order placed for venous duplex test for DVT to help guiding the duration of the therapy  Echo pending  Patient seen by PT/OT, recommending level 2 rehab, family in agreement  "

## 2025-07-06 NOTE — ASSESSMENT & PLAN NOTE
Wt Readings from Last 3 Encounters:   07/06/25 57.2 kg (126 lb 1.6 oz)   07/01/25 56.8 kg (125 lb 3.2 oz)   05/28/25 61.2 kg (135 lb)   History of systolic heart failure thought to be possibly secondary to RMSF  Most recent echo 4/2025 with LVEF of 45%.  G1 DD  Maintained on torsemide 40 Mg daily  Examines euvolemic on admission, continue home torsemide  Repeat echo ordered due to presence of large pulmonary embolism  Held torsemide in setting of CHASE, creatinine improved, plan to resume torsemide on 7/7

## 2025-07-06 NOTE — ASSESSMENT & PLAN NOTE
Improved, creatinine 1.15  Creatinine 1.23 from 0.88, probably in the settings of recent contrast use and diuretics  Held torsemide and losartan, gentle hydration  Recheck BMP in the morning  Creatinine improved, resume torsemide and losartan on 7/7

## 2025-07-07 ENCOUNTER — APPOINTMENT (INPATIENT)
Dept: NON INVASIVE DIAGNOSTICS | Facility: HOSPITAL | Age: 75
DRG: 176 | End: 2025-07-07
Payer: MEDICARE

## 2025-07-07 VITALS
HEIGHT: 58 IN | WEIGHT: 126 LBS | RESPIRATION RATE: 17 BRPM | DIASTOLIC BLOOD PRESSURE: 67 MMHG | HEART RATE: 74 BPM | OXYGEN SATURATION: 95 % | BODY MASS INDEX: 26.45 KG/M2 | TEMPERATURE: 97.5 F | SYSTOLIC BLOOD PRESSURE: 133 MMHG

## 2025-07-07 LAB
ANION GAP SERPL CALCULATED.3IONS-SCNC: 5 MMOL/L (ref 4–13)
AORTIC VALVE MEAN VELOCITY: 10 M/S
AV LVOT MEAN GRADIENT: 2 MMHG
AV LVOT PEAK GRADIENT: 3 MMHG
AV MEAN PRESS GRAD SYS DOP V1V2: 5 MMHG
AV PEAK GRADIENT: 11 MMHG
AV VELOCITY RATIO: 0.64
AV VMAX SYS DOP: 1.64 M/S
BSA FOR ECHO PROCEDURE: 1.5 M2
BUN SERPL-MCNC: 13 MG/DL (ref 5–25)
CALCIUM SERPL-MCNC: 9.7 MG/DL (ref 8.4–10.2)
CHLORIDE SERPL-SCNC: 107 MMOL/L (ref 96–108)
CO2 SERPL-SCNC: 26 MMOL/L (ref 21–32)
CREAT SERPL-MCNC: 0.91 MG/DL (ref 0.6–1.3)
DOP CALC AO VTI: 27.85 CM
DOP CALC LVOT PEAK VEL VTI: 17.87 CM
DOP CALC LVOT PEAK VEL: 0.93 M/S
GFR SERPL CREATININE-BSD FRML MDRD: 61 ML/MIN/1.73SQ M
GLUCOSE SERPL-MCNC: 100 MG/DL (ref 65–140)
GLUCOSE SERPL-MCNC: 107 MG/DL (ref 65–140)
GLUCOSE SERPL-MCNC: 168 MG/DL (ref 65–140)
GLUCOSE SERPL-MCNC: 192 MG/DL (ref 65–140)
POTASSIUM SERPL-SCNC: 4.8 MMOL/L (ref 3.5–5.3)
SL CV LV EF: 40
SODIUM SERPL-SCNC: 138 MMOL/L (ref 135–147)
TRICUSPID ANNULAR PLANE SYSTOLIC EXCURSION: 1.4 CM

## 2025-07-07 PROCEDURE — 93970 EXTREMITY STUDY: CPT | Performed by: SURGERY

## 2025-07-07 PROCEDURE — 94760 N-INVAS EAR/PLS OXIMETRY 1: CPT

## 2025-07-07 PROCEDURE — 93970 EXTREMITY STUDY: CPT

## 2025-07-07 PROCEDURE — 97167 OT EVAL HIGH COMPLEX 60 MIN: CPT

## 2025-07-07 PROCEDURE — 93306 TTE W/DOPPLER COMPLETE: CPT | Performed by: INTERNAL MEDICINE

## 2025-07-07 PROCEDURE — 99239 HOSP IP/OBS DSCHRG MGMT >30: CPT | Performed by: HOSPITALIST

## 2025-07-07 PROCEDURE — 80048 BASIC METABOLIC PNL TOTAL CA: CPT | Performed by: FAMILY MEDICINE

## 2025-07-07 PROCEDURE — C8929 TTE W OR WO FOL WCON,DOPPLER: HCPCS

## 2025-07-07 PROCEDURE — 97116 GAIT TRAINING THERAPY: CPT

## 2025-07-07 PROCEDURE — 82948 REAGENT STRIP/BLOOD GLUCOSE: CPT

## 2025-07-07 RX ORDER — TRAMADOL HYDROCHLORIDE 50 MG/1
50 TABLET ORAL 2 TIMES DAILY PRN
Qty: 6 TABLET | Refills: 0 | Status: SHIPPED | OUTPATIENT
Start: 2025-07-07

## 2025-07-07 RX ADMIN — GABAPENTIN 100 MG: 100 CAPSULE ORAL at 10:10

## 2025-07-07 RX ADMIN — ASPIRIN 81 MG: 81 TABLET, COATED ORAL at 10:09

## 2025-07-07 RX ADMIN — TORSEMIDE 40 MG: 20 TABLET ORAL at 10:08

## 2025-07-07 RX ADMIN — LEVOTHYROXINE SODIUM 25 MCG: 0.03 TABLET ORAL at 04:04

## 2025-07-07 RX ADMIN — FLUOXETINE HYDROCHLORIDE 20 MG: 20 CAPSULE ORAL at 10:10

## 2025-07-07 RX ADMIN — Medication 100 MG: at 10:10

## 2025-07-07 RX ADMIN — CALCIUM CARBONATE (ANTACID) CHEW TAB 500 MG 500 MG: 500 CHEW TAB at 10:10

## 2025-07-07 RX ADMIN — METOPROLOL SUCCINATE 100 MG: 50 TABLET, EXTENDED RELEASE ORAL at 10:10

## 2025-07-07 RX ADMIN — Medication 1000 UNITS: at 10:10

## 2025-07-07 RX ADMIN — PERFLUTREN 1 ML/MIN: 6.52 INJECTION, SUSPENSION INTRAVENOUS at 09:44

## 2025-07-07 RX ADMIN — ALLOPURINOL 100 MG: 100 TABLET ORAL at 10:09

## 2025-07-07 RX ADMIN — Medication 400 MG: at 10:09

## 2025-07-07 RX ADMIN — LORATADINE 10 MG: 10 TABLET ORAL at 10:10

## 2025-07-07 RX ADMIN — APIXABAN 10 MG: 5 TABLET, FILM COATED ORAL at 10:09

## 2025-07-07 RX ADMIN — PANTOPRAZOLE SODIUM 40 MG: 40 TABLET, DELAYED RELEASE ORAL at 04:06

## 2025-07-07 RX ADMIN — INSULIN LISPRO 1 UNITS: 100 INJECTION, SOLUTION INTRAVENOUS; SUBCUTANEOUS at 12:56

## 2025-07-07 RX ADMIN — METHYLPREDNISOLONE 4 MG: 4 TABLET ORAL at 10:10

## 2025-07-07 NOTE — NURSING NOTE
The patient was transferred to [facility]. I gave report handoff to the nurse at [facility]. I removed the IV and Masimo from the patient. The patient had no questions for me. The patient was discharged from the hospital at 1624.

## 2025-07-07 NOTE — ASSESSMENT & PLAN NOTE
"CT PE study: \"There is a large pulmonary embolus at the right main pulmonary artery extending into segmental branches of the lower lobe.   No evidence for right heart strain. The pulmonary artery is dilated measuring up to 41 mm which may reflect component of pulmonary hypertension. There our somewhat triangular somewhat confluent and groundglass opacities at the right lower lobe. Additionally there is a somewhat triangular appearing density at the lingula which could reflect atelectasis. \"  Patient not requiring supplemental oxygen at time of admission  Suspect lung abnormalities are likely pulmonary infarcts  Pulmonology consulted, recommendations appreciated  transition to Eliquis on 7/5, price checked, first month is going to be $105 including starter pack, following months would be $85, affordable for family  Pt instructed to follow  w pulm to determine definitive length of therapy.   Echo ef 40% similar to 2023.  Patient seen by PT/OT, recommending level 2 rehab, family in agreement  "

## 2025-07-07 NOTE — DISCHARGE SUMMARY
"Discharge Summary - Hospitalist   Name: Dejah Parish 75 y.o. female I MRN: [id number]  Unit/Bed#: [facility] I Date of Admission: 7/3/2025   Date of Service: 7/7/2025 I Hospital Day: 3     Assessment & Plan  Pulmonary embolism (HCC)  CT PE study: \"There is a large pulmonary embolus at the right main pulmonary artery extending into segmental branches of the lower lobe.   No evidence for right heart strain. The pulmonary artery is dilated measuring up to 41 mm which may reflect component of pulmonary hypertension. There our somewhat triangular somewhat confluent and groundglass opacities at the right lower lobe. Additionally there is a somewhat triangular appearing density at the lingula which could reflect atelectasis. \"  Patient not requiring supplemental oxygen at time of admission  Suspect lung abnormalities are likely pulmonary infarcts  Pulmonology consulted, recommendations appreciated  transition to Eliquis on 7/5, price checked, first month is going to be $105 including starter pack, following months would be $85, affordable for family  Pt instructed to follow  w pulm to determine definitive length of therapy.   Echo ef 40% similar to 2023.  Patient seen by PT/OT, recommending level 2 rehab, family in agreement  Abnormal urinalysis  UA with 4-10 WBCs but no bacteria, urine culture-contaminants  Discontinue antibiotics  Type 2 diabetes mellitus with hyperglycemia, without long-term current use of insulin ([facility])  Lab Results   Component Value Date    HGBA1C 9.0 (H) 06/21/2025       Recent Labs     07/06/25  1614 07/06/25  2116 07/07/25  0909 07/07/25  1122   POCGLU 117 116 100 168*       Blood Sugar Average: Last 72 hrs:  (P) [id number] regimen: Januvia 100 mg daily, PCP just prescribed  semaglutide, which patient has not yet started  Hold home medications and placed on SSI 3 times daily and at bedtime  Trend glucose, may need standing insulin as outpatient A1c was 9.0    Chronic systolic " "congestive heart failure (HCC)  Wt Readings from Last 3 Encounters:   07/07/25 57.2 kg (126 lb)   07/01/25 56.8 kg (125 lb 3.2 oz)   05/28/25 61.2 kg (135 lb)   History of systolic heart failure thought to be possibly secondary to RMSF  Most recent echo 4/2025 with LVEF of 45%.  G1 DD  Maintained on torsemide 40 Mg daily  Examines euvolemic on admission, continue home torsemide  Repeat echo ordered due to presence of large pulmonary embolism  Held torsemide in setting of CHASE, creatinine improved, plan to resume torsemide on 7/7  Hypertension  Home regimen: Candesartan 32 Mg daily, metoprolol succinate 100 Mg daily  Continue formulary equivalent for home candesartan and continue home metoprolol  Monitor BP per unit protocol  Biventricular ICD (implantable cardioverter-defibrillator) in place  S/p ICD in March 2005 due to history of systolic HF  Last device check 5/14/2025 at shows normal device function and OptiVol within normal limits  CHASE (acute kidney injury) (HCC)  Improved, creatinine 1.15  Creatinine 1.23 from 0.88, probably in the settings of recent contrast use and diuretics  Held torsemide and losartan, gentle hydration  Recheck BMP in the morning  RESOLVED  Memory changes  Currently being managed by PCP, MMSE 27/30  Memory labs obtained outpatient only showed elevated B12  Had developed recent auditory hallucinations, however unable to obtain MRI as patient's ICD is not MRI conditional  CTh on admit: \"No acute intracranial abnormality. Stable mild microangiopathic changes \"  Oriented x 4 on admission, however does have some confusion regarding situation  Coronary artery disease  CAD s/p PCI in January 2005 in March 2010  Continue home aspirin beta-blocker, and formulary equivalent for statin,   Hyperparathyroidism (HCC)  History of hyperparathyroidism in the past  PTH elevated on outpatient labs 6/21/2025 -ECP referred to Endo at that time  Continue holding home calcium, NVD, and vitamin D  Continuous " "opioid dependence (HCC)  Previously was on Celebrex, however this was discontinued due to renal function  Currently maintained on tramadol twice daily as needed  PDMP reviewed, last fill 2/11/2025  PCP recently started gabapentin 100 Mg daily with plan to uptitrate to 100 Mg 3 times daily every 3 days to eventually wean patient off tramadol  Gabapentin 100 Mg daily ordered on admit  Obstructive sleep apnea  Uses BiPAP 12-16 at home  Current moderate episode of major depressive disorder without prior episode (HCC)  Continue home Prozac 20 Mg daily  Euthymic on admission  Acquired hypothyroidism  Continue home Synthroid 25 mcg daily  Renal lesion  CT A/P: \"Enlarging multilobulated exophytic soft tissue density lesion at the left inferior pole measuring up to 2.3 cm with interspersed fat density. 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 Additional indeterminate isodense lesions at the right inferior pole and left upper pole.\"  Patient and family were made aware of these findings during recent ED visit, recommend MRI abdomen outpatient  Patient reported left flank pain in the ED, however states that this has since resolved.  Unlikely that this would be contributory to pain  Rib fractures  CT chest: \"Fractures with callus formation at the anterior left fourth and fifth ribs \"  Questionable trace pneumothorax of the left upper lobe - this was discussed with trauma by the ED who felt this was negligible as fractures were old due to evidence of callus formation  Patient on room air on admission    Hospital Course:     Dejah Parish is a 75 y.o. female patient who originally presented to the hospital on   Admission Orders (From admission, onward)       Ordered        07/04/25 0052  INPATIENT ADMISSION  Once                         due to pulmonary embolism. Evaluated by pulmonary and transitioned to eliquis. Pt without resp complaints at [place]. She will be discharged to " rehab for physical reconditioning. Pt/family agreeable to following with pulm and cards at dc as well as following up for her outpt mri.     Please see above list of diagnoses and related plan for additional information.     Physical Exam:    GEN: No acute distress, comfortable  HEEENT: No JVD, PERRLA, no scleral icterus  RESP: Lungs clear to auscultation bilaterally  CV: RRR, +s1/s2   ABD: SOFT NON TENDER, POSITIVE BOWEL SOUNDS, NO DISTENTION  PSYCH: CALM  NEURO: Mentation baseline, NO FOCAL DEFICITS  SKIN: NO RASH  EXTREM: NO EDEMA    CONSULTING PROVIDERS   IP CONSULT TO PULMONOLOGY    PROCEDURES PERFORMED  * No surgery found *    RADIOLOGY RESULTS  Echo follow up/limited w/ contrast if indicated  Result Date: 7/7/2025  Narrative:   Left Ventricle: Left ventricular cavity size is normal. Wall thickness is increased. The left ventricular ejection fraction is 40%. Systolic function is moderately reduced. Diastolic function is mildly abnormal, consistent with grade I (abnormal) relaxation.   The following segments are akinetic: apical anterior, apical inferior, apical lateral and apex.   The following segments are hypokinetic: apical septal.   IVS: There is abnormal septal motion consistent with right ventricular pacing.   Right Ventricle: Right ventricular cavity size is normal. Systolic function is mildly reduced.   Aortic Valve: There is trace regurgitation. There is aortic valve sclerosis.   Mitral Valve: There is mild annular calcification. There is mild to moderate regurgitation.   Tricuspid Valve: There is mild regurgitation.     XR chest portable  Result Date: 7/4/2025  Narrative: XR CHEST PORTABLE INDICATION: left flank pain/left lower rib pain. COMPARISON: 7/20/2023 FINDINGS: Right basilar parenchymal opacity noted, new since the prior study and may represent subsegmental atelectasis/scarring or focal airspace disease. The remaining right lung field and left lung appear adequately aerated and clear. No  pneumothorax or pleural  effusion. Normal cardiomediastinal silhouette. Left chest cardiac pacer device, calcific atherosclerosis of the aortic arch region, and left shoulder prosthesis again seen. Bones are unremarkable for age. No free air in the upper abdomen.     Impression: Right basilar parenchymal opacity noted, new since the prior study and may represent subsegmental atelectasis/scarring or focal airspace disease. The remaining right lung field and left lung appear adequately aerated and clear. Workstation performed: [id number]     CT pe study w abdomen pelvis w contrast  Result Date: 7/4/2025  Narrative: CT PULMONARY ANGIOGRAM OF THE CHEST AND CT ABDOMEN AND PELVIS WITH INTRAVENOUS CONTRAST INDICATION: fall weeks ago. COMPARISON: 7/2/2025 TECHNIQUE: CT examination of the chest, abdomen and pelvis was performed. Thin section CT angiographic technique was used in the chest in order to evaluate for pulmonary embolus and coronal 3D MIP postprocessing was performed on the acquisition scanner. Multiplanar 2D reformatted images were created from the source data. This examination, like all CT scans performed in the [facility], was performed utilizing techniques to minimize radiation dose exposure, including the use of iterative reconstruction and automated exposure control. Radiation dose length product (DLP) for this visit: 976.85 mGy-cm. IV Contrast: 100 mL of iohexol (OMNIPAQUE) Enteric Contrast: Not administered. FINDINGS: CHEST PULMONARY ARTERIAL TREE: There is a large pulmonary embolus at the right main pulmonary artery extending into segmental branches of the lower lobe.   No evidence for right heart strain. The pulmonary artery is dilated measuring up to 41 mm which may reflect component of pulmonary hypertension. LUNGS: There our somewhat triangular somewhat confluent and groundglass opacities at the right lower lobe, which are nonspecific, however may reflect pulmonary infarction.  Developing pneumonia may have a similar appearance. Additionally there is a somewhat triangular appearing density at the lingula which could reflect atelectasis. However, developing pneumonia or pulmonary contusion or infarct are in the differential. PLEURA: No pleural effusion. Questionable trace pneumothorax at the left upper lobe (6/136). HEART/AORTA: Heart is mildly enlarged. No thoracic aortic aneurysm. MEDIASTINUM AND GEORGE: Unremarkable. CHEST WALL AND LOWER NECK: Evaluation of the left axillary region is not possible secondary to extensive streak artifact from left shoulder prosthesis. ABDOMEN LIVER/BILIARY TREE: Unremarkable. GALLBLADDER: No calcified gallstones. No pericholecystic inflammatory change. SPLEEN: Unremarkable. PANCREAS: Unremarkable. ADRENAL GLANDS: Unremarkable. KIDNEYS/URETERS: Kidneys are normal for size and enhance symmetrically. Redemonstration of multilobulated exophytic soft tissue density lesion at the left renal lower pole, stable from prior measuring up to 23 mm (3/81), however enlarged from other prior  examinations as mentioned on 7/2/2025 report. Again nonemergent MRI of the abdomen is recommended for further evaluation, as previously recommended. Other stable hypodensities within both kidneys. STOMACH AND BOWEL: Prominent gastric wall thickening which could be due to nondistention, however correlate clinically for gastritis. No dilated loops of small bowel to suggest mechanical obstruction. Scattered diverticulosis without evidence for acute diverticulitis. No evidence for colitis. APPENDIX: No findings to suggest appendicitis. ABDOMINOPELVIC CAVITY: No ascites. No pneumoperitoneum. No lymphadenopathy. VESSELS: Moderate to marked atherosclerosis without abdominal aortic aneurysm. PELVIS REPRODUCTIVE ORGANS: Post hysterectomy URINARY BLADDER: Unremarkable. ABDOMINAL WALL/INGUINAL REGIONS: Unremarkable. BONES: Fractures with callus formation at the anterior left fourth and fifth  ribs (6/133 and 153). Multilevel degenerative change of the spine     Impression: There is a large pulmonary embolus at the right main pulmonary artery extending into segmental branches of the lower lobe.   No evidence for right heart strain. The pulmonary artery is dilated measuring up to 41 mm which may reflect component of pulmonary hypertension. There our somewhat triangular somewhat confluent and groundglass opacities at the right lower lobe, which are nonspecific, however may reflect pulmonary infarction. Developing pneumonia may have a similar appearance. Fractures with callus formation at the anterior left fourth and fifth ribs (6/133 and 153).  Questionable trace pneumothorax at the left upper lobe (6/136). Additionally there is a somewhat triangular appearing density at the lingula which could reflect atelectasis. However, developing pneumonia or pulmonary contusion or infarct are in the differential (3/164). Prominent gastric wall thickening which could be due to nondistention, however correlate clinically for gastritis. Redemonstration of multilobulated exophytic soft tissue density lesion at the left renal lower pole, stable from prior measuring up to 23 mm (3/81), however enlarged from other prior examinations as mentioned on 7/2/2025 report. Again nonemergent MRI of the abdomen is recommended for further evaluation, as previously recommended. Other findings as detailed above. I personally discussed this study with ELLEN CAMPBELL on 7/3/2025 11:59 PM. Computerized Assisted Algorithm (CAA) may have aided analysis of applicable images. Workstation performed: [id number]     CT spine cervical without contrast  Result Date: 7/3/2025  Narrative: CT CERVICAL SPINE - WITHOUT CONTRAST INDICATION:   fall weeks ago. COMPARISON: 4/26/2007 TECHNIQUE:  CT examination of the cervical spine was performed without intravenous contrast.  Contiguous axial images were obtained. Multiplanar 2D reformatted images were  created from the source data. Radiation dose length product (DLP) for this visit:  467 mGy-cm. .  This examination, like all CT scans performed in the [facility], was performed utilizing techniques to minimize radiation dose exposure, including the use of iterative reconstruction and automated exposure control. IMAGE QUALITY:  Diagnostic. FINDINGS: ALIGNMENT: Mild grade 1 anterolisthesis of C3 over C4. Mild retrolisthesis of C4 over C5 VERTEBRAE: No acute vertebral body compression fracture DEGENERATIVE CHANGES: Moderate multilevel cervical degenerative changes are noted. No critical central canal stenosis. PREVERTEBRAL AND PARASPINAL SOFT TISSUES: Unremarkable THORACIC INLET: Evaluation is somewhat limited secondary to streak artifact from cardiac wires at the anterior mediastinum. However thoracic inlet is grossly unremarkable. No consolidative airspace opacity, pleural effusion, or pneumothorax within the visualized lungs     Impression: No acute vertebral body compression fracture. No traumatic malalignment. No prevertebral soft tissue swelling. Workstation performed: [id number]     CT head without contrast  Result Date: 7/3/2025  Narrative: CT BRAIN - WITHOUT CONTRAST INDICATION:   fall weeks ago. COMPARISON: 12/3/2023 TECHNIQUE:  CT examination of the brain was performed.  Multiplanar 2D reformatted images were created from the source data. Radiation dose length product (DLP) for this visit:  863 mGy-cm. .  This examination, like all CT scans performed in the [facility], was performed utilizing techniques to minimize radiation dose exposure, including the use of iterative reconstruction and automated exposure control. IMAGE QUALITY:  Diagnostic. FINDINGS: PARENCHYMA: Decreased attenuation is noted in periventricular and subcortical white matter demonstrating an appearance that is statistically most likely to represent mild microangiopathic change. No CT signs of acute  infarction.  No intracranial mass, mass effect or midline shift.  No acute parenchymal hemorrhage. VENTRICLES AND EXTRA-AXIAL SPACES:  Normal for the patient's age. VISUALIZED ORBITS: Postsurgical thinning of the lenses. PARANASAL SINUSES: Normal visualized paranasal sinuses. CALVARIUM AND EXTRACRANIAL SOFT TISSUES: Questionable subtle soft tissue swelling at the posterior left parietal calvarium (axial image 33). No acute depressed calvarial fracture.     Impression: No acute intracranial abnormality. Stable mild microangiopathic changes Workstation performed: [id number]     CT abdomen pelvis with contrast  Result Date: 7/2/2025  Narrative: CT ABDOMEN AND PELVIS WITH IV CONTRAST INDICATION: epigastric pain. . COMPARISON: None. TECHNIQUE: CT examination of the abdomen and pelvis was performed. Multiplanar 2D reformatted images were created from the source data. This examination, like all CT scans performed in the [facility], was performed utilizing techniques to minimize radiation dose exposure, including the use of iterative reconstruction and automated exposure control. Radiation dose length product (DLP) for this visit: 563.95 mGy-cm. IV Contrast: 50 mL of iohexol (OMNIPAQUE) Enteric Contrast: Not administered. FINDINGS: ABDOMEN LOWER CHEST: No clinically significant abnormality in the visualized lower chest. Right basilar atelectasis LIVER/BILIARY TREE: Unremarkable. GALLBLADDER: No calcified gallstones. No pericholecystic inflammatory change. SPLEEN: Unremarkable. PANCREAS: Unremarkable. ADRENAL GLANDS: Unremarkable. KIDNEYS/URETERS: No hydronephrosis or urinary tract calculi. Simple cysts and subcentimeter hypoattenuating renal lesion(s), too small to characterize but statistically likely benign, which do not warrant follow-up (Radiology June 2019). Enlarging multilobulated exophytic soft tissue density lesion at the left inferior pole measuring 2.3 x 1.2 x 1.7 cm with interspersed fat density.  Lesion measured 9 mm on CT examination of 10/4/2018. Additional isodense lesions at the right inferior pole measuring 1.5 cm and left upper pole measuring 1.3 cm which are indeterminate Stable focal parenchymal thinning/scar at the left upper pole with focal parenchymal calcification stable since 2018. STOMACH AND BOWEL: Colonic diverticulosis without findings of acute diverticulitis. APPENDIX: No findings to suggest appendicitis. ABDOMINOPELVIC CAVITY: No ascites. No pneumoperitoneum. No lymphadenopathy. VESSELS: Unremarkable for patient's age. PELVIS REPRODUCTIVE ORGANS: Post hysterectomy. URINARY BLADDER: Unremarkable. ABDOMINAL WALL/INGUINAL REGIONS: Unremarkable. BONES: No acute fracture or suspicious osseous lesion.     Impression: No evidence of acute intra-abdominal or pelvic pathology. Enlarging multilobulated exophytic soft tissue density lesion at the left inferior pole measuring up to 2.3 cm with interspersed fat density. 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 Nonemergent MR abdomen recommended for further evaluation. Additional indeterminate isodense lesions at the right inferior pole and left upper pole. These may also be evaluated on MRI. The study was marked in EPIC for immediate notification. Workstation performed: [id number]       LABS  Results from last 7 days   Lab Units 07/06/25  0522 07/05/25  0535 07/04/25  0641 07/03/25 2042 07/02/25  1620   WBC Thousand/uL 7.28 7.19 9.12 10.95* 11.12*   HEMOGLOBIN g/dL 12.9 13.8 12.7 13.8 14.4   HEMATOCRIT % 39.9 41.6 39.8 43.0 44.4   MCV fL 94 92 96 95 95   PLATELETS Thousands/uL 248 201 178 189 171   INR   --   --   --  1.02  --      Results from last 7 days   Lab Units 07/07/25  0401 07/06/25  0522 07/05/25  0535 07/04/25  0641 07/03/25 2042 07/02/25  1620   SODIUM mmol/L 138 139 138 136 137 136   POTASSIUM mmol/L 4.8 5.0 3.4* 3.4* 3.9 4.4   CHLORIDE mmol/L 107 106 102 101 99 101   CO2 mmol/L 26 30 27  26 29 25   BUN mg/dL 13 16 18 14 16 16   CREATININE mg/dL 0.91 1.15 1.23 0.88 0.98 1.08   CALCIUM mg/dL 9.7 9.8 10.0 9.5 10.2 10.3*   ALBUMIN g/dL  --   --   --   --  3.4* 3.7   TOTAL BILIRUBIN mg/dL  --   --   --   --  0.80 0.83   ALK PHOS U/L  --   --   --   --  85 82   ALT U/L  --   --   --   --  15 16   AST U/L  --   --   --   --  16 23   EGFR ml/min/1.73sq m 61 46 42 63 55 49   GLUCOSE RANDOM mg/dL 107 106 96 109 146* 154*     Results from last 7 days   Lab Units 07/03/25 2251 07/03/25 2042   HS TNI 0HR ng/L  --  13   HS TNI 2HR ng/L 13  --           Results from last 7 days   Lab Units 07/03/25  2042   D-DIMER QUANTITATIVE ug/ml FEU 2.80*     Results from last 7 days   Lab Units 07/07/25  1122 07/07/25  0909 07/06/25  2116 07/06/25  1614 07/06/25  1204 07/06/25  0906 07/05/25  2112 07/05/25  1625 07/05/25  1151 07/05/25  0725   POC GLUCOSE mg/dl 168* 100 116 117 103 89 160* 173* 157* 109             Results from last 7 days   Lab Units 07/03/25  2042   LACTIC ACID mmol/L 1.1   PROCALCITONIN ng/ml 0.29*           Cultures:   Results from last 7 days   Lab Units 07/04/25  0322   COLOR UA  Light Yellow   CLARITY UA  Clear   SPEC GRAV UA  <1.005*   PH UA  6.5   LEUKOCYTES UA  Small*   NITRITE UA  Negative   GLUCOSE UA mg/dl 70 (7/100%)*   KETONES UA mg/dl 10 (1+)*   BILIRUBIN UA  Negative   BLOOD UA  Negative      Results from last 7 days   Lab Units 07/04/25  0322   RBC UA /hpf None Seen   WBC UA /hpf 4-10*   EPITHELIAL CELLS WET PREP /hpf Occasional   BACTERIA UA /hpf None Seen      Results from last 7 days   Lab Units 07/04/25  0322 07/03/25 2042 07/02/25  1151   BLOOD CULTURE   --  No Growth at 72 hrs.  No Growth at 72 hrs.  --    URINE CULTURE  10,000-19,000 cfu/ml  --  >100,000 cfu/ml         Condition at Discharge:  good      Discharge instructions/Information to patient and family:   See after visit summary for information provided to patient and family.  The above hospital course, results, incidental  findings, need for follow up was discussed in detail with the patient and/or family.    Provisions for Follow-Up Care:  See after visit summary for information related to follow-up care and any pertinent home health orders.      Disposition:     Other: snf       Discharge Statement:  I spent 41 minutes discharging the patient. This time was spent on the day of discharge. I had direct contact with the patient on the day of discharge. Greater than 50% of the total time was spent examining patient, answering all patient questions, arranging and discussing plan of care with patient as well as directly providing post-discharge instructions.  Additional time then spent on discharge activities. D/w dtrs     Discharge Medications:  See after visit summary for reconciled discharge medications provided to patient and family.      ** Please Note: This note has been constructed using a voice recognition system **

## 2025-07-07 NOTE — PLAN OF CARE
Problem: PHYSICAL THERAPY ADULT  Goal: Performs mobility at highest level of function for planned discharge setting.  See evaluation for individualized goals.  Description: Treatment/Interventions: Functional transfer training, LE strengthening/ROM, Elevations, Therapeutic exercise, Endurance training, Patient/family training, Equipment eval/education, Bed mobility, Gait training, Spoke to MD, Spoke to nursing, Continued evaluation, Spoke to case management, Family  Equipment Recommended: Walker (pt needs RW upon DC)       See flowsheet documentation for full assessment, interventions and recommendations.  Outcome: Progressing  Note: Prognosis: Good  Problem List: Decreased strength, Decreased endurance, Impaired balance, Decreased mobility, Decreased cognition, Impaired judgement, Decreased safety awareness, Decreased skin integrity  Assessment: Pt received supine in bed, agreeable to therapy session. Pt needs CG assist for transfers and ambulation using a RW. Pt's gait quality is improving but her endurance remains decreased. Pt remains unsafe to return home alone at her current functional status. The patient's AM-PAC Basic Mobility Inpatient Short Form Raw Score is 17. A Raw score of less than or equal to 17 suggests the patient may benefit from discharge to post-acute rehabilitation services. Please also refer to the recommendation of the Physical Therapist for safe discharge planning. Based on current status, Level 2 rehab intensity is recommended at time of discharge. Will continue to follow during hospitalization to maximize independence and functional gains.  Barriers to Discharge: Decreased caregiver support, Inaccessible home environment (lives alone)     Rehab Resource Intensity Level, PT: II (Moderate Resource Intensity)    See flowsheet documentation for full assessment.

## 2025-07-07 NOTE — OCCUPATIONAL THERAPY NOTE
Occupational Therapy Evaluation     Patient Name: Dejah Parish  Today's Date: 7/7/2025  Problem List  Principal Problem:    Pulmonary embolism (HCC)  Active Problems:    Coronary artery disease    Chronic systolic congestive heart failure (HCC)    Biventricular ICD (implantable cardioverter-defibrillator) in place    Acquired hypothyroidism    Obstructive sleep apnea    Hyperparathyroidism (HCC)    Continuous opioid dependence (HCC)    Current moderate episode of major depressive disorder without prior episode (HCC)    Type 2 diabetes mellitus with hyperglycemia, without long-term current use of insulin (HCC)    Hypertension    Renal lesion    Abnormal urinalysis    Memory changes    Rib fractures    CHASE (acute kidney injury) (HCC)    Past Medical History  Past Medical History[1]  Past Surgical History  Past Surgical History[2]          07/07/25 0923   OT Last Visit   OT Visit Date 07/07/25   Note Type   Note type Evaluation   Pain Assessment   Pain Assessment Tool 0-10   Pain Score No Pain   Restrictions/Precautions   Weight Bearing Precautions Per Order No   Other Precautions Chair Alarm;Bed Alarm;Cognitive;Fall Risk;Telemetry   Home Living   Type of Home House   Home Layout Two level;1/2 bath on main level;Bed/bath upstairs;Stairs to enter without rails  (1 JASVIR)   Bathroom Shower/Tub Walk-in shower   Additional Comments no DME PTA   Prior Function   Level of Dare Independent with ADLs;Independent with functional mobility;Independent with IADLS   Lives With (S)  Alone   Receives Help From Family  (Only local family is a son who is disabled & unable to assist pt. Family either lives in [place] or [place])   IADLs Independent with driving;Independent with meal prep;Independent with medication management   Falls in the last 6 months 1 to 4  (June 24, 2025)   Vocational Retired   Lifestyle   Autonomy IND at baseline   Reciprocal Relationships Lives alone, has supportive family, however, they live out of  "state   Service to Others Retired   General   Family/Caregiver Present Yes   Additional General Comments Dtr present   Subjective   Subjective \"I was voted most athletic in high school\"   ADL   Eating Assistance 7  Independent   Grooming Assistance 5  Supervision/Setup   UB Bathing Assistance 5  Supervision/Setup   LB Bathing Assistance 4  Minimal Assistance   UB Dressing Assistance 5  Supervision/Setup   LB Dressing Assistance 4  Minimal Assistance   Toileting Assistance  4  Minimal Assistance   Bed Mobility   Supine to Sit 5  Supervision   Additional items HOB elevated;Increased time required;Verbal cues   Sit to Supine 5  Supervision   Additional items HOB elevated;Bedrails   Additional Comments Able to sit EOB with supervision   Transfers   Sit to Stand 5  Supervision   Additional items Verbal cues   Stand to Sit 5  Supervision   Additional items Verbal cues   Stand pivot 5  Supervision   Additional items Verbal cues  (Close supervision with RW from chair > bed)   Additional Comments 2 sit <> stands performed, VCs for hand placement   Functional Mobility   Functional Mobility 4  Minimal assistance   Additional Comments x1 for CGA. Initially lateral swaying toward L side requiring CGA for safety. Improved with increased distance throughout unit halls   Additional items Rolling walker   Balance   Static Sitting Fair +   Dynamic Sitting Fair -   Static Standing Fair -   Dynamic Standing Fair -   Ambulatory Fair -   Activity Tolerance   Activity Tolerance Patient tolerated treatment well   Medical Staff Made Aware PT Damon   RUE Assessment   RUE Assessment WFL   LUE Assessment   LUE Assessment WFL   Hand Function   Hand Function Comments Recent L hand cellulitis s/p cat bite end of May. Pt seen by orthopedics in OP setting & recommended for L hand MRI to r/o extensor tendon rupture. However, per dtr, MRI had to be cancelled d/t current hospitalization. Pt with visible lump at dorsum of L wrist with 3rd digit unable " to fully extend. Able to make composite fist. Wrist flexion appears WFL, wrist extension with minimal ROM loss. Overall L hand is WFL for ADL participation   Vision-Basic Assessment   Current Vision Wears glasses all the time   Cognition   Overall Cognitive Status Impaired  (H/o memory changes, followed by PCP with MMSE of 27/30)   Arousal/Participation Alert;Cooperative   Attention Within functional limits   Orientation Level Oriented to person;Oriented to time;Oriented to place;Disoriented to situation   Memory Decreased recall of precautions;Decreased recall of recent events;Decreased short term memory   Following Commands Follows one step commands without difficulty   Assessment   Limitation Decreased ADL status;Decreased Safe judgement during ADL;Decreased cognition;Decreased endurance;Decreased self-care trans;Decreased high-level ADLs   Prognosis Good   Assessment Pt is a 75 y.o. female seen for OT evaluation at [facility], admitted 7/3/2025 w/ Pulmonary embolism (HCC). OT completed extensive review of pt's medical and social history. Comorbidities affecting pt's functional performance at time of assessment include: CHF, biventricular ICD in place, DM2, HTN, UTI, memory changes, rib fractures, CHASE, OA, cervical DDD. Personal factors affecting pt at time of IE include: steps to enter environment, limited home support, behavioral pattern, difficulty performing ADLS, difficulty performing IADLS , limited insight into deficits, and environment. Prior to admission, pt was living alone in a 2Sh with 1 JASVIR.  Pt was I w/  ADLS and w/ IADLS, (+) drove, & required use of no DME/AD PTA. Upon evaluation: Pt requires S for bed mobility, S for functional transfers, CGA for functional mobility, S for UB ADLs and Min A for LB ADLS 2* the following deficits impacting occupational performance: weakness, decreased balance, decreased tolerance, impaired memory, impaired problem solving, and decreased safety awareness.  Full objective findings from OT assessment regarding body systems outlined above. Pt to benefit from continued skilled OT tx while in the hospital to address deficits as defined above and maximize level of functional independence w/ ADL's and functional mobility. Occupational Performance areas to address include: grooming, bathing/shower, toilet hygiene, dressing, functional mobility, and clothing management. Based on findings, pt is of high complexity. The patient's raw score on the -PAC Daily Activity inpatient short form is 19, standardized score is 40.22, greater than 39.4. Patients at this level are likely to benefit from DC to home. However, please refer to therapist recommendation for discharge planning given other factors that may influence destination. At this time, OT recommendations at time of discharge are DC with Level II - Moderate Rehab Resource Intensity.   Goals   Patient Goals Pt wants to go home   Plan   Treatment Interventions ADL retraining;Functional transfer training;Endurance training;Patient/family training;Equipment evaluation/education;Compensatory technique education;Continued evaluation;Cognitive reorientation   Goal Expiration Date 07/17/25   OT Treatment Day 0   OT Frequency 3-5x/wk   Discharge Recommendation   Rehab Resource Intensity Level, OT II (Moderate Resource Intensity)   -PAC Daily Activity Inpatient   Lower Body Dressing 3   Bathing 3   Toileting 3   Upper Body Dressing 3   Grooming 3   Eating 4   Daily Activity Raw Score 19   Daily Activity Standardized Score (Calc for Raw Score >=11) 40.22   AM-PAC Applied Cognition Inpatient   Following a Speech/Presentation 3   Understanding Ordinary Conversation 4   Taking Medications 3   Remembering Where Things Are Placed or Put Away 3   Remembering List of 4-5 Errands 3   Taking Care of Complicated Tasks 3   Applied Cognition Raw Score 19   Applied Cognition Standardized Score 39.77   End of Consult   Education Provided Yes;Family  or social support of family present for education by provider   Patient Position at End of Consult Bed/Chair alarm activated;All needs within reach;Supine   Nurse Communication Nurse aware of consult     Pt will achieve the following goals within 10 days.    *Pt will complete UB bathing and dressing with IND.    *Pt will complete LB bathing and dressing with Mod I & AD PRN.    *Pt will complete toileting w/ Mod I w/ G hygiene/thoroughness using AD PRN    *Pt will verbalize and demonstrate understanding in use of compensatory techniques and use of long handled AE for increased safety and independence during LB ADL tasks.       *Pt will verbalize and demonstrate good body mechanics and joint protection techniques during  ADLs/ IADLs with no verbal cues.    *Pt will complete bed mobility independently, with bed flat and no side rail to prep for purposeful tasks    *Pt will perform functional transfers with on/off all surfaces with Mod I using DME as needed w/ G balance/safety.    *Pt will improve functional mobility during ADL/IADL/leisure tasks to Mod I using DME as needed w/ G balance/safety.    *Pt will demonstrate independent problem solving/sequencing skills during ADL/IADLs tasks & functional txfers/mobility to improve independence & safety awareness    *Pt will demonstrate G carryover of pt/caregiver education and training as appropriate w/ Mod I w/o cues w/ good tolerance    Linsey Wolf OTR/L              [1]   Past Medical History:  Diagnosis Date    Abnormal finding on breast imaging     last assessed 11/30/17    Allergic rhinitis     Anxiety     BBB (bundle branch block)     left,last assesed 6/4/12    Bilateral fibrocystic breast changes     Bilateral sacroiliitis (HCC)     Carpal tunnel syndrome, unspecified upper limb     Cellulitis of left hand 05/17/2025    Chronic systolic congestive heart failure (HCC)     Coronary artery disease     Detrusor instability     Dilated cardiomyopathy (HCC)      Disorder of intervertebral disc of cervical spine     Fibroid     Gout     Hormone replacement therapy     Hx of blood clots     Hyperlipidemia     Hypertension     Hypokalemia     Obesity     Osteoarthritis     Papilloma of left breast     Psoriasis     psoriatic arthropathy    Psoriatic arthropathy (HCC)     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    RLS (restless legs syndrome)     Screening mammogram, encounter for 12/05/2019    Vitamin D deficiency    [2]   Past Surgical History:  Procedure Laterality Date    BLADDER SURGERY  1999    lift and repair    BREAST BIOPSY Left 05/23/2016    US CORE BIOPSY-BENIGN    CARDIAC CATHETERIZATION      outcome:  successful    CARDIAC DEFIBRILLATOR PLACEMENT      CARDIAC ELECTROPHYSIOLOGY PROCEDURE N/A 08/22/2023    Procedure: Cardiac biv icd generator change;  Surgeon: Nahid Dickerson MD;  Location: [facility];  Service: Cardiology    CARPAL TUNNEL RELEASE Bilateral 1997    CATARACT EXTRACTION      COLONOSCOPY      CORONARY ANGIOPLASTY WITH STENT PLACEMENT      CYSTOSCOPY W/ URETEROSCOPY  2009    DE QUERVAIN'S RELEASE Left 2011    and trigger finger release    EYE SURGERY Left 1999    EYE SURGERY Left 11/13/2019    Cataract    EYE SURGERY Right 11/09/2019    Cataract    INSERT / REPLACE / REMOVE PACEMAKER      JOINT REPLACEMENT Right 09/2017    Knee    KNEE ARTHROSCOPY      therapeutic    NEUROPLASTY / TRANSPOSITION MEDIAN NERVE AT CARPAL TUNNEL      OOPHORECTOMY Bilateral     AGE 44    ORTHOPEDIC SURGERY      CA TENDON SHEATH INCISION Left 03/02/2017    Procedure: SMALL TRIGGER FINGER RELEASE;  Surgeon: Camden Lancaster MD;  Location: [facility];  Service: Orthopedics    RECTAL SURGERY  2006    repair of rectal ulcer    SHOULDER ARTHROSCOPY Left 2006    TOTAL ABDOMINAL HYSTERECTOMY      AGE 44    TOTAL KNEE ARTHROPLASTY Left     TOTAL SHOULDER REPLACEMENT Left 2007    TRIGGER FINGER RELEASE Bilateral 2011    right haand 201,2015,left hand 2012,2015    US GUIDED BREAST BIOPSY  LEFT COMPLETE Left 05/23/2016

## 2025-07-07 NOTE — ASSESSMENT & PLAN NOTE
Improved, creatinine 1.15  Creatinine 1.23 from 0.88, probably in the settings of recent contrast use and diuretics  Held torsemide and losartan, gentle hydration  Recheck BMP in the morning  RESOLVED

## 2025-07-07 NOTE — PROGRESS NOTES
Patient:    MRN:  [id number]    Sophie Request ID:  [id number]    Level of care reserved:  Skilled Nursing Facility    Partner Reserved:  [facility] [place] [address] [phone]    Clinical needs requested:    Geography searched:  15 miles around [id number]    Start of Service:    Request sent:  1:05pm EDT on 7/5/2025 by Mariela Tejada    Partner reserved:  12:24pm EDT on 7/7/2025 by Jose A Richardson    Choice list shared:  12:23pm EDT on 7/7/2025 by Jose A Richardson

## 2025-07-07 NOTE — CASE MANAGEMENT
Case Management Assessment & Discharge Planning Note    Patient name Dejah Parish  Location /-01 MRN [id number]  : 1950 Date 2025       Current Admission Date: 7/3/2025  Current Admission Diagnosis:Pulmonary embolism (HCC)   Patient Active Problem List    Diagnosis Date Noted    CHASE (acute kidney injury) (HCC) 2025    Renal lesion 2025    Pulmonary embolism (HCC) 2025    Abnormal urinalysis 2025    Memory changes 2025    Rib fractures 2025    Hypertension 2025    Type 2 diabetes mellitus with hyperglycemia, without long-term current use of insulin ([facility]) 2025    Pain of left hip 2024    Ischial bursitis of left side 2024    Tremor of both hands 2023    Continuous opioid dependence (HCC) 2023    Pulmonary emphysema, unspecified emphysema type ([facility]) 2023    Dilated cardiomyopathy ([facility])     PVC's (premature ventricular contractions) 06/10/2020    Chronic left shoulder pain 06/10/2020    Abnormal computerized axial tomography of abdomen 2019    Hyperparathyroidism (HCC) 2019    Dense breast tissue 2018    Family history of breast cancer 2018    Hypercalcemia 2018    Leukocytosis 2018    Lumbar spondylosis 10/10/2018    Chronic left-sided low back pain without sciatica 10/10/2018    Bilateral fibrocystic breast changes 2017    Benign positional vertigo, left 2017    Gout 2017    Trigger little finger of left hand 2017    Arthralgia of knee, left 2016    Arthralgia of knee, right 2016    Stage 3b chronic kidney disease (HCC) 10/07/2015    Biventricular ICD (implantable cardioverter-defibrillator) in place 2014    Hypokalemia 2013    Mixed hyperlipidemia 2013    Vitamin D deficiency 2013    Psoriasis 2012    Allergic rhinitis 2012    Coronary artery disease 2012    Chronic systolic congestive heart  failure (HCC) 06/14/2012    DDD (degenerative disc disease), cervical 06/14/2012    Acquired hypothyroidism 06/14/2012    Obstructive sleep apnea 06/14/2012    Osteoarthritis 06/14/2012    Overactive bladder 06/14/2012    Type 2 diabetes mellitus with stage 3 chronic kidney disease, without long-term current use of insulin ([facility]) 06/14/2012    Gastroesophageal reflux disease with esophagitis 06/14/2012    Hypertensive heart and chronic kidney disease with heart failure and stage 1 through stage 4 chronic kidney disease, or chronic kidney disease (HCC) 06/14/2012    Current moderate episode of major depressive disorder without prior episode ([facility]) 05/10/2012      LOS (days): 3  Geometric Mean LOS (GMLOS) (days): 2.4  Days to GMLOS:-1.1     OBJECTIVE:    Risk of Unplanned Readmission Score: 28.89         Current admission status: Inpatient       Preferred Pharmacy:   [facility] [address]  Phone: [phone] Fax: [phone]    [facility] [address] [place], [place] [address]  Phone: [phone] Fax: [phone]    Primary Care Provider: Leann Henson DO    Primary Insurance: MEDICARE  Secondary Insurance: AETNA    ASSESSMENT:  Active Health Care Proxies       Tamia Craven Health Care Representative - Daughter   Primary Phone: [phone] (Mobile)                 Advance Directives  Does patient have a Health Care POA?: No  Was patient offered paperwork?: Yes  Does patient currently have a Health Care decision maker?: Yes, please see Health Care Proxy section  Does patient have Advance Directives?: No  Was patient offered paperwork?: Yes  Primary Contact: luther Cantrell.         Readmission Root Cause  30 Day Readmission: No    Patient Information  Admitted from:: Home  Mental Status: Alert  During Assessment patient was accompanied by: Daughter  Assessment information  provided by:: Patient, Daughter  Primary Caregiver: Self  Support Systems: Daughter, Self, Children  County of Residence: [place]  What [facility] do you live in?: [place].  Home entry access options. Select all that apply.: Stairs  Number of steps to enter home.: 1  Type of Current Residence: 2 story home  Upon entering residence, is there a bedroom on the main floor (no further steps)?: No  A bedroom is located on the following floor levels of residence (select all that apply):: 2nd Floor  Upon entering residence, is there a bathroom on the main floor (no further steps)?: Yes  Number of steps to 2nd floor from main floor: One Flight  Living Arrangements: Lives Alone    Activities of Daily Living Prior to Admission  Functional Status: Independent  Completes ADLs independently?: Yes  Ambulates independently?: Yes  Does patient use assisted devices?: No  Does patient currently own DME?: No  Does patient have a history of Outpatient Therapy (PT/OT)?: Yes  Does the patient have a history of Short-Term Rehab?: No  Does patient have a history of HHC?: No  Does patient currently have HHC?: No         Patient Information Continued  Income Source: SSI/SSD  Does patient have prescription coverage?: Yes  Can the patient afford their medications and any related supplies (such as glucometers or test strips)?: Yes  Does patient receive dialysis treatments?: No  Does patient have a history of substance abuse?: No  Does patient have a history of Mental Health Diagnosis?: No         Means of Transportation  Means of Transport to Appts:: Drives Self          DISCHARGE DETAILS:    Discharge planning discussed with:: Pt and daughter Tamia.  Freedom of Choice: Yes     CM contacted family/caregiver?: Yes  Were Treatment Team discharge recommendations reviewed with patient/caregiver?: Yes  Did patient/caregiver verbalize understanding of patient care needs?: Yes  Were patient/caregiver advised of the risks associated with not following  Treatment Team discharge recommendations?: Yes    Contacts  Patient Contacts: luther Cantrell.  Relationship to Patient:: Family  Contact Method: In Person  Reason/Outcome: Continuity of Care, Discharge Planning, Referral, Emergency Contact    Requested Home Health Care         Is the patient interested in C at discharge?: No    DME Referral Provided  Referral made for DME?: No    Other Referral/Resources/Interventions Provided:  Interventions: Short Term Rehab, Transportation  Referral Comments: Agreeable to STR. Preference is [facility]--reserved in [place]. [employer] van transport claimed for 3pm today for pt to attend [place]. Informed pt, pt's daughter, SLIM, RN of transport time. Informed dtr of OPP of transport. Provided RN report numbers.Phone: [phone]  Fax [phone].         Treatment Team Recommendation: Short Term Rehab  Expected Discharge Disposition: Short Term Rehab  Additional Discharge Dispositions: Short Term Rehab  Transport at Discharge : Wheelchair van     Number/Name of Dispatcher: [phone]  Transported by (Company and Unit #): [employer]  ETA of Transport (Date): 07/07/25  ETA of Transport (Time): 1500              IMM Given (Date):: 07/07/25  IMM Given to:: Patient  Family notified:: Tamia, luther.  IMM reviewed with patient and caregiver, patient and caregiver agrees with discharge determination.   Additional Comments: CM met with pt and daughter Tamia at bedside to plan discharge. Elizabeth lives alone in a 2sh, 1ste, bedroom upstairs. Reports being independent with walking and ADLS PTA. Does not use or own DME. No hx of STR, IP psych, VNA, or D&A treatment. Drives. No medical POA. [facility] [place] [facility]. Agreeable to STR. [facility] reserved. Transport set for 3pm--[employer].

## 2025-07-07 NOTE — PLAN OF CARE
Problem: OCCUPATIONAL THERAPY ADULT  Goal: Performs self-care activities at highest level of function for planned discharge setting.  See evaluation for individualized goals.  Description: Treatment Interventions: ADL retraining, Functional transfer training, Endurance training, Patient/family training, Equipment evaluation/education, Compensatory technique education, Continued evaluation, Cognitive reorientation          See flowsheet documentation for full assessment, interventions and recommendations.   Note: Limitation: Decreased ADL status, Decreased Safe judgement during ADL, Decreased cognition, Decreased endurance, Decreased self-care trans, Decreased high-level ADLs  Prognosis: Good  Assessment: Pt is a 75 y.o. female seen for OT evaluation at [facility], admitted 7/3/2025 w/ Pulmonary embolism (HCC). OT completed extensive review of pt's medical and social history. Comorbidities affecting pt's functional performance at time of assessment include: CHF, biventricular ICD in place, DM2, HTN, UTI, memory changes, rib fractures, CHASE, OA, cervical DDD. Personal factors affecting pt at time of IE include: steps to enter environment, limited home support, behavioral pattern, difficulty performing ADLS, difficulty performing IADLS , limited insight into deficits, and environment. Prior to admission, pt was living alone in a 2Sh with 1 JASVIR.  Pt was I w/  ADLS and w/ IADLS, (+) drove, & required use of no DME/AD PTA. Upon evaluation: Pt requires S for bed mobility, S for functional transfers, CGA for functional mobility, S for UB ADLs and Min A for LB ADLS 2* the following deficits impacting occupational performance: weakness, decreased balance, decreased tolerance, impaired memory, impaired problem solving, and decreased safety awareness. Full objective findings from OT assessment regarding body systems outlined above. Pt to benefit from continued skilled OT tx while in the hospital to address deficits  as defined above and maximize level of functional independence w/ ADL's and functional mobility. Occupational Performance areas to address include: grooming, bathing/shower, toilet hygiene, dressing, functional mobility, and clothing management. Based on findings, pt is of high complexity. The patient's raw score on the -PAC Daily Activity inpatient short form is 19, standardized score is 40.22, greater than 39.4. Patients at this level are likely to benefit from DC to home. However, please refer to therapist recommendation for discharge planning given other factors that may influence destination. At this time, OT recommendations at time of discharge are DC with Level II - Moderate Rehab Resource Intensity.     Rehab Resource Intensity Level, OT: II (Moderate Resource Intensity)

## 2025-07-07 NOTE — PHYSICAL THERAPY NOTE
"                                                                                  PHYSICAL THERAPY TREATMENT NOTE      Patient Name: Dejah Parish  Today's Date: 7/7/2025 07/07/25 0930   PT Last Visit   PT Visit Date 07/07/25   Note Type   Note Type Treatment   Pain Assessment   Pain Assessment Tool 0-10   Pain Score No Pain   Restrictions/Precautions   Weight Bearing Precautions Per Order No   Other Precautions Chair Alarm;Cognitive;Bed Alarm;Fall Risk;Telemetry   General   Chart Reviewed Yes   Family/Caregiver Present Yes  (daughter)   Cognition   Overall Cognitive Status Impaired   Arousal/Participation Alert;Cooperative   Attention Within functional limits   Orientation Level Oriented to person;Oriented to time;Oriented to place;Disoriented to situation   Memory Decreased recall of precautions;Decreased recall of recent events;Decreased short term memory   Following Commands Follows one step commands without difficulty   Subjective   Subjective \"I haven't been walking that much\".   Bed Mobility   Supine to Sit 5  Supervision   Additional items HOB elevated;Increased time required;Verbal cues   Additional Comments able to sit at EOB with supervision   Transfers   Sit to Stand 4  Minimal assistance  (for CG assist)   Additional items Verbal cues   Stand to Sit 4  Minimal assistance  (for CG assist)   Additional items Armrests;Verbal cues   Ambulation/Elevation   Gait pattern   (variable JEAN-PAUL and step length. reciprocal steps, decreased heel strike and foot clearance bilaterally)   Gait Assistance 4  Minimal assist   Additional items Assist x 1;Verbal cues   Assistive Device Rolling walker   Distance 125ft   Ambulation/Elevation Additional Comments Pt needed multiple standing rest breaks due to fatigue and mild TEE. SpO2 lowest was 90% on room air on exertion.   Balance   Static Sitting Fair +   Dynamic Sitting Fair   Static Standing Fair -   Dynamic Standing Fair -   Ambulatory Fair -   Activity " Tolerance   Activity Tolerance Patient limited by fatigue   Medical Staff Made Aware OT damion   Assessment   Prognosis Good   Problem List Decreased strength;Decreased endurance;Impaired balance;Decreased mobility;Decreased cognition;Impaired judgement;Decreased safety awareness;Decreased skin integrity   Assessment Pt received supine in bed, agreeable to therapy session. Pt needs CG assist for transfers and ambulation using a RW. Pt's gait quality is improving but her endurance remains decreased. Pt remains unsafe to return home alone at her current functional status. The patient's AM-PAC Basic Mobility Inpatient Short Form Raw Score is 17. A Raw score of less than or equal to 17 suggests the patient may benefit from discharge to post-acute rehabilitation services. Please also refer to the recommendation of the Physical Therapist for safe discharge planning. Based on current status, Level 2 rehab intensity is recommended at time of discharge. Will continue to follow during hospitalization to maximize independence and functional gains.   Barriers to Discharge Decreased caregiver support;Inaccessible home environment  (lives alone)   Goals   Patient Goals to get better   STG Expiration Date 07/15/25   Short Term Goal #1 in 7-10 days:  (1) Pt will be able to ambulate greater than 150 feet  with use of RW on various surfaces needing S level of A in order to A pt to return to PLOF, (2) activity tolerance:45 mins/45mins, (3) pt will be able to perform sit to stand transfers needing S level of A to and from various surfaces consistently in order to return to PLOF, (4) pt will be able to perform BM needing S level of A to A pt to return to PLOF, (5) (I) with BLE therapeutic ex HEP in various positions to A pt to inc balance,strength,mobility,endurance and to A to dec pain, (6) inc balance 1/2 grade in order to dec fall risk, (7) pt will be able to go up and down 1 FF of steps and single curb step needing S level of A in  order to navigate JASVIR and 2 SH as able and as needed prior to D/C, (8) cont to provide pt and pt family education for safe D/C planning, (9) inc BLE strength 1/2 to 1 full grade in order to A pt to inc balance,strength,mobility,endurance and to A to dec pain   PT Treatment Day 1   Plan   Treatment/Interventions Functional transfer training;LE strengthening/ROM;Therapeutic exercise;Endurance training;Patient/family training;Bed mobility;Gait training;Compensatory technique education;Spoke to nursing;Family   Progress Progressing toward goals   PT Frequency 3-5x/wk   Discharge Recommendation   Rehab Resource Intensity Level, PT II (Moderate Resource Intensity)   AM-PAC Basic Mobility Inpatient   Turning in Flat Bed Without Bedrails 3   Lying on Back to Sitting on Edge of Flat Bed Without Bedrails 3   Moving Bed to Chair 3   Standing Up From Chair Using Arms 3   Walk in Room 3   Climb 3-5 Stairs With Railing 2   Basic Mobility Inpatient Raw Score 17   Basic Mobility Standardized Score 39.67   [facility] Highest Level Of Mobility   -HLM Goal 5: Stand one or more mins   -HLM Achieved 7: Walk 25 feet or more   Education   Education Provided Mobility training   Patient Demonstrates verbal understanding;Reinforcement needed   End of Consult   Patient Position at End of Consult Bedside chair;Bed/Chair alarm activated;All needs within reach     Damon Lezama, PT

## 2025-07-07 NOTE — ASSESSMENT & PLAN NOTE
Wt Readings from Last 3 Encounters:   07/07/25 57.2 kg (126 lb)   07/01/25 56.8 kg (125 lb 3.2 oz)   05/28/25 61.2 kg (135 lb)   History of systolic heart failure thought to be possibly secondary to RMSF  Most recent echo 4/2025 with LVEF of 45%.  G1 DD  Maintained on torsemide 40 Mg daily  Examines euvolemic on admission, continue home torsemide  Repeat echo ordered due to presence of large pulmonary embolism  Held torsemide in setting of CHASE, creatinine improved, plan to resume torsemide on 7/7

## 2025-07-07 NOTE — ASSESSMENT & PLAN NOTE
Lab Results   Component Value Date    HGBA1C 9.0 (H) 06/21/2025       Recent Labs     07/06/25  1614 07/06/25  2116 07/07/25  0909 07/07/25  1122   POCGLU 117 116 100 168*       Blood Sugar Average: Last 72 hrs:  (P) [id number] regimen: Januvia 100 mg daily, PCP just prescribed  semaglutide, which patient has not yet started  Hold home medications and placed on SSI 3 times daily and at bedtime  Trend glucose, may need standing insulin as outpatient A1c was 9.0

## 2025-07-08 ENCOUNTER — TELEPHONE (OUTPATIENT)
Age: 75
End: 2025-07-08

## 2025-07-08 RX ORDER — SITAGLIPTIN 100 MG/1
100 TABLET, FILM COATED ORAL DAILY
Qty: 30 TABLET | Refills: 5 | Status: SHIPPED | OUTPATIENT
Start: 2025-07-08

## 2025-07-08 NOTE — TELEPHONE ENCOUNTER
Patient's daughter has question regarding start dates on  Gabapentin and Rybelsus.She thought hat she was told to start with Rybelsus and then towards the end of the month start Gabapentin.  Please call to straighten this out . Tried clinical staff as well.  She stated that she called yesterday and was told that Dr. Henson will get back to her. I was unable to find the encounter.  Please help she is worried.    Thank you!!

## 2025-07-08 NOTE — TELEPHONE ENCOUNTER
Spoke to daughter  she will have her sister call to make appt for HFU.   Dr JONES wanted hfu for 3 months   can be with  or damien, but dr garzon has appt s in [place] for sept.

## 2025-07-08 NOTE — TELEPHONE ENCOUNTER
Patient's daughter called and patient has been scheduled to se paddy Isbell in September.      Thank you.

## 2025-07-08 NOTE — TELEPHONE ENCOUNTER
Start Gabapentin and increase it from1 tab PO q day to 1 tab PO bid to 1 tab PO tid and stay on that until re-eval.  After stable on Gabapentin for a week and see no side effects then start there Rybelsus - likely 2-3 wks from now.  If any SE then call

## 2025-07-09 LAB
BACTERIA BLD CULT: NORMAL
BACTERIA BLD CULT: NORMAL

## 2025-07-09 NOTE — TELEPHONE ENCOUNTER
[facility]. I advised her patient is already scheduled 9/23 at 3:00 pm with Dr Isbell. No further questions at this time. Thank you.

## 2025-07-15 ENCOUNTER — TELEPHONE (OUTPATIENT)
Age: 75
End: 2025-07-15

## 2025-07-16 ENCOUNTER — CONSULT (OUTPATIENT)
Dept: ENDOCRINOLOGY | Facility: HOSPITAL | Age: 75
End: 2025-07-16
Attending: INTERNAL MEDICINE
Payer: MEDICARE

## 2025-07-16 VITALS
BODY MASS INDEX: 27.29 KG/M2 | HEART RATE: 96 BPM | HEIGHT: 58 IN | WEIGHT: 130 LBS | SYSTOLIC BLOOD PRESSURE: 122 MMHG | DIASTOLIC BLOOD PRESSURE: 70 MMHG

## 2025-07-16 DIAGNOSIS — M85.852 OSTEOPENIA OF LEFT HIP: ICD-10-CM

## 2025-07-16 DIAGNOSIS — E21.3 HYPERPARATHYROIDISM (HCC): Primary | ICD-10-CM

## 2025-07-16 DIAGNOSIS — N28.89 LEFT KIDNEY MASS: Primary | ICD-10-CM

## 2025-07-16 PROCEDURE — 99204 OFFICE O/P NEW MOD 45 MIN: CPT | Performed by: INTERNAL MEDICINE

## 2025-07-16 RX ORDER — BISACODYL 10 MG
10 SUPPOSITORY, RECTAL RECTAL AS NEEDED
COMMUNITY

## 2025-07-16 RX ORDER — ACETAMINOPHEN 325 MG/1
650 TABLET ORAL EVERY 4 HOURS PRN
COMMUNITY

## 2025-07-16 RX ORDER — POLYETHYLENE GLYCOL 3350 17 G/17G
17 POWDER, FOR SOLUTION ORAL AS NEEDED
COMMUNITY

## 2025-07-16 NOTE — ASSESSMENT & PLAN NOTE
Orders:    Ambulatory Referral to Endocrinology    Comprehensive metabolic panel; Future    Phosphorus    Protein electrophoresis, serum    PTH, intact    Magnesium    Vitamin D 25 hydroxy    DXA bone density spine hip and pelvis; Future

## 2025-07-16 NOTE — TELEPHONE ENCOUNTER
I tried to order the MRI but got an alert that the MRI department has previously designated her as unsafe to have an MRI (not knowing her history I am not sure why this is). On her recent CT scan the radiologist noted that the MRI need is non-emergent, so this can wait for PCP to determine appropriate testing when she returns next week.....

## 2025-07-16 NOTE — PROGRESS NOTES
Name: Dejah Parish      : 1950      MRN: [id number]  Encounter Provider: Lizy Diaz MD  Encounter Date: 2025   Encounter department: [facility]    No chief complaint on file.  :  Assessment & Plan  Hyperparathyroidism (HCC)    Orders:    Ambulatory Referral to Endocrinology    Comprehensive metabolic panel; Future    Phosphorus    Protein electrophoresis, serum    PTH, intact    Magnesium    Vitamin D 25 hydroxy    DXA bone density spine hip and pelvis; Future    Osteopenia of left hip    Orders:    DXA bone density spine hip and pelvis; Future      Assessment & Plan  1. Hyperparathyroidism.  - She has a history of elevated parathyroid hormone levels since at least , with recent labs showing increased calcium and parathyroid hormone levels at the end of 2025. Her calcium levels have since normalized.  - Physical exam findings include no kidney stones or broken bones, but she did have two broken ribs from a recent hospitalization. A DEXA scan from 2023 showed weak bone in the hip.  - The potential need for surgical intervention was discussed if medical management proves insufficient. Blood work will be ordered to assess calcium, phosphorus, magnesium, parathyroid hormone, and vitamin D levels to evaluate for other causes of hypercalcemia. A DEXA scan will also be ordered to evaluate bone density.  - The current regimen of calcium and vitamin D will be maintained until further notice.    2.  Osteopenia of the left hip.  We will try to identify if she is taking any calcium and vitamin D replacement as yet at her rehab currently.  She was not taking it at home prior to the need for rehab.  I will order a DEXA scan as it has been more than 2 years since the last 1.  Blood work will be ordered to evaluate for secondary causes of osteoporosis.    3.  Diabetes mellitus.  - She is scheduled to start a new diabetes medication  tomorrow.    4. Stage 2 kidney disease.  - Her kidney function has improved from stage 3 to stage 2.  - Recent blood work shows improved kidney function.    Follow-up: To be determined.    She will get blood work performed at her earliest convenience consisting of a CMP, phosphorus, magnesium, intact parathyroid hormone level, serum protein electrophoresis, and 25-hydroxy vitamin D level.  She will also get a DEXA scan performed at her earliest convenience.      Pertinent Medical History   Dejah Parish is a 75-year-old female with osteopenia of the hip and hyperparathyroidism with elevated calcium.    She was recently in the hospital with debilitated symptoms and weakness.  She was found to have 2 fractured ribs after a fall.  Calcium was noted to be very elevated prior to the hospitalization and reportedly she was on calcium and vitamin D replacement which were discontinued but it appears she may be on that in rehab posthospitalization.  She apparently has had hyper parathyroidism or an elevated parathyroid level since at least 2019 when she had a negative parathyroid scan.         History of Present Illness   History of Present Illness  Dejah Parish is a 75-year-old female here for a consultation regarding hyperparathyroidism. She is accompanied by her daughter.    She was previously on calcium and vitamin D supplements, which were discontinued in early July 2025. She reports no history of kidney stones or fractures. However, she was informed of 2 broken ribs during her recent hospital stay. She experienced a fall a few weeks ago and has been using a walker since her hospital admission. Her diet includes cheese 2 to 3 times a week, yogurt once or twice a week, and occasional ice cream. She does not consume milk, dark green vegetables, or sardines. She does not use antacids like Tums. She was active before but has been less so in the past month due to her hospitalization. She is currently undergoing  "physical therapy at a rehab facility. She reports no family history of calcium-related issues.     She reports average urination, no waking up at night to urinate, excessive thirst, constipation, abdominal pain, nausea, or heartburn. She occasionally experiences fatigue, particularly in the past month. Her sleep is satisfactory. She has low back pain, which is a new symptom. She feels mentally sharp but has noticed increased forgetfulness. She reports no hot flashes, excessive sweating, tremors, palpitations, or diarrhea. She has noticed increased hair loss and weight loss of about 20 pounds, with 10 pounds lost in the past month. Her appetite has improved since starting rehab. She reports no numbness or tingling. She has not received radiation treatment to her head or neck. She has been in rehab for a week and is expected to stay for 1 to 3 weeks. She is doing well and may be discharged soon.    She has a history of diabetes and is scheduled to start a new medication tomorrow. She started taking Jardiance in the past month but was taken off it due to nausea.    She has stage 2 kidney disease.    FAMILY HISTORY  Her mother had Alzheimer's and blood pressure issues. Her father had prostate cancer. Her brother had Parkinson's and leukemia. Her daughter had breast cancer.    Review of Systems as per HPI  Medical History Reviewed by provider this encounter:  Tobacco  Allergies  Meds  Problems  Med Hx  Surg Hx  Fam Hx     .  Medications Ordered Prior to Encounter[1]   Social History[2]     Medical History Reviewed by provider this encounter:  Tobacco  Allergies  Meds  Problems  Med Hx  Surg Hx  Fam Hx     .    Objective   /70   Pulse 96   Ht 4' 10\" (1.473 m)   Wt 59 kg (130 lb)   BMI 27.17 kg/m²      Body mass index is 27.17 kg/m².  Wt Readings from Last 3 Encounters:   07/16/25 59 kg (130 lb)   07/07/25 57.2 kg (126 lb)   07/01/25 56.8 kg (125 lb 3.2 oz)     Physical Exam  Vitals and nursing note " reviewed.   Constitutional:       General: She is not in acute distress.     Appearance: Normal appearance. She is well-developed.   HENT:      Head: Normocephalic and atraumatic.      Comments: Negative Chvostek's sign.    Eyes:      Conjunctiva/sclera: Conjunctivae normal.      Comments: No lid lag, stare, proptosis, or periorbital edema.   Neck:      Vascular: No carotid bruit.      Comments: Thyroid normal in size.  No palpable thyroid nodules.  No masses of the neck.  Cardiovascular:      Rate and Rhythm: Normal rate and regular rhythm.      Heart sounds: Normal heart sounds. No murmur heard.  Pulmonary:      Effort: Pulmonary effort is normal. No respiratory distress.      Breath sounds: Normal breath sounds. No wheezing.   Abdominal:      Palpations: Abdomen is soft.     Musculoskeletal:         General: No swelling.      Cervical back: Neck supple.      Right lower leg: No edema.      Left lower leg: No edema.      Comments: No tremor of the outstretched hands.  No CVA tenderness.  No spinous process tenderness.  Using a walker.   Lymphadenopathy:      Cervical: No cervical adenopathy.     Skin:     General: Skin is warm and dry.      Capillary Refill: Capillary refill takes less than 2 seconds.     Neurological:      Mental Status: She is alert and oriented to person, place, and time.      Comments: Patellar deep tendon reflexes normal.   Psychiatric:         Mood and Affect: Mood normal.       Physical Exam      Results    Labs: I have reviewed pertinent labs including:   Lab Results   Component Value Date    HGBA1C 9.0 (H) 06/21/2025    HGBA1C 9.8 (H) 03/03/2025    HGBA1C 7.3 (A) 12/03/2024      Lab Results   Component Value Date    CREATININE 0.91 07/07/2025    CREATININE 1.15 07/06/2025    CREATININE 1.23 07/05/2025    BUN 13 07/07/2025     07/05/2017    K 4.8 07/07/2025     07/07/2025    CO2 26 07/07/2025      eGFR   Date Value Ref Range Status   07/07/2025 61 ml/min/1.73sq m Final       Cholesterol   Date Value Ref Range Status   07/05/2017 170 125 - 200 mg/dL Final     HDL   Date Value Ref Range Status   07/05/2017 73 > OR = 46 mg/dL Final     HDL, Direct   Date Value Ref Range Status   12/04/2024 75 >=50 mg/dL Final     Triglycerides   Date Value Ref Range Status   12/04/2024 70 See Comment mg/dL Final     Comment:     Triglyceride:     0-9Y            <75mg/dL     10Y-17Y         <90 mg/dL       >=18Y     Normal          <150 mg/dL     Borderline High 150-199 mg/dL     High            200-499 mg/dL        Very High       >499 mg/dL    Specimen collection should occur prior to Metamizole administration due to the potential for falsely depressed results.   07/05/2017 59 <150 mg/dL Final      ALT   Date Value Ref Range Status   07/03/2025 15 7 - 52 U/L Final     Comment:     Specimen collection should occur prior to Sulfasalazine administration due to the potential for falsely depressed results.    07/05/2017 26 6 - 29 U/L Final     Comment:     Performed at: [facility]  JOSE TAM MD, [address] [id number]       AST   Date Value Ref Range Status   07/03/2025 16 13 - 39 U/L Final   07/05/2017 18 10 - 35 U/L Final     Alkaline Phosphatase   Date Value Ref Range Status   07/03/2025 85 34 - 104 U/L Final   07/05/2017 66 33 - 130 U/L Final     Total Bilirubin   Date Value Ref Range Status   07/05/2017 0.6 0.2 - 1.2 mg/dL Final      Lab Results   Component Value Date    [id number] 1.120 12/04/2024         Lab Results   Component Value Date    [id number] >120.0 (H) 12/04/2024    [id number] 97.1 05/01/2023    [id number] 48 06/17/2015      Radiology Results Review: I have reviewed the following images/report studies in PACS: DEXA scan.    DXA SCAN performed on 6/12/2023     CLINICAL HISTORY: 73-year-old postmenopausal female.  OTHER RISK FACTORS: Takes PPI's and SSRIs.     PHARMACOLOGIC THERAPY FOR OSTEOPOROSIS:  None.     TECHNIQUE: Bone  densitometry was performed using a Hologic Discovery C bone densitometer.  Regions of interest appear properly placed.     COMPARISON: 2/24/2021.     RESULTS:     LUMBAR SPINE  Level: L1, L3, L4  (L2 vertebra excluded from analysis due to local structural abnormalities or artifact):  BMD: 1.074 gm/cm2  T-score: 0.2        LEFT  TOTAL HIP:  BMD: 0.845 gm/cm2  T-score: -0.8     LEFT  FEMORAL NECK:  BMD: 0.657 gm/cm2  T score: -1.7        IMPRESSION:     1. Low bone mass (osteopenia). Based on the left femoral neck     2.  Since a DXA study from 2/24/2021, there has been:  A  STATISTICALLY SIGNIFICANT DECREASE in bone mineral density of 0.031 g/cm2 (3.5%) in the left total hip.           3.  The 10 year risk of hip fracture is 3.2% with the 10 year risk of major osteoporotic fracture being 12% as calculated by the [employer] fracture risk assessment tool (FRAX, which is based on data generated by the WHO Collaborating [place]   for Metabolic Bone Diseases).     4.  The current NOF guidelines recommend treating patients with a T-score of -2.5 or less in the lumbar spine or hips, or in post-menopausal women and men over the age of 50 with low bone mass (osteopenia) and a FRAX 10 year risk score of >3% for hip   fracture and/or >20% for major osteoporotic fracture.     5.  The NOF recommends follow-up DXA in 1-2 years after initiating therapy for osteoporosis and every 2 years thereafter. More frequent evaluation is appropriate for patients with conditions associated with rapid bone loss, such as glucocorticoid   therapy. The interval between DXA screenings may be longer for individuals without major risk factors and initial T-score in the normal or upper low bone mass range.       Patient Instructions   We'll start with blood work.     We need to clarify whether she is taking calcium and vitamin D.     We'll order the dexa scan.    Follow up to be determined.     Discussed with the patient and all questioned  fully answered. She will call me if any problems arise.           [1]   Current Outpatient Medications on File Prior to Visit   Medication Sig Dispense Refill    acetaminophen (TYLENOL) 325 mg tablet Take 650 mg by mouth every 4 (four) hours as needed for mild pain      allopurinol (ZYLOPRIM) 100 mg tablet TAKE 1 TABLET BY MOUTH DAILY 90 tablet 1    apixaban (ELIQUIS) 5 mg Take 1 tablet (5 mg total) by mouth 2 (two) times a day Do not start before July 12, 2025.      aspirin 81 MG tablet Take 81 mg by mouth in the morning.      bisacodyl (Dulcolax) 10 mg suppository Insert 10 mg into the rectum as needed for constipation      calcium carbonate (OS-MANINDER) 600 MG tablet Take 1,200 mg by mouth in the morning.      candesartan (ATACAND) 32 MG tablet Take 1 tablet (32 mg total) by mouth daily 90 tablet 1    cetirizine (ZyrTEC) 10 mg tablet Take 10 mg by mouth in the morning.      Cholecalciferol (VITAMIN D3) 1000 units CAPS Take by mouth      FLUoxetine (PROzac) 20 mg capsule TAKE 1 CAPSULE BY MOUTH DAILY (Patient taking differently: Take 10 mg by mouth daily) 30 capsule 5    gabapentin (Neurontin) 100 mg capsule 1 tab PO q am x 3 days then 1 tab PO bid x 3 days then 1 tab PO tid and stay on that until re-evaluated 90 capsule 1    levothyroxine 25 mcg tablet Take 1 tablet (25 mcg total) by mouth daily 90 tablet 1    Magnesium 250 MG TABS Take by mouth in the morning.      magnesium hydroxide (MILK OF MAGNESIA) 400 mg/5 mL oral suspension Take by mouth daily as needed for constipation      methylprednisolone (MEDROL) 4 mg tablet Take 4 mg by mouth every other day Take it on odd day of the month      omeprazole (PriLOSEC) 40 MG capsule Take 1 capsule (40 mg total) by mouth daily (Patient taking differently: Take 20 mg by mouth in the morning.) 90 capsule 1    polyethylene glycol (MIRALAX) 17 g packet Take 17 g by mouth as needed      potassium chloride (Klor-Con M20) 20 mEq tablet Take 1 tablet (20 mEq total) by mouth daily  (Patient taking differently: Take 10 mEq by mouth in the morning.) 60 tablet 5    pyridoxine (VITAMIN B6) 100 mg tablet Take 100 mg by mouth in the morning.      simvastatin (ZOCOR) 40 mg tablet TAKE 1 TABLET BY MOUTH DAILY 90 tablet 1    sitaGLIPtin (Januvia) 100 mg tablet Take 1 tablet by mouth once daily 30 tablet 5    torsemide (DEMADEX) 20 mg tablet Take 1 tablet by mouth twice daily 60 tablet 5    traMADol (ULTRAM) 50 mg tablet Take 1 tablet (50 mg total) by mouth 2 (two) times a day as needed for moderate pain or severe pain for up to 6 doses 6 tablet 0    Melatonin 5 MG TABS Take 5 mg by mouth daily at bedtime (Patient not taking: Reported on 2025)      semaglutide (Rybelsus) 3 MG tablet Take 1 tablet (3 mg total) by mouth daily Take on an empty stomach with 4 oz water, and wait 30 minutes before eating (Patient not taking: Reported on 2025) 30 tablet 0    [START ON 2025] semaglutide (Rybelsus) 7 MG tablet Take 1 tablet (7 mg total) by mouth daily Take on an empty stomach with 4 oz water, and wait 30 minutes before eating Do not start before 2025. (Patient not taking: Reported on 2025 Do not start before 2025.) 90 tablet 1     No current facility-administered medications on file prior to visit.   [2]   Social History  Tobacco Use    Smoking status: Former     Current packs/day: 0.00     Average packs/day: 1 pack/day for 15.0 years (15.0 ttl pk-yrs)     Types: Cigarettes     Start date:      Quit date:      Years since quittin.5    Smokeless tobacco: Never    Tobacco comments:     Smoked on and off for the last few years before I quit   Vaping Use    Vaping status: Never Used   Substance and Sexual Activity    Alcohol use: Never    Drug use: Never    Sexual activity: Not Currently     Birth control/protection: Ring, None     Comment: Took birth control pills from  until

## 2025-07-16 NOTE — PATIENT INSTRUCTIONS
We'll start with blood work.     We need to clarify whether she is taking calcium and vitamin D.     We'll order the dexa scan.    Follow up to be determined.

## 2025-07-16 NOTE — TELEPHONE ENCOUNTER
Detailed message left for daughter, Tanisha.  Recommendations from last two CT results state 'nonemergent MRI'.  Once Dr Henson reviews the chart/results she will determine what to order and patient/daughter will be notified.

## 2025-07-17 ENCOUNTER — TELEPHONE (OUTPATIENT)
Age: 75
End: 2025-07-17

## 2025-07-17 ENCOUNTER — TELEPHONE (OUTPATIENT)
Dept: NEPHROLOGY | Facility: CLINIC | Age: 75
End: 2025-07-17

## 2025-07-17 NOTE — TELEPHONE ENCOUNTER
I was cleaning up her chart.  The Eliquis dosage was the higher dose which was decreased as of July 12 so I discontinued it from the last.  The patient said she was not taking metoprolol so I discontinued it from her list.  If this was an error, I apologize but I was just trying to delete any medicine she was not taking any longer.

## 2025-07-17 NOTE — TELEPHONE ENCOUNTER
"Phone call from Dimple @ [facility] to check on patient's upcoming appointment. Informed Dimple that according to physician's note \"Follow-Up: To be determined\".     Please contact Dimple @ [facility] if any appointments are needed in the near future.   "

## 2025-07-17 NOTE — TELEPHONE ENCOUNTER
Daughter states on AVS it states Eliquis changed and to stop metoprolol. Daughter states she doesn't remember changes being talked about during OV yesterday. Please advise, thank you.

## 2025-07-22 ENCOUNTER — APPOINTMENT (OUTPATIENT)
Dept: LAB | Facility: CLINIC | Age: 75
End: 2025-07-22
Payer: MEDICARE

## 2025-07-22 DIAGNOSIS — E21.3 HYPERPARATHYROIDISM (HCC): ICD-10-CM

## 2025-07-22 LAB
25(OH)D3 SERPL-MCNC: >120 NG/ML (ref 30–100)
ALBUMIN SERPL BCG-MCNC: 3.6 G/DL (ref 3.5–5)
ALP SERPL-CCNC: 70 U/L (ref 34–104)
ALT SERPL W P-5'-P-CCNC: 19 U/L (ref 7–52)
ANION GAP SERPL CALCULATED.3IONS-SCNC: 8 MMOL/L (ref 4–13)
AST SERPL W P-5'-P-CCNC: 19 U/L (ref 13–39)
BILIRUB SERPL-MCNC: 0.4 MG/DL (ref 0.2–1)
BUN SERPL-MCNC: 21 MG/DL (ref 5–25)
CALCIUM SERPL-MCNC: 10.3 MG/DL (ref 8.4–10.2)
CHLORIDE SERPL-SCNC: 102 MMOL/L (ref 96–108)
CO2 SERPL-SCNC: 34 MMOL/L (ref 21–32)
CREAT SERPL-MCNC: 0.86 MG/DL (ref 0.6–1.3)
GFR SERPL CREATININE-BSD FRML MDRD: 65 ML/MIN/1.73SQ M
GLUCOSE P FAST SERPL-MCNC: 115 MG/DL (ref 65–99)
MAGNESIUM SERPL-MCNC: 2.2 MG/DL (ref 1.9–2.7)
PHOSPHATE SERPL-MCNC: 3.3 MG/DL (ref 2.3–4.1)
POTASSIUM SERPL-SCNC: 3.3 MMOL/L (ref 3.5–5.3)
PROT SERPL-MCNC: 6.1 G/DL (ref 6.4–8.4)
PTH-INTACT SERPL-MCNC: 67.6 PG/ML (ref 12–88)
SODIUM SERPL-SCNC: 144 MMOL/L (ref 135–147)

## 2025-07-22 PROCEDURE — 84165 PROTEIN E-PHORESIS SERUM: CPT | Performed by: INTERNAL MEDICINE

## 2025-07-22 PROCEDURE — 86334 IMMUNOFIX E-PHORESIS SERUM: CPT | Performed by: INTERNAL MEDICINE

## 2025-07-22 PROCEDURE — 82306 VITAMIN D 25 HYDROXY: CPT | Performed by: INTERNAL MEDICINE

## 2025-07-22 PROCEDURE — 80053 COMPREHEN METABOLIC PANEL: CPT

## 2025-07-22 PROCEDURE — 84100 ASSAY OF PHOSPHORUS: CPT | Performed by: INTERNAL MEDICINE

## 2025-07-22 PROCEDURE — 83970 ASSAY OF PARATHORMONE: CPT | Performed by: INTERNAL MEDICINE

## 2025-07-22 PROCEDURE — 83735 ASSAY OF MAGNESIUM: CPT | Performed by: INTERNAL MEDICINE

## 2025-07-22 PROCEDURE — 36415 COLL VENOUS BLD VENIPUNCTURE: CPT | Performed by: INTERNAL MEDICINE

## 2025-07-24 LAB
ALBUMIN SERPL ELPH-MCNC: 3.09 G/DL (ref 3.2–5.1)
ALBUMIN SERPL ELPH-MCNC: 56.2 % (ref 48–70)
ALPHA1 GLOB SERPL ELPH-MCNC: 0.39 G/DL (ref 0.15–0.47)
ALPHA1 GLOB SERPL ELPH-MCNC: 7.1 % (ref 1.8–7)
ALPHA2 GLOB SERPL ELPH-MCNC: 0.83 G/DL (ref 0.42–1.04)
ALPHA2 GLOB SERPL ELPH-MCNC: 15 % (ref 5.9–14.9)
BETA GLOB ABNORMAL SERPL ELPH-MCNC: 0.37 G/DL (ref 0.31–0.57)
BETA1 GLOB SERPL ELPH-MCNC: 6.8 % (ref 4.7–7.7)
BETA2 GLOB SERPL ELPH-MCNC: 6.7 % (ref 3.1–7.9)
BETA2+GAMMA GLOB SERPL ELPH-MCNC: 0.37 G/DL (ref 0.2–0.58)
GAMMA GLOB ABNORMAL SERPL ELPH-MCNC: 0.45 G/DL (ref 0.4–1.66)
GAMMA GLOB SERPL ELPH-MCNC: 8.2 % (ref 6.9–22.3)
IGG/ALB SER: 1.28 {RATIO} (ref 1.1–1.8)
PROT SERPL-MCNC: 5.5 G/DL (ref 6.4–8.4)

## 2025-07-24 PROCEDURE — 86334 IMMUNOFIX E-PHORESIS SERUM: CPT | Performed by: PATHOLOGY

## 2025-07-24 PROCEDURE — 84165 PROTEIN E-PHORESIS SERUM: CPT | Performed by: PATHOLOGY

## 2025-07-25 DIAGNOSIS — E11.22 TYPE 2 DIABETES MELLITUS WITH STAGE 3 CHRONIC KIDNEY DISEASE, WITHOUT LONG-TERM CURRENT USE OF INSULIN, UNSPECIFIED WHETHER STAGE 3A OR 3B CKD (HCC): Primary | ICD-10-CM

## 2025-07-25 DIAGNOSIS — N18.30 TYPE 2 DIABETES MELLITUS WITH STAGE 3 CHRONIC KIDNEY DISEASE, WITHOUT LONG-TERM CURRENT USE OF INSULIN, UNSPECIFIED WHETHER STAGE 3A OR 3B CKD (HCC): Primary | ICD-10-CM

## 2025-07-26 ENCOUNTER — APPOINTMENT (OUTPATIENT)
Dept: LAB | Facility: CLINIC | Age: 75
End: 2025-07-26
Payer: MEDICARE

## 2025-07-26 LAB
ANION GAP SERPL CALCULATED.3IONS-SCNC: 13 MMOL/L (ref 4–13)
BUN SERPL-MCNC: 20 MG/DL (ref 5–25)
CALCIUM SERPL-MCNC: 10.8 MG/DL (ref 8.4–10.2)
CHLORIDE SERPL-SCNC: 102 MMOL/L (ref 96–108)
CO2 SERPL-SCNC: 29 MMOL/L (ref 21–32)
CREAT SERPL-MCNC: 0.97 MG/DL (ref 0.6–1.3)
GFR SERPL CREATININE-BSD FRML MDRD: 56 ML/MIN/1.73SQ M
GLUCOSE SERPL-MCNC: 133 MG/DL (ref 65–140)
POTASSIUM SERPL-SCNC: 3.3 MMOL/L (ref 3.5–5.3)
SODIUM SERPL-SCNC: 144 MMOL/L (ref 135–147)

## 2025-07-26 PROCEDURE — 80048 BASIC METABOLIC PNL TOTAL CA: CPT | Performed by: INTERNAL MEDICINE

## 2025-07-26 PROCEDURE — 36415 COLL VENOUS BLD VENIPUNCTURE: CPT | Performed by: INTERNAL MEDICINE

## 2025-07-28 ENCOUNTER — RESULTS FOLLOW-UP (OUTPATIENT)
Dept: ENDOCRINOLOGY | Facility: HOSPITAL | Age: 75
End: 2025-07-28

## 2025-07-28 DIAGNOSIS — E03.9 ACQUIRED HYPOTHYROIDISM: ICD-10-CM

## 2025-07-28 DIAGNOSIS — I10 ESSENTIAL HYPERTENSION: ICD-10-CM

## 2025-07-28 DIAGNOSIS — N28.89 LEFT KIDNEY MASS: Primary | ICD-10-CM

## 2025-07-28 DIAGNOSIS — K21.00 GASTROESOPHAGEAL REFLUX DISEASE WITH ESOPHAGITIS: ICD-10-CM

## 2025-07-29 RX ORDER — OMEPRAZOLE 40 MG/1
CAPSULE, DELAYED RELEASE ORAL
Qty: 90 CAPSULE | Refills: 1 | Status: SHIPPED | OUTPATIENT
Start: 2025-07-29 | End: 2025-08-05 | Stop reason: SDUPTHER

## 2025-07-29 RX ORDER — CANDESARTAN 32 MG/1
32 TABLET ORAL DAILY
Qty: 90 TABLET | Refills: 1 | Status: SHIPPED | OUTPATIENT
Start: 2025-07-29

## 2025-07-29 RX ORDER — LEVOTHYROXINE SODIUM 25 UG/1
25 TABLET ORAL DAILY
Qty: 90 TABLET | Refills: 1 | Status: SHIPPED | OUTPATIENT
Start: 2025-07-29

## 2025-07-29 RX ORDER — METOPROLOL SUCCINATE 100 MG/1
100 TABLET, EXTENDED RELEASE ORAL DAILY
Qty: 90 TABLET | Refills: 1 | OUTPATIENT
Start: 2025-07-29

## 2025-08-04 ENCOUNTER — TELEPHONE (OUTPATIENT)
Age: 75
End: 2025-08-04

## 2025-08-04 DIAGNOSIS — E21.3 HYPERPARATHYROIDISM (HCC): Primary | ICD-10-CM

## 2025-08-05 DIAGNOSIS — I25.10 CORONARY ARTERY DISEASE INVOLVING NATIVE CORONARY ARTERY OF NATIVE HEART WITHOUT ANGINA PECTORIS: ICD-10-CM

## 2025-08-05 DIAGNOSIS — I50.22 CHRONIC SYSTOLIC CONGESTIVE HEART FAILURE (HCC): Primary | ICD-10-CM

## 2025-08-05 DIAGNOSIS — K21.00 GASTROESOPHAGEAL REFLUX DISEASE WITH ESOPHAGITIS: ICD-10-CM

## 2025-08-05 RX ORDER — OMEPRAZOLE 40 MG/1
CAPSULE, DELAYED RELEASE ORAL
Qty: 90 CAPSULE | Refills: 1 | Status: SHIPPED | OUTPATIENT
Start: 2025-08-05

## 2025-08-07 ENCOUNTER — APPOINTMENT (OUTPATIENT)
Dept: CT IMAGING | Facility: HOSPITAL | Age: 75
End: 2025-08-07
Attending: INTERNAL MEDICINE
Payer: MEDICARE

## 2025-08-07 ENCOUNTER — OFFICE VISIT (OUTPATIENT)
Dept: FAMILY MEDICINE CLINIC | Facility: HOSPITAL | Age: 75
End: 2025-08-07
Payer: MEDICARE

## 2025-08-07 ENCOUNTER — HOSPITAL ENCOUNTER (OUTPATIENT)
Dept: BONE DENSITY | Facility: CLINIC | Age: 75
Discharge: HOME | End: 2025-08-07
Attending: INTERNAL MEDICINE
Payer: MEDICARE

## 2025-08-07 VITALS — HEIGHT: 58 IN | WEIGHT: 130 LBS | BODY MASS INDEX: 27.29 KG/M2

## 2025-08-07 DIAGNOSIS — E21.3 HYPERPARATHYROIDISM (HCC): ICD-10-CM

## 2025-08-07 DIAGNOSIS — M85.852 OSTEOPENIA OF LEFT HIP: ICD-10-CM

## 2025-08-07 PROCEDURE — 77080 DXA BONE DENSITY AXIAL: CPT

## 2025-08-12 ENCOUNTER — NURSE TRIAGE (OUTPATIENT)
Age: 75
End: 2025-08-12

## 2025-08-22 ENCOUNTER — HOSPITAL ENCOUNTER (OUTPATIENT)
Dept: CT IMAGING | Facility: HOSPITAL | Age: 75
Discharge: HOME | End: 2025-08-22
Attending: INTERNAL MEDICINE
Payer: MEDICARE

## 2025-08-22 DIAGNOSIS — N28.89 LEFT KIDNEY MASS: ICD-10-CM

## 2025-08-22 PROCEDURE — 74178 CT ABD&PLV WO CNTR FLWD CNTR: CPT

## 2025-08-22 RX ADMIN — IOHEXOL 75 ML: 350 INJECTION, SOLUTION INTRAVENOUS at 13:23

## (undated) DEVICE — GLOVE INDICATOR PI UNDERGLOVE SZ 6.5 BLUE

## (undated) DEVICE — PLASMABLADE PS200-040 4.0: Brand: PLASMABLADE™

## (undated) DEVICE — SPONGE PVP SCRUB WING STERILE

## (undated) DEVICE — SUT PROLENE 4-0 PC-3 18 IN 8634G

## (undated) DEVICE — INTENDED FOR TISSUE SEPARATION, AND OTHER PROCEDURES THAT REQUIRE A SHARP SURGICAL BLADE TO PUNCTURE OR CUT.: Brand: BARD-PARKER SAFETY BLADES SIZE 15, STERILE

## (undated) DEVICE — CHLORAPREP HI-LITE 26ML ORANGE

## (undated) DEVICE — GLOVE SRG BIOGEL 7.5

## (undated) DEVICE — GLOVE INDICATOR PI UNDERGLOVE SZ 8 BLUE

## (undated) DEVICE — PACK PLASTIC HAND PBDS

## (undated) DEVICE — GLOVE SRG BIOGEL 6.5